# Patient Record
Sex: MALE | Race: WHITE | Employment: OTHER | URBAN - NONMETROPOLITAN AREA
[De-identification: names, ages, dates, MRNs, and addresses within clinical notes are randomized per-mention and may not be internally consistent; named-entity substitution may affect disease eponyms.]

---

## 2017-01-05 ENCOUNTER — OFFICE VISIT (OUTPATIENT)
Dept: PHYSICAL MEDICINE AND REHAB | Age: 52
End: 2017-01-05

## 2017-01-05 VITALS
HEART RATE: 75 BPM | HEIGHT: 66 IN | WEIGHT: 156.4 LBS | SYSTOLIC BLOOD PRESSURE: 106 MMHG | DIASTOLIC BLOOD PRESSURE: 64 MMHG | BODY MASS INDEX: 25.13 KG/M2

## 2017-01-05 DIAGNOSIS — G89.4 CHRONIC PAIN SYNDROME: ICD-10-CM

## 2017-01-05 DIAGNOSIS — M47.816 SPONDYLOSIS OF LUMBAR REGION WITHOUT MYELOPATHY OR RADICULOPATHY: Primary | ICD-10-CM

## 2017-01-05 PROCEDURE — G8427 DOCREV CUR MEDS BY ELIG CLIN: HCPCS | Performed by: PAIN MEDICINE

## 2017-01-05 PROCEDURE — G8484 FLU IMMUNIZE NO ADMIN: HCPCS | Performed by: PAIN MEDICINE

## 2017-01-05 PROCEDURE — G8599 NO ASA/ANTIPLAT THER USE RNG: HCPCS | Performed by: PAIN MEDICINE

## 2017-01-05 PROCEDURE — 3017F COLORECTAL CA SCREEN DOC REV: CPT | Performed by: PAIN MEDICINE

## 2017-01-05 PROCEDURE — G8419 CALC BMI OUT NRM PARAM NOF/U: HCPCS | Performed by: PAIN MEDICINE

## 2017-01-05 PROCEDURE — 99205 OFFICE O/P NEW HI 60 MIN: CPT | Performed by: PAIN MEDICINE

## 2017-01-05 PROCEDURE — 4004F PT TOBACCO SCREEN RCVD TLK: CPT | Performed by: PAIN MEDICINE

## 2017-01-05 RX ORDER — TIZANIDINE 4 MG/1
4 TABLET ORAL EVERY 8 HOURS PRN
Qty: 90 TABLET | Refills: 0 | Status: SHIPPED | OUTPATIENT
Start: 2017-01-05 | End: 2017-02-04

## 2017-01-05 RX ORDER — TRAMADOL HYDROCHLORIDE 50 MG/1
50 TABLET ORAL EVERY 8 HOURS PRN
Qty: 90 TABLET | Refills: 0 | Status: SHIPPED | OUTPATIENT
Start: 2017-01-05 | End: 2017-02-09 | Stop reason: ALTCHOICE

## 2017-01-05 ASSESSMENT — ENCOUNTER SYMPTOMS
COUGH: 0
RHINORRHEA: 0
DIARRHEA: 0
SINUS PRESSURE: 0
CHEST TIGHTNESS: 0
SORE THROAT: 0
ABDOMINAL PAIN: 0
PHOTOPHOBIA: 0
COLOR CHANGE: 0
WHEEZING: 0
SHORTNESS OF BREATH: 0
BOWEL INCONTINENCE: 0
BACK PAIN: 1
CONSTIPATION: 0
NAUSEA: 0
EYE PAIN: 0
VOMITING: 0

## 2017-01-06 DIAGNOSIS — M47.816 SPONDYLOSIS OF LUMBAR REGION WITHOUT MYELOPATHY OR RADICULOPATHY: Primary | ICD-10-CM

## 2017-01-26 ENCOUNTER — TELEPHONE (OUTPATIENT)
Dept: PHYSICAL MEDICINE AND REHAB | Age: 52
End: 2017-01-26

## 2017-02-09 ENCOUNTER — OFFICE VISIT (OUTPATIENT)
Dept: PHYSICAL MEDICINE AND REHAB | Age: 52
End: 2017-02-09

## 2017-02-09 VITALS
BODY MASS INDEX: 26.36 KG/M2 | DIASTOLIC BLOOD PRESSURE: 88 MMHG | HEART RATE: 104 BPM | HEIGHT: 66 IN | WEIGHT: 164 LBS | SYSTOLIC BLOOD PRESSURE: 127 MMHG

## 2017-02-09 DIAGNOSIS — G89.4 CHRONIC PAIN SYNDROME: ICD-10-CM

## 2017-02-09 DIAGNOSIS — M47.816 SPONDYLOSIS OF LUMBAR REGION WITHOUT MYELOPATHY OR RADICULOPATHY: Primary | ICD-10-CM

## 2017-02-09 PROCEDURE — 4004F PT TOBACCO SCREEN RCVD TLK: CPT | Performed by: NURSE PRACTITIONER

## 2017-02-09 PROCEDURE — G8419 CALC BMI OUT NRM PARAM NOF/U: HCPCS | Performed by: NURSE PRACTITIONER

## 2017-02-09 PROCEDURE — G8427 DOCREV CUR MEDS BY ELIG CLIN: HCPCS | Performed by: NURSE PRACTITIONER

## 2017-02-09 PROCEDURE — G8484 FLU IMMUNIZE NO ADMIN: HCPCS | Performed by: NURSE PRACTITIONER

## 2017-02-09 PROCEDURE — G8599 NO ASA/ANTIPLAT THER USE RNG: HCPCS | Performed by: NURSE PRACTITIONER

## 2017-02-09 PROCEDURE — 99213 OFFICE O/P EST LOW 20 MIN: CPT | Performed by: NURSE PRACTITIONER

## 2017-02-09 PROCEDURE — 3017F COLORECTAL CA SCREEN DOC REV: CPT | Performed by: NURSE PRACTITIONER

## 2017-02-09 RX ORDER — ACETAMINOPHEN AND CODEINE PHOSPHATE 300; 30 MG/1; MG/1
1 TABLET ORAL EVERY 8 HOURS PRN
Qty: 42 TABLET | Refills: 0 | Status: SHIPPED | OUTPATIENT
Start: 2017-02-09 | End: 2017-02-20

## 2017-02-09 ASSESSMENT — ENCOUNTER SYMPTOMS
BACK PAIN: 1
NAUSEA: 0
RHINORRHEA: 0
EYE PAIN: 0
CHEST TIGHTNESS: 0
SORE THROAT: 0
CONSTIPATION: 0
SHORTNESS OF BREATH: 0
SINUS PRESSURE: 0
PHOTOPHOBIA: 0
ABDOMINAL PAIN: 0
COLOR CHANGE: 0
COUGH: 0
DIARRHEA: 0
WHEEZING: 0
VOMITING: 0

## 2017-03-13 ENCOUNTER — OFFICE VISIT (OUTPATIENT)
Dept: PHYSICAL MEDICINE AND REHAB | Age: 52
End: 2017-03-13

## 2017-03-13 VITALS
WEIGHT: 156 LBS | BODY MASS INDEX: 25.07 KG/M2 | DIASTOLIC BLOOD PRESSURE: 82 MMHG | SYSTOLIC BLOOD PRESSURE: 129 MMHG | HEIGHT: 66 IN | HEART RATE: 81 BPM

## 2017-03-13 DIAGNOSIS — G89.29 CHRONIC BILATERAL LOW BACK PAIN WITHOUT SCIATICA: ICD-10-CM

## 2017-03-13 DIAGNOSIS — M47.816 SPONDYLOSIS OF LUMBAR REGION WITHOUT MYELOPATHY OR RADICULOPATHY: Primary | ICD-10-CM

## 2017-03-13 DIAGNOSIS — M54.50 CHRONIC BILATERAL LOW BACK PAIN WITHOUT SCIATICA: ICD-10-CM

## 2017-03-13 PROCEDURE — 4004F PT TOBACCO SCREEN RCVD TLK: CPT | Performed by: NURSE PRACTITIONER

## 2017-03-13 PROCEDURE — G8427 DOCREV CUR MEDS BY ELIG CLIN: HCPCS | Performed by: NURSE PRACTITIONER

## 2017-03-13 PROCEDURE — G8484 FLU IMMUNIZE NO ADMIN: HCPCS | Performed by: NURSE PRACTITIONER

## 2017-03-13 PROCEDURE — 3017F COLORECTAL CA SCREEN DOC REV: CPT | Performed by: NURSE PRACTITIONER

## 2017-03-13 PROCEDURE — 99213 OFFICE O/P EST LOW 20 MIN: CPT | Performed by: NURSE PRACTITIONER

## 2017-03-13 PROCEDURE — G8599 NO ASA/ANTIPLAT THER USE RNG: HCPCS | Performed by: NURSE PRACTITIONER

## 2017-03-13 PROCEDURE — G8419 CALC BMI OUT NRM PARAM NOF/U: HCPCS | Performed by: NURSE PRACTITIONER

## 2017-03-13 RX ORDER — TIZANIDINE 4 MG/1
4 TABLET ORAL 2 TIMES DAILY PRN
Qty: 30 TABLET | Refills: 0 | Status: SHIPPED | OUTPATIENT
Start: 2017-03-13 | End: 2017-03-28

## 2017-03-13 RX ORDER — TIZANIDINE 4 MG/1
4 TABLET ORAL EVERY 8 HOURS PRN
COMMUNITY
End: 2017-03-13 | Stop reason: SDUPTHER

## 2017-03-13 RX ORDER — ACETAMINOPHEN AND CODEINE PHOSPHATE 300; 30 MG/1; MG/1
1 TABLET ORAL EVERY 6 HOURS PRN
Qty: 120 TABLET | Refills: 0 | Status: SHIPPED | OUTPATIENT
Start: 2017-03-13 | End: 2017-04-12

## 2017-03-13 RX ORDER — PANTOPRAZOLE SODIUM 40 MG/1
40 TABLET, DELAYED RELEASE ORAL DAILY
COMMUNITY
End: 2017-09-19 | Stop reason: SDUPTHER

## 2017-03-13 ASSESSMENT — ENCOUNTER SYMPTOMS
ABDOMINAL DISTENTION: 0
CHOKING: 0
ANAL BLEEDING: 0
VOICE CHANGE: 0
STRIDOR: 0
VOMITING: 0
EYE ITCHING: 0
PHOTOPHOBIA: 0
CHEST TIGHTNESS: 0
COLOR CHANGE: 0
EYE PAIN: 0
FACIAL SWELLING: 0
EYE DISCHARGE: 0
SORE THROAT: 0
CONSTIPATION: 0
NAUSEA: 0
COUGH: 0
DIARRHEA: 0
ABDOMINAL PAIN: 0
EYE REDNESS: 0
SINUS PRESSURE: 0
RECTAL PAIN: 0
SHORTNESS OF BREATH: 0
RHINORRHEA: 0
WHEEZING: 0
APNEA: 0
TROUBLE SWALLOWING: 0
BLOOD IN STOOL: 0
BACK PAIN: 1

## 2017-03-30 ENCOUNTER — TELEPHONE (OUTPATIENT)
Dept: PHYSICAL MEDICINE AND REHAB | Age: 52
End: 2017-03-30

## 2017-04-13 ENCOUNTER — OFFICE VISIT (OUTPATIENT)
Dept: PHYSICAL MEDICINE AND REHAB | Age: 52
End: 2017-04-13

## 2017-04-13 VITALS
WEIGHT: 144.18 LBS | SYSTOLIC BLOOD PRESSURE: 104 MMHG | HEART RATE: 77 BPM | DIASTOLIC BLOOD PRESSURE: 63 MMHG | BODY MASS INDEX: 23.17 KG/M2 | HEIGHT: 66 IN

## 2017-04-13 DIAGNOSIS — G89.29 CHRONIC BILATERAL LOW BACK PAIN WITHOUT SCIATICA: ICD-10-CM

## 2017-04-13 DIAGNOSIS — G89.4 CHRONIC PAIN SYNDROME: ICD-10-CM

## 2017-04-13 DIAGNOSIS — M54.50 CHRONIC BILATERAL LOW BACK PAIN WITHOUT SCIATICA: ICD-10-CM

## 2017-04-13 DIAGNOSIS — M47.816 SPONDYLOSIS OF LUMBAR REGION WITHOUT MYELOPATHY OR RADICULOPATHY: Primary | ICD-10-CM

## 2017-04-13 PROCEDURE — G8420 CALC BMI NORM PARAMETERS: HCPCS | Performed by: NURSE PRACTITIONER

## 2017-04-13 PROCEDURE — 4004F PT TOBACCO SCREEN RCVD TLK: CPT | Performed by: NURSE PRACTITIONER

## 2017-04-13 PROCEDURE — G8427 DOCREV CUR MEDS BY ELIG CLIN: HCPCS | Performed by: NURSE PRACTITIONER

## 2017-04-13 PROCEDURE — 99213 OFFICE O/P EST LOW 20 MIN: CPT | Performed by: NURSE PRACTITIONER

## 2017-04-13 PROCEDURE — G8599 NO ASA/ANTIPLAT THER USE RNG: HCPCS | Performed by: NURSE PRACTITIONER

## 2017-04-13 PROCEDURE — 3017F COLORECTAL CA SCREEN DOC REV: CPT | Performed by: NURSE PRACTITIONER

## 2017-04-13 ASSESSMENT — ENCOUNTER SYMPTOMS
CONSTIPATION: 0
CHEST TIGHTNESS: 0
COUGH: 0
BACK PAIN: 1
VOMITING: 0
SINUS PRESSURE: 0
SORE THROAT: 0
DIARRHEA: 0
RHINORRHEA: 0
ABDOMINAL PAIN: 0
COLOR CHANGE: 0
EYE PAIN: 0
SHORTNESS OF BREATH: 0
PHOTOPHOBIA: 0
WHEEZING: 0
NAUSEA: 0

## 2017-06-12 ENCOUNTER — OFFICE VISIT (OUTPATIENT)
Dept: PHYSICAL MEDICINE AND REHAB | Age: 52
End: 2017-06-12

## 2017-06-12 VITALS
BODY MASS INDEX: 24.91 KG/M2 | HEIGHT: 66 IN | SYSTOLIC BLOOD PRESSURE: 124 MMHG | DIASTOLIC BLOOD PRESSURE: 77 MMHG | HEART RATE: 93 BPM | WEIGHT: 155 LBS

## 2017-06-12 DIAGNOSIS — M46.1 SI (SACROILIAC) JOINT INFLAMMATION (HCC): Primary | ICD-10-CM

## 2017-06-12 DIAGNOSIS — G89.4 CHRONIC PAIN SYNDROME: ICD-10-CM

## 2017-06-12 DIAGNOSIS — G89.29 CHRONIC BILATERAL LOW BACK PAIN WITHOUT SCIATICA: ICD-10-CM

## 2017-06-12 DIAGNOSIS — M47.816 SPONDYLOSIS OF LUMBAR REGION WITHOUT MYELOPATHY OR RADICULOPATHY: ICD-10-CM

## 2017-06-12 DIAGNOSIS — M54.50 CHRONIC BILATERAL LOW BACK PAIN WITHOUT SCIATICA: ICD-10-CM

## 2017-06-12 PROCEDURE — 99213 OFFICE O/P EST LOW 20 MIN: CPT | Performed by: NURSE PRACTITIONER

## 2017-06-12 PROCEDURE — 4004F PT TOBACCO SCREEN RCVD TLK: CPT | Performed by: NURSE PRACTITIONER

## 2017-06-12 PROCEDURE — G8599 NO ASA/ANTIPLAT THER USE RNG: HCPCS | Performed by: NURSE PRACTITIONER

## 2017-06-12 PROCEDURE — G8419 CALC BMI OUT NRM PARAM NOF/U: HCPCS | Performed by: NURSE PRACTITIONER

## 2017-06-12 PROCEDURE — 3017F COLORECTAL CA SCREEN DOC REV: CPT | Performed by: NURSE PRACTITIONER

## 2017-06-12 PROCEDURE — G8427 DOCREV CUR MEDS BY ELIG CLIN: HCPCS | Performed by: NURSE PRACTITIONER

## 2017-06-12 RX ORDER — ACETAMINOPHEN AND CODEINE PHOSPHATE 300; 30 MG/1; MG/1
1 TABLET ORAL EVERY 4 HOURS PRN
COMMUNITY
End: 2017-09-19

## 2017-06-12 ASSESSMENT — ENCOUNTER SYMPTOMS
RHINORRHEA: 0
ABDOMINAL PAIN: 0
APNEA: 0
ABDOMINAL DISTENTION: 0
STRIDOR: 0
WHEEZING: 0
SHORTNESS OF BREATH: 0
COLOR CHANGE: 0
VOICE CHANGE: 0
VOMITING: 0
BACK PAIN: 1
TROUBLE SWALLOWING: 0
EYE ITCHING: 0
DIARRHEA: 0
EYE REDNESS: 0
CONSTIPATION: 0
EYE DISCHARGE: 0
NAUSEA: 0
SORE THROAT: 0
PHOTOPHOBIA: 0
RECTAL PAIN: 0
COUGH: 0
FACIAL SWELLING: 0
CHEST TIGHTNESS: 0
EYE PAIN: 0
ANAL BLEEDING: 0
CHOKING: 0
SINUS PRESSURE: 0
BLOOD IN STOOL: 0

## 2017-06-29 ENCOUNTER — OFFICE VISIT (OUTPATIENT)
Dept: CARDIOLOGY | Age: 52
End: 2017-06-29

## 2017-06-29 VITALS
HEART RATE: 68 BPM | DIASTOLIC BLOOD PRESSURE: 60 MMHG | WEIGHT: 163 LBS | HEIGHT: 66 IN | SYSTOLIC BLOOD PRESSURE: 112 MMHG | BODY MASS INDEX: 26.2 KG/M2

## 2017-06-29 DIAGNOSIS — I20.8 CARDIAC SYNDROME X (HCC): ICD-10-CM

## 2017-06-29 DIAGNOSIS — B20 HIV (HUMAN IMMUNODEFICIENCY VIRUS INFECTION) (HCC): ICD-10-CM

## 2017-06-29 DIAGNOSIS — E78.00 PURE HYPERCHOLESTEROLEMIA: Primary | ICD-10-CM

## 2017-06-29 PROCEDURE — 4004F PT TOBACCO SCREEN RCVD TLK: CPT | Performed by: INTERNAL MEDICINE

## 2017-06-29 PROCEDURE — G8427 DOCREV CUR MEDS BY ELIG CLIN: HCPCS | Performed by: INTERNAL MEDICINE

## 2017-06-29 PROCEDURE — 99213 OFFICE O/P EST LOW 20 MIN: CPT | Performed by: INTERNAL MEDICINE

## 2017-06-29 PROCEDURE — G8419 CALC BMI OUT NRM PARAM NOF/U: HCPCS | Performed by: INTERNAL MEDICINE

## 2017-06-29 PROCEDURE — 3017F COLORECTAL CA SCREEN DOC REV: CPT | Performed by: INTERNAL MEDICINE

## 2017-06-29 PROCEDURE — G8599 NO ASA/ANTIPLAT THER USE RNG: HCPCS | Performed by: INTERNAL MEDICINE

## 2017-06-29 RX ORDER — ISOSORBIDE DINITRATE 20 MG/1
TABLET ORAL
Qty: 180 TABLET | Refills: 0 | Status: SHIPPED | OUTPATIENT
Start: 2017-06-29 | End: 2018-02-12 | Stop reason: SDUPTHER

## 2017-07-18 ENCOUNTER — ANESTHESIA (OUTPATIENT)
Dept: OPERATING ROOM | Age: 52
End: 2017-07-18
Payer: MEDICARE

## 2017-07-18 ENCOUNTER — HOSPITAL ENCOUNTER (OUTPATIENT)
Age: 52
Setting detail: OUTPATIENT SURGERY
Discharge: HOME | End: 2017-07-18
Attending: PAIN MEDICINE | Admitting: PAIN MEDICINE
Payer: MEDICARE

## 2017-07-18 ENCOUNTER — HOSPITAL ENCOUNTER (OUTPATIENT)
Dept: GENERAL RADIOLOGY | Age: 52
Discharge: HOME | End: 2017-07-18

## 2017-07-18 ENCOUNTER — ANESTHESIA EVENT (OUTPATIENT)
Dept: OPERATING ROOM | Age: 52
End: 2017-07-18
Payer: MEDICARE

## 2017-07-18 VITALS
DIASTOLIC BLOOD PRESSURE: 74 MMHG | TEMPERATURE: 98.1 F | HEART RATE: 69 BPM | OXYGEN SATURATION: 97 % | BODY MASS INDEX: 26.33 KG/M2 | HEIGHT: 66 IN | RESPIRATION RATE: 16 BRPM | SYSTOLIC BLOOD PRESSURE: 115 MMHG | WEIGHT: 163.8 LBS

## 2017-07-18 VITALS — DIASTOLIC BLOOD PRESSURE: 67 MMHG | SYSTOLIC BLOOD PRESSURE: 115 MMHG | OXYGEN SATURATION: 100 %

## 2017-07-18 PROCEDURE — 2500000003 HC RX 250 WO HCPCS: Performed by: PAIN MEDICINE

## 2017-07-18 PROCEDURE — 27096 INJECT SACROILIAC JOINT: CPT | Performed by: PAIN MEDICINE

## 2017-07-18 PROCEDURE — 3600000054 HC PAIN LEVEL 3 BASE: Performed by: PAIN MEDICINE

## 2017-07-18 PROCEDURE — 2580000003 HC RX 258: Performed by: NURSE ANESTHETIST, CERTIFIED REGISTERED

## 2017-07-18 PROCEDURE — 6360000002 HC RX W HCPCS: Performed by: NURSE ANESTHETIST, CERTIFIED REGISTERED

## 2017-07-18 PROCEDURE — 3700000000 HC ANESTHESIA ATTENDED CARE: Performed by: PAIN MEDICINE

## 2017-07-18 PROCEDURE — 7100000011 HC PHASE II RECOVERY - ADDTL 15 MIN: Performed by: PAIN MEDICINE

## 2017-07-18 PROCEDURE — 7100000010 HC PHASE II RECOVERY - FIRST 15 MIN: Performed by: PAIN MEDICINE

## 2017-07-18 PROCEDURE — 2500000003 HC RX 250 WO HCPCS: Performed by: NURSE ANESTHETIST, CERTIFIED REGISTERED

## 2017-07-18 PROCEDURE — 3209999900 FL SURGICAL TECHNOLOGIST

## 2017-07-18 PROCEDURE — 6360000002 HC RX W HCPCS: Performed by: PAIN MEDICINE

## 2017-07-18 RX ORDER — SODIUM CHLORIDE 9 MG/ML
INJECTION, SOLUTION INTRAVENOUS CONTINUOUS PRN
Status: DISCONTINUED | OUTPATIENT
Start: 2017-07-18 | End: 2017-07-18 | Stop reason: SDUPTHER

## 2017-07-18 RX ORDER — LIDOCAINE HYDROCHLORIDE 10 MG/ML
INJECTION, SOLUTION INFILTRATION; PERINEURAL PRN
Status: DISCONTINUED | OUTPATIENT
Start: 2017-07-18 | End: 2017-07-18 | Stop reason: SDUPTHER

## 2017-07-18 RX ORDER — METHYLPREDNISOLONE ACETATE 80 MG/ML
INJECTION, SUSPENSION INTRA-ARTICULAR; INTRALESIONAL; INTRAMUSCULAR; SOFT TISSUE PRN
Status: DISCONTINUED | OUTPATIENT
Start: 2017-07-18 | End: 2017-07-18 | Stop reason: HOSPADM

## 2017-07-18 RX ORDER — LIDOCAINE HYDROCHLORIDE 10 MG/ML
INJECTION, SOLUTION INFILTRATION; PERINEURAL PRN
Status: DISCONTINUED | OUTPATIENT
Start: 2017-07-18 | End: 2017-07-18 | Stop reason: HOSPADM

## 2017-07-18 RX ORDER — BUPIVACAINE HYDROCHLORIDE 2.5 MG/ML
INJECTION, SOLUTION EPIDURAL; INFILTRATION; INTRACAUDAL PRN
Status: DISCONTINUED | OUTPATIENT
Start: 2017-07-18 | End: 2017-07-18 | Stop reason: HOSPADM

## 2017-07-18 RX ORDER — PROPOFOL 10 MG/ML
INJECTION, EMULSION INTRAVENOUS PRN
Status: DISCONTINUED | OUTPATIENT
Start: 2017-07-18 | End: 2017-07-18 | Stop reason: SDUPTHER

## 2017-07-18 RX ADMIN — SODIUM CHLORIDE: 9 INJECTION, SOLUTION INTRAVENOUS at 14:23

## 2017-07-18 RX ADMIN — LIDOCAINE HYDROCHLORIDE 40 MG: 10 INJECTION, SOLUTION INFILTRATION; PERINEURAL at 14:26

## 2017-07-18 RX ADMIN — PROPOFOL 50 MG: 10 INJECTION, EMULSION INTRAVENOUS at 14:26

## 2017-07-18 ASSESSMENT — PULMONARY FUNCTION TESTS
PIF_VALUE: 0

## 2017-07-18 ASSESSMENT — PAIN - FUNCTIONAL ASSESSMENT: PAIN_FUNCTIONAL_ASSESSMENT: 0-10

## 2017-07-18 ASSESSMENT — PAIN SCALES - GENERAL: PAINLEVEL_OUTOF10: 0

## 2017-07-20 ENCOUNTER — HOSPITAL ENCOUNTER (OUTPATIENT)
Age: 52
Discharge: HOME | End: 2017-07-20
Payer: MEDICARE

## 2017-07-20 PROCEDURE — 86361 T CELL ABSOLUTE COUNT: CPT

## 2017-07-20 PROCEDURE — 87536 HIV-1 QUANT&REVRSE TRNSCRPJ: CPT

## 2017-07-20 PROCEDURE — 36415 COLL VENOUS BLD VENIPUNCTURE: CPT

## 2017-07-22 LAB — CD4 T-HELPER CELLS: NORMAL

## 2017-07-23 LAB
HIV RNA PCR INTERPRETATION: NOT DETECTED
HIV-1 RNA BY PCR, QN: < 1.3 LOG
HIV-1 RNA BY PCR, QN: < 20 CPY/ML

## 2017-08-07 ENCOUNTER — OFFICE VISIT (OUTPATIENT)
Dept: PHYSICAL MEDICINE AND REHAB | Age: 52
End: 2017-08-07
Payer: MEDICARE

## 2017-08-07 VITALS
SYSTOLIC BLOOD PRESSURE: 126 MMHG | WEIGHT: 168.6 LBS | BODY MASS INDEX: 27.1 KG/M2 | HEART RATE: 63 BPM | DIASTOLIC BLOOD PRESSURE: 72 MMHG | HEIGHT: 66 IN

## 2017-08-07 DIAGNOSIS — G89.4 CHRONIC PAIN SYNDROME: ICD-10-CM

## 2017-08-07 DIAGNOSIS — M54.50 CHRONIC BILATERAL LOW BACK PAIN WITHOUT SCIATICA: ICD-10-CM

## 2017-08-07 DIAGNOSIS — M46.1 SI (SACROILIAC) JOINT INFLAMMATION (HCC): Primary | ICD-10-CM

## 2017-08-07 DIAGNOSIS — G89.29 CHRONIC BILATERAL LOW BACK PAIN WITHOUT SCIATICA: ICD-10-CM

## 2017-08-07 DIAGNOSIS — M47.816 SPONDYLOSIS OF LUMBAR REGION WITHOUT MYELOPATHY OR RADICULOPATHY: ICD-10-CM

## 2017-08-07 PROCEDURE — G8599 NO ASA/ANTIPLAT THER USE RNG: HCPCS | Performed by: NURSE PRACTITIONER

## 2017-08-07 PROCEDURE — G8419 CALC BMI OUT NRM PARAM NOF/U: HCPCS | Performed by: NURSE PRACTITIONER

## 2017-08-07 PROCEDURE — 4004F PT TOBACCO SCREEN RCVD TLK: CPT | Performed by: NURSE PRACTITIONER

## 2017-08-07 PROCEDURE — 99213 OFFICE O/P EST LOW 20 MIN: CPT | Performed by: NURSE PRACTITIONER

## 2017-08-07 PROCEDURE — G8427 DOCREV CUR MEDS BY ELIG CLIN: HCPCS | Performed by: NURSE PRACTITIONER

## 2017-08-07 PROCEDURE — 3017F COLORECTAL CA SCREEN DOC REV: CPT | Performed by: NURSE PRACTITIONER

## 2017-08-07 RX ORDER — CYCLOBENZAPRINE HCL 10 MG
10 TABLET ORAL 3 TIMES DAILY PRN
COMMUNITY
End: 2017-08-07 | Stop reason: SDUPTHER

## 2017-08-07 RX ORDER — TRAMADOL HYDROCHLORIDE 50 MG/1
50 TABLET ORAL EVERY 8 HOURS PRN
COMMUNITY
End: 2019-03-07 | Stop reason: SDUPTHER

## 2017-08-07 RX ORDER — CYCLOBENZAPRINE HCL 10 MG
10 TABLET ORAL DAILY PRN
Qty: 30 TABLET | Refills: 0 | Status: SHIPPED | OUTPATIENT
Start: 2017-08-07 | End: 2018-04-04 | Stop reason: SDUPTHER

## 2017-08-07 ASSESSMENT — ENCOUNTER SYMPTOMS
BACK PAIN: 0
RHINORRHEA: 0
SINUS PRESSURE: 0
PHOTOPHOBIA: 0
CHEST TIGHTNESS: 0
COUGH: 0
NAUSEA: 0
WHEEZING: 0
SHORTNESS OF BREATH: 0
CONSTIPATION: 0
SORE THROAT: 0
ABDOMINAL PAIN: 0
COLOR CHANGE: 0
VOMITING: 0
EYE PAIN: 0
DIARRHEA: 0

## 2017-08-15 ENCOUNTER — HOSPITAL ENCOUNTER (OUTPATIENT)
Dept: ULTRASOUND IMAGING | Age: 52
Discharge: HOME | End: 2017-08-15
Payer: MEDICARE

## 2017-08-15 DIAGNOSIS — E78.00 HYPERCHOLESTEROLEMIA: ICD-10-CM

## 2017-08-15 PROCEDURE — 76705 ECHO EXAM OF ABDOMEN: CPT

## 2017-08-16 ENCOUNTER — HOSPITAL ENCOUNTER (OUTPATIENT)
Dept: AUDIOLOGY | Age: 52
Discharge: HOME | End: 2017-08-16
Payer: MEDICARE

## 2017-08-16 PROCEDURE — 92557 COMPREHENSIVE HEARING TEST: CPT | Performed by: AUDIOLOGIST

## 2017-08-16 PROCEDURE — 92567 TYMPANOMETRY: CPT | Performed by: AUDIOLOGIST

## 2017-09-18 ENCOUNTER — OFFICE VISIT (OUTPATIENT)
Dept: PHYSICAL MEDICINE AND REHAB | Age: 52
End: 2017-09-18
Payer: MEDICARE

## 2017-09-18 VITALS
BODY MASS INDEX: 27.1 KG/M2 | HEART RATE: 84 BPM | WEIGHT: 168.65 LBS | DIASTOLIC BLOOD PRESSURE: 79 MMHG | HEIGHT: 66 IN | SYSTOLIC BLOOD PRESSURE: 115 MMHG

## 2017-09-18 DIAGNOSIS — M46.1 SI (SACROILIAC) JOINT INFLAMMATION (HCC): Primary | ICD-10-CM

## 2017-09-18 DIAGNOSIS — G89.29 CHRONIC BILATERAL LOW BACK PAIN WITHOUT SCIATICA: ICD-10-CM

## 2017-09-18 DIAGNOSIS — M47.816 SPONDYLOSIS OF LUMBAR REGION WITHOUT MYELOPATHY OR RADICULOPATHY: ICD-10-CM

## 2017-09-18 DIAGNOSIS — G89.4 CHRONIC PAIN SYNDROME: ICD-10-CM

## 2017-09-18 DIAGNOSIS — M54.50 CHRONIC BILATERAL LOW BACK PAIN WITHOUT SCIATICA: ICD-10-CM

## 2017-09-18 PROCEDURE — 99213 OFFICE O/P EST LOW 20 MIN: CPT | Performed by: NURSE PRACTITIONER

## 2017-09-18 PROCEDURE — 3017F COLORECTAL CA SCREEN DOC REV: CPT | Performed by: NURSE PRACTITIONER

## 2017-09-18 PROCEDURE — G8427 DOCREV CUR MEDS BY ELIG CLIN: HCPCS | Performed by: NURSE PRACTITIONER

## 2017-09-18 PROCEDURE — G8599 NO ASA/ANTIPLAT THER USE RNG: HCPCS | Performed by: NURSE PRACTITIONER

## 2017-09-18 PROCEDURE — G8417 CALC BMI ABV UP PARAM F/U: HCPCS | Performed by: NURSE PRACTITIONER

## 2017-09-18 PROCEDURE — 4004F PT TOBACCO SCREEN RCVD TLK: CPT | Performed by: NURSE PRACTITIONER

## 2017-09-18 ASSESSMENT — ENCOUNTER SYMPTOMS
PHOTOPHOBIA: 0
RESPIRATORY NEGATIVE: 1
ALLERGIC/IMMUNOLOGIC NEGATIVE: 1
CHEST TIGHTNESS: 0
ABDOMINAL PAIN: 0
SHORTNESS OF BREATH: 0
COLOR CHANGE: 0
DIARRHEA: 0
RHINORRHEA: 0
NAUSEA: 0
SINUS PRESSURE: 0
WHEEZING: 0
SORE THROAT: 0
BACK PAIN: 1
COUGH: 0
VOMITING: 0
EYES NEGATIVE: 1
CONSTIPATION: 0
EYE PAIN: 0

## 2017-10-26 ENCOUNTER — HOSPITAL ENCOUNTER (OUTPATIENT)
Age: 52
Discharge: HOME | End: 2017-10-26
Payer: MEDICARE

## 2017-10-26 PROCEDURE — 83704 LIPOPROTEIN BLD QUAN PART: CPT

## 2017-10-26 PROCEDURE — 87536 HIV-1 QUANT&REVRSE TRNSCRPJ: CPT

## 2017-10-26 PROCEDURE — 86361 T CELL ABSOLUTE COUNT: CPT

## 2017-10-26 PROCEDURE — 80061 LIPID PANEL: CPT

## 2017-10-26 PROCEDURE — 36415 COLL VENOUS BLD VENIPUNCTURE: CPT

## 2017-10-27 LAB — CD4 T-HELPER CELLS: NORMAL

## 2017-10-29 LAB — MISC. #1 REFERENCE GROUP TEST: ABNORMAL

## 2017-11-01 RX ORDER — NITROGLYCERIN 0.4 MG/1
TABLET SUBLINGUAL
Qty: 25 TABLET | Refills: 3 | Status: SHIPPED | OUTPATIENT
Start: 2017-11-01

## 2017-11-13 ENCOUNTER — HOSPITAL ENCOUNTER (OUTPATIENT)
Age: 52
Discharge: HOME | End: 2017-11-13
Payer: MEDICARE

## 2017-11-13 VITALS
TEMPERATURE: 97.9 F | OXYGEN SATURATION: 99 % | DIASTOLIC BLOOD PRESSURE: 67 MMHG | RESPIRATION RATE: 16 BRPM | SYSTOLIC BLOOD PRESSURE: 123 MMHG | HEART RATE: 71 BPM

## 2017-11-13 PROCEDURE — 86580 TB INTRADERMAL TEST: CPT

## 2017-11-20 ENCOUNTER — OFFICE VISIT (OUTPATIENT)
Dept: PHYSICAL MEDICINE AND REHAB | Age: 52
End: 2017-11-20
Payer: MEDICARE

## 2017-11-20 VITALS
BODY MASS INDEX: 26.2 KG/M2 | DIASTOLIC BLOOD PRESSURE: 82 MMHG | HEART RATE: 73 BPM | WEIGHT: 163 LBS | SYSTOLIC BLOOD PRESSURE: 120 MMHG | HEIGHT: 66 IN

## 2017-11-20 DIAGNOSIS — M47.816 SPONDYLOSIS OF LUMBAR REGION WITHOUT MYELOPATHY OR RADICULOPATHY: ICD-10-CM

## 2017-11-20 DIAGNOSIS — M54.50 CHRONIC BILATERAL LOW BACK PAIN WITHOUT SCIATICA: ICD-10-CM

## 2017-11-20 DIAGNOSIS — G89.4 CHRONIC PAIN SYNDROME: ICD-10-CM

## 2017-11-20 DIAGNOSIS — M46.1 SI (SACROILIAC) JOINT INFLAMMATION (HCC): Primary | ICD-10-CM

## 2017-11-20 DIAGNOSIS — G89.29 CHRONIC BILATERAL LOW BACK PAIN WITHOUT SCIATICA: ICD-10-CM

## 2017-11-20 PROCEDURE — G8427 DOCREV CUR MEDS BY ELIG CLIN: HCPCS | Performed by: NURSE PRACTITIONER

## 2017-11-20 PROCEDURE — 3017F COLORECTAL CA SCREEN DOC REV: CPT | Performed by: NURSE PRACTITIONER

## 2017-11-20 PROCEDURE — G8484 FLU IMMUNIZE NO ADMIN: HCPCS | Performed by: NURSE PRACTITIONER

## 2017-11-20 PROCEDURE — 4004F PT TOBACCO SCREEN RCVD TLK: CPT | Performed by: NURSE PRACTITIONER

## 2017-11-20 PROCEDURE — G8599 NO ASA/ANTIPLAT THER USE RNG: HCPCS | Performed by: NURSE PRACTITIONER

## 2017-11-20 PROCEDURE — G8417 CALC BMI ABV UP PARAM F/U: HCPCS | Performed by: NURSE PRACTITIONER

## 2017-11-20 PROCEDURE — 99213 OFFICE O/P EST LOW 20 MIN: CPT | Performed by: NURSE PRACTITIONER

## 2017-11-20 ASSESSMENT — ENCOUNTER SYMPTOMS
COLOR CHANGE: 0
EYES NEGATIVE: 1
ALLERGIC/IMMUNOLOGIC NEGATIVE: 1
DIARRHEA: 0
WHEEZING: 0
PHOTOPHOBIA: 0
COUGH: 0
BACK PAIN: 1
CONSTIPATION: 0
CHEST TIGHTNESS: 0
SORE THROAT: 0
VOMITING: 0
RHINORRHEA: 0
EYE PAIN: 0
SHORTNESS OF BREATH: 0
SINUS PRESSURE: 0
NAUSEA: 0
RESPIRATORY NEGATIVE: 1
ABDOMINAL PAIN: 0

## 2017-11-20 NOTE — PROGRESS NOTES
Arthritis; Arthritis; Chest pain; Depression; Dysphagia; GERD (gastroesophageal reflux disease); History of blood transfusion; History of nephrolithiasis; HIV (human immunodeficiency virus infection) ([facility] [address] Hyperlipidemia; Hypertension; and Pancreatitis. Past Surgical History  The patient  has a past surgical history that includes Foot surgery (1970's); Cardiac catheterization (2015???); Colonoscopy (2016); Endoscopy, colon, diagnostic; other surgical history (Bilateral, 01/17/2017); other surgical history (Bilateral, 02/20/2017); other surgical history (Right, 03/28/2017); Nerve Surgery (Bilateral, 07/18/2017); Nerve Block Lumb Facet Level 1 Bilateral (Bilateral, 7/18/2017); and Upper gastrointestinal endoscopy (2985,4110U8, 6388,6385). Family History  This patient's family history includes Diabetes in his mother; High Blood Pressure in his mother. Social History  Abeba Gauthier  reports that he has been smoking Cigars. He has a 20.00 pack-year smoking history. He has never used smokeless tobacco. He reports that he does not drink alcohol or use drugs. Medications    Current Outpatient Prescriptions:     Acetaminophen-Codeine (TYLENOL WITH CODEINE #3 PO), Take by mouth 3 times daily as needed, Disp: , Rfl:     NITROSTAT 0.4 MG SL tablet, place 1 tablet under the tongue if needed every 5 minutes for chest pain for 3 doses IF NO RELIEF AFTER 3RD DOSE CALL PRESCRIBER ., Disp: 25 tablet, Rfl: 3    cyclobenzaprine (FLEXERIL) 10 MG tablet, Take 10 mg by mouth 3 times daily as needed for Muscle spasms, Disp: , Rfl:     traMADol (ULTRAM) 50 MG tablet, Take 50 mg by mouth every 8 hours as needed for Pain, Disp: , Rfl:     metoprolol tartrate (LOPRESSOR) 25 MG tablet, take 1 tablet by mouth twice a day, Disp: 180 tablet, Rfl: 0    isosorbide dinitrate (ISORDIL) 20 MG tablet, take 1 tablet by mouth twice a day, Disp: 180 tablet, Rfl: 0    simvastatin (ZOCOR) 10 MG tablet, Take 10 mg by mouth nightly.   , hallucinations, self-injury, sleep disturbance and suicidal ideas. The patient is not nervous/anxious and is not hyperactive. Objective:     Vitals:    11/20/17 1422   BP: 120/82   Site: Left Arm   Position: Sitting   Pulse: 73   Weight: 163 lb (73.9 kg)   Height: 5' 5.98\" (1.676 m)       Physical Exam   Constitutional: He is oriented to person, place, and time. He appears well-developed and well-nourished. No distress. HENT:   Head: Normocephalic and atraumatic. Right Ear: External ear normal.   Left Ear: External ear normal.   Nose: Nose normal.   Mouth/Throat: Oropharynx is clear and moist. No oropharyngeal exudate. Eyes: Conjunctivae and EOM are normal. Pupils are equal, round, and reactive to light. Right eye exhibits no discharge. Left eye exhibits no discharge. No scleral icterus. Neck: Normal range of motion and full passive range of motion without pain. Neck supple. No muscular tenderness present. No neck rigidity. No edema, no erythema and normal range of motion present. No thyromegaly present. Cardiovascular: Normal rate, regular rhythm, normal heart sounds and intact distal pulses. Exam reveals no gallop and no friction rub. No murmur heard. Pulmonary/Chest: Effort normal and breath sounds normal. No respiratory distress. He has no wheezes. He has no rales. He exhibits no tenderness. Abdominal: Soft. Bowel sounds are normal. He exhibits no distension. There is no tenderness. There is no rebound and no guarding. Musculoskeletal: He exhibits tenderness. Right shoulder: Normal.        Left shoulder: Normal.        Right hip: He exhibits tenderness. Left hip: He exhibits tenderness. Right knee: Normal.        Left knee: Normal.        Right ankle: Normal.        Left ankle: Normal.        Cervical back: Normal.        Thoracic back: Normal.        Lumbar back: He exhibits tenderness and spasm. He exhibits no pain.    Neurological: He is alert and oriented to person, place, and time. He has normal reflexes. He is not disoriented. He displays no atrophy. No cranial nerve deficit or sensory deficit. He exhibits normal muscle tone. He displays a negative Romberg sign. Coordination and gait normal.   Reflex Scores:       Tricep reflexes are 2+ on the right side. SLR-  Motor 4/5 generalized weakness  Feet bilat dusky when dependent   Skin: Skin is warm. No rash noted. He is not diaphoretic. No erythema. No pallor. Psychiatric: He has a normal mood and affect. His speech is normal and behavior is normal. Judgment and thought content normal. His mood appears not anxious. His affect is not angry, not blunt, not labile and not inappropriate. He is not agitated, not aggressive, not hyperactive, not slowed, not withdrawn, not actively hallucinating and not combative. Thought content is not paranoid and not delusional. Cognition and memory are normal. Cognition and memory are not impaired. He does not express impulsivity or inappropriate judgment. He does not exhibit a depressed mood. He expresses no homicidal and no suicidal ideation. He expresses no suicidal plans and no homicidal plans. He exhibits normal recent memory and normal remote memory. He is attentive. Nursing note and vitals reviewed. Assessment:     1. SI (sacroiliac) joint inflammation (HCC)    2. Spondylosis of lumbar region without myelopathy or radiculopathy    3. Chronic pain syndrome    4. Chronic bilateral low back pain without sciatica            Plan:      · OARRS reviewed. · Discussed long term side effects of medications. · Discussed tolerance, dependency and addiction. · Previous UDS reviewed. · UDS preformed today for compliance. · Patient told can not receive any pain medications from any other source. · Discussed with pt.may not be pain free. · No evidence of abuse, diversion or aberrant behavior. · Medications and/or procedures improve function and quality of life. .  · Continue tylenol #3 prn pain  · Plan to repeat Bilateral SI MBB in future if need,continues to receive 80% relief from #1    Meds. Prescribed:   No orders of the defined types were placed in this encounter. Return in about 2 months (around 1/20/2018) for Follow up pain medications, Follow up after procedure.          Electronically signed by Jesus Gee CNP on 11/20/2017 at 2:34 PM

## 2018-02-06 ENCOUNTER — HOSPITAL ENCOUNTER (OUTPATIENT)
Age: 53
Discharge: HOME | End: 2018-02-06
Payer: MEDICARE

## 2018-02-06 PROCEDURE — 36415 COLL VENOUS BLD VENIPUNCTURE: CPT

## 2018-02-06 PROCEDURE — 86361 T CELL ABSOLUTE COUNT: CPT

## 2018-02-06 PROCEDURE — 87536 HIV-1 QUANT&REVRSE TRNSCRPJ: CPT

## 2018-02-08 LAB
CD4 T-HELPER CELLS: NORMAL
HIV RNA PCR INTERPRETATION: NOT DETECTED
HIV-1 RNA BY PCR, QN: < 1.3 LOG
HIV-1 RNA BY PCR, QN: < 20 CPY/ML

## 2018-02-12 ENCOUNTER — HOSPITAL ENCOUNTER (OUTPATIENT)
Age: 53
Discharge: HOME | End: 2018-02-12
Payer: MEDICARE

## 2018-02-12 LAB
ALBUMIN SERPL-MCNC: 4.5 G/DL (ref 3.5–5.1)
ALP BLD-CCNC: 78 U/L (ref 38–126)
ALT SERPL-CCNC: 25 U/L (ref 11–66)
ANION GAP SERPL CALCULATED.3IONS-SCNC: 16 MEQ/L (ref 8–16)
AST SERPL-CCNC: 23 U/L (ref 5–40)
BASOPHILIA: SLIGHT
BASOPHILS # BLD: 0.6 %
BASOPHILS ABSOLUTE: 0 THOU/MM3 (ref 0–0.1)
BILIRUB SERPL-MCNC: 0.5 MG/DL (ref 0.3–1.2)
BUN BLDV-MCNC: 9 MG/DL (ref 7–22)
CALCIUM SERPL-MCNC: 9.7 MG/DL (ref 8.5–10.5)
CHLORIDE BLD-SCNC: 98 MEQ/L (ref 98–111)
CHOLESTEROL, TOTAL: 171 MG/DL (ref 100–199)
CO2: 27 MEQ/L (ref 23–33)
CREAT SERPL-MCNC: 1 MG/DL (ref 0.4–1.2)
DIFFERENTIAL TYPE: ABNORMAL
EOSINOPHIL # BLD: 3.2 %
EOSINOPHILS ABSOLUTE: 0.2 THOU/MM3 (ref 0–0.4)
GFR SERPL CREATININE-BSD FRML MDRD: 78 ML/MIN/1.73M2
GLUCOSE BLD-MCNC: 123 MG/DL (ref 70–108)
HCT VFR BLD CALC: 38.3 % (ref 42–52)
HDLC SERPL-MCNC: 32 MG/DL
HEMOGLOBIN: 13.3 GM/DL (ref 14–18)
LDL CHOLESTEROL CALCULATED: 82 MG/DL
LYMPHOCYTES # BLD: 52.8 %
LYMPHOCYTES ABSOLUTE: 4 THOU/MM3 (ref 1–4.8)
MACROCYTES: ABNORMAL
MCH RBC QN AUTO: 40.9 PG (ref 27–31)
MCHC RBC AUTO-ENTMCNC: 34.7 GM/DL (ref 33–37)
MCV RBC AUTO: 117.9 FL (ref 80–94)
MONOCYTES # BLD: 7 %
MONOCYTES ABSOLUTE: 0.5 THOU/MM3 (ref 0.4–1.3)
NUCLEATED RED BLOOD CELLS: 0 /100 WBC
PATHOLOGIST REVIEW: ABNORMAL
PDW BLD-RTO: 13.1 % (ref 11.5–14.5)
PLATELET # BLD: 269 THOU/MM3 (ref 130–400)
PLATELET ESTIMATE: ADEQUATE
PMV BLD AUTO: 7.2 FL (ref 7.4–10.4)
POIKILOCYTES: SLIGHT
POTASSIUM SERPL-SCNC: 4.5 MEQ/L (ref 3.5–5.2)
RBC # BLD: 3.25 MILL/MM3 (ref 4.7–6.1)
SCAN OF BLOOD SMEAR: NORMAL
SEG NEUTROPHILS: 36.4 %
SEGMENTED NEUTROPHILS ABSOLUTE COUNT: 2.7 THOU/MM3 (ref 1.8–7.7)
SODIUM BLD-SCNC: 141 MEQ/L (ref 135–145)
TOTAL PROTEIN: 7.5 G/DL (ref 6.1–8)
TRIGL SERPL-MCNC: 285 MG/DL (ref 0–199)
WBC # BLD: 7.5 THOU/MM3 (ref 4.8–10.8)

## 2018-02-12 PROCEDURE — 85025 COMPLETE CBC W/AUTO DIFF WBC: CPT

## 2018-02-12 PROCEDURE — 80053 COMPREHEN METABOLIC PANEL: CPT

## 2018-02-12 PROCEDURE — 36415 COLL VENOUS BLD VENIPUNCTURE: CPT

## 2018-02-12 PROCEDURE — 80061 LIPID PANEL: CPT

## 2018-02-12 RX ORDER — ISOSORBIDE DINITRATE 20 MG/1
TABLET ORAL
Qty: 180 TABLET | Refills: 3 | Status: SHIPPED | OUTPATIENT
Start: 2018-02-12 | End: 2019-01-25 | Stop reason: SDUPTHER

## 2018-02-19 ENCOUNTER — OFFICE VISIT (OUTPATIENT)
Dept: PHYSICAL MEDICINE AND REHAB | Age: 53
End: 2018-02-19
Payer: MEDICARE

## 2018-02-19 VITALS
HEIGHT: 66 IN | SYSTOLIC BLOOD PRESSURE: 129 MMHG | HEART RATE: 81 BPM | DIASTOLIC BLOOD PRESSURE: 86 MMHG | BODY MASS INDEX: 26.2 KG/M2 | WEIGHT: 163 LBS

## 2018-02-19 DIAGNOSIS — M47.816 SPONDYLOSIS OF LUMBAR REGION WITHOUT MYELOPATHY OR RADICULOPATHY: ICD-10-CM

## 2018-02-19 DIAGNOSIS — M46.1 SI (SACROILIAC) JOINT INFLAMMATION (HCC): Primary | ICD-10-CM

## 2018-02-19 DIAGNOSIS — G89.4 CHRONIC PAIN SYNDROME: ICD-10-CM

## 2018-02-19 PROCEDURE — G8427 DOCREV CUR MEDS BY ELIG CLIN: HCPCS | Performed by: NURSE PRACTITIONER

## 2018-02-19 PROCEDURE — G8599 NO ASA/ANTIPLAT THER USE RNG: HCPCS | Performed by: NURSE PRACTITIONER

## 2018-02-19 PROCEDURE — 99213 OFFICE O/P EST LOW 20 MIN: CPT | Performed by: NURSE PRACTITIONER

## 2018-02-19 PROCEDURE — 4004F PT TOBACCO SCREEN RCVD TLK: CPT | Performed by: NURSE PRACTITIONER

## 2018-02-19 PROCEDURE — G8484 FLU IMMUNIZE NO ADMIN: HCPCS | Performed by: NURSE PRACTITIONER

## 2018-02-19 PROCEDURE — G8417 CALC BMI ABV UP PARAM F/U: HCPCS | Performed by: NURSE PRACTITIONER

## 2018-02-19 PROCEDURE — 3017F COLORECTAL CA SCREEN DOC REV: CPT | Performed by: NURSE PRACTITIONER

## 2018-02-19 RX ORDER — PANTOPRAZOLE SODIUM 40 MG/1
40 TABLET, DELAYED RELEASE ORAL DAILY
COMMUNITY
End: 2018-10-09 | Stop reason: SDUPTHER

## 2018-02-19 ASSESSMENT — ENCOUNTER SYMPTOMS
NAUSEA: 0
EYE PAIN: 0
ALLERGIC/IMMUNOLOGIC NEGATIVE: 1
BACK PAIN: 1
SORE THROAT: 0
VOMITING: 0
RHINORRHEA: 0
SHORTNESS OF BREATH: 0
SINUS PRESSURE: 0
PHOTOPHOBIA: 0
COLOR CHANGE: 0
CONSTIPATION: 0
ABDOMINAL PAIN: 0
CHEST TIGHTNESS: 0
EYES NEGATIVE: 1
COUGH: 0
RESPIRATORY NEGATIVE: 1
DIARRHEA: 0
WHEEZING: 0

## 2018-02-19 NOTE — PROGRESS NOTES
[facility]  [address]  Dept: [phone]  Dept Fax: [phone]  Loc: [phone]    Visit Date: 2/19/2018    Functionality Assessment/Goals Worksheet     On a scale of 0 (Does not Interfere) to 10 (Completely Interferes)     1. Which number describes how during the past week pain has interfered with       the following:  A. General Activity:  9  B. Mood: 8  C. Walking Ability:  9  D. Normal Work (Includes both work outside the home and housework):  9  E. Relations with Other People:   2  F. Sleep:   7  G. Enjoyment of Life:   2    2. Patient Prefers to Take their Pain Medications:     []  On a regular basis   [x]  Only when necessary    []  Does not take pain medications    3. What are the Patient's Goals/Expectations for Visiting Pain Management? [x]  Learn about my pain    [x]  Receive Medication   []  Physical Therapy     []  Treat Depression   []  Receive Injections    []  Treat Sleep   []  Deal with Anxiety and Stress   []  Treat Opoid Dependence/Addiction   []  Other:    HPI:   Tanya Tinsley is a 47 y.o. male is here today for    Chief Complaint:  Bilateral SI and low back pain    HPI   F/U Bilateral SI MBB #1 7/18/2017 received about 70-80% pain relief or benefit for 4-6 months. He continues to take Ibuprofen for pain. The pain has returned to baseline elizabeth to the procedure. The  Low back and SI pain is bilateral and occasional mostly and radiates into his right groin thigh area. He denies receiving pain medications from other sources. He denies any ER visits since last visit. Pain scale with out pain medications is 8/10. Pain scale with pain medications is  0/10. Drug screen reviewed from 11/20/2017 OK    The patient has No Known Allergies. Past Medical History  Navneet Smith  has a past medical history of Arthritis; Arthritis; Chest pain; Depression; Dysphagia; GERD (gastroesophageal reflux disease);  History of Vitals:    02/19/18 1227   BP: 129/86   Pulse: 81   Weight: 163 lb (73.9 kg)   Height: 5' 6\" (1.676 m)       Physical Exam   Constitutional: He is oriented to person, place, and time. He appears well-developed and well-nourished. No distress. HENT:   Head: Normocephalic and atraumatic. Right Ear: External ear normal.   Left Ear: External ear normal.   Nose: Nose normal.   Mouth/Throat: Oropharynx is clear and moist. No oropharyngeal exudate. Eyes: Conjunctivae and EOM are normal. Pupils are equal, round, and reactive to light. Right eye exhibits no discharge. Left eye exhibits no discharge. No scleral icterus. Neck: Normal range of motion and full passive range of motion without pain. Neck supple. No muscular tenderness present. No neck rigidity. No edema, no erythema and normal range of motion present. No thyromegaly present. Cardiovascular: Normal rate, regular rhythm, normal heart sounds and intact distal pulses. Exam reveals no gallop and no friction rub. No murmur heard. Pulmonary/Chest: Effort normal and breath sounds normal. No respiratory distress. He has no wheezes. He has no rales. He exhibits no tenderness. Abdominal: Soft. Bowel sounds are normal. He exhibits no distension. There is no tenderness. There is no rebound and no guarding. Musculoskeletal: He exhibits tenderness. Right shoulder: Normal.        Left shoulder: Normal.        Right hip: He exhibits tenderness. Left hip: He exhibits tenderness. Right knee: Normal.        Left knee: Normal.        Right ankle: Normal.        Left ankle: Normal.        Cervical back: Normal.        Thoracic back: Normal.        Lumbar back: He exhibits tenderness, pain and spasm. Back:    Neurological: He is alert and oriented to person, place, and time. He has normal reflexes. He is not disoriented. He displays no atrophy. No cranial nerve deficit or sensory deficit. He exhibits normal muscle tone.  He displays a

## 2018-02-27 NOTE — PROGRESS NOTES
NPO after midnight  [employer] and drivers license  Wear comfortable clean clothing  Do not bring jewelry or valuables  Shower night before and morning of surgery with a liquid antibacterial soap  Bring list of medications with dosage and how often taken  Follow all instructions given by your physician   needed at discharge  Patient states he asked for a local so he wouldn't need a .  Patient told that he needs to have someone here with him during the procedure

## 2018-03-06 ENCOUNTER — APPOINTMENT (OUTPATIENT)
Dept: GENERAL RADIOLOGY | Age: 53
End: 2018-03-06
Attending: PAIN MEDICINE
Payer: MEDICARE

## 2018-03-06 ENCOUNTER — HOSPITAL ENCOUNTER (OUTPATIENT)
Age: 53
Setting detail: OUTPATIENT SURGERY
Discharge: HOME | End: 2018-03-06
Attending: PAIN MEDICINE | Admitting: PAIN MEDICINE
Payer: MEDICARE

## 2018-03-06 VITALS
HEART RATE: 80 BPM | HEIGHT: 66 IN | BODY MASS INDEX: 26.39 KG/M2 | SYSTOLIC BLOOD PRESSURE: 109 MMHG | WEIGHT: 164.2 LBS | OXYGEN SATURATION: 94 % | TEMPERATURE: 98.6 F | DIASTOLIC BLOOD PRESSURE: 73 MMHG | RESPIRATION RATE: 16 BRPM

## 2018-03-06 PROCEDURE — 3600000056 HC PAIN LEVEL 4 BASE: Performed by: PAIN MEDICINE

## 2018-03-06 PROCEDURE — 2500000003 HC RX 250 WO HCPCS: Performed by: PAIN MEDICINE

## 2018-03-06 PROCEDURE — 7100000011 HC PHASE II RECOVERY - ADDTL 15 MIN: Performed by: PAIN MEDICINE

## 2018-03-06 PROCEDURE — 7100000010 HC PHASE II RECOVERY - FIRST 15 MIN: Performed by: PAIN MEDICINE

## 2018-03-06 PROCEDURE — 3209999900 FLUORO FOR SURGICAL PROCEDURES

## 2018-03-06 PROCEDURE — 6360000002 HC RX W HCPCS: Performed by: PAIN MEDICINE

## 2018-03-06 PROCEDURE — 27096 INJECT SACROILIAC JOINT: CPT | Performed by: PAIN MEDICINE

## 2018-03-06 RX ORDER — BETAMETHASONE SODIUM PHOSPHATE AND BETAMETHASONE ACETATE 3; 3 MG/ML; MG/ML
INJECTION, SUSPENSION INTRA-ARTICULAR; INTRALESIONAL; INTRAMUSCULAR; SOFT TISSUE PRN
Status: DISCONTINUED | OUTPATIENT
Start: 2018-03-06 | End: 2018-03-06 | Stop reason: HOSPADM

## 2018-03-06 RX ORDER — BUPIVACAINE HYDROCHLORIDE 2.5 MG/ML
INJECTION, SOLUTION EPIDURAL; INFILTRATION; INTRACAUDAL PRN
Status: DISCONTINUED | OUTPATIENT
Start: 2018-03-06 | End: 2018-03-06 | Stop reason: HOSPADM

## 2018-03-06 RX ORDER — LIDOCAINE HYDROCHLORIDE 10 MG/ML
INJECTION, SOLUTION INFILTRATION; PERINEURAL PRN
Status: DISCONTINUED | OUTPATIENT
Start: 2018-03-06 | End: 2018-03-06 | Stop reason: HOSPADM

## 2018-03-06 ASSESSMENT — PAIN - FUNCTIONAL ASSESSMENT: PAIN_FUNCTIONAL_ASSESSMENT: 0-10

## 2018-03-06 NOTE — OP NOTE
Pre-Procedure Note    Patient Name: Roxana Madlonado   YOB: 1965  Medical Record Number: [id number]  Date: 2/19/2018     Indication: si PAIN  Consent: On file. Vital Signs:   Vitals:    03/06/18 1345   BP: 109/73   Pulse: 80   Resp: 16   Temp: 98.6 °F (37 °C)   SpO2: 94%       Past Medical History:   has a past medical history of Arthritis; Arthritis; Chest pain; Depression; Diabetes mellitus ([facility] [address] Dysphagia; GERD (gastroesophageal reflux disease); History of blood transfusion; History of nephrolithiasis; HIV (human immunodeficiency virus infection) ([facility] [address] Hyperlipidemia; and Pancreatitis. Past Surgical History:   has a past surgical history that includes Foot surgery (1970's); Cardiac catheterization (2015???); Colonoscopy (2016); Endoscopy, colon, diagnostic; other surgical history (Bilateral, 01/17/2017); other surgical history (Bilateral, 02/20/2017); other surgical history (Right, 03/28/2017); Nerve Surgery (Bilateral, 07/18/2017); Nerve Block Lumb Facet Level 1 Bilateral (Bilateral, 7/18/2017); and Upper gastrointestinal endoscopy ([id number], 5593,3710). Pre-Sedation Documentation and Exam:   Vital signs have been reviewed (see flow sheet for vitals). Sedation/ Anesthesia Plan: LOCAL    Patient is an appropriate candidate for plan of sedation: yes    Preoperative Diagnosis:  Bilateral sacroiliitis. Postoperative Diagnosis: Bilateral  Sacroiliitis. Procedure Performed:  Bilateral sacroiliac joint injection under fluoroscopy guidance # 2. Indication for the Procedure: The patient failed conservative management  for pain in low back. The patient is tender over the Bilateral SI joint. Kiko's test is positive on the Bilateral side. As patient is not responding to conservative management and pain is interfering with activities of daily living we decided to proceed with SI joint injection.  The procedure and risks were discussed with the patient and an informed consent was obtained. Procedure: BILATERAL    A meaningful communication was kept up with the patient throughout the procedure. The patient is placed in prone position. Skin over the back was prepped and draped in sterile manner. Then using fluoroscopy the Bilateral sacroiliac joint was identified. Then the angle of the fluoroscopy was adjusted such that the view of the caudal aspect of the joint space was optimized. Then skin and deep tissues over the caudal aspect of the joint were infiltrated with 4 ml of 1% lidocaine. The #22-gauge, 3-1/2 inch spinal needle was introduced through the skin wheal and directed such that the tip of the needle lies in the joint space. This was confirmed by injecting 1 ml of Omnipaque-180 through the needle in divided dose and observing the spread of the contrast along the joint space. Then after negative aspiration a total of 80 mg of depomedrol with 2 ml of  0.25% Marcaine was injected through the needle in divided dose. . The needle is removed and a Band-Aid was placed over the needle insertion site. The patient tolerated the procedure well and vital signs remained stable. The patient was discharged home in stable condition and will be followed in the pain clinic in the next few weeks or further planning.     Electronically signed by Barak Garibay MD on 3/6/2018 at 2:47 PM

## 2018-03-27 ENCOUNTER — OFFICE VISIT (OUTPATIENT)
Dept: PHYSICAL MEDICINE AND REHAB | Age: 53
End: 2018-03-27
Payer: MEDICARE

## 2018-03-27 VITALS
SYSTOLIC BLOOD PRESSURE: 118 MMHG | BODY MASS INDEX: 26.4 KG/M2 | DIASTOLIC BLOOD PRESSURE: 80 MMHG | HEART RATE: 88 BPM | WEIGHT: 164.24 LBS | HEIGHT: 66 IN

## 2018-03-27 DIAGNOSIS — M47.816 SPONDYLOSIS OF LUMBAR REGION WITHOUT MYELOPATHY OR RADICULOPATHY: Primary | ICD-10-CM

## 2018-03-27 DIAGNOSIS — G89.4 CHRONIC PAIN SYNDROME: ICD-10-CM

## 2018-03-27 DIAGNOSIS — M46.1 SI (SACROILIAC) JOINT INFLAMMATION (HCC): ICD-10-CM

## 2018-03-27 PROCEDURE — G8598 ASA/ANTIPLAT THER USED: HCPCS | Performed by: NURSE PRACTITIONER

## 2018-03-27 PROCEDURE — 99213 OFFICE O/P EST LOW 20 MIN: CPT | Performed by: NURSE PRACTITIONER

## 2018-03-27 PROCEDURE — 4004F PT TOBACCO SCREEN RCVD TLK: CPT | Performed by: NURSE PRACTITIONER

## 2018-03-27 PROCEDURE — G8417 CALC BMI ABV UP PARAM F/U: HCPCS | Performed by: NURSE PRACTITIONER

## 2018-03-27 PROCEDURE — G8427 DOCREV CUR MEDS BY ELIG CLIN: HCPCS | Performed by: NURSE PRACTITIONER

## 2018-03-27 PROCEDURE — G8484 FLU IMMUNIZE NO ADMIN: HCPCS | Performed by: NURSE PRACTITIONER

## 2018-03-27 PROCEDURE — 3017F COLORECTAL CA SCREEN DOC REV: CPT | Performed by: NURSE PRACTITIONER

## 2018-03-27 RX ORDER — LISINOPRIL 2.5 MG/1
2.5 TABLET ORAL DAILY
COMMUNITY
End: 2020-08-31

## 2018-03-27 RX ORDER — TIZANIDINE 4 MG/1
4 TABLET ORAL 2 TIMES DAILY
COMMUNITY
End: 2019-05-15 | Stop reason: SDUPTHER

## 2018-03-27 ASSESSMENT — ENCOUNTER SYMPTOMS
DIARRHEA: 0
SHORTNESS OF BREATH: 0
VOMITING: 0
SINUS PRESSURE: 0
ABDOMINAL PAIN: 0
PHOTOPHOBIA: 0
ALLERGIC/IMMUNOLOGIC NEGATIVE: 1
NAUSEA: 0
RESPIRATORY NEGATIVE: 1
BACK PAIN: 1
COUGH: 0
RHINORRHEA: 0
SORE THROAT: 0
CONSTIPATION: 0
EYES NEGATIVE: 1
CHEST TIGHTNESS: 0
COLOR CHANGE: 0
WHEEZING: 0
EYE PAIN: 0

## 2018-03-27 NOTE — PROGRESS NOTES
normal. No respiratory distress. He has no wheezes. He has no rales. He exhibits no tenderness. Abdominal: Soft. Bowel sounds are normal. He exhibits no distension. There is no tenderness. There is no rebound and no guarding. Musculoskeletal: He exhibits tenderness. Right shoulder: Normal.        Left shoulder: Normal.        Right hip: He exhibits tenderness. Left hip: He exhibits tenderness. Right knee: Normal.        Left knee: Normal.        Right ankle: Normal.        Left ankle: Normal.        Cervical back: Normal.        Thoracic back: Normal.        Lumbar back: He exhibits tenderness, pain and spasm. Back:    Neurological: He is alert and oriented to person, place, and time. He has normal reflexes. He is not disoriented. He displays no atrophy. No cranial nerve deficit or sensory deficit. He exhibits normal muscle tone. He displays a negative Romberg sign. Coordination and gait normal.   Reflex Scores:       Tricep reflexes are 2+ on the right side. SLR-  Motor 4/5 generalized weakness  Feet bilat dusky when dependent   Skin: Skin is warm. No rash noted. He is not diaphoretic. No erythema. No pallor. Psychiatric: He has a normal mood and affect. His speech is normal and behavior is normal. Judgment and thought content normal. His mood appears not anxious. His affect is not angry, not blunt, not labile and not inappropriate. He is not agitated, not aggressive, not hyperactive, not slowed, not withdrawn, not actively hallucinating and not combative. Thought content is not paranoid and not delusional. Cognition and memory are normal. Cognition and memory are not impaired. He does not express impulsivity or inappropriate judgment. He does not exhibit a depressed mood. He expresses no homicidal and no suicidal ideation. He expresses no suicidal plans and no homicidal plans. He exhibits normal recent memory and normal remote memory. He is attentive.    Nursing note and

## 2018-04-04 DIAGNOSIS — M54.50 CHRONIC BILATERAL LOW BACK PAIN WITHOUT SCIATICA: ICD-10-CM

## 2018-04-04 DIAGNOSIS — M47.816 SPONDYLOSIS OF LUMBAR REGION WITHOUT MYELOPATHY OR RADICULOPATHY: ICD-10-CM

## 2018-04-04 DIAGNOSIS — M46.1 SI (SACROILIAC) JOINT INFLAMMATION (HCC): ICD-10-CM

## 2018-04-04 DIAGNOSIS — G89.4 CHRONIC PAIN SYNDROME: ICD-10-CM

## 2018-04-04 DIAGNOSIS — G89.29 CHRONIC BILATERAL LOW BACK PAIN WITHOUT SCIATICA: ICD-10-CM

## 2018-04-04 RX ORDER — CYCLOBENZAPRINE HCL 10 MG
TABLET ORAL
Qty: 30 TABLET | Refills: 0 | Status: SHIPPED | OUTPATIENT
Start: 2018-04-04 | End: 2018-05-07 | Stop reason: SDUPTHER

## 2018-04-23 ENCOUNTER — HOSPITAL ENCOUNTER (OUTPATIENT)
Age: 53
Discharge: HOME | End: 2018-04-23
Payer: MEDICARE

## 2018-04-23 DIAGNOSIS — E78.00 PURE HYPERCHOLESTEROLEMIA: ICD-10-CM

## 2018-04-23 LAB
ALBUMIN SERPL-MCNC: 4.1 G/DL (ref 3.5–5.1)
ALP BLD-CCNC: 65 U/L (ref 38–126)
ALT SERPL-CCNC: 18 U/L (ref 11–66)
AST SERPL-CCNC: 34 U/L (ref 5–40)
BILIRUB SERPL-MCNC: 0.4 MG/DL (ref 0.3–1.2)
BILIRUBIN DIRECT: < 0.2 MG/DL (ref 0–0.3)
CHOLESTEROL, TOTAL: 165 MG/DL (ref 100–199)
HDLC SERPL-MCNC: 26 MG/DL
LDL CHOLESTEROL CALCULATED: 94 MG/DL
TOTAL PROTEIN: 6.9 G/DL (ref 6.1–8)
TRIGL SERPL-MCNC: 225 MG/DL (ref 0–199)

## 2018-04-23 PROCEDURE — 80076 HEPATIC FUNCTION PANEL: CPT

## 2018-04-23 PROCEDURE — 80061 LIPID PANEL: CPT

## 2018-04-23 PROCEDURE — 36415 COLL VENOUS BLD VENIPUNCTURE: CPT

## 2018-04-30 ENCOUNTER — TELEPHONE (OUTPATIENT)
Dept: CARDIOLOGY CLINIC | Age: 53
End: 2018-04-30

## 2018-05-07 ENCOUNTER — OFFICE VISIT (OUTPATIENT)
Dept: CARDIOLOGY CLINIC | Age: 53
End: 2018-05-07
Payer: MEDICARE

## 2018-05-07 VITALS
DIASTOLIC BLOOD PRESSURE: 50 MMHG | SYSTOLIC BLOOD PRESSURE: 100 MMHG | HEART RATE: 83 BPM | BODY MASS INDEX: 24.96 KG/M2 | WEIGHT: 155.3 LBS | HEIGHT: 66 IN

## 2018-05-07 DIAGNOSIS — R06.02 SOB (SHORTNESS OF BREATH): Primary | ICD-10-CM

## 2018-05-07 PROCEDURE — 93000 ELECTROCARDIOGRAM COMPLETE: CPT | Performed by: INTERNAL MEDICINE

## 2018-05-07 PROCEDURE — 99213 OFFICE O/P EST LOW 20 MIN: CPT | Performed by: INTERNAL MEDICINE

## 2018-05-07 ASSESSMENT — ENCOUNTER SYMPTOMS
BOWEL INCONTINENCE: 0
HOARSE VOICE: 0
BLOATING: 0
EYE PAIN: 0
BACK PAIN: 0
HEMOPTYSIS: 0
ORTHOPNEA: 0
DOUBLE VISION: 0
COUGH: 0
DIARRHEA: 0
CONSTIPATION: 0
SHORTNESS OF BREATH: 0
CHANGE IN BOWEL HABIT: 0
BLURRED VISION: 0
EYE DISCHARGE: 0
ABDOMINAL PAIN: 0
SPUTUM PRODUCTION: 0

## 2018-05-29 ENCOUNTER — OFFICE VISIT (OUTPATIENT)
Dept: PHYSICAL MEDICINE AND REHAB | Age: 53
End: 2018-05-29
Payer: MEDICARE

## 2018-05-29 VITALS
DIASTOLIC BLOOD PRESSURE: 70 MMHG | HEIGHT: 66 IN | WEIGHT: 152 LBS | HEART RATE: 72 BPM | BODY MASS INDEX: 24.43 KG/M2 | SYSTOLIC BLOOD PRESSURE: 110 MMHG

## 2018-05-29 DIAGNOSIS — M47.816 SPONDYLOSIS OF LUMBAR REGION WITHOUT MYELOPATHY OR RADICULOPATHY: Primary | ICD-10-CM

## 2018-05-29 DIAGNOSIS — G89.4 CHRONIC PAIN SYNDROME: ICD-10-CM

## 2018-05-29 DIAGNOSIS — M46.1 SI (SACROILIAC) JOINT INFLAMMATION (HCC): ICD-10-CM

## 2018-05-29 PROCEDURE — 99213 OFFICE O/P EST LOW 20 MIN: CPT | Performed by: NURSE PRACTITIONER

## 2018-05-29 ASSESSMENT — ENCOUNTER SYMPTOMS
SORE THROAT: 0
RESPIRATORY NEGATIVE: 1
ABDOMINAL PAIN: 0
PHOTOPHOBIA: 0
VOMITING: 0
COUGH: 0
CHEST TIGHTNESS: 0
BACK PAIN: 1
COLOR CHANGE: 0
DIARRHEA: 0
CONSTIPATION: 0
SINUS PRESSURE: 0
EYES NEGATIVE: 1
ALLERGIC/IMMUNOLOGIC NEGATIVE: 1
SHORTNESS OF BREATH: 0
NAUSEA: 0
EYE PAIN: 0
WHEEZING: 0
RHINORRHEA: 0

## 2018-05-31 ENCOUNTER — HOSPITAL ENCOUNTER (OUTPATIENT)
Age: 53
Discharge: HOME | End: 2018-05-31
Payer: MEDICARE

## 2018-05-31 PROCEDURE — 86361 T CELL ABSOLUTE COUNT: CPT

## 2018-05-31 PROCEDURE — 36415 COLL VENOUS BLD VENIPUNCTURE: CPT

## 2018-05-31 PROCEDURE — 87536 HIV-1 QUANT&REVRSE TRNSCRPJ: CPT

## 2018-06-03 LAB — CD4 T-HELPER CELLS: NORMAL

## 2018-06-26 DIAGNOSIS — G89.29 CHRONIC BILATERAL LOW BACK PAIN WITHOUT SCIATICA: ICD-10-CM

## 2018-06-26 DIAGNOSIS — M46.1 SI (SACROILIAC) JOINT INFLAMMATION (HCC): ICD-10-CM

## 2018-06-26 DIAGNOSIS — G89.4 CHRONIC PAIN SYNDROME: ICD-10-CM

## 2018-06-26 DIAGNOSIS — M47.816 SPONDYLOSIS OF LUMBAR REGION WITHOUT MYELOPATHY OR RADICULOPATHY: ICD-10-CM

## 2018-06-26 DIAGNOSIS — M54.50 CHRONIC BILATERAL LOW BACK PAIN WITHOUT SCIATICA: ICD-10-CM

## 2018-06-26 RX ORDER — CYCLOBENZAPRINE HCL 10 MG
TABLET ORAL
Qty: 30 TABLET | Refills: 0 | Status: ON HOLD | OUTPATIENT
Start: 2018-06-26 | End: 2019-05-21

## 2018-08-28 ENCOUNTER — OFFICE VISIT (OUTPATIENT)
Dept: PHYSICAL MEDICINE AND REHAB | Age: 53
End: 2018-08-28
Payer: MEDICARE

## 2018-08-28 VITALS
SYSTOLIC BLOOD PRESSURE: 105 MMHG | HEART RATE: 73 BPM | HEIGHT: 66 IN | DIASTOLIC BLOOD PRESSURE: 73 MMHG | WEIGHT: 152 LBS | BODY MASS INDEX: 24.43 KG/M2

## 2018-08-28 DIAGNOSIS — M46.1 SI (SACROILIAC) JOINT INFLAMMATION (HCC): ICD-10-CM

## 2018-08-28 DIAGNOSIS — M47.816 SPONDYLOSIS OF LUMBAR REGION WITHOUT MYELOPATHY OR RADICULOPATHY: Primary | ICD-10-CM

## 2018-08-28 DIAGNOSIS — G89.4 CHRONIC PAIN SYNDROME: ICD-10-CM

## 2018-08-28 PROCEDURE — 99213 OFFICE O/P EST LOW 20 MIN: CPT | Performed by: NURSE PRACTITIONER

## 2018-08-28 ASSESSMENT — ENCOUNTER SYMPTOMS
ABDOMINAL PAIN: 0
SHORTNESS OF BREATH: 0
CHEST TIGHTNESS: 0
CONSTIPATION: 0
WHEEZING: 0
EYES NEGATIVE: 1
DIARRHEA: 0
ALLERGIC/IMMUNOLOGIC NEGATIVE: 1
EYE PAIN: 0
RESPIRATORY NEGATIVE: 1
SORE THROAT: 0
NAUSEA: 0
COUGH: 0
PHOTOPHOBIA: 0
VOMITING: 0
RHINORRHEA: 0
COLOR CHANGE: 0
BACK PAIN: 1
SINUS PRESSURE: 0

## 2018-08-28 NOTE — PROGRESS NOTES
"Chief Complaint   Patient presents with     Ear Problem     Derm Problem       Initial /86  Pulse 92  Temp 97.1  F (36.2  C) (Tympanic)  Resp 14  Wt 155 lb (70.3 kg)  LMP  (LMP Unknown)  SpO2 99%  BMI 26.61 kg/m2 Estimated body mass index is 26.61 kg/(m^2) as calculated from the following:    Height as of 11/16/16: 5' 4\" (1.626 m).    Weight as of this encounter: 155 lb (70.3 kg).  Medication Reconciliation: complete    "   tiZANidine (ZANAFLEX) 4 MG tablet, Take 4 mg by mouth 2 times daily, Disp: , Rfl:     pantoprazole (PROTONIX) 40 MG tablet, Take 40 mg by mouth daily, Disp: , Rfl:     isosorbide dinitrate (ISORDIL) 20 MG tablet, take 1 tablet by mouth twice a day, Disp: 180 tablet, Rfl: 3    Acetaminophen-Codeine (TYLENOL WITH CODEINE #3 PO), Take by mouth 3 times daily as needed, Disp: , Rfl:     NITROSTAT 0.4 MG SL tablet, place 1 tablet under the tongue if needed every 5 minutes for chest pain for 3 doses IF NO RELIEF AFTER 3RD DOSE CALL PRESCRIBER ., Disp: 25 tablet, Rfl: 3    cyclobenzaprine (FLEXERIL) 10 MG tablet, Take 10 mg by mouth 3 times daily as needed for Muscle spasms, Disp: , Rfl:     traMADol (ULTRAM) 50 MG tablet, Take 50 mg by mouth every 8 hours as needed for Pain, Disp: , Rfl:     simvastatin (ZOCOR) 10 MG tablet, Take 10 mg by mouth nightly.  , Disp: , Rfl:     lamivudine-zidovudine (COMBIVIR) 150-300 MG per tablet, Take 1 tablet by mouth 2 times daily. , Disp: , Rfl:     fosamprenavir (LEXIVA) 700 MG tablet, Take by mouth 2 times daily 2 tab, Disp: , Rfl:     olanzapine (ZYPREXA) 5 MG tablet, Take 5 mg by mouth nightly.  , Disp: , Rfl:     Subjective:      Review of Systems   Constitutional: Positive for activity change. Negative for appetite change, chills, diaphoresis, fatigue, fever and unexpected weight change. HENT: Negative. Negative for congestion, ear pain, hearing loss, mouth sores, nosebleeds, rhinorrhea, sinus pressure and sore throat. Eyes: Negative. Negative for photophobia, pain and visual disturbance. Respiratory: Negative. Negative for cough, chest tightness, shortness of breath and wheezing. Cardiovascular: Negative for chest pain and palpitations. Gastrointestinal: Negative for abdominal pain, constipation, diarrhea, nausea and vomiting. Endocrine: Negative. Negative for cold intolerance, heat intolerance, polydipsia, polyphagia and polyuria. heard.  Pulmonary/Chest: Effort normal and breath sounds normal. No respiratory distress. He has no wheezes. He has no rales. He exhibits no tenderness. Abdominal: Soft. Bowel sounds are normal. He exhibits no distension. There is no tenderness. There is no rebound and no guarding. Musculoskeletal: He exhibits tenderness. Right shoulder: Normal.        Left shoulder: Normal.        Right hip: He exhibits tenderness. Left hip: He exhibits tenderness. Right knee: Normal.        Left knee: Normal.        Right ankle: Normal.        Left ankle: Normal.        Cervical back: Normal.        Thoracic back: Normal.        Lumbar back: He exhibits tenderness, pain and spasm. Back:    Neurological: He is alert and oriented to person, place, and time. He has normal reflexes. He is not disoriented. He displays no atrophy. No cranial nerve deficit or sensory deficit. He exhibits normal muscle tone. He displays a negative Romberg sign. Coordination and gait normal.   Reflex Scores:       Tricep reflexes are 2+ on the right side. SLR-  Motor 4/5 generalized weakness  Feet bilat dusky when dependent   Skin: Skin is warm. No rash noted. He is not diaphoretic. No erythema. No pallor. Psychiatric: He has a normal mood and affect. His speech is normal and behavior is normal. Judgment and thought content normal. His mood appears not anxious. His affect is not angry, not blunt, not labile and not inappropriate. He is not agitated, not aggressive, not hyperactive, not slowed, not withdrawn, not actively hallucinating and not combative. Thought content is not paranoid and not delusional. Cognition and memory are normal. Cognition and memory are not impaired. He does not express impulsivity or inappropriate judgment. He does not exhibit a depressed mood. He expresses no homicidal and no suicidal ideation. He expresses no suicidal plans and no homicidal plans.  He exhibits normal recent memory and

## 2018-10-05 ENCOUNTER — HOSPITAL ENCOUNTER (OUTPATIENT)
Age: 53
Discharge: HOME | End: 2018-10-05
Payer: MEDICARE

## 2018-10-05 PROCEDURE — 87491 CHLMYD TRACH DNA AMP PROBE: CPT

## 2018-10-05 PROCEDURE — 87591 N.GONORRHOEAE DNA AMP PROB: CPT

## 2018-10-05 PROCEDURE — 36415 COLL VENOUS BLD VENIPUNCTURE: CPT

## 2018-10-05 PROCEDURE — 86592 SYPHILIS TEST NON-TREP QUAL: CPT

## 2018-10-05 PROCEDURE — 83036 HEMOGLOBIN GLYCOSYLATED A1C: CPT

## 2018-10-05 PROCEDURE — 87536 HIV-1 QUANT&REVRSE TRNSCRPJ: CPT

## 2018-10-05 PROCEDURE — 86361 T CELL ABSOLUTE COUNT: CPT

## 2018-10-06 LAB
AVERAGE GLUCOSE: 114 MG/DL (ref 70–126)
CHLAMYDIA TRACHOMATIS BY RT-PCR: NOT DETECTED
CT/NG SOURCE: NORMAL
HBA1C MFR BLD: 5.8 % (ref 4.4–6.4)
NEISSERIA GONORRHOEAE BY RT-PCR: NOT DETECTED
RPR: NONREACTIVE

## 2018-10-07 LAB — CD4 T-HELPER CELLS: NORMAL

## 2018-11-14 ENCOUNTER — HOSPITAL ENCOUNTER (OUTPATIENT)
Age: 53
Discharge: HOME | End: 2018-11-14
Payer: MEDICARE

## 2018-11-14 VITALS
OXYGEN SATURATION: 97 % | SYSTOLIC BLOOD PRESSURE: 124 MMHG | RESPIRATION RATE: 14 BRPM | HEART RATE: 76 BPM | WEIGHT: 155 LBS | DIASTOLIC BLOOD PRESSURE: 61 MMHG | TEMPERATURE: 98.1 F | BODY MASS INDEX: 25.02 KG/M2

## 2018-11-14 PROCEDURE — 86580 TB INTRADERMAL TEST: CPT

## 2018-11-14 PROCEDURE — 96372 THER/PROPH/DIAG INJ SC/IM: CPT

## 2018-11-14 NOTE — PROGRESS NOTES
Administered 0.1 ml PPD to right forearm. Patient tolerated well. No adverse reactions noted at this time. Patient to return in 48 hours for reading. Patient verbalized understanding.

## 2018-11-14 NOTE — PROGRESS NOTES
_Met__ Safety   GOAL: Patient will not experience fall during visit. (Environmental)   [place] to environment  [employer] ID band is correct and in place/ allergy band as needed   Assess for fall risk   Initiate fall precautions as applicable (fall band, side rails, etc.)   Call light within reach   Bed in low position/ wheels locked    __Met__ Pain   GOAL:   Patient will verbalize controlled pain.  Assess pain level and characteristics   Administer analgesics as ordered   Assess effectiveness of pain management and report to MD as needed    __Met__ Knowledge Deficit:   GOAL:  Patient will verbalize understanding of therapy plan.  Assess baseline knowledge   Provide teaching at level of understanding   Provide teaching via preferred learning method   Evaluate teaching effectiveness    __Met__ Hemodynamic/Respiratory Status:   GOAL:  Vital signs remain within normal limits prior and post treatment.        (Pre and Post Procedure Monitoring)   Assess/Monitor vital signs and LOC   Assess Baseline SpO2 prior to any sedation   Obtain weight/height   Assess vital signs/ LOC until patient meets discharge criteria   Monitor procedure site and notify MD of any issues      CARE PLAN END DATE:     11/16/18

## 2018-11-27 ENCOUNTER — OFFICE VISIT (OUTPATIENT)
Dept: PHYSICAL MEDICINE AND REHAB | Age: 53
End: 2018-11-27
Payer: MEDICARE

## 2018-11-27 VITALS
SYSTOLIC BLOOD PRESSURE: 129 MMHG | HEART RATE: 73 BPM | WEIGHT: 154.98 LBS | BODY MASS INDEX: 24.91 KG/M2 | DIASTOLIC BLOOD PRESSURE: 80 MMHG | HEIGHT: 66 IN

## 2018-11-27 DIAGNOSIS — M54.50 CHRONIC BILATERAL LOW BACK PAIN WITHOUT SCIATICA: ICD-10-CM

## 2018-11-27 DIAGNOSIS — G89.29 CHRONIC BILATERAL LOW BACK PAIN WITHOUT SCIATICA: ICD-10-CM

## 2018-11-27 DIAGNOSIS — M47.816 SPONDYLOSIS OF LUMBAR REGION WITHOUT MYELOPATHY OR RADICULOPATHY: Primary | ICD-10-CM

## 2018-11-27 DIAGNOSIS — G89.4 CHRONIC PAIN SYNDROME: ICD-10-CM

## 2018-11-27 DIAGNOSIS — M46.1 SI (SACROILIAC) JOINT INFLAMMATION (HCC): ICD-10-CM

## 2018-11-27 PROCEDURE — 99213 OFFICE O/P EST LOW 20 MIN: CPT | Performed by: NURSE PRACTITIONER

## 2018-11-27 ASSESSMENT — ENCOUNTER SYMPTOMS
BACK PAIN: 1
EYES NEGATIVE: 1
ABDOMINAL PAIN: 0
COUGH: 0
CHEST TIGHTNESS: 0
COLOR CHANGE: 0
VOMITING: 0
SORE THROAT: 0
RESPIRATORY NEGATIVE: 1
WHEEZING: 0
SINUS PRESSURE: 0
ALLERGIC/IMMUNOLOGIC NEGATIVE: 1
SHORTNESS OF BREATH: 0
RHINORRHEA: 0
PHOTOPHOBIA: 0
CONSTIPATION: 0
NAUSEA: 0
EYE PAIN: 0
DIARRHEA: 0

## 2019-01-28 RX ORDER — ISOSORBIDE DINITRATE 20 MG/1
TABLET ORAL
Qty: 60 TABLET | Refills: 11 | Status: SHIPPED | OUTPATIENT
Start: 2019-01-28 | End: 2019-11-29 | Stop reason: SDUPTHER

## 2019-02-27 ENCOUNTER — OFFICE VISIT (OUTPATIENT)
Dept: PHYSICAL MEDICINE AND REHAB | Age: 54
End: 2019-02-27
Payer: MEDICARE

## 2019-02-27 VITALS
BODY MASS INDEX: 25.3 KG/M2 | HEART RATE: 69 BPM | DIASTOLIC BLOOD PRESSURE: 69 MMHG | SYSTOLIC BLOOD PRESSURE: 123 MMHG | HEIGHT: 66 IN | WEIGHT: 157.4 LBS

## 2019-02-27 DIAGNOSIS — G89.4 CHRONIC PAIN SYNDROME: ICD-10-CM

## 2019-02-27 DIAGNOSIS — M47.816 SPONDYLOSIS OF LUMBAR REGION WITHOUT MYELOPATHY OR RADICULOPATHY: Primary | ICD-10-CM

## 2019-02-27 DIAGNOSIS — M46.1 SI (SACROILIAC) JOINT INFLAMMATION (HCC): ICD-10-CM

## 2019-02-27 PROCEDURE — 99213 OFFICE O/P EST LOW 20 MIN: CPT | Performed by: NURSE PRACTITIONER

## 2019-02-27 RX ORDER — TRAMADOL HYDROCHLORIDE 50 MG/1
50 TABLET ORAL EVERY 8 HOURS PRN
Qty: 90 TABLET | Refills: 0 | Status: SHIPPED | OUTPATIENT
Start: 2019-02-27 | End: 2019-03-29

## 2019-02-27 ASSESSMENT — ENCOUNTER SYMPTOMS
ABDOMINAL PAIN: 0
RESPIRATORY NEGATIVE: 1
SINUS PRESSURE: 0
SHORTNESS OF BREATH: 0
EYES NEGATIVE: 1
CHEST TIGHTNESS: 0
RHINORRHEA: 0
NAUSEA: 0
ALLERGIC/IMMUNOLOGIC NEGATIVE: 1
DIARRHEA: 0
WHEEZING: 0
COUGH: 0
CONSTIPATION: 0
EYE PAIN: 0
COLOR CHANGE: 0
VOMITING: 0
SORE THROAT: 0
BACK PAIN: 1
PHOTOPHOBIA: 0

## 2019-03-07 ENCOUNTER — OFFICE VISIT (OUTPATIENT)
Dept: CARDIOLOGY CLINIC | Age: 54
End: 2019-03-07
Payer: MEDICARE

## 2019-03-07 VITALS — BODY MASS INDEX: 25.57 KG/M2 | WEIGHT: 158.4 LBS

## 2019-03-07 DIAGNOSIS — I25.10 ASCVD (ARTERIOSCLEROTIC CARDIOVASCULAR DISEASE): Primary | ICD-10-CM

## 2019-03-07 PROCEDURE — 99213 OFFICE O/P EST LOW 20 MIN: CPT | Performed by: INTERNAL MEDICINE

## 2019-03-22 ENCOUNTER — PREP FOR PROCEDURE (OUTPATIENT)
Dept: PHYSICAL MEDICINE AND REHAB | Age: 54
End: 2019-03-22

## 2019-03-25 ENCOUNTER — TELEPHONE (OUTPATIENT)
Dept: PHYSICAL MEDICINE AND REHAB | Age: 54
End: 2019-03-25

## 2019-03-25 NOTE — TELEPHONE ENCOUNTER
Called to reschedule patient due to insurance still pending. Pt does not have voicemail set up. Called mother (emergency contact) and left message asking her to inform patient. Spoke with surgery center and informed them; also moved pt to Thursday 3-28-19.

## 2019-04-11 ENCOUNTER — PREP FOR PROCEDURE (OUTPATIENT)
Dept: PHYSICAL MEDICINE AND REHAB | Age: 54
End: 2019-04-11

## 2019-04-11 NOTE — H&P (VIEW-ONLY)
Richard Mattson is an 46 y.o.  male. Chief Complaint: Low back pain            The patienthas No Known Allergies.     Past Medical History  Mare Garcia  has a past medical history of Arthritis; Arthritis; Chest pain; Depression; Diabetes mellitus ([facility] Dysphagia; GERD (gastroesophageal reflux disease); History of blood transfusion; History of nephrolithiasis; [facility]) [facility] Hyperlipidemia; Pancreatitis; and Type 2 diabetes mellitus without complication ([facility]    Past Surgical History  The patient  has a past surgical history that includes Foot surgery (1970's); Cardiac catheterization (2015???); Colonoscopy (2016); Endoscopy, colon, diagnostic; other surgical history (Bilateral, 01/17/2017); other surgical history (Bilateral, 02/20/2017); other surgical history (Right, 03/28/2017); Nerve Surgery (Bilateral, 07/18/2017); Nerve Block Lumb Facet Level 1 Bilateral (Bilateral, 7/18/2017); Upper gastrointestinal endoscopy (5932,0597Z1, [id number]); other surgical history (Bilateral, 03/06/2018); and pr inj dx/ther agnt paravert facet joint, lumbar/sac, 2nd level (Bilateral, 3/6/2018).    Family History  This patient's family history includes Diabetes in his mother; High Blood Pressure in his mother.     Social History  Mare Garcia  reports that he has been smoking Cigars. He has a 10.00 pack-year smoking history. He has never used smokeless tobacco. He reports that he does not drink alcohol or use drugs.     Medications    Current Medication      Current Outpatient Prescriptions:     traMADol (ULTRAM) 50 MG tablet, Take 1 tablet by mouth every 8 hours as needed for Pain for up to 30 days. ., Disp: 90 tablet, Rfl: 0    isosorbide dinitrate (ISORDIL) 20 MG tablet, Take 1 tablet by mouth twice daily, Disp: 60 tablet, Rfl: 11    pantoprazole (PROTONIX) 40 MG tablet, Take 1 tablet by mouth every morning (before breakfast) 30 min.  before breakfast., Disp: 90 tablet, Rfl: 2    cyclobenzaprine sounds and intact distal pulses. Exam reveals no gallop and no friction rub. No murmur heard. Pulmonary/Chest: Effort normal and breath sounds normal. No respiratory distress. He has no wheezes. He has no rales. He exhibits no tenderness. Abdominal: Soft. Bowel sounds are normal. He exhibits no distension. There is no tenderness. There is no rebound and no guarding. Musculoskeletal: He exhibits tenderness. Right shoulder: Normal.        Left shoulder: Normal.        Right hip: He exhibits tenderness. Left hip: He exhibits tenderness. Right knee: Normal.        Left knee: Normal.        Right ankle: Normal.        Left ankle: Normal.        Cervical back: Normal.        Thoracic back: Normal.        Lumbar back: He exhibits tenderness, pain and spasm. Back:    Neurological: He is alert and oriented to person, place, and time. He has normal reflexes. He is not disoriented. He displays no atrophy. No cranial nerve deficit or sensory deficit. He exhibits normal muscle tone. He displays a negative Romberg sign. Coordination and gait normal.   Reflex Scores:       Tricep reflexes are 2+ on the right side. SLR-  Motor 4/5 generalized weakness  Feet bilat dusky when dependent   Skin: Skin is warm. No rash noted. He is not diaphoretic. No erythema. No pallor. Psychiatric: He has a normal mood and affect. His speech is normal and behavior is normal. Judgment and thought content normal. His mood appears not anxious. His affect is not angry, not blunt, not labile and not inappropriate. He is not agitated, not aggressive, not hyperactive, not slowed, not withdrawn, not actively hallucinating and not combative. Thought content is not paranoid and not delusional. Cognition and memory are normal. Cognition and memory are not impaired. He does not express impulsivity or inappropriate judgment. He does not exhibit a depressed mood. He expresses no homicidal and no suicidal ideation.  He

## 2019-04-17 NOTE — PROGRESS NOTES
PAT call attempted patient unavailable left message with instructions    NPO after midnight  Bring insurance info and drivers license  Wear comfortable clean clothing  Do not bring jewelry   Shower night before and morning of surgery with a liquid antibacterial soap  Bring list of medications with dosage and how often taken  Follow all instructions given by your physician   needed at discharge  Call [phone] for any questions

## 2019-04-18 ENCOUNTER — TELEPHONE (OUTPATIENT)
Dept: OPERATING ROOM | Age: 54
End: 2019-04-18

## 2019-04-18 NOTE — TELEPHONE ENCOUNTER
----- Message from Yogesh Panda sent at 4/18/2019 12:43 PM EDT -----  Contact: Auth in physician review- pending denial  As per call with [facility] Rn : Danyelle Hernández is in Physician review since the % of relief doesnot meet guidelines. 2 diagnostic Lumbar MBB with 80% relief needed before an RFA. Physician can choose to do a peer to peer ph no : [phone], Informed MDO via [employer] message.

## 2019-04-22 ENCOUNTER — TELEPHONE (OUTPATIENT)
Dept: PHYSICAL MEDICINE AND REHAB | Age: 54
End: 2019-04-22

## 2019-04-23 ENCOUNTER — HOSPITAL ENCOUNTER (OUTPATIENT)
Age: 54
Setting detail: OUTPATIENT SURGERY
Discharge: HOME | End: 2019-04-23
Attending: PAIN MEDICINE | Admitting: PAIN MEDICINE
Payer: MEDICARE

## 2019-04-23 ENCOUNTER — APPOINTMENT (OUTPATIENT)
Dept: GENERAL RADIOLOGY | Age: 54
End: 2019-04-23
Attending: PAIN MEDICINE
Payer: MEDICARE

## 2019-04-23 VITALS
RESPIRATION RATE: 18 BRPM | HEART RATE: 78 BPM | OXYGEN SATURATION: 97 % | DIASTOLIC BLOOD PRESSURE: 64 MMHG | BODY MASS INDEX: 24.68 KG/M2 | HEIGHT: 66 IN | WEIGHT: 153.6 LBS | TEMPERATURE: 97.9 F | SYSTOLIC BLOOD PRESSURE: 125 MMHG

## 2019-04-23 PROCEDURE — 64635 DESTROY LUMB/SAC FACET JNT: CPT | Performed by: PAIN MEDICINE

## 2019-04-23 PROCEDURE — 2709999900 HC NON-CHARGEABLE SUPPLY: Performed by: PAIN MEDICINE

## 2019-04-23 PROCEDURE — 3600000056 HC PAIN LEVEL 4 BASE: Performed by: PAIN MEDICINE

## 2019-04-23 PROCEDURE — 3209999900 FLUORO FOR SURGICAL PROCEDURES

## 2019-04-23 PROCEDURE — 7100000011 HC PHASE II RECOVERY - ADDTL 15 MIN: Performed by: PAIN MEDICINE

## 2019-04-23 PROCEDURE — 7100000010 HC PHASE II RECOVERY - FIRST 15 MIN: Performed by: PAIN MEDICINE

## 2019-04-23 PROCEDURE — 3600000057 HC PAIN LEVEL 4 ADDL 15 MIN: Performed by: PAIN MEDICINE

## 2019-04-23 PROCEDURE — 6360000002 HC RX W HCPCS: Performed by: PAIN MEDICINE

## 2019-04-23 PROCEDURE — 64636 DESTROY L/S FACET JNT ADDL: CPT | Performed by: PAIN MEDICINE

## 2019-04-23 PROCEDURE — 2500000003 HC RX 250 WO HCPCS: Performed by: PAIN MEDICINE

## 2019-04-23 RX ORDER — ROPIVACAINE HYDROCHLORIDE 2 MG/ML
INJECTION, SOLUTION EPIDURAL; INFILTRATION; PERINEURAL PRN
Status: DISCONTINUED | OUTPATIENT
Start: 2019-04-23 | End: 2019-04-23 | Stop reason: ALTCHOICE

## 2019-04-23 RX ORDER — LIDOCAINE HYDROCHLORIDE 10 MG/ML
INJECTION, SOLUTION EPIDURAL; INFILTRATION; INTRACAUDAL; PERINEURAL PRN
Status: DISCONTINUED | OUTPATIENT
Start: 2019-04-23 | End: 2019-04-23 | Stop reason: ALTCHOICE

## 2019-04-23 RX ORDER — TRAMADOL HYDROCHLORIDE 50 MG/1
50 TABLET ORAL ONCE
COMMUNITY
End: 2019-05-15 | Stop reason: SDUPTHER

## 2019-04-23 RX ORDER — METHYLPREDNISOLONE ACETATE 80 MG/ML
INJECTION, SUSPENSION INTRA-ARTICULAR; INTRALESIONAL; INTRAMUSCULAR; SOFT TISSUE PRN
Status: DISCONTINUED | OUTPATIENT
Start: 2019-04-23 | End: 2019-04-23 | Stop reason: ALTCHOICE

## 2019-04-23 ASSESSMENT — PAIN - FUNCTIONAL ASSESSMENT: PAIN_FUNCTIONAL_ASSESSMENT: 0-10

## 2019-04-23 ASSESSMENT — PAIN DESCRIPTION - DESCRIPTORS: DESCRIPTORS: ACHING

## 2019-04-23 ASSESSMENT — PAIN SCALES - GENERAL: PAINLEVEL_OUTOF10: 5

## 2019-04-23 NOTE — OP NOTE
Pre-Procedure Note    Patient Name: Ann Rivera   YOB: 1965  Medical Record Number: [id number]  Date: 2/27/2019    Indication:  Lower back pain  Consent: On file. Vital Signs:   Vitals:    04/23/19 1305   BP: 136/61   Pulse: 84   Resp: 25   Temp:    SpO2: 96%       Past Medical History:   has a past medical history of Arthritis, Arthritis, Chest pain, Depression, Diabetes mellitus ([facility] Dysphagia, GERD (gastroesophageal reflux disease), History of blood transfusion, History of nephrolithiasis, HIV (human immunodeficiency virus infection) ([facility] Hyperlipidemia, Pancreatitis, and Type [facility] Past Surgical History:   has a past surgical history that includes Foot surgery (1970's); Cardiac catheterization (2015???); Colonoscopy (2016); Endoscopy, colon, diagnostic; other surgical history (Bilateral, 01/17/2017); other surgical history (Bilateral, 02/20/2017); other surgical history (Right, 03/28/2017); Nerve Surgery (Bilateral, 07/18/2017); Nerve Block Lumb Facet Level 1 Bilateral (Bilateral, 7/18/2017); Upper gastrointestinal endoscopy (4338,6359O9, [id number]); other surgical history (Bilateral, 03/06/2018); and pr inj dx/ther agnt paravert facet joint, lumbar/sac, 2nd level (Bilateral, 3/6/2018). Pre-Sedation Documentation and Exam:   Vital signs have been reviewed (see flow sheet for vitals). Sedation/ Anesthesia Plan:   LOCAL    Patient is an appropriate candidate for plan of sedation: yes    Preoperative Diagnosis:  L-spondylosis    Post-Op Dx: as above    Procedure Performed:  :Radiofrequency ablation of median branches at the levels of L3-4,L4-5 and L5-S1 right under fluoroscopic guidance     Indication for the Procedure: The patient has ahistory of chronic low back pain that is not responding well to the conservative treatment. Patient's pain is mostly axial in nature. Pain is interfering with the activities of daily living.   Physical examination revealed facet tenderness and facet loading is positive. Patient had undergone lumbar facet joint injections with pain relief that lasted for only a short period of time and had greater than 70% pain relief with confirmatory median branch blocks. Hence we decided to do radiofrequency abalation of median branches for long term pain releif. The procedure and risks  were discussed with the patient and an informed consent was obtained. Procedure:  Right side   A meaningful communication was kept up with the patient throughout the procedure. The patient is placed in prone position and skin over the back was prepped and draped in sterile manner. Then using fluoroscopy the junction of the transverse process of the vertebra with the superior process of the facet joint was observed and the view was optimized. The skin and deep tissues posterior were infiltrated with 9 ml of  1% xylocaine. The RF canula with the 10 mm active tip was introduced through the skin wheal under fluoroscopy guidance such that the tip of the needle lies in the groove of the transverse process with the superior processes of the facet joint. Then a lateral view of the lumbar spine was obtained to make sure the tip of needle is not in the neural foramen. Then electric impedence was checked to make sure it is acceptable. Then a sensory stimulus was applied at 50 Hz up to 1 volt and concordant pain symptoms were reproduced. Then a motor stimulus was applied at 2 Hz up to 2 volts and no motor stimulation was seen in lower extremities. Some multifidus stimulus was seen. Then after negative aspiration a mixture of depomedrol 80 mg  and 0.2%  Ropivacaine 1.5 cc was injected through the needle. Then a initial lesion was done at 80 degrees centigrade for 90 seconds. For L5 median branch block the junction of the ala of  the sacrum with the superior articular process of the facet joint was taken as a reference point.   For the L4

## 2019-04-23 NOTE — PROGRESS NOTES
1309: Patient to phase 2 recovery room via cart. Patient is awake and alert. Report received from surgical RN, Anne Zamudio. Patient's vitals obtained, see charting. 1311: Patient is denying nausea and stating he is having pain a \"5\"/10.   1313: Patient is denying wanting anything to eat or drink and is wanting to go home. Patient is getting dressed at this time. 1324: Discharge instructions given and explained to the patient, he verbalized understanding. 1325: Patient discharged home in stable condition.

## 2019-04-23 NOTE — INTERVAL H&P NOTE
H&P Update    Patient's History and Physical from April 11, 2019 was reviewed. Patient examined. There has been no change.     Ephriam Spindle

## 2019-05-14 ENCOUNTER — PREP FOR PROCEDURE (OUTPATIENT)
Dept: PHYSICAL MEDICINE AND REHAB | Age: 54
End: 2019-05-14

## 2019-05-14 NOTE — H&P (VIEW-ONLY)
Jaimee Richter is an 46 y.o.  male. Chief Complaint: Low back pain   The patienthas No Known Allergies.     Past Medical History  [facility]  has a past medical history of Arthritis; Arthritis; Chest pain; Depression; Diabetes mellitus ([facility] Dysphagia; GERD (gastroesophageal reflux disease); History of blood transfusion; History of nephrolithiasis; HIV (human immunodeficiency virus infection) ([facility] Hyperlipidemia; Pancreatitis; and Type 2 diabetes mellitus without complication ([facility]    Past Surgical History  The patient  has a past surgical history that includes Foot surgery (1970's); Cardiac catheterization (2015???); Colonoscopy (2016); Endoscopy, colon, diagnostic; other surgical history (Bilateral, 01/17/2017); other surgical history (Bilateral, 02/20/2017); other surgical history (Right, 03/28/2017); Nerve Surgery (Bilateral, 07/18/2017); Nerve Block Lumb Facet Level 1 Bilateral (Bilateral, 7/18/2017); Upper gastrointestinal endoscopy (4075,9925Q3, [id number]); other surgical history (Bilateral, 03/06/2018); and pr inj dx/ther agnt paravert facet joint, lumbar/sac, 2nd level (Bilateral, 3/6/2018).    Family History  This patient's family history includes Diabetes in his mother; High Blood Pressure in his mother.     Social History  [facility]  reports that he has been smoking Cigars. He has a 10.00 pack-year smoking history. He has never used smokeless tobacco. He reports that he does not drink alcohol or use drugs.     Medications    Current Medication      Current Outpatient Prescriptions:     traMADol (ULTRAM) 50 MG tablet, Take 1 tablet by mouth every 8 hours as needed for Pain for up to 30 days. ., Disp: 90 tablet, Rfl: 0    isosorbide dinitrate (ISORDIL) 20 MG tablet, Take 1 tablet by mouth twice daily, Disp: 60 tablet, Rfl: 11    pantoprazole (PROTONIX) 40 MG tablet, Take 1 tablet by mouth every morning (before breakfast) 30 min.  before breakfast., Disp: 90 tablet, Rfl: 2    cyclobenzaprine (FLEXERIL) pain, constipation, diarrhea, nausea and vomiting. Endocrine: Negative. Negative for cold intolerance, heat intolerance, polydipsia, polyphagia and polyuria. Genitourinary: Negative. Negative for decreased urine volume, difficulty urinating, frequency and hematuria. Musculoskeletal: Positive for arthralgias, back pain, gait problem and myalgias. Negative for joint swelling, neck pain and neck stiffness. Skin: Negative. Negative for color change and rash. Allergic/Immunologic: Negative. Negative for food allergies and immunocompromised state. Neurological: Negative for dizziness, tremors, seizures, syncope, facial asymmetry, speech difficulty, weakness, light-headedness, numbness and headaches. Hematological: Negative. Does not bruise/bleed easily. Psychiatric/Behavioral: Negative. Negative for agitation, behavioral problems, confusion, decreased concentration, dysphoric mood, hallucinations, self-injury, sleep disturbance and suicidal ideas. The patient is not nervous/anxious and is not hyperactive.          Objective:      Vitals   Weight: 157 lb 6.4 oz (71.4 kg)   Height: 5' 6\" (1.676 m)            Physical Exam   Constitutional: He is oriented to person, place, and time. He appears well-developed and well-nourished. No distress. HENT:   Head: Normocephalic and atraumatic. Right Ear: External ear normal.   Left Ear: External ear normal.   Nose: Nose normal.   Mouth/Throat: Oropharynx is clear and moist. No oropharyngeal exudate. Eyes: Pupils are equal, round, and reactive to light. Conjunctivae and EOM are normal. Right eye exhibits no discharge. Left eye exhibits no discharge. No scleral icterus. Neck: Normal range of motion and full passive range of motion without pain. Neck supple. No muscular tenderness present. No neck rigidity. No edema, no erythema and normal range of motion present. No thyromegaly present.    Cardiovascular: Normal rate, regular rhythm, normal heart sounds and intact distal pulses. Exam reveals no gallop and no friction rub. No murmur heard. Pulmonary/Chest: Effort normal and breath sounds normal. No respiratory distress. He has no wheezes. He has no rales. He exhibits no tenderness. Abdominal: Soft. Bowel sounds are normal. He exhibits no distension. There is no tenderness. There is no rebound and no guarding. Musculoskeletal: He exhibits tenderness. Right shoulder: Normal.        Left shoulder: Normal.        Right hip: He exhibits tenderness. Left hip: He exhibits tenderness. Right knee: Normal.        Left knee: Normal.        Right ankle: Normal.        Left ankle: Normal.        Cervical back: Normal.        Thoracic back: Normal.        Lumbar back: He exhibits tenderness, pain and spasm. Back:    Neurological: He is alert and oriented to person, place, and time. He has normal reflexes. He is not disoriented. He displays no atrophy. No cranial nerve deficit or sensory deficit. He exhibits normal muscle tone. He displays a negative Romberg sign. Coordination and gait normal.   Reflex Scores:       Tricep reflexes are 2+ on the right side. SLR-  Motor 4/5 generalized weakness  Feet bilat dusky when dependent   Skin: Skin is warm. No rash noted. He is not diaphoretic. No erythema. No pallor. Psychiatric: He has a normal mood and affect. His speech is normal and behavior is normal. Judgment and thought content normal. His mood appears not anxious. His affect is not angry, not blunt, not labile and not inappropriate. He is not agitated, not aggressive, not hyperactive, not slowed, not withdrawn, not actively hallucinating and not combative. Thought content is not paranoid and not delusional. Cognition and memory are normal. Cognition and memory are not impaired. He does not express impulsivity or inappropriate judgment. He does not exhibit a depressed mood. He expresses no homicidal and no suicidal ideation.  He expresses no suicidal plans and no homicidal plans. He exhibits normal recent memory and normal remote memory. He is attentive. Nursing note and vitals reviewed.     HARRISON test:positive bilateral  Yeomans test: positive bilateral  Gaenslen test:positive bilateral  Assessment:      1. Spondylosis of lumbar region without myelopathy or radiculopathy    2. SI (sacroiliac) joint inflammation (HCC)    3.  Chronic pain syndrome           Review of Systems    Physical Exam      Plan:  Lumbar RFA LEFT SIDE L3-4,4-5,5-S1,         Elena Leblanc, ALIZE - CNP  5/14/2019

## 2019-05-15 ENCOUNTER — OFFICE VISIT (OUTPATIENT)
Dept: PHYSICAL MEDICINE AND REHAB | Age: 54
End: 2019-05-15
Payer: MEDICARE

## 2019-05-15 VITALS
HEART RATE: 71 BPM | BODY MASS INDEX: 24.73 KG/M2 | HEIGHT: 66 IN | SYSTOLIC BLOOD PRESSURE: 118 MMHG | WEIGHT: 153.9 LBS | DIASTOLIC BLOOD PRESSURE: 74 MMHG

## 2019-05-15 DIAGNOSIS — M46.1 SACROILIAC INFLAMMATION (HCC): ICD-10-CM

## 2019-05-15 DIAGNOSIS — M47.816 LUMBAR SPONDYLOSIS: Primary | ICD-10-CM

## 2019-05-15 DIAGNOSIS — G89.4 CHRONIC PAIN SYNDROME: ICD-10-CM

## 2019-05-15 PROCEDURE — 99213 OFFICE O/P EST LOW 20 MIN: CPT | Performed by: NURSE PRACTITIONER

## 2019-05-15 RX ORDER — TRAMADOL HYDROCHLORIDE 50 MG/1
50 TABLET ORAL 2 TIMES DAILY PRN
Qty: 60 TABLET | Refills: 0 | Status: SHIPPED | OUTPATIENT
Start: 2019-05-15 | End: 2019-06-11 | Stop reason: SDUPTHER

## 2019-05-15 RX ORDER — TIZANIDINE 4 MG/1
4 TABLET ORAL 2 TIMES DAILY PRN
Qty: 60 TABLET | Refills: 1 | Status: SHIPPED | OUTPATIENT
Start: 2019-05-15 | End: 2019-06-14

## 2019-05-15 ASSESSMENT — ENCOUNTER SYMPTOMS
SHORTNESS OF BREATH: 0
NAUSEA: 0
SINUS PRESSURE: 0
EYE PAIN: 0
ABDOMINAL PAIN: 0
EYES NEGATIVE: 1
COLOR CHANGE: 0
COUGH: 0
CHEST TIGHTNESS: 0
CONSTIPATION: 0
RESPIRATORY NEGATIVE: 1
WHEEZING: 0
ALLERGIC/IMMUNOLOGIC NEGATIVE: 1
VOMITING: 0
PHOTOPHOBIA: 0
RHINORRHEA: 0
SORE THROAT: 0
DIARRHEA: 0
BACK PAIN: 1

## 2019-05-15 NOTE — PROGRESS NOTES
[address] [facility]  [address] [facility] [address]  Dept: [phone]  Dept Fax: [phone]  Loc: [phone]    Visit Date: 5/15/2019    Functionality Assessment/Goals Worksheet     On a scale of 0 (Does not Interfere) to 10 (Completely Interferes)     1. Which number describes how during the past week pain has interfered with       the following:  A. General Activity:  8  B. Mood: 7  C. Walking Ability:  8  D. Normal Work (Includes both work outside the home and housework):  8  E. Relations with Other People:   3  F. Sleep:   1  G. Enjoyment of Life:   1    2. Patient Prefers to Take their Pain Medications:     []  On a regular basis   [x]  Only when necessary    []  Does not take pain medications    3. What are the Patient's Goals/Expectations for Visiting Pain Management? []  Learn about my pain    [x]  Receive Medication   []  Physical Therapy     [x]  Treat Depression   [x]  Receive Injections    []  Treat Sleep   []  Deal with Anxiety and Stress   []  Treat Opoid Dependence/Addiction   []  Other:      HPI:   Vince Dixon is a 46 y.o. male is here today for    Chief Complaint: Low back pain Si pain    HPI     F/U Lumbar RFA Right side L3-4,4-5,5-S1 4/23/2019 states he is receiving about 50% pain relief or benefit. He continues to have low back pain on the left side mostly. Lumbar RFA left side is scheduled next week. He continues to take Tramadol Zanaflex rarely. No relief from Bilateral SI MBB in past.  Medications reviewed. Patient denies side effects with medications. Patient states he is taking medications as prescribed. Hedenies receiving pain medications from other sources. He denies any ER visits since last visit. Pain scale with out pain medications or at its worst is 8/10. Pain scale with pain medications or at its best is 0/10.   Last dose of Tramadol was 5 days ago  Drug screen reviewed from 2/27/2019  and was appropriate was on Tramadol and Tylenol #3 at that time  Pill count was not completed today and patient was educated on bringing in medications  Patient does not have naloxone available at home. Patient has not required use of naloxone at home since last office visit. The patienthas No Known Allergies. Past Medical History  Raymond Gregg  has a past medical history of Arthritis, Arthritis, Chest pain, Depression, Diabetes mellitus ([facility] [address] Dysphagia, GERD (gastroesophageal reflux disease), History of blood transfusion, History of nephrolithiasis, HIV (human immunodeficiency virus infection) ([facility] [address] Hyperlipidemia, Pancreatitis, and Type 2 diabetes mellitus without complication ([facility] Past Surgical History  The patient  has a past surgical history that includes Foot surgery (1970's); Cardiac catheterization (2015???); Colonoscopy (2016); Endoscopy, colon, diagnostic; other surgical history (Bilateral, 01/17/2017); other surgical history (Bilateral, 02/20/2017); other surgical history (Right, 03/28/2017); Nerve Surgery (Bilateral, 07/18/2017); Nerve Block Lumb Facet Level 1 Bilateral (Bilateral, 7/18/2017); Upper gastrointestinal endoscopy (4257,4514X1, [id number]); other surgical history (Bilateral, 03/06/2018); pr inj dx/ther agnt paravert facet joint, lumbar/sac, 2nd level (Bilateral, 3/6/2018); and Lumbar spine surgery (Right, 4/23/2019). Family History  This patient's family history includes Diabetes in his mother; High Blood Pressure in his mother. Social History  Raymond Gregg  reports that he has been smoking cigars. He has a 10.00 pack-year smoking history. He has never used smokeless tobacco. He reports that he does not drink alcohol or use drugs.     Medications    Current Outpatient Medications:     tiZANidine (ZANAFLEX) 4 MG tablet, Take 1 tablet by mouth 2 times daily as needed (spasms), Disp: 60 tablet, Rfl: 1    traMADol (ULTRAM) 50 MG tablet, Take 1 tablet by mouth 2 times daily as needed for Pain for up to 30 days. , Disp: 60 tablet, Rfl: 0    metoprolol tartrate (LOPRESSOR) 25 MG tablet, Take 1 tablet by mouth twice daily, Disp: 60 tablet, Rfl: 11    isosorbide dinitrate (ISORDIL) 20 MG tablet, Take 1 tablet by mouth twice daily, Disp: 60 tablet, Rfl: 11    pantoprazole (PROTONIX) 40 MG tablet, Take 1 tablet by mouth every morning (before breakfast) 30 min. before breakfast., Disp: 90 tablet, Rfl: 2    cyclobenzaprine (FLEXERIL) 10 MG tablet, take 1 tablet by mouth if needed for muscle spasm, Disp: 30 tablet, Rfl: 0    aspirin 81 MG tablet, Take 81 mg by mouth daily, Disp: , Rfl:     lisinopril (PRINIVIL;ZESTRIL) 2.5 MG tablet, Take 2.5 mg by mouth daily, Disp: , Rfl:     NITROSTAT 0.4 MG SL tablet, place 1 tablet under the tongue if needed every 5 minutes for chest pain for 3 doses IF NO RELIEF AFTER 3RD DOSE CALL PRESCRIBER ., Disp: 25 tablet, Rfl: 3    simvastatin (ZOCOR) 10 MG tablet, Take 10 mg by mouth nightly.  , Disp: , Rfl:     lamivudine-zidovudine (COMBIVIR) 150-300 MG per tablet, Take 1 tablet by mouth 2 times daily. , Disp: , Rfl:     fosamprenavir (LEXIVA) 700 MG tablet, Take by mouth 2 times daily 2 tab, Disp: , Rfl:     olanzapine (ZYPREXA) 5 MG tablet, Take 5 mg by mouth nightly.  , Disp: , Rfl:     Subjective:      Review of Systems   Constitutional: Positive for activity change. Negative for appetite change, chills, diaphoresis, fatigue, fever and unexpected weight change. HENT: Negative. Negative for congestion, ear pain, hearing loss, mouth sores, nosebleeds, rhinorrhea, sinus pressure and sore throat. Eyes: Negative. Negative for photophobia, pain and visual disturbance. Respiratory: Negative. Negative for cough, chest tightness, shortness of breath and wheezing. Cardiovascular: Negative for chest pain and palpitations. Gastrointestinal: Negative for abdominal pain, constipation, diarrhea, nausea and vomiting.    Endocrine: Negative. Negative for cold intolerance, heat intolerance, polydipsia, polyphagia and polyuria. Genitourinary: Negative. Negative for decreased urine volume, difficulty urinating, frequency and hematuria. Musculoskeletal: Positive for arthralgias, back pain, gait problem and myalgias. Negative for joint swelling, neck pain and neck stiffness. Skin: Negative. Negative for color change and rash. Allergic/Immunologic: Negative. Negative for food allergies and immunocompromised state. Neurological: Negative for dizziness, tremors, seizures, syncope, facial asymmetry, speech difficulty, weakness, light-headedness, numbness and headaches. Hematological: Negative. Does not bruise/bleed easily. Psychiatric/Behavioral: Negative. Negative for agitation, behavioral problems, confusion, decreased concentration, dysphoric mood, hallucinations, self-injury, sleep disturbance and suicidal ideas. The patient is not nervous/anxious and is not hyperactive. Objective:     Vitals:    05/15/19 1333   BP: 118/74   Site: Left Upper Arm   Position: Sitting   Cuff Size: Medium Adult   Pulse: 71   Weight: 153 lb 14.4 oz (69.8 kg)   Height: 5' 6\" (1.676 m)       Physical Exam   Constitutional: He is oriented to person, place, and time. He appears well-developed and well-nourished. No distress. HENT:   Head: Normocephalic and atraumatic. Right Ear: External ear normal.   Left Ear: External ear normal.   Nose: Nose normal.   Mouth/Throat: Oropharynx is clear and moist. No oropharyngeal exudate. Eyes: Pupils are equal, round, and reactive to light. Conjunctivae and EOM are normal. Right eye exhibits no discharge. Left eye exhibits no discharge. No scleral icterus. Neck: Normal range of motion and full passive range of motion without pain. Neck supple. No muscular tenderness present. No neck rigidity. No edema, no erythema and normal range of motion present. No thyromegaly present.    Cardiovascular: Normal rate, regular rhythm, normal heart sounds and intact distal pulses. Exam reveals no gallop and no friction rub. No murmur heard. Pulmonary/Chest: Effort normal and breath sounds normal. No respiratory distress. He has no wheezes. He has no rales. He exhibits no tenderness. Abdominal: Soft. Bowel sounds are normal. He exhibits no distension. There is no tenderness. There is no rebound and no guarding. Musculoskeletal: He exhibits tenderness. Right shoulder: Normal.        Left shoulder: Normal.        Right hip: He exhibits tenderness. Left hip: He exhibits tenderness. Right knee: Normal.        Left knee: Normal.        Right ankle: Normal.        Left ankle: Normal.        Cervical back: Normal.        Thoracic back: Normal.        Lumbar back: He exhibits decreased range of motion, tenderness, bony tenderness, pain and spasm. Back:    Neurological: He is alert and oriented to person, place, and time. He has normal reflexes. He is not disoriented. He displays no atrophy. No cranial nerve deficit or sensory deficit. He exhibits normal muscle tone. He displays a negative Romberg sign. Coordination and gait normal.   Reflex Scores:       Tricep reflexes are 2+ on the right side. SLR-  Motor 4/5 generalized weakness  Feet bilat dusky when dependent   Skin: Skin is warm. No rash noted. He is not diaphoretic. No erythema. No pallor. Psychiatric: He has a normal mood and affect. His speech is normal and behavior is normal. Judgment and thought content normal. His mood appears not anxious. His affect is not angry, not blunt, not labile and not inappropriate. He is not agitated, not aggressive, not hyperactive, not slowed, not withdrawn, not actively hallucinating and not combative. Thought content is not paranoid and not delusional. Cognition and memory are normal. Cognition and memory are not impaired. He does not express impulsivity or inappropriate judgment.  He does not exhibit a depressed mood. He expresses no homicidal and no suicidal ideation. He expresses no suicidal plans and no homicidal plans. He exhibits normal recent memory and normal remote memory. He is attentive. Nursing note and vitals reviewed. HARRISON test:positive bilateral  Yeomans test:positive bilateral  Gaenslen test: positive bilateral     Assessment:     1. Lumbar spondylosis    2. Sacroiliac inflammation ([address]    3. Chronic pain syndrome            Plan:      · OARRS reviewed. Current MED:15  · Patient was offered naloxone for home. · Discussed long term side effects of medications, tolerance, dependency and addiction. · Previous UDS reviewed  · UDS preformed today for compliance. · Patient told can not receive any pain medications from any other source. · No evidence of abuse, diversion or aberrant behavior.  Medications and/or procedures to improve function and quality of life- patient understanding with this and that may not be pain free   Discussed with patient about safe storage of medications at home   Discussed possible weaning of medication dosing dependent on treatment/procedure results.  Testing: none   Procedures: Lumbar RFA Left side L3-4,4-5,5-S1 already scheduled next week   Discussed with patient about risks with procedure including infection, reaction to medication, increased pain, or bleeding.  Medications:Continue Tramadol Zanaflex. Meds. Prescribed:   Orders Placed This Encounter   Medications    tiZANidine (ZANAFLEX) 4 MG tablet     Sig: Take 1 tablet by mouth 2 times daily as needed (spasms)     Dispense:  60 tablet     Refill:  1    traMADol (ULTRAM) 50 MG tablet     Sig: Take 1 tablet by mouth 2 times daily as needed for Pain for up to 30 days. Dispense:  60 tablet     Refill:  0     Reduce doses taken as pain becomes manageable       Return for Lumbar RFA Left side  L3-4,4-5,5-S1 already scheduled. .         Electronically signed by ALIZE Carver - MONTSE on5/15/2019 at 1:59 PM

## 2019-05-21 ENCOUNTER — ANESTHESIA (OUTPATIENT)
Dept: OPERATING ROOM | Age: 54
End: 2019-05-21
Payer: MEDICARE

## 2019-05-21 ENCOUNTER — ANESTHESIA EVENT (OUTPATIENT)
Dept: OPERATING ROOM | Age: 54
End: 2019-05-21
Payer: MEDICARE

## 2019-05-21 ENCOUNTER — APPOINTMENT (OUTPATIENT)
Dept: GENERAL RADIOLOGY | Age: 54
End: 2019-05-21
Attending: PAIN MEDICINE
Payer: MEDICARE

## 2019-05-21 ENCOUNTER — HOSPITAL ENCOUNTER (OUTPATIENT)
Age: 54
Setting detail: OUTPATIENT SURGERY
Discharge: HOME | End: 2019-05-21
Attending: PAIN MEDICINE | Admitting: PAIN MEDICINE
Payer: MEDICARE

## 2019-05-21 VITALS
SYSTOLIC BLOOD PRESSURE: 141 MMHG | HEART RATE: 69 BPM | TEMPERATURE: 97.8 F | DIASTOLIC BLOOD PRESSURE: 72 MMHG | OXYGEN SATURATION: 96 % | RESPIRATION RATE: 18 BRPM

## 2019-05-21 PROCEDURE — 6360000002 HC RX W HCPCS: Performed by: PAIN MEDICINE

## 2019-05-21 PROCEDURE — 64636 DESTROY L/S FACET JNT ADDL: CPT | Performed by: PAIN MEDICINE

## 2019-05-21 PROCEDURE — 64635 DESTROY LUMB/SAC FACET JNT: CPT | Performed by: PAIN MEDICINE

## 2019-05-21 PROCEDURE — 2500000003 HC RX 250 WO HCPCS: Performed by: PAIN MEDICINE

## 2019-05-21 PROCEDURE — 3600000056 HC PAIN LEVEL 4 BASE: Performed by: PAIN MEDICINE

## 2019-05-21 PROCEDURE — 2709999900 HC NON-CHARGEABLE SUPPLY: Performed by: PAIN MEDICINE

## 2019-05-21 PROCEDURE — 3209999900 FLUORO FOR SURGICAL PROCEDURES

## 2019-05-21 PROCEDURE — 7100000010 HC PHASE II RECOVERY - FIRST 15 MIN: Performed by: PAIN MEDICINE

## 2019-05-21 PROCEDURE — 7100000011 HC PHASE II RECOVERY - ADDTL 15 MIN: Performed by: PAIN MEDICINE

## 2019-05-21 PROCEDURE — 3600000057 HC PAIN LEVEL 4 ADDL 15 MIN: Performed by: PAIN MEDICINE

## 2019-05-21 RX ORDER — METHYLPREDNISOLONE ACETATE 80 MG/ML
INJECTION, SUSPENSION INTRA-ARTICULAR; INTRALESIONAL; INTRAMUSCULAR; SOFT TISSUE PRN
Status: DISCONTINUED | OUTPATIENT
Start: 2019-05-21 | End: 2019-05-21 | Stop reason: ALTCHOICE

## 2019-05-21 RX ORDER — LIDOCAINE HYDROCHLORIDE 10 MG/ML
INJECTION, SOLUTION EPIDURAL; INFILTRATION; INTRACAUDAL; PERINEURAL PRN
Status: DISCONTINUED | OUTPATIENT
Start: 2019-05-21 | End: 2019-05-21 | Stop reason: ALTCHOICE

## 2019-05-21 RX ORDER — ROPIVACAINE HYDROCHLORIDE 2 MG/ML
INJECTION, SOLUTION EPIDURAL; INFILTRATION; PERINEURAL PRN
Status: DISCONTINUED | OUTPATIENT
Start: 2019-05-21 | End: 2019-05-21 | Stop reason: ALTCHOICE

## 2019-05-21 NOTE — OP NOTE
living. Physical examination revealed facet tenderness and facet loading is positive. Patient had undergone lumbar facet joint injections with pain relief that lasted for only a short period of time and had greater than 70% pain relief with confirmatory median branch blocks. Hence we decided to do radiofrequency abalation of median branches for long term pain releif. The procedure and risks  were discussed with the patient and an informed consent was obtained. Procedure:  Left side   A meaningful communication was kept up with the patient throughout the procedure. The patient is placed in prone position and skin over the back was prepped and draped in sterile manner. Then using fluoroscopy the junction of the transverse process of the vertebra with the superior process of the facet joint was observed and the view was optimized. The skin and deep tissues posterior were infiltrated with 9 ml of  1% xylocaine The RF canula with the 10 mm active tip was introduced through the skin wheal under fluoroscopy guidance such that the tip of the needle lies in the groove of the transverse process with the superior processes of the facet joint. Then a lateral view of the lumbar spine was obtained to make sure the tip of needle is not in the neural foramen. Then electric impedence was checked to make sure it is acceptable. Then a sensory stimulus was applied at 50 Hz up to 1 volt and concordant pain symptoms were reproduced. Then a motor stimulus was applied at 2 Hz up to 2 volts and no motor stimulation was seen in lower extremities. Some multifidus stimulus was seen. Then after negative aspiration a mixture of depomedrol 80 mg  and 0.2%  Ropivacaine 1.5 cc  was injected through the needle. Then a initial lesion was done at 80 degrees centigrade for 90 seconds. For L5 median branch block the junction of the ala of  the sacrum with the superior articular process of the facet joint was taken as a reference point. For the L4 median branch the junction of the transverse process of L5 with the superolateral possible facet joint was taken as a reference point and for S1 median branch the most lateral and superior aspect of S1 foramina was taken as a reference point,. For L3 median branch the junction of L4 transverse process and superior articular process of facet joint was taken as reference point and so on. Patient's vital signs and neurological status remained stable throughout the procedure and post procedural period. The patient tolerated the procedure well and was discharged home in stable condition.     Electronically signed by Lilli New MD

## 2019-05-21 NOTE — PROGRESS NOTES
1257- Pt arrived to PACU phase 2 local, VSS, pt breathing deeply on room air, denies needs, sat up snack given, no family here  1313- Pt doing well up to get dressed  36- Pt given discharge instructions, verbalized understanding discharged home by himself

## 2019-05-21 NOTE — INTERVAL H&P NOTE
H&P Update    Patient's History and Physical from May 14, 2019 was reviewed. Patient examined. There has been no change.     Naomia Sarai

## 2019-06-11 ENCOUNTER — OFFICE VISIT (OUTPATIENT)
Dept: PHYSICAL MEDICINE AND REHAB | Age: 54
End: 2019-06-11
Payer: MEDICARE

## 2019-06-11 VITALS
HEART RATE: 79 BPM | DIASTOLIC BLOOD PRESSURE: 66 MMHG | WEIGHT: 153 LBS | SYSTOLIC BLOOD PRESSURE: 116 MMHG | HEIGHT: 66 IN | BODY MASS INDEX: 24.59 KG/M2

## 2019-06-11 DIAGNOSIS — M47.816 LUMBAR SPONDYLOSIS: Primary | ICD-10-CM

## 2019-06-11 DIAGNOSIS — G89.4 CHRONIC PAIN SYNDROME: ICD-10-CM

## 2019-06-11 DIAGNOSIS — M46.1 SACROILIAC INFLAMMATION (HCC): ICD-10-CM

## 2019-06-11 PROCEDURE — 99213 OFFICE O/P EST LOW 20 MIN: CPT | Performed by: NURSE PRACTITIONER

## 2019-06-11 RX ORDER — TRAMADOL HYDROCHLORIDE 50 MG/1
50 TABLET ORAL 2 TIMES DAILY PRN
Qty: 60 TABLET | Refills: 0 | Status: SHIPPED | OUTPATIENT
Start: 2019-06-14 | End: 2019-07-14

## 2019-06-11 ASSESSMENT — ENCOUNTER SYMPTOMS
EYE PAIN: 0
SORE THROAT: 0
CONSTIPATION: 0
ALLERGIC/IMMUNOLOGIC NEGATIVE: 1
SHORTNESS OF BREATH: 0
BACK PAIN: 1
WHEEZING: 0
NAUSEA: 0
COLOR CHANGE: 0
ABDOMINAL PAIN: 0
VOMITING: 0
CHEST TIGHTNESS: 0
RHINORRHEA: 0
RESPIRATORY NEGATIVE: 1
DIARRHEA: 0
COUGH: 0
SINUS PRESSURE: 0
PHOTOPHOBIA: 0
EYES NEGATIVE: 1

## 2019-06-11 NOTE — PROGRESS NOTES
ago  Drug screen reviewed from 5/15/2019  and was appropriate  Pill count was completed today and was appropriate  Patient does not have naloxone available at home. Patient has not required use of naloxone at home since last office visit. The patienthas No Known Allergies. Past Medical History  Lisha Dena  has a past medical history of Arthritis, Arthritis, Chest pain, Depression, Diabetes mellitus ([facility] [address] Dysphagia, GERD (gastroesophageal reflux disease), History of blood transfusion, History of nephrolithiasis, HIV (human immunodeficiency virus infection) ([facility] [address] Hyperlipidemia, Pancreatitis, and Type 2 diabetes mellitus without complication ([facility] [address] Past Surgical History  The patient  has a past surgical history that includes Foot surgery (1970's); Cardiac catheterization (2015???); Colonoscopy (2016); Endoscopy, colon, diagnostic; other surgical history (Bilateral, 01/17/2017); other surgical history (Bilateral, 02/20/2017); other surgical history (Right, 03/28/2017); Nerve Surgery (Bilateral, 07/18/2017); Nerve Block Lumb Facet Level 1 Bilateral (Bilateral, 7/18/2017); Upper gastrointestinal endoscopy (3689,6254L6, [id number]); other surgical history (Bilateral, 03/06/2018); pr inj dx/ther agnt paravert facet joint, lumbar/sac, 2nd level (Bilateral, 3/6/2018); Lumbar spine surgery (Right, 4/23/2019); and Lumbar spine surgery (Left, 5/21/2019). Family History  This patient's family history includes Diabetes in his mother; High Blood Pressure in his mother. Social History  Lisha Dena  reports that he has been smoking cigars. He has a 10.00 pack-year smoking history. He has never used smokeless tobacco. He reports that he does not drink alcohol or use drugs. Medications    Current Outpatient Medications:     [START ON 6/14/2019] traMADol (ULTRAM) 50 MG tablet, Take 1 tablet by mouth 2 times daily as needed for Pain for up to 30 days. , Disp: 60 tablet, Rfl: 0    tiZANidine (ZANAFLEX) 4 MG tablet, Take 1 Genitourinary: Negative. Negative for decreased urine volume, difficulty urinating, frequency and hematuria. Musculoskeletal: Positive for arthralgias, back pain, gait problem and myalgias. Negative for joint swelling, neck pain and neck stiffness. Skin: Negative. Negative for color change and rash. Allergic/Immunologic: Negative. Negative for food allergies and immunocompromised state. Neurological: Negative for dizziness, tremors, seizures, syncope, facial asymmetry, speech difficulty, weakness, light-headedness, numbness and headaches. Hematological: Negative. Does not bruise/bleed easily. Psychiatric/Behavioral: Negative. Negative for agitation, behavioral problems, confusion, decreased concentration, dysphoric mood, hallucinations, self-injury, sleep disturbance and suicidal ideas. The patient is not nervous/anxious and is not hyperactive. Objective:     Vitals:    06/11/19 1248   BP: 116/66   Site: Left Upper Arm   Position: Sitting   Cuff Size: Medium Adult   Pulse: 79   Weight: 153 lb (69.4 kg)   Height: 5' 6\" (1.676 m)       Physical Exam   Constitutional: He is oriented to person, place, and time. He appears well-developed and well-nourished. No distress. HENT:   Head: Normocephalic and atraumatic. Right Ear: External ear normal.   Left Ear: External ear normal.   Nose: Nose normal.   Mouth/Throat: Oropharynx is clear and moist. No oropharyngeal exudate. Eyes: Pupils are equal, round, and reactive to light. Conjunctivae and EOM are normal. Right eye exhibits no discharge. Left eye exhibits no discharge. No scleral icterus. Neck: Normal range of motion and full passive range of motion without pain. Neck supple. No muscular tenderness present. No neck rigidity. No edema, no erythema and normal range of motion present. No thyromegaly present. Cardiovascular: Normal rate, regular rhythm, normal heart sounds and intact distal pulses.  Exam reveals no gallop and no friction rub.   No murmur heard. Pulmonary/Chest: Effort normal and breath sounds normal. No respiratory distress. He has no wheezes. He has no rales. He exhibits no tenderness. Abdominal: Soft. Bowel sounds are normal. He exhibits no distension. There is no tenderness. There is no rebound and no guarding. Musculoskeletal: He exhibits tenderness. Right shoulder: Normal.        Left shoulder: Normal.        Right hip: He exhibits tenderness. Left hip: He exhibits tenderness. Right knee: Normal.        Left knee: Normal.        Right ankle: Normal.        Left ankle: Normal.        Cervical back: Normal.        Thoracic back: Normal.        Lumbar back: He exhibits decreased range of motion, tenderness, bony tenderness, pain and spasm. Back:    Neurological: He is alert and oriented to person, place, and time. He has normal reflexes. He is not disoriented. He displays no atrophy. No cranial nerve deficit or sensory deficit. He exhibits normal muscle tone. He displays a negative Romberg sign. Coordination and gait normal.   Reflex Scores:       Tricep reflexes are 2+ on the right side. SLR-  Motor 4/5 generalized weakness  Feet bilat dusky when dependent   Skin: Skin is warm. No rash noted. He is not diaphoretic. No erythema. No pallor. Psychiatric: He has a normal mood and affect. His speech is normal and behavior is normal. Judgment and thought content normal. His mood appears not anxious. His affect is not angry, not blunt, not labile and not inappropriate. He is not agitated, not aggressive, not hyperactive, not slowed, not withdrawn, not actively hallucinating and not combative. Thought content is not paranoid and not delusional. Cognition and memory are normal. Cognition and memory are not impaired. He does not express impulsivity or inappropriate judgment. He does not exhibit a depressed mood. He expresses no homicidal and no suicidal ideation.  He expresses no suicidal plans and no homicidal plans. He exhibits normal recent memory and normal remote memory. Flat affect, h/o depression He is attentive. Nursing note and vitals reviewed. HARRISON test: positive bilateral  Yeomans test:positive bilateral  Gaenslen test: positive bilateral     Assessment:     1. Lumbar spondylosis    2. Sacroiliac inflammation ([address]    3. Chronic pain syndrome            Plan:      · OARRS reviewed. Current MED 10  · Patient was offered naloxone for home. refsued  · Discussed long term side effects of medications, tolerance, dependency and addiction. · Previous UDS reviewed  · UDS preformed today for compliance. · Patient told can not receive any pain medications from any other source. · No evidence of abuse, diversion or aberrant behavior.  Medications and/or procedures to improve function and quality of life- patient understanding with this and that may not be pain free   Discussed with patient about safe storage of medications at home   Discussed possible weaning of medication dosing dependent on treatment/procedure results.  Testing: none   Procedures: Continued 75% pain relief or benefit from Bilateral Lumbar RFA L3-S1   Discussed with patient about risks with procedure including infection, reaction to medication, increased pain, or bleeding.  Medications:Continue Tramadol, no relief from Bilateral Si MBB in past      Meds. Prescribed:   Orders Placed This Encounter   Medications    traMADol (ULTRAM) 50 MG tablet     Sig: Take 1 tablet by mouth 2 times daily as needed for Pain for up to 30 days. Dispense:  60 tablet     Refill:  0     Reduce doses taken as pain becomes manageable       Return in about 3 months (around 9/11/2019) for Follow up after procedure.          Electronically signed by ALIZE Ward CNP on6/11/2019 at 2:59 PM

## 2019-08-07 ENCOUNTER — HOSPITAL ENCOUNTER (OUTPATIENT)
Age: 54
Setting detail: SPECIMEN
Discharge: HOME | End: 2019-08-07
Payer: MEDICARE

## 2019-08-07 LAB
ABSOLUTE EOS #: 0.23 K/UL (ref 0–0.44)
ABSOLUTE IMMATURE GRANULOCYTE: 0 K/UL (ref 0–0.3)
ABSOLUTE LYMPH #: 3.3 K/UL (ref 1.1–3.7)
ABSOLUTE MONO #: 0.45 K/UL (ref 0.1–1.2)
ALBUMIN SERPL-MCNC: 4.2 G/DL (ref 3.5–5.2)
ALBUMIN/GLOBULIN RATIO: 1.4 (ref 1–2.5)
ALP BLD-CCNC: 71 U/L (ref 40–129)
ALT SERPL-CCNC: 16 U/L (ref 5–41)
ANION GAP SERPL CALCULATED.3IONS-SCNC: 14 MMOL/L (ref 9–17)
AST SERPL-CCNC: 16 U/L
BASOPHILS # BLD: 1 % (ref 0–2)
BASOPHILS ABSOLUTE: 0.08 K/UL (ref 0–0.2)
BILIRUB SERPL-MCNC: 0.21 MG/DL (ref 0.3–1.2)
BUN BLDV-MCNC: 9 MG/DL (ref 6–20)
BUN/CREAT BLD: ABNORMAL (ref 9–20)
CALCIUM SERPL-MCNC: 8.9 MG/DL (ref 8.6–10.4)
CHLORIDE BLD-SCNC: 96 MMOL/L (ref 98–107)
CHOLESTEROL/HDL RATIO: 3.6
CHOLESTEROL: 134 MG/DL
CO2: 21 MMOL/L (ref 20–31)
CREAT SERPL-MCNC: 0.87 MG/DL (ref 0.7–1.2)
DIFFERENTIAL TYPE: ABNORMAL
EOSINOPHILS RELATIVE PERCENT: 3 % (ref 1–4)
GFR AFRICAN AMERICAN: >60 ML/MIN
GFR NON-AFRICAN AMERICAN: >60 ML/MIN
GFR SERPL CREATININE-BSD FRML MDRD: ABNORMAL ML/MIN/{1.73_M2}
GFR SERPL CREATININE-BSD FRML MDRD: ABNORMAL ML/MIN/{1.73_M2}
GLUCOSE BLD-MCNC: 166 MG/DL (ref 70–99)
HCT VFR BLD CALC: 39.4 % (ref 40.7–50.3)
HDLC SERPL-MCNC: 37 MG/DL
HEMOGLOBIN: 14.1 G/DL (ref 13–17)
IMMATURE GRANULOCYTES: 0 %
LDL CHOLESTEROL: 70 MG/DL (ref 0–130)
LYMPHOCYTES # BLD: 44 % (ref 24–43)
MCH RBC QN AUTO: 41.5 PG (ref 25.2–33.5)
MCHC RBC AUTO-ENTMCNC: 35.8 G/DL (ref 28.4–34.8)
MCV RBC AUTO: 115.9 FL (ref 82.6–102.9)
MONOCYTES # BLD: 6 % (ref 3–12)
MORPHOLOGY: ABNORMAL
NRBC AUTOMATED: 0 PER 100 WBC
PDW BLD-RTO: 11.9 % (ref 11.8–14.4)
PLATELET # BLD: 331 K/UL (ref 138–453)
PLATELET ESTIMATE: ABNORMAL
PMV BLD AUTO: 9.1 FL (ref 8.1–13.5)
POTASSIUM SERPL-SCNC: 4.8 MMOL/L (ref 3.7–5.3)
RBC # BLD: 3.4 M/UL (ref 4.21–5.77)
RBC # BLD: ABNORMAL 10*6/UL
SEG NEUTROPHILS: 46 % (ref 36–65)
SEGMENTED NEUTROPHILS ABSOLUTE COUNT: 3.44 K/UL (ref 1.5–8.1)
SODIUM BLD-SCNC: 131 MMOL/L (ref 135–144)
T. PALLIDUM, IGG: NONREACTIVE
TOTAL PROTEIN: 7.1 G/DL (ref 6.4–8.3)
TRIGL SERPL-MCNC: 136 MG/DL
VLDLC SERPL CALC-MCNC: ABNORMAL MG/DL (ref 1–30)
WBC # BLD: 7.5 K/UL (ref 3.5–11.3)
WBC # BLD: ABNORMAL 10*3/UL

## 2019-08-08 LAB
ABSOLUTE CD 4 HELPER: 891 /UL (ref 309–1571)
CD4 % HELPER T CELL: 27 % (ref 27–64)
CRYPTOCOCCAL ANTIGEN: NEGATIVE
DIRECT EXAM: NORMAL
DIRECT EXAM: NORMAL
LYMPHOCYTES # BLD: 44 % (ref 24–44)
Lab: NORMAL
SPECIMEN DESCRIPTION: NORMAL
WBC # BLD: 7.5 K/UL (ref 3.5–11)

## 2019-08-11 LAB
HBV QNT LOG, IU/ML: NOT DETECTED LOG IU/ML
HBV QNT, IU/ML: NOT DETECTED IU/ML
INTERPRETATION: NOT DETECTED
QUANTI TB GOLD PLUS: NEGATIVE
QUANTI TB1 MINUS NIL: 0 IU/ML (ref 0–0.34)
QUANTI TB2 MINUS NIL: 0 IU/ML (ref 0–0.34)
QUANTIFERON MITOGEN: 8.66 IU/ML
QUANTIFERON NIL: 0.04 IU/ML

## 2019-09-10 ENCOUNTER — OFFICE VISIT (OUTPATIENT)
Dept: PHYSICAL MEDICINE AND REHAB | Age: 54
End: 2019-09-10
Payer: MEDICARE

## 2019-09-10 VITALS
HEIGHT: 66 IN | HEART RATE: 64 BPM | DIASTOLIC BLOOD PRESSURE: 66 MMHG | BODY MASS INDEX: 24.43 KG/M2 | SYSTOLIC BLOOD PRESSURE: 110 MMHG | WEIGHT: 152 LBS

## 2019-09-10 DIAGNOSIS — M47.816 LUMBAR SPONDYLOSIS: Primary | ICD-10-CM

## 2019-09-10 DIAGNOSIS — M46.1 SI (SACROILIAC) JOINT INFLAMMATION (HCC): ICD-10-CM

## 2019-09-10 DIAGNOSIS — M47.816 SPONDYLOSIS OF LUMBAR REGION WITHOUT MYELOPATHY OR RADICULOPATHY: ICD-10-CM

## 2019-09-10 DIAGNOSIS — G89.4 CHRONIC PAIN SYNDROME: ICD-10-CM

## 2019-09-10 DIAGNOSIS — M46.1 SACROILIAC INFLAMMATION (HCC): ICD-10-CM

## 2019-09-10 PROCEDURE — 99213 OFFICE O/P EST LOW 20 MIN: CPT | Performed by: NURSE PRACTITIONER

## 2019-09-10 ASSESSMENT — ENCOUNTER SYMPTOMS
ABDOMINAL PAIN: 0
RESPIRATORY NEGATIVE: 1
PHOTOPHOBIA: 0
EYES NEGATIVE: 1
EYE PAIN: 0
VOMITING: 0
ALLERGIC/IMMUNOLOGIC NEGATIVE: 1
COLOR CHANGE: 0
SHORTNESS OF BREATH: 0
SORE THROAT: 0
COUGH: 0
CHEST TIGHTNESS: 0
SINUS PRESSURE: 0
BACK PAIN: 1
NAUSEA: 0
CONSTIPATION: 0
WHEEZING: 0
RHINORRHEA: 0
DIARRHEA: 0

## 2019-09-10 NOTE — PROGRESS NOTES
[facility]  [address]  Dept: [phone]  Dept Fax: [phone]: [phone]    Visit Date: 9/10/2019    Functionality Assessment/Goals Worksheet     On a scale of 0 (Does not Interfere) to 10 (Completely Interferes)     1. Which number describes how during the past week pain has interfered with       the following:  A. General Activity:  0  B. Mood: 2  C. Walking Ability:  0  D. Normal Work (Includes both work outside the home and housework):  2  E. Relations with Other People:   0  F. Sleep:   0  G. Enjoyment of Life:   0    2. Patient Prefers to Take their Pain Medications:     []  On a regular basis   [x]  Only when necessary    []  Does not take pain medications    3. What are the Patient's Goals/Expectations for Visiting Pain Management? []  Learn about my pain    [x]  Receive Medication   []  Physical Therapy     []  Treat Depression   [x]  Receive Injections    []  Treat Sleep   []  Deal with Anxiety and Stress   []  Treat Opoid Dependence/Addiction   []  Other:    HPI:   Teena Robbins is a 46 y.o. male is here today for    Chief Complaint: Low back pain  SI pain  HPI   F/U Continued 75% pain relief or benefit from Bilateral Lumbar RFA L3-4,4-5,5-S1  Left May 2019 Right April 2019. He had a few flare ups of back pain and had to take his Tramadol but rarely takes it. He states the low back pan is better now. Medications reviewed. Patient denies side effects with medications. Patient states he is taking medications as prescribed. Hedenies receiving pain medications from other sources. He denies any ER visits since last visit. Pain scale with out pain medications or at its worst is 4/10. Pain scale with pain medications or at its best is 0/10.   Last dose of Tramadol  Was 1 week ago  Drug screen reviewed from 6/11/2019 and was appropriate  Pill count was mouth every morning (before breakfast) 30 min. before breakfast., Disp: 90 tablet, Rfl: 2    aspirin 81 MG tablet, Take 81 mg by mouth daily, Disp: , Rfl:     lisinopril (PRINIVIL;ZESTRIL) 2.5 MG tablet, Take 2.5 mg by mouth daily, Disp: , Rfl:     NITROSTAT 0.4 MG SL tablet, place 1 tablet under the tongue if needed every 5 minutes for chest pain for 3 doses IF NO RELIEF AFTER 3RD DOSE CALL PRESCRIBER ., Disp: 25 tablet, Rfl: 3    simvastatin (ZOCOR) 10 MG tablet, Take 10 mg by mouth nightly.  , Disp: , Rfl:     lamivudine-zidovudine (COMBIVIR) 150-300 MG per tablet, Take 1 tablet by mouth 2 times daily. , Disp: , Rfl:     fosamprenavir (LEXIVA) 700 MG tablet, Take by mouth 2 times daily 2 tab, Disp: , Rfl:     olanzapine (ZYPREXA) 5 MG tablet, Take 5 mg by mouth nightly.  , Disp: , Rfl:     Subjective:      Review of Systems   Constitutional: Positive for activity change. Negative for appetite change, chills, diaphoresis, fatigue, fever and unexpected weight change. HENT: Negative. Negative for congestion, ear pain, hearing loss, mouth sores, nosebleeds, rhinorrhea, sinus pressure and sore throat. Eyes: Negative. Negative for photophobia, pain and visual disturbance. Respiratory: Negative. Negative for cough, chest tightness, shortness of breath and wheezing. Cardiovascular: Negative for chest pain and palpitations. Gastrointestinal: Negative for abdominal pain, constipation, diarrhea, nausea and vomiting. GERD   Endocrine: Negative. Negative for cold intolerance, heat intolerance, polydipsia, polyphagia and polyuria. DM    Genitourinary: Negative. Negative for decreased urine volume, difficulty urinating, frequency and hematuria. Musculoskeletal: Positive for arthralgias, back pain, gait problem and myalgias. Negative for joint swelling, neck pain and neck stiffness. Skin: Negative. Negative for color change and rash. Allergic/Immunologic: Negative.   Negative for food allergies and immunocompromised state. Neurological: Negative for dizziness, tremors, seizures, syncope, facial asymmetry, speech difficulty, weakness, light-headedness, numbness and headaches. Hematological: Negative. Does not bruise/bleed easily. Psychiatric/Behavioral: Negative. Negative for agitation, behavioral problems, confusion, decreased concentration, dysphoric mood, hallucinations, self-injury, sleep disturbance and suicidal ideas. The patient is not nervous/anxious and is not hyperactive. Objective:     Vitals:    09/10/19 1233   BP: 110/66   Site: Left Upper Arm   Position: Sitting   Cuff Size: Medium Adult   Pulse: 64   Weight: 152 lb (68.9 kg)   Height: 5' 6\" (1.676 m)       Physical Exam   Constitutional: He is oriented to person, place, and time. He appears well-developed and well-nourished. No distress. HENT:   Head: Normocephalic and atraumatic. Right Ear: External ear normal.   Left Ear: External ear normal.   Nose: Nose normal.   Mouth/Throat: Oropharynx is clear and moist. No oropharyngeal exudate. Eyes: Pupils are equal, round, and reactive to light. Conjunctivae and EOM are normal. Right eye exhibits no discharge. Left eye exhibits no discharge. No scleral icterus. Neck: Normal range of motion and full passive range of motion without pain. Neck supple. No muscular tenderness present. No neck rigidity. No edema, no erythema and normal range of motion present. No thyromegaly present. Cardiovascular: Normal rate, regular rhythm, normal heart sounds and intact distal pulses. Exam reveals no gallop and no friction rub. No murmur heard. Pulmonary/Chest: Effort normal and breath sounds normal. No respiratory distress. He has no wheezes. He has no rales. He exhibits no tenderness. Abdominal: Soft. Bowel sounds are normal. He exhibits no distension. There is no tenderness. There is no rebound and no guarding. Musculoskeletal: He exhibits tenderness.

## 2019-11-29 RX ORDER — ISOSORBIDE DINITRATE 20 MG/1
TABLET ORAL
Qty: 60 TABLET | Refills: 3 | Status: SHIPPED | OUTPATIENT
Start: 2019-11-29 | End: 2020-04-08

## 2019-12-10 ENCOUNTER — OFFICE VISIT (OUTPATIENT)
Dept: PHYSICAL MEDICINE AND REHAB | Age: 54
End: 2019-12-10
Payer: MEDICARE

## 2019-12-10 ENCOUNTER — TELEPHONE (OUTPATIENT)
Dept: PHYSICAL MEDICINE AND REHAB | Age: 54
End: 2019-12-10

## 2019-12-10 VITALS
HEIGHT: 66 IN | BODY MASS INDEX: 24.09 KG/M2 | DIASTOLIC BLOOD PRESSURE: 68 MMHG | HEART RATE: 74 BPM | SYSTOLIC BLOOD PRESSURE: 120 MMHG | WEIGHT: 149.91 LBS

## 2019-12-10 DIAGNOSIS — G89.4 CHRONIC PAIN SYNDROME: ICD-10-CM

## 2019-12-10 DIAGNOSIS — M46.1 SI (SACROILIAC) JOINT INFLAMMATION (HCC): ICD-10-CM

## 2019-12-10 DIAGNOSIS — M47.816 LUMBAR SPONDYLOSIS: Primary | ICD-10-CM

## 2019-12-10 DIAGNOSIS — M46.1 SACROILIAC INFLAMMATION (HCC): ICD-10-CM

## 2019-12-10 DIAGNOSIS — M47.816 SPONDYLOSIS OF LUMBAR REGION WITHOUT MYELOPATHY OR RADICULOPATHY: ICD-10-CM

## 2019-12-10 PROCEDURE — 99213 OFFICE O/P EST LOW 20 MIN: CPT | Performed by: NURSE PRACTITIONER

## 2019-12-10 ASSESSMENT — ENCOUNTER SYMPTOMS
SORE THROAT: 0
SHORTNESS OF BREATH: 0
RHINORRHEA: 0
CONSTIPATION: 0
NAUSEA: 0
RESPIRATORY NEGATIVE: 1
VOMITING: 0
EYE PAIN: 0
PHOTOPHOBIA: 0
ALLERGIC/IMMUNOLOGIC NEGATIVE: 1
COUGH: 0
WHEEZING: 0
CHEST TIGHTNESS: 0
DIARRHEA: 0
EYES NEGATIVE: 1
COLOR CHANGE: 0
ABDOMINAL PAIN: 0
SINUS PRESSURE: 0
BACK PAIN: 1

## 2020-01-06 ENCOUNTER — PREP FOR PROCEDURE (OUTPATIENT)
Dept: PHYSICAL MEDICINE AND REHAB | Age: 55
End: 2020-01-06

## 2020-01-13 RX ORDER — CALCIUM POLYCARBOPHIL 625 MG 625 MG/1
625 TABLET ORAL DAILY
COMMUNITY
End: 2021-09-10

## 2020-01-13 RX ORDER — M-VIT,TX,IRON,MINS/CALC/FOLIC 27MG-0.4MG
1 TABLET ORAL DAILY
COMMUNITY
End: 2021-09-10

## 2020-01-14 ENCOUNTER — HOSPITAL ENCOUNTER (OUTPATIENT)
Age: 55
Setting detail: OUTPATIENT SURGERY
Discharge: HOME | End: 2020-01-14
Attending: PAIN MEDICINE | Admitting: PAIN MEDICINE
Payer: MEDICARE

## 2020-01-14 ENCOUNTER — APPOINTMENT (OUTPATIENT)
Dept: GENERAL RADIOLOGY | Age: 55
End: 2020-01-14
Attending: PAIN MEDICINE
Payer: MEDICARE

## 2020-01-14 VITALS
DIASTOLIC BLOOD PRESSURE: 69 MMHG | HEIGHT: 66 IN | HEART RATE: 81 BPM | WEIGHT: 151.6 LBS | OXYGEN SATURATION: 96 % | BODY MASS INDEX: 24.36 KG/M2 | SYSTOLIC BLOOD PRESSURE: 112 MMHG | RESPIRATION RATE: 14 BRPM | TEMPERATURE: 97.8 F

## 2020-01-14 PROCEDURE — 2500000003 HC RX 250 WO HCPCS: Performed by: PAIN MEDICINE

## 2020-01-14 PROCEDURE — 3600000056 HC PAIN LEVEL 4 BASE: Performed by: PAIN MEDICINE

## 2020-01-14 PROCEDURE — 3209999900 FLUORO FOR SURGICAL PROCEDURES

## 2020-01-14 PROCEDURE — 64635 DESTROY LUMB/SAC FACET JNT: CPT | Performed by: PAIN MEDICINE

## 2020-01-14 PROCEDURE — 64636 DESTROY L/S FACET JNT ADDL: CPT | Performed by: PAIN MEDICINE

## 2020-01-14 PROCEDURE — 2709999900 HC NON-CHARGEABLE SUPPLY: Performed by: PAIN MEDICINE

## 2020-01-14 PROCEDURE — 6360000002 HC RX W HCPCS: Performed by: PAIN MEDICINE

## 2020-01-14 PROCEDURE — 7100000010 HC PHASE II RECOVERY - FIRST 15 MIN: Performed by: PAIN MEDICINE

## 2020-01-14 RX ORDER — LIDOCAINE HYDROCHLORIDE 10 MG/ML
INJECTION, SOLUTION EPIDURAL; INFILTRATION; INTRACAUDAL; PERINEURAL PRN
Status: DISCONTINUED | OUTPATIENT
Start: 2020-01-14 | End: 2020-01-14 | Stop reason: ALTCHOICE

## 2020-01-14 RX ORDER — ROPIVACAINE HYDROCHLORIDE 2 MG/ML
INJECTION, SOLUTION EPIDURAL; INFILTRATION; PERINEURAL PRN
Status: DISCONTINUED | OUTPATIENT
Start: 2020-01-14 | End: 2020-01-14 | Stop reason: ALTCHOICE

## 2020-01-14 RX ORDER — METHYLPREDNISOLONE ACETATE 80 MG/ML
INJECTION, SUSPENSION INTRA-ARTICULAR; INTRALESIONAL; INTRAMUSCULAR; SOFT TISSUE PRN
Status: DISCONTINUED | OUTPATIENT
Start: 2020-01-14 | End: 2020-01-14 | Stop reason: ALTCHOICE

## 2020-01-14 ASSESSMENT — PAIN - FUNCTIONAL ASSESSMENT: PAIN_FUNCTIONAL_ASSESSMENT: 0-10

## 2020-01-14 ASSESSMENT — PAIN SCALES - GENERAL: PAINLEVEL_OUTOF10: 0

## 2020-01-14 NOTE — INTERVAL H&P NOTE
H&P Update    Patient's History and Physical from January 6, 2020 was reviewed. Patient examined. There has been no change.     Electronically signed by Krish Frederick MD on 1/14/20 at 2:14 PM

## 2020-01-14 NOTE — OP NOTE
the activities of daily living. Physical examination revealed facet tenderness and facet loading is positive. Patient had undergone lumbar facet joint injections with pain relief that lasted for only a short period of time and had greater than 70% pain relief with confirmatory median branch blocks. Hence we decided to do radiofrequency abalation of median branches for long term pain releif. The procedure and risks  were discussed with the patient and an informed consent was obtained.     Procedure:  Right side   A meaningful communication was kept up with the patient throughout the procedure. The patient is placed in prone position and skin over the back was prepped and draped in sterile manner. Then using fluoroscopy the junction of the transverse process of the vertebra with the superior process of the facet joint was observed and the view was optimized. The skin and deep tissues posterior were infiltrated with 9 ml of  1% xylocaine. The RF canula with the 10 mm active tip was introduced through the skin wheal under fluoroscopy guidance such that the tip of the needle lies in the groove of the transverse process with the superior processes of the facet joint. Then a lateral view of the lumbar spine was obtained to make sure the tip of needle is not in the neural foramen. Then electric impedence was checked to make sure it is acceptable. Then a sensory stimulus was applied at 50 Hz up to 1 volt and concordant pain symptoms were reproduced. Then a motor stimulus was applied at 2 Hz up to 2 volts and no motor stimulation was seen in lower extremities. Some multifidus stimulus was seen. Then after negative aspiration a mixture of depomedrol 80 mg  and 0.2%  Ropivacaine 1.5 cc was injected through the needle.  Then a initial lesion was done at 80 degrees centigrade for 90 seconds.       For L5 median branch block the junction of the ala of  the sacrum with the superior articular process of the facet joint non-verbal indicators of pain/discomfort absent

## 2020-02-05 ENCOUNTER — OFFICE VISIT (OUTPATIENT)
Dept: PHYSICAL MEDICINE AND REHAB | Age: 55
End: 2020-02-05
Payer: MEDICARE

## 2020-02-05 ENCOUNTER — TELEPHONE (OUTPATIENT)
Dept: PHYSICAL MEDICINE AND REHAB | Age: 55
End: 2020-02-05

## 2020-02-05 VITALS
HEIGHT: 66 IN | BODY MASS INDEX: 24.09 KG/M2 | WEIGHT: 149.91 LBS | SYSTOLIC BLOOD PRESSURE: 128 MMHG | DIASTOLIC BLOOD PRESSURE: 70 MMHG | HEART RATE: 68 BPM

## 2020-02-05 PROCEDURE — 99213 OFFICE O/P EST LOW 20 MIN: CPT | Performed by: NURSE PRACTITIONER

## 2020-02-05 ASSESSMENT — ENCOUNTER SYMPTOMS
CONSTIPATION: 0
RHINORRHEA: 0
COUGH: 0
BACK PAIN: 1
PHOTOPHOBIA: 0
DIARRHEA: 0
COLOR CHANGE: 0
SHORTNESS OF BREATH: 0
RESPIRATORY NEGATIVE: 1
VOMITING: 0
ABDOMINAL PAIN: 0
WHEEZING: 0
ALLERGIC/IMMUNOLOGIC NEGATIVE: 1
EYES NEGATIVE: 1
CHEST TIGHTNESS: 0
SINUS PRESSURE: 0
EYE PAIN: 0
NAUSEA: 0
SORE THROAT: 0

## 2020-02-05 NOTE — PROGRESS NOTES
[facility]  [address]  Dept: [phone]  Dept Fax: [phone]: [phone]    Visit Date: 2/5/2020    Functionality Assessment/Goals Worksheet     On a scale of 0 (Does not Interfere) to 10 (Completely Interferes)     1. Which number describes how during the past week pain has interfered with       the following:  A. General Activity:  2  B. Mood: 1  C. Walking Ability:  3  D. Normal Work (Includes both work outside the home and housework):  3  E. Relations with Other People:   0  F. Sleep:   0  G. Enjoyment of Life:   2    2. Patient Prefers to Take their Pain Medications:     []  On a regular basis   [x]  Only when necessary    []  Does not take pain medications    3. What are the Patient's Goals/Expectations for Visiting Pain Management? []  Learn about my pain    [x]  Receive Medication   []  Physical Therapy     []  Treat Depression   [x]  Receive Injections    []  Treat Sleep   []  Deal with Anxiety and Stress   []  Treat Opoid Dependence/Addiction   []  Other:      HPI:   Karri Sterling is a 53 y.o. male is here today for    Chief Complaint: Low back pain and Hip pain  Si pain   HPI   F/U Lumbar RFA Right side L3-4,4-5,5-S1 1/14/20202 states he still has procedure site pain but overall receiving about 50% pan relief or benefit. He continues to have low back bilateral Si pain. He has had Lumbar RFA Bilateral L3-S1 Spring 2019 with 40-75% pain relief for 8 months. He continues to take Tramadol Motrin prn . His low back SI pain increases with long term walking. Medications reviewed. Patient denies side effects with medications. Patient states he is taking medications as prescribed. Hedenies receiving pain medications from other sources. He denies any ER visits since last visit. Pain scale with out pain medications or at its worst is 6/10.   Pain scale with sounds. No murmur. No friction rub. No gallop. Pulmonary:      Effort: Pulmonary effort is normal. No respiratory distress. Breath sounds: Normal breath sounds. No wheezing or rales. Chest:      Chest wall: No tenderness. Abdominal:      General: Bowel sounds are normal. There is no distension. Palpations: Abdomen is soft. Tenderness: There is no abdominal tenderness. There is no guarding or rebound. Musculoskeletal:         General: Tenderness present. Right shoulder: Normal.      Left shoulder: Normal.      Right hip: He exhibits tenderness. Left hip: He exhibits tenderness. Right knee: Normal.      Left knee: Normal.      Right ankle: Normal.      Left ankle: Normal.      Cervical back: Normal.      Thoracic back: Normal.      Lumbar back: He exhibits decreased range of motion, tenderness, bony tenderness, pain and spasm. Back:    Skin:     General: Skin is warm. Coloration: Skin is not pale. Findings: No erythema or rash. Neurological:      Mental Status: He is alert and oriented to person, place, and time. He is not disoriented. Cranial Nerves: No cranial nerve deficit. Sensory: No sensory deficit. Motor: No atrophy or abnormal muscle tone. Coordination: Coordination normal.      Gait: Gait normal.      Deep Tendon Reflexes: Reflexes are normal and symmetric. Reflex Scores:       Tricep reflexes are 2+ on the right side and 2+ on the left side. Bicep reflexes are 2+ on the right side and 2+ on the left side. Brachioradialis reflexes are 2+ on the right side and 2+ on the left side. Patellar reflexes are 2+ on the right side and 2+ on the left side. Achilles reflexes are 2+ on the right side and 2+ on the left side. Comments: SLR-  Motor 4/5 generalized weakness  Feet bilat dusky when dependent   Psychiatric:         Attention and Perception: Attention normal. He is attentive.          Mood and

## 2020-02-07 ENCOUNTER — TELEPHONE (OUTPATIENT)
Dept: PHYSICAL MEDICINE AND REHAB | Age: 55
End: 2020-02-07

## 2020-02-24 ENCOUNTER — PREP FOR PROCEDURE (OUTPATIENT)
Dept: PHYSICAL MEDICINE AND REHAB | Age: 55
End: 2020-02-24

## 2020-02-24 NOTE — H&P (VIEW-ONLY)
tablet, Rfl: 11    isosorbide dinitrate (ISORDIL) 20 MG tablet, Take 1 tablet by mouth twice daily, Disp: 60 tablet, Rfl: 3    metoprolol tartrate (LOPRESSOR) 25 MG tablet, Take 1 tablet by mouth twice daily, Disp: 60 tablet, Rfl: 11    lisinopril (PRINIVIL;ZESTRIL) 2.5 MG tablet, Take 2.5 mg by mouth daily, Disp: , Rfl:     NITROSTAT 0.4 MG SL tablet, place 1 tablet under the tongue if needed every 5 minutes for chest pain for 3 doses IF NO RELIEF AFTER 3RD DOSE CALL PRESCRIBER ., Disp: 25 tablet, Rfl: 3    simvastatin (ZOCOR) 10 MG tablet, Take 10 mg by mouth nightly.  , Disp: , Rfl:     lamivudine-zidovudine (COMBIVIR) 150-300 MG per tablet, Take 1 tablet by mouth 2 times daily. , Disp: , Rfl:     fosamprenavir (LEXIVA) 700 MG tablet, Take by mouth 2 times daily 2 tab, Disp: , Rfl:     olanzapine (ZYPREXA) 5 MG tablet, Take 5 mg by mouth nightly.  , Disp: , Rfl:         Subjective:      Review of Systems   Constitutional: Positive for activity change. Negative for appetite change, chills, diaphoresis, fatigue, fever and unexpected weight change. HENT: Negative. Negative for congestion, ear pain, hearing loss, mouth sores, nosebleeds, rhinorrhea, sinus pressure and sore throat. Eyes: Negative. Negative for photophobia, pain and visual disturbance. Respiratory: Negative. Negative for cough, chest tightness, shortness of breath and wheezing. Cardiovascular: Negative for chest pain and palpitations. Gastrointestinal: Negative for abdominal pain, constipation, diarrhea, nausea and vomiting. GERD   Endocrine: Negative. Negative for cold intolerance, heat intolerance, polydipsia, polyphagia and polyuria. DM    Genitourinary: Negative. Negative for decreased urine volume, difficulty urinating, frequency and hematuria. Musculoskeletal: Positive for arthralgias, back pain, gait problem and myalgias. Negative for joint swelling, neck pain and neck stiffness. Skin: Negative. content is not paranoid or delusional. Thought content does not include homicidal or suicidal ideation. Thought content does not include homicidal or suicidal plan. Cognition and Memory: Cognition normal. Memory is not impaired. He does not exhibit impaired recent memory or impaired remote memory. Judgment: Judgment normal. Judgment is not impulsive or inappropriate. Comments: Flat affect, h/o depression         HARRISON test: +  Yeomans test: +  Gaenslen test: +  Assessment:      1. Lumbar spondylosis    2. SI (sacroiliac) joint inflammation (HCC)    3.  Chronic pain syndrome          Review of Systems    Physical Exam        Plan:  Lumbar RFA Left side L3-4,4-5,5-S1     Wilma Jones, APRN - CNP  2/24/2020

## 2020-03-02 ENCOUNTER — HOSPITAL ENCOUNTER (OUTPATIENT)
Age: 55
Setting detail: OUTPATIENT SURGERY
Discharge: HOME | End: 2020-03-02
Attending: PAIN MEDICINE | Admitting: PAIN MEDICINE
Payer: MEDICARE

## 2020-03-02 ENCOUNTER — APPOINTMENT (OUTPATIENT)
Dept: GENERAL RADIOLOGY | Age: 55
End: 2020-03-02
Attending: PAIN MEDICINE
Payer: MEDICARE

## 2020-03-02 VITALS
HEIGHT: 66 IN | WEIGHT: 154.8 LBS | DIASTOLIC BLOOD PRESSURE: 71 MMHG | OXYGEN SATURATION: 95 % | RESPIRATION RATE: 16 BRPM | SYSTOLIC BLOOD PRESSURE: 123 MMHG | BODY MASS INDEX: 24.88 KG/M2 | HEART RATE: 76 BPM | TEMPERATURE: 98 F

## 2020-03-02 PROCEDURE — 64636 DESTROY L/S FACET JNT ADDL: CPT | Performed by: PAIN MEDICINE

## 2020-03-02 PROCEDURE — 7100000011 HC PHASE II RECOVERY - ADDTL 15 MIN: Performed by: PAIN MEDICINE

## 2020-03-02 PROCEDURE — 2709999900 HC NON-CHARGEABLE SUPPLY: Performed by: PAIN MEDICINE

## 2020-03-02 PROCEDURE — 7100000010 HC PHASE II RECOVERY - FIRST 15 MIN: Performed by: PAIN MEDICINE

## 2020-03-02 PROCEDURE — 2500000003 HC RX 250 WO HCPCS: Performed by: PAIN MEDICINE

## 2020-03-02 PROCEDURE — 3209999900 FLUORO FOR SURGICAL PROCEDURES

## 2020-03-02 PROCEDURE — 64635 DESTROY LUMB/SAC FACET JNT: CPT | Performed by: PAIN MEDICINE

## 2020-03-02 PROCEDURE — 6360000002 HC RX W HCPCS: Performed by: PAIN MEDICINE

## 2020-03-02 PROCEDURE — 3600000057 HC PAIN LEVEL 4 ADDL 15 MIN: Performed by: PAIN MEDICINE

## 2020-03-02 PROCEDURE — 3600000056 HC PAIN LEVEL 4 BASE: Performed by: PAIN MEDICINE

## 2020-03-02 RX ORDER — LIDOCAINE HYDROCHLORIDE 10 MG/ML
INJECTION, SOLUTION EPIDURAL; INFILTRATION; INTRACAUDAL; PERINEURAL PRN
Status: DISCONTINUED | OUTPATIENT
Start: 2020-03-02 | End: 2020-03-02 | Stop reason: ALTCHOICE

## 2020-03-02 RX ORDER — BUPIVACAINE HYDROCHLORIDE 2.5 MG/ML
INJECTION, SOLUTION EPIDURAL; INFILTRATION; INTRACAUDAL PRN
Status: DISCONTINUED | OUTPATIENT
Start: 2020-03-02 | End: 2020-03-02 | Stop reason: ALTCHOICE

## 2020-03-02 RX ORDER — METHYLPREDNISOLONE ACETATE 80 MG/ML
INJECTION, SUSPENSION INTRA-ARTICULAR; INTRALESIONAL; INTRAMUSCULAR; SOFT TISSUE PRN
Status: DISCONTINUED | OUTPATIENT
Start: 2020-03-02 | End: 2020-03-02 | Stop reason: ALTCHOICE

## 2020-03-02 ASSESSMENT — PAIN - FUNCTIONAL ASSESSMENT: PAIN_FUNCTIONAL_ASSESSMENT: 0-10

## 2020-03-02 NOTE — OP NOTE
Pre-Procedure Note    Patient Name: Melany Thomas   YOB: 1965  Room/Bed: [address] [place] [address] Pool/[facility]  Medical Record Number: [id number]  Date: 3/2/20      Indication:  Lower back pain  Consent: On file. Vital Signs:   Vitals:    03/02/20 1359   BP: 121/69   Pulse: 84   Resp: 16   Temp: 97.9 °F (36.6 °C)   SpO2: 94%       Pre-Sedation Documentation and Exam:   Vital signs have been reviewed (see flow sheet for vitals). Sedation/ Anesthesia Plan:   LOCAL    Patient is an appropriate candidate for plan of sedation: yes       Preoperative Diagnosis:  L-spondylosis     Post-Op Dx: as above     Procedure Performed:  :Radiofrequency ablation of median branches at the levels of L3-4,L4-5 and L5-S1 left under fluoroscopic guidance      Indication for the Procedure:  The patient has ahistory of chronic low back pain that is not responding well to the conservative treatment.  Patient's pain is mostly axial in nature.  Pain is interfering with the activities of daily living.  Physical examination revealed facet tenderness and facet loading is positive.  Patient had undergone lumbar facet joint injections with pain relief that lasted for only a short period of time and had greater than 70% pain relief with confirmatory median branch blocks.  Hence we decided to do radiofrequency abalation of median branches for long term pain releif.   The procedure and risks  were discussed with the patient and an informed consent was obtained.     Procedure:  left side   A meaningful communication was kept up with the patient throughout the procedure. The patient is placed in prone position and skin over the back was prepped and draped in sterile manner.  Then using fluoroscopy the junction of the transverse process of the vertebra with the superior process of the facet joint was observed and the view was optimized.  The skin and deep tissues posterior were infiltrated with 9 ml of  1% xylocaine.  The RF canula with the 10 mm active tip was introduced through the skin wheal under fluoroscopy guidance such that the tip of the needle lies in the groove of the transverse process with the superior processes of the facet joint.  Then a lateral view of the lumbar spine was obtained to make sure the tip of needle is not in the neural foramen.  Then electric impedence was checked to make sure it is acceptable. Then a sensory stimulus was applied at 50 Hz up to 1 volt and concordant pain symptoms were reproduced. Then a motor stimulus was applied at 2 Hz up to 2 volts and no motor stimulation was seen in lower extremities.  Some multifidus stimulus was seen. Patti Exon after negative aspiration a mixture of depomedrol 80 mg  and 0.2%  Ropivacaine 1.5 cc was injected through the needle. Then a initial lesion was done at 80 degrees centigrade for 90 seconds.       For L5 median branch block the junction of the ala of  the sacrum with the superior articular process of the facet joint was taken as a reference point.  For the L4 median branch the junction of the transverse process of L5 with the superolateral possible facet joint was taken as a reference point and for S1 median branch the most lateral and superior aspect of S1 foramina was taken as a reference point,.  For L3 median branch the junction of L4 transverse process and superior articular process of facet joint was taken as reference point and so on.     [id number]     Patient's vital signs and neurological status remained stable throughout the procedure and post procedural period.  The patient tolerated the procedure well and was discharged home in stable condition.     Electronically signed by Víctor Raphael MD on 3/2/20 at 3:17 PM

## 2020-03-02 NOTE — PROGRESS NOTES
1533: Patient arrived to Phase II via cart. VSS. [id number]: Patient tolerating PO fluid and peanut butter crackers. 1555: Reviewed discharge instructions with patient, no concerns voiced. 1559: Patient escorted to discharge door in stable condition.

## 2020-03-25 ENCOUNTER — OFFICE VISIT (OUTPATIENT)
Dept: PHYSICAL MEDICINE AND REHAB | Age: 55
End: 2020-03-25
Payer: MEDICARE

## 2020-03-25 VITALS
HEIGHT: 66 IN | DIASTOLIC BLOOD PRESSURE: 68 MMHG | HEART RATE: 70 BPM | BODY MASS INDEX: 24.8 KG/M2 | WEIGHT: 154.32 LBS | SYSTOLIC BLOOD PRESSURE: 112 MMHG

## 2020-03-25 PROCEDURE — 99213 OFFICE O/P EST LOW 20 MIN: CPT | Performed by: NURSE PRACTITIONER

## 2020-03-25 ASSESSMENT — ENCOUNTER SYMPTOMS
DIARRHEA: 0
CONSTIPATION: 0
EYES NEGATIVE: 1
WHEEZING: 0
SORE THROAT: 0
COLOR CHANGE: 0
SHORTNESS OF BREATH: 0
SINUS PRESSURE: 0
NAUSEA: 0
RHINORRHEA: 0
RESPIRATORY NEGATIVE: 1
EYE PAIN: 0
PHOTOPHOBIA: 0
COUGH: 0
ALLERGIC/IMMUNOLOGIC NEGATIVE: 1
BACK PAIN: 1
ABDOMINAL PAIN: 0
CHEST TIGHTNESS: 0
VOMITING: 0

## 2020-03-25 NOTE — PROGRESS NOTES
[facility]  [address]  Dept: [phone]  Dept Fax: [phone]: [phone]    Visit Date: 3/25/2020    Functionality Assessment/Goals Worksheet     On a scale of 0 (Does not Interfere) to 10 (Completely Interferes)     1. Which number describes how during the past week pain has interfered with       the following:  A. General Activity:  0  B. Mood: 1  C. Walking Ability:  2  D. Normal Work (Includes both work outside the home and housework):  2  E. Relations with Other People:   1  F. Sleep:   0  G. Enjoyment of Life:   1    2. Patient Prefers to Take their Pain Medications:     []  On a regular basis   []  Only when necessary    []  Does not take pain medications    3. What are the Patient's Goals/Expectations for Visiting Pain Management? []  Learn about my pain    [x]  Receive Medication   []  Physical Therapy     []  Treat Depression   [x]  Receive Injections    []  Treat Sleep   []  Deal with Anxiety and Stress   []  Treat Opoid Dependence/Addiction   []  Other:    HPI:   Radha  is a 53 y.o. male is here today for    Chief Complaint: Low back pain, Right leg pain, Left leg pain and Hip pain  SI pain   HPI   F/U Left Lumbar RFA L3-S1  3/2/2020 state she is receiving about 80-90% pain relief or benefit. He had Right Lumbar RFA  1/4/2020 continued 80% pain relief. He continues to take Tramadol very rarely has had same script for 9 months. Continues to take  Motrin prn also. .      Medications reviewed. Patient denies side effects with medications. Patient states he is taking medications as prescribed. Hedenies receiving pain medications from other sources. He denies any ER visits since last visit. Pain scale with out pain medications or at its worst is 3/10. Pain scale with pain medications or at its best is 0/10.   Last dose of  Tramadol was  2 weeks 40 MG tablet, Take 1 tablet by mouth every morning, Disp: 30 tablet, Rfl: 11    isosorbide dinitrate (ISORDIL) 20 MG tablet, Take 1 tablet by mouth twice daily, Disp: 60 tablet, Rfl: 3    lisinopril (PRINIVIL;ZESTRIL) 2.5 MG tablet, Take 2.5 mg by mouth daily, Disp: , Rfl:     NITROSTAT 0.4 MG SL tablet, place 1 tablet under the tongue if needed every 5 minutes for chest pain for 3 doses IF NO RELIEF AFTER 3RD DOSE CALL PRESCRIBER ., Disp: 25 tablet, Rfl: 3    simvastatin (ZOCOR) 10 MG tablet, Take 10 mg by mouth nightly.  , Disp: , Rfl:     lamivudine-zidovudine (COMBIVIR) 150-300 MG per tablet, Take 1 tablet by mouth 2 times daily. , Disp: , Rfl:     fosamprenavir (LEXIVA) 700 MG tablet, Take by mouth 2 times daily 2 tab, Disp: , Rfl:     olanzapine (ZYPREXA) 5 MG tablet, Take 5 mg by mouth nightly.  , Disp: , Rfl:     Subjective:      Review of Systems   Constitutional: Positive for activity change. Negative for appetite change, chills, diaphoresis, fatigue, fever and unexpected weight change. HENT: Negative. Negative for congestion, ear pain, hearing loss, mouth sores, nosebleeds, rhinorrhea, sinus pressure and sore throat. Eyes: Negative. Negative for photophobia, pain and visual disturbance. Respiratory: Negative. Negative for cough, chest tightness, shortness of breath and wheezing. Cardiovascular: Negative for chest pain and palpitations. Gastrointestinal: Negative for abdominal pain, constipation, diarrhea, nausea and vomiting. GERD   Endocrine: Negative. Negative for cold intolerance, heat intolerance, polydipsia, polyphagia and polyuria. DM    Genitourinary: Negative. Negative for decreased urine volume, difficulty urinating, frequency and hematuria. Musculoskeletal: Positive for arthralgias, back pain, gait problem and myalgias. Negative for joint swelling, neck pain and neck stiffness. Skin: Negative. Negative for color change and rash. Allergic/Immunologic: Negative. Negative for food allergies and immunocompromised state. Neurological: Negative for dizziness, tremors, seizures, syncope, facial asymmetry, speech difficulty, weakness, light-headedness, numbness and headaches. Hematological: Negative. Does not bruise/bleed easily. Psychiatric/Behavioral: Negative. Negative for agitation, behavioral problems, confusion, decreased concentration, dysphoric mood, hallucinations, self-injury, sleep disturbance and suicidal ideas. The patient is not nervous/anxious and is not hyperactive. Objective:     Vitals:    03/25/20 1227   BP: 112/68   Site: Left Upper Arm   Position: Sitting   Cuff Size: Medium Adult   Pulse: 70   Weight: 154 lb 5.2 oz (70 kg)   Height: 5' 6\" (1.676 m)       Physical Exam  Vitals signs and nursing note reviewed. Constitutional:       General: He is not in acute distress. Appearance: He is well-developed. He is not diaphoretic. HENT:      Head: Normocephalic and atraumatic. Right Ear: External ear normal.      Left Ear: External ear normal.      Nose: Nose normal.      Mouth/Throat:      Pharynx: No oropharyngeal exudate. Eyes:      General: No scleral icterus. Right eye: No discharge. Left eye: No discharge. Conjunctiva/sclera: Conjunctivae normal.      Pupils: Pupils are equal, round, and reactive to light. Neck:      Musculoskeletal: Full passive range of motion without pain, normal range of motion and neck supple. Normal range of motion. No edema, erythema, neck rigidity or muscular tenderness. Thyroid: No thyromegaly. Cardiovascular:      Rate and Rhythm: Normal rate and regular rhythm. Heart sounds: Normal heart sounds. No murmur. No friction rub. No gallop. Pulmonary:      Effort: Pulmonary effort is normal. No respiratory distress. Breath sounds: Normal breath sounds. No wheezing or rales. Chest:      Chest wall: No tenderness.    Abdominal: General: Bowel sounds are normal. There is no distension. Palpations: Abdomen is soft. Tenderness: There is no abdominal tenderness. There is no guarding or rebound. Musculoskeletal:         General: Tenderness present. Right shoulder: Normal.      Left shoulder: Normal.      Right hip: He exhibits tenderness. Left hip: He exhibits tenderness. Right knee: Normal.      Left knee: Normal.      Right ankle: Normal.      Left ankle: Normal.      Cervical back: Normal.      Thoracic back: Normal.      Lumbar back: He exhibits decreased range of motion, tenderness, bony tenderness, pain and spasm. Back:    Skin:     General: Skin is warm. Coloration: Skin is not pale. Findings: No erythema or rash. Neurological:      Mental Status: He is alert and oriented to person, place, and time. He is not disoriented. Cranial Nerves: Cranial nerves are intact. No cranial nerve deficit. Sensory: Sensation is intact. No sensory deficit. Motor: Weakness present. No atrophy or abnormal muscle tone. Coordination: Coordination is intact. Coordination normal.      Gait: Gait abnormal.      Deep Tendon Reflexes: Reflexes are normal and symmetric. Reflex Scores:       Tricep reflexes are 2+ on the right side and 2+ on the left side. Bicep reflexes are 2+ on the right side and 2+ on the left side. Brachioradialis reflexes are 2+ on the right side and 2+ on the left side. Patellar reflexes are 2+ on the right side and 2+ on the left side. Achilles reflexes are 2+ on the right side and 2+ on the left side. Comments: SLR-  Motor 4/5 generalized weakness  Feet bilat dusky when dependent   Psychiatric:         Attention and Perception: Attention and perception normal. He is attentive. Mood and Affect: Mood and affect normal. Mood is not anxious or depressed. Affect is not labile, blunt, angry or inappropriate.          Speech: Speech normal.         Behavior: Behavior normal. Behavior is not agitated, slowed, aggressive, withdrawn, hyperactive or combative. Behavior is cooperative. Thought Content: Thought content normal. Thought content is not paranoid or delusional. Thought content does not include homicidal or suicidal ideation. Thought content does not include homicidal or suicidal plan. Cognition and Memory: Cognition and memory normal. Memory is not impaired. He does not exhibit impaired recent memory or impaired remote memory. Judgment: Judgment normal. Judgment is not impulsive or inappropriate. Comments: Flat affect, h/o depression       HARRISON test:+  Yeomans test: +  Gaenslen test: +     Assessment:     1. Lumbar spondylosis    2. SI (sacroiliac) joint inflammation (HCC)    3. Sacroiliac inflammation (HCC)    4. Spondylosis of lumbar region without myelopathy or radiculopathy    5. Chronic pain syndrome            Plan:      · OARRS reviewed. Current MED:10  · Patient was not offered naloxone for home. · Discussed long term side effects of medications, tolerance, dependency and addiction. · Previous UDS reviewed  · UDS preformed today for compliance. · Patient told can not receive any pain medications from any other source. · No evidence of abuse, diversion or aberrant behavior.  Medications and/or procedures to improve function and quality of life- patient understanding with this and that may not be pain free   Discussed with patient about safe storage of medications at home   Discussed possible weaning of medication dosing dependent on treatment/procedure results.  Testing:none    Procedures: Continued 80% pain relief form Lumbar RFA Bilateral    Discussed with patient about risks with procedure including infection, reaction to medication, increased pain, or bleeding.  Medications:Tramadol very rarely prn Motrin prn       Meds.  Prescribed:   No orders of the defined types were placed in

## 2020-04-09 RX ORDER — ISOSORBIDE DINITRATE 20 MG/1
TABLET ORAL
Qty: 60 TABLET | Refills: 3 | Status: SHIPPED | OUTPATIENT
Start: 2020-04-09 | End: 2020-08-07

## 2020-06-24 ENCOUNTER — OFFICE VISIT (OUTPATIENT)
Dept: PHYSICAL MEDICINE AND REHAB | Age: 55
End: 2020-06-24
Payer: MEDICARE

## 2020-06-24 VITALS
WEIGHT: 155 LBS | SYSTOLIC BLOOD PRESSURE: 112 MMHG | HEART RATE: 78 BPM | TEMPERATURE: 98 F | DIASTOLIC BLOOD PRESSURE: 66 MMHG | HEIGHT: 66 IN | BODY MASS INDEX: 24.91 KG/M2

## 2020-06-24 PROCEDURE — 99213 OFFICE O/P EST LOW 20 MIN: CPT | Performed by: NURSE PRACTITIONER

## 2020-06-24 ASSESSMENT — ENCOUNTER SYMPTOMS
VOMITING: 0
EYES NEGATIVE: 1
SORE THROAT: 0
EYE PAIN: 0
ABDOMINAL PAIN: 0
RHINORRHEA: 0
ALLERGIC/IMMUNOLOGIC NEGATIVE: 1
SHORTNESS OF BREATH: 0
SINUS PRESSURE: 0
CONSTIPATION: 0
NAUSEA: 0
PHOTOPHOBIA: 0
CHEST TIGHTNESS: 0
DIARRHEA: 0
BACK PAIN: 1
COLOR CHANGE: 0

## 2020-06-24 NOTE — PROGRESS NOTES
daily, Disp: 60 tablet, Rfl: 3    Multiple Vitamins-Minerals (THERAPEUTIC MULTIVITAMIN-MINERALS) tablet, Take 1 tablet by mouth daily, Disp: , Rfl:     polycarbophil (FIBERCON) 625 MG tablet, Take 625 mg by mouth daily, Disp: , Rfl:     pantoprazole (PROTONIX) 40 MG tablet, Take 1 tablet by mouth every morning, Disp: 30 tablet, Rfl: 11    lisinopril (PRINIVIL;ZESTRIL) 2.5 MG tablet, Take 2.5 mg by mouth daily, Disp: , Rfl:     NITROSTAT 0.4 MG SL tablet, place 1 tablet under the tongue if needed every 5 minutes for chest pain for 3 doses IF NO RELIEF AFTER 3RD DOSE CALL PRESCRIBER ., Disp: 25 tablet, Rfl: 3    simvastatin (ZOCOR) 10 MG tablet, Take 10 mg by mouth nightly.  , Disp: , Rfl:     lamivudine-zidovudine (COMBIVIR) 150-300 MG per tablet, Take 1 tablet by mouth 2 times daily. , Disp: , Rfl:     fosamprenavir (LEXIVA) 700 MG tablet, Take by mouth 2 times daily 2 tab, Disp: , Rfl:     olanzapine (ZYPREXA) 5 MG tablet, Take 5 mg by mouth nightly.  , Disp: , Rfl:     Subjective:      Review of Systems   Constitutional: Positive for activity change. Negative for appetite change, chills, diaphoresis, fatigue, fever and unexpected weight change. HENT: Negative. Negative for congestion, ear pain, hearing loss, mouth sores, nosebleeds, rhinorrhea, sinus pressure and sore throat. Eyes: Negative. Negative for photophobia, pain and visual disturbance. Respiratory: Negative for chest tightness and shortness of breath. DAY COPD smokes 1 pack a day pt has noticed increased SOB last 3 months   Gastrointestinal: Negative for abdominal pain, constipation, diarrhea, nausea and vomiting. GERD   Endocrine: Negative. Negative for cold intolerance, heat intolerance, polydipsia, polyphagia and polyuria. DM    Genitourinary: Negative. Negative for decreased urine volume, difficulty urinating, frequency and hematuria.    Musculoskeletal: Positive for arthralgias, back pain, gait problem and myalgias. Negative for joint swelling, neck pain and neck stiffness. Skin: Negative. Negative for color change and rash. Allergic/Immunologic: Negative. Negative for food allergies and immunocompromised state. Neurological: Negative for dizziness, tremors, seizures, syncope, facial asymmetry, speech difficulty, weakness, light-headedness, numbness and headaches. Hematological: Negative. Does not bruise/bleed easily. Psychiatric/Behavioral: Negative. Negative for agitation, behavioral problems, confusion, decreased concentration, dysphoric mood, hallucinations, self-injury, sleep disturbance and suicidal ideas. The patient is not nervous/anxious and is not hyperactive. Objective:     Vitals:    06/24/20 1115   BP: 112/66   Site: Left Upper Arm   Position: Sitting   Cuff Size: Medium Adult   Pulse: 78   Temp: 98 °F (36.7 °C)   TempSrc: Temporal   Weight: 155 lb (70.3 kg)   Height: 5' 6\" (1.676 m)       Physical Exam  Vitals signs and nursing note reviewed. Constitutional:       General: He is not in acute distress. Appearance: He is well-developed. He is not diaphoretic. HENT:      Head: Normocephalic and atraumatic. Right Ear: External ear normal.      Left Ear: External ear normal.      Nose: Nose normal.      Mouth/Throat:      Pharynx: No oropharyngeal exudate. Eyes:      General: No scleral icterus. Right eye: No discharge. Left eye: No discharge. Conjunctiva/sclera: Conjunctivae normal.      Pupils: Pupils are equal, round, and reactive to light. Neck:      Musculoskeletal: Full passive range of motion without pain, normal range of motion and neck supple. Normal range of motion. No edema, erythema, neck rigidity or muscular tenderness. Thyroid: No thyromegaly. Cardiovascular:      Rate and Rhythm: Normal rate and regular rhythm. Heart sounds: Normal heart sounds. No murmur. No friction rub. No gallop.     Pulmonary:      Effort: Pulmonary effort is normal. No respiratory distress. Breath sounds: Examination of the right-upper field reveals wheezing. Examination of the left-upper field reveals wheezing. Decreased breath sounds and wheezing present. No rales. Comments: Anterior expiratory wheezes  DBS   Chest:      Chest wall: No tenderness. Abdominal:      General: Bowel sounds are normal. There is no distension. Palpations: Abdomen is soft. Tenderness: There is no abdominal tenderness. There is no guarding or rebound. Musculoskeletal:         General: Tenderness present. Right shoulder: Normal.      Left shoulder: Normal.      Right hip: He exhibits tenderness. Left hip: He exhibits tenderness. Right knee: Normal.      Left knee: Normal.      Right ankle: Normal.      Left ankle: Normal.      Cervical back: Normal.      Thoracic back: Normal.      Lumbar back: He exhibits decreased range of motion, tenderness, bony tenderness, pain and spasm. Back:    Skin:     General: Skin is warm. Coloration: Skin is not pale. Findings: No erythema or rash. Neurological:      Mental Status: He is alert and oriented to person, place, and time. He is not disoriented. Cranial Nerves: Cranial nerves are intact. No cranial nerve deficit. Sensory: Sensation is intact. No sensory deficit. Motor: Weakness present. No atrophy or abnormal muscle tone. Coordination: Coordination is intact. Coordination normal.      Gait: Gait abnormal.      Deep Tendon Reflexes: Reflexes are normal and symmetric. Reflex Scores:       Tricep reflexes are 2+ on the right side and 2+ on the left side. Bicep reflexes are 2+ on the right side and 2+ on the left side. Brachioradialis reflexes are 2+ on the right side and 2+ on the left side. Patellar reflexes are 2+ on the right side and 2+ on the left side. Achilles reflexes are 2+ on the right side and 2+ on the left side.

## 2020-08-07 RX ORDER — ISOSORBIDE DINITRATE 20 MG/1
TABLET ORAL
Qty: 60 TABLET | Refills: 0 | Status: SHIPPED | OUTPATIENT
Start: 2020-08-07 | End: 2020-12-08 | Stop reason: SDUPTHER

## 2020-08-31 ENCOUNTER — OFFICE VISIT (OUTPATIENT)
Dept: CARDIOLOGY CLINIC | Age: 55
End: 2020-08-31
Payer: MEDICARE

## 2020-08-31 VITALS
DIASTOLIC BLOOD PRESSURE: 60 MMHG | SYSTOLIC BLOOD PRESSURE: 132 MMHG | BODY MASS INDEX: 25.1 KG/M2 | WEIGHT: 156.2 LBS | HEART RATE: 75 BPM | HEIGHT: 66 IN

## 2020-08-31 PROCEDURE — 99213 OFFICE O/P EST LOW 20 MIN: CPT | Performed by: INTERNAL MEDICINE

## 2020-08-31 PROCEDURE — 93000 ELECTROCARDIOGRAM COMPLETE: CPT | Performed by: INTERNAL MEDICINE

## 2020-08-31 RX ORDER — TIZANIDINE 4 MG/1
4 TABLET ORAL EVERY 6 HOURS PRN
COMMUNITY
End: 2021-03-08 | Stop reason: SDUPTHER

## 2020-08-31 RX ORDER — TRAMADOL HYDROCHLORIDE 50 MG/1
50 TABLET ORAL EVERY 6 HOURS PRN
COMMUNITY
End: 2022-08-01

## 2020-08-31 NOTE — PROGRESS NOTES
3    simvastatin (ZOCOR) 10 MG tablet, Take 10 mg by mouth nightly.  , Disp: , Rfl:     lamivudine-zidovudine (COMBIVIR) 150-300 MG per tablet, Take 1 tablet by mouth 2 times daily. , Disp: , Rfl:     fosamprenavir (LEXIVA) 700 MG tablet, Take by mouth 2 times daily 2 tab, Disp: , Rfl:     olanzapine (ZYPREXA) 5 MG tablet, Take 5 mg by mouth nightly.  , Disp: , Rfl:     Past Medical History  Vinod Zimmer  has a past medical history of Arthritis, Arthritis, Chest pain, Depression, Diabetes mellitus ([facility] [address] Dysphagia, GERD (gastroesophageal reflux disease), History of blood transfusion, History of nephrolithiasis, HIV (human immunodeficiency virus infection) ([facility] [address] Hyperlipidemia, Pancreatitis, and Type 2 diabetes mellitus without complication ([facility] [address] Social History  Vinod Zimmer  reports that he has been smoking cigarettes. He has a 20.00 pack-year smoking history. He has never used smokeless tobacco. He reports that he does not drink alcohol or use drugs. Family History  Vinod Zimmer family history includes Diabetes in his mother; High Blood Pressure in his mother.     Past Surgical History   Past Surgical History:   Procedure Laterality Date    CARDIAC CATHETERIZATION  2015???    OK    COLONOSCOPY  2016    ENDOSCOPY, COLON, DIAGNOSTIC      egd with dilatation    FOOT SURGERY  1970's    left    LUMBAR SPINE SURGERY Right 4/23/2019    Lumbar RFA  Right side first L3-4,4-5,5-S1 performed by Yamila Rome MD at [address] [facility] Left 5/21/2019    Lumbar RFA  Left side L3-4,4-5,5-S1 performed by Yamila Rome MD at [place] [address] 1 BILATERAL Bilateral 7/18/2017    SI MBB BILATERAL performed by Yamila Rome MD at [facility] 98 Bilateral 07/18/2017    SI BLOCK    OTHER SURGICAL HISTORY Bilateral 01/17/2017    Lumbar facet     OTHER SURGICAL HISTORY Bilateral 02/20/2017    Lumbar Facet L3-S1    OTHER SURGICAL HISTORY Right 03/28/2017    Lumbar RFA L3-S1    OTHER SURGICAL HISTORY Bilateral 03/06/2018    Bilat SI joint injection    PAIN MANAGEMENT PROCEDURE Right 1/14/2020    Lumbar RFA  Right side first L3-4,4-5,5-S1 No sedation per patient request [facility] Left 3/2/2020    Lumbar RFA Left side L3-4,4-5,5-S1- local per pt request performed by Vanita Frank MD at [address] [place] [address] DX/THER AGNT PARAVERT FACET JOINT, LUMBAR/SAC, 2ND LEVEL Bilateral 3/6/2018    BILATERAL SI MBB #2 NO SEDATION performed by Vanita Frank MD at [address] [place] [address] ENDOSCOPY  [id number], [id number]       Subjective:     REVIEW OF SYSTEMS  Constitutional: denies sweats, chills and fever  HENT: denies  congestion, sinus pressure, sneezing and sore throat. Eyes: denies  pain, discharge, redness and itching. Respiratory: denies apnea, cough  Gastrointestinal: denies blood in stool, constipation, diarrhea   Endocrine: denies cold intolerance, heat intolerance, polydipsia. Genitourinary: denies dysuria, enuresis, flank pain and hematuria. Musculoskeletal: denies arthralgias, joint swelling and neck pain. Neurological: denies numbness and headaches. Psychiatric/Behavioral: denies agitation, confusion, decreased concentration and dysphoric mood    All others reviewed and are negative. Objective:     /60   Pulse 75   Ht 5' 6\" (1.676 m)   Wt 156 lb 3.2 oz (70.9 kg)   BMI 25.21 kg/m²     Wt Readings from Last 3 Encounters:   08/31/20 156 lb 3.2 oz (70.9 kg)   06/24/20 155 lb (70.3 kg)   03/25/20 154 lb 5.2 oz (70 kg)     BP Readings from Last 3 Encounters:   08/31/20 132/60   06/24/20 112/66   03/25/20 112/68       PHYSICAL EXAM  Constitutional: Oriented to person, place, and time. Appears well-developed and well-nourished. HENT:   Head: Normocephalic and atraumatic.    Eyes: 04/18/2013           Results for orders placed during the hospital encounter of 11/05/15   ECHO Complete 2D W Doppler W Color       STRESS:    CATH:6/2013:  CORONARY ANGIOGRAPHY     RIGHT CORONARY ARTERY:  A large caliber vessel bifurcating to the PDA and PLV  which are also large caliber vessels.  In the proximal portion, the lesion is  about 20% lesion noted.     LEFT MAIN:  Has just mild tapering in the ostium, however no critical lesion  is noted. The vessel is long and bifurcates to the circumflex and LAD.     CIRCUMFLEX:  Moderate to large caliber vessel which gives rise to a small to  medium sized OM-1 and OM-2 without any critical lesion.     LEFT ANTERIOR DESCENDING:  The LAD is a large caliber vessel that gives rise  to a large diagonal.  It does not demonstrate any critical lesion.  It just  has some mild luminal irregularity, but no critical lesion noted.     CONCLUSION:       1.   Left ventriculography demonstrated normal systolic function,             ejection fraction 55% with no mitral regurgitation on pullback.       2.   Hemodynamics, refer to the patient's chart.       3.   Right coronary artery is a dominant, large caliber vessel.  It has             a 20% lesion in the proximal portion, but no critical lesion.       4.   The left main is a long caliber vessel with just mild tapering in             the ostium, however no critical lesion is noted.       5.   The circumflex is a moderate to large caliber vessel, widely             patent.      6.    The left anterior descending is a large caliber vessel, widely       patent.       7.   The diagonal is also a large caliber vessel, widely patent.       8.   No complication occurred.  Adequate hemostasis was obtained.     RECOMMENDATION:  Aggressive risk factor modification, exercise thirty minutes  a day and no driving or heavy lifting for the next two days.     ECHO:   11/05/2015    SUMMARY:     Left ventricle:  Size was normal.  Systolic function

## 2020-09-23 ENCOUNTER — OFFICE VISIT (OUTPATIENT)
Dept: PHYSICAL MEDICINE AND REHAB | Age: 55
End: 2020-09-23
Payer: MEDICARE

## 2020-09-23 VITALS
HEIGHT: 66 IN | DIASTOLIC BLOOD PRESSURE: 62 MMHG | WEIGHT: 156 LBS | SYSTOLIC BLOOD PRESSURE: 106 MMHG | TEMPERATURE: 96.7 F | BODY MASS INDEX: 25.07 KG/M2

## 2020-09-23 PROCEDURE — 99213 OFFICE O/P EST LOW 20 MIN: CPT | Performed by: NURSE PRACTITIONER

## 2020-09-23 ASSESSMENT — ENCOUNTER SYMPTOMS
CONSTIPATION: 0
SORE THROAT: 0
RHINORRHEA: 0
ABDOMINAL PAIN: 0
COLOR CHANGE: 0
ALLERGIC/IMMUNOLOGIC NEGATIVE: 1
NAUSEA: 0
DIARRHEA: 0
EYES NEGATIVE: 1
BACK PAIN: 1
SHORTNESS OF BREATH: 1
PHOTOPHOBIA: 0
CHEST TIGHTNESS: 0
EYE PAIN: 0
VOMITING: 0
SINUS PRESSURE: 0

## 2020-09-23 NOTE — PROGRESS NOTES
[facility]  [address]  Dept: [phone]  Dept Fax: [phone]: [phone]    Visit Date: 9/23/2020    Functionality Assessment/Goals Worksheet     On a scale of 0 (Does not Interfere) to 10 (Completely Interferes)     1. Which number describes how during the past week pain has interfered with       the following:  A. General Activity:  2  B. Mood: 1  C. Walking Ability:  6  D. Normal Work (Includes both work outside the home and housework):  2  E. Relations with Other People:   1  F. Sleep:   0  G. Enjoyment of Life:   0    2. Patient Prefers to Take their Pain Medications:     []  On a regular basis   [x]  Only when necessary    []  Does not take pain medications    3. What are the Patient's Goals/Expectations for Visiting Pain Management? []  Learn about my pain    [x]  Receive Medication   []  Physical Therapy     []  Treat Depression   [x]  Receive Injections    []  Treat Sleep   []  Deal with Anxiety and Stress   []  Treat Opoid Dependence/Addiction   []  Other:      HPI:   Magui Cowart is a 53 y.o. male is here today for    Chief Complaint: Low back pain, Right leg pain, Left leg pain and Hip pain  Si pain   HPI   F/U  contiunes to have low back  Bilateral  SI hip pain. He continues to take Tramadol Zanaflex. He has had Lumbar RFA Bilateral L3-4,4-5,5-S1  Left 3/2020 Right 1/2020 with 50% pain releif for 6-8 months. He states the low back pain recently returned with the cold weather change and increased  walking. His low back pain increases with walking mostly. He still has some DAY with activity. He had f/u cardiology recently and follows PCP soon. He smokes 1 pack a day. Medications reviewed. Patient denies side effects with medications. Patient states he is taking medications as prescribed. Hedenies receiving pain medications from other sources.  He does not drink alcohol or use drugs. Medications    Current Outpatient Medications:     traMADol (ULTRAM) 50 MG tablet, Take 50 mg by mouth every 6 hours as needed for Pain., Disp: , Rfl:     tiZANidine (ZANAFLEX) 4 MG tablet, Take 4 mg by mouth every 6 hours as needed, Disp: , Rfl:     isosorbide dinitrate (ISORDIL) 20 MG tablet, Take 1 tablet by mouth twice daily, Disp: 60 tablet, Rfl: 0    metoprolol tartrate (LOPRESSOR) 25 MG tablet, Take 1 tablet by mouth twice daily, Disp: 60 tablet, Rfl: 4    Multiple Vitamins-Minerals (THERAPEUTIC MULTIVITAMIN-MINERALS) tablet, Take 1 tablet by mouth daily, Disp: , Rfl:     polycarbophil (FIBERCON) 625 MG tablet, Take 625 mg by mouth daily, Disp: , Rfl:     pantoprazole (PROTONIX) 40 MG tablet, Take 1 tablet by mouth every morning, Disp: 30 tablet, Rfl: 11    NITROSTAT 0.4 MG SL tablet, place 1 tablet under the tongue if needed every 5 minutes for chest pain for 3 doses IF NO RELIEF AFTER 3RD DOSE CALL PRESCRIBER ., Disp: 25 tablet, Rfl: 3    simvastatin (ZOCOR) 10 MG tablet, Take 10 mg by mouth nightly.  , Disp: , Rfl:     lamivudine-zidovudine (COMBIVIR) 150-300 MG per tablet, Take 1 tablet by mouth 2 times daily. , Disp: , Rfl:     fosamprenavir (LEXIVA) 700 MG tablet, Take by mouth 2 times daily 2 tab, Disp: , Rfl:     olanzapine (ZYPREXA) 5 MG tablet, Take 5 mg by mouth nightly.  , Disp: , Rfl:     Subjective:      Review of Systems   Constitutional: Positive for activity change. Negative for appetite change, chills, diaphoresis, fatigue, fever and unexpected weight change. HENT: Negative. Negative for congestion, ear pain, hearing loss, mouth sores, nosebleeds, rhinorrhea, sinus pressure and sore throat. Eyes: Negative. Negative for photophobia, pain and visual disturbance. Respiratory: Positive for shortness of breath. Negative for chest tightness.          DAY COPD smokes 1 pack a day pt has noticed increased SOB lately Cardiovascular:        CAD HTN  HLD   Gastrointestinal: Negative for abdominal pain, constipation, diarrhea, nausea and vomiting. GERD   Endocrine: Negative. Negative for cold intolerance, heat intolerance, polydipsia, polyphagia and polyuria. DM    Genitourinary: Negative. Negative for decreased urine volume, difficulty urinating, frequency and hematuria. Musculoskeletal: Positive for arthralgias, back pain, gait problem and myalgias. Negative for joint swelling, neck pain and neck stiffness. Skin: Negative. Negative for color change and rash. Allergic/Immunologic: Negative. Negative for food allergies and immunocompromised state. Neurological: Negative for dizziness, tremors, seizures, syncope, facial asymmetry, speech difficulty, weakness, light-headedness, numbness and headaches. Hematological: Negative. Does not bruise/bleed easily. HIV   Psychiatric/Behavioral: Negative. Negative for agitation, behavioral problems, confusion, decreased concentration, dysphoric mood, hallucinations, self-injury, sleep disturbance and suicidal ideas. The patient is not nervous/anxious and is not hyperactive. Objective:     Vitals:    09/23/20 1052   BP: 106/62   Temp: 96.7 °F (35.9 °C)   Weight: 156 lb (70.8 kg)   Height: 5' 6\" (1.676 m)       Physical Exam  Vitals signs and nursing note reviewed. Constitutional:       General: He is not in acute distress. Appearance: He is well-developed. He is not diaphoretic. HENT:      Head: Normocephalic and atraumatic. Right Ear: External ear normal.      Left Ear: External ear normal.      Nose: Nose normal.      Mouth/Throat:      Pharynx: No oropharyngeal exudate. Eyes:      General: No scleral icterus. Right eye: No discharge. Left eye: No discharge. Conjunctiva/sclera: Conjunctivae normal.      Pupils: Pupils are equal, round, and reactive to light.    Neck:      Musculoskeletal: Full passive range of motion without pain, normal range of motion and neck supple. Normal range of motion. No edema, erythema, neck rigidity or muscular tenderness. Thyroid: No thyromegaly. Cardiovascular:      Rate and Rhythm: Normal rate and regular rhythm. Heart sounds: Normal heart sounds. No murmur. No friction rub. No gallop. Pulmonary:      Effort: Pulmonary effort is normal. No respiratory distress. Breath sounds: Examination of the right-upper field reveals wheezing. Examination of the left-upper field reveals wheezing. Decreased breath sounds and wheezing present. No rales. Comments: Anterior expiratory wheezes  DBS   DAY  Chest:      Chest wall: No tenderness. Abdominal:      General: Bowel sounds are normal. There is no distension. Palpations: Abdomen is soft. Tenderness: There is no abdominal tenderness. There is no guarding or rebound. Musculoskeletal:         General: Tenderness present. Right shoulder: Normal.      Left shoulder: Normal.      Right hip: He exhibits tenderness. Left hip: He exhibits tenderness. Right knee: Normal.      Left knee: Normal.      Right ankle: Normal.      Left ankle: Normal.      Cervical back: Normal.      Thoracic back: Normal.      Lumbar back: He exhibits decreased range of motion, tenderness, bony tenderness, pain and spasm. Back:    Skin:     General: Skin is warm. Coloration: Skin is not pale. Findings: No erythema or rash. Neurological:      Mental Status: He is alert and oriented to person, place, and time. He is not disoriented. Cranial Nerves: Cranial nerves are intact. No cranial nerve deficit. Sensory: Sensation is intact. No sensory deficit. Motor: Weakness present. No atrophy or abnormal muscle tone. Coordination: Coordination is intact. Coordination normal.      Gait: Gait abnormal.      Deep Tendon Reflexes: Reflexes are normal and symmetric.       Reflex Scores:       Tricep reflexes are 2+ on the right side and 2+ on the left side. Bicep reflexes are 2+ on the right side and 2+ on the left side. Brachioradialis reflexes are 2+ on the right side and 2+ on the left side. Patellar reflexes are 2+ on the right side and 2+ on the left side. Achilles reflexes are 2+ on the right side and 2+ on the left side. Comments: SLR-  Motor 4/5 generalized weakness  Feet bilat dusky when dependent   Psychiatric:         Attention and Perception: Attention and perception normal. He is attentive. Mood and Affect: Mood and affect normal. Mood is not anxious or depressed. Affect is not labile, blunt, angry or inappropriate. Speech: Speech normal.         Behavior: Behavior normal. Behavior is not agitated, slowed, aggressive, withdrawn, hyperactive or combative. Behavior is cooperative. Thought Content: Thought content normal. Thought content is not paranoid or delusional. Thought content does not include homicidal or suicidal ideation. Thought content does not include homicidal or suicidal plan. Cognition and Memory: Cognition and memory normal. Memory is not impaired. He does not exhibit impaired recent memory or impaired remote memory. Judgment: Judgment normal. Judgment is not impulsive or inappropriate. Comments: Flat affect, h/o depression       HARRISON test: +  Yeomans test: +  Gaenslen test: +     Assessment:     1. Lumbar spondylosis    2. SI (sacroiliac) joint inflammation (HCC)    3. Sacroiliac inflammation (HCC)    4. Spondylosis of lumbar region without myelopathy or radiculopathy    5. Chronic pain syndrome            Plan:      · OARRS reviewed. Current MED: 10  · Patient was offered naloxone for home. refused  · Discussed long term side effects of medications, tolerance, dependency and addiction. · Previous UDS reviewed  · UDS preformed today for compliance.   · Patient told can not receive any pain medications from any other source. · No evidence of abuse, diversion or aberrant behavior.  Medications and/or procedures to improve function and quality of life- patient understanding with this and that may not be pain free   Discussed with patient about safe storage of medications at home   Discussed possible weaning of medication dosing dependent on treatment/procedure results.  Testing: none   Procedures: Repeat Lumbar RF A Bilateral L3-4,4-5,5-S1 right side first    Discussed with patient about risks with procedure including infection, reaction to medication, increased pain, or bleeding.  Medications:Tramadol Zanaflex       Meds. Prescribed:   No orders of the defined types were placed in this encounter. Return for repeat Lumbar RFA bilateral L3-4,4-5,5-S1  right side first .         Time spent with patient was 15 minutes, more than 50% was spent Counseling/coordinated the patient'scare.       Electronically signed by ALIZE Kunz CNP on9/23/2020 at 11:21 AM

## 2020-10-06 ENCOUNTER — TELEPHONE (OUTPATIENT)
Dept: PHYSICAL MEDICINE AND REHAB | Age: 55
End: 2020-10-06

## 2020-10-08 ENCOUNTER — APPOINTMENT (OUTPATIENT)
Dept: GENERAL RADIOLOGY | Age: 55
End: 2020-10-08
Attending: PAIN MEDICINE
Payer: MEDICARE

## 2020-10-08 ENCOUNTER — HOSPITAL ENCOUNTER (OUTPATIENT)
Age: 55
Setting detail: OUTPATIENT SURGERY
Discharge: HOME | End: 2020-10-08
Attending: PAIN MEDICINE | Admitting: PAIN MEDICINE
Payer: MEDICARE

## 2020-10-08 VITALS
RESPIRATION RATE: 16 BRPM | TEMPERATURE: 98.2 F | OXYGEN SATURATION: 97 % | DIASTOLIC BLOOD PRESSURE: 83 MMHG | HEART RATE: 74 BPM | HEIGHT: 66 IN | BODY MASS INDEX: 25.39 KG/M2 | WEIGHT: 158 LBS | SYSTOLIC BLOOD PRESSURE: 138 MMHG

## 2020-10-08 PROCEDURE — 2500000003 HC RX 250 WO HCPCS: Performed by: PAIN MEDICINE

## 2020-10-08 PROCEDURE — 2709999900 HC NON-CHARGEABLE SUPPLY: Performed by: PAIN MEDICINE

## 2020-10-08 PROCEDURE — 64635 DESTROY LUMB/SAC FACET JNT: CPT | Performed by: PAIN MEDICINE

## 2020-10-08 PROCEDURE — 7100000010 HC PHASE II RECOVERY - FIRST 15 MIN: Performed by: PAIN MEDICINE

## 2020-10-08 PROCEDURE — 3209999900 FLUORO FOR SURGICAL PROCEDURES

## 2020-10-08 PROCEDURE — 3600000056 HC PAIN LEVEL 4 BASE: Performed by: PAIN MEDICINE

## 2020-10-08 PROCEDURE — 64636 DESTROY L/S FACET JNT ADDL: CPT | Performed by: PAIN MEDICINE

## 2020-10-08 PROCEDURE — 3600000057 HC PAIN LEVEL 4 ADDL 15 MIN: Performed by: PAIN MEDICINE

## 2020-10-08 PROCEDURE — 6360000002 HC RX W HCPCS: Performed by: PAIN MEDICINE

## 2020-10-08 RX ORDER — LIDOCAINE HYDROCHLORIDE 10 MG/ML
INJECTION, SOLUTION EPIDURAL; INFILTRATION; INTRACAUDAL; PERINEURAL PRN
Status: DISCONTINUED | OUTPATIENT
Start: 2020-10-08 | End: 2020-10-08 | Stop reason: ALTCHOICE

## 2020-10-08 RX ORDER — METHYLPREDNISOLONE ACETATE 80 MG/ML
INJECTION, SUSPENSION INTRA-ARTICULAR; INTRALESIONAL; INTRAMUSCULAR; SOFT TISSUE PRN
Status: DISCONTINUED | OUTPATIENT
Start: 2020-10-08 | End: 2020-10-08 | Stop reason: ALTCHOICE

## 2020-10-08 RX ORDER — BUPIVACAINE HYDROCHLORIDE 2.5 MG/ML
INJECTION, SOLUTION EPIDURAL; INFILTRATION; INTRACAUDAL PRN
Status: DISCONTINUED | OUTPATIENT
Start: 2020-10-08 | End: 2020-10-08 | Stop reason: ALTCHOICE

## 2020-10-08 ASSESSMENT — PAIN DESCRIPTION - PAIN TYPE: TYPE: CHRONIC PAIN

## 2020-10-08 ASSESSMENT — PAIN DESCRIPTION - DESCRIPTORS
DESCRIPTORS: ACHING
DESCRIPTORS: ACHING

## 2020-10-08 ASSESSMENT — PAIN SCALES - GENERAL: PAINLEVEL_OUTOF10: 3

## 2020-10-08 ASSESSMENT — PAIN DESCRIPTION - ORIENTATION: ORIENTATION: LOWER

## 2020-10-08 ASSESSMENT — PAIN DESCRIPTION - LOCATION: LOCATION: BACK

## 2020-10-08 ASSESSMENT — PAIN - FUNCTIONAL ASSESSMENT: PAIN_FUNCTIONAL_ASSESSMENT: 0-10

## 2020-10-08 NOTE — H&P
H&P    Contiunes to have low back  Bilateral  SI hip pain. He continues to take Tramadol Zanaflex. He has had Lumbar RFA Bilateral L3-4,4-5,5-S1  Left 3/2020 Right 1/2020 with 50% pain releif for 6-8 months. He states the low back pain recently returned with the cold weather change and increased  walking. His low back pain increases with walking mostly. He still has some DAY with activity. He had f/u cardiology recently and follows PCP soon. He smokes 1 pack a day.         Medications reviewed. Patient denies side effects with medications. Patient states he is taking medications as prescribed. Hedenies receiving pain medications from other sources. He denies any ER visits since last visit.     Pain scale with out pain medications or at its worst is 6/10. Pain scale with pain medications or at its best is 0/10. Last dose of  Tramadol was  Month ago   Drug screen reviewed from  6/2020 and was appropriate  Pill count was completed today and was appropriate 53  Patient does not have naloxone available at home. Patient has not required use of naloxone at home since last office visit.         The patienthas No Known Allergies.     Past Medical History  Jimmie House  has a past medical history of Arthritis, Arthritis, Chest pain, Depression, Diabetes mellitus ([address] Dysphagia, GERD (gastroesophageal reflux disease), History of blood transfusion, History of nephrolithiasis, HIV (human immunodeficiency virus infection) ([address] Hyperlipidemia, Pancreatitis, and Type 2 diabetes mellitus without complication ([address]    Past Surgical History  The patient  has a past surgical history that includes Foot surgery (1970's); Cardiac catheterization (2015???); Colonoscopy (2016); Endoscopy, colon, diagnostic; other surgical history (Bilateral, 01/17/2017); other surgical history (Bilateral, 02/20/2017); other surgical history (Right, 03/28/2017); Nerve Surgery (Bilateral, 07/18/2017);  Nerve Block Lumb Facet Level 1 Bilateral (Bilateral, Take by mouth 2 times daily 2 tab, Disp: , Rfl:     olanzapine (ZYPREXA) 5 MG tablet, Take 5 mg by mouth nightly.  , Disp: , Rfl:         Subjective:      Review of Systems   Constitutional: Positive for activity change. Negative for appetite change, chills, diaphoresis, fatigue, fever and unexpected weight change. HENT: Negative. Negative for congestion, ear pain, hearing loss, mouth sores, nosebleeds, rhinorrhea, sinus pressure and sore throat. Eyes: Negative. Negative for photophobia, pain and visual disturbance. Respiratory: Positive for shortness of breath. Negative for chest tightness. DAY COPD smokes 1 pack a day pt has noticed increased SOB lately    Cardiovascular:        CAD HTN  HLD   Gastrointestinal: Negative for abdominal pain, constipation, diarrhea, nausea and vomiting. GERD   Endocrine: Negative. Negative for cold intolerance, heat intolerance, polydipsia, polyphagia and polyuria. DM    Genitourinary: Negative. Negative for decreased urine volume, difficulty urinating, frequency and hematuria. Musculoskeletal: Positive for arthralgias, back pain, gait problem and myalgias. Negative for joint swelling, neck pain and neck stiffness. Skin: Negative. Negative for color change and rash. Allergic/Immunologic: Negative. Negative for food allergies and immunocompromised state. Neurological: Negative for dizziness, tremors, seizures, syncope, facial asymmetry, speech difficulty, weakness, light-headedness, numbness and headaches. Hematological: Negative. Does not bruise/bleed easily. HIV   Psychiatric/Behavioral: Negative. Negative for agitation, behavioral problems, confusion, decreased concentration, dysphoric mood, hallucinations, self-injury, sleep disturbance and suicidal ideas.  The patient is not nervous/anxious and is not hyperactive.          Objective:      Vitals       Vitals:     09/23/20 1052   BP: 106/62   Temp: 96.7 °F (35.9 °C)   Weight: 156 lb (70.8 kg)   Height: 5' 6\" (1.676 m)           Physical Exam  Vitals signs and nursing note reviewed. Constitutional:       General: He is not in acute distress. Appearance: He is well-developed. He is not diaphoretic. HENT:      Head: Normocephalic and atraumatic. Right Ear: External ear normal.      Left Ear: External ear normal.      Nose: Nose normal.      Mouth/Throat:      Pharynx: No oropharyngeal exudate. Eyes:      General: No scleral icterus. Right eye: No discharge. Left eye: No discharge. Conjunctiva/sclera: Conjunctivae normal.      Pupils: Pupils are equal, round, and reactive to light. Neck:      Musculoskeletal: Full passive range of motion without pain, normal range of motion and neck supple. Normal range of motion. No edema, erythema, neck rigidity or muscular tenderness. Thyroid: No thyromegaly. Cardiovascular:      Rate and Rhythm: Normal rate and regular rhythm. Heart sounds: Normal heart sounds. No murmur. No friction rub. No gallop. Pulmonary:      Effort: Pulmonary effort is normal. No respiratory distress. Breath sounds: Examination of the right-upper field reveals wheezing. Examination of the left-upper field reveals wheezing. Decreased breath sounds and wheezing present. No rales. Comments: Anterior expiratory wheezes  DBS   DAY  Chest:      Chest wall: No tenderness. Abdominal:      General: Bowel sounds are normal. There is no distension. Palpations: Abdomen is soft. Tenderness: There is no abdominal tenderness. There is no guarding or rebound. Musculoskeletal:         General: Tenderness present. Right shoulder: Normal.      Left shoulder: Normal.      Right hip: He exhibits tenderness. Left hip: He exhibits tenderness.       Right knee: Normal.      Left knee: Normal.      Right ankle: Normal.      Left ankle: Normal.      Cervical back: Normal.      Thoracic back: Normal.      Lumbar back: He exhibits decreased range of motion, tenderness, bony tenderness, pain and spasm. Back:    Skin:     General: Skin is warm. Coloration: Skin is not pale. Findings: No erythema or rash. Neurological:      Mental Status: He is alert and oriented to person, place, and time. He is not disoriented. Cranial Nerves: Cranial nerves are intact. No cranial nerve deficit. Sensory: Sensation is intact. No sensory deficit. Motor: Weakness present. No atrophy or abnormal muscle tone. Coordination: Coordination is intact. Coordination normal.      Gait: Gait abnormal.      Deep Tendon Reflexes: Reflexes are normal and symmetric. Reflex Scores:       Tricep reflexes are 2+ on the right side and 2+ on the left side. Bicep reflexes are 2+ on the right side and 2+ on the left side. Brachioradialis reflexes are 2+ on the right side and 2+ on the left side. Patellar reflexes are 2+ on the right side and 2+ on the left side. Achilles reflexes are 2+ on the right side and 2+ on the left side. Comments: SLR-  Motor 4/5 generalized weakness  Feet bilat dusky when dependent   Psychiatric:         Attention and Perception: Attention and perception normal. He is attentive. Mood and Affect: Mood and affect normal. Mood is not anxious or depressed. Affect is not labile, blunt, angry or inappropriate. Speech: Speech normal.         Behavior: Behavior normal. Behavior is not agitated, slowed, aggressive, withdrawn, hyperactive or combative. Behavior is cooperative. Thought Content: Thought content normal. Thought content is not paranoid or delusional. Thought content does not include homicidal or suicidal ideation. Thought content does not include homicidal or suicidal plan. Cognition and Memory: Cognition and memory normal. Memory is not impaired. He does not exhibit impaired recent memory or impaired remote memory.          Judgment: Judgment normal. Judgment is not impulsive or inappropriate. Comments: Flat affect, h/o depression         HARRISON test: +  Yeomans test: +  Gaenslen test: +  Assessment:      1. Lumbar spondylosis    2. SI (sacroiliac) joint inflammation (HCC)    3. Sacroiliac inflammation (HCC)    4. Spondylosis of lumbar region without myelopathy or radiculopathy    5. Chronic pain syndrome       Plan:      · OARRS reviewed. Current MED: 10  · Patient was offered naloxone for home. refused  · Discussed long term side effects of medications, tolerance, dependency and addiction. · Previous UDS reviewed  · UDS preformed today for compliance. · Patient told can not receive any pain medications from any other source. · No evidence of abuse, diversion or aberrant behavior. · Medications and/or procedures to improve function and quality of life- patient understanding with this and that may not be pain free  · Discussed with patient about safe storage of medications at home  · Discussed possible weaning of medication dosing dependent on treatment/procedure results. · Testing: none  · Procedures: Repeat Lumbar RF A Bilateral L3-4,4-5,5-S1 right side first   · Discussed with patient about risks with procedure including infection, reaction to medication, increased pain, or bleeding. · Medications:Tramadol Zanaflex         Meds.  Prescribed:     Encounter Medications    No orders of the defined types were placed in this encounter.        Return for repeat Lumbar RFA bilateral L3-4,4-5,5-S1  right side first .

## 2020-10-08 NOTE — PROGRESS NOTES
1154-  Patient arrived to phase II via cart. Spontaneous respirations even and unlabored. Placed on monitor--VSS. Report received from Surgical RN.   0297-  Assessment completed. Patient is alert and oriented x4. Patient states pain 3/10. Denies ice pack. Injection sites clean and dry. 1157-  Snack and drink given to patient. No family present. 1200-  Patient dressed and states he is ready to go. Discharge instructions given in pre-op. 1205-  Patient discharged in stable condition with all belongings.

## 2020-10-08 NOTE — H&P
[address] [address]  History and Physical Update    Pt Name: Cristina Weiss  MRN: [id number]  YOB: 1965  Date of evaluation: 10/8/2020      I have examined the patient and reviewed the H&P/Consult and there are no changes to the patient or plans.         Electronically signed by Michelle Simons MD on 10/8/2020 at 10:43 AM

## 2020-10-08 NOTE — OP NOTE
chronic low back pain that is not responding well to the conservative treatment.  Patient's pain is mostly axial in nature.  Pain is interfering with the activities of daily living.  Physical examination revealed facet tenderness and facet loading is positive.  Patient had undergone lumbar facet joint injections with pain relief that lasted for only a short period of time and had greater than 70% pain relief with confirmatory median branch blocks.  Hence we decided to do radiofrequency abalation of median branches for long term pain releif.   The procedure and risks  were discussed with the patient and an informed consent was obtained.     Procedure:  right side   A meaningful communication was kept up with the patient throughout the procedure. The patient is placed in prone position and skin over the back was prepped and draped in sterile manner.  Then using fluoroscopy the junction of the transverse process of the vertebra with the superior process of the facet joint was observed and the view was optimized.  The skin and deep tissues posterior were infiltrated with 9 ml of  1% xylocaine. The RF canula with the 10 mm active tip was introduced through the skin wheal under fluoroscopy guidance such that the tip of the needle lies in the groove of the transverse process with the superior processes of the facet joint.  Then a lateral view of the lumbar spine was obtained to make sure the tip of needle is not in the neural foramen.  Then electric impedence was checked to make sure it is acceptable. Then a sensory stimulus was applied at 50 Hz up to 1 volt and concordant pain symptoms were reproduced. Then a motor stimulus was applied at 2 Hz up to 2 volts and no motor stimulation was seen in lower extremities.  Some multifidus stimulus was seen. Victoria Saucedo after negative aspiration a mixture of depomedrol 80 mg  and 0.25%  marcaine 1.5 cc was injected through the needle.  Then a  lesion was done at 80 degrees centigrade for 90 seconds.     EBL-0  For L5 median branch block the junction of the ala of  the sacrum with the superior articular process of the facet joint was taken as a reference point.  For the L4 median branch the junction of the transverse process of L5 with the superolateral possible facet joint was taken as a reference point and for S1 median branch the most lateral and superior aspect of S1 foramina was taken as a reference point,.  For L3 median branch the junction of L4 transverse process and superior articular process of facet joint was taken as reference point and so on.     ebl-0     Patient's vital signs and neurological status remained stable throughout the procedure and post procedural period.  The patient tolerated the procedure well and was discharged home in stable condition.       Electronically signed by Dalila López MD on 10/8/20 at 11:42 AM EDT

## 2020-10-19 NOTE — TELEPHONE ENCOUNTER
Jayant العراقي called requesting a refill on the following medications:  Requested Prescriptions     Pending Prescriptions Disp Refills    isosorbide dinitrate (ISORDIL) 20 MG tablet 60 tablet 0    metoprolol tartrate (LOPRESSOR) 25 MG tablet 60 tablet 4     Pharmacy verified:[facility]  . [facility]      Date of last visit: 09/23/20  Date of next visit (if applicable): Visit date not found

## 2020-10-20 RX ORDER — ISOSORBIDE DINITRATE 20 MG/1
TABLET ORAL
Qty: 60 TABLET | Refills: 4 | OUTPATIENT
Start: 2020-10-20

## 2020-10-27 ENCOUNTER — OFFICE VISIT (OUTPATIENT)
Dept: PHYSICAL MEDICINE AND REHAB | Age: 55
End: 2020-10-27
Payer: MEDICARE

## 2020-10-27 VITALS
DIASTOLIC BLOOD PRESSURE: 60 MMHG | WEIGHT: 158 LBS | SYSTOLIC BLOOD PRESSURE: 114 MMHG | HEIGHT: 66 IN | BODY MASS INDEX: 25.39 KG/M2 | TEMPERATURE: 96.6 F

## 2020-10-27 PROCEDURE — 99213 OFFICE O/P EST LOW 20 MIN: CPT | Performed by: NURSE PRACTITIONER

## 2020-10-27 ASSESSMENT — ENCOUNTER SYMPTOMS
EYE PAIN: 0
SINUS PRESSURE: 0
DIARRHEA: 0
ALLERGIC/IMMUNOLOGIC NEGATIVE: 1
PHOTOPHOBIA: 0
SORE THROAT: 0
VOMITING: 0
NAUSEA: 0
CHEST TIGHTNESS: 0
EYES NEGATIVE: 1
SHORTNESS OF BREATH: 1
ABDOMINAL PAIN: 0
RHINORRHEA: 0
BACK PAIN: 1
CONSTIPATION: 0
COLOR CHANGE: 0

## 2020-10-27 NOTE — PROGRESS NOTES
[facility]  [address]  Dept: [phone]  Dept Fax: [phone]: [phone]    Visit Date: 10/27/2020    Functionality Assessment/Goals Worksheet     On a scale of 0 (Does not Interfere) to 10 (Completely Interferes)     1. Which number describes how during the past week pain has interfered with       the following:  A. General Activity:  1  B. Mood: 1  C. Walking Ability:  1  D. Normal Work (Includes both work outside the home and housework):  1  E. Relations with Other People:   0  F. Sleep:   0  G. Enjoyment of Life:   0    2. Patient Prefers to Take their Pain Medications:     []  On a regular basis   [x]  Only when necessary    []  Does not take pain medications    3. What are the Patient's Goals/Expectations for Visiting Pain Management? []  Learn about my pain    [x]  Receive Medication   []  Physical Therapy     []  Treat Depression   [x]  Receive Injections    []  Treat Sleep   []  Deal with Anxiety and Stress   []  Treat Opoid Dependence/Addiction   []  Other:    HPI:   Pastora Carcamo is a 53 y.o. male is here today for    Chief Complaint: Low back pain, Right leg pain, Left leg pain and Hip pain    HPI   F/U  Lumbar RFA right side L3-4,4-5,5-S1 10/8/2020 states he is receiving about 80% pain relief. He continues to have  low back SI hip BLE pain. He continues to take Tramadol Zanaflex. He had bilateral Lumbar RFA L3-S1  Left 3/2020 Right  1/2020 with 50% pain relief for 6-8 months. Medications reviewed. Patient denies side effects with medications. Patient states he is taking medications as prescribed. Hedenies receiving pain medications from other sources. He denies any ER visits since last visit. Pain scale with out pain medications or at its worst is 3/10. Pain scale with pain medications or at its best is 0/10.   Last dose of Tramadol  was 6 weeks ago  Drug screen reviewed from 9/2020 and was appropriate  Pill count was completed today and was appropriate  Patient does not have naloxone available at home. Patient has not required use of naloxone at home since last office visit. The patienthas No Known Allergies. Past Medical History  Mian Gooden  has a past medical history of Arthritis, Arthritis, Chest pain, Depression, Diabetes mellitus ([facility] [address] Dysphagia, GERD (gastroesophageal reflux disease), History of blood transfusion, History of nephrolithiasis, HIV (human immunodeficiency virus infection) ([facility] [address] Hyperlipidemia, Pancreatitis, and Type 2 diabetes mellitus without complication ([facility] [address] Past Surgical History  The patient  has a past surgical history that includes Foot surgery (1970's); Cardiac catheterization (2015???); Colonoscopy (2016); Endoscopy, colon, diagnostic; other surgical history (Bilateral, 01/17/2017); other surgical history (Bilateral, 02/20/2017); other surgical history (Right, 03/28/2017); Nerve Surgery (Bilateral, 07/18/2017); Nerve Block Lumb Facet Level 1 Bilateral (Bilateral, 7/18/2017); Upper gastrointestinal endoscopy ([id number], [id number]); other surgical history (Bilateral, 03/06/2018); pr inj dx/ther agnt paravert facet joint, lumbar/sac, 2nd level (Bilateral, 3/6/2018); Lumbar spine surgery (Right, 4/23/2019); Lumbar spine surgery (Left, 5/21/2019); Pain management procedure (Right, 1/14/2020); Pain management procedure (Left, 3/2/2020); and Pain management procedure (Right, 10/8/2020). Family History  This patient's family history includes Diabetes in his mother; High Blood Pressure in his mother. Social History  Mian Gooden  reports that he has been smoking cigarettes. He has a 20.00 pack-year smoking history. He has never used smokeless tobacco. He reports that he does not drink alcohol or use drugs.     Medications    Current Outpatient Medications:     traMADol (ULTRAM) 50 MG tablet, Take 50 mg by mouth every 6 GERD   Endocrine: Negative. Negative for cold intolerance, heat intolerance, polydipsia, polyphagia and polyuria. DM    Genitourinary: Negative. Negative for decreased urine volume, difficulty urinating, frequency and hematuria. Musculoskeletal: Positive for arthralgias, back pain, gait problem and myalgias. Negative for joint swelling, neck pain and neck stiffness. Skin: Negative. Negative for color change and rash. Allergic/Immunologic: Negative. Negative for food allergies and immunocompromised state. Neurological: Negative for dizziness, tremors, seizures, syncope, facial asymmetry, speech difficulty, weakness, light-headedness, numbness and headaches. Hematological: Negative. Does not bruise/bleed easily. HIV   Psychiatric/Behavioral: Negative. Negative for agitation, behavioral problems, confusion, decreased concentration, dysphoric mood, hallucinations, self-injury, sleep disturbance and suicidal ideas. The patient is not nervous/anxious and is not hyperactive. Objective:     Vitals:    10/27/20 0926   BP: 114/60   Temp: 96.6 °F (35.9 °C)   Weight: 158 lb (71.7 kg)   Height: 5' 6\" (1.676 m)       Physical Exam  Vitals signs and nursing note reviewed. Constitutional:       General: He is not in acute distress. Appearance: He is well-developed. He is not diaphoretic. HENT:      Head: Normocephalic and atraumatic. Right Ear: External ear normal.      Left Ear: External ear normal.      Nose: Nose normal.      Mouth/Throat:      Pharynx: No oropharyngeal exudate. Eyes:      General: No scleral icterus. Right eye: No discharge. Left eye: No discharge. Conjunctiva/sclera: Conjunctivae normal.      Pupils: Pupils are equal, round, and reactive to light. Neck:      Musculoskeletal: Full passive range of motion without pain, normal range of motion and neck supple. Normal range of motion. No edema, erythema, neck rigidity or muscular tenderness. Thyroid: No thyromegaly. Cardiovascular:      Rate and Rhythm: Normal rate and regular rhythm. Heart sounds: Normal heart sounds. No murmur. No friction rub. No gallop. Pulmonary:      Effort: Pulmonary effort is normal. No respiratory distress. Breath sounds: Examination of the right-upper field reveals wheezing. Examination of the left-upper field reveals wheezing. Decreased breath sounds and wheezing present. No rales. Comments: Anterior expiratory wheezes  DBS   DAY  Chest:      Chest wall: No tenderness. Abdominal:      General: Bowel sounds are normal. There is no distension. Palpations: Abdomen is soft. Tenderness: There is no abdominal tenderness. There is no guarding or rebound. Musculoskeletal:         General: Tenderness present. Right shoulder: Normal.      Left shoulder: Normal.      Right hip: He exhibits tenderness. Left hip: He exhibits tenderness. Right knee: Normal.      Left knee: Normal.      Right ankle: Normal.      Left ankle: Normal.      Cervical back: Normal.      Thoracic back: Normal.      Lumbar back: He exhibits decreased range of motion, tenderness, bony tenderness, pain and spasm. Back:    Skin:     General: Skin is warm. Coloration: Skin is not pale. Findings: No erythema or rash. Neurological:      Mental Status: He is alert and oriented to person, place, and time. He is not disoriented. Cranial Nerves: Cranial nerves are intact. No cranial nerve deficit. Sensory: Sensation is intact. No sensory deficit. Motor: Weakness present. No atrophy or abnormal muscle tone. Coordination: Coordination is intact. Coordination normal.      Gait: Gait abnormal.      Deep Tendon Reflexes: Reflexes are normal and symmetric. Reflex Scores:       Tricep reflexes are 2+ on the right side and 2+ on the left side. Bicep reflexes are 2+ on the right side and 2+ on the left side. Brachioradialis reflexes are 2+ on the right side and 2+ on the left side. Patellar reflexes are 2+ on the right side and 2+ on the left side. Achilles reflexes are 2+ on the right side and 2+ on the left side. Comments: SLR-  Motor 4/5 generalized weakness  Feet bilat dusky when dependent   Psychiatric:         Attention and Perception: Attention and perception normal. He is attentive. Mood and Affect: Mood and affect normal. Mood is not anxious or depressed. Affect is not labile, blunt, angry or inappropriate. Speech: Speech normal.         Behavior: Behavior normal. Behavior is not agitated, slowed, aggressive, withdrawn, hyperactive or combative. Behavior is cooperative. Thought Content: Thought content normal. Thought content is not paranoid or delusional. Thought content does not include homicidal or suicidal ideation. Thought content does not include homicidal or suicidal plan. Cognition and Memory: Cognition and memory normal. Memory is not impaired. He does not exhibit impaired recent memory or impaired remote memory. Judgment: Judgment normal. Judgment is not impulsive or inappropriate. Comments: Flat affect, h/o depression       HARRISON test:+  Yeomans test:+  Gaenslen test:+     Assessment:     1. Lumbar spondylosis    2. SI (sacroiliac) joint inflammation (HCC)    3. Sacroiliac inflammation (HCC)    4. Spondylosis of lumbar region without myelopathy or radiculopathy    5. Chronic pain syndrome            Plan:      · OARRS reviewed. Current MED:10  · Patient was not offered naloxone for home. · Discussed long term side effects of medications, tolerance, dependency and addiction. · Previous UDS reviewed  · UDS preformed today for compliance. · Patient told can not receive any pain medications from any other source. · No evidence of abuse, diversion or aberrant behavior.    Medications and/or procedures to improve function and quality of life- patient understanding with this and that may not be pain free   Discussed with patient about safe storage of medications at home   Discussed possible weaning of medication dosing dependent on treatment/procedure results.  Testing:none    Procedures: Repeat Lumbar RFA Left Side L3-4,4-5,5-S1   Discussed with patient about risks with procedure including infection, reaction to medication, increased pain, or bleeding.  Medications:Tramadol Zanaflex       Meds. Prescribed:   No orders of the defined types were placed in this encounter. Return for Lumbar RFA Left side L3-4, 4-5, 5-S1, Follow up after procedure. Time spent with patient was 15 minutes, more than 50% was spent Counseling/coordinated the patient'scare.       Electronically signed by ALIZE Paiz CNP on10/27/2020 at 9:49 AM

## 2020-11-03 PROBLEM — M47.816 LUMBAR SPONDYLOSIS: Status: RESOLVED | Noted: 2020-11-03 | Resolved: 2020-11-03

## 2020-11-04 NOTE — TELEPHONE ENCOUNTER
Patient calling in about this, he did schedule an appt for 9/10/2021. Please verify refills or call him is needs anything else done.

## 2020-11-12 ENCOUNTER — ANESTHESIA EVENT (OUTPATIENT)
Dept: OPERATING ROOM | Age: 55
End: 2020-11-12
Payer: MEDICARE

## 2020-11-12 ENCOUNTER — HOSPITAL ENCOUNTER (OUTPATIENT)
Age: 55
Setting detail: OUTPATIENT SURGERY
Discharge: HOME | End: 2020-11-12
Attending: PAIN MEDICINE | Admitting: PAIN MEDICINE
Payer: MEDICARE

## 2020-11-12 ENCOUNTER — ANESTHESIA (OUTPATIENT)
Dept: OPERATING ROOM | Age: 55
End: 2020-11-12
Payer: MEDICARE

## 2020-11-12 ENCOUNTER — APPOINTMENT (OUTPATIENT)
Dept: GENERAL RADIOLOGY | Age: 55
End: 2020-11-12
Attending: PAIN MEDICINE
Payer: MEDICARE

## 2020-11-12 VITALS
WEIGHT: 152 LBS | TEMPERATURE: 97.7 F | HEART RATE: 112 BPM | RESPIRATION RATE: 16 BRPM | BODY MASS INDEX: 24.43 KG/M2 | HEIGHT: 66 IN | DIASTOLIC BLOOD PRESSURE: 75 MMHG | SYSTOLIC BLOOD PRESSURE: 130 MMHG | OXYGEN SATURATION: 95 %

## 2020-11-12 PROBLEM — M47.816 LUMBAR SPONDYLOSIS: Status: ACTIVE | Noted: 2020-11-03

## 2020-11-12 PROCEDURE — 2709999900 HC NON-CHARGEABLE SUPPLY: Performed by: PAIN MEDICINE

## 2020-11-12 PROCEDURE — 6360000002 HC RX W HCPCS: Performed by: PAIN MEDICINE

## 2020-11-12 PROCEDURE — 3600000056 HC PAIN LEVEL 4 BASE: Performed by: PAIN MEDICINE

## 2020-11-12 PROCEDURE — 64635 DESTROY LUMB/SAC FACET JNT: CPT | Performed by: PAIN MEDICINE

## 2020-11-12 PROCEDURE — 7100000010 HC PHASE II RECOVERY - FIRST 15 MIN: Performed by: PAIN MEDICINE

## 2020-11-12 PROCEDURE — 2720000010 HC SURG SUPPLY STERILE: Performed by: PAIN MEDICINE

## 2020-11-12 PROCEDURE — 7100000011 HC PHASE II RECOVERY - ADDTL 15 MIN: Performed by: PAIN MEDICINE

## 2020-11-12 PROCEDURE — 3209999900 FLUORO FOR SURGICAL PROCEDURES

## 2020-11-12 PROCEDURE — 2500000003 HC RX 250 WO HCPCS: Performed by: PAIN MEDICINE

## 2020-11-12 PROCEDURE — 64636 DESTROY L/S FACET JNT ADDL: CPT | Performed by: PAIN MEDICINE

## 2020-11-12 RX ORDER — BUPIVACAINE HYDROCHLORIDE 2.5 MG/ML
INJECTION, SOLUTION EPIDURAL; INFILTRATION; INTRACAUDAL PRN
Status: DISCONTINUED | OUTPATIENT
Start: 2020-11-12 | End: 2020-11-12 | Stop reason: ALTCHOICE

## 2020-11-12 RX ORDER — METHYLPREDNISOLONE ACETATE 80 MG/ML
INJECTION, SUSPENSION INTRA-ARTICULAR; INTRALESIONAL; INTRAMUSCULAR; SOFT TISSUE PRN
Status: DISCONTINUED | OUTPATIENT
Start: 2020-11-12 | End: 2020-11-12 | Stop reason: ALTCHOICE

## 2020-11-12 RX ORDER — LIDOCAINE HYDROCHLORIDE 10 MG/ML
INJECTION, SOLUTION EPIDURAL; INFILTRATION; INTRACAUDAL; PERINEURAL PRN
Status: DISCONTINUED | OUTPATIENT
Start: 2020-11-12 | End: 2020-11-12 | Stop reason: ALTCHOICE

## 2020-11-12 ASSESSMENT — PAIN SCALES - GENERAL: PAINLEVEL_OUTOF10: 0

## 2020-11-12 ASSESSMENT — PAIN - FUNCTIONAL ASSESSMENT: PAIN_FUNCTIONAL_ASSESSMENT: 0-10

## 2020-11-12 ASSESSMENT — PAIN DESCRIPTION - DESCRIPTORS: DESCRIPTORS: ACHING

## 2020-11-12 NOTE — OP NOTE
Indication for the Procedure:  The patient has ahistory of chronic low back pain that is not responding well to the conservative treatment.  Patient's pain is mostly axial in nature.  Pain is interfering with the activities of daily living.  Physical examination revealed facet tenderness and facet loading is positive.  Patient had undergone lumbar facet joint injections with pain relief that lasted for only a short period of time and had greater than 70% pain relief with confirmatory median branch blocks.  Hence we decided to do radiofrequency abalation of median branches for long term pain releif.   The procedure and risks  were discussed with the patient and an informed consent was obtained.     Procedure:  left side   A meaningful communication was kept up with the patient throughout the procedure. The patient is placed in prone position and skin over the back was prepped and draped in sterile manner.  Then using fluoroscopy the junction of the transverse process of the vertebra with the superior process of the facet joint was observed and the view was optimized.  The skin and deep tissues posterior were infiltrated with 9 ml of  1% xylocaine. The RF canula with the 10 mm active tip was introduced through the skin wheal under fluoroscopy guidance such that the tip of the needle lies in the groove of the transverse process with the superior processes of the facet joint.  Then a lateral view of the lumbar spine was obtained to make sure the tip of needle is not in the neural foramen.  Then electric impedence was checked to make sure it is acceptable. Then a sensory stimulus was applied at 50 Hz up to 1 volt and concordant pain symptoms were reproduced.  Then a motor stimulus was applied at 2 Hz up to 2 volts and no motor stimulation was seen in lower extremities.  Some multifidus stimulus was seen. Sharrell Burkitt after negative aspiration a mixture of depomedrol 80 mg  and 0.25%  marcaine 1.5 cc was injected through the needle.  Then a  lesion was done at 80 degrees centigrade for 90 seconds.     EBL-0  For L5 median branch block the junction of the ala of  the sacrum with the superior articular process of the facet joint was taken as a reference point.  For the L4 median branch the junction of the transverse process of L5 with the superolateral possible facet joint was taken as a reference point and for S1 median branch the most lateral and superior aspect of S1 foramina was taken as a reference point,.  For L3 median branch the junction of L4 transverse process and superior articular process of facet joint was taken as reference point and so on.     ebl-0     Patient's vital signs and neurological status remained stable throughout the procedure and post procedural period.  The patient tolerated the procedure well and was discharged home in stable condition.       Electronically signed by Lemuel Andino MD on 11/12/20 at 12:19 PM EST

## 2020-11-12 NOTE — H&P
H&P    Lumbar RFA right side L3-4,4-5,5-S1 10/8/2020 states he is receiving about 80% pain relief. He continues to have  low back SI hip BLE pain. He continues to take Tramadol Zanaflex. He had bilateral Lumbar RFA L3-S1  Left 3/2020 Right  1/2020 with 50% pain relief for 6-8 months.      Medications reviewed. Patient denies side effects with medications. Patient states he is taking medications as prescribed. Hedenies receiving pain medications from other sources. He denies any ER visits since last visit.     Pain scale with out pain medications or at its worst is 3/10. Pain scale with pain medications or at its best is 0/10. Last dose of Tramadol  was 6 weeks ago  Drug screen reviewed from 9/2020 and was appropriate  Pill count was completed today and was appropriate  Patient does not have naloxone available at home. Patient has not required use of naloxone at home since last office visit.         The patienthas No Known Allergies.     Past Medical History  Chato Benavidez  has a past medical history of Arthritis, Arthritis, Chest pain, Depression, Diabetes mellitus ([address] Dysphagia, GERD (gastroesophageal reflux disease), History of blood transfusion, History of nephrolithiasis, HIV (human immunodeficiency virus infection) ([address] Hyperlipidemia, Pancreatitis, and Type 2 diabetes mellitus without complication ([address]    Past Surgical History  The patient  has a past surgical history that includes Foot surgery (1970's); Cardiac catheterization (2015???); Colonoscopy (2016); Endoscopy, colon, diagnostic; other surgical history (Bilateral, 01/17/2017); other surgical history (Bilateral, 02/20/2017); other surgical history (Right, 03/28/2017); Nerve Surgery (Bilateral, 07/18/2017); Nerve Block Lumb Facet Level 1 Bilateral (Bilateral, 7/18/2017);  Upper gastrointestinal endoscopy ([id number], [id number]); other surgical history (Bilateral, 03/06/2018); pr inj dx/ther agnt paravert facet joint, lumbar/sac, 2nd level (Bilateral, 3/6/2018); Lumbar spine surgery (Right, 4/23/2019); Lumbar spine surgery (Left, 5/21/2019); Pain management procedure (Right, 1/14/2020); Pain management procedure (Left, 3/2/2020); and Pain management procedure (Right, 10/8/2020).    Family History  This patient's family history includes Diabetes in his mother; High Blood Pressure in his mother.     Social History  Kalpana Scott  reports that he has been smoking cigarettes. He has a 20.00 pack-year smoking history. He has never used smokeless tobacco. He reports that he does not drink alcohol or use drugs.     Medications  Current Medication     Current Outpatient Medications:     traMADol (ULTRAM) 50 MG tablet, Take 50 mg by mouth every 6 hours as needed for Pain., Disp: , Rfl:     tiZANidine (ZANAFLEX) 4 MG tablet, Take 4 mg by mouth every 6 hours as needed, Disp: , Rfl:     isosorbide dinitrate (ISORDIL) 20 MG tablet, Take 1 tablet by mouth twice daily, Disp: 60 tablet, Rfl: 0    metoprolol tartrate (LOPRESSOR) 25 MG tablet, Take 1 tablet by mouth twice daily, Disp: 60 tablet, Rfl: 4    Multiple Vitamins-Minerals (THERAPEUTIC MULTIVITAMIN-MINERALS) tablet, Take 1 tablet by mouth daily, Disp: , Rfl:     polycarbophil (FIBERCON) 625 MG tablet, Take 625 mg by mouth daily, Disp: , Rfl:     pantoprazole (PROTONIX) 40 MG tablet, Take 1 tablet by mouth every morning, Disp: 30 tablet, Rfl: 11    NITROSTAT 0.4 MG SL tablet, place 1 tablet under the tongue if needed every 5 minutes for chest pain for 3 doses IF NO RELIEF AFTER 3RD DOSE CALL PRESCRIBER ., Disp: 25 tablet, Rfl: 3    simvastatin (ZOCOR) 10 MG tablet, Take 10 mg by mouth nightly.  , Disp: , Rfl:     lamivudine-zidovudine (COMBIVIR) 150-300 MG per tablet, Take 1 tablet by mouth 2 times daily.   , Disp: , Rfl:     fosamprenavir (LEXIVA) 700 MG tablet, Take by mouth 2 times daily 2 tab, Disp: , Rfl:     olanzapine (ZYPREXA) 5 MG tablet, Take 5 mg by mouth nightly.  , Disp: , Rfl:    Subjective:      Review of Systems   Constitutional: Positive for activity change. Negative for appetite change, chills, diaphoresis, fatigue, fever and unexpected weight change. HENT: Negative. Negative for congestion, ear pain, hearing loss, mouth sores, nosebleeds, rhinorrhea, sinus pressure and sore throat. Eyes: Negative. Negative for photophobia, pain and visual disturbance. Respiratory: Positive for shortness of breath. Negative for chest tightness. DAY COPD smokes 1 pack a day pt has noticed increased SOB lately now on  Inhaler per pulmonary    Cardiovascular:        CAD HTN  HLD   Gastrointestinal: Negative for abdominal pain, constipation, diarrhea, nausea and vomiting. GERD   Endocrine: Negative. Negative for cold intolerance, heat intolerance, polydipsia, polyphagia and polyuria. DM    Genitourinary: Negative. Negative for decreased urine volume, difficulty urinating, frequency and hematuria. Musculoskeletal: Positive for arthralgias, back pain, gait problem and myalgias. Negative for joint swelling, neck pain and neck stiffness. Skin: Negative. Negative for color change and rash. Allergic/Immunologic: Negative. Negative for food allergies and immunocompromised state. Neurological: Negative for dizziness, tremors, seizures, syncope, facial asymmetry, speech difficulty, weakness, light-headedness, numbness and headaches. Hematological: Negative. Does not bruise/bleed easily. HIV   Psychiatric/Behavioral: Negative. Negative for agitation, behavioral problems, confusion, decreased concentration, dysphoric mood, hallucinations, self-injury, sleep disturbance and suicidal ideas.  The patient is not nervous/anxious and is not hyperactive.          Objective:      Vitals       Vitals:     10/27/20 0926   BP: 114/60   Temp: 96.6 °F (35.9 °C)   Weight: 158 lb (71.7 kg)   Height: 5' 6\" (1.676 m)           Physical Exam  Vitals signs and nursing note reviewed. Constitutional:       General: He is not in acute distress. Appearance: He is well-developed. He is not diaphoretic. HENT:      Head: Normocephalic and atraumatic. Right Ear: External ear normal.      Left Ear: External ear normal.      Nose: Nose normal.      Mouth/Throat:      Pharynx: No oropharyngeal exudate. Eyes:      General: No scleral icterus. Right eye: No discharge. Left eye: No discharge. Conjunctiva/sclera: Conjunctivae normal.      Pupils: Pupils are equal, round, and reactive to light. Neck:      Musculoskeletal: Full passive range of motion without pain, normal range of motion and neck supple. Normal range of motion. No edema, erythema, neck rigidity or muscular tenderness. Thyroid: No thyromegaly. Cardiovascular:      Rate and Rhythm: Normal rate and regular rhythm. Heart sounds: Normal heart sounds. No murmur. No friction rub. No gallop. Pulmonary:      Effort: Pulmonary effort is normal. No respiratory distress. Breath sounds: Examination of the right-upper field reveals wheezing. Examination of the left-upper field reveals wheezing. Decreased breath sounds and wheezing present. No rales. Comments: Anterior expiratory wheezes  DBS   DAY  Chest:      Chest wall: No tenderness. Abdominal:      General: Bowel sounds are normal. There is no distension. Palpations: Abdomen is soft. Tenderness: There is no abdominal tenderness. There is no guarding or rebound. Musculoskeletal:         General: Tenderness present. Right shoulder: Normal.      Left shoulder: Normal.      Right hip: He exhibits tenderness. Left hip: He exhibits tenderness. Right knee: Normal.      Left knee: Normal.      Right ankle: Normal.      Left ankle: Normal.      Cervical back: Normal.      Thoracic back: Normal.      Lumbar back: He exhibits decreased range of motion, tenderness, bony tenderness, pain and spasm. Back:    Skin:     General: Skin is warm. Coloration: Skin is not pale. Findings: No erythema or rash. Neurological:      Mental Status: He is alert and oriented to person, place, and time. He is not disoriented. Cranial Nerves: Cranial nerves are intact. No cranial nerve deficit. Sensory: Sensation is intact. No sensory deficit. Motor: Weakness present. No atrophy or abnormal muscle tone. Coordination: Coordination is intact. Coordination normal.      Gait: Gait abnormal.      Deep Tendon Reflexes: Reflexes are normal and symmetric. Reflex Scores:       Tricep reflexes are 2+ on the right side and 2+ on the left side. Bicep reflexes are 2+ on the right side and 2+ on the left side. Brachioradialis reflexes are 2+ on the right side and 2+ on the left side. Patellar reflexes are 2+ on the right side and 2+ on the left side. Achilles reflexes are 2+ on the right side and 2+ on the left side. Comments: SLR-  Motor 4/5 generalized weakness  Feet bilat dusky when dependent   Psychiatric:         Attention and Perception: Attention and perception normal. He is attentive. Mood and Affect: Mood and affect normal. Mood is not anxious or depressed. Affect is not labile, blunt, angry or inappropriate. Speech: Speech normal.         Behavior: Behavior normal. Behavior is not agitated, slowed, aggressive, withdrawn, hyperactive or combative. Behavior is cooperative. Thought Content: Thought content normal. Thought content is not paranoid or delusional. Thought content does not include homicidal or suicidal ideation. Thought content does not include homicidal or suicidal plan. Cognition and Memory: Cognition and memory normal. Memory is not impaired. He does not exhibit impaired recent memory or impaired remote memory. Judgment: Judgment normal. Judgment is not impulsive or inappropriate.       Comments: Flat affect, h/o depression         HARRISON test:+  Yeomans test:+  Gaenslen test:+  Assessment:      1. Lumbar spondylosis    2. SI (sacroiliac) joint inflammation (HCC)    3. Sacroiliac inflammation (HCC)    4. Spondylosis of lumbar region without myelopathy or radiculopathy    5. Chronic pain syndrome       Plan:      · OARRS reviewed. Current MED:10  · Patient was not offered naloxone for home. · Discussed long term side effects of medications, tolerance, dependency and addiction. · Previous UDS reviewed  · UDS preformed today for compliance. · Patient told can not receive any pain medications from any other source. · No evidence of abuse, diversion or aberrant behavior. · Medications and/or procedures to improve function and quality of life- patient understanding with this and that may not be pain free  · Discussed with patient about safe storage of medications at home  · Discussed possible weaning of medication dosing dependent on treatment/procedure results. · Testing:none   · Procedures: Repeat Lumbar RFA Left Side L3-4,4-5,5-S1  · Discussed with patient about risks with procedure including infection, reaction to medication, increased pain, or bleeding. · Medications:Tramadol Zanaflex         Meds. Prescribed:     Encounter Medications    No orders of the defined types were placed in this encounter.        Return for Lumbar RFA Left side L3-4, 4-5, 5-S1, Follow up after procedure.

## 2020-11-12 NOTE — ANESTHESIA PRE PROCEDURE
Department of Anesthesiology  Preprocedure Note       Name:  Disha Benitez   Age:  63 y.o.  :  1965                                          MRN:  [id number]         Date:  2020      Surgeon: Erick Hurd):  Kelly Hawthorne MD    Procedure: Procedure(s):  Lumbar RFA Left side L3-4, 4-5, 5-S1,    Medications prior to admission:   Prior to Admission medications    Medication Sig Start Date End Date Taking? Authorizing Provider   traMADol (ULTRAM) 50 MG tablet Take 50 mg by mouth every 6 hours as needed for Pain. Yes Historical Provider, MD   tiZANidine (ZANAFLEX) 4 MG tablet Take 4 mg by mouth every 6 hours as needed   Yes Historical Provider, MD   isosorbide dinitrate (ISORDIL) 20 MG tablet Take 1 tablet by mouth twice daily 20  Yes ALIZE Pate CNP   metoprolol tartrate (LOPRESSOR) 25 MG tablet Take 1 tablet by mouth twice daily 20  Yes Maikel Carroll MD   polycarbophil (FIBERCON) 625 MG tablet Take 625 mg by mouth daily   Yes Historical Provider, MD   pantoprazole (PROTONIX) 40 MG tablet Take 1 tablet by mouth every morning 20  Yes ALIZE King CNP   simvastatin (ZOCOR) 10 MG tablet Take 10 mg by mouth nightly. Yes Historical Provider, MD   lamivudine-zidovudine (COMBIVIR) 150-300 MG per tablet Take 1 tablet by mouth 2 times daily. Yes Historical Provider, MD   fosamprenavir (LEXIVA) 700 MG tablet Take by mouth 2 times daily 2 tab   Yes Historical Provider, MD   olanzapine (ZYPREXA) 5 MG tablet Take 5 mg by mouth nightly.      Yes Historical Provider, MD   Multiple Vitamins-Minerals (THERAPEUTIC MULTIVITAMIN-MINERALS) tablet Take 1 tablet by mouth daily    Historical Provider, MD   NITROSTAT 0.4 MG SL tablet place 1 tablet under the tongue if needed every 5 minutes for chest pain for 3 doses IF NO RELIEF AFTER 3RD DOSE CALL PRESCRIBER . 17   ALIZE Pate CNP       Current medications:    No current facility-administered medications for this encounter. Allergies:  No Known Allergies    Problem List:    Patient Active Problem List   Diagnosis Code    HIV (human immunodeficiency virus infection) ([facility] B20    Hyperlipidemia E78.5    Depression F32.9    Cardiac syndrome X ([address] I20.8    Spondylosis of lumbar region without myelopathy or radiculopathy M47.816    Sacroiliac inflammation ([address] M46.1       Past Medical History:        Diagnosis Date    Arthritis     back    Arthritis 06/01/2016    LUMBAR SPINE     Chest pain     Depression     Diabetes mellitus ([address]     Dysphagia     GERD (gastroesophageal reflux disease)     History of blood transfusion last one 2000? ??    for HIV    History of nephrolithiasis     HIV (human immunodeficiency virus infection) ([facility]     Hyperlipidemia     Pancreatitis     Type 2 diabetes mellitus without complication ([address]     PT DENIES BEING DB  AT THIS TIME        Past Surgical History:        Procedure Laterality Date    CARDIAC CATHETERIZATION  2015???    OK    COLONOSCOPY  2016    ENDOSCOPY, COLON, DIAGNOSTIC      egd with dilatation    FOOT SURGERY  1970's    left    LUMBAR SPINE SURGERY Right 4/23/2019    Lumbar RFA  Right side first L3-4,4-5,5-S1 performed by Dimas Garcia MD at [facility] Left 5/21/2019    Lumbar RFA  Left side L3-4,4-5,5-S1 performed by Dimas Garcia MD at [place] [address] 1 BILATERAL Bilateral 7/18/2017    SI MBB BILATERAL performed by Dimas Garcia MD at [place] [address] Bilateral 07/18/2017    SI BLOCK    OTHER SURGICAL HISTORY Bilateral 01/17/2017    Lumbar facet     OTHER SURGICAL HISTORY Bilateral 02/20/2017    Lumbar Facet L3-S1    OTHER SURGICAL HISTORY Right 03/28/2017    Lumbar RFA L3-S1    OTHER SURGICAL HISTORY Bilateral 03/06/2018    Bilat SI joint injection    PAIN MANAGEMENT PROCEDURE Right 1/14/2020 Lumbar RFA  Right side first L3-4,4-5,5-S1 No sedation per patient request performed by Michelle Simons MD at [place] [address] Left 3/2/2020    Lumbar RFA Left side L3-4,4-5,5-S1- local per pt request performed by Michelle Simons MD at [facility] Right 10/8/2020    Lumbar RFA bilateral L3-4,4-5,5-S1  right side first -Local per patient request- performed by Michelle Simons MD at [facility] DX/THER AGNT PARAVERT FACET JOINT, LUMBAR/SAC, 2ND LEVEL Bilateral 3/6/2018    BILATERAL SI MBB #2 NO SEDATION performed by Michelle Simons MD at [address] [place] [address]  [id number], [id number]       Social History:    Social History     Tobacco Use    Smoking status: Current Every Day Smoker     Packs/day: 1.00     Years: 20.00     Pack years: 20.00     Types: Cigarettes    Smokeless tobacco: Never Used   Substance Use Topics    Alcohol use: No                                Ready to quit: Not Answered  Counseling given: Not Answered      Vital Signs (Current):   Vitals:    11/12/20 1120   BP: (!) 128/90   Pulse: 119   Resp: 16   Temp: 96.4 °F (35.8 °C)   TempSrc: Temporal   SpO2: 95%   Weight: 152 lb (68.9 kg)   Height: 5' 6\" (1.676 m)                                              BP Readings from Last 3 Encounters:   11/12/20 (!) 128/90   10/27/20 114/60   10/08/20 138/83       NPO Status: Time of last liquid consumption: 2000                        Time of last solid consumption: 2000                        Date of last liquid consumption: 11/11/20                        Date of last solid food consumption: 11/11/20    BMI:   Wt Readings from Last 3 Encounters:   11/12/20 152 lb (68.9 kg)   10/27/20 158 lb (71.7 kg)   10/08/20 158 lb (71.7 kg)     Body mass index is 24.53 kg/m².     CBC:   Lab Results   Component Value Date    WBC 7.5 08/07/2019    WBC 7.5 08/07/2019 Plan      MAC     ASA 3       Induction: intravenous. Anesthetic plan and risks discussed with patient. Plan discussed with CRNA.                   [address], DO   11/12/2020

## 2020-11-12 NOTE — H&P
[address] [place] [address]  History and Physical Update    Pt Name: Lesa Velazquez  MRN: [id number]  YOB: 1965  Date of evaluation: 11/12/2020      I have examined the patient and reviewed the H&P/Consult and there are no changes to the patient or plans.         Electronically signed by Lalito Klein MD on 11/12/2020 at 11:03 AM

## 2020-12-08 ENCOUNTER — OFFICE VISIT (OUTPATIENT)
Dept: PHYSICAL MEDICINE AND REHAB | Age: 55
End: 2020-12-08
Payer: MEDICARE

## 2020-12-08 VITALS
SYSTOLIC BLOOD PRESSURE: 128 MMHG | DIASTOLIC BLOOD PRESSURE: 72 MMHG | WEIGHT: 152 LBS | BODY MASS INDEX: 24.43 KG/M2 | HEIGHT: 66 IN | TEMPERATURE: 97.6 F

## 2020-12-08 PROCEDURE — 99213 OFFICE O/P EST LOW 20 MIN: CPT | Performed by: NURSE PRACTITIONER

## 2020-12-08 RX ORDER — ISOSORBIDE DINITRATE 20 MG/1
TABLET ORAL
Qty: 60 TABLET | Refills: 0 | OUTPATIENT
Start: 2020-12-08

## 2020-12-08 ASSESSMENT — ENCOUNTER SYMPTOMS
VOMITING: 0
SORE THROAT: 0
BACK PAIN: 1
RHINORRHEA: 0
ABDOMINAL PAIN: 0
EYES NEGATIVE: 1
SHORTNESS OF BREATH: 1
EYE PAIN: 0
NAUSEA: 0
DIARRHEA: 0
PHOTOPHOBIA: 0
CONSTIPATION: 0
SINUS PRESSURE: 0
CHEST TIGHTNESS: 0
COLOR CHANGE: 0
ALLERGIC/IMMUNOLOGIC NEGATIVE: 1

## 2020-12-08 NOTE — PROGRESS NOTES
and was appropriate  Pill count was not completed today and patient was educated on bringing in medications  Patient does not have naloxone available at home. Patient has not required use of naloxone at home since last office visit. The patienthas No Known Allergies. Past Medical History  Anai Quintana  has a past medical history of Arthritis, Arthritis, Chest pain, Depression, Diabetes mellitus ([facility] [address] Dysphagia, GERD (gastroesophageal reflux disease), History of blood transfusion, History of nephrolithiasis, HIV (human immunodeficiency virus infection) ([facility] [address] Hyperlipidemia, Pancreatitis, and Type 2 diabetes mellitus without complication ([facility] [address] Past Surgical History  The patient  has a past surgical history that includes Foot surgery (1970's); Cardiac catheterization (2015???); Colonoscopy (2016); Endoscopy, colon, diagnostic; other surgical history (Bilateral, 01/17/2017); other surgical history (Bilateral, 02/20/2017); other surgical history (Right, 03/28/2017); Nerve Surgery (Bilateral, 07/18/2017); Nerve Block Lumb Facet Level 1 Bilateral (Bilateral, 7/18/2017); Upper gastrointestinal endoscopy ([id number], [id number]); other surgical history (Bilateral, 03/06/2018); pr inj dx/ther agnt paravert facet joint, lumbar/sac, 2nd level (Bilateral, 3/6/2018); Lumbar spine surgery (Right, 4/23/2019); Lumbar spine surgery (Left, 5/21/2019); Pain management procedure (Right, 1/14/2020); Pain management procedure (Left, 3/2/2020); Pain management procedure (Right, 10/8/2020); and Pain management procedure (Left, 11/12/2020). Family History  This patient's family history includes Diabetes in his mother; High Blood Pressure in his mother. Social History  Anai Quintana  reports that he has been smoking cigarettes. He has a 20.00 pack-year smoking history. He has never used smokeless tobacco. He reports that he does not drink alcohol or use drugs.     Medications    Current Outpatient Medications:     traMADol (ULTRAM) 50 MG tablet, Take 50 mg by mouth every 6 hours as needed for Pain., Disp: , Rfl:     tiZANidine (ZANAFLEX) 4 MG tablet, Take 4 mg by mouth every 6 hours as needed, Disp: , Rfl:     isosorbide dinitrate (ISORDIL) 20 MG tablet, Take 1 tablet by mouth twice daily, Disp: 60 tablet, Rfl: 0    metoprolol tartrate (LOPRESSOR) 25 MG tablet, Take 1 tablet by mouth twice daily, Disp: 60 tablet, Rfl: 4    Multiple Vitamins-Minerals (THERAPEUTIC MULTIVITAMIN-MINERALS) tablet, Take 1 tablet by mouth daily, Disp: , Rfl:     polycarbophil (FIBERCON) 625 MG tablet, Take 625 mg by mouth daily, Disp: , Rfl:     pantoprazole (PROTONIX) 40 MG tablet, Take 1 tablet by mouth every morning, Disp: 30 tablet, Rfl: 11    NITROSTAT 0.4 MG SL tablet, place 1 tablet under the tongue if needed every 5 minutes for chest pain for 3 doses IF NO RELIEF AFTER 3RD DOSE CALL PRESCRIBER ., Disp: 25 tablet, Rfl: 3    simvastatin (ZOCOR) 10 MG tablet, Take 10 mg by mouth nightly.  , Disp: , Rfl:     lamivudine-zidovudine (COMBIVIR) 150-300 MG per tablet, Take 1 tablet by mouth 2 times daily. , Disp: , Rfl:     fosamprenavir (LEXIVA) 700 MG tablet, Take by mouth 2 times daily 2 tab, Disp: , Rfl:     olanzapine (ZYPREXA) 5 MG tablet, Take 5 mg by mouth nightly.  , Disp: , Rfl:     Subjective:      Review of Systems   Constitutional: Positive for activity change. Negative for appetite change, chills, diaphoresis, fatigue, fever and unexpected weight change. HENT: Positive for hearing loss. Negative for congestion, ear pain, mouth sores, nosebleeds, rhinorrhea, sinus pressure and sore throat. Eyes: Negative. Negative for photophobia, pain and visual disturbance. Respiratory: Positive for shortness of breath. Negative for chest tightness.          DAY COPD smokes 1 pack a day pt has noticed increased SOB lately now on  Inhaler per pulmonary    Cardiovascular:        CAD HTN  HLD   Gastrointestinal: Negative for abdominal pain, constipation, diarrhea, nausea and vomiting. GERD   Endocrine: Negative. Negative for cold intolerance, heat intolerance, polydipsia, polyphagia and polyuria. DM    Genitourinary: Negative. Negative for decreased urine volume, difficulty urinating, frequency and hematuria. Musculoskeletal: Positive for arthralgias, back pain, gait problem and myalgias. Negative for joint swelling, neck pain and neck stiffness. Skin: Negative. Negative for color change and rash. Allergic/Immunologic: Negative. Negative for food allergies and immunocompromised state. Neurological: Negative for dizziness, tremors, seizures, syncope, facial asymmetry, speech difficulty, weakness, light-headedness, numbness and headaches. Hematological: Negative. Does not bruise/bleed easily. HIV   Psychiatric/Behavioral: Negative. Negative for agitation, behavioral problems, confusion, decreased concentration, dysphoric mood, hallucinations, self-injury, sleep disturbance and suicidal ideas. The patient is not nervous/anxious and is not hyperactive. Objective:     Vitals:    12/08/20 1138   BP: 128/72   Site: Left Upper Arm   Position: Sitting   Cuff Size: Small Adult   Temp: 97.6 °F (36.4 °C)   Weight: 152 lb (68.9 kg)   Height: 5' 6\" (1.676 m)       Physical Exam  Vitals signs and nursing note reviewed. Constitutional:       General: He is not in acute distress. Appearance: He is well-developed. He is not diaphoretic. HENT:      Head: Normocephalic and atraumatic. Right Ear: External ear normal.      Left Ear: External ear normal.      Nose: Nose normal.      Mouth/Throat:      Pharynx: No oropharyngeal exudate. Eyes:      General: No scleral icterus. Right eye: No discharge. Left eye: No discharge. Conjunctiva/sclera: Conjunctivae normal.      Pupils: Pupils are equal, round, and reactive to light.    Neck:      Musculoskeletal: Full passive range of motion without pain, normal range of motion and neck supple. Normal range of motion. No edema, erythema, neck rigidity or muscular tenderness. Thyroid: No thyromegaly. Cardiovascular:      Rate and Rhythm: Normal rate and regular rhythm. Heart sounds: Normal heart sounds. No murmur. No friction rub. No gallop. Pulmonary:      Effort: Pulmonary effort is normal. No respiratory distress. Breath sounds: Examination of the right-upper field reveals wheezing. Examination of the left-upper field reveals wheezing. Decreased breath sounds and wheezing present. No rales. Comments: Anterior expiratory wheezes  DBS   DAY  Chest:      Chest wall: No tenderness. Abdominal:      General: Bowel sounds are normal. There is no distension. Palpations: Abdomen is soft. Tenderness: There is no abdominal tenderness. There is no guarding or rebound. Musculoskeletal:         General: Tenderness present. Right shoulder: Normal.      Left shoulder: Normal.      Right hip: He exhibits tenderness. Left hip: He exhibits tenderness. Right knee: Normal.      Left knee: Normal.      Right ankle: Normal.      Left ankle: Normal.      Cervical back: Normal.      Thoracic back: Normal.      Lumbar back: He exhibits decreased range of motion, tenderness, bony tenderness, pain and spasm. Back:    Skin:     General: Skin is warm. Coloration: Skin is not pale. Findings: No erythema or rash. Neurological:      Mental Status: He is alert and oriented to person, place, and time. He is not disoriented. Cranial Nerves: Cranial nerves are intact. No cranial nerve deficit. Sensory: Sensation is intact. No sensory deficit. Motor: Weakness present. No atrophy or abnormal muscle tone. Coordination: Coordination is intact. Coordination normal.      Gait: Gait abnormal.      Deep Tendon Reflexes: Reflexes are normal and symmetric.       Reflex Scores:       Tricep reflexes are 2+ on the right side and 2+ on the left side. Bicep reflexes are 2+ on the right side and 2+ on the left side. Brachioradialis reflexes are 2+ on the right side and 2+ on the left side. Patellar reflexes are 2+ on the right side and 2+ on the left side. Achilles reflexes are 2+ on the right side and 2+ on the left side. Comments: SLR-  Motor 4/5 generalized weakness  Feet bilat dusky when dependent   Psychiatric:         Attention and Perception: Attention and perception normal. He is attentive. Mood and Affect: Mood and affect normal. Mood is not anxious or depressed. Affect is not labile, blunt, angry or inappropriate. Speech: Speech normal.         Behavior: Behavior normal. Behavior is not agitated, slowed, aggressive, withdrawn, hyperactive or combative. Behavior is cooperative. Thought Content: Thought content normal. Thought content is not paranoid or delusional. Thought content does not include homicidal or suicidal ideation. Thought content does not include homicidal or suicidal plan. Cognition and Memory: Cognition and memory normal. Memory is not impaired. He does not exhibit impaired recent memory or impaired remote memory. Judgment: Judgment normal. Judgment is not impulsive or inappropriate. Comments: Flat affect, h/o depression       HARRISON test: +  Yeomans test: +  Gaenslen test: +     Assessment:     1. Lumbar spondylosis    2. SI (sacroiliac) joint inflammation (HCC)    3. Chronic pain syndrome            Plan:      · OARRS reviewed. Current MED:10  · Patient was not offered naloxone for home. · Discussed long term side effects of medications, tolerance, dependency and addiction. · Previous UDS reviewed  · UDS preformed today for compliance. · Patient told can not receive any pain medications from any other source. · No evidence of abuse, diversion or aberrant behavior.    Medications and/or procedures to improve function and quality of life- patient understanding with this and that may not be pain free   Discussed with patient about safe storage of medications at home   Discussed possible weaning of medication dosing dependent on treatment/procedure results.  Testing:none    Procedures: none   Discussed with patient about risks with procedure including infection, reaction to medication, increased pain, or bleeding.  Medications:Tramadol Zanaflex       Meds. Prescribed:   No orders of the defined types were placed in this encounter. Return in about 3 months (around 3/8/2021) for Follow up pain medications. Time spent with patient was  15 minutes, more than 50% was spent Counseling/coordinated the patient'scare.       Electronically signed by ALIZE Santana CNP on12/8/2020 at 12:11 PM

## 2020-12-08 NOTE — TELEPHONE ENCOUNTER
Akanksha Hunter called requesting a refill on the following medications:  Requested Prescriptions     Pending Prescriptions Disp Refills    isosorbide dinitrate (ISORDIL) 20 MG tablet 60 tablet 0    metoprolol tartrate (LOPRESSOR) 25 MG tablet 60 tablet 4     Pharmacy verified:  [facility] [place]      Date of last visit: 08-31-20  Date of next visit (if applicable): 11-18-43

## 2020-12-08 NOTE — TELEPHONE ENCOUNTER
Columba Sat called requesting a refill on the following medications:  Requested Prescriptions     Pending Prescriptions Disp Refills    metoprolol tartrate (LOPRESSOR) 25 MG tablet 60 tablet 4    isosorbide dinitrate (ISORDIL) 20 MG tablet 60 tablet 0   COMPLETELY OUT OF MEDS, NEEDS SHORT SUPPLY UNTIL MAIL ORDER ARRIVES    Pharmacy verified:  [facility] [place]      Date of last visit: 08-31-20  Date of next visit (if applicable): 68-02-02

## 2020-12-09 RX ORDER — ISOSORBIDE DINITRATE 20 MG/1
TABLET ORAL
Qty: 180 TABLET | Refills: 3 | Status: SHIPPED | OUTPATIENT
Start: 2020-12-09 | End: 2021-08-26

## 2021-03-08 ENCOUNTER — OFFICE VISIT (OUTPATIENT)
Dept: PHYSICAL MEDICINE AND REHAB | Age: 56
End: 2021-03-08
Payer: MEDICARE

## 2021-03-08 VITALS
SYSTOLIC BLOOD PRESSURE: 118 MMHG | DIASTOLIC BLOOD PRESSURE: 60 MMHG | HEIGHT: 66 IN | BODY MASS INDEX: 24.91 KG/M2 | HEART RATE: 97 BPM | WEIGHT: 155 LBS

## 2021-03-08 DIAGNOSIS — F11.90 CHRONIC, CONTINUOUS USE OF OPIOIDS: ICD-10-CM

## 2021-03-08 DIAGNOSIS — M46.1 SACROILIAC INFLAMMATION (HCC): ICD-10-CM

## 2021-03-08 DIAGNOSIS — M47.816 SPONDYLOSIS OF LUMBAR REGION WITHOUT MYELOPATHY OR RADICULOPATHY: ICD-10-CM

## 2021-03-08 DIAGNOSIS — M46.1 SI (SACROILIAC) JOINT INFLAMMATION (HCC): ICD-10-CM

## 2021-03-08 DIAGNOSIS — G89.4 CHRONIC PAIN SYNDROME: ICD-10-CM

## 2021-03-08 DIAGNOSIS — M47.816 LUMBAR SPONDYLOSIS: Primary | ICD-10-CM

## 2021-03-08 PROCEDURE — 99214 OFFICE O/P EST MOD 30 MIN: CPT | Performed by: NURSE PRACTITIONER

## 2021-03-08 RX ORDER — TIZANIDINE 4 MG/1
4 TABLET ORAL EVERY 8 HOURS PRN
Qty: 90 TABLET | Refills: 2 | Status: SHIPPED | OUTPATIENT
Start: 2021-03-08 | End: 2021-04-07

## 2021-03-08 ASSESSMENT — ENCOUNTER SYMPTOMS
PHOTOPHOBIA: 0
RHINORRHEA: 0
EYE PAIN: 0
NAUSEA: 0
VOMITING: 0
SORE THROAT: 0
COLOR CHANGE: 0
SHORTNESS OF BREATH: 1
ABDOMINAL PAIN: 0
ALLERGIC/IMMUNOLOGIC NEGATIVE: 1
SINUS PRESSURE: 0
EYES NEGATIVE: 1
BACK PAIN: 1
CHEST TIGHTNESS: 0
CONSTIPATION: 0
DIARRHEA: 0

## 2021-03-08 NOTE — PROGRESS NOTES
[facility] [address]  Dept: [phone]  Dept Fax: [phone]: [phone]    Visit Date: 3/8/2021    Functionality Assessment/Goals Worksheet     On a scale of 0 (Does not Interfere) to 10 (Completely Interferes)     1. Which number describes how during the past week pain has interfered with       the following:  A. General Activity:  1  B. Mood: 2  C. Walking Ability:  2  D. Normal Work (Includes both work outside the home and housework):  2  E. Relations with Other People:   1  F. Sleep:   1  G. Enjoyment of Life:   0    2. Patient Prefers to Take their Pain Medications:     []  On a regular basis   [x]  Only when necessary    []  Does not take pain medications    3. What are the Patient's Goals/Expectations for Visiting Pain Management? []  Learn about my pain    [x]  Receive Medication   [x]  Physical Therapy     [x]  Treat Depression   [x]  Receive Injections    []  Treat Sleep   []  Deal with Anxiety and Stress   []  Treat Opoid Dependence/Addiction   []  Other:      HPI:   Ramesh Rodriguez is a 63 y.o. male is here today for    Chief Complaint: Low back pain and Hip pain  SI pain   HPI   F/U continues to have mild low back Si pain. He has had Lumbar RFA L3-S1 bilateral right 10/2020 Left 11/2020 with continued 50% pain relief. He continues to take Zanaflex Tramadol very rarely. He has increased pain with walking and  lifting . Medications reviewed. Patient denies side effects with medications. Patient states he is taking medications as prescribed. Hedenies receiving pain medications from other sources. He denies any ER visits since last visit. Pain scale with out pain medications or at its worst is 5/10. Pain scale with pain medications or at its best is 0/10.   Last dose of Tramadol was yesterday  Drug screen reviewed from 12/2020 and was appropriate  Pill count was completed today and was appropriate 48  Patient does not have naloxone available at home. Patient has not required use of naloxone at home since last office visit. The patienthas No Known Allergies. Subjective:      Review of Systems   Constitutional: Positive for activity change. Negative for appetite change, chills, diaphoresis, fatigue, fever and unexpected weight change. HENT: Positive for hearing loss. Negative for congestion, ear pain, mouth sores, nosebleeds, rhinorrhea, sinus pressure and sore throat. Eyes: Negative. Negative for photophobia, pain and visual disturbance. Respiratory: Positive for shortness of breath. Negative for chest tightness. DAY COPD smokes 1 pack a day pt has noticed increased SOB lately now on  Inhaler per pulmonary    Cardiovascular:        CAD HTN  HLD   Gastrointestinal: Negative for abdominal pain, constipation, diarrhea, nausea and vomiting. GERD   Endocrine: Negative. Negative for cold intolerance, heat intolerance, polydipsia, polyphagia and polyuria. DM    Genitourinary: Negative. Negative for decreased urine volume, difficulty urinating, frequency and hematuria. Musculoskeletal: Positive for arthralgias, back pain, gait problem and myalgias. Negative for joint swelling, neck pain and neck stiffness. Skin: Negative. Negative for color change and rash. Allergic/Immunologic: Negative. Negative for food allergies and immunocompromised state. Neurological: Negative for dizziness, tremors, seizures, syncope, facial asymmetry, speech difficulty, weakness, light-headedness, numbness and headaches. Hematological: Negative. Does not bruise/bleed easily. HIV   Psychiatric/Behavioral: Negative. Negative for agitation, behavioral problems, confusion, decreased concentration, dysphoric mood, hallucinations, self-injury, sleep disturbance and suicidal ideas.  The patient is not nervous/anxious and is not hyperactive. Objective:     Vitals:    03/08/21 1115   BP: 118/60   Site: Left Upper Arm   Position: Sitting   Cuff Size: Medium Adult   Pulse: 97   Weight: 155 lb (70.3 kg)   Height: 5' 6\" (1.676 m)       Physical Exam  Vitals signs and nursing note reviewed. Constitutional:       General: He is not in acute distress. Appearance: He is well-developed. He is not diaphoretic. HENT:      Head: Normocephalic and atraumatic. Right Ear: External ear normal.      Left Ear: External ear normal.      Nose: Nose normal.      Mouth/Throat:      Pharynx: No oropharyngeal exudate. Eyes:      General: No scleral icterus. Right eye: No discharge. Left eye: No discharge. Conjunctiva/sclera: Conjunctivae normal.      Pupils: Pupils are equal, round, and reactive to light. Neck:      Musculoskeletal: Full passive range of motion without pain, normal range of motion and neck supple. Normal range of motion. No edema, erythema, neck rigidity or muscular tenderness. Thyroid: No thyromegaly. Cardiovascular:      Rate and Rhythm: Normal rate and regular rhythm. Heart sounds: Normal heart sounds. No murmur. No friction rub. No gallop. Pulmonary:      Effort: Pulmonary effort is normal. No respiratory distress. Breath sounds: Examination of the right-upper field reveals wheezing. Examination of the left-upper field reveals wheezing. Decreased breath sounds and wheezing present. No rales. Comments: Anterior expiratory wheezes  DBS   DAY  Chest:      Chest wall: No tenderness. Abdominal:      General: Bowel sounds are normal. There is no distension. Palpations: Abdomen is soft. Tenderness: There is no abdominal tenderness. There is no guarding or rebound. Musculoskeletal:         General: Tenderness present. Right shoulder: Normal.      Left shoulder: Normal.      Right hip: He exhibits tenderness. Left hip: He exhibits tenderness.       Right knee: Normal.      Left knee: Normal.      Right ankle: Normal.      Left ankle: Normal.      Cervical back: Normal.      Thoracic back: Normal.      Lumbar back: He exhibits decreased range of motion, tenderness, bony tenderness, pain and spasm. Back:    Skin:     General: Skin is warm. Coloration: Skin is not pale. Findings: No erythema or rash. Neurological:      Mental Status: He is alert and oriented to person, place, and time. He is not disoriented. Cranial Nerves: Cranial nerves are intact. No cranial nerve deficit. Sensory: Sensation is intact. No sensory deficit. Motor: Weakness present. No atrophy or abnormal muscle tone. Coordination: Coordination is intact. Coordination normal.      Gait: Gait abnormal.      Deep Tendon Reflexes: Reflexes are normal and symmetric. Reflex Scores:       Tricep reflexes are 2+ on the right side and 2+ on the left side. Bicep reflexes are 2+ on the right side and 2+ on the left side. Brachioradialis reflexes are 2+ on the right side and 2+ on the left side. Patellar reflexes are 2+ on the right side and 2+ on the left side. Achilles reflexes are 2+ on the right side and 2+ on the left side. Comments: SLR-  Motor 4/5 generalized weakness  Feet bilat dusky when dependent   Psychiatric:         Attention and Perception: Attention and perception normal. He is attentive. Mood and Affect: Mood and affect normal. Mood is not anxious or depressed. Affect is not labile, blunt, angry or inappropriate. Speech: Speech normal.         Behavior: Behavior normal. Behavior is not agitated, slowed, aggressive, withdrawn, hyperactive or combative. Behavior is cooperative. Thought Content: Thought content normal. Thought content is not paranoid or delusional. Thought content does not include homicidal or suicidal ideation. Thought content does not include homicidal or suicidal plan. Cognition and Memory: Cognition and memory normal. Memory is not impaired. He does not exhibit impaired recent memory or impaired remote memory. Judgment: Judgment normal. Judgment is not impulsive or inappropriate. Comments: Flat affect, h/o depression       HARRISON test: +  Yeomans test:+  Gaenslen test:+     Assessment:     1. Lumbar spondylosis    2. SI (sacroiliac) joint inflammation (HCC)    3. Sacroiliac inflammation (HCC)    4. Spondylosis of lumbar region without myelopathy or radiculopathy    5. Chronic pain syndrome    6. Chronic, continuous use of opioids            Plan:      OARRS reviewed. Current MED:10  Patient was not offered naloxone for home. Discussed long term side effects of medications, tolerance, dependency and addiction. Previous UDS reviewed  UDS preformed today for compliance. Patient told can not receive any pain medications from any other source. No evidence of abuse, diversion or aberrant behavior. Medications and/or procedures to improve function and quality of life- patient understanding with this and that may not be pain free  Discussed with patient about safe storage of medications at home  Discussed possible weaning of medication dosing dependent on treatment/procedure results. Testing: none  Procedures: may repeat Lumbar RFA right side  L3-S1  if need at next apt   Discussed with patient about risks with procedure including infection, reaction to medication, increased pain, or bleeding. Medications:Tramadol Zanaflex   If patient is on blood thinners will need approval to holdn/a      Meds. Prescribed:   Orders Placed This Encounter   Medications    tiZANidine (ZANAFLEX) 4 MG tablet     Sig: Take 1 tablet by mouth every 8 hours as needed (spasms)     Dispense:  90 tablet     Refill:  2       Return in about 3 months (around 6/8/2021) for Follow up pain medications.                Electronically signed by ALIZE Benjamin CNP on3/8/2021 at 11:44 AM

## 2021-05-24 ENCOUNTER — OFFICE VISIT (OUTPATIENT)
Dept: PHYSICAL MEDICINE AND REHAB | Age: 56
End: 2021-05-24
Payer: MEDICARE

## 2021-05-24 VITALS
BODY MASS INDEX: 25.07 KG/M2 | HEIGHT: 66 IN | HEART RATE: 62 BPM | WEIGHT: 156 LBS | DIASTOLIC BLOOD PRESSURE: 62 MMHG | SYSTOLIC BLOOD PRESSURE: 120 MMHG

## 2021-05-24 DIAGNOSIS — F11.90 CHRONIC, CONTINUOUS USE OF OPIOIDS: ICD-10-CM

## 2021-05-24 DIAGNOSIS — M47.816 SPONDYLOSIS OF LUMBAR REGION WITHOUT MYELOPATHY OR RADICULOPATHY: ICD-10-CM

## 2021-05-24 DIAGNOSIS — M46.1 SACROILIAC INFLAMMATION (HCC): ICD-10-CM

## 2021-05-24 DIAGNOSIS — M47.816 LUMBAR SPONDYLOSIS: Primary | ICD-10-CM

## 2021-05-24 DIAGNOSIS — G89.4 CHRONIC PAIN SYNDROME: ICD-10-CM

## 2021-05-24 DIAGNOSIS — M46.1 SI (SACROILIAC) JOINT INFLAMMATION (HCC): ICD-10-CM

## 2021-05-24 PROCEDURE — 99214 OFFICE O/P EST MOD 30 MIN: CPT | Performed by: NURSE PRACTITIONER

## 2021-05-24 ASSESSMENT — ENCOUNTER SYMPTOMS
VOMITING: 0
RHINORRHEA: 0
EYE PAIN: 0
CHEST TIGHTNESS: 0
DIARRHEA: 0
EYES NEGATIVE: 1
SINUS PRESSURE: 0
BACK PAIN: 1
SHORTNESS OF BREATH: 1
ALLERGIC/IMMUNOLOGIC NEGATIVE: 1
SORE THROAT: 0
ABDOMINAL PAIN: 0
NAUSEA: 0
CONSTIPATION: 0
PHOTOPHOBIA: 0
COLOR CHANGE: 0

## 2021-05-24 NOTE — PROGRESS NOTES
[address] [facility] [address]  Dept: [phone]  Dept Fax: [phone]: [phone]    Visit Date: 5/24/2021    Functionality Assessment/Goals Worksheet     On a scale of 0 (Does not Interfere) to 10 (Completely Interferes)     1. Which number describes how during the past week pain has interfered with       the following:  A. General Activity:  1  B. Mood: 1  C. Walking Ability:  1  D. Normal Work (Includes both work outside the home and housework):  1  E. Relations with Other People:   1  F. Sleep:   1  G. Enjoyment of Life:   1    2. Patient Prefers to Take their Pain Medications:     []  On a regular basis   [x]  Only when necessary    []  Does not take pain medications    3. What are the Patient's Goals/Expectations for Visiting Pain Management? []  Learn about my pain    [x]  Receive Medication   []  Physical Therapy     []  Treat Depression   [x]  Receive Injections    []  Treat Sleep   []  Deal with Anxiety and Stress   []  Treat Opoid Dependence/Addiction   []  Other:    HPI:   Ann Ross is a 63 y.o. male is here today for    Chief Complaint: Low back pain, Mid back pain, Right leg pain, Left leg pain and Hip pain    HPI   F/U states he is doing well. Has been walking and increased activity. He has had Lumbar RFA L3-S1 bilateral right 10/2020 Left 11/2020 with continued 50% pain relief. He continues to take Zanaflex Tramadol very rarely. Pain increases with bending, lifting, twisting , turning torso, walking and standing. ice, heat, narcotics, muscle relaxer, steroid burst and injections  stabbing, burning and aching    Medications reviewed. Patient denies side effects with medications. Patient states he is taking medications as prescribed. Hedenies receiving pain medications from other sources. He denies any ER visits since last visit.     Pain scale with out pain medications or at its worst is 3/10. Pain scale with pain medications or at its best is 0/10. Last dose o Tramadol  was  5 days ago   Drug screen reviewed from 3/2021and was appropriate  Pill count was not completed today and patient was educated on bringing in medications  Patient does not have naloxone available at home. Patient has not required use of naloxone at home since last office visit. The patienthas No Known Allergies. Subjective:      Review of Systems   Constitutional: Positive for activity change. Negative for appetite change, chills, diaphoresis, fatigue, fever and unexpected weight change. HENT: Positive for hearing loss. Negative for congestion, ear pain, mouth sores, nosebleeds, rhinorrhea, sinus pressure and sore throat. Eyes: Negative. Negative for photophobia, pain and visual disturbance. Respiratory: Positive for shortness of breath. Negative for chest tightness. DAY COPD smokes 1 pack a day pt has noticed increased SOB lately now on  Inhaler per pulmonary    Cardiovascular:        CAD HTN  HLD   Gastrointestinal: Negative for abdominal pain, constipation, diarrhea, nausea and vomiting. GERD   Endocrine: Negative. Negative for cold intolerance, heat intolerance, polydipsia, polyphagia and polyuria. DM    Genitourinary: Negative. Negative for decreased urine volume, difficulty urinating, frequency and hematuria. Musculoskeletal: Positive for arthralgias, back pain, gait problem and myalgias. Negative for joint swelling, neck pain and neck stiffness. Skin: Negative. Negative for color change and rash. Allergic/Immunologic: Negative. Negative for food allergies and immunocompromised state. Neurological: Negative for dizziness, tremors, seizures, syncope, facial asymmetry, speech difficulty, weakness, light-headedness, numbness and headaches. Hematological: Negative. Does not bruise/bleed easily.         HIV   Psychiatric/Behavioral: Negative. Negative for agitation, behavioral problems, confusion, decreased concentration, dysphoric mood, hallucinations, self-injury, sleep disturbance and suicidal ideas. The patient is not nervous/anxious and is not hyperactive. Objective:     Vitals:    05/24/21 1119   BP: 120/62   Site: Left Upper Arm   Position: Sitting   Cuff Size: Medium Adult   Pulse: 62   Weight: 156 lb (70.8 kg)   Height: 5' 6\" (1.676 m)       Physical Exam  Vitals and nursing note reviewed. Constitutional:       General: He is not in acute distress. Appearance: He is well-developed. He is not diaphoretic. HENT:      Head: Normocephalic and atraumatic. Right Ear: External ear normal.      Left Ear: External ear normal.      Nose: Nose normal.      Mouth/Throat:      Pharynx: No oropharyngeal exudate. Eyes:      General: No scleral icterus. Right eye: No discharge. Left eye: No discharge. Conjunctiva/sclera: Conjunctivae normal.      Pupils: Pupils are equal, round, and reactive to light. Neck:      Thyroid: No thyromegaly. Cardiovascular:      Rate and Rhythm: Normal rate and regular rhythm. Heart sounds: Normal heart sounds. No murmur heard. No friction rub. No gallop. Pulmonary:      Effort: Pulmonary effort is normal. No respiratory distress. Breath sounds: Examination of the right-upper field reveals wheezing. Examination of the left-upper field reveals wheezing. Decreased breath sounds and wheezing present. No rales. Comments:   Left sided I and E A and P   wheezes  DBS   DAY  Chest:      Chest wall: No tenderness. Abdominal:      General: Bowel sounds are normal. There is no distension. Palpations: Abdomen is soft. Tenderness: There is no abdominal tenderness. There is no guarding or rebound. Musculoskeletal:         General: Tenderness present.       Right shoulder: Normal.      Left shoulder: Normal.      Cervical back: Full passive range of motion without pain, normal range of motion and neck supple. No edema, erythema or rigidity. No muscular tenderness. Normal range of motion. Thoracic back: Tenderness and bony tenderness present. Decreased range of motion. Lumbar back: Spasms, tenderness and bony tenderness present. Decreased range of motion. Back:       Right hip: Tenderness present. Left hip: Tenderness present. Right knee: Normal.      Left knee: Normal.      Right ankle: Normal.      Left ankle: Normal.   Skin:     General: Skin is warm. Coloration: Skin is not pale. Findings: No erythema or rash. Neurological:      Mental Status: He is alert and oriented to person, place, and time. He is not disoriented. Cranial Nerves: Cranial nerves are intact. No cranial nerve deficit. Sensory: Sensation is intact. No sensory deficit. Motor: Weakness present. No atrophy or abnormal muscle tone. Coordination: Coordination is intact. Coordination normal.      Gait: Gait abnormal.      Deep Tendon Reflexes: Reflexes are normal and symmetric. Reflex Scores:       Tricep reflexes are 2+ on the right side and 2+ on the left side. Bicep reflexes are 2+ on the right side and 2+ on the left side. Brachioradialis reflexes are 2+ on the right side and 2+ on the left side. Patellar reflexes are 2+ on the right side and 2+ on the left side. Achilles reflexes are 2+ on the right side and 2+ on the left side. Comments: SLR-  Motor 4/5 generalized weakness  Feet bilat dusky when dependent   Psychiatric:         Attention and Perception: Attention and perception normal. He is attentive. Mood and Affect: Mood and affect normal. Mood is not anxious or depressed. Affect is not labile, blunt, angry or inappropriate. Speech: Speech normal.         Behavior: Behavior normal. Behavior is not agitated, slowed, aggressive, withdrawn, hyperactive or combative.  Behavior is Electronically signed by ALIZE Arellano CNP on5/24/2021 at 11:35 AM

## 2021-08-26 RX ORDER — ISOSORBIDE DINITRATE 20 MG/1
TABLET ORAL
Qty: 60 TABLET | Refills: 11 | Status: SHIPPED | OUTPATIENT
Start: 2021-08-26 | End: 2022-07-12 | Stop reason: SDUPTHER

## 2021-08-30 ENCOUNTER — OFFICE VISIT (OUTPATIENT)
Dept: PHYSICAL MEDICINE AND REHAB | Age: 56
End: 2021-08-30
Payer: MEDICARE

## 2021-08-30 VITALS
HEIGHT: 66 IN | BODY MASS INDEX: 24.43 KG/M2 | WEIGHT: 152 LBS | SYSTOLIC BLOOD PRESSURE: 102 MMHG | DIASTOLIC BLOOD PRESSURE: 68 MMHG

## 2021-08-30 DIAGNOSIS — M46.1 SI (SACROILIAC) JOINT INFLAMMATION (HCC): ICD-10-CM

## 2021-08-30 DIAGNOSIS — M47.816 LUMBAR SPONDYLOSIS: Primary | ICD-10-CM

## 2021-08-30 DIAGNOSIS — M47.816 SPONDYLOSIS OF LUMBAR REGION WITHOUT MYELOPATHY OR RADICULOPATHY: ICD-10-CM

## 2021-08-30 DIAGNOSIS — M46.1 SACROILIAC INFLAMMATION (HCC): ICD-10-CM

## 2021-08-30 DIAGNOSIS — G89.4 CHRONIC PAIN SYNDROME: ICD-10-CM

## 2021-08-30 DIAGNOSIS — B20 HIV INFECTION, UNSPECIFIED SYMPTOM STATUS (HCC): ICD-10-CM

## 2021-08-30 DIAGNOSIS — F11.90 CHRONIC, CONTINUOUS USE OF OPIOIDS: ICD-10-CM

## 2021-08-30 PROCEDURE — 99214 OFFICE O/P EST MOD 30 MIN: CPT | Performed by: NURSE PRACTITIONER

## 2021-08-30 ASSESSMENT — ENCOUNTER SYMPTOMS
RHINORRHEA: 0
DIARRHEA: 0
ALLERGIC/IMMUNOLOGIC NEGATIVE: 1
SHORTNESS OF BREATH: 1
SINUS PRESSURE: 0
BACK PAIN: 1
CONSTIPATION: 0
COLOR CHANGE: 0
NAUSEA: 0
ABDOMINAL PAIN: 0
PHOTOPHOBIA: 0
SORE THROAT: 0
EYE PAIN: 0
EYES NEGATIVE: 1
VOMITING: 0
CHEST TIGHTNESS: 0

## 2021-08-30 NOTE — PROGRESS NOTES
[address] [facility] [address]  Dept: [phone]  Dept Fax: [phone]: [phone]    Visit Date: 8/30/2021    Functionality Assessment/Goals Worksheet     On a scale of 0 (Does not Interfere) to 10 (Completely Interferes)     1. Which number describes how during the past week pain has interfered with       the following:  A. General Activity:  1  B. Mood: 0  C. Walking Ability:  1  D. Normal Work (Includes both work outside the home and housework):  1  E. Relations with Other People:   0  F. Sleep:   0  G. Enjoyment of Life:   0    2. Patient Prefers to Take their Pain Medications:     []  On a regular basis   [x]  Only when necessary    []  Does not take pain medications    3. What are the Patient's Goals/Expectations for Visiting Pain Management? []  Learn about my pain    [x]  Receive Medication   []  Physical Therapy     [x]  Treat Depression   [x]  Receive Injections    []  Treat Sleep   []  Deal with Anxiety and Stress   []  Treat Opoid Dependence/Addiction   []  Other:    HPI:   Karlos Paris is a 63 y.o. male is here today for    Chief Complaint: Low back pain, Mid back pain, Neck pain and Hip pain    HPI   F/U states he is doing well. Has been walking and increased activity. He has had Lumbar RFA L3-S1 bilateral right 10/2020 Left 11/2020 with continued 50% pain relief. He continues to take Zanaflex Tramadol very rarely. Pain increases with bending, lifting, twisting , reaching, pushing, pulling, walking, standing, stairs and getting up and down. Treatments Tried ice, heat, NSAIDS, narcotics, muscle relaxer, OTC rubs creams patches, steroid burst and injections  Pain Description sharp, burning, aching, numbness and tingling    Medications reviewed. Patient denies side effects with medications. Patient states he is taking medications as prescribed. Cashes receiving pain medications from other sources. He denies any ER visits since last visit. Pain scale with out pain medications or at its worst is 3/10. Pain scale with pain medications or at its best is 0/10. Last dose of Tramadol  was  greater than 1 week ago  Drug screen reviewed from 5/2021and was appropriate  Pill count was completed today and was appropriate Pt is out of meds:   Patient  Does not have naloxone available at home. Patient has not required use of naloxone at home since last office visit. The patienthas No Known Allergies. Subjective:      Review of Systems   Constitutional: Positive for activity change. Negative for appetite change, chills, diaphoresis, fatigue, fever and unexpected weight change. HENT: Positive for hearing loss. Negative for congestion, ear pain, mouth sores, nosebleeds, rhinorrhea, sinus pressure and sore throat. Eyes: Negative. Negative for photophobia, pain and visual disturbance. Respiratory: Positive for shortness of breath. Negative for chest tightness. DAY COPD smokes 1 pack a day pt has noticed increased SOB lately now on  Inhaler per pulmonary    Cardiovascular:        CAD HTN  HLD   Gastrointestinal: Negative for abdominal pain, constipation, diarrhea, nausea and vomiting. GERD   Endocrine: Negative. Negative for cold intolerance, heat intolerance, polydipsia, polyphagia and polyuria. DM    Genitourinary: Negative. Negative for decreased urine volume, difficulty urinating, frequency and hematuria. Musculoskeletal: Positive for arthralgias, back pain, gait problem and myalgias. Negative for joint swelling, neck pain and neck stiffness. Skin: Negative. Negative for color change and rash. Allergic/Immunologic: Negative. Negative for food allergies and immunocompromised state.    Neurological: Negative for dizziness, tremors, seizures, syncope, facial asymmetry, speech difficulty, weakness, light-headedness, numbness and headaches. Hematological: Negative. Does not bruise/bleed easily. HIV   Psychiatric/Behavioral: Negative. Negative for agitation, behavioral problems, confusion, decreased concentration, dysphoric mood, hallucinations, self-injury, sleep disturbance and suicidal ideas. The patient is not nervous/anxious and is not hyperactive. Objective:     Vitals:    08/30/21 1144   BP: 102/68   Site: Left Upper Arm   Position: Sitting   Weight: 152 lb (68.9 kg)   Height: 5' 6\" (1.676 m)       Physical Exam  Vitals and nursing note reviewed. Constitutional:       General: He is not in acute distress. Appearance: He is well-developed. He is not diaphoretic. HENT:      Head: Normocephalic and atraumatic. Right Ear: External ear normal.      Left Ear: External ear normal.      Nose: Nose normal.      Mouth/Throat:      Pharynx: No oropharyngeal exudate. Eyes:      General: No scleral icterus. Right eye: No discharge. Left eye: No discharge. Conjunctiva/sclera: Conjunctivae normal.      Pupils: Pupils are equal, round, and reactive to light. Neck:      Thyroid: No thyromegaly. Cardiovascular:      Rate and Rhythm: Normal rate and regular rhythm. Heart sounds: Normal heart sounds. No murmur heard. No friction rub. No gallop. Pulmonary:      Effort: Pulmonary effort is normal. No respiratory distress. Breath sounds: Examination of the right-upper field reveals wheezing. Examination of the left-upper field reveals wheezing. Decreased breath sounds and wheezing present. No rales. Comments:   Left sided I and E A and P   wheezes  DBS   DAY  Chest:      Chest wall: No tenderness. Abdominal:      General: Bowel sounds are normal. There is no distension. Palpations: Abdomen is soft. Tenderness: There is no abdominal tenderness. There is no guarding or rebound. Musculoskeletal:         General: Tenderness present.       Right shoulder: Normal.      Left shoulder: Normal.      Cervical back: Full passive range of motion without pain, normal range of motion and neck supple. No edema, erythema or rigidity. No muscular tenderness. Normal range of motion. Thoracic back: Tenderness and bony tenderness present. Decreased range of motion. Lumbar back: Spasms, tenderness and bony tenderness present. Decreased range of motion. Back:       Right hip: Tenderness present. Left hip: Tenderness present. Right knee: Normal.      Left knee: Normal.      Right ankle: Normal.      Left ankle: Normal.   Skin:     General: Skin is warm. Coloration: Skin is not pale. Findings: No erythema or rash. Neurological:      Mental Status: He is alert and oriented to person, place, and time. He is not disoriented. Cranial Nerves: Cranial nerves are intact. No cranial nerve deficit. Sensory: Sensation is intact. No sensory deficit. Motor: Weakness present. No atrophy or abnormal muscle tone. Coordination: Coordination is intact. Coordination normal.      Gait: Gait abnormal.      Deep Tendon Reflexes: Reflexes are normal and symmetric. Reflex Scores:       Tricep reflexes are 2+ on the right side and 2+ on the left side. Bicep reflexes are 2+ on the right side and 2+ on the left side. Brachioradialis reflexes are 2+ on the right side and 2+ on the left side. Patellar reflexes are 2+ on the right side and 2+ on the left side. Achilles reflexes are 2+ on the right side and 2+ on the left side. Comments: SLR-  Motor 4/5 generalized weakness  Feet bilat dusky when dependent   Psychiatric:         Attention and Perception: Attention and perception normal. He is attentive. Mood and Affect: Mood and affect normal. Mood is not anxious or depressed. Affect is not labile, blunt, angry or inappropriate.          Speech: Speech normal.         Behavior: Behavior normal. Behavior is not agitated, slowed, aggressive, withdrawn, hyperactive or combative. Behavior is cooperative. Thought Content: Thought content normal. Thought content is not paranoid or delusional. Thought content does not include homicidal or suicidal ideation. Thought content does not include homicidal or suicidal plan. Cognition and Memory: Cognition and memory normal. Memory is not impaired. He does not exhibit impaired recent memory or impaired remote memory. Judgment: Judgment normal. Judgment is not impulsive or inappropriate. Comments: Flat affect, h/o depression       HARRISON test: +  Yeomans test:+  Gaenslen test: +     Assessment:     1. Lumbar spondylosis    2. SI (sacroiliac) joint inflammation (HCC)    3. Sacroiliac inflammation (HCC)    4. Spondylosis of lumbar region without myelopathy or radiculopathy    5. Chronic pain syndrome    6. Chronic, continuous use of opioids    7. HIV infection, unspecified symptom status ([facility] [address]            Plan:      OARRS reviewed. Current MED: 15  Patient was not offered naloxone for home. Discussed long term side effects of medications, tolerance, dependency and addiction. Previous UDS reviewed  UDS preformed today for compliance. Patient told can not receive any pain medications from any other source. No evidence of abuse, diversion or aberrant behavior. Medications and/or procedures to improve function and quality of life- patient understanding with this and that may not be pain free  Discussed with patient about safe storage of medications at home  Discussed possible weaning of medication dosing dependent on treatment/procedure results. Testing: none  Procedures: none   Discussed with patient about risks with procedure including infection, reaction to medication, increased pain, or bleeding. Medications:Tramadol Zanaflex   If patient is on blood thinners will need approval to hold:N/A      Meds.  Prescribed:   No orders of the defined types were placed in this encounter. Return in about 3 months (around 11/30/2021) for Follow up pain medications.                Electronically signed by ALIZE Babb CNP on8/30/2021 at 11:51 AM

## 2021-09-10 ENCOUNTER — OFFICE VISIT (OUTPATIENT)
Dept: CARDIOLOGY CLINIC | Age: 56
End: 2021-09-10
Payer: MEDICARE

## 2021-09-10 VITALS
DIASTOLIC BLOOD PRESSURE: 60 MMHG | SYSTOLIC BLOOD PRESSURE: 118 MMHG | HEIGHT: 66 IN | BODY MASS INDEX: 23.3 KG/M2 | WEIGHT: 145 LBS | HEART RATE: 88 BPM

## 2021-09-10 DIAGNOSIS — R06.02 SOB (SHORTNESS OF BREATH): Primary | ICD-10-CM

## 2021-09-10 PROCEDURE — 99213 OFFICE O/P EST LOW 20 MIN: CPT | Performed by: INTERNAL MEDICINE

## 2021-09-10 PROCEDURE — 93000 ELECTROCARDIOGRAM COMPLETE: CPT | Performed by: INTERNAL MEDICINE

## 2021-09-10 RX ORDER — ASPIRIN 81 MG/1
81 TABLET ORAL DAILY
Qty: 90 TABLET | Refills: 1 | Status: SHIPPED | OUTPATIENT
Start: 2021-09-10 | End: 2022-07-12

## 2021-09-10 NOTE — PROGRESS NOTES
Ul[facility] CARDIOLOGY  [address] [facility]  Dept: [phone]  Dept Fax: [phone]  Loc: [phone]    Visit Date: 9/10/2021    Mr. Jaimee Jimenez is a 63 y.o. male  who presented for:  No chief complaint on file. HPI:   48 yo M c hx of HIV, DM, HLD , Depression is here for a follow up. Cath in 6/2013 showed non-obstructive CAD. Denies any chest pain, palpitations, lightheadedness, dizziness, orthopnea, PND or pedal edema. Patient complains of Shortness of Breath which he states is chronic and due to his smoking. Patient experiences this with walking somewhere between six blocks and 1 mile. Patient denies symptoms with rest.  Patient continues to smoke one pack a day and no oxygen use at home. Patient sleeps flat during the night. Patient has not had to use the nitro in years. Patient denies side effects with current medications    EKG 9/10/21: Sinus rhythm with incomplete RBBB     Current Outpatient Medications:     aspirin EC 81 MG EC tablet, Take 1 tablet by mouth daily, Disp: 90 tablet, Rfl: 1    metoprolol tartrate (LOPRESSOR) 25 MG tablet, Take 1 tablet by mouth twice daily, Disp: 60 tablet, Rfl: 11    isosorbide dinitrate (ISORDIL) 20 MG tablet, Take 1 tablet by mouth twice daily, Disp: 60 tablet, Rfl: 11    tiZANidine (ZANAFLEX) 4 MG tablet, Take 4 mg by mouth every 6 hours as needed, Disp: , Rfl:     pantoprazole (PROTONIX) 40 MG tablet, Take 1 tablet by mouth every morning (before breakfast), Disp: 90 tablet, Rfl: 1    traMADol (ULTRAM) 50 MG tablet, Take 50 mg by mouth every 6 hours as needed for Pain., Disp: , Rfl:     NITROSTAT 0.4 MG SL tablet, place 1 tablet under the tongue if needed every 5 minutes for chest pain for 3 doses IF NO RELIEF AFTER 3RD DOSE CALL PRESCRIBER ., Disp: 25 tablet, Rfl: 3    lamivudine-zidovudine (COMBIVIR) 150-300 MG per tablet, Take 1 tablet by mouth 2 times daily.   , Disp: , Rfl:     fosamprenavir (LEXIVA) 700 MG tablet, Take by mouth 2 times daily 2 tab, Disp: , Rfl:     olanzapine (ZYPREXA) 5 MG tablet, Take 5 mg by mouth nightly.  , Disp: , Rfl:     Past Medical History  Anai Quintana  has a past medical history of Arthritis, Arthritis, Chest pain, Depression, Diabetes mellitus ([facility] [address] Dysphagia, GERD (gastroesophageal reflux disease), History of blood transfusion, History of nephrolithiasis, HIV (human immunodeficiency virus infection) ([facility] [address] Hyperlipidemia, Pancreatitis, and Type 2 diabetes mellitus without complication ([facility] [address] Social History  Anai Quintana  reports that he has been smoking cigarettes. He has a 20.00 pack-year smoking history. He has never used smokeless tobacco. He reports that he does not drink alcohol and does not use drugs. Family History  Anai Quintana family history includes Diabetes in his mother; High Blood Pressure in his mother.     Past Surgical History   Past Surgical History:   Procedure Laterality Date    CARDIAC CATHETERIZATION  2015???    OK    COLONOSCOPY  2016    ENDOSCOPY, COLON, DIAGNOSTIC      egd with dilatation    FOOT SURGERY  1970's    left    LUMBAR SPINE SURGERY Right 4/23/2019    Lumbar RFA  Right side first L3-4,4-5,5-S1 performed by Jeffrey Myers MD at [facility] Left 5/21/2019    Lumbar RFA  Left side L3-4,4-5,5-S1 performed by Jeffrey Myers MD at [place] [address] 1 BILATERAL Bilateral 7/18/2017    SI MBB BILATERAL performed by Jeffrey Myers MD at [facility] 98 Bilateral 07/18/2017    SI BLOCK    OTHER SURGICAL HISTORY Bilateral 01/17/2017    Lumbar facet     OTHER SURGICAL HISTORY Bilateral 02/20/2017    Lumbar Facet L3-S1    OTHER SURGICAL HISTORY Right 03/28/2017    Lumbar RFA L3-S1    OTHER SURGICAL HISTORY Bilateral 03/06/2018    Bilat SI joint injection    PAIN MANAGEMENT PROCEDURE Right 1/14/2020 Lumbar RFA  Right side first L3-4,4-5,5-S1 No sedation per patient request performed by Juju Kwon MD at [place] [address] Left 3/2/2020    Lumbar RFA Left side L3-4,4-5,5-S1- local per pt request performed by Juju Kwon MD at [place] [address] Right 10/8/2020    Lumbar RFA bilateral L3-4,4-5,5-S1  right side first -Local per patient request- performed by Juju Kwon MD at [place] [address] Left 11/12/2020    Lumbar RFA Left side L3-4, 4-5, 5-S1, performed by Juju Kwon MD at [facility] DX/THER AGNT PARAVERT FACET JOINT, LUMBAR/SAC, 2ND LEVEL Bilateral 3/6/2018    BILATERAL SI MBB #2 NO SEDATION performed by Juju Kwon MD at [address] [facility]  [id number], [id number]       Subjective:     REVIEW OF SYSTEMS  Constitutional: denies sweats, chills and fever  HENT: denies  congestion, sinus pressure, sneezing and sore throat. Eyes: denies  pain, discharge, redness and itching. Respiratory: Short of breath, denies apnea, cough  Gastrointestinal: denies blood in stool, constipation, diarrhea   Endocrine: denies cold intolerance, heat intolerance, polydipsia. Genitourinary: denies dysuria, enuresis, flank pain and hematuria. Musculoskeletal: denies arthralgias, joint swelling and neck pain. Neurological: denies numbness and headaches. Psychiatric/Behavioral: denies agitation, confusion, decreased concentration and dysphoric mood    All others reviewed and are negative.    Objective:     /60   Pulse 88   Ht 5' 6\" (1.676 m)   Wt 145 lb (65.8 kg)   BMI 23.40 kg/m²     Wt Readings from Last 3 Encounters:   09/10/21 145 lb (65.8 kg)   08/30/21 152 lb (68.9 kg)   07/29/21 152 lb (68.9 kg)     BP Readings from Last 3 Encounters:   09/10/21 118/60   08/30/21 102/68   07/29/21 100/65 PHYSICAL EXAM  Constitutional: Oriented to person, place, and time. Appears well-developed and well-nourished. Short of breath during exam.  HENT:   Head: Normocephalic and atraumatic. Eyes: EOM are normal. Pupils are equal, round, and reactive to light. Neck: Normal range of motion. Neck supple. No JVD present. Cardiovascular: Normal rate , normal heart sounds and intact distal pulses. Pulmonary/Chest: Short of breath during exam with increased effort of breathing, decreased breath sounds bilaterally with mild wheezing on auscultation. No respiratory distress. Abdominal: Soft. Bowel sounds are normal. No distension. There is no tenderness. Musculoskeletal: Normal range of motion. No edema. Neurological: Alert and oriented to person, place, and time. No cranial nerve deficit. Coordination normal.   Skin: Skin is warm and dry. Psychiatric: Normal mood and affect.        Lab Results   Component Value Date    CKTOTAL 117 11/04/2011    CKMB 1.5 11/04/2011       Lab Results   Component Value Date    WBC 7.5 08/07/2019    WBC 7.5 08/07/2019    RBC 3.40 08/07/2019    RBC 3.37 11/04/2011    HGB 14.1 08/07/2019    HCT 39.4 08/07/2019    .9 08/07/2019    MCH 41.5 08/07/2019    MCHC 35.8 08/07/2019    RDW 11.9 08/07/2019     08/07/2019    MPV 9.1 08/07/2019       Lab Results   Component Value Date     08/07/2019    K 4.8 08/07/2019    CL 96 08/07/2019    CO2 21 08/07/2019    BUN 9 08/07/2019    LABALBU 4.2 08/07/2019    LABALBU 4.4 11/04/2011    CREATININE 0.87 08/07/2019    CALCIUM 8.9 08/07/2019    GFRAA >60 08/07/2019    LABGLOM >60 08/07/2019    LABGLOM 78 02/12/2018    GLUCOSE 166 08/07/2019       Lab Results   Component Value Date    ALKPHOS 71 08/07/2019    ALT 16 08/07/2019    AST 16 08/07/2019    PROT 7.1 08/07/2019    BILITOT 0.21 08/07/2019    BILIDIR <0.2 04/23/2018    LABALBU 4.2 08/07/2019    LABALBU 4.4 11/04/2011       No results found for: MG    Lab Results   Component Value Date    INR 0.85 09/09/2016         Lab Results   Component Value Date    LABA1C 5.8 10/05/2018       Lab Results   Component Value Date    TRIG 136 08/07/2019    HDL 37 08/07/2019    LDLCALC 94 04/23/2018       Lab Results   Component Value Date    TSH 1.975 04/18/2013           Results for orders placed during the hospital encounter of 11/05/15   ECHO Complete 2D W Doppler W Color       STRESS:    CATH:6/2013:  CORONARY ANGIOGRAPHY     RIGHT CORONARY ARTERY:  A large caliber vessel bifurcating to the PDA and PLV  which are also large caliber vessels.  In the proximal portion, the lesion is  about 20% lesion noted.     LEFT MAIN:  Has just mild tapering in the ostium, however no critical lesion  is noted. The vessel is long and bifurcates to the circumflex and LAD.     CIRCUMFLEX:  Moderate to large caliber vessel which gives rise to a small to  medium sized OM-1 and OM-2 without any critical lesion.     LEFT ANTERIOR DESCENDING:  The LAD is a large caliber vessel that gives rise  to a large diagonal.  It does not demonstrate any critical lesion.  It just  has some mild luminal irregularity, but no critical lesion noted.     CONCLUSION:       1.   Left ventriculography demonstrated normal systolic function,             ejection fraction 55% with no mitral regurgitation on pullback.       2.   Hemodynamics, refer to the patient's chart.       3.   Right coronary artery is a dominant, large caliber vessel.  It has             a 20% lesion in the proximal portion, but no critical lesion.       4.   The left main is a long caliber vessel with just mild tapering in             the ostium, however no critical lesion is noted.       5.   The circumflex is a moderate to large caliber vessel, widely             patent.      6.    The left anterior descending is a large caliber vessel, widely       patent.       7.   The diagonal is also a large caliber vessel, widely patent.       8.   No complication occurred. Adequate hemostasis was obtained.     RECOMMENDATION:  Aggressive risk factor modification, exercise thirty minutes  a day and no driving or heavy lifting for the next two days.     ECHO:   11/05/2015    SUMMARY:     Left ventricle:  Size was normal.  Systolic function was normal. Ejection fraction was estimated in the range of 55 % to 65 %. There were no regional wall motion abnormalities. Doppler parameters were consistent with abnormal left ventricular relaxation (grade 1 diastolic dysfunction).    Aortic valve: There was mild regurgitation.     Tricuspid valve: There was mild regurgitation.     Summary: compared to prior study no true change is noted      Assessment/Plan       Diagnosis Orders   1. SOB (shortness of breath)  EKG 12 Lead       ASCVD  Current smoker  HLD  HIV     Patient reports chronic shortness of breath    Reviewed Today EKG/cath/echo from before  EKG 8/31/20: Sinus rhythm with incomplete RBBB compared with EKG 05/2018 which showed abnormal RSR(V1)    Continue Lisinopril/Statin/metoprolol  Patient no longer taking aspirin. Patient states was discontinued by PCP. Pt denies side effects with aspirin. Would suggest to restart aspirin  Advised to stop smoking because smoking increases risk of heart disease, morbidity, mortality and end organ damage. Patient expressed no interest in quitting. The patient is asked to make an attempt to improve diet and exercise patterns to aid in medical management of this problem. Advised more plant based nutrition/meditarrean diet   Advised patient to call office or seek immediate medical attention if there is any new onset of  any chest pain, sob, palpitations, lightheadedness, dizziness, orthopnea, PND or pedal edema. All medication side effects were discussed in details. Thank you for allowing me to participate in the care of this patient. Please do not hesitate to contact me for any further questions.      Return if symptoms worsen or fail to improve, for Regular follow up, Review testing.        Electronically signed by Bob Ernst MD [facility]  9/10/2021 at 12:58 PM

## 2021-11-15 ENCOUNTER — OFFICE VISIT (OUTPATIENT)
Dept: PHYSICAL MEDICINE AND REHAB | Age: 56
End: 2021-11-15
Payer: MEDICARE

## 2021-11-15 VITALS
HEIGHT: 66 IN | WEIGHT: 146 LBS | HEART RATE: 72 BPM | BODY MASS INDEX: 23.46 KG/M2 | DIASTOLIC BLOOD PRESSURE: 76 MMHG | SYSTOLIC BLOOD PRESSURE: 118 MMHG

## 2021-11-15 DIAGNOSIS — M47.816 LUMBAR SPONDYLOSIS: Primary | ICD-10-CM

## 2021-11-15 DIAGNOSIS — M46.1 SACROILIAC INFLAMMATION (HCC): ICD-10-CM

## 2021-11-15 DIAGNOSIS — M46.1 SI (SACROILIAC) JOINT INFLAMMATION (HCC): ICD-10-CM

## 2021-11-15 DIAGNOSIS — F11.90 CHRONIC, CONTINUOUS USE OF OPIOIDS: ICD-10-CM

## 2021-11-15 DIAGNOSIS — G89.4 CHRONIC PAIN SYNDROME: ICD-10-CM

## 2021-11-15 DIAGNOSIS — M47.816 SPONDYLOSIS OF LUMBAR REGION WITHOUT MYELOPATHY OR RADICULOPATHY: ICD-10-CM

## 2021-11-15 PROCEDURE — 99214 OFFICE O/P EST MOD 30 MIN: CPT | Performed by: NURSE PRACTITIONER

## 2021-11-15 RX ORDER — TRAMADOL HYDROCHLORIDE 50 MG/1
50 TABLET ORAL 2 TIMES DAILY PRN
Qty: 60 TABLET | Refills: 0 | Status: SHIPPED | OUTPATIENT
Start: 2021-11-15 | End: 2021-12-15

## 2021-11-15 ASSESSMENT — ENCOUNTER SYMPTOMS
DIARRHEA: 0
NAUSEA: 0
CONSTIPATION: 0
VOMITING: 0
COLOR CHANGE: 0
SHORTNESS OF BREATH: 1
EYES NEGATIVE: 1
RHINORRHEA: 0
ABDOMINAL PAIN: 0
CHEST TIGHTNESS: 0
SORE THROAT: 0
BACK PAIN: 1
PHOTOPHOBIA: 0
EYE PAIN: 0
ALLERGIC/IMMUNOLOGIC NEGATIVE: 1
SINUS PRESSURE: 0

## 2021-11-15 NOTE — PROGRESS NOTES
[address] [facility] [address] [facility]  Dept: [phone]  Dept Fax: [phone]: [phone]    Visit Date: 11/15/2021    Functionality Assessment/Goals Worksheet     On a scale of 0 (Does not Interfere) to 10 (Completely Interferes)     1. Which number describes how during the past week pain has interfered with       the following:  A. General Activity:  0  B. Mood: 0  C. Walking Ability:  3  D. Normal Work (Includes both work outside the home and housework):  3  E. Relations with Other People:   0  F. Sleep:   0  G. Enjoyment of Life:   0    2. Patient Prefers to Take their Pain Medications:     []  On a regular basis   [x]  Only when necessary    []  Does not take pain medications    3. What are the Patient's Goals/Expectations for Visiting Pain Management? []  Learn about my pain    [x]  Receive Medication   []  Physical Therapy     [x]  Treat Depression   [x]  Receive Injections    []  Treat Sleep   []  Deal with Anxiety and Stress   []  Treat Opoid Dependence/Addiction   []  Other:    HPI:   Kellie Carbajal is a 61 y.o. male is here today for    Chief Complaint: Low back pain, Right leg pain and Left leg pain    HPI   F/U  continues to have low back pain. He had  EGD Colonoscopy. He has continued relief from Lumbar RFA in 2020. He has had Lumbar RFA L3-S1 bilateral right 10/2020 Left 11/2020 with continued 50% pain relief. He continues to take Zanaflex Tramadol very rarely. Pain increases with bending, lifting, twisting , reaching, pushing, pulling, walking, standing, stairs and getting up and down. Treatments Tried ice, heat, NSAIDS, narcotics, muscle relaxer, OTC rubs creams patches, steroid burst and injections  Pain Description sharp, burning, aching, numbness and tingling      Medications reviewed. Patient denies side effects with medications.  Patient states he is taking medications as prescribed. Hedenies receiving pain medications from other sources. He denies any ER visits since last visit. Pain scale with out pain medications or at its worst is 4/10. Pain scale with pain medications or at its best is 0/10. Last dose of Tramadol months ago Drug screen reviewed from 8/2021and was appropriate  Pill count was completed today and was appropriate Pt is out of meds:   Patient does not have naloxone available at home. Patient has not required use of naloxone at home since last office visit. The patienthas No Known Allergies. Subjective:      Review of Systems   Constitutional: Positive for activity change. Negative for appetite change, chills, diaphoresis, fatigue, fever and unexpected weight change. HENT: Positive for hearing loss. Negative for congestion, ear pain, mouth sores, nosebleeds, rhinorrhea, sinus pressure and sore throat. Eyes: Negative. Negative for photophobia, pain and visual disturbance. Respiratory: Positive for shortness of breath. Negative for chest tightness. DAY COPD smokes 1 pack a day pt has noticed increased SOB lately now on  Inhaler per pulmonary    Cardiovascular:        CAD HTN  HLD   Gastrointestinal: Negative for abdominal pain, constipation, diarrhea, nausea and vomiting. GERD  Recent EGD Colonoscopy    Endocrine: Negative. Negative for cold intolerance, heat intolerance, polydipsia, polyphagia and polyuria. DM    Genitourinary: Negative. Negative for decreased urine volume, difficulty urinating, frequency and hematuria. Musculoskeletal: Positive for arthralgias, back pain, gait problem and myalgias. Negative for joint swelling, neck pain and neck stiffness. Skin: Negative. Negative for color change and rash. Allergic/Immunologic: Negative. Negative for food allergies and immunocompromised state.    Neurological: Negative for dizziness, tremors, seizures, syncope, facial asymmetry, speech difficulty, weakness, light-headedness, numbness and headaches. Hematological: Negative. Does not bruise/bleed easily. HIV   Psychiatric/Behavioral: Negative. Negative for agitation, behavioral problems, confusion, decreased concentration, dysphoric mood, hallucinations, self-injury, sleep disturbance and suicidal ideas. The patient is not nervous/anxious and is not hyperactive. Objective:     Vitals:    11/15/21 1150   BP: 118/76   Site: Left Upper Arm   Position: Sitting   Cuff Size: Medium Adult   Pulse: 72   Weight: 146 lb (66.2 kg)   Height: 5' 6\" (1.676 m)       Physical Exam  Vitals and nursing note reviewed. Constitutional:       General: He is not in acute distress. Appearance: He is well-developed. He is not diaphoretic. HENT:      Head: Normocephalic and atraumatic. Right Ear: External ear normal.      Left Ear: External ear normal.      Nose: Nose normal.      Mouth/Throat:      Pharynx: No oropharyngeal exudate. Eyes:      General: No scleral icterus. Right eye: No discharge. Left eye: No discharge. Conjunctiva/sclera: Conjunctivae normal.      Pupils: Pupils are equal, round, and reactive to light. Neck:      Thyroid: No thyromegaly. Cardiovascular:      Rate and Rhythm: Normal rate and regular rhythm. Heart sounds: Normal heart sounds. No murmur heard. No friction rub. No gallop. Pulmonary:      Effort: Pulmonary effort is normal. No respiratory distress. Breath sounds: Examination of the right-upper field reveals wheezing. Examination of the left-upper field reveals wheezing. Decreased breath sounds and wheezing present. No rales. Comments:   Left sided I and E A and P   wheezes  DBS   DAY  Chest:      Chest wall: No tenderness. Abdominal:      General: Bowel sounds are normal. There is no distension. Palpations: Abdomen is soft. Tenderness: There is no abdominal tenderness. There is no guarding or rebound. Musculoskeletal:         General: Tenderness present. Right shoulder: Normal.      Left shoulder: Normal.      Cervical back: Full passive range of motion without pain, normal range of motion and neck supple. No edema, erythema or rigidity. No muscular tenderness. Normal range of motion. Thoracic back: Tenderness and bony tenderness present. Decreased range of motion. Lumbar back: Spasms, tenderness and bony tenderness present. Decreased range of motion. Back:       Right hip: Tenderness present. Left hip: Tenderness present. Right knee: Normal.      Left knee: Normal.      Right ankle: Normal.      Left ankle: Normal.   Skin:     General: Skin is warm. Coloration: Skin is not pale. Findings: No erythema or rash. Neurological:      Mental Status: He is alert and oriented to person, place, and time. He is not disoriented. Cranial Nerves: Cranial nerves are intact. No cranial nerve deficit. Sensory: Sensation is intact. No sensory deficit. Motor: Weakness present. No atrophy or abnormal muscle tone. Coordination: Coordination is intact. Coordination normal.      Gait: Gait abnormal.      Deep Tendon Reflexes: Reflexes are normal and symmetric. Reflex Scores:       Tricep reflexes are 2+ on the right side and 2+ on the left side. Bicep reflexes are 2+ on the right side and 2+ on the left side. Brachioradialis reflexes are 2+ on the right side and 2+ on the left side. Patellar reflexes are 2+ on the right side and 2+ on the left side. Achilles reflexes are 2+ on the right side and 2+ on the left side. Comments: SLR-  Motor 4/5 generalized weakness  Feet bilat dusky when dependent   Psychiatric:         Attention and Perception: Attention and perception normal. He is attentive. Mood and Affect: Mood and affect normal. Mood is not anxious or depressed. Affect is not labile, blunt, angry or inappropriate. Speech: Speech normal.         Behavior: Behavior normal. Behavior is not agitated, slowed, aggressive, withdrawn, hyperactive or combative. Behavior is cooperative. Thought Content: Thought content normal. Thought content is not paranoid or delusional. Thought content does not include homicidal or suicidal ideation. Thought content does not include homicidal or suicidal plan. Cognition and Memory: Cognition and memory normal. Memory is not impaired. He does not exhibit impaired recent memory or impaired remote memory. Judgment: Judgment normal. Judgment is not impulsive or inappropriate. Comments: Flat affect, h/o depression       HARRISON  Patricks test:+        Assessment:     1. Lumbar spondylosis    2. SI (sacroiliac) joint inflammation (HCC)    3. Sacroiliac inflammation (HCC)    4. Spondylosis of lumbar region without myelopathy or radiculopathy    5. Chronic pain syndrome    6. Chronic, continuous use of opioids            Plan:      OARRS reviewed. Current MED: 10  Patient was not offered naloxone for home. Discussed long term side effects of medications, tolerance, dependency and addiction. Previous UDS reviewed  UDS preformed today for compliance. Patient told can not receive any pain medications from any other source. No evidence of abuse, diversion or aberrant behavior. Medications and/or procedures to improve function and quality of life- patient understanding with this and that may not be pain free  Discussed with patient about safe storage of medications at home  Discussed possible weaning of medication dosing dependent on treatment/procedure results. Testing: none  Procedures: none  Discussed with patient about risks with procedure including infection, reaction to medication, increased pain, or bleeding. Medications:Tramadol zanaflex  If patient is on blood thinners will need approval to hold:Asp      Meds.  Prescribed:   Orders Placed This Encounter   Medications  traMADol (ULTRAM) 50 MG tablet     Sig: Take 1 tablet by mouth 2 times daily as needed for Pain for up to 30 days. Dispense:  60 tablet     Refill:  0     Reduce doses taken as pain becomes manageable       Return in about 3 months (around 2/15/2022) for Follow up pain medications.                Electronically signed by ALIZE Michael CNP on11/15/2021 at 12:11 PM

## 2022-02-14 ENCOUNTER — OFFICE VISIT (OUTPATIENT)
Dept: PHYSICAL MEDICINE AND REHAB | Age: 57
End: 2022-02-14
Payer: MEDICARE

## 2022-02-14 VITALS
HEART RATE: 60 BPM | WEIGHT: 156 LBS | DIASTOLIC BLOOD PRESSURE: 78 MMHG | HEIGHT: 66 IN | BODY MASS INDEX: 25.07 KG/M2 | SYSTOLIC BLOOD PRESSURE: 126 MMHG

## 2022-02-14 DIAGNOSIS — G89.4 CHRONIC PAIN SYNDROME: ICD-10-CM

## 2022-02-14 DIAGNOSIS — M46.1 SI (SACROILIAC) JOINT INFLAMMATION (HCC): ICD-10-CM

## 2022-02-14 DIAGNOSIS — B20 HIV INFECTION, UNSPECIFIED SYMPTOM STATUS (HCC): ICD-10-CM

## 2022-02-14 DIAGNOSIS — M47.816 LUMBAR SPONDYLOSIS: Primary | ICD-10-CM

## 2022-02-14 PROCEDURE — 99213 OFFICE O/P EST LOW 20 MIN: CPT | Performed by: NURSE PRACTITIONER

## 2022-02-14 ASSESSMENT — ENCOUNTER SYMPTOMS
SORE THROAT: 0
DIARRHEA: 0
RHINORRHEA: 0
CHEST TIGHTNESS: 0
ABDOMINAL PAIN: 0
BACK PAIN: 1
NAUSEA: 0
COLOR CHANGE: 0
SINUS PRESSURE: 0
PHOTOPHOBIA: 0
CONSTIPATION: 0
EYE PAIN: 0
EYES NEGATIVE: 1
ALLERGIC/IMMUNOLOGIC NEGATIVE: 1
SHORTNESS OF BREATH: 1
VOMITING: 0

## 2022-02-14 NOTE — PROGRESS NOTES
[address] [facility] [address]  Dept: [phone]  Dept Fax: [phone]: [phone]    Visit Date: 2/14/2022    Functionality Assessment/Goals Worksheet     On a scale of 0 (Does not Interfere) to 10 (Completely Interferes)     1. Which number describes how during the past week pain has interfered with       the following:  A. General Activity:  0  B. Mood: 0  C. Walking Ability:  6  D. Normal Work (Includes both work outside the home and housework):  0  E. Relations with Other People:   0  F. Sleep:   0  G. Enjoyment of Life:   0    2. Patient Prefers to Take their Pain Medications:     []  On a regular basis   [x]  Only when necessary    []  Does not take pain medications    3. What are the Patient's Goals/Expectations for Visiting Pain Management? []  Learn about my pain    [x]  Receive Medication   [x]  Physical Therapy     [x]  Treat Depression   [x]  Receive Injections    [x]  Treat Sleep   []  Deal with Anxiety and Stress   []  Treat Opoid Dependence/Addiction   []  Other:      HPI:   Riana Farr is a 61 y.o. male is here today for    Chief Complaint: Low back pain and SI pain    HPI   F/U continues to have relief from procedures. Pain is tolerable. He has continued relief from Lumbar RFA in 2020. He has had Lumbar RFA L3-S1 bilateral right 10/2020 Left 11/2020 with continued 50% pain relief. He continues to take Zanaflex Tramadol very rarely. Pain increases with bending, lifting, twisting , reaching, pushing, pulling, walking, standing, stairs and getting up and down. Treatments Tried ice, heat, NSAIDS, narcotics, muscle relaxer, OTC rubs creams patches, steroid burst and injections  Pain Description sharp, burning, aching, numbness and tingling      Medications reviewed. Patient denies side effects with medications.  Patient states he is taking medications as prescribed. Hedenies receiving pain medications from other sources. He denies any ER visits since last visit. Pain scale with out pain medications or at its worst is 6/10. Pain scale with pain medications or at its best is 0/10. has not took Tramadol in 3 months       The patienthas No Known Allergies. Subjective:      Review of Systems   Constitutional: Positive for activity change. Negative for appetite change, chills, diaphoresis, fatigue, fever and unexpected weight change. HENT: Positive for hearing loss. Negative for congestion, ear pain, mouth sores, nosebleeds, rhinorrhea, sinus pressure and sore throat. Eyes: Negative. Negative for photophobia, pain and visual disturbance. Respiratory: Positive for shortness of breath. Negative for chest tightness. DAY COPD smokes 1 pack a day pt has noticed increased SOB lately now on  Inhaler per pulmonary    Cardiovascular:        CAD HTN  HLD   Gastrointestinal: Negative for abdominal pain, constipation, diarrhea, nausea and vomiting. GERD  Recent EGD Colonoscopy    Endocrine: Negative. Negative for cold intolerance, heat intolerance, polydipsia, polyphagia and polyuria. DM    Genitourinary: Negative. Negative for decreased urine volume, difficulty urinating, frequency and hematuria. Musculoskeletal: Positive for arthralgias, back pain, gait problem and myalgias. Negative for joint swelling, neck pain and neck stiffness. Skin: Negative. Negative for color change and rash. Allergic/Immunologic: Negative. Negative for food allergies and immunocompromised state. Neurological: Negative for dizziness, tremors, seizures, syncope, facial asymmetry, speech difficulty, weakness, light-headedness, numbness and headaches. Hematological: Negative. Does not bruise/bleed easily. HIV   Psychiatric/Behavioral: Negative.   Negative for agitation, behavioral problems, confusion, decreased concentration, dysphoric mood, hallucinations, self-injury, sleep disturbance and suicidal ideas. The patient is not nervous/anxious and is not hyperactive. Objective:     Vitals:    02/14/22 1128   BP: 126/78   Site: Left Upper Arm   Position: Sitting   Cuff Size: Large Adult   Pulse: 60   Weight: 156 lb (70.8 kg)   Height: 5' 6\" (1.676 m)       Physical Exam  Vitals and nursing note reviewed. Constitutional:       General: He is not in acute distress. Appearance: He is well-developed. He is not diaphoretic. HENT:      Head: Normocephalic and atraumatic. Right Ear: External ear normal.      Left Ear: External ear normal.      Nose: Nose normal.      Mouth/Throat:      Pharynx: No oropharyngeal exudate. Eyes:      General: No scleral icterus. Right eye: No discharge. Left eye: No discharge. Conjunctiva/sclera: Conjunctivae normal.      Pupils: Pupils are equal, round, and reactive to light. Neck:      Thyroid: No thyromegaly. Cardiovascular:      Rate and Rhythm: Normal rate and regular rhythm. Heart sounds: Normal heart sounds. No murmur heard. No friction rub. No gallop. Pulmonary:      Effort: Pulmonary effort is normal. No respiratory distress. Breath sounds: Examination of the right-upper field reveals wheezing. Examination of the left-upper field reveals wheezing. Decreased breath sounds and wheezing present. No rales. Comments:   Left sided I and E A and P   wheezes  DBS   DAY  Chest:      Chest wall: No tenderness. Abdominal:      General: Bowel sounds are normal. There is no distension. Palpations: Abdomen is soft. Tenderness: There is no abdominal tenderness. There is no guarding or rebound. Musculoskeletal:         General: Tenderness present. Right shoulder: Normal.      Left shoulder: Normal.      Cervical back: Full passive range of motion without pain, normal range of motion and neck supple. No edema, erythema or rigidity. No muscular tenderness. Normal range of motion. Thoracic back: Tenderness and bony tenderness present. Decreased range of motion. Lumbar back: Spasms, tenderness and bony tenderness present. Decreased range of motion. Back:       Right hip: Tenderness present. Left hip: Tenderness present. Right knee: Normal.      Left knee: Normal.      Right ankle: Normal.      Left ankle: Normal.   Skin:     General: Skin is warm. Coloration: Skin is not pale. Findings: No erythema or rash. Neurological:      Mental Status: He is alert and oriented to person, place, and time. He is not disoriented. Cranial Nerves: Cranial nerves are intact. No cranial nerve deficit. Sensory: Sensation is intact. No sensory deficit. Motor: Weakness present. No atrophy or abnormal muscle tone. Coordination: Coordination is intact. Coordination normal.      Gait: Gait abnormal.      Deep Tendon Reflexes: Reflexes are normal and symmetric. Reflex Scores:       Tricep reflexes are 2+ on the right side and 2+ on the left side. Bicep reflexes are 2+ on the right side and 2+ on the left side. Brachioradialis reflexes are 2+ on the right side and 2+ on the left side. Patellar reflexes are 2+ on the right side and 2+ on the left side. Achilles reflexes are 2+ on the right side and 2+ on the left side. Comments: SLR-  Motor 4/5 generalized weakness  Feet bilat dusky when dependent   Psychiatric:         Attention and Perception: Attention and perception normal. He is attentive. Mood and Affect: Mood and affect normal. Mood is not anxious or depressed. Affect is not labile, blunt, angry or inappropriate. Speech: Speech normal.         Behavior: Behavior normal. Behavior is not agitated, slowed, aggressive, withdrawn, hyperactive or combative. Behavior is cooperative. Thought Content:  Thought content normal. Thought content is not paranoid or delusional. Thought content does not include homicidal or suicidal ideation. Thought content does not include homicidal or suicidal plan. Cognition and Memory: Cognition and memory normal. Memory is not impaired. He does not exhibit impaired recent memory or impaired remote memory. Judgment: Judgment normal. Judgment is not impulsive or inappropriate. Comments: Flat affect, h/o depression              Assessment:     1. Lumbar spondylosis    2. SI (sacroiliac) joint inflammation (HCC)    3. Chronic pain syndrome    4. HIV infection, unspecified symptom status ([facility] [address]            Plan:      · OARRS reviewed. Current MED: 20 same script since 2021  · Patient was not offered naloxone for home. · Discussed long term side effects of medications, tolerance, dependency and addiction. · Previous UDS reviewed  · UDS preformed today for compliance. · Patient told can not receive any pain medications from any other source. · No evidence of abuse, diversion or aberrant behavior.  Medications and/or procedures to improve function and quality of life- patient understanding with this and that may not be pain free   Discussed with patient about safe storage of medications at home   Discussed possible weaning of medication dosing dependent on treatment/procedure results.  Testing: none   Procedures: none   Discussed with patient about risks with procedure including infection, reaction to medication, increased pain, or bleeding.  Medications: Tramadol rarely Zanaflex      Meds. Prescribed:   No orders of the defined types were placed in this encounter. Return in about 3 months (around 5/14/2022) for Follow up pain medications.                Electronically signed by ALIZE Randolph CNP on2/14/2022 at 11:44 AM

## 2022-05-09 ENCOUNTER — OFFICE VISIT (OUTPATIENT)
Dept: PHYSICAL MEDICINE AND REHAB | Age: 57
End: 2022-05-09
Payer: MEDICARE

## 2022-05-09 VITALS
DIASTOLIC BLOOD PRESSURE: 64 MMHG | HEART RATE: 62 BPM | HEIGHT: 66 IN | SYSTOLIC BLOOD PRESSURE: 114 MMHG | BODY MASS INDEX: 25.39 KG/M2 | WEIGHT: 158 LBS

## 2022-05-09 DIAGNOSIS — M47.816 LUMBAR SPONDYLOSIS: Primary | ICD-10-CM

## 2022-05-09 DIAGNOSIS — M47.816 SPONDYLOSIS OF LUMBAR REGION WITHOUT MYELOPATHY OR RADICULOPATHY: ICD-10-CM

## 2022-05-09 DIAGNOSIS — G89.4 CHRONIC PAIN SYNDROME: ICD-10-CM

## 2022-05-09 DIAGNOSIS — M46.1 SI (SACROILIAC) JOINT INFLAMMATION (HCC): ICD-10-CM

## 2022-05-09 DIAGNOSIS — M46.1 SACROILIAC INFLAMMATION (HCC): ICD-10-CM

## 2022-05-09 DIAGNOSIS — B20 HIV INFECTION, UNSPECIFIED SYMPTOM STATUS (HCC): ICD-10-CM

## 2022-05-09 PROCEDURE — 99214 OFFICE O/P EST MOD 30 MIN: CPT | Performed by: NURSE PRACTITIONER

## 2022-05-09 ASSESSMENT — ENCOUNTER SYMPTOMS
NAUSEA: 0
COLOR CHANGE: 0
BACK PAIN: 1
DIARRHEA: 0
PHOTOPHOBIA: 0
VOMITING: 0
ALLERGIC/IMMUNOLOGIC NEGATIVE: 1
SHORTNESS OF BREATH: 1
EYE PAIN: 0
SINUS PRESSURE: 0
CONSTIPATION: 0
RHINORRHEA: 0
SORE THROAT: 0
EYES NEGATIVE: 1
CHEST TIGHTNESS: 0
ABDOMINAL PAIN: 0

## 2022-05-09 NOTE — PROGRESS NOTES
[facility] [address]  Dept: [phone]  Dept Fax: [phone]: [phone]    Visit Date: 5/9/2022    Functionality Assessment/Goals Worksheet     On a scale of 0 (Does not Interfere) to 10 (Completely Interferes)     1. Which number describes how during the past week pain has interfered with       the following:  A. General Activity:  0  B. Mood: 0  C. Walking Ability:  7  D. Normal Work (Includes both work outside the home and housework):  2  E. Relations with Other People:   0  F. Sleep:   0  G. Enjoyment of Life:   0    2. Patient Prefers to Take their Pain Medications:     []  On a regular basis   [x]  Only when necessary    []  Does not take pain medications    3. What are the Patient's Goals/Expectations for Visiting Pain Management? []  Learn about my pain    [x]  Receive Medication   []  Physical Therapy     []  Treat Depression   [x]  Receive Injections    []  Treat Sleep   []  Deal with Anxiety and Stress   []  Treat Opoid Dependence/Addiction   []  Other:    HPI:   Genesis He is a 61 y.o. male is here today for    Chief Complaint: Low back pain and SI pain    HPI   F/U     He continues to have relief from procedures. Pain is tolerable. He has continued relief from Lumbar RFA in 2020. He has had Lumbar RFA L3-S1 bilateral right 10/2020 Left 11/2020 with continued 50% pain relief. He continues to take Zanaflex Tramadol very rarely.     Pain increases with bending, lifting, twisting , reaching, pushing, pulling, walking, standing, stairs and getting up and down. Treatments Tried ice, heat, NSAIDS, narcotics, muscle relaxer, OTC rubs creams patches, steroid burst and injections  Pain Description sharp, burning, aching, numbness and tingling         He denies any ER visits or new health issues since last visit.     Pain scale with out pain medications or at its worst is 3/10. Pain scale with pain medications or at its best is 0/10. Last dose of Tramadol was greater than 1 week ago  Drug screen reviewed from 2/2022and was appropriate  Pill count completed  today and WNL: No     FINDINGS: The osseous framework of the lumbar spine demonstrates satisfactory alignment. Marrow signals show no evidence of aggressive lytic or blastic process. No evidence of fracture. No evidence of spondylolysis.       There are mildly desiccated intervertebral discs throughout the lumbar spine. Minimal endplate osteophytes are also present, predominantly in the proximal lumbar spine.       The conus medullaris is located at the superior L1 level. It and the spinal cord parenchyma imaged have normal signal characteristics. Components of the cauda equina as well show no finding to indicate intrinsic abnormality.       Very mild degenerative disc changes are present in the T11-12 and T12-L1 disc space levels, producing very minimal left lateral canal stenosis. No significant foraminal stenosis.       There is bulging disc material, mild, at L1-2. Very minimal to mild facet degenerative changes are present. There is resultant minimal central canal stenosis. There is no significant foraminal stenosis on either side.       The L2-3 canal is grossly patent. There are mild facet degenerative changes bilaterally. There is no focal disc abnormality. The foramina are uncompromised on both sides.       The L3-4 canal is also widely patent. Mild facet degeneration evident at this level as well. No focal disc abnormality demonstrated. No compromise of the foramen demonstrated.       The L4-5 canal is widely patent, as are the foramina. Mild facet degenerative changes are present bilaterally. This has only minimal desiccation but otherwise is within normal limits.       Widely patent canal and foramina also demonstrated at L5-S1. There are mild facet degeneration evident bilaterally.  The disc is essentially within normal limits.       Surrounding tissues reveal no concerning characteristics of acute or aggressive process though slightly prominent atherosclerotic changes are implied in the abdominal aorta, poorly defined, including irregular asymmetric plaque at the level of the    proximal infrarenal abdominal aortic.           Impression   NO EVIDENCE OF ACUTE ABNORMALITY OR CHARACTERISTICS OF SIGNIFICANT CANAL OR FORAMINAL STENOSIS. FACET DEGENERATIVE CHANGES ARE PRESENT DIFFUSELY, MILD, WITH LESS IMPRESSIVE DISC DEGENERATION, PREDOMINANTLY DEMONSTRATED AT THE L1-2 LEVEL,    WHERE THERE IS ONLY MILD BULGING DISC MATERIAL DEMONSTRATED.       POORLY CHARACTERIZED PROMINENT ATHEROSCLEROTIC DISEASE OF THE ABDOMINAL AORTA IMPLIED. CONSIDER FURTHER EVALUATION OF THE ABDOMINAL AORTA SUCH AS WITH ULTRASOUND OR CTA, TO FURTHER EVALUATE.                 The patienthas No Known Allergies. Subjective:      Review of Systems   Constitutional: Positive for activity change. Negative for appetite change, chills, diaphoresis, fatigue, fever and unexpected weight change. HENT: Positive for hearing loss. Negative for congestion, ear pain, mouth sores, nosebleeds, rhinorrhea, sinus pressure and sore throat. Eyes: Negative. Negative for photophobia, pain and visual disturbance. Respiratory: Positive for shortness of breath. Negative for chest tightness. DAY COPD smokes 1 pack a day pt has noticed increased SOB lately now on  Inhaler per pulmonary    Cardiovascular:        CAD HTN  HLD   Gastrointestinal: Negative for abdominal pain, constipation, diarrhea, nausea and vomiting. GERD  Recent EGD Colonoscopy    Endocrine: Negative. Negative for cold intolerance, heat intolerance, polydipsia, polyphagia and polyuria. DM    Genitourinary: Negative. Negative for decreased urine volume, difficulty urinating, frequency and hematuria.    Musculoskeletal: Positive for arthralgias, back pain, gait problem and myalgias. Negative for joint swelling, neck pain and neck stiffness. Skin: Negative. Negative for color change and rash. Allergic/Immunologic: Negative. Negative for food allergies and immunocompromised state. Neurological: Negative for dizziness, tremors, seizures, syncope, facial asymmetry, speech difficulty, weakness, light-headedness, numbness and headaches. Hematological: Negative. Does not bruise/bleed easily. HIV   Psychiatric/Behavioral: Negative. Negative for agitation, behavioral problems, confusion, decreased concentration, dysphoric mood, hallucinations, self-injury, sleep disturbance and suicidal ideas. The patient is not nervous/anxious and is not hyperactive. Objective:     Vitals:    05/09/22 1126   BP: 114/64   Site: Left Upper Arm   Position: Sitting   Cuff Size: Medium Adult   Pulse: 62   Weight: 158 lb (71.7 kg)   Height: 5' 6\" (1.676 m)       Physical Exam  Vitals and nursing note reviewed. Constitutional:       General: He is not in acute distress. Appearance: He is well-developed. He is not diaphoretic. HENT:      Head: Normocephalic and atraumatic. Right Ear: External ear normal.      Left Ear: External ear normal.      Nose: Nose normal.      Mouth/Throat:      Pharynx: No oropharyngeal exudate. Eyes:      General: No scleral icterus. Right eye: No discharge. Left eye: No discharge. Conjunctiva/sclera: Conjunctivae normal.      Pupils: Pupils are equal, round, and reactive to light. Neck:      Thyroid: No thyromegaly. Cardiovascular:      Rate and Rhythm: Normal rate and regular rhythm. Heart sounds: Normal heart sounds. No murmur heard. No friction rub. No gallop. Pulmonary:      Effort: Pulmonary effort is normal. No respiratory distress. Breath sounds: Examination of the right-upper field reveals wheezing. Examination of the left-upper field reveals wheezing.  Decreased breath sounds and wheezing present. No rales. Comments:   Left sided I and E A and P   wheezes  DBS   DAY  Chest:      Chest wall: No tenderness. Abdominal:      General: Bowel sounds are normal. There is no distension. Palpations: Abdomen is soft. Tenderness: There is no abdominal tenderness. There is no guarding or rebound. Musculoskeletal:         General: Tenderness present. Right shoulder: Normal.      Left shoulder: Normal.      Cervical back: Full passive range of motion without pain, normal range of motion and neck supple. No edema, erythema or rigidity. No muscular tenderness. Normal range of motion. Thoracic back: Tenderness and bony tenderness present. Decreased range of motion. Lumbar back: Spasms, tenderness and bony tenderness present. Decreased range of motion. Back:       Right hip: Tenderness present. Left hip: Tenderness present. Right knee: Normal.      Left knee: Normal.      Right ankle: Normal.      Left ankle: Normal.   Skin:     General: Skin is warm. Coloration: Skin is not pale. Findings: No erythema or rash. Neurological:      Mental Status: He is alert and oriented to person, place, and time. He is not disoriented. Cranial Nerves: Cranial nerves are intact. No cranial nerve deficit. Sensory: Sensation is intact. No sensory deficit. Motor: Weakness present. No atrophy or abnormal muscle tone. Coordination: Coordination is intact. Coordination normal.      Gait: Gait abnormal.      Deep Tendon Reflexes: Reflexes are normal and symmetric. Reflex Scores:       Tricep reflexes are 2+ on the right side and 2+ on the left side. Bicep reflexes are 2+ on the right side and 2+ on the left side. Brachioradialis reflexes are 2+ on the right side and 2+ on the left side. Patellar reflexes are 2+ on the right side and 2+ on the left side. Achilles reflexes are 2+ on the right side and 2+ on the left side. Comments: SLR-  Motor 4/5 generalized weakness  Feet bilat dusky when dependent   Psychiatric:         Attention and Perception: Attention and perception normal. He is attentive. Mood and Affect: Mood and affect normal. Mood is not anxious or depressed. Affect is not labile, blunt, angry or inappropriate. Speech: Speech normal.         Behavior: Behavior normal. Behavior is not agitated, slowed, aggressive, withdrawn, hyperactive or combative. Behavior is cooperative. Thought Content: Thought content normal. Thought content is not paranoid or delusional. Thought content does not include homicidal or suicidal ideation. Thought content does not include homicidal or suicidal plan. Cognition and Memory: Cognition and memory normal. Memory is not impaired. He does not exhibit impaired recent memory or impaired remote memory. Judgment: Judgment normal. Judgment is not impulsive or inappropriate. Comments: Flat affect, h/o depression       HARRISON  Patricks test  positive  Yeoman's  or Gaenslen's positive  Kemps  positive  Spurlings  na  Montgomery's na         Assessment:     1. Lumbar spondylosis    2. SI (sacroiliac) joint inflammation (HCC)    3. Chronic pain syndrome    4. HIV infection, unspecified symptom status ([facility] [address]    5. Sacroiliac inflammation (HCC)    6. Spondylosis of lumbar region without myelopathy or radiculopathy            Plan:      · OARRS reviewed. Current MED10  · Patient was not offered naloxone for home.  Procedures:may repeat if need   Lumbar MRI reviewed   Discussed with patient about risks with procedure including infection, reaction to medication, increased pain, or bleeding.  Medications:Tramadol     Meds. Prescribed:   No orders of the defined types were placed in this encounter. Return in about 3 months (around 8/9/2022) for Follow up pain medications.                Electronically signed by ALIZE Hastings CNP on5/9/2022 at 11:41 AM

## 2022-06-27 RX ORDER — ISOSORBIDE DINITRATE 20 MG/1
TABLET ORAL
Qty: 60 TABLET | Refills: 11 | OUTPATIENT
Start: 2022-06-27

## 2022-06-29 RX ORDER — ISOSORBIDE DINITRATE 20 MG/1
TABLET ORAL
Qty: 60 TABLET | Refills: 11 | OUTPATIENT
Start: 2022-06-29

## 2022-06-29 NOTE — TELEPHONE ENCOUNTER
[facility] [place] called requesting a refill on the following medications:  Requested Prescriptions     Pending Prescriptions Disp Refills    isosorbide dinitrate (ISORDIL) 20 MG tablet 60 tablet 11    metoprolol tartrate (LOPRESSOR) 25 MG tablet 60 tablet 11     Pharmacy verified:[facility]  . pv      Date of last visit: 9-  Date of next visit (if applicable): 7/66/1559

## 2022-07-12 ENCOUNTER — OFFICE VISIT (OUTPATIENT)
Dept: CARDIOLOGY CLINIC | Age: 57
End: 2022-07-12
Payer: MEDICARE

## 2022-07-12 VITALS
HEART RATE: 121 BPM | BODY MASS INDEX: 25.36 KG/M2 | HEIGHT: 66 IN | DIASTOLIC BLOOD PRESSURE: 80 MMHG | SYSTOLIC BLOOD PRESSURE: 139 MMHG | WEIGHT: 157.8 LBS

## 2022-07-12 DIAGNOSIS — I25.10 CORONARY ARTERY DISEASE INVOLVING NATIVE CORONARY ARTERY OF NATIVE HEART WITHOUT ANGINA PECTORIS: Primary | ICD-10-CM

## 2022-07-12 DIAGNOSIS — R06.02 SHORTNESS OF BREATH: ICD-10-CM

## 2022-07-12 PROCEDURE — 99214 OFFICE O/P EST MOD 30 MIN: CPT | Performed by: STUDENT IN AN ORGANIZED HEALTH CARE EDUCATION/TRAINING PROGRAM

## 2022-07-12 RX ORDER — ISOSORBIDE DINITRATE 20 MG/1
TABLET ORAL
Qty: 60 TABLET | Refills: 11 | Status: SHIPPED | OUTPATIENT
Start: 2022-07-12

## 2022-07-12 RX ORDER — FOSAMPRENAVIR CALCIUM 700 MG/1
1400 TABLET, COATED ORAL 2 TIMES DAILY
COMMUNITY

## 2022-07-12 NOTE — PROGRESS NOTES
[facility] PROFESSIONAL SERVICES  [facility] [address]   [address] [address]   Dept: [phone]   Dept Fax: [phone]   Loc: [phone]      Chief Complaint   Patient presents with    Follow-up     Cardiologist:  Dr. Abimael Chowdhury  62 yo male presents for 10 month f/u. Hx of HIV, DM, HLD, depression, smoker. Cath 2013 non obs CAD. Breathing has not been good. Still smoking. Is interested in quitting eventually, not right now. Has never had a lung workup that he knows of. States his breathing is never very good but worse after chain smoking. Can usually walk some distance without noticeably worse symptoms but it does get worse at times. Fairly vague about symptoms. No chest pain, no dizziness, no swelling. Weight is stable. General:   No fever, no chills, no weight loss, + fatigue  Pulmonary:    + dyspnea, no wheezing  Cardiac:    Denies recent chest pain   GI:     No nausea or vomiting, no abdominal pain  Neuro:      No dizziness or light headedness  Musculoskeletal:  No recent active issues  Extremities:   No edema      Past Medical History:   Diagnosis Date    Arthritis     back    Arthritis 06/01/2016    LUMBAR SPINE     Chest pain     Depression     Diabetes mellitus ([facility] [address]     Dysphagia     GERD (gastroesophageal reflux disease)     History of blood transfusion last one 2000? ??    for HIV    History of nephrolithiasis     HIV (human immunodeficiency virus infection) ([facility] [address]     Hyperlipidemia     Pancreatitis     Type 2 diabetes mellitus without complication (HCC)     PT DENIES BEING DB  AT THIS TIME        No Known Allergies    Current Outpatient Medications   Medication Sig Dispense Refill    fosamprenavir (LEXIVA) 700 MG tablet Take 1,400 mg by mouth 2 times daily      isosorbide dinitrate (ISORDIL) 20 MG tablet 1 tablet twice daily 60 tablet 11    metoprolol tartrate (LOPRESSOR) 25 MG tablet Take 1 tablet by mouth twice daily 60 tablet 11    obvious chest pain. Has not been evaluted with a stress test since [facility] that I see. Should have an ischemic eval. Will schedule exercise ST to r/o cardiac concerns for SOB. Consider referral to pulm. He is not interested in quitting smoking right now. Stress test.     Disposition:   F/u with Dr Yoan Matta in 1 year.   Continue Dr Victor Finger current treatment plan  Follow up with Dr Yoan Matta as scheduled or sooner if needed

## 2022-07-27 ENCOUNTER — TELEPHONE (OUTPATIENT)
Dept: CARDIOLOGY CLINIC | Age: 57
End: 2022-07-27

## 2022-08-01 ENCOUNTER — OFFICE VISIT (OUTPATIENT)
Dept: PHYSICAL MEDICINE AND REHAB | Age: 57
End: 2022-08-01
Payer: MEDICARE

## 2022-08-01 VITALS
SYSTOLIC BLOOD PRESSURE: 112 MMHG | HEART RATE: 64 BPM | WEIGHT: 157 LBS | HEIGHT: 66 IN | BODY MASS INDEX: 25.23 KG/M2 | DIASTOLIC BLOOD PRESSURE: 64 MMHG

## 2022-08-01 DIAGNOSIS — M46.1 SI (SACROILIAC) JOINT INFLAMMATION (HCC): ICD-10-CM

## 2022-08-01 DIAGNOSIS — M47.816 LUMBAR SPONDYLOSIS: Primary | ICD-10-CM

## 2022-08-01 DIAGNOSIS — G89.4 CHRONIC PAIN SYNDROME: ICD-10-CM

## 2022-08-01 DIAGNOSIS — M46.1 SACROILIAC INFLAMMATION (HCC): ICD-10-CM

## 2022-08-01 DIAGNOSIS — B20 HIV INFECTION, UNSPECIFIED SYMPTOM STATUS (HCC): ICD-10-CM

## 2022-08-01 DIAGNOSIS — M47.816 SPONDYLOSIS OF LUMBAR REGION WITHOUT MYELOPATHY OR RADICULOPATHY: ICD-10-CM

## 2022-08-01 PROCEDURE — 99214 OFFICE O/P EST MOD 30 MIN: CPT | Performed by: NURSE PRACTITIONER

## 2022-08-01 RX ORDER — TRAMADOL HYDROCHLORIDE 50 MG/1
50 TABLET ORAL 2 TIMES DAILY PRN
Qty: 60 TABLET | Refills: 0 | Status: SHIPPED | OUTPATIENT
Start: 2022-08-01 | End: 2022-08-31

## 2022-08-01 ASSESSMENT — ENCOUNTER SYMPTOMS
PHOTOPHOBIA: 0
NAUSEA: 0
CONSTIPATION: 0
ALLERGIC/IMMUNOLOGIC NEGATIVE: 1
COLOR CHANGE: 0
EYES NEGATIVE: 1
DIARRHEA: 0
SHORTNESS OF BREATH: 1
ABDOMINAL PAIN: 0
SORE THROAT: 0
CHEST TIGHTNESS: 0
BACK PAIN: 1
SINUS PRESSURE: 0
RHINORRHEA: 0
EYE PAIN: 0
VOMITING: 0

## 2022-08-01 NOTE — PROGRESS NOTES
or at its best is denies/10. Last dose of Tramadol  was greater than 1 week ago  Drug screen reviewed from 5/2022 and was appropriate  Pill count completed  today and WNL: Yes         Prior Injections:  Lumbar RFA L3-S1 bilateral right 10/2020 Left 11/2020 with continued 50% pain relief        Radiology:  FINDINGS: The osseous framework of the lumbar spine demonstrates satisfactory alignment. Marrow signals show no evidence of aggressive lytic or blastic process. No evidence of fracture. No evidence of spondylolysis. There are mildly desiccated intervertebral discs throughout the lumbar spine. Minimal endplate osteophytes are also present, predominantly in the proximal lumbar spine. The conus medullaris is located at the superior L1 level. It and the spinal cord parenchyma imaged have normal signal characteristics. Components of the cauda equina as well show no finding to indicate intrinsic abnormality. Very mild degenerative disc changes are present in the T11-12 and T12-L1 disc space levels, producing very minimal left lateral canal stenosis. No significant foraminal stenosis. There is bulging disc material, mild, at L1-2. Very minimal to mild facet degenerative changes are present. There is resultant minimal central canal stenosis. There is no significant foraminal stenosis on either side. The L2-3 canal is grossly patent. There are mild facet degenerative changes bilaterally. There is no focal disc abnormality. The foramina are uncompromised on both sides. The L3-4 canal is also widely patent. Mild facet degeneration evident at this level as well. No focal disc abnormality demonstrated. No compromise of the foramen demonstrated. The L4-5 canal is widely patent, as are the foramina. Mild facet degenerative changes are present bilaterally. This has only minimal desiccation but otherwise is within normal limits.        Widely patent canal and foramina also demonstrated at L5-S1. There are mild facet degeneration evident bilaterally. The disc is essentially within normal limits. Surrounding tissues reveal no concerning characteristics of acute or aggressive process though slightly prominent atherosclerotic changes are implied in the abdominal aorta, poorly defined, including irregular asymmetric plaque at the level of the   proximal infrarenal abdominal aortic. Impression   NO EVIDENCE OF ACUTE ABNORMALITY OR CHARACTERISTICS OF SIGNIFICANT CANAL OR FORAMINAL STENOSIS. FACET DEGENERATIVE CHANGES ARE PRESENT DIFFUSELY, MILD, WITH LESS IMPRESSIVE DISC DEGENERATION, PREDOMINANTLY DEMONSTRATED AT THE L1-2 LEVEL,   WHERE THERE IS ONLY MILD BULGING DISC MATERIAL DEMONSTRATED. POORLY CHARACTERIZED PROMINENT ATHEROSCLEROTIC DISEASE OF THE ABDOMINAL AORTA IMPLIED. CONSIDER FURTHER EVALUATION OF THE ABDOMINAL AORTA SUCH AS WITH ULTRASOUND OR CTA, TO FURTHER EVALUATE. The patienthas No Known Allergies. Subjective:      Review of Systems   Constitutional:  Positive for activity change. Negative for appetite change, chills, diaphoresis, fatigue, fever and unexpected weight change. HENT:  Positive for hearing loss. Negative for congestion, ear pain, mouth sores, nosebleeds, rhinorrhea, sinus pressure and sore throat. Eyes: Negative. Negative for photophobia, pain and visual disturbance. Respiratory:  Positive for shortness of breath. Negative for chest tightness. DAY COPD smokes 1 pack a day pt has noticed increased SOB lately now on  Inhaler per pulmonary    Cardiovascular:         CAD HTN  HLD   Gastrointestinal:  Negative for abdominal pain, constipation, diarrhea, nausea and vomiting. GERD  Recent EGD Colonoscopy    Endocrine: Negative. Negative for cold intolerance, heat intolerance, polydipsia, polyphagia and polyuria. DM    Genitourinary: Negative.   Negative for decreased urine volume, difficulty urinating, frequency and hematuria. Musculoskeletal:  Positive for arthralgias, back pain, gait problem and myalgias. Negative for joint swelling, neck pain and neck stiffness. Skin: Negative. Negative for color change and rash. Allergic/Immunologic: Negative. Negative for food allergies and immunocompromised state. Neurological:  Negative for dizziness, tremors, seizures, syncope, facial asymmetry, speech difficulty, weakness, light-headedness, numbness and headaches. Hematological: Negative. Does not bruise/bleed easily. HIV   Psychiatric/Behavioral: Negative. Negative for agitation, behavioral problems, confusion, decreased concentration, dysphoric mood, hallucinations, self-injury, sleep disturbance and suicidal ideas. The patient is not nervous/anxious and is not hyperactive. Objective:     Vitals:    08/01/22 1104   BP: 112/64   Site: Left Upper Arm   Position: Sitting   Cuff Size: Medium Adult   Pulse: 64   Weight: 157 lb (71.2 kg)   Height: 5' 6\" (1.676 m)       Physical Exam  Vitals and nursing note reviewed. Constitutional:       General: He is not in acute distress. Appearance: He is well-developed. He is not diaphoretic. HENT:      Head: Normocephalic and atraumatic. Right Ear: External ear normal.      Left Ear: External ear normal.      Nose: Nose normal.      Mouth/Throat:      Pharynx: No oropharyngeal exudate. Eyes:      General: No scleral icterus. Right eye: No discharge. Left eye: No discharge. Conjunctiva/sclera: Conjunctivae normal.      Pupils: Pupils are equal, round, and reactive to light. Neck:      Thyroid: No thyromegaly. Cardiovascular:      Rate and Rhythm: Normal rate and regular rhythm. Heart sounds: Normal heart sounds. No murmur heard. No friction rub. No gallop. Pulmonary:      Effort: Pulmonary effort is normal. No respiratory distress. Breath sounds: Examination of the right-upper field reveals wheezing.  Examination of the Achilles reflexes are 2+ on the right side and 2+ on the left side. Comments: SLR-  Motor 4/5 generalized weakness  Feet bilat dusky when dependent   Psychiatric:         Attention and Perception: Attention and perception normal. He is attentive. Mood and Affect: Mood and affect normal. Mood is not anxious or depressed. Affect is not labile, blunt, angry or inappropriate. Speech: Speech normal.         Behavior: Behavior normal. Behavior is not agitated, slowed, aggressive, withdrawn, hyperactive or combative. Behavior is cooperative. Thought Content: Thought content normal. Thought content is not paranoid or delusional. Thought content does not include homicidal or suicidal ideation. Thought content does not include homicidal or suicidal plan. Cognition and Memory: Cognition and memory normal. Memory is not impaired. He does not exhibit impaired recent memory or impaired remote memory. Judgment: Judgment normal. Judgment is not impulsive or inappropriate. Comments: Flat affect, h/o depression     HARRISON  Patricks test  positive  Yeoman's  or Gaenslen's positive  Kemps  positive           Assessment:     1. Lumbar spondylosis    2. Chronic pain syndrome    3. HIV infection, unspecified symptom status ([address]    4. SI (sacroiliac) joint inflammation ([address]    5. Sacroiliac inflammation (HCC)    6. Spondylosis of lumbar region without myelopathy or radiculopathy            Plan:      OARRS reviewed. Current MED: 10  Patient was not offered naloxone for home. Testing Labs or Radiology reviewed: Lumbar  Procedures:may repeat Lumbar RFA if need  Medications:Tramadol Zanaflex      Meds. Prescribed:   Orders Placed This Encounter   Medications    traMADol (ULTRAM) 50 MG tablet     Sig: Take 1 tablet by mouth 2 times daily as needed for Pain for up to 30 days.      Dispense:  60 tablet     Refill:  0     Reduce doses taken as pain becomes manageable       Return in about 3 months (around 11/1/2022).          Electronically signed by ALIZE Leavitt CNP on8/1/2022 at 11:18 AM

## 2022-10-31 ENCOUNTER — OFFICE VISIT (OUTPATIENT)
Dept: PHYSICAL MEDICINE AND REHAB | Age: 57
End: 2022-10-31
Payer: MEDICARE

## 2022-10-31 VITALS
SYSTOLIC BLOOD PRESSURE: 120 MMHG | HEIGHT: 66 IN | HEART RATE: 80 BPM | BODY MASS INDEX: 24.91 KG/M2 | WEIGHT: 155 LBS | DIASTOLIC BLOOD PRESSURE: 72 MMHG

## 2022-10-31 DIAGNOSIS — M47.816 LUMBAR SPONDYLOSIS: Primary | ICD-10-CM

## 2022-10-31 DIAGNOSIS — M47.816 SPONDYLOSIS OF LUMBAR REGION WITHOUT MYELOPATHY OR RADICULOPATHY: ICD-10-CM

## 2022-10-31 DIAGNOSIS — B20 HIV INFECTION, UNSPECIFIED SYMPTOM STATUS (HCC): ICD-10-CM

## 2022-10-31 DIAGNOSIS — M46.1 SI (SACROILIAC) JOINT INFLAMMATION (HCC): ICD-10-CM

## 2022-10-31 DIAGNOSIS — M46.1 SACROILIAC INFLAMMATION (HCC): ICD-10-CM

## 2022-10-31 DIAGNOSIS — G89.4 CHRONIC PAIN SYNDROME: ICD-10-CM

## 2022-10-31 PROCEDURE — 99214 OFFICE O/P EST MOD 30 MIN: CPT | Performed by: NURSE PRACTITIONER

## 2022-10-31 ASSESSMENT — ENCOUNTER SYMPTOMS
CONSTIPATION: 0
ALLERGIC/IMMUNOLOGIC NEGATIVE: 1
ABDOMINAL PAIN: 0
COLOR CHANGE: 0
NAUSEA: 0
EYE PAIN: 0
SORE THROAT: 0
VOMITING: 0
SHORTNESS OF BREATH: 1
RHINORRHEA: 0
BACK PAIN: 1
CHEST TIGHTNESS: 0
SINUS PRESSURE: 0
PHOTOPHOBIA: 0
EYES NEGATIVE: 1
DIARRHEA: 0

## 2022-10-31 NOTE — PROGRESS NOTES
[facility] [address]  Dept: [phone]  Dept Fax: [phone]: [phone]    Visit Date: 10/31/2022    Functionality Assessment/Goals Worksheet     On a scale of 0 (Does not Interfere) to 10 (Completely Interferes)     1. Which number describes how during the past week pain has interfered with       the following:  A. General Activity:  0  B. Mood: 0  C. Walking Ability:  7  D. Normal Work (Includes both work outside the home and housework):  3  E. Relations with Other People:   0  F. Sleep:   0  G. Enjoyment of Life:   0    2. Patient Prefers to Take their Pain Medications:     []  On a regular basis   [x]  Only when necessary    []  Does not take pain medications    3. What are the Patient's Goals/Expectations for Visiting Pain Management? []  Learn about my pain    [x]  Receive Medication   []  Physical Therapy     []  Treat Depression   [x]  Receive Injections    []  Treat Sleep   []  Deal with Anxiety and Stress   []  Treat Opoid Dependence/Addiction   []  Other:    HPI:   Dayna Gu is a 61 y.o. male is here today for    Chief Complaint: Lumbar back pain and SI pain      F/U has continued relief from Lumbar RFA in 2020    No new health issues. States increased activity will flare  the lumbar SI pain but overall  manageable tolerable. He continues to take Zanaflex Tramadol very rarely. Pain increases with bending, lifting, twisting , reaching, pushing, pulling, walking, standing, stairs and getting up and down. Treatments Tried ice, heat, NSAIDS, narcotics, muscle relaxer, OTC rubs creams patches, steroid burst and injections  Pain Description sharp, burning, aching, numbness and tingling      He denies any ER visits or new health issues since last visit. Pain scale with out pain medications or at its worst is 4/10.   Pain scale with pain medications or at its best is 0/10. Last dose of Tramadol  was greater than 1 week ago  Drug screen reviewed from 8/2022 and was appropriate  Pill count completed  today and WNL: No         Prior Injections:    Lumbar RFA L3-S1 bilateral right 10/2020 Left 11/2020 with continued 50% pain relief         Radiology:  FINDINGS: The osseous framework of the lumbar spine demonstrates satisfactory alignment. Marrow signals show no evidence of aggressive lytic or blastic process. No evidence of fracture. No evidence of spondylolysis. There are mildly desiccated intervertebral discs throughout the lumbar spine. Minimal endplate osteophytes are also present, predominantly in the proximal lumbar spine. The conus medullaris is located at the superior L1 level. It and the spinal cord parenchyma imaged have normal signal characteristics. Components of the cauda equina as well show no finding to indicate intrinsic abnormality. Very mild degenerative disc changes are present in the T11-12 and T12-L1 disc space levels, producing very minimal left lateral canal stenosis. No significant foraminal stenosis. There is bulging disc material, mild, at L1-2. Very minimal to mild facet degenerative changes are present. There is resultant minimal central canal stenosis. There is no significant foraminal stenosis on either side. The L2-3 canal is grossly patent. There are mild facet degenerative changes bilaterally. There is no focal disc abnormality. The foramina are uncompromised on both sides. The L3-4 canal is also widely patent. Mild facet degeneration evident at this level as well. No focal disc abnormality demonstrated. No compromise of the foramen demonstrated. The L4-5 canal is widely patent, as are the foramina. Mild facet degenerative changes are present bilaterally. This has only minimal desiccation but otherwise is within normal limits.        Widely patent canal and foramina also demonstrated at L5-S1. There are mild facet degeneration evident bilaterally. The disc is essentially within normal limits. Surrounding tissues reveal no concerning characteristics of acute or aggressive process though slightly prominent atherosclerotic changes are implied in the abdominal aorta, poorly defined, including irregular asymmetric plaque at the level of the   proximal infrarenal abdominal aortic. Impression   NO EVIDENCE OF ACUTE ABNORMALITY OR CHARACTERISTICS OF SIGNIFICANT CANAL OR FORAMINAL STENOSIS. FACET DEGENERATIVE CHANGES ARE PRESENT DIFFUSELY, MILD, WITH LESS IMPRESSIVE DISC DEGENERATION, PREDOMINANTLY DEMONSTRATED AT THE L1-2 LEVEL,   WHERE THERE IS ONLY MILD BULGING DISC MATERIAL DEMONSTRATED. POORLY CHARACTERIZED PROMINENT ATHEROSCLEROTIC DISEASE OF THE ABDOMINAL AORTA IMPLIED. CONSIDER FURTHER EVALUATION OF THE ABDOMINAL AORTA SUCH AS WITH ULTRASOUND OR CTA, TO FURTHER EVALUATE. The patienthas No Known Allergies. Subjective:      Review of Systems   Constitutional:  Positive for activity change. Negative for appetite change, chills, diaphoresis, fatigue, fever and unexpected weight change. HENT:  Positive for hearing loss. Negative for congestion, ear pain, mouth sores, nosebleeds, rhinorrhea, sinus pressure and sore throat. Eyes: Negative. Negative for photophobia, pain and visual disturbance. Respiratory:  Positive for shortness of breath. Negative for chest tightness. DAY COPD smokes 1 pack a day pt has noticed increased SOB lately now on  Inhaler per pulmonary    Cardiovascular:         CAD HTN  HLD   Gastrointestinal:  Negative for abdominal pain, constipation, diarrhea, nausea and vomiting. GERD  Recent EGD Colonoscopy    Endocrine: Negative. Negative for cold intolerance, heat intolerance, polydipsia, polyphagia and polyuria. DM    Genitourinary: Negative.   Negative for decreased urine volume, difficulty urinating, frequency and hematuria. Musculoskeletal:  Positive for arthralgias, back pain, gait problem and myalgias. Negative for joint swelling, neck pain and neck stiffness. Skin: Negative. Negative for color change and rash. Allergic/Immunologic: Negative. Negative for food allergies and immunocompromised state. Neurological:  Negative for dizziness, tremors, seizures, syncope, facial asymmetry, speech difficulty, weakness, light-headedness, numbness and headaches. Hematological: Negative. Does not bruise/bleed easily. HIV   Psychiatric/Behavioral: Negative. Negative for agitation, behavioral problems, confusion, decreased concentration, dysphoric mood, hallucinations, self-injury, sleep disturbance and suicidal ideas. The patient is not nervous/anxious and is not hyperactive. Objective:     Vitals:    10/31/22 0842   BP: 120/72   Site: Left Upper Arm   Position: Sitting   Cuff Size: Medium Adult   Pulse: 80   Weight: 155 lb (70.3 kg)   Height: 5' 6\" (1.676 m)       Physical Exam  Vitals and nursing note reviewed. Constitutional:       General: He is not in acute distress. Appearance: He is well-developed. He is not diaphoretic. HENT:      Head: Normocephalic and atraumatic. Right Ear: External ear normal.      Left Ear: External ear normal.      Nose: Nose normal.      Mouth/Throat:      Pharynx: No oropharyngeal exudate. Eyes:      General: No scleral icterus. Right eye: No discharge. Left eye: No discharge. Conjunctiva/sclera: Conjunctivae normal.      Pupils: Pupils are equal, round, and reactive to light. Neck:      Thyroid: No thyromegaly. Cardiovascular:      Rate and Rhythm: Normal rate and regular rhythm. Heart sounds: Normal heart sounds. No murmur heard. No friction rub. No gallop. Pulmonary:      Effort: Pulmonary effort is normal. No respiratory distress. Breath sounds: Examination of the right-upper field reveals wheezing. Examination of the left-upper field reveals wheezing. Decreased breath sounds and wheezing present. No rales. Comments:   Left sided I and E A and P   wheezes  DBS   DAY  Chest:      Chest wall: No tenderness. Abdominal:      General: Bowel sounds are normal. There is no distension. Palpations: Abdomen is soft. Tenderness: There is no abdominal tenderness. There is no guarding or rebound. Musculoskeletal:         General: Tenderness present. Right shoulder: Normal.      Left shoulder: Normal.      Cervical back: Full passive range of motion without pain, normal range of motion and neck supple. No edema, erythema or rigidity. No muscular tenderness. Normal range of motion. Thoracic back: Tenderness and bony tenderness present. Decreased range of motion. Lumbar back: Spasms, tenderness and bony tenderness present. Decreased range of motion. Back:       Right hip: Tenderness present. Left hip: Tenderness present. Right knee: Normal.      Left knee: Normal.      Right ankle: Normal.      Left ankle: Normal.   Skin:     General: Skin is warm. Coloration: Skin is not pale. Findings: No erythema or rash. Neurological:      Mental Status: He is alert and oriented to person, place, and time. He is not disoriented. Cranial Nerves: No cranial nerve deficit. Sensory: Sensation is intact. No sensory deficit. Motor: Weakness present. No atrophy or abnormal muscle tone. Coordination: Coordination is intact. Coordination normal.      Gait: Gait abnormal.      Deep Tendon Reflexes: Reflexes are normal and symmetric. Reflex Scores:       Tricep reflexes are 2+ on the right side and 2+ on the left side. Bicep reflexes are 2+ on the right side and 2+ on the left side. Brachioradialis reflexes are 2+ on the right side and 2+ on the left side. Patellar reflexes are 2+ on the right side and 2+ on the left side.        Achilles reflexes are 2+ on the right side and 2+ on the left side. Comments: SLR-  Motor 4/5 generalized weakness  Feet bilat dusky when dependent   Psychiatric:         Attention and Perception: Attention and perception normal. He is attentive. Mood and Affect: Mood and affect normal. Mood is not anxious or depressed. Affect is not labile, blunt, angry or inappropriate. Speech: Speech normal.         Behavior: Behavior normal. Behavior is not agitated, slowed, aggressive, withdrawn, hyperactive or combative. Behavior is cooperative. Thought Content: Thought content normal. Thought content is not paranoid or delusional. Thought content does not include homicidal or suicidal ideation. Thought content does not include homicidal or suicidal plan. Cognition and Memory: Cognition and memory normal. Memory is not impaired. He does not exhibit impaired recent memory or impaired remote memory. Judgment: Judgment normal. Judgment is not impulsive or inappropriate. Comments: Flat affect, h/o depression     HARRISON  Patricks test  positive  Yeoman's  or Gaenslen's positive  Kemps  positive         Assessment:     1. Lumbar spondylosis    2. SI (sacroiliac) joint inflammation (HCC)    3. Chronic pain syndrome    4. HIV infection, unspecified symptom status ([facility] [address]    5. Sacroiliac inflammation (HCC)    6. Spondylosis of lumbar region without myelopathy or radiculopathy            Plan:      OARRS reviewed. Current MED: 10  Patient was not offered naloxone for home. Testing Labs or Radiology reviewed: Lumbar  Medications: Tramadol very rarely Zanaflex rarely     Meds. Prescribed:   No orders of the defined types were placed in this encounter. Return in about 3 months (around 1/31/2023).                Electronically signed by ALIZE Long CNP on10/31/2022 at 8:55 AM

## 2022-11-08 ENCOUNTER — HOSPITAL ENCOUNTER (OUTPATIENT)
Dept: NON INVASIVE DIAGNOSTICS | Age: 57
Discharge: HOME | End: 2022-11-08
Payer: MEDICARE

## 2022-11-08 DIAGNOSIS — I25.10 CORONARY ARTERY DISEASE INVOLVING NATIVE CORONARY ARTERY OF NATIVE HEART WITHOUT ANGINA PECTORIS: ICD-10-CM

## 2022-11-08 DIAGNOSIS — R06.02 SHORTNESS OF BREATH: ICD-10-CM

## 2022-11-08 PROCEDURE — 6360000002 HC RX W HCPCS

## 2022-11-08 PROCEDURE — A9500 TC99M SESTAMIBI: HCPCS | Performed by: INTERNAL MEDICINE

## 2022-11-08 PROCEDURE — 93017 CV STRESS TEST TRACING ONLY: CPT | Performed by: INTERNAL MEDICINE

## 2022-11-08 PROCEDURE — 78452 HT MUSCLE IMAGE SPECT MULT: CPT

## 2022-11-08 PROCEDURE — 3430000000 HC RX DIAGNOSTIC RADIOPHARMACEUTICAL: Performed by: INTERNAL MEDICINE

## 2022-11-08 RX ADMIN — Medication 28.4 MILLICURIE: at 10:45

## 2022-11-08 RX ADMIN — Medication 8.8 MILLICURIE: at 09:40

## 2022-11-09 ENCOUNTER — TELEPHONE (OUTPATIENT)
Dept: CARDIOLOGY CLINIC | Age: 57
End: 2022-11-09

## 2022-11-09 NOTE — TELEPHONE ENCOUNTER
Anything needed? Summary   Lexiscan EKG stress test is not suggestive for ischemia. Calculated gated LVEF 57 %. The T.I.D. ratio was 1.09 . There was a small sized, moderate in intensity, partially reversible   myocardial perfusion defect of the apical wall. Recommendation   Clinical correlation is recommended. Medical management.       Signatures      ----------------------------------------------------------------   Electronically signed by Angeline Mabry MD (Interpreting   Cardiologist) on 11/08/2022 at 19:04

## 2022-11-10 ENCOUNTER — OFFICE VISIT (OUTPATIENT)
Dept: CARDIOLOGY CLINIC | Age: 57
End: 2022-11-10
Payer: MEDICARE

## 2022-11-10 VITALS
SYSTOLIC BLOOD PRESSURE: 94 MMHG | DIASTOLIC BLOOD PRESSURE: 62 MMHG | HEART RATE: 78 BPM | BODY MASS INDEX: 23.82 KG/M2 | WEIGHT: 148.2 LBS | HEIGHT: 66 IN

## 2022-11-10 DIAGNOSIS — I25.10 ASCVD (ARTERIOSCLEROTIC CARDIOVASCULAR DISEASE): Primary | ICD-10-CM

## 2022-11-10 PROCEDURE — 99213 OFFICE O/P EST LOW 20 MIN: CPT | Performed by: INTERNAL MEDICINE

## 2022-11-10 RX ORDER — TRAMADOL HYDROCHLORIDE 50 MG/1
50 TABLET ORAL EVERY 6 HOURS PRN
COMMUNITY

## 2022-11-10 NOTE — PROGRESS NOTES
Pt C/O pt has no cardiac symptoms at this time.        Pt denies CP, SOB, Headache, dizziness, heart palpitations, swelling, fatigue

## 2022-11-10 NOTE — PROGRESS NOTES
Ginger Russell 89 CARDIOLOGY  [facility] [address]  [facility] [address]  Dept: [phone]  Dept Fax: [phone]  Loc: [phone]    Visit Date: 11/10/2022    Mr. Bevin Ahumada is a 61 y.o. male  who presented for:  Chief Complaint   Patient presents with    Follow-up     Stress        HPI:   60 yo M c hx of HIV, DM, HLD , Depression is here for a follow up. Cath in 6/2013 showed non-obstructive CAD. Denies any chest pain, palpitations, lightheadedness, dizziness, orthopnea, PND or pedal edema. EKG 11/10/22: Sinus rhythm with incomplete RBBB     Current Outpatient Medications:     traMADol (ULTRAM) 50 MG tablet, Take 50 mg by mouth every 6 hours as needed for Pain., Disp: , Rfl:     tiotropium-olodaterol (STIOLTO RESPIMAT) 2.5-2.5 MCG/ACT AERS, Inhale 2 puffs into the lungs daily, Disp: , Rfl:     fosamprenavir (LEXIVA) 700 MG tablet, Take 1,400 mg by mouth 2 times daily, Disp: , Rfl:     isosorbide dinitrate (ISORDIL) 20 MG tablet, 1 tablet twice daily, Disp: 60 tablet, Rfl: 11    metoprolol tartrate (LOPRESSOR) 25 MG tablet, Take 1 tablet by mouth twice daily, Disp: 60 tablet, Rfl: 11    tiZANidine (ZANAFLEX) 4 MG tablet, Take 4 mg by mouth every 8 hours as needed , Disp: , Rfl:     pantoprazole (PROTONIX) 40 MG tablet, Take 1 tablet by mouth every morning (before breakfast), Disp: 90 tablet, Rfl: 1    lamivudine-zidovudine (COMBIVIR) 150-300 MG per tablet, Take 1 tablet by mouth 2 times daily. , Disp: , Rfl:     olanzapine (ZYPREXA) 5 MG tablet, Take 5 mg by mouth nightly.  , Disp: , Rfl:     NITROSTAT 0.4 MG SL tablet, place 1 tablet under the tongue if needed every 5 minutes for chest pain for 3 doses IF NO RELIEF AFTER 3RD DOSE CALL PRESCRIBER .  (Patient not taking: Reported on 11/10/2022), Disp: 25 tablet, Rfl: 3    Past Medical History  Irish Starch  has a past medical history of Arthritis, Arthritis, Chest pain, Depression, Diabetes mellitus ([facility] [address] Dysphagia, GERD (gastroesophageal reflux disease), History of blood transfusion, History of nephrolithiasis, HIV (human immunodeficiency virus infection) ([facility] [address] Hyperlipidemia, Pancreatitis, and Type 2 diabetes mellitus without complication ([facility] [address] Social History  Tarsha Dueñas  reports that he has been smoking cigarettes. He has a 27.00 pack-year smoking history. He has never used smokeless tobacco. He reports that he does not drink alcohol and does not use drugs. Family History  Tarsha Dueñas family history includes Diabetes in his mother; High Blood Pressure in his mother.     Past Surgical History   Past Surgical History:   Procedure Laterality Date    CARDIAC CATHETERIZATION  2015???    OK    COLONOSCOPY  2016    COLONOSCOPY  2021    Dr Hattie Luna     EGD  2021    ENDOSCOPY, COLON, DIAGNOSTIC      egd with dilatation    FOOT SURGERY  1970's    left    LUMBAR SPINE SURGERY Right 4/23/2019    Lumbar RFA  Right side first L3-4,4-5,5-S1 performed by Disha Babin MD at [facility] Left 5/21/2019    Lumbar RFA  Left side L3-4,4-5,5-S1 performed by Disha Babin MD at [address] [address] BILATERAL Bilateral 7/18/2017    SI MBB BILATERAL performed by Disha Babin MD at [address] [facility] Bilateral 07/18/2017    SI BLOCK    OTHER SURGICAL HISTORY Bilateral 01/17/2017    Lumbar facet     OTHER SURGICAL HISTORY Bilateral 02/20/2017    Lumbar Facet L3-S1    OTHER SURGICAL HISTORY Right 03/28/2017    Lumbar RFA L3-S1    OTHER SURGICAL HISTORY Bilateral 03/06/2018    Bilat SI joint injection    PAIN MANAGEMENT PROCEDURE Right 1/14/2020    Lumbar RFA  Right side first L3-4,4-5,5-S1 No sedation per patient request performed by Disha Babin MD at [facility] Left 3/2/2020    Lumbar RFA Left side L3-4,4-5,5-S1- local per pt request performed by Disha Babin MD at [facility] OR    PAIN MANAGEMENT PROCEDURE Right 10/8/2020    Lumbar RFA bilateral L3-4,4-5,5-S1  right side first -Local per patient request- performed by Antionette Schultz MD at [facility] Left 11/12/2020    Lumbar RFA Left side L3-4, 4-5, 5-S1, performed by Antionette Schultz MD at [address] [facility] DX/THER AGNT PARAVERT FACET JOINT, LUMBAR/SAC, 2ND LEVEL Bilateral 3/6/2018    BILATERAL SI MBB #2 NO SEDATION performed by Antionette Schultz MD at [address] [place] [id number], [phone]       Subjective:     REVIEW OF SYSTEMS  Constitutional: denies sweats, chills and fever  HENT: denies  congestion, sinus pressure, sneezing and sore throat. Eyes: denies  pain, discharge, redness and itching. Respiratory: Short of breath, denies apnea, cough  Gastrointestinal: denies blood in stool, constipation, diarrhea   Endocrine: denies cold intolerance, heat intolerance, polydipsia. Genitourinary: denies dysuria, enuresis, flank pain and hematuria. Musculoskeletal: denies arthralgias, joint swelling and neck pain. Neurological: denies numbness and headaches. Psychiatric/Behavioral: denies agitation, confusion, decreased concentration and dysphoric mood    All others reviewed and are negative. Objective:     BP 94/62   Pulse 78   Ht 5' 6\" (1.676 m)   Wt 148 lb 3.2 oz (67.2 kg)   BMI 23.92 kg/m²     Wt Readings from Last 3 Encounters:   11/10/22 148 lb 3.2 oz (67.2 kg)   10/31/22 155 lb (70.3 kg)   08/01/22 157 lb (71.2 kg)     BP Readings from Last 3 Encounters:   11/10/22 94/62   10/31/22 120/72   08/01/22 112/64       PHYSICAL EXAM  Constitutional: Oriented to person, place, and time. Appears well-developed and well-nourished. Short of breath during exam.  HENT:   Head: Normocephalic and atraumatic. Eyes: EOM are normal. Pupils are equal, round, and reactive to light.    Neck: Normal range of motion. Neck supple. No JVD present. Cardiovascular: Normal rate , normal heart sounds and intact distal pulses. Pulmonary/Chest: Short of breath during exam with increased effort of breathing, decreased breath sounds bilaterally with mild wheezing on auscultation. No respiratory distress. Abdominal: Soft. Bowel sounds are normal. No distension. There is no tenderness. Musculoskeletal: Normal range of motion. No edema. Neurological: Alert and oriented to person, place, and time. No cranial nerve deficit. Coordination normal.   Skin: Skin is warm and dry. Psychiatric: Normal mood and affect.        Lab Results   Component Value Date/Time    CKTOTAL 117 11/04/2011 10:45 AM    CKMB 1.5 11/04/2011 10:45 AM       Lab Results   Component Value Date/Time    WBC 7.5 08/07/2019 01:45 PM    WBC 7.5 08/07/2019 01:45 PM    RBC 3.40 08/07/2019 01:45 PM    RBC 3.37 11/04/2011 10:45 AM    HGB 14.1 08/07/2019 01:45 PM    HCT 39.4 08/07/2019 01:45 PM    .9 08/07/2019 01:45 PM    MCH 41.5 08/07/2019 01:45 PM    MCHC 35.8 08/07/2019 01:45 PM    RDW 11.9 08/07/2019 01:45 PM     08/07/2019 01:45 PM    MPV 9.1 08/07/2019 01:45 PM       Lab Results   Component Value Date/Time     08/07/2019 01:45 PM    K 4.8 08/07/2019 01:45 PM    CL 96 08/07/2019 01:45 PM    CO2 21 08/07/2019 01:45 PM    BUN 9 08/07/2019 01:45 PM    LABALBU 4.2 08/07/2019 01:45 PM    LABALBU 4.4 11/04/2011 10:45 AM    CREATININE 0.87 08/07/2019 01:45 PM    CALCIUM 8.9 08/07/2019 01:45 PM    GFRAA >60 08/07/2019 01:45 PM    LABGLOM >60 08/07/2019 01:45 PM    LABGLOM 78 02/12/2018 08:06 AM    GLUCOSE 166 08/07/2019 01:45 PM       Lab Results   Component Value Date/Time    ALKPHOS 71 08/07/2019 01:45 PM    ALT 16 08/07/2019 01:45 PM    AST 16 08/07/2019 01:45 PM    PROT 7.1 08/07/2019 01:45 PM    BILITOT 0.21 08/07/2019 01:45 PM    BILIDIR <0.2 04/23/2018 04:19 PM    LABALBU 4.2 08/07/2019 01:45 PM    LABALBU 4.4 11/04/2011 10:45 AM       No results found for: MG    Lab Results   Component Value Date    INR 0.85 09/09/2016         Lab Results   Component Value Date/Time    LABA1C 5.8 10/05/2018 02:44 PM       Lab Results   Component Value Date/Time    TRIG 136 08/07/2019 01:45 PM    HDL 37 08/07/2019 01:45 PM    LDLCALC 94 04/23/2018 04:19 PM       Lab Results   Component Value Date/Time    TSH 1.975 04/18/2013 12:08 PM           Results for orders placed during the hospital encounter of 11/05/15   ECHO Complete 2D W Doppler W Color       STRESS:    CATH:6/2013:  CORONARY ANGIOGRAPHY     RIGHT CORONARY ARTERY:  A large caliber vessel bifurcating to the PDA and PLV  which are also large caliber vessels. In the proximal portion, the lesion is  about 20% lesion noted. LEFT MAIN:  Has just mild tapering in the ostium, however no critical lesion  is noted. The vessel is long and bifurcates to the circumflex and LAD. CIRCUMFLEX:  Moderate to large caliber vessel which gives rise to a small to  medium sized OM-1 and OM-2 without any critical lesion. LEFT ANTERIOR DESCENDING:  The LAD is a large caliber vessel that gives rise  to a large diagonal.  It does not demonstrate any critical lesion. It just  has some mild luminal irregularity, but no critical lesion noted. CONCLUSION:       1. Left ventriculography demonstrated normal systolic function,             ejection fraction 55% with no mitral regurgitation on pullback. 2.   Hemodynamics, refer to the patient's chart. 3.   Right coronary artery is a dominant, large caliber vessel. It has             a 20% lesion in the proximal portion, but no critical lesion. 4.   The left main is a long caliber vessel with just mild tapering in             the ostium, however no critical lesion is noted. 5.   The circumflex is a moderate to large caliber vessel, widely             patent. 6.    The left anterior descending is a large caliber vessel, widely       patent. 7.   The diagonal is also a large caliber vessel, widely patent. 8.   No complication occurred. Adequate hemostasis was obtained. RECOMMENDATION:  Aggressive risk factor modification, exercise thirty minutes  a day and no driving or heavy lifting for the next two days. ECHO:   11/05/2015    SUMMARY:     Left ventricle:  Size was normal.  Systolic function was normal. Ejection fraction was estimated in the range of 55 % to 65 %. There were no regional wall motion abnormalities. Doppler parameters were consistent with abnormal left ventricular relaxation (grade 1 diastolic dysfunction). Aortic valve: There was mild regurgitation. Tricuspid valve: There was mild regurgitation. Summary: compared to prior study no true change is noted      Assessment/Plan       Diagnosis Orders   1. ASCVD (arteriosclerotic cardiovascular disease)              ASCVD  Current smoker  HLD  HIV     Patient reports chronic shortness of breath    Reviewed Today EKG/cath/echo from before  EKG today Sinus rhythm with incomplete RBBB   Continue Lisinopril/Statin/metoprolol  Patient no longer taking aspirin. Patient states was discontinued by PCP. Advised to stop smoking because smoking increases risk of heart disease, morbidity, mortality and end organ damage. Patient expressed no interest in quitting. The patient is asked to make an attempt to improve diet and exercise patterns to aid in medical management of this problem. Advised more plant based nutrition/meditarrean diet   Advised patient to call office or seek immediate medical attention if there is any new onset of  any chest pain, sob, palpitations, lightheadedness, dizziness, orthopnea, PND or pedal edema. All medication side effects were discussed in details. Thank you for allowing me to participate in the care of this patient. Please do not hesitate to contact me for any further questions.      Return if symptoms worsen or fail to improve, for Review testing, Regular follow up.        Electronically signed by Teodoro García MD [facility]  11/10/2022 at 12:58 PM

## 2023-01-30 ENCOUNTER — OFFICE VISIT (OUTPATIENT)
Dept: PHYSICAL MEDICINE AND REHAB | Age: 58
End: 2023-01-30
Payer: MEDICARE

## 2023-01-30 ENCOUNTER — TELEPHONE (OUTPATIENT)
Dept: PHYSICAL MEDICINE AND REHAB | Age: 58
End: 2023-01-30

## 2023-01-30 VITALS
HEIGHT: 66 IN | SYSTOLIC BLOOD PRESSURE: 130 MMHG | HEART RATE: 80 BPM | WEIGHT: 150 LBS | DIASTOLIC BLOOD PRESSURE: 82 MMHG | BODY MASS INDEX: 24.11 KG/M2

## 2023-01-30 DIAGNOSIS — M46.1 SACROILIAC INFLAMMATION (HCC): ICD-10-CM

## 2023-01-30 DIAGNOSIS — M47.816 SPONDYLOSIS OF LUMBAR REGION WITHOUT MYELOPATHY OR RADICULOPATHY: ICD-10-CM

## 2023-01-30 DIAGNOSIS — G89.4 CHRONIC PAIN SYNDROME: ICD-10-CM

## 2023-01-30 DIAGNOSIS — M47.816 LUMBAR SPONDYLOSIS: Primary | ICD-10-CM

## 2023-01-30 DIAGNOSIS — M46.1 SI (SACROILIAC) JOINT INFLAMMATION (HCC): ICD-10-CM

## 2023-01-30 PROCEDURE — 99214 OFFICE O/P EST MOD 30 MIN: CPT | Performed by: NURSE PRACTITIONER

## 2023-01-30 ASSESSMENT — ENCOUNTER SYMPTOMS
EYES NEGATIVE: 1
CHEST TIGHTNESS: 0
EYE PAIN: 0
BACK PAIN: 1
PHOTOPHOBIA: 0
COLOR CHANGE: 0
RHINORRHEA: 0
NAUSEA: 0
SHORTNESS OF BREATH: 1
CONSTIPATION: 0
SINUS PRESSURE: 0
DIARRHEA: 0
ALLERGIC/IMMUNOLOGIC NEGATIVE: 1
SORE THROAT: 0
VOMITING: 0
ABDOMINAL PAIN: 0

## 2023-01-30 NOTE — PROGRESS NOTES
[address] [facility] [address]  Dept: [phone]  Dept Fax: [phone]: [phone]    Visit Date: 1/30/2023    Functionality Assessment/Goals Worksheet     On a scale of 0 (Does not Interfere) to 10 (Completely Interferes)     1. Which number describes how during the past week pain has interfered with       the following:  A. General Activity:  0  B. Mood: 0  C. Walking Ability:  7  D. Normal Work (Includes both work outside the home and housework):  0  E. Relations with Other People:   0  F. Sleep:   0  G. Enjoyment of Life:   0    2. Patient Prefers to Take their Pain Medications:     []  On a regular basis   [x]  Only when necessary    []  Does not take pain medications    3. What are the Patient's Goals/Expectations for Visiting Pain Management?      []  Learn about my pain    [x]  Receive Medication   [x]  Physical Therapy     []  Treat Depression   [x]  Receive Injections    []  Treat Sleep   []  Deal with Anxiety and Stress   []  Treat Opoid Dependence/Addiction   []  Other:

## 2023-01-30 NOTE — PROGRESS NOTES
HPI:   Rigo Antonio is a 61 y.o. male is here today for    Chief Complaint: Lumbar back pain and SI pain      No new health issues. States increased activity will flare  the lumbar SI pain  would like to repeat Lumbar RFA. He continues to take Zanaflex Tramadol very rarely. Pain increases with bending, lifting, twisting , reaching, pushing, pulling, walking, standing, stairs and getting up and down. Treatments Tried ice, heat, NSAIDS, narcotics, muscle relaxer, OTC rubs creams patches, steroid burst and injections  Pain Description sharp, burning, aching, numbness and tingling     He denies any ER visits or new health issues since last visit. Pain scale with out pain medications or at its worst is 9/10. Pain scale with pain medications or at its best is 3/10. Last dose of Tramadol  was  4 days ago  takes very rarely   Drug screen reviewed from 10/2022 and was appropriate  Pill count completed  today and WNL: Yes         Prior Injections:    Lumbar RFA L3-S1 bilateral right 10/2020 Left 11/2020 with continued 50% pain relief      Radiology:    FINDINGS: The osseous framework of the lumbar spine demonstrates satisfactory alignment. Marrow signals show no evidence of aggressive lytic or blastic process. No evidence of fracture. No evidence of spondylolysis. There are mildly desiccated intervertebral discs throughout the lumbar spine. Minimal endplate osteophytes are also present, predominantly in the proximal lumbar spine. The conus medullaris is located at the superior L1 level. It and the spinal cord parenchyma imaged have normal signal characteristics. Components of the cauda equina as well show no finding to indicate intrinsic abnormality. Very mild degenerative disc changes are present in the T11-12 and T12-L1 disc space levels, producing very minimal left lateral canal stenosis. No significant foraminal stenosis. There is bulging disc material, mild, at L1-2.  Very minimal to mild facet degenerative changes are present. There is resultant minimal central canal stenosis. There is no significant foraminal stenosis on either side. The L2-3 canal is grossly patent. There are mild facet degenerative changes bilaterally. There is no focal disc abnormality. The foramina are uncompromised on both sides. The L3-4 canal is also widely patent. Mild facet degeneration evident at this level as well. No focal disc abnormality demonstrated. No compromise of the foramen demonstrated. The L4-5 canal is widely patent, as are the foramina. Mild facet degenerative changes are present bilaterally. This has only minimal desiccation but otherwise is within normal limits. Widely patent canal and foramina also demonstrated at L5-S1. There are mild facet degeneration evident bilaterally. The disc is essentially within normal limits. Surrounding tissues reveal no concerning characteristics of acute or aggressive process though slightly prominent atherosclerotic changes are implied in the abdominal aorta, poorly defined, including irregular asymmetric plaque at the level of the   proximal infrarenal abdominal aortic. Impression   NO EVIDENCE OF ACUTE ABNORMALITY OR CHARACTERISTICS OF SIGNIFICANT CANAL OR FORAMINAL STENOSIS. FACET DEGENERATIVE CHANGES ARE PRESENT DIFFUSELY, MILD, WITH LESS IMPRESSIVE DISC DEGENERATION, PREDOMINANTLY DEMONSTRATED AT THE L1-2 LEVEL,   WHERE THERE IS ONLY MILD BULGING DISC MATERIAL DEMONSTRATED. POORLY CHARACTERIZED PROMINENT ATHEROSCLEROTIC DISEASE OF THE ABDOMINAL AORTA IMPLIED. CONSIDER FURTHER EVALUATION OF THE ABDOMINAL AORTA SUCH AS WITH ULTRASOUND OR CTA, TO FURTHER EVALUATE. The patienthas No Known Allergies. Subjective:      Review of Systems   Constitutional:  Positive for activity change. Negative for appetite change, chills, diaphoresis, fatigue, fever and unexpected weight change.    HENT:  Positive for hearing loss. Negative for congestion, ear pain, mouth sores, nosebleeds, rhinorrhea, sinus pressure and sore throat.    Eyes: Negative.  Negative for photophobia, pain and visual disturbance.   Respiratory:  Positive for shortness of breath. Negative for chest tightness.         DAY COPD smokes 1 pack a day pt has noticed increased SOB lately now on  Inhaler per pulmonary    Cardiovascular:         CAD HTN  HLD   Gastrointestinal:  Negative for abdominal pain, constipation, diarrhea, nausea and vomiting.        GERD  Recent EGD Colonoscopy    Endocrine: Negative.  Negative for cold intolerance, heat intolerance, polydipsia, polyphagia and polyuria.        DM    Genitourinary: Negative.  Negative for decreased urine volume, difficulty urinating, frequency and hematuria.   Musculoskeletal:  Positive for arthralgias, back pain, gait problem and myalgias. Negative for joint swelling, neck pain and neck stiffness.   Skin: Negative.  Negative for color change and rash.   Allergic/Immunologic: Negative.  Negative for food allergies and immunocompromised state.   Neurological:  Negative for dizziness, tremors, seizures, syncope, facial asymmetry, speech difficulty, weakness, light-headedness, numbness and headaches.   Hematological: Negative.  Does not bruise/bleed easily.        HIV   Psychiatric/Behavioral: Negative.  Negative for agitation, behavioral problems, confusion, decreased concentration, dysphoric mood, hallucinations, self-injury, sleep disturbance and suicidal ideas. The patient is not nervous/anxious and is not hyperactive.      Objective:     Vitals:    01/30/23 0950   BP: 130/82   Site: Left Upper Arm   Position: Sitting   Cuff Size: Medium Adult   Pulse: 80   Weight: 150 lb (68 kg)   Height: 5' 6\" (1.676 m)       Physical Exam  Vitals and nursing note reviewed.   Constitutional:       General: He is not in acute distress.     Appearance: He is well-developed. He is not diaphoretic.   HENT:      Head:  Normocephalic and atraumatic. Right Ear: External ear normal.      Left Ear: External ear normal.      Nose: Nose normal.      Mouth/Throat:      Pharynx: No oropharyngeal exudate. Eyes:      General: No scleral icterus. Right eye: No discharge. Left eye: No discharge. Conjunctiva/sclera: Conjunctivae normal.      Pupils: Pupils are equal, round, and reactive to light. Neck:      Thyroid: No thyromegaly. Cardiovascular:      Rate and Rhythm: Normal rate and regular rhythm. Heart sounds: Normal heart sounds. No murmur heard. No friction rub. No gallop. Pulmonary:      Effort: Pulmonary effort is normal. No respiratory distress. Breath sounds: Examination of the right-upper field reveals wheezing. Examination of the left-upper field reveals wheezing. Decreased breath sounds and wheezing present. No rales. Comments:   Left sided I and E A and P   wheezes  DBS   DAY  Chest:      Chest wall: No tenderness. Abdominal:      General: Bowel sounds are normal. There is no distension. Palpations: Abdomen is soft. Tenderness: There is no abdominal tenderness. There is no guarding or rebound. Musculoskeletal:         General: Tenderness present. Right shoulder: Normal.      Left shoulder: Normal.      Cervical back: Full passive range of motion without pain, normal range of motion and neck supple. No edema, erythema or rigidity. No muscular tenderness. Normal range of motion. Thoracic back: Tenderness and bony tenderness present. Decreased range of motion. Lumbar back: Spasms, tenderness and bony tenderness present. Decreased range of motion. Back:       Right hip: Tenderness present. Left hip: Tenderness present. Right knee: Normal.      Left knee: Normal.      Right ankle: Normal.      Left ankle: Normal.   Skin:     General: Skin is warm. Coloration: Skin is not pale. Findings: No erythema or rash.           Neurological: Mental Status: He is alert and oriented to person, place, and time. He is not disoriented. Cranial Nerves: No cranial nerve deficit. Sensory: Sensation is intact. No sensory deficit. Motor: Weakness present. No atrophy or abnormal muscle tone. Coordination: Coordination is intact. Coordination normal.      Gait: Gait abnormal.      Deep Tendon Reflexes: Reflexes are normal and symmetric. Reflex Scores:       Tricep reflexes are 2+ on the right side and 2+ on the left side. Bicep reflexes are 2+ on the right side and 2+ on the left side. Brachioradialis reflexes are 2+ on the right side and 2+ on the left side. Patellar reflexes are 2+ on the right side and 2+ on the left side. Achilles reflexes are 2+ on the right side and 2+ on the left side. Comments: SLR-  Motor 4/5 generalized weakness  Feet bilat dusky when dependent   Psychiatric:         Attention and Perception: Attention and perception normal. He is attentive. Mood and Affect: Mood and affect normal. Mood is not anxious or depressed. Affect is not labile, blunt, angry or inappropriate. Speech: Speech normal.         Behavior: Behavior normal. Behavior is not agitated, slowed, aggressive, withdrawn, hyperactive or combative. Behavior is cooperative. Thought Content: Thought content normal. Thought content is not paranoid or delusional. Thought content does not include homicidal or suicidal ideation. Thought content does not include homicidal or suicidal plan. Cognition and Memory: Cognition and memory normal. Memory is not impaired. He does not exhibit impaired recent memory or impaired remote memory. Judgment: Judgment normal. Judgment is not impulsive or inappropriate. Comments: Flat affect, h/o depression     HARRISON  Patricks test  positive  Yeoman's  or Gaenslen's positive  Kemps  positive  Spurlings  positive  Montgomery's na         Assessment:     1. Lumbar spondylosis    2. SI (sacroiliac) joint inflammation (HCC)    3. Chronic pain syndrome    4. Sacroiliac inflammation (HCC)    5. Spondylosis of lumbar region without myelopathy or radiculopathy            Plan:      OARRS reviewed. Current MED: 20  Patient was not offered naloxone for home. Testing Labs or Radiology reviewed: Lumbar   Procedures:repeat Lumbar RFA Bilateral L4-5,5-S1   Discussed with patient about risks with procedure including infection, reaction to medication, increased pain, or bleeding. Medications:Tramadol very rarely Zanaflex rarely   If patient is on blood thinners will need approval to hold yes or no:N/A  Does patient have implanted devices? Stimulators, AICD or Pacemaker:N/A      Meds. Prescribed:   No orders of the defined types were placed in this encounter. Return for repeat Lumbar RFA L4-5, 5-S1 , Follow up after procedure.                Electronically signed by ALIZE Salmeron CNP on1/30/2023 at 9:59 AM

## 2023-02-21 NOTE — DISCHARGE INSTRUCTIONS
ANESTHESIA INSTRUCTIONS FOLLOWING SURGERY        Since you may experience some intermittent light-headedness for the next several hours, we suggest you plan on bed rest or quiet relaxation this evening. You must have a friend or relative stay with you tonight. Because of the sedation you have received, it is recommended that you do not drive a motor vehicle, operate any kind of machinery, or sign any contractual agreement for 24 hours following the procedure. You should not take alcoholic beverages tonight and only take sleeping medication that has been specifically prescribed for you by your physician. Call office [phone] if you have:  Temperature greater than 100.4  Persistent nausea and vomiting  Severe uncontrolled pain  Redness, tenderness, or signs of infection (pain, swelling, redness, odor or green/yellow discharge around the site)  Difficulty breathing, headache or visual disturbances  Hives  Persistent dizziness or light-headedness  Extreme fatigue  Any other questions or concerns you may have after discharge    In an emergency, call 911 or go to an Emergency Department at a nearby hospital    It is important to bring a complete, current list of your medications to any medical appointments or hospitalizations. REMINDER:   Carry a list of your medications and allergies with you at all times  Call your pharmacy at least 1 week in advance to refill prescriptions    Diet: Resume your usual diet. Good nutrition promotes healing. Increase fluid intake. Activity: Rest for 24 hrs then resume normal activity. HOME MEDICATIONS:      If on blood thinning products such as; Aspirin, NSAIDS, Plavix, Coumadin, Xarelto, Fish Oil, Multi-Vitamins or Herbal Supplements restart in 24 hours      Restart Metformin in 48 hours if you had procedure with dye. Restart Metformin in 24 hours if no dye used during procedure.         Education Materials Received: {yes/no:376416}  Belongings Returned: {yes/no:[id number]}          I understand and acknowledge receipt of the above instructions. Patient or Guardian Signature                                                         Date/Time                                                                                                                                            Physician's or R.N.'s Signature                                                                  Date/Time      The discharge instructions have been reviewed with the patient and/or Guardian. Patient and/or Guardian signed and retained a printed copy. Surgical Site Infections        How can we work together to prevent Surgical Site Infections? We would like to thank you for choosing [facility] for your Surgical Care. Below you will find helpful information on how we can work together to prevent Surgical Site Infections. What is a Surgical Site Infection (SSI)? A surgical site infection is an infection that occurs after surgery in the part of the body where the surgery took place. Most patients who have surgery do not develop an infection. However, infections develop in about 1 to 3 out of every 100 patients who have surgery. Some of the common symptoms of a surgical site infection are:  Redness and pain around the area where you had surgery  Drainage of cloudy fluid from your surgical wound  Fever    Can SSIs be treated? Yes. Most surgical site infections can be treated with antibiotics. The antibiotic given to you depends on the bacteria (germs) causing the infection. Sometimes patients with SSIs also need another surgery to treat the infection. What are some of the things that hospitals are doing to prevent SSIs? To prevent SSIs, doctors, nurses, and other healthcare providers:   May remove some of your hair immediately before your surgery using electric clippers if the hair is in the same area where the procedure will occur. They should not shave you with a razor. Give you antibiotics before your surgery starts. In most cases, you should get antibiotics within 60 minutes before the surgery starts and the antibiotics should be stopped within 24 hours after surgery. Clean the skin at the site of your surgery with a special soap that kills germs. Clean their hands and arms up to their elbows with an antiseptic agent just before the surgery. Wear special hair covers, masks, gowns, and gloves during surgery to  keep the surgery area clean. Clean their hands with soap and water or an alcohol-based hand rub before and after caring for each patient. If you do not see your providers clean their hands, please     ask  them to do so. What can I do to help prevent SSIs? Before your surgery:  Tell your doctor about other medical problems you may have. Health problems such as allergies, diabetes, and obesity could affect your surgery and your treatment. Quit smoking. Patients who smoke get more infections. Talk to your doctor about how you can quit before your surgery. Do not shave near where you will have surgery. Shaving with a razor can irritate your skin and make it easier to develop an infection. At the time of your surgery:  Speak up if someone tries to shave you with a razor before surgery. Ask why you need to be shaved and talk with your surgeon if you have any concerns. Ask if you will get antibiotics before surgery. After your surgery:  Make sure that your healthcare providers clean their hands before examining you, either with soap and water or an alcohol-based hand rub. Family and friends who visit you should not touch the surgical wound or dressings. Family and friends should clean their hands with soap and water or an alcohol-based hand rub before and after visiting you.  If you do not see them clean their hands, ask them to clean their hands. What do I need to do when I go home from the hospital?  Before you go home, your doctor or nurse should explain everything you need to know about taking care of your wound. Make sure you understand how to care for your wound before you leave the hospital.  Always clean your hands before and after caring for your wound. Before you go home, make sure you know who to contact if you have questions or problems after you get home. If you have any symptoms of an infection, such as redness and pain at the surgery site, drainage, or fever, call your doctor immediately. If you have additional questions, please ask your doctor or nurse. Call office [phone] if you have:  Temperature greater than 100.4  Persistent nausea and vomiting  Severe uncontrolled pain  Redness, tenderness, or signs of infection (pain, swelling, redness, odor or green/yellow discharge around the site)  Difficulty breathing, headache or visual disturbances  Hives  Persistent dizziness or light-headedness  Extreme fatigue  Any other questions or concerns you may have after discharge    In an emergency, call 911 or go to an Emergency Department at a nearby hospital    It is important to bring a complete, current list of your medications to any medical appointments or hospitalizations. REMINDER:   Carry a list of your medications and allergies with you at all times  Call your pharmacy at least 1 week in advance to refill prescriptions    Diet: Resume your usual diet. Good nutrition promotes healing. Increase fluid intake. Activity: Rest for 24 hrs then resume normal activity. Education Materials Received: {yes/no:197617}  Belongings Returned: {yes/no:[id number]}          I understand and acknowledge receipt of the above instructions.                                                                                                                                           Patient or Guardian Signature                                                         Date/Time                                                                                                                                            Physician's or R.N.'s Signature                                                                  Date/Time      The discharge instructions have been reviewed with the patient and/or Guardian. Patient and/or Guardian signed and retained a printed copy.

## 2023-02-23 ENCOUNTER — PREP FOR PROCEDURE (OUTPATIENT)
Dept: PHYSICAL MEDICINE AND REHAB | Age: 58
End: 2023-02-23

## 2023-02-27 ENCOUNTER — HOSPITAL ENCOUNTER (OUTPATIENT)
Age: 58
Setting detail: OUTPATIENT SURGERY
Discharge: HOME | End: 2023-02-27
Attending: PAIN MEDICINE | Admitting: PAIN MEDICINE
Payer: MEDICARE

## 2023-02-27 ENCOUNTER — APPOINTMENT (OUTPATIENT)
Dept: GENERAL RADIOLOGY | Age: 58
End: 2023-02-27
Attending: PAIN MEDICINE
Payer: MEDICARE

## 2023-02-27 VITALS
RESPIRATION RATE: 14 BRPM | WEIGHT: 150 LBS | HEART RATE: 81 BPM | BODY MASS INDEX: 24.11 KG/M2 | DIASTOLIC BLOOD PRESSURE: 67 MMHG | TEMPERATURE: 97.7 F | SYSTOLIC BLOOD PRESSURE: 140 MMHG | OXYGEN SATURATION: 95 % | HEIGHT: 66 IN

## 2023-02-27 PROCEDURE — 64635 DESTROY LUMB/SAC FACET JNT: CPT | Performed by: PAIN MEDICINE

## 2023-02-27 PROCEDURE — 3600000057 HC PAIN LEVEL 4 ADDL 15 MIN: Performed by: PAIN MEDICINE

## 2023-02-27 PROCEDURE — 64636 DESTROY L/S FACET JNT ADDL: CPT | Performed by: PAIN MEDICINE

## 2023-02-27 PROCEDURE — 7100000011 HC PHASE II RECOVERY - ADDTL 15 MIN: Performed by: PAIN MEDICINE

## 2023-02-27 PROCEDURE — 3600000056 HC PAIN LEVEL 4 BASE: Performed by: PAIN MEDICINE

## 2023-02-27 PROCEDURE — 2500000003 HC RX 250 WO HCPCS: Performed by: PAIN MEDICINE

## 2023-02-27 PROCEDURE — 7100000010 HC PHASE II RECOVERY - FIRST 15 MIN: Performed by: PAIN MEDICINE

## 2023-02-27 PROCEDURE — 3209999900 FLUORO FOR SURGICAL PROCEDURES

## 2023-02-27 PROCEDURE — 2709999900 HC NON-CHARGEABLE SUPPLY: Performed by: PAIN MEDICINE

## 2023-02-27 RX ORDER — BUPIVACAINE HYDROCHLORIDE 2.5 MG/ML
INJECTION, SOLUTION EPIDURAL; INFILTRATION; INTRACAUDAL PRN
Status: DISCONTINUED | OUTPATIENT
Start: 2023-02-27 | End: 2023-02-27 | Stop reason: ALTCHOICE

## 2023-02-27 RX ORDER — LIDOCAINE HYDROCHLORIDE 10 MG/ML
INJECTION, SOLUTION EPIDURAL; INFILTRATION; INTRACAUDAL; PERINEURAL PRN
Status: DISCONTINUED | OUTPATIENT
Start: 2023-02-27 | End: 2023-02-27 | Stop reason: ALTCHOICE

## 2023-02-27 ASSESSMENT — PAIN SCALES - GENERAL: PAINLEVEL_OUTOF10: 0

## 2023-02-27 ASSESSMENT — PAIN - FUNCTIONAL ASSESSMENT: PAIN_FUNCTIONAL_ASSESSMENT: 0-10

## 2023-02-27 NOTE — PROGRESS NOTES
1423: Patient to Phase II Recovery via bed in stable condition on room air. 1426: Patient requesting snack at this time. 1430: IV removed. No complications. 1433: Patient getting dressed. 1445: Patient discharged.

## 2023-02-27 NOTE — OP NOTE
Pre-Procedure Note    Patient Name: Cristy Botello   YOB: 1965  Medical Record Number: [id number]  Date: 2/27/23    Indication:   Lower back pain    Consent: On file. Vital Signs:   Vitals:    02/27/23 1234   BP: 124/70   Pulse: 81   Resp: 16   Temp: 97.7 °F (36.5 °C)   SpO2: 94%       Past Medical History:   has a past medical history of Arthritis, Arthritis, Chest pain, Depression, Dysphagia, GERD (gastroesophageal reflux disease), History of blood transfusion, History of nephrolithiasis, HIV (human immunodeficiency virus infection) ([facility] [address] Hyperlipidemia, and Pancreatitis. Past Surgical History:   has a past surgical history that includes Foot surgery (1970's); Cardiac catheterization (2015???); Colonoscopy (2016); Endoscopy, colon, diagnostic; other surgical history (Bilateral, 01/17/2017); other surgical history (Bilateral, 02/20/2017); other surgical history (Right, 03/28/2017); Nerve Surgery (Bilateral, 07/18/2017); Nerve Block Lumb Facet Level 1 Bilateral (Bilateral, 7/18/2017); Upper gastrointestinal endoscopy ([id number],[id number], [id number]); other surgical history (Bilateral, 03/06/2018); pr njx dx/ther agt pvrt facet jt lmbr/sac 2nd level (Bilateral, 3/6/2018); Lumbar spine surgery (Right, 4/23/2019); Lumbar spine surgery (Left, 5/21/2019); Pain management procedure (Right, 1/14/2020); Pain management procedure (Left, 3/2/2020); Pain management procedure (Right, 10/8/2020); Pain management procedure (Left, 11/12/2020); EGD (2021); and Colonoscopy (2021). Pre-Sedation Documentation and Exam:   Vital signs have been reviewed (see flow sheet for vitals).      Sedation/ Anesthesia Plan:   LOCAL    Patient is an appropriate candidate for plan of sedation: yes         Preoperative Diagnosis:  L-spondylosis     Post-Op Dx: as above     Procedure Performed:  :Radiofrequency ablation of median branches at the levels of 4-5 and L5-S1 bilateral under fluoroscopic guidance      Indication for the Procedure: The patient has ahistory of chronic low back pain that is not responding well to the conservative treatment. Patient's pain is mostly axial in nature. Pain is interfering with the activities of daily living. Physical examination revealed facet tenderness and facet loading is positive. Patient had undergone lumbar facet joint injections with pain relief that lasted for only a short period of time and had greater than 70% pain relief with confirmatory median branch blocks. Hence we decided to do radiofrequency abalation of median branches for long term pain releif. The procedure and risks  were discussed with the patient and an informed consent was obtained. Procedure:     A meaningful communication was kept up with the patient throughout the procedure. The patient is placed in prone position and skin over the back was prepped and draped in sterile manner. Then using fluoroscopy the junction of the transverse process of the vertebra with the superior process of the facet joint was observed and the view was optimized. The skin and deep tissues posterior were infiltrated with 9 ml of  1% xylocaine. The RF canula with the 15 mm active tip was introduced through the skin wheal under fluoroscopy guidance such that the tip of the needle lies in the groove of the transverse process with the superior processes of the facet joint. Then a lateral view of the lumbar spine was obtained to make sure the tip of needle is not in the neural foramen. Then electric impedence was checked to make sure it is acceptable. Then a sensory stimulus was applied at 50 Hz up to 1 volt and concordant pain symptoms were reproduced. Then a motor stimulus was applied at 2 Hz up to 2 volts and no motor stimulation was seen in lower extremities. Some multifidus stimulus was seen. Then after negative aspiration 0.25%  marcaine  4 cc was injected through the needle in divided doses.  Then a  lesion was done at 80 degrees centigrade for 90 seconds. EBL-0  For L5 median branch block the junction of the ala of  the sacrum with the superior articular process of the facet joint was taken as a reference point. For the L4 median branch the junction of the transverse process of L5 with the superolateral possible facet joint was taken as a reference point and for S1 median branch the most lateral and superior aspect of S1 foramina was taken as a reference point,. EBL-0     Patient's vital signs and neurological status remained stable throughout the procedure and post procedural period. The patient tolerated the procedure well and was discharged home in stable condition.      Electronically signed by Adela Irby MD on 2/27/23 at 2:06 PM EST

## 2023-02-27 NOTE — H&P
H&P    States increased activity will flare  the lumbar SI pain  would like to repeat Lumbar RFA. He continues to take Zanaflex Tramadol very rarely. Pain increases with bending, lifting, twisting , reaching, pushing, pulling, walking, standing, stairs and getting up and down. Treatments Tried ice, heat, NSAIDS, narcotics, muscle relaxer, OTC rubs creams patches, steroid burst and injections  Pain Description sharp, burning, aching, numbness and tingling     He denies any ER visits or new health issues since last visit. Pain scale with out pain medications or at its worst is 9/10. Pain scale with pain medications or at its best is 3/10. Last dose of Tramadol  was  4 days ago  takes very rarely   Drug screen reviewed from 10/2022 and was appropriate  Pill count completed  today and WNL: Yes            Prior Injections:     Lumbar RFA L3-S1 bilateral right 10/2020 Left 11/2020 with continued 50% pain relief        Radiology:     FINDINGS: The osseous framework of the lumbar spine demonstrates satisfactory alignment. Marrow signals show no evidence of aggressive lytic or blastic process. No evidence of fracture. No evidence of spondylolysis. There are mildly desiccated intervertebral discs throughout the lumbar spine. Minimal endplate osteophytes are also present, predominantly in the proximal lumbar spine. The conus medullaris is located at the superior L1 level. It and the spinal cord parenchyma imaged have normal signal characteristics. Components of the cauda equina as well show no finding to indicate intrinsic abnormality. Very mild degenerative disc changes are present in the T11-12 and T12-L1 disc space levels, producing very minimal left lateral canal stenosis. No significant foraminal stenosis. There is bulging disc material, mild, at L1-2. Very minimal to mild facet degenerative changes are present. There is resultant minimal central canal stenosis.  There is no significant foraminal stenosis on either side. The L2-3 canal is grossly patent. There are mild facet degenerative changes bilaterally. There is no focal disc abnormality. The foramina are uncompromised on both sides. The L3-4 canal is also widely patent. Mild facet degeneration evident at this level as well. No focal disc abnormality demonstrated. No compromise of the foramen demonstrated. The L4-5 canal is widely patent, as are the foramina. Mild facet degenerative changes are present bilaterally. This has only minimal desiccation but otherwise is within normal limits. Widely patent canal and foramina also demonstrated at L5-S1. There are mild facet degeneration evident bilaterally. The disc is essentially within normal limits. Surrounding tissues reveal no concerning characteristics of acute or aggressive process though slightly prominent atherosclerotic changes are implied in the abdominal aorta, poorly defined, including irregular asymmetric plaque at the level of the   proximal infrarenal abdominal aortic. Impression   NO EVIDENCE OF ACUTE ABNORMALITY OR CHARACTERISTICS OF SIGNIFICANT CANAL OR FORAMINAL STENOSIS. FACET DEGENERATIVE CHANGES ARE PRESENT DIFFUSELY, MILD, WITH LESS IMPRESSIVE DISC DEGENERATION, PREDOMINANTLY DEMONSTRATED AT THE L1-2 LEVEL,   WHERE THERE IS ONLY MILD BULGING DISC MATERIAL DEMONSTRATED. POORLY CHARACTERIZED PROMINENT ATHEROSCLEROTIC DISEASE OF THE ABDOMINAL AORTA IMPLIED. CONSIDER FURTHER EVALUATION OF THE ABDOMINAL AORTA SUCH AS WITH ULTRASOUND OR CTA, TO FURTHER EVALUATE. The patienthas No Known Allergies. Subjective:      Review of Systems   Constitutional:  Positive for activity change. Negative for appetite change, chills, diaphoresis, fatigue, fever and unexpected weight change. HENT:  Positive for hearing loss.  Negative for congestion, ear pain, mouth sores, nosebleeds, rhinorrhea, sinus pressure and sore throat. Eyes: Negative. Negative for photophobia, pain and visual disturbance. Respiratory:  Positive for shortness of breath. Negative for chest tightness. DAY COPD smokes 1 pack a day pt has noticed increased SOB lately now on  Inhaler per pulmonary    Cardiovascular:         CAD HTN  HLD   Gastrointestinal:  Negative for abdominal pain, constipation, diarrhea, nausea and vomiting. GERD  Recent EGD Colonoscopy    Endocrine: Negative. Negative for cold intolerance, heat intolerance, polydipsia, polyphagia and polyuria. DM    Genitourinary: Negative. Negative for decreased urine volume, difficulty urinating, frequency and hematuria. Musculoskeletal:  Positive for arthralgias, back pain, gait problem and myalgias. Negative for joint swelling, neck pain and neck stiffness. Skin: Negative. Negative for color change and rash. Allergic/Immunologic: Negative. Negative for food allergies and immunocompromised state. Neurological:  Negative for dizziness, tremors, seizures, syncope, facial asymmetry, speech difficulty, weakness, light-headedness, numbness and headaches. Hematological: Negative. Does not bruise/bleed easily. HIV   Psychiatric/Behavioral: Negative. Negative for agitation, behavioral problems, confusion, decreased concentration, dysphoric mood, hallucinations, self-injury, sleep disturbance and suicidal ideas. The patient is not nervous/anxious and is not hyperactive. Objective:      Vitals       Vitals:     01/30/23 0950   BP: 130/82   Site: Left Upper Arm   Position: Sitting   Cuff Size: Medium Adult   Pulse: 80   Weight: 150 lb (68 kg)   Height: 5' 6\" (1.676 m)            Physical Exam  Vitals and nursing note reviewed. Constitutional:       General: He is not in acute distress. Appearance: He is well-developed. He is not diaphoretic. HENT:      Head: Normocephalic and atraumatic.       Right Ear: External ear normal.      Left Ear: External ear normal.      Nose: Nose normal.      Mouth/Throat:      Pharynx: No oropharyngeal exudate. Eyes:      General: No scleral icterus. Right eye: No discharge. Left eye: No discharge. Conjunctiva/sclera: Conjunctivae normal.      Pupils: Pupils are equal, round, and reactive to light. Neck:      Thyroid: No thyromegaly. Cardiovascular:      Rate and Rhythm: Normal rate and regular rhythm. Heart sounds: Normal heart sounds. No murmur heard. No friction rub. No gallop. Pulmonary:      Effort: Pulmonary effort is normal. No respiratory distress. Breath sounds: Examination of the right-upper field reveals wheezing. Examination of the left-upper field reveals wheezing. Decreased breath sounds and wheezing present. No rales. Comments:   Left sided I and E A and P   wheezes  DBS   DAY  Chest:      Chest wall: No tenderness. Abdominal:      General: Bowel sounds are normal. There is no distension. Palpations: Abdomen is soft. Tenderness: There is no abdominal tenderness. There is no guarding or rebound. Musculoskeletal:         General: Tenderness present. Right shoulder: Normal.      Left shoulder: Normal.      Cervical back: Full passive range of motion without pain, normal range of motion and neck supple. No edema, erythema or rigidity. No muscular tenderness. Normal range of motion. Thoracic back: Tenderness and bony tenderness present. Decreased range of motion. Lumbar back: Spasms, tenderness and bony tenderness present. Decreased range of motion. Back:     Right hip: Tenderness present. Left hip: Tenderness present. Right knee: Normal.      Left knee: Normal.      Right ankle: Normal.      Left ankle: Normal.   Skin:     General: Skin is warm. Coloration: Skin is not pale. Findings: No erythema or rash. Neurological:      Mental Status: He is alert and oriented to person, place, and time.  He is not disoriented. Cranial Nerves: No cranial nerve deficit. Sensory: Sensation is intact. No sensory deficit. Motor: Weakness present. No atrophy or abnormal muscle tone. Coordination: Coordination is intact. Coordination normal.      Gait: Gait abnormal.      Deep Tendon Reflexes: Reflexes are normal and symmetric. Reflex Scores:       Tricep reflexes are 2+ on the right side and 2+ on the left side. Bicep reflexes are 2+ on the right side and 2+ on the left side. Brachioradialis reflexes are 2+ on the right side and 2+ on the left side. Patellar reflexes are 2+ on the right side and 2+ on the left side. Achilles reflexes are 2+ on the right side and 2+ on the left side. Comments: SLR-  Motor 4/5 generalized weakness  Feet bilat dusky when dependent   Psychiatric:         Attention and Perception: Attention and perception normal. He is attentive. Mood and Affect: Mood and affect normal. Mood is not anxious or depressed. Affect is not labile, blunt, angry or inappropriate. Speech: Speech normal.         Behavior: Behavior normal. Behavior is not agitated, slowed, aggressive, withdrawn, hyperactive or combative. Behavior is cooperative. Thought Content: Thought content normal. Thought content is not paranoid or delusional. Thought content does not include homicidal or suicidal ideation. Thought content does not include homicidal or suicidal plan. Cognition and Memory: Cognition and memory normal. Memory is not impaired. He does not exhibit impaired recent memory or impaired remote memory. Judgment: Judgment normal. Judgment is not impulsive or inappropriate. Comments: Flat affect, h/o depression      HARRISON  Patricks test  positive  Yeoman's  or Gaenslen's positive  Kemps  positive  Spurlings  positive  Montgomery's na        Assessment:      1. Lumbar spondylosis    2. SI (sacroiliac) joint inflammation (HCC)    3.  Chronic pain syndrome    4. Sacroiliac inflammation (HCC)    5. Spondylosis of lumbar region without myelopathy or radiculopathy       Plan:      OARRS reviewed. Current MED: 20  Patient was not offered naloxone for home. Testing Labs or Radiology reviewed: Lumbar   Procedures:repeat Lumbar RFA Bilateral L4-5,5-S1   Discussed with patient about risks with procedure including infection, reaction to medication, increased pain, or bleeding. Medications:Tramadol very rarely Zanaflex rarely   If patient is on blood thinners will need approval to hold yes or no:N/A  Does patient have implanted devices? Stimulators, AICD or Pacemaker:N/A           Return for repeat Lumbar RFA L4-5, 5-S1 , Follow up after procedure. show

## 2023-02-27 NOTE — H&P
[facility]  History and Physical Update    Pt Name: Deirdre Valenzuela  MRN: [id number]  YOB: 1965  Date of evaluation: 2/27/2023      I have examined the patient and reviewed the H&P/Consult and there are no changes to the patient or plans.         Electronically signed by Jessica Roger MD on 2/27/2023 at 12:39 PM

## 2023-03-27 ENCOUNTER — OFFICE VISIT (OUTPATIENT)
Dept: PHYSICAL MEDICINE AND REHAB | Age: 58
End: 2023-03-27
Payer: MEDICARE

## 2023-03-27 VITALS
WEIGHT: 150 LBS | HEIGHT: 66 IN | BODY MASS INDEX: 24.11 KG/M2 | SYSTOLIC BLOOD PRESSURE: 118 MMHG | HEART RATE: 78 BPM | DIASTOLIC BLOOD PRESSURE: 64 MMHG

## 2023-03-27 DIAGNOSIS — M46.1 SI (SACROILIAC) JOINT INFLAMMATION (HCC): ICD-10-CM

## 2023-03-27 DIAGNOSIS — G89.4 CHRONIC PAIN SYNDROME: ICD-10-CM

## 2023-03-27 DIAGNOSIS — M47.816 SPONDYLOSIS OF LUMBAR REGION WITHOUT MYELOPATHY OR RADICULOPATHY: ICD-10-CM

## 2023-03-27 DIAGNOSIS — M46.1 SACROILIAC INFLAMMATION (HCC): ICD-10-CM

## 2023-03-27 DIAGNOSIS — B20 HIV INFECTION, UNSPECIFIED SYMPTOM STATUS (HCC): ICD-10-CM

## 2023-03-27 DIAGNOSIS — M47.816 LUMBAR SPONDYLOSIS: Primary | ICD-10-CM

## 2023-03-27 PROCEDURE — 99214 OFFICE O/P EST MOD 30 MIN: CPT | Performed by: NURSE PRACTITIONER

## 2023-03-27 ASSESSMENT — ENCOUNTER SYMPTOMS
VOMITING: 0
SINUS PRESSURE: 0
EYES NEGATIVE: 1
EYE PAIN: 0
PHOTOPHOBIA: 0
SORE THROAT: 0
CHEST TIGHTNESS: 0
BACK PAIN: 1
DIARRHEA: 0
SHORTNESS OF BREATH: 1
NAUSEA: 0
COLOR CHANGE: 0
CONSTIPATION: 0
ABDOMINAL PAIN: 0
RHINORRHEA: 0
ALLERGIC/IMMUNOLOGIC NEGATIVE: 1

## 2023-05-15 RX ORDER — ISOSORBIDE DINITRATE 20 MG/1
TABLET ORAL
Qty: 60 TABLET | Refills: 11 | OUTPATIENT
Start: 2023-05-15

## 2023-05-24 ENCOUNTER — OFFICE VISIT (OUTPATIENT)
Dept: CARDIOLOGY CLINIC | Age: 58
End: 2023-05-24
Payer: MEDICARE

## 2023-05-24 VITALS
HEIGHT: 66 IN | HEART RATE: 95 BPM | BODY MASS INDEX: 23.33 KG/M2 | SYSTOLIC BLOOD PRESSURE: 116 MMHG | WEIGHT: 145.2 LBS | DIASTOLIC BLOOD PRESSURE: 78 MMHG

## 2023-05-24 DIAGNOSIS — I10 ESSENTIAL HYPERTENSION: ICD-10-CM

## 2023-05-24 DIAGNOSIS — K22.70 BARRETT'S ESOPHAGUS WITHOUT DYSPLASIA: ICD-10-CM

## 2023-05-24 DIAGNOSIS — E78.2 MIXED HYPERLIPIDEMIA: ICD-10-CM

## 2023-05-24 DIAGNOSIS — I25.10 CORONARY ARTERY DISEASE INVOLVING NATIVE CORONARY ARTERY OF NATIVE HEART WITHOUT ANGINA PECTORIS: Primary | ICD-10-CM

## 2023-05-24 DIAGNOSIS — K21.9 GASTROESOPHAGEAL REFLUX DISEASE WITHOUT ESOPHAGITIS: ICD-10-CM

## 2023-05-24 DIAGNOSIS — Z76.0 ENCOUNTER FOR MEDICATION REFILL: ICD-10-CM

## 2023-05-24 PROCEDURE — 3078F DIAST BP <80 MM HG: CPT | Performed by: STUDENT IN AN ORGANIZED HEALTH CARE EDUCATION/TRAINING PROGRAM

## 2023-05-24 PROCEDURE — 99213 OFFICE O/P EST LOW 20 MIN: CPT | Performed by: STUDENT IN AN ORGANIZED HEALTH CARE EDUCATION/TRAINING PROGRAM

## 2023-05-24 PROCEDURE — 3074F SYST BP LT 130 MM HG: CPT | Performed by: STUDENT IN AN ORGANIZED HEALTH CARE EDUCATION/TRAINING PROGRAM

## 2023-05-24 PROCEDURE — 93000 ELECTROCARDIOGRAM COMPLETE: CPT | Performed by: STUDENT IN AN ORGANIZED HEALTH CARE EDUCATION/TRAINING PROGRAM

## 2023-05-24 RX ORDER — ATORVASTATIN CALCIUM 40 MG/1
40 TABLET, FILM COATED ORAL DAILY
COMMUNITY
End: 2023-05-24 | Stop reason: SDUPTHER

## 2023-05-24 RX ORDER — FENOFIBRATE 145 MG/1
145 TABLET, COATED ORAL DAILY
COMMUNITY
End: 2023-05-24 | Stop reason: SDUPTHER

## 2023-05-24 RX ORDER — ATORVASTATIN CALCIUM 40 MG/1
40 TABLET, FILM COATED ORAL DAILY
Qty: 30 TABLET | Refills: 11 | Status: SHIPPED | OUTPATIENT
Start: 2023-05-24

## 2023-05-24 RX ORDER — FENOFIBRATE 145 MG/1
145 TABLET, COATED ORAL DAILY
Qty: 30 TABLET | Refills: 11 | Status: SHIPPED | OUTPATIENT
Start: 2023-05-24

## 2023-05-24 RX ORDER — PANTOPRAZOLE SODIUM 40 MG/1
40 TABLET, DELAYED RELEASE ORAL
Qty: 90 TABLET | Refills: 11 | Status: SHIPPED | OUTPATIENT
Start: 2023-05-24

## 2023-05-24 RX ORDER — ISOSORBIDE DINITRATE 20 MG/1
TABLET ORAL
Qty: 60 TABLET | Refills: 11 | Status: SHIPPED | OUTPATIENT
Start: 2023-05-24

## 2023-05-24 NOTE — PROGRESS NOTES
Pt here for a check up    EKG done today    Pt denies cp, sob, dizziness, LASHA and palpitations
Placed This Encounter   Procedures    EKG 12 Lead     Order Specific Question:   Reason for Exam?     Answer: Other       Assessment/Plan:   SOB/Hx of mild CAD--breathing is about the same. No obvious chest pain. Continue GDMT as before with statin, tricor, isordil, bb, protonix. Disposition:   1 year.    Follow up with Dr Myles Livingston as scheduled or sooner if needed

## 2023-07-18 ENCOUNTER — OFFICE VISIT (OUTPATIENT)
Dept: PHYSICAL MEDICINE AND REHAB | Age: 58
End: 2023-07-18
Payer: MEDICARE

## 2023-07-18 VITALS
HEART RATE: 64 BPM | WEIGHT: 144 LBS | SYSTOLIC BLOOD PRESSURE: 120 MMHG | HEIGHT: 66 IN | BODY MASS INDEX: 23.14 KG/M2 | DIASTOLIC BLOOD PRESSURE: 70 MMHG

## 2023-07-18 DIAGNOSIS — M46.1 SACROILIAC INFLAMMATION (HCC): ICD-10-CM

## 2023-07-18 DIAGNOSIS — M47.816 LUMBAR SPONDYLOSIS: Primary | ICD-10-CM

## 2023-07-18 DIAGNOSIS — G89.4 CHRONIC PAIN SYNDROME: ICD-10-CM

## 2023-07-18 DIAGNOSIS — M46.1 SI (SACROILIAC) JOINT INFLAMMATION (HCC): ICD-10-CM

## 2023-07-18 DIAGNOSIS — B20 HIV INFECTION, UNSPECIFIED SYMPTOM STATUS (HCC): ICD-10-CM

## 2023-07-18 DIAGNOSIS — M47.816 SPONDYLOSIS OF LUMBAR REGION WITHOUT MYELOPATHY OR RADICULOPATHY: ICD-10-CM

## 2023-07-18 PROCEDURE — 99214 OFFICE O/P EST MOD 30 MIN: CPT | Performed by: NURSE PRACTITIONER

## 2023-07-18 RX ORDER — TRAMADOL HYDROCHLORIDE 50 MG/1
50 TABLET ORAL 2 TIMES DAILY PRN
Qty: 60 TABLET | Refills: 0 | Status: SHIPPED | OUTPATIENT
Start: 2023-07-18 | End: 2023-08-17

## 2023-07-18 RX ORDER — TIZANIDINE 4 MG/1
4 TABLET ORAL 2 TIMES DAILY PRN
Qty: 180 TABLET | Refills: 0 | Status: SHIPPED | OUTPATIENT
Start: 2023-07-18 | End: 2023-10-16

## 2023-07-18 ASSESSMENT — ENCOUNTER SYMPTOMS
SINUS PRESSURE: 0
SORE THROAT: 0
EYE PAIN: 0
SHORTNESS OF BREATH: 1
NAUSEA: 0
PHOTOPHOBIA: 0
BACK PAIN: 1
CONSTIPATION: 0
COLOR CHANGE: 0
EYES NEGATIVE: 1
ABDOMINAL PAIN: 0
ALLERGIC/IMMUNOLOGIC NEGATIVE: 1
CHEST TIGHTNESS: 0
VOMITING: 0
RHINORRHEA: 0
DIARRHEA: 0

## 2023-07-18 NOTE — PROGRESS NOTES
Functionality Assessment/Goals Worksheet     On a scale of 0 (Does not Interfere) to 10 (Completely Interferes)     1. Which number describes how during the past week pain has interfered with           the following:  A. General Activity:  0  B. Mood: 0  C. Walking Ability:  6  D. Normal Work (Includes both work outside the home and housework):  6  E. Relations with Other People:   0  F. Sleep:   0  G. Enjoyment of Life:   0    2. Patient Prefers to Take their Pain Medications:     []  On a regular basis   [x]  Only when necessary    []  Does not take pain medications    3. What are the Patient's Goals/Expectations for Visiting Pain Management?      []  Learn about my pain    [x]  Receive Medication   []  Physical Therapy     []  Treat Depression   [x]  Receive Injections    []  Treat Sleep   []  Deal with Anxiety and Stress   []  Treat Opoid Dependence/Addiction   []  Other:
Prescribed:   Orders Placed This Encounter   Medications    traMADol (ULTRAM) 50 MG tablet     Sig: Take 1 tablet by mouth 2 times daily as needed for Pain for up to 30 days. Max Daily Amount: 100 mg     Dispense:  60 tablet     Refill:  0     Reduce doses taken as pain becomes manageable    tiZANidine (ZANAFLEX) 4 MG tablet     Sig: Take 1 tablet by mouth 2 times daily as needed (spasms)     Dispense:  180 tablet     Refill:  0       Return in about 2 months (around 9/18/2023).                Electronically signed by ALIZE Gale CNP on7/18/2023 at 1:18 PM

## 2023-09-07 ENCOUNTER — HOSPITAL ENCOUNTER (INPATIENT)
Age: 58
LOS: 3 days | Discharge: HOME | DRG: 641 | End: 2023-09-11
Attending: EMERGENCY MEDICINE | Admitting: INTERNAL MEDICINE
Payer: MEDICARE

## 2023-09-07 ENCOUNTER — APPOINTMENT (OUTPATIENT)
Dept: CT IMAGING | Age: 58
DRG: 641 | End: 2023-09-07
Payer: MEDICARE

## 2023-09-07 ENCOUNTER — APPOINTMENT (OUTPATIENT)
Dept: GENERAL RADIOLOGY | Age: 58
DRG: 641 | End: 2023-09-07
Payer: MEDICARE

## 2023-09-07 DIAGNOSIS — R07.9 CHEST PAIN, UNSPECIFIED TYPE: ICD-10-CM

## 2023-09-07 DIAGNOSIS — R10.11 ABDOMINAL PAIN, RIGHT UPPER QUADRANT: Primary | ICD-10-CM

## 2023-09-07 DIAGNOSIS — E87.1 HYPONATREMIA: ICD-10-CM

## 2023-09-07 LAB
ALBUMIN SERPL BCG-MCNC: 4.3 G/DL (ref 3.5–5.1)
ALP SERPL-CCNC: 43 U/L (ref 38–126)
ALT SERPL W/O P-5'-P-CCNC: 19 U/L (ref 11–66)
ANION GAP SERPL CALC-SCNC: 14 MEQ/L (ref 8–16)
ANION GAP SERPL CALC-SCNC: 15 MEQ/L (ref 8–16)
AST SERPL-CCNC: 35 U/L (ref 5–40)
BACTERIA URNS QL MICRO: ABNORMAL /HPF
BILIRUB CONJ SERPL-MCNC: < 0.2 MG/DL (ref 0–0.3)
BILIRUB SERPL-MCNC: 0.7 MG/DL (ref 0.3–1.2)
BILIRUB UR QL STRIP.AUTO: NEGATIVE
BUN SERPL-MCNC: 15 MG/DL (ref 7–22)
BUN SERPL-MCNC: 16 MG/DL (ref 7–22)
CALCIUM SERPL-MCNC: 9.6 MG/DL (ref 8.5–10.5)
CALCIUM SERPL-MCNC: 9.9 MG/DL (ref 8.5–10.5)
CASTS #/AREA URNS LPF: ABNORMAL /LPF
CASTS 2: ABNORMAL /LPF
CHARACTER UR: CLEAR
CHLORIDE SERPL-SCNC: 80 MEQ/L (ref 98–111)
CHLORIDE SERPL-SCNC: 81 MEQ/L (ref 98–111)
CO2 SERPL-SCNC: 23 MEQ/L (ref 23–33)
CO2 SERPL-SCNC: 23 MEQ/L (ref 23–33)
COLOR: YELLOW
CREAT SERPL-MCNC: 1 MG/DL (ref 0.4–1.2)
CREAT SERPL-MCNC: 1.1 MG/DL (ref 0.4–1.2)
CRYSTALS URNS MICRO: ABNORMAL
EPITHELIAL CELLS, UA: ABNORMAL /HPF
GFR SERPL CREATININE-BSD FRML MDRD: > 60 ML/MIN/1.73M2
GFR SERPL CREATININE-BSD FRML MDRD: > 60 ML/MIN/1.73M2
GLUCOSE SERPL-MCNC: 117 MG/DL (ref 70–108)
GLUCOSE SERPL-MCNC: 94 MG/DL (ref 70–108)
GLUCOSE UR QL STRIP.AUTO: NEGATIVE MG/DL
HCT VFR BLD AUTO: 34.5 % (ref 42–52)
HGB BLD-MCNC: 12.8 GM/DL (ref 14–18)
HGB UR QL STRIP.AUTO: ABNORMAL
KETONES UR QL STRIP.AUTO: NEGATIVE
LIPASE SERPL-CCNC: 38.1 U/L (ref 5.6–51.3)
MISCELLANEOUS 2: ABNORMAL
NITRITE UR QL STRIP: NEGATIVE
OSMOLALITY SERPL CALC.SUM OF ELEC: 237.2 MOSMOL/KG (ref 275–300)
OSMOLALITY SERPL CALC.SUM OF ELEC: 242.6 MOSMOL/KG (ref 275–300)
OSMOLALITY SERPL: 249 MOSMOL/KG (ref 275–295)
OSMOLALITY UR: 270 MOSMOL/KG (ref 250–750)
PH UR STRIP.AUTO: 6.5 [PH] (ref 5–9)
POTASSIUM SERPL-SCNC: 3.8 MEQ/L (ref 3.5–5.2)
POTASSIUM SERPL-SCNC: 4.3 MEQ/L (ref 3.5–5.2)
PROT SERPL-MCNC: 7.4 G/DL (ref 6.1–8)
PROT UR STRIP.AUTO-MCNC: 300 MG/DL
RBC URINE: ABNORMAL /HPF
RENAL EPI CELLS #/AREA URNS HPF: ABNORMAL /[HPF]
SCAN OF BLOOD SMEAR: NORMAL
SODIUM SERPL-SCNC: 117 MEQ/L (ref 135–145)
SODIUM SERPL-SCNC: 119 MEQ/L (ref 135–145)
SODIUM UR-SCNC: < 20 MEQ/L
SP GR UR REFRACT.AUTO: 1.02 (ref 1–1.03)
TROPONIN, HIGH SENSITIVITY: 23 NG/L (ref 0–12)
UROBILINOGEN, URINE: 0.2 EU/DL (ref 0–1)
WBC #/AREA URNS HPF: ABNORMAL /HPF
WBC #/AREA URNS HPF: ABNORMAL /[HPF]
YEAST LIKE FUNGI URNS QL MICRO: ABNORMAL

## 2023-09-07 PROCEDURE — 71046 X-RAY EXAM CHEST 2 VIEWS: CPT

## 2023-09-07 PROCEDURE — 84484 ASSAY OF TROPONIN QUANT: CPT

## 2023-09-07 PROCEDURE — 81001 URINALYSIS AUTO W/SCOPE: CPT

## 2023-09-07 PROCEDURE — 74177 CT ABD & PELVIS W/CONTRAST: CPT

## 2023-09-07 PROCEDURE — 84300 ASSAY OF URINE SODIUM: CPT

## 2023-09-07 PROCEDURE — 82607 VITAMIN B-12: CPT

## 2023-09-07 PROCEDURE — 87086 URINE CULTURE/COLONY COUNT: CPT

## 2023-09-07 PROCEDURE — 71260 CT THORAX DX C+: CPT

## 2023-09-07 PROCEDURE — 83690 ASSAY OF LIPASE: CPT

## 2023-09-07 PROCEDURE — 85018 HEMOGLOBIN: CPT

## 2023-09-07 PROCEDURE — 80053 COMPREHEN METABOLIC PANEL: CPT

## 2023-09-07 PROCEDURE — 93005 ELECTROCARDIOGRAM TRACING: CPT | Performed by: NURSE PRACTITIONER

## 2023-09-07 PROCEDURE — 85014 HEMATOCRIT: CPT

## 2023-09-07 PROCEDURE — 82746 ASSAY OF FOLIC ACID SERUM: CPT

## 2023-09-07 PROCEDURE — 82248 BILIRUBIN DIRECT: CPT

## 2023-09-07 PROCEDURE — 99215 OFFICE O/P EST HI 40 MIN: CPT

## 2023-09-07 PROCEDURE — 83935 ASSAY OF URINE OSMOLALITY: CPT

## 2023-09-07 PROCEDURE — 99204 OFFICE O/P NEW MOD 45 MIN: CPT | Performed by: NURSE PRACTITIONER

## 2023-09-07 PROCEDURE — 85025 COMPLETE CBC W/AUTO DIFF WBC: CPT

## 2023-09-07 PROCEDURE — 83930 ASSAY OF BLOOD OSMOLALITY: CPT

## 2023-09-07 PROCEDURE — 6360000004 HC RX CONTRAST MEDICATION: Performed by: EMERGENCY MEDICINE

## 2023-09-07 PROCEDURE — 36415 COLL VENOUS BLD VENIPUNCTURE: CPT

## 2023-09-07 PROCEDURE — 6370000000 HC RX 637 (ALT 250 FOR IP): Performed by: EMERGENCY MEDICINE

## 2023-09-07 PROCEDURE — 99285 EMERGENCY DEPT VISIT HI MDM: CPT

## 2023-09-07 RX ORDER — LIDOCAINE 4 G/G
1 PATCH TOPICAL ONCE
Status: COMPLETED | OUTPATIENT
Start: 2023-09-07 | End: 2023-09-08

## 2023-09-07 RX ADMIN — IOPAMIDOL 80 ML: 755 INJECTION, SOLUTION INTRAVENOUS at 20:34

## 2023-09-07 ASSESSMENT — ENCOUNTER SYMPTOMS
ABDOMINAL PAIN: 1
SORE THROAT: 0
DIARRHEA: 0
COUGH: 0
RHINORRHEA: 0
CHEST TIGHTNESS: 0
NAUSEA: 0
SHORTNESS OF BREATH: 0
BACK PAIN: 1
VOMITING: 0

## 2023-09-07 ASSESSMENT — PAIN SCALES - GENERAL
PAINLEVEL_OUTOF10: 6
PAINLEVEL_OUTOF10: 8
PAINLEVEL_OUTOF10: 10
PAINLEVEL_OUTOF10: 7
PAINLEVEL_OUTOF10: 8

## 2023-09-07 ASSESSMENT — PAIN DESCRIPTION - PAIN TYPE: TYPE: ACUTE PAIN

## 2023-09-07 ASSESSMENT — PAIN DESCRIPTION - LOCATION
LOCATION: BACK

## 2023-09-07 ASSESSMENT — PAIN DESCRIPTION - DESCRIPTORS: DESCRIPTORS: ACHING

## 2023-09-07 ASSESSMENT — PAIN - FUNCTIONAL ASSESSMENT: PAIN_FUNCTIONAL_ASSESSMENT: 0-10

## 2023-09-07 ASSESSMENT — PAIN DESCRIPTION - ORIENTATION: ORIENTATION: RIGHT;LEFT

## 2023-09-07 NOTE — ED NOTES
Pt report right rib pain and left out back pain under his scapula.       Allie Rainey, RN  09/07/23 1924

## 2023-09-07 NOTE — ED TRIAGE NOTES
Pt c/o upper and lower back pain x 2 days. Denies known injury. Denies nausea, vomiting or diarrhea. Pt is very hard of hearing and did not wear hearing aids today. Denies taking any medication OTC for pain relief.

## 2023-09-07 NOTE — ED NOTES
Pt denies pain at present. Instructed to go directly to [facility] ER and remain NPO. Pt voiced understanding. To lobby via ambulation. Gait steady.       Violeta Rosenberg RN  09/07/23 0357

## 2023-09-08 PROBLEM — R10.9 ABDOMINAL PAIN: Status: ACTIVE | Noted: 2023-09-08

## 2023-09-08 LAB
AMPHETAMINES UR QL SCN: NEGATIVE
ANION GAP SERPL CALC-SCNC: 19 MEQ/L (ref 8–16)
AUTO DIFF PNL BLD: ABNORMAL
B-OH-BUTYR SERPL-MSCNC: 3.2 MG/DL (ref 0.2–2.81)
BARBITURATES UR QL SCN: NEGATIVE
BASOPHILS ABSOLUTE: 0 THOU/MM3 (ref 0–0.1)
BASOPHILS NFR BLD AUTO: 0.3 %
BENZODIAZ UR QL SCN: NEGATIVE
BUN SERPL-MCNC: 15 MG/DL (ref 7–22)
BZE UR QL SCN: NEGATIVE
CALCIUM SERPL-MCNC: 10.2 MG/DL (ref 8.5–10.5)
CANNABINOIDS UR QL SCN: NEGATIVE
CHLORIDE SERPL-SCNC: 84 MEQ/L (ref 98–111)
CHOLEST SERPL-MCNC: 173 MG/DL (ref 100–199)
CK SERPL-CCNC: 575 U/L (ref 55–170)
CO2 SERPL-SCNC: 21 MEQ/L (ref 23–33)
CREAT SERPL-MCNC: 1.1 MG/DL (ref 0.4–1.2)
CRP SERPL-MCNC: 6.25 MG/DL (ref 0–1)
D DIMER PPP IA.FEU-MCNC: 488 NG/ML FEU (ref 0–500)
DEPRECATED RDW RBC AUTO: 45.5 FL (ref 35–45)
DEPRECATED RDW RBC AUTO: 46.9 FL (ref 35–45)
EKG ATRIAL RATE: 79 BPM
EKG P AXIS: 32 DEGREES
EKG P-R INTERVAL: 166 MS
EKG Q-T INTERVAL: 406 MS
EKG QRS DURATION: 116 MS
EKG QTC CALCULATION (BAZETT): 465 MS
EKG R AXIS: -7 DEGREES
EKG T AXIS: 45 DEGREES
EKG VENTRICULAR RATE: 79 BPM
EOSINOPHIL NFR BLD AUTO: 1.2 %
EOSINOPHILS ABSOLUTE: 0.1 THOU/MM3 (ref 0–0.4)
ERYTHROCYTE [DISTWIDTH] IN BLOOD BY AUTOMATED COUNT: 11.4 % (ref 11.5–14.5)
ERYTHROCYTE [DISTWIDTH] IN BLOOD BY AUTOMATED COUNT: 11.5 % (ref 11.5–14.5)
ERYTHROCYTE [SEDIMENTATION RATE] IN BLOOD BY WESTERGREN METHOD: 46 MM/HR (ref 0–10)
ETHANOL SERPL-MCNC: < 0.01 %
FENTANYL: NEGATIVE
FLUAV RNA RESP QL NAA+PROBE: NOT DETECTED
FLUBV RNA RESP QL NAA+PROBE: NOT DETECTED
FOLATE SERPL-MCNC: 6 NG/ML (ref 4.8–24.2)
GFR SERPL CREATININE-BSD FRML MDRD: > 60 ML/MIN/1.73M2
GLUCOSE BLD STRIP.AUTO-MCNC: 116 MG/DL (ref 70–108)
GLUCOSE BLD STRIP.AUTO-MCNC: 119 MG/DL (ref 70–108)
GLUCOSE BLD STRIP.AUTO-MCNC: 141 MG/DL (ref 70–108)
GLUCOSE BLD STRIP.AUTO-MCNC: 208 MG/DL (ref 70–108)
GLUCOSE SERPL-MCNC: 120 MG/DL (ref 70–108)
HCT VFR BLD AUTO: 23 % (ref 42–52)
HCT VFR BLD AUTO: 37.7 % (ref 42–52)
HDLC SERPL-MCNC: 44 MG/DL
HGB BLD-MCNC: 13.9 GM/DL (ref 14–18)
HGB BLD-MCNC: 8.3 GM/DL (ref 14–18)
IMM GRANULOCYTES # BLD AUTO: 0.03 THOU/MM3 (ref 0–0.07)
IMM GRANULOCYTES NFR BLD AUTO: 0.5 %
LACTATE SERPL-SCNC: 0.9 MMOL/L (ref 0.5–2)
LACTATE SERPL-SCNC: 1.5 MMOL/L (ref 0.5–2)
LDLC SERPL CALC-MCNC: 96 MG/DL
LIPASE SERPL-CCNC: 42.7 U/L (ref 5.6–51.3)
LYMPHOCYTES ABSOLUTE: 2.4 THOU/MM3 (ref 1–4.8)
LYMPHOCYTES NFR BLD AUTO: 36.5 %
MACROCYTES BLD QL SMEAR: PRESENT
MCH RBC QN AUTO: 39.8 PG (ref 26–33)
MCH RBC QN AUTO: 40.3 PG (ref 26–33)
MCHC RBC AUTO-ENTMCNC: 36.1 GM/DL (ref 32.2–35.5)
MCHC RBC AUTO-ENTMCNC: 36.9 GM/DL (ref 32.2–35.5)
MCV RBC AUTO: 108 FL (ref 80–94)
MCV RBC AUTO: 111.7 FL (ref 80–94)
MONOCYTES ABSOLUTE: 0.7 THOU/MM3 (ref 0.4–1.3)
MONOCYTES NFR BLD AUTO: 10.9 %
MRSA DNA SPEC QL NAA+PROBE: NEGATIVE
NEUTROPHILS NFR BLD AUTO: 50.6 %
NRBC BLD AUTO-RTO: 0 /100 WBC
OPIATES UR QL SCN: NEGATIVE
OSMOLALITY SERPL CALC.SUM OF ELEC: 251.7 MOSMOL/KG (ref 275–300)
OXYCODONE: NEGATIVE
PATHOLOGIST REVIEW: ABNORMAL
PCP UR QL SCN: NEGATIVE
PLATELET # BLD AUTO: 209 THOU/MM3 (ref 130–400)
PLATELET # BLD AUTO: 411 THOU/MM3 (ref 130–400)
PLATELET BLD QL SMEAR: ADEQUATE
PMV BLD AUTO: 8.4 FL (ref 9.4–12.4)
PMV BLD AUTO: 8.5 FL (ref 9.4–12.4)
POTASSIUM SERPL-SCNC: 3.6 MEQ/L (ref 3.5–5.2)
PROCALCITONIN SERPL IA-MCNC: 0.14 NG/ML (ref 0.01–0.09)
RBC # BLD AUTO: 2.06 MILL/MM3 (ref 4.7–6.1)
RBC # BLD AUTO: 3.49 MILL/MM3 (ref 4.7–6.1)
SARS-COV-2 RNA RESP QL NAA+PROBE: NOT DETECTED
SEGMENTED NEUTROPHILS ABSOLUTE COUNT: 3.3 THOU/MM3 (ref 1.8–7.7)
SODIUM SERPL-SCNC: 119 MEQ/L (ref 135–145)
SODIUM SERPL-SCNC: 122 MEQ/L (ref 135–145)
SODIUM SERPL-SCNC: 122 MEQ/L (ref 135–145)
SODIUM SERPL-SCNC: 124 MEQ/L (ref 135–145)
SODIUM SERPL-SCNC: 124 MEQ/L (ref 135–145)
SODIUM SERPL-SCNC: 125 MEQ/L (ref 135–145)
SODIUM SERPL-SCNC: 126 MEQ/L (ref 135–145)
T4 FREE SERPL-MCNC: 1.56 NG/DL (ref 0.93–1.76)
TRIGL SERPL-MCNC: 163 MG/DL (ref 0–199)
TROPONIN, HIGH SENSITIVITY: 30 NG/L (ref 0–12)
TROPONIN, HIGH SENSITIVITY: 30 NG/L (ref 0–12)
TROPONIN, HIGH SENSITIVITY: 35 NG/L (ref 0–12)
TSH SERPL DL<=0.005 MIU/L-ACNC: 0.89 UIU/ML (ref 0.4–4.2)
VIT B12 SERPL-MCNC: 416 PG/ML (ref 211–911)
WBC # BLD AUTO: 10.4 THOU/MM3 (ref 4.8–10.8)
WBC # BLD AUTO: 6.6 THOU/MM3 (ref 4.8–10.8)

## 2023-09-08 PROCEDURE — 82550 ASSAY OF CK (CPK): CPT

## 2023-09-08 PROCEDURE — 84439 ASSAY OF FREE THYROXINE: CPT

## 2023-09-08 PROCEDURE — 93306 TTE W/DOPPLER COMPLETE: CPT

## 2023-09-08 PROCEDURE — 84295 ASSAY OF SERUM SODIUM: CPT

## 2023-09-08 PROCEDURE — 2580000003 HC RX 258: Performed by: STUDENT IN AN ORGANIZED HEALTH CARE EDUCATION/TRAINING PROGRAM

## 2023-09-08 PROCEDURE — 84443 ASSAY THYROID STIM HORMONE: CPT

## 2023-09-08 PROCEDURE — 87070 CULTURE OTHR SPECIMN AEROBIC: CPT

## 2023-09-08 PROCEDURE — 83690 ASSAY OF LIPASE: CPT

## 2023-09-08 PROCEDURE — 6360000002 HC RX W HCPCS: Performed by: STUDENT IN AN ORGANIZED HEALTH CARE EDUCATION/TRAINING PROGRAM

## 2023-09-08 PROCEDURE — 84484 ASSAY OF TROPONIN QUANT: CPT

## 2023-09-08 PROCEDURE — 86140 C-REACTIVE PROTEIN: CPT

## 2023-09-08 PROCEDURE — 80048 BASIC METABOLIC PNL TOTAL CA: CPT

## 2023-09-08 PROCEDURE — 6370000000 HC RX 637 (ALT 250 FOR IP): Performed by: STUDENT IN AN ORGANIZED HEALTH CARE EDUCATION/TRAINING PROGRAM

## 2023-09-08 PROCEDURE — 87086 URINE CULTURE/COLONY COUNT: CPT

## 2023-09-08 PROCEDURE — 85379 FIBRIN DEGRADATION QUANT: CPT

## 2023-09-08 PROCEDURE — 85027 COMPLETE CBC AUTOMATED: CPT

## 2023-09-08 PROCEDURE — 87040 BLOOD CULTURE FOR BACTERIA: CPT

## 2023-09-08 PROCEDURE — 80307 DRUG TEST PRSMV CHEM ANLYZR: CPT

## 2023-09-08 PROCEDURE — 82010 KETONE BODYS QUAN: CPT

## 2023-09-08 PROCEDURE — 87641 MR-STAPH DNA AMP PROBE: CPT

## 2023-09-08 PROCEDURE — 36415 COLL VENOUS BLD VENIPUNCTURE: CPT

## 2023-09-08 PROCEDURE — 87536 HIV-1 QUANT&REVRSE TRNSCRPJ: CPT

## 2023-09-08 PROCEDURE — 83605 ASSAY OF LACTIC ACID: CPT

## 2023-09-08 PROCEDURE — 82077 ASSAY SPEC XCP UR&BREATH IA: CPT

## 2023-09-08 PROCEDURE — 2060000000 HC ICU INTERMEDIATE R&B

## 2023-09-08 PROCEDURE — 87636 SARSCOV2 & INF A&B AMP PRB: CPT

## 2023-09-08 PROCEDURE — 80061 LIPID PANEL: CPT

## 2023-09-08 PROCEDURE — 84145 PROCALCITONIN (PCT): CPT

## 2023-09-08 PROCEDURE — 82948 REAGENT STRIP/BLOOD GLUCOSE: CPT

## 2023-09-08 PROCEDURE — 85651 RBC SED RATE NONAUTOMATED: CPT

## 2023-09-08 PROCEDURE — 93010 ELECTROCARDIOGRAM REPORT: CPT | Performed by: INTERNAL MEDICINE

## 2023-09-08 RX ORDER — PANTOPRAZOLE SODIUM 40 MG/1
40 TABLET, DELAYED RELEASE ORAL
Status: DISCONTINUED | OUTPATIENT
Start: 2023-09-08 | End: 2023-09-11 | Stop reason: HOSPADM

## 2023-09-08 RX ORDER — ISOSORBIDE DINITRATE 20 MG/1
20 TABLET ORAL 2 TIMES DAILY
Status: DISCONTINUED | OUTPATIENT
Start: 2023-09-08 | End: 2023-09-11 | Stop reason: HOSPADM

## 2023-09-08 RX ORDER — ENOXAPARIN SODIUM 100 MG/ML
40 INJECTION SUBCUTANEOUS DAILY
Status: DISCONTINUED | OUTPATIENT
Start: 2023-09-08 | End: 2023-09-11 | Stop reason: HOSPADM

## 2023-09-08 RX ORDER — SODIUM CHLORIDE 9 MG/ML
INJECTION, SOLUTION INTRAVENOUS CONTINUOUS
Status: DISCONTINUED | OUTPATIENT
Start: 2023-09-08 | End: 2023-09-09

## 2023-09-08 RX ORDER — SODIUM CHLORIDE 0.9 % (FLUSH) 0.9 %
5-40 SYRINGE (ML) INJECTION PRN
Status: DISCONTINUED | OUTPATIENT
Start: 2023-09-08 | End: 2023-09-11 | Stop reason: HOSPADM

## 2023-09-08 RX ORDER — DEXTROSE MONOHYDRATE 100 MG/ML
INJECTION, SOLUTION INTRAVENOUS CONTINUOUS PRN
Status: DISCONTINUED | OUTPATIENT
Start: 2023-09-08 | End: 2023-09-11 | Stop reason: HOSPADM

## 2023-09-08 RX ORDER — REGADENOSON 0.08 MG/ML
0.4 INJECTION, SOLUTION INTRAVENOUS
Status: DISCONTINUED | OUTPATIENT
Start: 2023-09-08 | End: 2023-09-11 | Stop reason: HOSPADM

## 2023-09-08 RX ORDER — ACETAMINOPHEN 650 MG/1
650 SUPPOSITORY RECTAL EVERY 6 HOURS PRN
Status: DISCONTINUED | OUTPATIENT
Start: 2023-09-08 | End: 2023-09-11 | Stop reason: HOSPADM

## 2023-09-08 RX ORDER — ACETAMINOPHEN 325 MG/1
650 TABLET ORAL EVERY 6 HOURS PRN
Status: DISCONTINUED | OUTPATIENT
Start: 2023-09-08 | End: 2023-09-11 | Stop reason: HOSPADM

## 2023-09-08 RX ORDER — TIZANIDINE 4 MG/1
4 TABLET ORAL 2 TIMES DAILY PRN
Status: DISCONTINUED | OUTPATIENT
Start: 2023-09-08 | End: 2023-09-11 | Stop reason: HOSPADM

## 2023-09-08 RX ORDER — ONDANSETRON 2 MG/ML
4 INJECTION INTRAMUSCULAR; INTRAVENOUS EVERY 6 HOURS PRN
Status: DISCONTINUED | OUTPATIENT
Start: 2023-09-08 | End: 2023-09-11 | Stop reason: HOSPADM

## 2023-09-08 RX ORDER — FOSAMPRENAVIR CALCIUM 700 MG/1
1400 TABLET, COATED ORAL 2 TIMES DAILY
Status: DISCONTINUED | OUTPATIENT
Start: 2023-09-11 | End: 2023-09-11 | Stop reason: HOSPADM

## 2023-09-08 RX ORDER — SODIUM CHLORIDE 9 MG/ML
INJECTION, SOLUTION INTRAVENOUS CONTINUOUS
Status: ACTIVE | OUTPATIENT
Start: 2023-09-08 | End: 2023-09-09

## 2023-09-08 RX ORDER — SODIUM CHLORIDE 0.9 % (FLUSH) 0.9 %
5-40 SYRINGE (ML) INJECTION EVERY 12 HOURS SCHEDULED
Status: DISCONTINUED | OUTPATIENT
Start: 2023-09-08 | End: 2023-09-11 | Stop reason: HOSPADM

## 2023-09-08 RX ORDER — IBUPROFEN 600 MG/1
1 TABLET ORAL PRN
Status: DISCONTINUED | OUTPATIENT
Start: 2023-09-08 | End: 2023-09-11 | Stop reason: HOSPADM

## 2023-09-08 RX ORDER — ONDANSETRON 4 MG/1
4 TABLET, ORALLY DISINTEGRATING ORAL EVERY 8 HOURS PRN
Status: DISCONTINUED | OUTPATIENT
Start: 2023-09-08 | End: 2023-09-11 | Stop reason: HOSPADM

## 2023-09-08 RX ORDER — POLYETHYLENE GLYCOL 3350 17 G/17G
17 POWDER, FOR SOLUTION ORAL DAILY PRN
Status: DISCONTINUED | OUTPATIENT
Start: 2023-09-08 | End: 2023-09-11 | Stop reason: HOSPADM

## 2023-09-08 RX ORDER — SODIUM CHLORIDE 450 MG/100ML
INJECTION, SOLUTION INTRAVENOUS CONTINUOUS
Status: DISCONTINUED | OUTPATIENT
Start: 2023-09-08 | End: 2023-09-09

## 2023-09-08 RX ORDER — LAMIVUDINE AND ZIDOVUDINE 150; 300 MG/1; MG/1
1 TABLET, FILM COATED ORAL 2 TIMES DAILY
Status: DISCONTINUED | OUTPATIENT
Start: 2023-09-11 | End: 2023-09-11 | Stop reason: HOSPADM

## 2023-09-08 RX ORDER — LIDOCAINE 4 G/G
1 PATCH TOPICAL DAILY PRN
Status: DISCONTINUED | OUTPATIENT
Start: 2023-09-08 | End: 2023-09-11 | Stop reason: HOSPADM

## 2023-09-08 RX ORDER — ATORVASTATIN CALCIUM 40 MG/1
40 TABLET, FILM COATED ORAL DAILY
Status: DISCONTINUED | OUTPATIENT
Start: 2023-09-08 | End: 2023-09-08 | Stop reason: ALTCHOICE

## 2023-09-08 RX ORDER — FENOFIBRATE 54 MG/1
54 TABLET ORAL DAILY
Status: DISCONTINUED | OUTPATIENT
Start: 2023-09-08 | End: 2023-09-11 | Stop reason: HOSPADM

## 2023-09-08 RX ORDER — OLANZAPINE 10 MG/1
10 TABLET ORAL NIGHTLY
COMMUNITY

## 2023-09-08 RX ORDER — OLANZAPINE 10 MG/1
10 TABLET ORAL NIGHTLY
Status: DISCONTINUED | OUTPATIENT
Start: 2023-09-08 | End: 2023-09-11 | Stop reason: HOSPADM

## 2023-09-08 RX ORDER — SODIUM CHLORIDE 9 MG/ML
INJECTION, SOLUTION INTRAVENOUS PRN
Status: DISCONTINUED | OUTPATIENT
Start: 2023-09-08 | End: 2023-09-11 | Stop reason: HOSPADM

## 2023-09-08 RX ADMIN — CEFTRIAXONE SODIUM 1000 MG: 1 INJECTION, POWDER, FOR SOLUTION INTRAMUSCULAR; INTRAVENOUS at 15:00

## 2023-09-08 RX ADMIN — ATORVASTATIN CALCIUM 40 MG: 40 TABLET, FILM COATED ORAL at 10:26

## 2023-09-08 RX ADMIN — METOPROLOL TARTRATE 25 MG: 50 TABLET, FILM COATED ORAL at 10:26

## 2023-09-08 RX ADMIN — ENOXAPARIN SODIUM 40 MG: 100 INJECTION SUBCUTANEOUS at 10:26

## 2023-09-08 RX ADMIN — OLANZAPINE 10 MG: 5 TABLET, FILM COATED ORAL at 20:14

## 2023-09-08 RX ADMIN — SODIUM CHLORIDE: 9 INJECTION, SOLUTION INTRAVENOUS at 05:59

## 2023-09-08 RX ADMIN — PANTOPRAZOLE SODIUM 40 MG: 40 TABLET, DELAYED RELEASE ORAL at 06:53

## 2023-09-08 RX ADMIN — SODIUM CHLORIDE, PRESERVATIVE FREE 10 ML: 5 INJECTION INTRAVENOUS at 20:14

## 2023-09-08 RX ADMIN — SODIUM CHLORIDE: 9 INJECTION, SOLUTION INTRAVENOUS at 20:18

## 2023-09-08 RX ADMIN — SODIUM CHLORIDE: 4.5 INJECTION, SOLUTION INTRAVENOUS at 12:16

## 2023-09-08 RX ADMIN — FENOFIBRATE 54 MG: 54 TABLET ORAL at 12:19

## 2023-09-08 ASSESSMENT — PAIN SCALES - GENERAL
PAINLEVEL_OUTOF10: 6
PAINLEVEL_OUTOF10: 0
PAINLEVEL_OUTOF10: 6
PAINLEVEL_OUTOF10: 0

## 2023-09-08 ASSESSMENT — PAIN DESCRIPTION - LOCATION: LOCATION: BACK

## 2023-09-08 NOTE — CARE COORDINATION
Case Management Assessment  Initial Evaluation    Date/Time of Evaluation: 9/8/2023 11:54 AM  Assessment Completed by: Amira Velázquez RN    If patient is discharged prior to next notation, then this note serves as note for discharge by case management. Patient Name: Radha Orozco                   YOB: 1965  Diagnosis: Abdominal pain, right upper quadrant [R10.11]  Hyponatremia [E87.1]  Abdominal pain [R10.9]  Chest pain, unspecified type [R07.9]                   Date / Time: 9/7/2023  5:20 PM  Location: [address] [place] [address]     Patient Admission Status: Inpatient   Readmission Risk Low 0-14, Mod 15-19), High > 20: Readmission Risk Score: 11.3    Current PCP: ALIZE Lloyd CNP  PCP verified by CM? Yes    Chart Reviewed: Yes      History Provided by: Patient  Patient Orientation: Alert and Oriented    Patient Cognition: Alert    Hospitalization in the last 30 days (Readmission):  No    If yes, Readmission Assessment in  Navigator will be completed. Advance Directives:      Code Status: Full Code   Patient's Primary Decision Maker is: Legal Next of Kin      Discharge Planning:    Patient lives with: Alone Type of Home: House  Primary Care Giver: Self  Patient Support Systems include: Children   Current Financial resources: Medicaid, Medicare  Current community resources: None  Current services prior to admission: None            Current DME:              Type of Home Care services:  None    ADLS  Prior functional level: Independent in ADLs/IADLs  Current functional level: Independent in ADLs/IADLs    Family can provide assistance at DC: Yes  Would you like Case Management to discuss the discharge plan with any other family members/significant others, and if so, who?  No  Plans to Return to Present Housing: Yes  Other Identified Issues/Barriers to RETURNING to current housing: medical complications  Potential Assistance needed at discharge: N/A            Potential DME:    Patient

## 2023-09-08 NOTE — ED NOTES
Pt ambulatory to restroom. Medicated per MAR. Call light within reach.      Ken Zuñiga RN  09/07/23 9623

## 2023-09-08 NOTE — ED NOTES
ED to inpatient nurses report    Chief Complaint   Patient presents with    Back Pain     C/O back pain upper and lower-denies known injury onset symptoms x 2 days      Present to ED from home  LOC: alert and orientated to name, place, date  Vital signs   Vitals:    09/07/23 2115 09/07/23 2215 09/07/23 2300 09/08/23 0000   BP: (!) 144/87 (!) 149/94 (!) 140/90 (!) 156/93   Pulse: 83 88 83 86   Resp: 16 21 22 27   Temp:       TempSrc:       SpO2: 98% 98% 97% 98%   Weight:       Height:          Oxygen Baseline 0    Current needs required 0   LDAs:   Peripheral IV 09/07/23 Left Antecubital (Active)   Site Assessment Clean, dry & intact 09/07/23 2020   Line Status Brisk blood return;Flushed 09/07/23 2020   Phlebitis Assessment No symptoms 09/07/23 2020   Infiltration Assessment 0 09/07/23 2020   Dressing Status New dressing applied 09/07/23 2020   Dressing Type Transparent 09/07/23 2020   Dressing Intervention New 09/07/23 2020     Mobility: Independent  Pending ED orders: None  Present condition: Stable      C-SSRS Risk of Suicide: No Risk  Swallow Screening    Preferred Language: Dominik     Electronically signed by Дмитрий Stanton RN on 9/8/2023 at 12:29 AM       Дмитрий Stanton RN  09/08/23 8463

## 2023-09-08 NOTE — ED NOTES
Patient resting in bed watching TV. Respirations easy and unlabored. No distress noted. Call light within reach.        Reva Terry RN  09/08/23 5273

## 2023-09-08 NOTE — FLOWSHEET NOTE
09/08/23 1057   Safe Environment   Safety Measures Bed in low position;Call light within reach; Side rails X 2;Standard Safety Measures  (Virtual nurse safety round completed.)     Patient resting quietly. No distress noted. Unknown if patient's side camera is on.

## 2023-09-08 NOTE — FLOWSHEET NOTE
09/08/23 4711   Safe Environment   Safety Measures Bed in low position;Call light within reach;Standard Safety Measures  (virtual safety round)     Pt did not respond to voice, camera turned on for safety. Pt resting in bed with eyes closed. No apparent signs of distress.

## 2023-09-08 NOTE — ED NOTES
Pt moved to room 30. This RN assuming care. EKG completed. IV established. Call light within reach.      Tomas Bhatt RN  09/07/23 2053

## 2023-09-08 NOTE — H&P
MD Joe at [facility]. Bilateral 07/18/2017    SI BLOCK    OTHER SURGICAL HISTORY Bilateral 01/17/2017    Lumbar facet     OTHER SURGICAL HISTORY Bilateral 02/20/2017    Lumbar Facet L3-S1    OTHER SURGICAL HISTORY Right 03/28/2017    Lumbar RFA L3-S1    OTHER SURGICAL HISTORY Bilateral 03/06/2018    Bilat SI joint injection    PAIN MANAGEMENT PROCEDURE Right 1/14/2020    Lumbar RFA  Right side first L3-4,4-5,5-S1 No sedation per patient request performed by Campos Cortez MD at [address] Left 3/2/2020    Lumbar RFA Left side L3-4,4-5,5-S1- local per pt request performed by Campos Cortez MD at [address] Right 10/8/2020    Lumbar RFA bilateral L3-4,4-5,5-S1  right side first -Local per patient request- performed by Campos Cortez MD at [address] Left 11/12/2020    Lumbar RFA Left side L3-4, 4-5, 5-S1, performed by Campos Cortez MD at [address] Bilateral 2/27/2023    Bilateral Lumbar 4-5, 5-sacral 1 radiofrequency ablation performed by Campos Cortez MD at [address] DX/THER AGT PVRT FACET JT LMBR/SAC 2ND LEVEL Bilateral 3/6/2018    BILATERAL SI MBB #2 NO SEDATION performed by Campos Cortez MD at [address] [facility].  [id number], [id number]     Home Medications:   No current facility-administered medications on file prior to encounter.      Current Outpatient Medications on File Prior to Encounter   Medication Sig Dispense Refill    tiZANidine (ZANAFLEX) 4 MG tablet Take 1 tablet by mouth 2 times daily as needed (spasms) 180 tablet 0    isosorbide dinitrate (ISORDIL) 20 MG tablet 1 tablet twice daily 60 tablet 11    metoprolol tartrate (LOPRESSOR) 25 MG tablet Take 1 tablet by mouth twice daily 60 tablet 11

## 2023-09-08 NOTE — ED NOTES
Patient resting in bed. Respirations easy and unlabored. No distress noted. Call light within reach.          Kurt Nguyễn RN  09/08/23 5226

## 2023-09-09 PROBLEM — R10.11 ABDOMINAL PAIN, RIGHT UPPER QUADRANT: Status: ACTIVE | Noted: 2023-09-09

## 2023-09-09 LAB
ANION GAP SERPL CALC-SCNC: 13 MEQ/L (ref 8–16)
BACTERIA UR CULT: ABNORMAL
BACTERIA UR CULT: ABNORMAL
BUN SERPL-MCNC: 21 MG/DL (ref 7–22)
CALCIUM SERPL-MCNC: 9.5 MG/DL (ref 8.5–10.5)
CHLORIDE SERPL-SCNC: 92 MEQ/L (ref 98–111)
CO2 SERPL-SCNC: 23 MEQ/L (ref 23–33)
CREAT SERPL-MCNC: 1.1 MG/DL (ref 0.4–1.2)
DEPRECATED RDW RBC AUTO: 46.4 FL (ref 35–45)
ERYTHROCYTE [DISTWIDTH] IN BLOOD BY AUTOMATED COUNT: 11.4 % (ref 11.5–14.5)
GFR SERPL CREATININE-BSD FRML MDRD: > 60 ML/MIN/1.73M2
GLUCOSE BLD STRIP.AUTO-MCNC: 119 MG/DL (ref 70–108)
GLUCOSE BLD STRIP.AUTO-MCNC: 121 MG/DL (ref 70–108)
GLUCOSE BLD STRIP.AUTO-MCNC: 150 MG/DL (ref 70–108)
GLUCOSE BLD STRIP.AUTO-MCNC: 150 MG/DL (ref 70–108)
GLUCOSE SERPL-MCNC: 99 MG/DL (ref 70–108)
HCT VFR BLD AUTO: 32.3 % (ref 42–52)
HGB BLD-MCNC: 11.5 GM/DL (ref 14–18)
MAGNESIUM SERPL-MCNC: 1.7 MG/DL (ref 1.6–2.4)
MCH RBC QN AUTO: 39.5 PG (ref 26–33)
MCHC RBC AUTO-ENTMCNC: 35.6 GM/DL (ref 32.2–35.5)
MCV RBC AUTO: 111 FL (ref 80–94)
ORGANISM: ABNORMAL
ORGANISM: ABNORMAL
PLATELET # BLD AUTO: 283 THOU/MM3 (ref 130–400)
PMV BLD AUTO: 8.6 FL (ref 9.4–12.4)
POTASSIUM SERPL-SCNC: 3.6 MEQ/L (ref 3.5–5.2)
RBC # BLD AUTO: 2.91 MILL/MM3 (ref 4.7–6.1)
SODIUM SERPL-SCNC: 120 MEQ/L (ref 135–145)
SODIUM SERPL-SCNC: 128 MEQ/L (ref 135–145)
SODIUM SERPL-SCNC: 129 MEQ/L (ref 135–145)
SODIUM SERPL-SCNC: 129 MEQ/L (ref 135–145)
WBC # BLD AUTO: 8.1 THOU/MM3 (ref 4.8–10.8)

## 2023-09-09 PROCEDURE — 82948 REAGENT STRIP/BLOOD GLUCOSE: CPT

## 2023-09-09 PROCEDURE — 84295 ASSAY OF SERUM SODIUM: CPT

## 2023-09-09 PROCEDURE — 80048 BASIC METABOLIC PNL TOTAL CA: CPT

## 2023-09-09 PROCEDURE — 85027 COMPLETE CBC AUTOMATED: CPT

## 2023-09-09 PROCEDURE — 6370000000 HC RX 637 (ALT 250 FOR IP): Performed by: STUDENT IN AN ORGANIZED HEALTH CARE EDUCATION/TRAINING PROGRAM

## 2023-09-09 PROCEDURE — 83735 ASSAY OF MAGNESIUM: CPT

## 2023-09-09 PROCEDURE — 6360000002 HC RX W HCPCS: Performed by: STUDENT IN AN ORGANIZED HEALTH CARE EDUCATION/TRAINING PROGRAM

## 2023-09-09 PROCEDURE — 94760 N-INVAS EAR/PLS OXIMETRY 1: CPT

## 2023-09-09 PROCEDURE — 94640 AIRWAY INHALATION TREATMENT: CPT

## 2023-09-09 PROCEDURE — 1200000003 HC TELEMETRY R&B

## 2023-09-09 PROCEDURE — 99222 1ST HOSP IP/OBS MODERATE 55: CPT | Performed by: INTERNAL MEDICINE

## 2023-09-09 PROCEDURE — 36415 COLL VENOUS BLD VENIPUNCTURE: CPT

## 2023-09-09 PROCEDURE — 2580000003 HC RX 258: Performed by: STUDENT IN AN ORGANIZED HEALTH CARE EDUCATION/TRAINING PROGRAM

## 2023-09-09 PROCEDURE — 99232 SBSQ HOSP IP/OBS MODERATE 35: CPT | Performed by: INTERNAL MEDICINE

## 2023-09-09 RX ORDER — SODIUM CHLORIDE 9 MG/ML
INJECTION, SOLUTION INTRAVENOUS CONTINUOUS
Status: ACTIVE | OUTPATIENT
Start: 2023-09-09 | End: 2023-09-09

## 2023-09-09 RX ADMIN — ISOSORBIDE DINITRATE 20 MG: 20 TABLET ORAL at 08:19

## 2023-09-09 RX ADMIN — SODIUM CHLORIDE, PRESERVATIVE FREE 10 ML: 5 INJECTION INTRAVENOUS at 20:53

## 2023-09-09 RX ADMIN — ENOXAPARIN SODIUM 40 MG: 100 INJECTION SUBCUTANEOUS at 08:20

## 2023-09-09 RX ADMIN — OLANZAPINE 10 MG: 5 TABLET, FILM COATED ORAL at 20:54

## 2023-09-09 RX ADMIN — METOPROLOL TARTRATE 25 MG: 50 TABLET, FILM COATED ORAL at 08:19

## 2023-09-09 RX ADMIN — FENOFIBRATE 54 MG: 54 TABLET ORAL at 08:19

## 2023-09-09 RX ADMIN — TIOTROPIUM BROMIDE AND OLODATEROL 2 PUFF: 3.124; 2.736 SPRAY, METERED RESPIRATORY (INHALATION) at 09:43

## 2023-09-09 RX ADMIN — CEFTRIAXONE SODIUM 1000 MG: 1 INJECTION, POWDER, FOR SOLUTION INTRAMUSCULAR; INTRAVENOUS at 15:51

## 2023-09-09 ASSESSMENT — PAIN SCALES - GENERAL
PAINLEVEL_OUTOF10: 0
PAINLEVEL_OUTOF10: 0

## 2023-09-10 LAB
ANION GAP SERPL CALC-SCNC: 12 MEQ/L (ref 8–16)
BACTERIA SPEC AEROBE CULT: NORMAL
BUN SERPL-MCNC: 21 MG/DL (ref 7–22)
CALCIUM SERPL-MCNC: 9.6 MG/DL (ref 8.5–10.5)
CHLORIDE SERPL-SCNC: 99 MEQ/L (ref 98–111)
CO2 SERPL-SCNC: 24 MEQ/L (ref 23–33)
CREAT SERPL-MCNC: 1.1 MG/DL (ref 0.4–1.2)
DEPRECATED RDW RBC AUTO: 47.3 FL (ref 35–45)
ERYTHROCYTE [DISTWIDTH] IN BLOOD BY AUTOMATED COUNT: 11.5 % (ref 11.5–14.5)
GFR SERPL CREATININE-BSD FRML MDRD: > 60 ML/MIN/1.73M2
GLUCOSE BLD STRIP.AUTO-MCNC: 106 MG/DL (ref 70–108)
GLUCOSE BLD STRIP.AUTO-MCNC: 147 MG/DL (ref 70–108)
GLUCOSE BLD STRIP.AUTO-MCNC: 171 MG/DL (ref 70–108)
GLUCOSE SERPL-MCNC: 108 MG/DL (ref 70–108)
HCT VFR BLD AUTO: 31.7 % (ref 42–52)
HGB BLD-MCNC: 11.2 GM/DL (ref 14–18)
MAGNESIUM SERPL-MCNC: 1.5 MG/DL (ref 1.6–2.4)
MCH RBC QN AUTO: 39.6 PG (ref 26–33)
MCHC RBC AUTO-ENTMCNC: 35.3 GM/DL (ref 32.2–35.5)
MCV RBC AUTO: 112 FL (ref 80–94)
PLATELET # BLD AUTO: 298 THOU/MM3 (ref 130–400)
PMV BLD AUTO: 8.5 FL (ref 9.4–12.4)
POTASSIUM SERPL-SCNC: 4.1 MEQ/L (ref 3.5–5.2)
RBC # BLD AUTO: 2.83 MILL/MM3 (ref 4.7–6.1)
SODIUM SERPL-SCNC: 132 MEQ/L (ref 135–145)
SODIUM SERPL-SCNC: 132 MEQ/L (ref 135–145)
SODIUM SERPL-SCNC: 133 MEQ/L (ref 135–145)
SODIUM SERPL-SCNC: 135 MEQ/L (ref 135–145)
WBC # BLD AUTO: 8 THOU/MM3 (ref 4.8–10.8)

## 2023-09-10 PROCEDURE — 80048 BASIC METABOLIC PNL TOTAL CA: CPT

## 2023-09-10 PROCEDURE — 6370000000 HC RX 637 (ALT 250 FOR IP)

## 2023-09-10 PROCEDURE — 6370000000 HC RX 637 (ALT 250 FOR IP): Performed by: STUDENT IN AN ORGANIZED HEALTH CARE EDUCATION/TRAINING PROGRAM

## 2023-09-10 PROCEDURE — 6360000002 HC RX W HCPCS

## 2023-09-10 PROCEDURE — 85027 COMPLETE CBC AUTOMATED: CPT

## 2023-09-10 PROCEDURE — 83735 ASSAY OF MAGNESIUM: CPT

## 2023-09-10 PROCEDURE — 36415 COLL VENOUS BLD VENIPUNCTURE: CPT

## 2023-09-10 PROCEDURE — 84295 ASSAY OF SERUM SODIUM: CPT

## 2023-09-10 PROCEDURE — 82948 REAGENT STRIP/BLOOD GLUCOSE: CPT

## 2023-09-10 PROCEDURE — 2580000003 HC RX 258: Performed by: STUDENT IN AN ORGANIZED HEALTH CARE EDUCATION/TRAINING PROGRAM

## 2023-09-10 PROCEDURE — 6360000002 HC RX W HCPCS: Performed by: STUDENT IN AN ORGANIZED HEALTH CARE EDUCATION/TRAINING PROGRAM

## 2023-09-10 PROCEDURE — 99232 SBSQ HOSP IP/OBS MODERATE 35: CPT | Performed by: INTERNAL MEDICINE

## 2023-09-10 PROCEDURE — 1200000003 HC TELEMETRY R&B

## 2023-09-10 RX ORDER — REGADENOSON 0.08 MG/ML
0.4 INJECTION, SOLUTION INTRAVENOUS
Status: DISCONTINUED | OUTPATIENT
Start: 2023-09-10 | End: 2023-09-11 | Stop reason: HOSPADM

## 2023-09-10 RX ORDER — MAGNESIUM SULFATE IN WATER 40 MG/ML
2000 INJECTION, SOLUTION INTRAVENOUS ONCE
Status: COMPLETED | OUTPATIENT
Start: 2023-09-10 | End: 2023-09-10

## 2023-09-10 RX ADMIN — TIZANIDINE 4 MG: 4 TABLET ORAL at 02:44

## 2023-09-10 RX ADMIN — FENOFIBRATE 54 MG: 54 TABLET ORAL at 09:04

## 2023-09-10 RX ADMIN — SODIUM CHLORIDE, PRESERVATIVE FREE 10 ML: 5 INJECTION INTRAVENOUS at 09:05

## 2023-09-10 RX ADMIN — MAGNESIUM SULFATE HEPTAHYDRATE 2000 MG: 40 INJECTION, SOLUTION INTRAVENOUS at 09:11

## 2023-09-10 RX ADMIN — METOPROLOL TARTRATE 25 MG: 50 TABLET, FILM COATED ORAL at 21:44

## 2023-09-10 RX ADMIN — SODIUM CHLORIDE, PRESERVATIVE FREE 10 ML: 5 INJECTION INTRAVENOUS at 21:46

## 2023-09-10 RX ADMIN — ENOXAPARIN SODIUM 40 MG: 100 INJECTION SUBCUTANEOUS at 09:04

## 2023-09-10 RX ADMIN — OLANZAPINE 10 MG: 5 TABLET, FILM COATED ORAL at 21:44

## 2023-09-10 RX ADMIN — PANTOPRAZOLE SODIUM 40 MG: 40 TABLET, DELAYED RELEASE ORAL at 05:11

## 2023-09-10 RX ADMIN — ISOSORBIDE DINITRATE 20 MG: 20 TABLET ORAL at 21:44

## 2023-09-10 RX ADMIN — METOPROLOL TARTRATE 25 MG: 50 TABLET, FILM COATED ORAL at 09:04

## 2023-09-10 RX ADMIN — ACETAMINOPHEN 650 MG: 325 TABLET ORAL at 21:44

## 2023-09-10 RX ADMIN — ISOSORBIDE DINITRATE 20 MG: 20 TABLET ORAL at 09:04

## 2023-09-10 ASSESSMENT — PAIN DESCRIPTION - PAIN TYPE: TYPE: ACUTE PAIN;CHRONIC PAIN

## 2023-09-10 ASSESSMENT — PAIN DESCRIPTION - FREQUENCY: FREQUENCY: INTERMITTENT

## 2023-09-10 ASSESSMENT — PAIN DESCRIPTION - DESCRIPTORS
DESCRIPTORS: ACHING
DESCRIPTORS: ACHING

## 2023-09-10 ASSESSMENT — PAIN DESCRIPTION - LOCATION
LOCATION: BACK
LOCATION: BACK

## 2023-09-10 ASSESSMENT — PAIN SCALES - GENERAL
PAINLEVEL_OUTOF10: 0
PAINLEVEL_OUTOF10: 2
PAINLEVEL_OUTOF10: 10
PAINLEVEL_OUTOF10: 3

## 2023-09-10 ASSESSMENT — PAIN - FUNCTIONAL ASSESSMENT: PAIN_FUNCTIONAL_ASSESSMENT: ACTIVITIES ARE NOT PREVENTED

## 2023-09-10 ASSESSMENT — PAIN DESCRIPTION - ONSET: ONSET: AWAKENED FROM SLEEP

## 2023-09-10 ASSESSMENT — PAIN DESCRIPTION - ORIENTATION: ORIENTATION: RIGHT;LEFT

## 2023-09-11 ENCOUNTER — APPOINTMENT (OUTPATIENT)
Dept: NON INVASIVE DIAGNOSTICS | Age: 58
DRG: 641 | End: 2023-09-11
Payer: MEDICARE

## 2023-09-11 VITALS
SYSTOLIC BLOOD PRESSURE: 121 MMHG | HEIGHT: 66 IN | RESPIRATION RATE: 17 BRPM | HEART RATE: 78 BPM | BODY MASS INDEX: 23.43 KG/M2 | WEIGHT: 145.8 LBS | DIASTOLIC BLOOD PRESSURE: 70 MMHG | TEMPERATURE: 98.4 F | OXYGEN SATURATION: 97 %

## 2023-09-11 PROBLEM — E87.1 HYPONATREMIA: Status: ACTIVE | Noted: 2023-09-11

## 2023-09-11 LAB
ANION GAP SERPL CALC-SCNC: 11 MEQ/L (ref 8–16)
BUN SERPL-MCNC: 19 MG/DL (ref 7–22)
CALCIUM SERPL-MCNC: 9.8 MG/DL (ref 8.5–10.5)
CHLORIDE SERPL-SCNC: 101 MEQ/L (ref 98–111)
CO2 SERPL-SCNC: 24 MEQ/L (ref 23–33)
CREAT SERPL-MCNC: 1 MG/DL (ref 0.4–1.2)
DEPRECATED RDW RBC AUTO: 51.9 FL (ref 35–45)
ERYTHROCYTE [DISTWIDTH] IN BLOOD BY AUTOMATED COUNT: 11.7 % (ref 11.5–14.5)
GFR SERPL CREATININE-BSD FRML MDRD: > 60 ML/MIN/1.73M2
GLUCOSE SERPL-MCNC: 105 MG/DL (ref 70–108)
HCT VFR BLD AUTO: 35.8 % (ref 42–52)
HGB BLD-MCNC: 12 GM/DL (ref 14–18)
HIV QUANT LOG: 1.93 LOG CPY/ML
HIV-1 RNA BY PCR, QN: 84.7 CPY/ML
HIV-1 RNA BY PCR, QN: DETECTED
MAGNESIUM SERPL-MCNC: 2.1 MG/DL (ref 1.6–2.4)
MCH RBC QN AUTO: 40.1 PG (ref 26–33)
MCHC RBC AUTO-ENTMCNC: 33.5 GM/DL (ref 32.2–35.5)
MCV RBC AUTO: 119.7 FL (ref 80–94)
OSMOLALITY UR: 378 MOSMOL/KG (ref 250–750)
PLATELET # BLD AUTO: 282 THOU/MM3 (ref 130–400)
PMV BLD AUTO: 8.3 FL (ref 9.4–12.4)
POTASSIUM SERPL-SCNC: 3.7 MEQ/L (ref 3.5–5.2)
RBC # BLD AUTO: 2.99 MILL/MM3 (ref 4.7–6.1)
SODIUM SERPL-SCNC: 129 MEQ/L (ref 135–145)
SODIUM SERPL-SCNC: 136 MEQ/L (ref 135–145)
SODIUM SERPL-SCNC: 136 MEQ/L (ref 135–145)
SODIUM UR-SCNC: 46 MEQ/L
SOURCE: ABNORMAL
WBC # BLD AUTO: 6.1 THOU/MM3 (ref 4.8–10.8)

## 2023-09-11 PROCEDURE — 36415 COLL VENOUS BLD VENIPUNCTURE: CPT

## 2023-09-11 PROCEDURE — 6370000000 HC RX 637 (ALT 250 FOR IP): Performed by: STUDENT IN AN ORGANIZED HEALTH CARE EDUCATION/TRAINING PROGRAM

## 2023-09-11 PROCEDURE — 6370000000 HC RX 637 (ALT 250 FOR IP)

## 2023-09-11 PROCEDURE — 3430000000 HC RX DIAGNOSTIC RADIOPHARMACEUTICAL: Performed by: STUDENT IN AN ORGANIZED HEALTH CARE EDUCATION/TRAINING PROGRAM

## 2023-09-11 PROCEDURE — 6360000002 HC RX W HCPCS: Performed by: STUDENT IN AN ORGANIZED HEALTH CARE EDUCATION/TRAINING PROGRAM

## 2023-09-11 PROCEDURE — 93017 CV STRESS TEST TRACING ONLY: CPT | Performed by: INTERNAL MEDICINE

## 2023-09-11 PROCEDURE — 83935 ASSAY OF URINE OSMOLALITY: CPT

## 2023-09-11 PROCEDURE — 78452 HT MUSCLE IMAGE SPECT MULT: CPT | Performed by: INTERNAL MEDICINE

## 2023-09-11 PROCEDURE — 80048 BASIC METABOLIC PNL TOTAL CA: CPT

## 2023-09-11 PROCEDURE — 83735 ASSAY OF MAGNESIUM: CPT

## 2023-09-11 PROCEDURE — 84295 ASSAY OF SERUM SODIUM: CPT

## 2023-09-11 PROCEDURE — 2580000003 HC RX 258: Performed by: INTERNAL MEDICINE

## 2023-09-11 PROCEDURE — 99239 HOSP IP/OBS DSCHRG MGMT >30: CPT | Performed by: INTERNAL MEDICINE

## 2023-09-11 PROCEDURE — 6360000002 HC RX W HCPCS

## 2023-09-11 PROCEDURE — A9500 TC99M SESTAMIBI: HCPCS | Performed by: STUDENT IN AN ORGANIZED HEALTH CARE EDUCATION/TRAINING PROGRAM

## 2023-09-11 PROCEDURE — 85027 COMPLETE CBC AUTOMATED: CPT

## 2023-09-11 PROCEDURE — 84300 ASSAY OF URINE SODIUM: CPT

## 2023-09-11 RX ORDER — SODIUM CHLORIDE 9 MG/ML
500 INJECTION, SOLUTION INTRAVENOUS CONTINUOUS PRN
Status: ACTIVE | OUTPATIENT
Start: 2023-09-11 | End: 2023-09-11

## 2023-09-11 RX ORDER — TETRAKIS(2-METHOXYISOBUTYLISOCYANIDE)COPPER(I) TETRAFLUOROBORATE 1 MG/ML
9.35 INJECTION, POWDER, LYOPHILIZED, FOR SOLUTION INTRAVENOUS
Status: COMPLETED | OUTPATIENT
Start: 2023-09-11 | End: 2023-09-11

## 2023-09-11 RX ORDER — SODIUM CHLORIDE 1 G/1
1 TABLET ORAL 2 TIMES DAILY WITH MEALS
Qty: 90 TABLET | Refills: 3 | Status: SHIPPED | OUTPATIENT
Start: 2023-09-11

## 2023-09-11 RX ORDER — AMINOPHYLLINE DIHYDRATE 25 MG/ML
50 INJECTION, SOLUTION INTRAVENOUS PRN
Status: ACTIVE | OUTPATIENT
Start: 2023-09-11 | End: 2023-09-11

## 2023-09-11 RX ORDER — ATROPINE SULFATE 0.1 MG/ML
0.5 INJECTION INTRAVENOUS EVERY 5 MIN PRN
Status: ACTIVE | OUTPATIENT
Start: 2023-09-11 | End: 2023-09-11

## 2023-09-11 RX ORDER — TETRAKIS(2-METHOXYISOBUTYLISOCYANIDE)COPPER(I) TETRAFLUOROBORATE 1 MG/ML
30.4 INJECTION, POWDER, LYOPHILIZED, FOR SOLUTION INTRAVENOUS
Status: COMPLETED | OUTPATIENT
Start: 2023-09-11 | End: 2023-09-11

## 2023-09-11 RX ORDER — SODIUM CHLORIDE 0.9 % (FLUSH) 0.9 %
5-40 SYRINGE (ML) INJECTION PRN
Status: ACTIVE | OUTPATIENT
Start: 2023-09-11 | End: 2023-09-11

## 2023-09-11 RX ORDER — NITROGLYCERIN 0.4 MG/1
0.4 TABLET SUBLINGUAL EVERY 5 MIN PRN
Status: DISCONTINUED | OUTPATIENT
Start: 2023-09-11 | End: 2023-09-11 | Stop reason: SDUPTHER

## 2023-09-11 RX ORDER — SODIUM CHLORIDE 1 G/1
1 TABLET ORAL 2 TIMES DAILY WITH MEALS
Status: DISCONTINUED | OUTPATIENT
Start: 2023-09-11 | End: 2023-09-11 | Stop reason: HOSPADM

## 2023-09-11 RX ORDER — SODIUM CHLORIDE 9 MG/ML
INJECTION, SOLUTION INTRAVENOUS CONTINUOUS
Status: DISCONTINUED | OUTPATIENT
Start: 2023-09-11 | End: 2023-09-11

## 2023-09-11 RX ORDER — ALBUTEROL SULFATE 90 UG/1
2 AEROSOL, METERED RESPIRATORY (INHALATION) PRN
Status: ACTIVE | OUTPATIENT
Start: 2023-09-11 | End: 2023-09-11

## 2023-09-11 RX ORDER — METOPROLOL TARTRATE 5 MG/5ML
5 INJECTION INTRAVENOUS EVERY 5 MIN PRN
Status: DISCONTINUED | OUTPATIENT
Start: 2023-09-11 | End: 2023-09-11 | Stop reason: SDUPTHER

## 2023-09-11 RX ORDER — METOPROLOL TARTRATE 5 MG/5ML
5 INJECTION INTRAVENOUS EVERY 5 MIN PRN
Status: ACTIVE | OUTPATIENT
Start: 2023-09-11 | End: 2023-09-11

## 2023-09-11 RX ORDER — AMINOPHYLLINE DIHYDRATE 25 MG/ML
50 INJECTION, SOLUTION INTRAVENOUS PRN
Status: DISCONTINUED | OUTPATIENT
Start: 2023-09-11 | End: 2023-09-11 | Stop reason: SDUPTHER

## 2023-09-11 RX ORDER — NITROGLYCERIN 0.4 MG/1
0.4 TABLET SUBLINGUAL EVERY 5 MIN PRN
Status: ACTIVE | OUTPATIENT
Start: 2023-09-11 | End: 2023-09-11

## 2023-09-11 RX ORDER — ATROPINE SULFATE 0.1 MG/ML
0.5 INJECTION INTRAVENOUS EVERY 5 MIN PRN
Status: DISCONTINUED | OUTPATIENT
Start: 2023-09-11 | End: 2023-09-11 | Stop reason: SDUPTHER

## 2023-09-11 RX ADMIN — Medication 30.4 MILLICURIE: at 08:25

## 2023-09-11 RX ADMIN — SODIUM CHLORIDE: 9 INJECTION, SOLUTION INTRAVENOUS at 10:11

## 2023-09-11 RX ADMIN — ISOSORBIDE DINITRATE 20 MG: 20 TABLET ORAL at 10:12

## 2023-09-11 RX ADMIN — Medication 9.35 MILLICURIE: at 07:27

## 2023-09-11 RX ADMIN — FENOFIBRATE 54 MG: 54 TABLET ORAL at 10:12

## 2023-09-11 RX ADMIN — ENOXAPARIN SODIUM 40 MG: 100 INJECTION SUBCUTANEOUS at 10:19

## 2023-09-11 RX ADMIN — METOPROLOL TARTRATE 25 MG: 50 TABLET, FILM COATED ORAL at 10:12

## 2023-09-11 ASSESSMENT — PAIN SCALES - GENERAL
PAINLEVEL_OUTOF10: 0
PAINLEVEL_OUTOF10: 0

## 2023-09-11 NOTE — PROGRESS NOTES
Echo completed at bedside.
Internal Medicine Resident Progress Note    Name: Toshia Barlow, male, : 1965, MRN: [id number]    PCP: Michelle Wallace, ALIZE - CNP    Date of Admission: 2023  Date of Service: Pt seen/examined on 23      Assessment/Plan:  Hypovolemic hypotonic hyponatremia - resolved: Na 117 on arrival. Aox4, asymptomatic. Serum Osm low, urine osmolality normal, Urine Na <20. Clinically very dry but not sure why, denies any renal/GI losses out of proportion. Likely contributing to his diffuse muscle spasms. 2023: Start gentle IV fluids, monitor Na q3h, avoid overcorrection. 2023: Sodium corrected to 124, followed by a drop to 120.  2023: Sodium 128 this morning, 129 at 1310. EtOH negative. continue NS at 50 mL/h. Chloride has improved to 92. Sodium checks every 6 hours. 9/10/2023: Patient sodium today 132, continue with NS 50 cc/h for 14 hours with sodium checks. 2023: Sodium down to 129 at night. NS increased from 50 cc/h to 75 cc/h. Repeat sodium 136. Fluids discontinued - sodium 136, and salt tablet started. No need to repeat sodium levels     Nonspecific abdominal and atypical, non-anginal chest pain:  Anterior rib cage diffusely tender to palpation. Unlikely ACS. Low suspicion for pancreatitis, CT abdomen unremarkable. Lipas, inflammatory markers pending. Continue supportive care with multimodal pain control. EKG shows normal sinus rhythm with RBBB. Cardiology following  2023: Due to risk factors and nonspecific abdominal pain cardiology consult was placed. D-dimer negative. Troponins elevated but stable: 20, 35, 30.  Echo shows EF 55%. No wall motion abnormalities. 2023: Pain resolved. No tenderness to palpation. Chemical stress test scheduled for Monday. 9/10/2023: Patient is scheduled for stress test tomorrow a.m. if stress test is negative patient can be discharged. 2023: Stress test performed, awaiting results.      Right
Pharmacy Medication History Note      List of current medications patient is taking is complete. Source of information: patient interview, pill packages, [facility], dispense report    Changes made to medication list:  Medications added/doses adjusted:  Olanzapine 5 mg increased to 10 mg nightly    Other notes (ex. Recent course of antibiotics, Coumadin dosing):  Confirmed HIV medication regimen filled with [facility] by speaking with local [facility] in [place] on [address]. Lexiva and Combivir are both nonformulary and not stocked in pharmacy. Order was placed to acquire these medications, although will not arrive until Monday 9/11. Informed Dr. Elena Latif and we will plan to resume HIV medications on Monday. Patient recevied Bactrim x 7 days on 8/23/23; however he reports still having some remaining. Patient takes fenofibrate 145 mg at home, but fenofibrate 54 mg ordered inpatient (appropriate based on CrCl 30-80). Consider continuing with reduced dose at discharge. Denies use of other OTC or herbal medications.     Thank you,    Electronically signed by Demetrius Ernandez, [address] [place] [address] on 9/8/2023 at 10:06 AM
Pt admitted to  [facility] in a wheelchair. Complaints: Abdominal Pain. IV site free of s/s of infection or infiltration. Vital signs obtained. Assessment and data collection initiated. Two nurse skin assessment performed by Samantha Willingham RN and Fabrizio Oleary. Oriented to room. Swallow Screen completed and documented for all patients ages 39 and older. Patient passed swallow screen    Policies and procedures for [facility] explained. All questions answered with no further questions at this time. Fall prevention and safety brochure discussed with patient. Bed alarm on. Call light in reach.
Pt discharge home with family transporting; discharge packet reviewed regarding: medications, follow ups, discharge instructions, and pt education with no further questions or concerns voiced at this time.
Sodium up from 117 to 124. Will hold the fluids now to avoid overcorrection. Day shift to resume as indicated.
N/A),   IBW. Current BMI (kg/m2): 23.5  Usual Body Weight:  (per pt : 145#, Per EMR: (5/24/23) 145# 3oz, (11/10/22) 148# 3oz, (7/12/22) 157# 13oz)                       BMI Categories: Normal Weight (BMI 18.5-24. 9)    Estimated Daily Nutrient Needs:  Energy Requirements Based On: Kcal/kg  Weight Used for Energy Requirements:  (66.1kgm (9/11))  Energy (kcal/day): 1653-1983kcals (25-30kcals/kgm)  Weight Used for Protein Requirements:  (66.1kgm)  Protein (g/day): 66-79 grams  (1-1.2 grams protein/kgm)       Nutrition Diagnosis:   Inadequate oral intake related to inadequate protein-energy intake as evidenced by NPO or clear liquid status due to medical condition    Nutrition Interventions:   Food and/or Nutrient Delivery: Continue NPO  Nutrition Education/Counseling: Education not appropriate  Coordination of Nutrition Care: Continue to monitor while inpatient       Goals:     Goals: Initiate PO diet (within 2 days)       Nutrition Monitoring and Evaluation:      Food/Nutrient Intake Outcomes: Diet Advancement/Tolerance  Physical Signs/Symptoms Outcomes: Biochemical Data, GI Status, Fluid Status or Edema, Hemodynamic Status, Nutrition Focused Physical Findings, Skin, Weight    Discharge Planning:     Too soon to determine (9/15-9/18)     Cassy Cee RD, LD  Contact: [phone]
4.1  --    CL 84*  --  92*  --   --  99  --    CO2 21*  --  23  --   --  24  --    BUN 15  --  21  --   --  21  --    CREATININE 1.1  --  1.1  --   --  1.1  --    CALCIUM 10.2  --  9.5  --   --  9.6  --     < > = values in this interval not displayed. Recent Labs     09/07/23 1955   AST 35   ALT 19   BILIDIR <0.2   BILITOT 0.7   ALKPHOS 43     No results for input(s): \"INR\" in the last 72 hours. No results for input(s): \"TROPONINT\" in the last 72 hours. Recent Labs     09/08/23  0028   PROCAL 0.14*      Lab Results   Component Value Date/Time    NITRU NEGATIVE 09/07/2023 09:50 PM    WBCUA 10-15 09/07/2023 09:50 PM    WBCUA 50-75 11/04/2011 10:30 AM    BACTERIA NONE SEEN 09/07/2023 09:50 PM    RBCUA 3-5 09/07/2023 09:50 PM    BLOODU MODERATE 09/07/2023 09:50 PM    GLUCOSEU NEGATIVE 09/07/2023 09:50 PM           DVT prophylaxis:    [x] Lovenox  [] SCDs  [] SQ Heparin  [] Encourage ambulation   [] Already on Anticoagulation  Diet: ADULT DIET;  Regular  ADULT ORAL NUTRITION SUPPLEMENT; Breakfast, Lunch, Dinner; Standard High Calorie/High Protein Oral Supplement  Diet NPO  Code Status: Full Code  PT/OT: No  Tele: Yes  IVF: NS 50 cc/h for 14 hours    Electronically signed by Lulu Echols DO PGY 2 on 9/10/2023 at 2:20 PM    Case was discussed with Attending, Dr. Dary Salas
1. No acute infiltrate. This document has been electronically signed by: Avinash Moreno MD on 09/07/2023 08:04 PM      DVT prophylaxis:    [x] Lovenox  [] SCDs  [] SQ Heparin  [] Encourage ambulation   [] Already on Anticoagulation  Diet: ADULT DIET; Regular  ADULT ORAL NUTRITION SUPPLEMENT; Breakfast, Lunch, Dinner; Standard High Calorie/High Protein Oral Supplement  Code Status: Full Code  PT/OT: No  Tele: yes  IVF: NS 50ml/hr    Electronically signed by Dale العراقي DO on 9/9/2023 at 3:34 PM    Case was discussed with Attending, Dr. Rudy Morelos.

## 2023-09-11 NOTE — DISCHARGE SUMMARY
H&P    Patient: Melany Crowder               Sex: male          DOA: 2/4/2020       YOB: 1957      Age:  60 y.o.                  ASSESSMENT:   1. Large left renal pelvic stone with intermittent flank pain  2. BPH/LUTS 100gm prostate on Flomax/finasteride     No interval change  On Macrobid   To OR for L PCNL         Edward Glover MD  [phone] Office  [phone]  Pager    Left renal stone     HISTORY OF PRESENT ILLNESS:  Melany Crowder is a 60 y.o. male with large left renal pelvic stone. Intermittent left flank pain. No hematuria, infections. Does not pain getting worse over several weeks. No fevers. Proceed to PCNL . No flowsheet data found.     Past Medical History:   Diagnosis Date    Asthma     Hernia     High cholesterol     nephrolithiasis     kidney stone       Past Surgical History:   Procedure Laterality Date    HX APPENDECTOMY      HX HEENT      nasal surgery    HX HERNIA REPAIR         Social History     Tobacco Use    Smoking status: Never Smoker    Smokeless tobacco: Never Used   Substance Use Topics    Alcohol use: Yes     Comment: occasional    Drug use: Never       No Known Allergies    Family History   Problem Relation Age of Onset    Cancer Mother         breast    Heart Disease Other     Diabetes Other     Heart Disease Father        Current Facility-Administered Medications   Medication Dose Route Frequency Provider Last Rate Last Dose    lidocaine (PF) (XYLOCAINE) 10 mg/mL (1 %) injection 0.1 mL  0.1 mL SubCUTAneous PRN Ceci Vazquez CRNA        lactated Ringers infusion  50 mL/hr IntraVENous CONTINUOUS Ceci Vazquez CRNA 50 mL/hr at 02/04/20 0922 50 mL/hr at 02/04/20 0922    gentamicin (GARAMYCIN) 400 mg in 0.9% sodium chloride 100 mL IVPB  400 mg IntraVENous ONCE Alma Herbert MD        ampicillin (OMNIPEN) 2 g in 0.9% sodium chloride (MBP/ADV) 100 mL MBP  2 g IntraVENous Q4H Laine Larios MD           Review of Resident Discharge Summary (Hospitalist)      Patient Identification:   Yanni Espinoza   : 1965  MRN: [id number]   Account: [de-identified]   Patient's PCP: ALIZE Shoemaker CNP    Admit Date: 2023   Discharge Date: 2023      Admitting Physician: Derrell Howe MD  Discharge Physician: Henok Harper DO       Discharge Diagnoses:  Hypovolemic Hypotonic Hyponatremia, resolved: Na 117 on arrival.   2023: Sodium corrected to 124, followed by a drop to 120.  2023: Sodium 128 this morning, 129 at 1310. EtOH negative. continue NS at 50 mL/h. Chloride has improved to 92. Sodium checks every 6 hours. 9/10/2023: Patient sodium today 132, continue with NS 50 cc/h for 14 hours with sodium checks. 2023: Overnight sodium 129 thought to be inaccurate. Repeat sodium 136. Follow-up sodium 136 as well. Fluids were discontinued. salt tabs initiated. Continue salt tabs 1 mg twice daily on DC. Nonspecific abdominal and atypical, non-anginal chest pain: resolved Anterior rib cage diffusely tender to palpation. Unlikely ACS. EKG shows normal sinus rhythm with RBBB.  2023: Due to risk factors and nonspecific abdominal pain cardiology consult was placed. D-dimer negative. Troponins elevated but stable: 20, 35, 30.  Echo shows EF 55%. No wall motion abnormalities. 2023: Pain resolved. No tenderness to palpation. Chemical stress test scheduled for Monday. 9/10/2023: Patient is scheduled for stress test tomorrow a.m. if stress test is negative patient can be discharged. 2023: Lexiscan EKG stress and nuclear images not suggestive of myocardial ischemia. 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 Lactic acid WNL. Follow up as outpatient. HIV:Resume home antiretrovirals     COPD: In no acute exacerbation.  Resume home Stiolto, prn albuterol     Tobacco Smoker: Smokes 1 ppd, total 30 pack years [employer]  Constitutional: No fever, chills, or weight loss  Respiratory: No dyspnea  Cardiovascular: No chest pain  Gastrointestinal: No vomiting or abdominal pain. Genitourinary: Denies frequency, urgency, dysuria, hematuria. Neurological: No focal motor changes. PHYSICAL EXAMINATION:   Visit Vitals  BP (!) 159/92 (BP 1 Location: Right arm, BP Patient Position: At rest)   Pulse 74   Temp 97.8 °F (36.6 °C)   Resp 20   Ht 6' 2\" (1.88 m)   Wt 197 lb 4 oz (89.5 kg)   SpO2 95%   BMI 25.33 kg/m²     Constitutional: Well developed, well nourished male. No acute distress. HEENT: Normocephalic, Atraumatic, EOM's intact   CV:  Normal radial pulse. Respiratory: No respiratory distress or difficulties breathing   Abdomen:  Nontender, nondistended.  Male: + L CVA tenderness  SCROTUM:  No scrotal rash or lesions noticed. Normal bilateral testes without masses or tenderness. Skin: No evidence of jaundice. Normal color  Neuro/Psych:  Alert and oriented. Affect appropriate. Lymphatic:   No enlarged inguinal lymph nodes. REVIEW OF LABS AND IMAGING:      Labs: Results:   Chemistry  No results for input(s): GLU, NA, K, CL, CO2, BUN, CREA, CA, AGAP, BUCR, TBIL, GPT, AP, TP, ALB, GLOB, AGRAT in the last 72 hours. CBC w/Diff No results for input(s): WBC, RBC, HGB, HCT, PLT, GRANS, LYMPH, EOS, HGBEXT, HCTEXT, PLTEXT in the last 72 hours. Cultures No results for input(s): CULT in the last 72 hours.   All Micro Results     None            Urinalysis Color   Date Value Ref Range Status   12/30/2019 YELLOW   Final     Appearance   Date Value Ref Range Status   12/30/2019 CLOUDY   Final     Specific gravity   Date Value Ref Range Status   12/30/2019 1.015 1.005 - 1.030   Final     pH (UA)   Date Value Ref Range Status   12/30/2019 7.5 5.0 - 8.0   Final     Protein   Date Value Ref Range Status   12/30/2019 NEGATIVE  NEG mg/dL Final     Ketone   Date Value Ref Range Status   12/30/2019 NEGATIVE  NEG mg/dL Final Bilirubin   Date Value Ref Range Status   2019 NEGATIVE  NEG   Final     Blood   Date Value Ref Range Status   2019 MODERATE (A) NEG   Final     Urobilinogen   Date Value Ref Range Status   2019 1.0 0.2 - 1.0 EU/dL Final     Nitrites   Date Value Ref Range Status   2019 NEGATIVE  NEG   Final     Leukocyte Esterase   Date Value Ref Range Status   2019 SMALL (A) NEG   Final     Potassium   Date Value Ref Range Status   2019 3.9 3.5 - 5.5 mmol/L Final     Creatinine   Date Value Ref Range Status   2019 0.97 0.6 - 1.3 MG/DL Final     BUN   Date Value Ref Range Status   2019 27 (H) 7.0 - 18 MG/DL Final      PSA No results for input(s): PSA in the last 72 hours. Coagulation Lab Results   Component Value Date/Time    Prothrombin time 12.9 2019 12:14 PM    INR 1.0 2019 12:14 PM    aPTT 32.0 2019 12:14 PM           US Results (most recent):  No results found for this or any previous visit. chest    CT Results (most recent):   Results from Hospital Encounter encounter on 11   [address] [place] [address]                 NAME: Renae Andino                                                          PT : 1957               SEX: M         MR#: [id number]     LOCATION/NS: ER-                 AGE: 54Y      ACCT: [id number]     ORDERING: Mary Whalen                      PT TYPE: E                         RADIOLOGIST: Shelley Quinteros MD ([id number])  **Final Report**       ICD Codes / Adm. Diagnosis:    /     Examination:  CT ABD PELVIS W CONTRAST  - [id number] - 2011 12:29PM    Reason:  abd pain, lori rlq    REPORT:  HISTORY: Right lower quadrant abdominal pain.     Exam: CT abdomen and pelvis with oral and IV contrast.    Technique: Thin section axial CT images are obtained from the lung bases   through the pubic symphysis. 125 mL Optiray 350 was administered. Oral   contrast was also given. No comparison. FINDINGS: The lung bases demonstrate mild atelectasis bilaterally. CT abdomen: There is a right posterior lobe hepatic lesion measuring 12 mm   (image 18). Smaller hypodense lesions seen within the right hepatic lobe,   measuring less than 5 mm. The spleen enhances homogeneously. The   gallbladder, pancreas, and adrenal glands are normal. There is a tiny   hypodense lesion within the upper pole left kidney. Nonobstructing calculi   seen within the left kidney. No evidence of obstructive uropathy. Bowel   loops in the upper abdomen are normal. No definite upper abdominal   lymphadenopathy seen. CT pelvis: There are marked periappendiceal inflammatory changes present. Appendix is distended, measuring 9 mm. There is a small periappendiceal   fluid collection present, which could represent a small abscess. However,   this is at the level of the appendiceal origin from the cecum, rather than   at the tip of the appendix. This collection measures 16 x 20 mm (image 52). Seminal vesicles, prostate, bladder, and rectum are normal. There is no   pelvic adenopathy present. Bone window evaluation demonstrates no aggressive osseous lesions. IMPRESSIONS:  1. Findings compatible with appendicitis. There is a small periappendiceal   fluid collection, at the level of the origin of the appendix. This measures   16 x 20 mm, and may represent a small abscess. No evidence of free air. 2. Hypodense lesion within the right posterior lobe of liver is of uncertain   etiology or clinical significance. 3. Nonobstructing calculi seen within the collecting system on the left.     IMPRESSION:              Signing/Reading Doctor: Hector Wagner MD ([id number])    Approved: Hector Wagner MD ([id number])  11/27/2011 ABD      MRI Results (most recent): No procedure found. No diagnosis found.

## 2023-09-11 NOTE — CARE COORDINATION
9/11/23, 1:58 PM EDT    DISCHARGE ON GOING EVALUATION    Daniela Ferrer day: 3  Location: 8A-10/010-A Reason for admit: Abdominal pain, right upper quadrant [R10.11]  Hyponatremia [E87.1]  Abdominal pain [R10.9]  Chest pain, unspecified type [R07.9]   Procedure:   9/7 CXR  No acute infiltrate  9/7 CT Chest W Contrast 1. No solid or cystic lesion seen in the chest wall. 2. Moderate emphysema. 3. Prominent interstitial markings seen in the periphery of the lungs bilaterally may represent chronic interstitial lung disease. No focal   areas of consolidation. 9/7 CT A/P 1. No gallstones or evidence for acute cholecystitis. 2. Colonic diverticulosis without diverticulitis. Normal appendix. 3. Atherosclerotic plaquing and calcification seen in the infrarenal abdominal aorta. No aneurysm. 4. 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 5. Left common and external iliac artery are patent. Left internal iliac   artery is occluded  9/8 ECHO: EF 55%. 9/11 Stress Test: ordered  Barriers to Discharge: Hospitalist. Sodium q6h. Daily BMP. Na 136. IVF stopped. Lopressor   PCP: ALIZE Chadwick CNP  Readmission Risk Score: 10.8%  Patient Goals/Plan/Treatment Preferences: Plans to return home alone. Has a walker. Denies DME needs or [facility] services.

## 2023-09-12 ENCOUNTER — HOSPITAL ENCOUNTER (OUTPATIENT)
Age: 58
Discharge: HOME | End: 2023-09-12
Payer: MEDICARE

## 2023-09-12 DIAGNOSIS — E87.1 HYPONATREMIA: ICD-10-CM

## 2023-09-12 DIAGNOSIS — R10.11 ABDOMINAL PAIN, RIGHT UPPER QUADRANT: ICD-10-CM

## 2023-09-12 LAB
ANION GAP SERPL CALC-SCNC: 12 MEQ/L (ref 8–16)
BUN SERPL-MCNC: 18 MG/DL (ref 7–22)
CALCIUM SERPL-MCNC: 9.2 MG/DL (ref 8.5–10.5)
CHLORIDE SERPL-SCNC: 98 MEQ/L (ref 98–111)
CO2 SERPL-SCNC: 24 MEQ/L (ref 23–33)
CREAT SERPL-MCNC: 1.1 MG/DL (ref 0.4–1.2)
DEPRECATED RDW RBC AUTO: 49.8 FL (ref 35–45)
ERYTHROCYTE [DISTWIDTH] IN BLOOD BY AUTOMATED COUNT: 11.6 % (ref 11.5–14.5)
GFR SERPL CREATININE-BSD FRML MDRD: > 60 ML/MIN/1.73M2
GLUCOSE SERPL-MCNC: 101 MG/DL (ref 70–108)
HCT VFR BLD AUTO: 31.9 % (ref 42–52)
HGB BLD-MCNC: 11 GM/DL (ref 14–18)
MCH RBC QN AUTO: 40.6 PG (ref 26–33)
MCHC RBC AUTO-ENTMCNC: 34.5 GM/DL (ref 32.2–35.5)
MCV RBC AUTO: 117.7 FL (ref 80–94)
PLATELET # BLD AUTO: 376 THOU/MM3 (ref 130–400)
PMV BLD AUTO: 8.5 FL (ref 9.4–12.4)
POTASSIUM SERPL-SCNC: 5.1 MEQ/L (ref 3.5–5.2)
RBC # BLD AUTO: 2.71 MILL/MM3 (ref 4.7–6.1)
SODIUM SERPL-SCNC: 134 MEQ/L (ref 135–145)
WBC # BLD AUTO: 6.7 THOU/MM3 (ref 4.8–10.8)

## 2023-09-12 PROCEDURE — 36415 COLL VENOUS BLD VENIPUNCTURE: CPT

## 2023-09-12 PROCEDURE — 80048 BASIC METABOLIC PNL TOTAL CA: CPT

## 2023-09-12 PROCEDURE — 85027 COMPLETE CBC AUTOMATED: CPT

## 2023-09-15 LAB
BACTERIA BLD AEROBE CULT: NORMAL
BACTERIA BLD AEROBE CULT: NORMAL

## 2023-09-19 ENCOUNTER — OFFICE VISIT (OUTPATIENT)
Dept: PHYSICAL MEDICINE AND REHAB | Age: 58
End: 2023-09-19
Payer: MEDICARE

## 2023-09-19 ENCOUNTER — TELEPHONE (OUTPATIENT)
Dept: PHYSICAL MEDICINE AND REHAB | Age: 58
End: 2023-09-19

## 2023-09-19 VITALS
WEIGHT: 145 LBS | HEIGHT: 66 IN | HEART RATE: 76 BPM | BODY MASS INDEX: 23.3 KG/M2 | SYSTOLIC BLOOD PRESSURE: 118 MMHG | DIASTOLIC BLOOD PRESSURE: 66 MMHG

## 2023-09-19 DIAGNOSIS — M53.3 SACROILIAC JOINT DYSFUNCTION: ICD-10-CM

## 2023-09-19 DIAGNOSIS — M46.1 SI (SACROILIAC) JOINT INFLAMMATION (HCC): ICD-10-CM

## 2023-09-19 DIAGNOSIS — G62.9 NEUROPATHY: ICD-10-CM

## 2023-09-19 DIAGNOSIS — M47.812 CERVICAL SPONDYLOSIS: ICD-10-CM

## 2023-09-19 DIAGNOSIS — G89.4 CHRONIC PAIN SYNDROME: ICD-10-CM

## 2023-09-19 DIAGNOSIS — M47.816 LUMBAR SPONDYLOSIS: Primary | ICD-10-CM

## 2023-09-19 PROCEDURE — 99214 OFFICE O/P EST MOD 30 MIN: CPT | Performed by: NURSE PRACTITIONER

## 2023-09-19 RX ORDER — TRAMADOL HYDROCHLORIDE 50 MG/1
50 TABLET ORAL EVERY 6 HOURS PRN
Qty: 120 TABLET | Refills: 0 | Status: SHIPPED | OUTPATIENT
Start: 2023-09-19 | End: 2023-10-19

## 2023-09-19 RX ORDER — TIZANIDINE 4 MG/1
4 TABLET ORAL 2 TIMES DAILY PRN
Qty: 180 TABLET | Refills: 2 | Status: SHIPPED | OUTPATIENT
Start: 2023-09-19 | End: 2024-12-19

## 2023-09-19 ASSESSMENT — ENCOUNTER SYMPTOMS
DIARRHEA: 0
PHOTOPHOBIA: 0
NAUSEA: 0
RHINORRHEA: 0
EYES NEGATIVE: 1
SORE THROAT: 0
CONSTIPATION: 0
CHEST TIGHTNESS: 0
ABDOMINAL PAIN: 0
EYE PAIN: 0
ALLERGIC/IMMUNOLOGIC NEGATIVE: 1
VOMITING: 0
BACK PAIN: 1
SHORTNESS OF BREATH: 1
COLOR CHANGE: 0
SINUS PRESSURE: 0

## 2023-09-19 NOTE — PROGRESS NOTES
[address] [place] [address]  Dept: [phone]  Dept Fax: [phone]: [phone]    Visit Date: 9/19/2023    Functionality Assessment/Goals Worksheet     On a scale of 0 (Does not Interfere) to 10 (Completely Interferes)     1. Which number describes how during the past week pain has interfered with       the following:  A. General Activity:  1  B. Mood: 0  C. Walking Ability:  6  D. Normal Work (Includes both work outside the home and housework):  6  E. Relations with Other People:   0  F. Sleep:   0  G. Enjoyment of Life:   0    2. Patient Prefers to Take their Pain Medications:     []  On a regular basis   [x]  Only when necessary    []  Does not take pain medications    3. What are the Patient's Goals/Expectations for Visiting Pain Management? []  Learn about my pain    [x]  Receive Medication   []  Physical Therapy     []  Treat Depression   [x]  Receive Injections    []  Treat Sleep   []  Deal with Anxiety and Stress   []  Treat Opoid Dependence/Addiction   []  Other:    HPI:   Ryan Plasencia is a 61 y.o. male is here today for    Chief Complaint: Lumbar back pain and SI pain      F/U  was in hospital for  low sodium since last visit. He continues to take Zanaflex Tramadol very rarely. Lumbar RFA has worn off now (completed 2/2023) lumbar pain is back to baseline maybe higher. He c/o low lumbar Si  pain that Increases with walking  standing bending and mowing. Pain increases with bending, lifting, twisting , reaching, pushing, pulling, walking, standing, stairs and getting up and down. Treatments Tried ice, heat, NSAIDS, narcotics, muscle relaxer, OTC rubs creams patches, steroid burst and injections  Pain Description sharp, burning, aching, numbness and tingling   . Pain scale with out pain medications or at its worst is 8/10.   Pain scale with pain

## 2023-09-19 NOTE — PROGRESS NOTES
Functionality Assessment/Goals Worksheet     On a scale of 0 (Does not Interfere) to 10 (Completely Interferes)     1. Which number describes how during the past week pain has interfered with           the following:  A. General Activity:  4  B. Mood: 0  C. Walking Ability:  5  D. Normal Work (Includes both work outside the home and housework):  2  E. Relations with Other People:   0  F. Sleep:   0  G. Enjoyment of Life:   0    2. Patient Prefers to Take their Pain Medications:     []  On a regular basis   [x]  Only when necessary    []  Does not take pain medications    3. What are the Patient's Goals/Expectations for Visiting Pain Management?      []  Learn about my pain    [x]  Receive Medication   []  Physical Therapy     []  Treat Depression   [x]  Receive Injections    []  Treat Sleep   []  Deal with Anxiety and Stress   []  Treat Opoid Dependence/Addiction   []  Other:

## 2023-10-01 NOTE — H&P
Esther Roman is a 53 y.o. male is here today for     Chief Complaint: Low back pain        The patienthas No Known Allergies.     Past Medical History  Yesenia Jaimes  has a past medical history of Arthritis, Arthritis, Chest pain, Depression, Diabetes mellitus ([facility] [address] Dysphagia, GERD (gastroesophageal reflux disease), History of blood transfusion, History of nephrolithiasis, HIV (human immunodeficiency virus infection) ([facility] [address] Hyperlipidemia, Pancreatitis, and Type 2 diabetes mellitus without complication ([facility] [address]    Past Surgical History  The patient  has a past surgical history that includes Foot surgery (1970's); Cardiac catheterization (2015???); Colonoscopy (2016); Endoscopy, colon, diagnostic; other surgical history (Bilateral, 01/17/2017); other surgical history (Bilateral, 02/20/2017); other surgical history (Right, 03/28/2017); Nerve Surgery (Bilateral, 07/18/2017); Nerve Block Lumb Facet Level 1 Bilateral (Bilateral, 7/18/2017); Upper gastrointestinal endoscopy ([id number], [id number]); other surgical history (Bilateral, 03/06/2018); pr inj dx/ther agnt paravert facet joint, lumbar/sac, 2nd level (Bilateral, 3/6/2018); Lumbar spine surgery (Right, 4/23/2019); and Lumbar spine surgery (Left, 5/21/2019).    Family History  This patient's family history includes Diabetes in his mother; High Blood Pressure in his mother.     Social History  Yesenia Jaimes  reports that he has been smoking cigars. He has a 10.00 pack-year smoking history. He has never used smokeless tobacco. He reports that he does not drink alcohol or use drugs.     Medications    Current Medication      Current Outpatient Medications:     isosorbide dinitrate (ISORDIL) 20 MG tablet, Take 1 tablet by mouth twice daily, Disp: 60 tablet, Rfl: 3    metoprolol tartrate (LOPRESSOR) 25 MG tablet, Take 1 tablet by mouth twice daily, Disp: 60 tablet, Rfl: 11    pantoprazole (PROTONIX) 40 MG tablet, Take 1 tablet by mouth every morning (before breakfast) 30 min. before breakfast., Disp: 90 tablet, Rfl: 2    aspirin 81 MG tablet, Take 81 mg by mouth daily, Disp: , Rfl:     lisinopril (PRINIVIL;ZESTRIL) 2.5 MG tablet, Take 2.5 mg by mouth daily, Disp: , Rfl:     NITROSTAT 0.4 MG SL tablet, place 1 tablet under the tongue if needed every 5 minutes for chest pain for 3 doses IF NO RELIEF AFTER 3RD DOSE CALL PRESCRIBER ., Disp: 25 tablet, Rfl: 3    simvastatin (ZOCOR) 10 MG tablet, Take 10 mg by mouth nightly.  , Disp: , Rfl:     lamivudine-zidovudine (COMBIVIR) 150-300 MG per tablet, Take 1 tablet by mouth 2 times daily. , Disp: , Rfl:     fosamprenavir (LEXIVA) 700 MG tablet, Take by mouth 2 times daily 2 tab, Disp: , Rfl:     olanzapine (ZYPREXA) 5 MG tablet, Take 5 mg by mouth nightly.  , Disp: , Rfl:         Subjective:      Review of Systems   Constitutional: Positive for activity change. Negative for appetite change, chills, diaphoresis, fatigue, fever and unexpected weight change. HENT: Negative. Negative for congestion, ear pain, hearing loss, mouth sores, nosebleeds, rhinorrhea, sinus pressure and sore throat. Eyes: Negative. Negative for photophobia, pain and visual disturbance. Respiratory: Negative. Negative for cough, chest tightness, shortness of breath and wheezing. Cardiovascular: Negative for chest pain and palpitations. Gastrointestinal: Negative for abdominal pain, constipation, diarrhea, nausea and vomiting. GERD   Endocrine: Negative. Negative for cold intolerance, heat intolerance, polydipsia, polyphagia and polyuria. DM    Genitourinary: Negative. Negative for decreased urine volume, difficulty urinating, frequency and hematuria. Musculoskeletal: Positive for arthralgias, back pain, gait problem and myalgias. Negative for joint swelling, neck pain and neck stiffness. Skin: Negative. Negative for color change and rash. Allergic/Immunologic: Negative.   Negative for food allergies and immunocompromised rebound. Musculoskeletal:         General: Tenderness present. Right shoulder: Normal.      Left shoulder: Normal.      Right hip: He exhibits tenderness. Left hip: He exhibits tenderness. Right knee: Normal.      Left knee: Normal.      Right ankle: Normal.      Left ankle: Normal.      Cervical back: Normal.      Thoracic back: Normal.      Lumbar back: He exhibits decreased range of motion, tenderness, bony tenderness, pain and spasm. Back:    Skin:     General: Skin is warm. Coloration: Skin is not pale. Findings: No erythema or rash. Neurological:      Mental Status: He is alert and oriented to person, place, and time. He is not disoriented. Cranial Nerves: No cranial nerve deficit. Sensory: No sensory deficit. Motor: No atrophy or abnormal muscle tone. Coordination: Coordination normal.      Gait: Gait normal.      Deep Tendon Reflexes: Reflexes are normal and symmetric. Reflex Scores:       Tricep reflexes are 2+ on the right side. Comments: SLR-  Motor 4/5 generalized weakness  Feet bilat dusky when dependent   Psychiatric:         Attention and Perception: He is attentive. Mood and Affect: Mood is not anxious or depressed. Affect is not labile, blunt, angry or inappropriate. Speech: Speech normal.         Behavior: Behavior normal. Behavior is not agitated, slowed, aggressive, withdrawn, hyperactive or combative. Thought Content: Thought content normal. Thought content is not paranoid or delusional. Thought content does not include homicidal or suicidal ideation. Thought content does not include homicidal or suicidal plan. Cognition and Memory: Memory is not impaired. He does not exhibit impaired recent memory or impaired remote memory. Judgment: Judgment normal. Judgment is not impulsive or inappropriate.       Comments: Flat affect, h/o depression         HARRISON test:+   Yeomans test: +  Gasukhdev test: +  Assessment:      1. Lumbar spondylosis    2. Sacroiliac inflammation (HCC)    3. Spondylosis of lumbar region without myelopathy or radiculopathy    4. SI (sacroiliac) joint inflammation (HCC)    5.  Chronic pain syndrome          Plan:  Lumbar RFA Bilateral L3-4,4-5,5-S1 right side first .       Fausto Hernandez, ALIZE - CNP  1/6/2020 Parent MEDICATIONS  (STANDING):  acetaminophen   Tablet .. 650 milliGRAM(s) Oral once  atorvastatin 40 milliGRAM(s) Oral at bedtime  cloZAPine Disintegrating Tablet 100 milliGRAM(s) Oral two times a day  dextrose 40% Gel 15 Gram(s) Oral once  glucagon  Injectable 1 milliGRAM(s) IntraMuscular once  insulin lispro (ADMELOG) corrective regimen sliding scale   SubCutaneous three times a day before meals  insulin lispro (ADMELOG) corrective regimen sliding scale   SubCutaneous at bedtime  metFORMIN 1000 milliGRAM(s) Oral at bedtime  risperiDONE  Disintegrating Tablet 4 milliGRAM(s) Oral at bedtime  risperiDONE  Disintegrating Tablet 2 milliGRAM(s) Oral daily  sitaGLIPtin 100 milliGRAM(s) Oral daily    MEDICATIONS  (PRN):  acetaminophen   Tablet .. 650 milliGRAM(s) Oral every 6 hours PRN Mild Pain (1 - 3), Moderate Pain (4 - 6)  chlorproMAZINE    Injectable 100 milliGRAM(s) IntraMuscular once PRN agitation or aggression  chlorproMAZINE    Tablet 100 milliGRAM(s) Oral every 4 hours PRN agitation or aggression  diphenhydrAMINE   Injectable 25 milliGRAM(s) IntraMuscular every 12 hours PRN EPS ppx  diphenhydrAMINE   Injectable 25 milliGRAM(s) IntraMuscular every 12 hours PRN EPS ppx  haloperidol    Injectable 5 milliGRAM(s) IntraMuscular every 12 hours PRN psychosis  haloperidol    Injectable 5 milliGRAM(s) IntraMuscular every 12 hours PRN psychosis  LORazepam     Tablet 2 milliGRAM(s) Oral every 6 hours PRN agitation  melatonin. 5 milliGRAM(s) Oral at bedtime PRN Insomnia  polyethylene glycol 3350 17 Gram(s) Oral daily PRN constipation  senna 2 Tablet(s) Oral at bedtime PRN constipation

## 2024-03-15 ENCOUNTER — HOSPITAL ENCOUNTER (OUTPATIENT)
Dept: GENERAL RADIOLOGY | Age: 59
Discharge: HOME | End: 2024-03-15
Payer: MEDICARE

## 2024-03-15 ENCOUNTER — HOSPITAL ENCOUNTER (OUTPATIENT)
Age: 59
Discharge: HOME | End: 2024-03-15
Payer: MEDICARE

## 2024-03-15 DIAGNOSIS — J44.9 CHRONIC OBSTRUCTIVE PULMONARY DISEASE, UNSPECIFIED COPD TYPE (HCC): ICD-10-CM

## 2024-03-15 PROCEDURE — 71046 X-RAY EXAM CHEST 2 VIEWS: CPT

## 2024-03-25 ENCOUNTER — HOSPITAL ENCOUNTER (OUTPATIENT)
Dept: ULTRASOUND IMAGING | Age: 59
Discharge: HOME | End: 2024-03-25
Payer: MEDICARE

## 2024-03-25 DIAGNOSIS — N50.811 TESTICULAR PAIN, RIGHT: ICD-10-CM

## 2024-03-25 PROCEDURE — 76870 US EXAM SCROTUM: CPT

## 2024-03-25 RX ORDER — ISOSORBIDE DINITRATE 20 MG/1
TABLET ORAL
Qty: 180 TABLET | Refills: 0 | Status: SHIPPED | OUTPATIENT
Start: 2024-03-25

## 2024-04-02 ENCOUNTER — TELEPHONE (OUTPATIENT)
Dept: SURGERY | Age: 59
End: 2024-04-02

## 2024-04-02 ENCOUNTER — HOSPITAL ENCOUNTER (OUTPATIENT)
Age: 59
Discharge: HOME | End: 2024-04-02
Payer: MEDICARE

## 2024-04-02 ENCOUNTER — OFFICE VISIT (OUTPATIENT)
Dept: SURGERY | Age: 59
End: 2024-04-02
Payer: MEDICARE

## 2024-04-02 VITALS
RESPIRATION RATE: 18 BRPM | HEART RATE: 94 BPM | SYSTOLIC BLOOD PRESSURE: 100 MMHG | HEIGHT: 66 IN | TEMPERATURE: 98 F | DIASTOLIC BLOOD PRESSURE: 72 MMHG | OXYGEN SATURATION: 97 % | WEIGHT: 151.9 LBS | BODY MASS INDEX: 24.41 KG/M2

## 2024-04-02 DIAGNOSIS — K40.90 NON-RECURRENT UNILATERAL INGUINAL HERNIA WITHOUT OBSTRUCTION OR GANGRENE: Primary | ICD-10-CM

## 2024-04-02 DIAGNOSIS — Z01.818 PRE-OP TESTING: ICD-10-CM

## 2024-04-02 DIAGNOSIS — K40.90 LEFT INGUINAL HERNIA: ICD-10-CM

## 2024-04-02 LAB
ANION GAP SERPL CALC-SCNC: 13 MEQ/L (ref 8–16)
BUN SERPL-MCNC: 20 MG/DL (ref 7–22)
CALCIUM SERPL-MCNC: 9.2 MG/DL (ref 8.5–10.5)
CHLORIDE SERPL-SCNC: 98 MEQ/L (ref 98–111)
CO2 SERPL-SCNC: 21 MEQ/L (ref 23–33)
CREAT SERPL-MCNC: 1.2 MG/DL (ref 0.4–1.2)
GFR SERPL CREATININE-BSD FRML MDRD: 69 ML/MIN/1.73M2
GLUCOSE SERPL-MCNC: 98 MG/DL (ref 70–108)
HCT VFR BLD AUTO: 37 % (ref 42–52)
HGB BLD-MCNC: 13.2 GM/DL (ref 14–18)
POTASSIUM SERPL-SCNC: 4.4 MEQ/L (ref 3.5–5.2)
SODIUM SERPL-SCNC: 132 MEQ/L (ref 135–145)

## 2024-04-02 PROCEDURE — 85018 HEMOGLOBIN: CPT

## 2024-04-02 PROCEDURE — 80048 BASIC METABOLIC PNL TOTAL CA: CPT

## 2024-04-02 PROCEDURE — 85014 HEMATOCRIT: CPT

## 2024-04-02 PROCEDURE — 99204 OFFICE O/P NEW MOD 45 MIN: CPT | Performed by: SURGERY

## 2024-04-02 PROCEDURE — 36415 COLL VENOUS BLD VENIPUNCTURE: CPT

## 2024-04-02 RX ORDER — FLUTICASONE PROPIONATE 50 MCG
SPRAY, SUSPENSION (ML) NASAL
COMMUNITY
Start: 2024-03-15

## 2024-04-02 RX ORDER — LORATADINE 10 MG/1
TABLET ORAL
COMMUNITY
Start: 2024-03-15

## 2024-04-02 NOTE — PROGRESS NOTES
file   Occupational History    Not on file   Tobacco Use    Smoking status: Every Day     Current packs/day: 1.00     Average packs/day: 1 pack/day for 27.0 years (27.0 ttl pk-yrs)     Types: Cigarettes    Smokeless tobacco: Never   Vaping Use    Vaping Use: Never used   Substance and Sexual Activity    Alcohol use: No    Drug use: No    Sexual activity: Not on file   Other Topics Concern    Not on file   Social History Narrative    Not on file     Social Determinants of Health     Financial Resource Strain: Not on file   Food Insecurity: Not on file   Transportation Needs: Not on file   Physical Activity: Not on file   Stress: Not on file   Social Connections: Not on file   Intimate Partner Violence: Not on file   Housing Stability: Not on file        Review of Systems:  Review of Systems   Constitutional:  Negative for appetite change, fatigue and fever.   HENT:  Negative for ear pain, sore throat and trouble swallowing.    Respiratory:  Negative for cough, chest tightness and shortness of breath.    Cardiovascular:  Negative for chest pain, palpitations and leg swelling.   Gastrointestinal:  Negative for abdominal pain, blood in stool, constipation, diarrhea, nausea and vomiting.        Groin pain   Endocrine: Negative for cold intolerance.   Genitourinary:  Negative for difficulty urinating and urgency.   Musculoskeletal:  Negative for back pain and joint swelling.   Skin:  Negative for rash and wound.   Allergic/Immunologic: Negative for immunocompromised state.   Neurological:  Negative for seizures and headaches.   Psychiatric/Behavioral:  Negative for hallucinations and suicidal ideas. The patient is not nervous/anxious.        OBJECTIVE:   CURRENT VITALS:  height is 1.676 m (5' 6\") and weight is 68.9 kg (151 lb 14.4 oz). His temporal temperature is 98 °F (36.7 °C). His blood pressure is 100/72 and his pulse is 94. His respiration is 18 and oxygen saturation is 97%.  Body mass index is 24.52 kg/m².  Physical

## 2024-04-02 NOTE — TELEPHONE ENCOUNTER
Patient scheduled for surgery with Dr. Trent on 04- at 12:00pm with an arrival of 10:00am.  Preop order (H/H, BMP) and surgery instructions given to patient.  Surgery consent signed. Antibacterial soap given.  Cardiac clearance per Dr. Carroll.

## 2024-04-02 NOTE — TELEPHONE ENCOUNTER
Robotic Surgery Scheduling Form   [facility] [address]  [place], [place] [address]    Phone * [phone]   Surgical Scheduling Direct line Phone * [phone]  Fax * [phone]      David CORREIA Lio      1965    male    [address]  [place] [address]  Marital Status:    Single     Home Phone: [phone]   Cell Phone:   Telephone Information:   Mobile [phone]              Surgeon: Dr. Trent  Surgery Date:04-   Time: 12:00pm     Procedure: Robotic assisted left inguinal hernia repair with mesh possible open, possible right inguinal hernia repair   Outpatient     Diagnosis: Left inguinal hernia    Important Medical History: In Epic    Special Inst/Equip:     CPT Codes: 59429    Latex Allergy:   no Cardiac Device:  no    Anesthesia Type: General    Case Location:  Main OR     Preadmission Testing: Phone Call      PAT Date and Time: ________________________________    PAT Confirmation #: _________________________________    Post Op Visit:  ______________________________________    Need Preop Cardiac Clearance:   yes,     Does patient have Cardiologist/physician?   Dr. Carroll    Surgery Conformation #:  ______________________________________________    : __________________________________ Date:____________________      RNFA (colon resection only):      Insurance Company Name:  [employer]

## 2024-04-03 ENCOUNTER — TELEPHONE (OUTPATIENT)
Dept: SURGERY | Age: 59
End: 2024-04-03

## 2024-04-03 NOTE — TELEPHONE ENCOUNTER
Per [employer]    No Authorization Required  Service Type  2 - Surgical  Place of Service  22 - [facility]  Service From - To Date  2024-04-26 - 2024-04-26    Quantity  1 Units    Level of Service  Elective    Diagnosis Code 1   - Unil inguinal hernia w/o obst or gangr not spcf as recur    Procedure Code 1  22608 - LAP ING HERNIA REPAIR INIT  Quantity  1 Units    Status  NO AUTH REQUIRED

## 2024-04-04 ASSESSMENT — ENCOUNTER SYMPTOMS
ABDOMINAL PAIN: 0
TROUBLE SWALLOWING: 0
SORE THROAT: 0
VOMITING: 0
NAUSEA: 0
CHEST TIGHTNESS: 0
DIARRHEA: 0
COUGH: 0
BLOOD IN STOOL: 0
SHORTNESS OF BREATH: 0

## 2024-04-09 RX ORDER — SODIUM CHLORIDE 1 G/1
1 TABLET ORAL 2 TIMES DAILY WITH MEALS
Qty: 240 TABLET | OUTPATIENT
Start: 2024-04-09

## 2024-04-22 ENCOUNTER — PREP FOR PROCEDURE (OUTPATIENT)
Dept: SURGERY | Age: 59
End: 2024-04-22

## 2024-04-22 RX ORDER — SODIUM CHLORIDE 9 MG/ML
INJECTION, SOLUTION INTRAVENOUS CONTINUOUS
Status: CANCELLED | OUTPATIENT
Start: 2024-04-22

## 2024-04-26 ENCOUNTER — ANESTHESIA EVENT (OUTPATIENT)
Dept: OPERATING ROOM | Age: 59
End: 2024-04-26
Payer: MEDICARE

## 2024-04-26 ENCOUNTER — HOSPITAL ENCOUNTER (OUTPATIENT)
Age: 59
Setting detail: OUTPATIENT SURGERY
Discharge: HOME | End: 2024-04-26
Attending: SURGERY | Admitting: SURGERY
Payer: MEDICARE

## 2024-04-26 ENCOUNTER — ANESTHESIA (OUTPATIENT)
Dept: OPERATING ROOM | Age: 59
End: 2024-04-26
Payer: MEDICARE

## 2024-04-26 VITALS
RESPIRATION RATE: 14 BRPM | DIASTOLIC BLOOD PRESSURE: 87 MMHG | HEIGHT: 66 IN | TEMPERATURE: 96.8 F | HEART RATE: 93 BPM | WEIGHT: 148.5 LBS | SYSTOLIC BLOOD PRESSURE: 153 MMHG | OXYGEN SATURATION: 96 % | BODY MASS INDEX: 23.87 KG/M2

## 2024-04-26 DIAGNOSIS — K40.20 BILATERAL INGUINAL HERNIA WITHOUT OBSTRUCTION OR GANGRENE, RECURRENCE NOT SPECIFIED: Primary | ICD-10-CM

## 2024-04-26 PROCEDURE — 6360000002 HC RX W HCPCS: Performed by: SURGERY

## 2024-04-26 PROCEDURE — 2580000003 HC RX 258: Performed by: NURSE PRACTITIONER

## 2024-04-26 PROCEDURE — C1781 MESH (IMPLANTABLE): HCPCS | Performed by: SURGERY

## 2024-04-26 PROCEDURE — 2709999900 HC NON-CHARGEABLE SUPPLY: Performed by: SURGERY

## 2024-04-26 PROCEDURE — 3600000019 HC SURGERY ROBOT ADDTL 15MIN: Performed by: SURGERY

## 2024-04-26 PROCEDURE — 3600000009 HC SURGERY ROBOT BASE: Performed by: SURGERY

## 2024-04-26 PROCEDURE — 7100000010 HC PHASE II RECOVERY - FIRST 15 MIN: Performed by: SURGERY

## 2024-04-26 PROCEDURE — 7100000011 HC PHASE II RECOVERY - ADDTL 15 MIN: Performed by: SURGERY

## 2024-04-26 PROCEDURE — 6360000002 HC RX W HCPCS: Performed by: NURSE ANESTHETIST, CERTIFIED REGISTERED

## 2024-04-26 PROCEDURE — 2500000003 HC RX 250 WO HCPCS: Performed by: NURSE ANESTHETIST, CERTIFIED REGISTERED

## 2024-04-26 PROCEDURE — 94640 AIRWAY INHALATION TREATMENT: CPT

## 2024-04-26 PROCEDURE — 7100000001 HC PACU RECOVERY - ADDTL 15 MIN: Performed by: SURGERY

## 2024-04-26 PROCEDURE — 7100000000 HC PACU RECOVERY - FIRST 15 MIN: Performed by: SURGERY

## 2024-04-26 PROCEDURE — 6360000002 HC RX W HCPCS: Performed by: NURSE PRACTITIONER

## 2024-04-26 PROCEDURE — 3700000000 HC ANESTHESIA ATTENDED CARE: Performed by: SURGERY

## 2024-04-26 PROCEDURE — S2900 ROBOTIC SURGICAL SYSTEM: HCPCS | Performed by: SURGERY

## 2024-04-26 PROCEDURE — 49650 LAP ING HERNIA REPAIR INIT: CPT | Performed by: SURGERY

## 2024-04-26 PROCEDURE — 3700000001 HC ADD 15 MINUTES (ANESTHESIA): Performed by: SURGERY

## 2024-04-26 PROCEDURE — 6370000000 HC RX 637 (ALT 250 FOR IP): Performed by: SURGERY

## 2024-04-26 DEVICE — MESH HERN W10XL15CM POLY POLYLACTIC ACID 70% CLLGN 30% GLYC: Type: IMPLANTABLE DEVICE | Site: GROIN | Status: FUNCTIONAL

## 2024-04-26 RX ORDER — PHENYLEPHRINE HCL IN 0.9% NACL 1 MG/10 ML
SYRINGE (ML) INTRAVENOUS PRN
Status: DISCONTINUED | OUTPATIENT
Start: 2024-04-26 | End: 2024-04-26 | Stop reason: SDUPTHER

## 2024-04-26 RX ORDER — ONDANSETRON 2 MG/ML
INJECTION INTRAMUSCULAR; INTRAVENOUS PRN
Status: DISCONTINUED | OUTPATIENT
Start: 2024-04-26 | End: 2024-04-26 | Stop reason: SDUPTHER

## 2024-04-26 RX ORDER — HYDROMORPHONE HYDROCHLORIDE 2 MG/ML
INJECTION, SOLUTION INTRAMUSCULAR; INTRAVENOUS; SUBCUTANEOUS PRN
Status: DISCONTINUED | OUTPATIENT
Start: 2024-04-26 | End: 2024-04-26 | Stop reason: SDUPTHER

## 2024-04-26 RX ORDER — IPRATROPIUM BROMIDE AND ALBUTEROL SULFATE 2.5; .5 MG/3ML; MG/3ML
1 SOLUTION RESPIRATORY (INHALATION) ONCE
Status: COMPLETED | OUTPATIENT
Start: 2024-04-26 | End: 2024-04-26

## 2024-04-26 RX ORDER — IPRATROPIUM BROMIDE AND ALBUTEROL SULFATE 2.5; .5 MG/3ML; MG/3ML
SOLUTION RESPIRATORY (INHALATION)
Status: DISCONTINUED
Start: 2024-04-26 | End: 2024-04-26 | Stop reason: HOSPADM

## 2024-04-26 RX ORDER — ESMOLOL HYDROCHLORIDE 10 MG/ML
INJECTION INTRAVENOUS PRN
Status: DISCONTINUED | OUTPATIENT
Start: 2024-04-26 | End: 2024-04-26 | Stop reason: SDUPTHER

## 2024-04-26 RX ORDER — KETAMINE HCL IN NACL, ISO-OSM 100MG/10ML
SYRINGE (ML) INJECTION PRN
Status: DISCONTINUED | OUTPATIENT
Start: 2024-04-26 | End: 2024-04-26 | Stop reason: SDUPTHER

## 2024-04-26 RX ORDER — SODIUM CHLORIDE 9 MG/ML
INJECTION, SOLUTION INTRAVENOUS CONTINUOUS
Status: DISCONTINUED | OUTPATIENT
Start: 2024-04-26 | End: 2024-04-26 | Stop reason: HOSPADM

## 2024-04-26 RX ORDER — KETOROLAC TROMETHAMINE 30 MG/ML
INJECTION, SOLUTION INTRAMUSCULAR; INTRAVENOUS PRN
Status: DISCONTINUED | OUTPATIENT
Start: 2024-04-26 | End: 2024-04-26 | Stop reason: SDUPTHER

## 2024-04-26 RX ORDER — DEXAMETHASONE SODIUM PHOSPHATE 10 MG/ML
INJECTION, EMULSION INTRAMUSCULAR; INTRAVENOUS PRN
Status: DISCONTINUED | OUTPATIENT
Start: 2024-04-26 | End: 2024-04-26 | Stop reason: SDUPTHER

## 2024-04-26 RX ORDER — PROPOFOL 10 MG/ML
INJECTION, EMULSION INTRAVENOUS PRN
Status: DISCONTINUED | OUTPATIENT
Start: 2024-04-26 | End: 2024-04-26 | Stop reason: SDUPTHER

## 2024-04-26 RX ORDER — OXYCODONE HYDROCHLORIDE AND ACETAMINOPHEN 5; 325 MG/1; MG/1
1 TABLET ORAL EVERY 6 HOURS PRN
Qty: 20 TABLET | Refills: 0 | Status: SHIPPED | OUTPATIENT
Start: 2024-04-26 | End: 2024-05-01

## 2024-04-26 RX ORDER — DOCUSATE SODIUM 100 MG/1
100 CAPSULE, LIQUID FILLED ORAL 2 TIMES DAILY
Qty: 60 CAPSULE | Refills: 0 | Status: SHIPPED | OUTPATIENT
Start: 2024-04-26 | End: 2024-05-26

## 2024-04-26 RX ORDER — BUPIVACAINE HYDROCHLORIDE 5 MG/ML
INJECTION, SOLUTION PERINEURAL PRN
Status: DISCONTINUED | OUTPATIENT
Start: 2024-04-26 | End: 2024-04-26 | Stop reason: ALTCHOICE

## 2024-04-26 RX ORDER — ROCURONIUM BROMIDE 10 MG/ML
INJECTION, SOLUTION INTRAVENOUS PRN
Status: DISCONTINUED | OUTPATIENT
Start: 2024-04-26 | End: 2024-04-26 | Stop reason: SDUPTHER

## 2024-04-26 RX ADMIN — SUGAMMADEX 150 MG: 100 INJECTION, SOLUTION INTRAVENOUS at 12:40

## 2024-04-26 RX ADMIN — Medication 200 MCG: at 12:07

## 2024-04-26 RX ADMIN — HYDROMORPHONE HYDROCHLORIDE 1 MG: 2 INJECTION INTRAMUSCULAR; INTRAVENOUS; SUBCUTANEOUS at 11:50

## 2024-04-26 RX ADMIN — ESMOLOL HYDROCHLORIDE 50 MG: 10 INJECTION, SOLUTION INTRAVENOUS at 12:20

## 2024-04-26 RX ADMIN — SUGAMMADEX 50 MG: 100 INJECTION, SOLUTION INTRAVENOUS at 12:39

## 2024-04-26 RX ADMIN — WATER 2000 MG: 1 INJECTION INTRAMUSCULAR; INTRAVENOUS; SUBCUTANEOUS at 11:50

## 2024-04-26 RX ADMIN — ESMOLOL HYDROCHLORIDE 30 MG: 10 INJECTION, SOLUTION INTRAVENOUS at 12:15

## 2024-04-26 RX ADMIN — ROCURONIUM BROMIDE 50 MG: 10 INJECTION INTRAVENOUS at 11:50

## 2024-04-26 RX ADMIN — KETOROLAC TROMETHAMINE 15 MG: 30 INJECTION, SOLUTION INTRAMUSCULAR at 12:30

## 2024-04-26 RX ADMIN — HYDROMORPHONE HYDROCHLORIDE 1 MG: 2 INJECTION INTRAMUSCULAR; INTRAVENOUS; SUBCUTANEOUS at 12:22

## 2024-04-26 RX ADMIN — SODIUM CHLORIDE: 9 INJECTION, SOLUTION INTRAVENOUS at 11:05

## 2024-04-26 RX ADMIN — Medication 30 MG: at 12:10

## 2024-04-26 RX ADMIN — ESMOLOL HYDROCHLORIDE 20 MG: 10 INJECTION, SOLUTION INTRAVENOUS at 12:10

## 2024-04-26 RX ADMIN — ONDANSETRON 4 MG: 2 INJECTION INTRAMUSCULAR; INTRAVENOUS at 11:50

## 2024-04-26 RX ADMIN — DEXAMETHASONE SODIUM PHOSPHATE 10 MG: 10 INJECTION, EMULSION INTRAMUSCULAR; INTRAVENOUS at 11:50

## 2024-04-26 RX ADMIN — PROPOFOL 150 MG: 10 INJECTION, EMULSION INTRAVENOUS at 11:50

## 2024-04-26 RX ADMIN — PROPOFOL 50 MG: 10 INJECTION, EMULSION INTRAVENOUS at 12:13

## 2024-04-26 RX ADMIN — IPRATROPIUM BROMIDE AND ALBUTEROL SULFATE 1 DOSE: .5; 3 SOLUTION RESPIRATORY (INHALATION) at 11:36

## 2024-04-26 ASSESSMENT — ENCOUNTER SYMPTOMS: SHORTNESS OF BREATH: 1

## 2024-04-26 ASSESSMENT — PAIN - FUNCTIONAL ASSESSMENT
PAIN_FUNCTIONAL_ASSESSMENT: ACTIVITIES ARE NOT PREVENTED
PAIN_FUNCTIONAL_ASSESSMENT: NONE - DENIES PAIN
PAIN_FUNCTIONAL_ASSESSMENT: 0-10

## 2024-04-26 ASSESSMENT — PAIN SCALES - GENERAL: PAINLEVEL_OUTOF10: 0

## 2024-04-26 ASSESSMENT — LIFESTYLE VARIABLES: SMOKING_STATUS: 1

## 2024-04-26 ASSESSMENT — PAIN DESCRIPTION - DESCRIPTORS: DESCRIPTORS: ACHING;TENDER

## 2024-04-26 NOTE — PROGRESS NOTES
Patient oriented to Same Day department and admitted to Same Day Surgery room 1.   Patient verbalized approval for first name, last initial with physician name on unit whiteboard.     Plan of care reviewed with patient.   Patient room whiteboard filled out and discussed with patient and responsible adult.     Call light in reach.   Bed in lowest position, locked, with one bed rail up.   SCDs and warming blanket in place.  Appropriate arm bands on patient.   Bathroom offered.   All questions and concerns of patient addressed.        Meds to Beds:   Patient informed of [facility] Meds to Beds program during admission. Patient has declined use of program.

## 2024-04-26 NOTE — ANESTHESIA POSTPROCEDURE EVALUATION
Department of Anesthesiology  Postprocedure Note    Patient: David Vaca  MRN: [id number]  YOB: 1965  Date of evaluation: 4/26/2024    Procedure Summary       Date: 04/26/24 Room / Location: [facility] OR [address] [place] [address] OR    Anesthesia Start: 1147 Anesthesia Stop:     Procedure: Robotic Left Inguinal Hernia Repair and Right Inguinal Hernia Repair both with mesh (Left) Diagnosis:       Left inguinal hernia      (Left inguinal hernia [K40.90])    Surgeons: Jana Trent MD Responsible Provider: Kiko Hutton DO    Anesthesia Type: general ASA Status: 4            Anesthesia Type: No value filed.    Jaskaran Phase I: Jaskaran Score: 9    Jaskaran Phase II:      Anesthesia Post Evaluation    Patient location during evaluation: PACU  Patient participation: complete - patient participated  Level of consciousness: awake and alert  Airway patency: patent  Nausea & Vomiting: no nausea  Cardiovascular status: blood pressure returned to baseline and hemodynamically stable  Respiratory status: acceptable and spontaneous ventilation  Hydration status: euvolemic  Pain management: adequate    No notable events documented.

## 2024-04-26 NOTE — DISCHARGE INSTRUCTIONS
DR. HDZ'S DISCHARGE INSTRUCTIONS    Pt Name: David Vaca  Medical Record Number: [id number]  Today's Date: 4/26/2024  GENERAL ANESTHESIA OR SEDATION   1. Do not drive or operate hazardous machinery for 24 hours.  2. Do not make important business or personal decisions for 24 hours.  3. Do not drink alcoholic beverages or use tobacco for 24 hours.  ACTIVITY INSTRUCTIONS   Rest today. Resume light to normal activity tomorrow.  You may resume normal activity tomorrow. Do not engage in strenuous activity that may place stress on your incision.  Do not drive for 3-5 days and avoid heavy lifting, tugging, pullings greater than 10-20 lbs until seen in the office.    DIET INSTRUCTIONS   Begin with clear liquids. If not nauseated, may increase to a low-fat diet when you desire. Greasy and spicy foods are not advised.  Regular diet as tolerated.  MEDICATIONS   Prescription written for ***. Take as directed.  Do not drink alcohol or drive while taking pain medications. You may experience dizziness or drowsiness with these medications. You may also experience constipation which can be relieved with stool softners or laxatives.  You may resume your daily prescription medication schedule unless otherwise specified.  Do not take 325mg Aspirin or other blood thinners such as Coumadin or Plavix for 5 days.  WOUND & DRESSING INSTRUCTIONS   Always ensure you and your care giver clean hands before and after caring for the wound.  Keep dressing clean and dry for 48 hours. Change when soiled or wet.      Allow steri-strips to fall off on their own if present, allow surgical glue to peel off on its own  Ice operative site for 20 minutes 4 times a day.     May wash over incision in shower in 48 hours, but do not soak in a bath.     ABDOMINAL & LAPAROSCOPIC PROCEDURES   1. You are encouraged to get up and move around as this helps with the circulation and speeds up the healing process.  2. Breath deeply and cough from time to

## 2024-04-26 NOTE — PROGRESS NOTES
Back to [facility] from PACU Alert. No Family at bedside. crackers and joe mist given. Side rails up bed in low position. Call light in reach

## 2024-04-26 NOTE — ANESTHESIA PRE PROCEDURE
Department of Anesthesiology  Preprocedure Note       Name:  David Vaca   Age:  58 y.o.  :  1965                                          MRN:  [id number]         Date:  2024      Surgeon: Surgeon(s):  Jana Trent MD    Procedure: Procedure(s):  Robotic Left Inguinal Hernia Repair with Mesh Possible Open Possible Right Inguinal Hernia Repair    Medications prior to admission:   Prior to Admission medications    Medication Sig Start Date End Date Taking? Authorizing Provider   fluticasone (FLONASE) 50 MCG/ACT nasal spray Flonase Allergy Relief 50 MCG/ACT Nasal Suspension 03/15/2024 Provider: Sandi Choudhary CNP 3/15/24   Abelino Bond MD   loratadine (CLARITIN) 10 MG tablet Take by mouth 3/15/24   Abelino Bond MD   metoprolol tartrate (LOPRESSOR) 25 MG tablet Take 1 tablet by mouth twice daily 3/25/24   Maikel Carroll MD   isosorbide dinitrate (ISORDIL) 20 MG tablet Take 1 tablet by mouth twice daily 3/25/24   Maikel Carroll MD   tiZANidine (ZANAFLEX) 4 MG tablet Take 1 tablet by mouth 2 times daily as needed (spasms) 23  Renetta Longoria APRN - CNP   sodium chloride 1 g tablet Take 1 tablet by mouth 2 times daily (with meals) 23   Chad Blackburn DO   OLANZapine (ZYPREXA) 10 MG tablet Take 1 tablet by mouth nightly    Abelino Bond MD   atorvastatin (LIPITOR) 40 MG tablet Take 1 tablet by mouth daily 23   Mortimer, Connor, PA-C   fenofibrate (TRICOR) 145 MG tablet Take 1 tablet by mouth daily 23   Mortimer, Connor, PA-C   pantoprazole (PROTONIX) 40 MG tablet Take 1 tablet by mouth every morning (before breakfast) 23   Mortimer, Connor, PA-C   tiotropium-olodaterol (STIOLTO) 2.5-2.5 MCG/ACT AERS Inhale 2 puffs into the lungs daily    Abelino Bond MD   fosamprenavir (LEXIVA) 700 MG tablet Take 2 tablets by mouth 2 times daily    Abelino Bond MD   lamivudine-zidovudine (COMBIVIR) 150-300 MG per tablet Take 1 tablet by    Component Value Date/Time    COVID19 NOT DETECTED 09/08/2023 06:05 AM           Anesthesia Evaluation  Patient summary reviewed and Nursing notes reviewed   no history of anesthetic complications:   Airway: Mallampati: II          Dental:          Pulmonary: breath sounds clear to auscultation  (+)   shortness of breath: chronic,         current smoker          Patient smoked on day of surgery.                 Cardiovascular:  Exercise tolerance: no interval change  (+) angina: no interval change, CAD:      ECG reviewed  Rhythm: regular  Rate: normal  Echocardiogram reviewed  Stress test reviewed                Neuro/Psych:   (+) psychiatric history:            GI/Hepatic/Renal:   (+) GERD:          Endo/Other:    (+) : arthritis:..                  ROS comment: HiV +  Stable counts Abdominal:             Vascular:          Other Findings:       Anesthesia Plan      general     ASA 4       Induction: intravenous.    MIPS: Postoperative opioids intended and Prophylactic antiemetics administered.  Anesthetic plan and risks discussed with patient and spouse.      Plan discussed with CRNA.                ESTRELLITA GRAFF DO   4/26/2024

## 2024-04-26 NOTE — OP NOTE
inspection demonstrated very small inguinal hernias.  Started on the patient's left side preperitoneal flap was created, this dissected down the avascular plane to the pubic tubercle, and then dissected laterally.  Cord structures were identified skeletonized the cord structures and vasa were identified and protected.  There were no femoral or direct hernias appreciated the preperitoneal flap was dissected down larger to accommodate mesh.  Attention was then turned to the right side where dissection was done in identical fashion again there was a very very small indirect hernia.  After all of the direct indirect femoral spaces were freed there were no other hernias appreciated and the flaps were large enough mesh was rolled and placed into the abdomen.  The mesh was unrolled and placed to cover both the direct indirect and femoral spaces and mesh crossing the midline behind the bladder.  The mesh laid nicely there is no kinking or pulling.  The peritoneal flaps were closed with running  V-Loc sutures.  Pneumoperitoneum was evacuated after the robotic system had been undocked.  The trocars were removed and the incisions were closed with subdermal intra Vicryl.  Surgical glue was applied patient was woken anesthesia and transported PACU in stable condition all sponge and needle counts were correct    Electronically signed by Jana Trent MD on 4/26/2024 at 12:56 PM

## 2024-04-26 NOTE — PROGRESS NOTES
1248 Arouses to name on arrival to PACU with NC 2L NC , HOB elevated , pt has Insp and Exp wheeze   1255 Duo neb aerosol Tx started   1300 Tx complete I&E wheeze remains but not as tight   Denies any pain or nausea and drifts back to sleep easily   1315 eyes closed resp easy   1325 resting , awakens to name , states pain tolerable and drifts back to sleep easily   1330 meets criteria for discharge , transported to [facility]

## 2024-04-29 ENCOUNTER — TELEPHONE (OUTPATIENT)
Dept: SURGERY | Age: 59
End: 2024-04-29

## 2024-05-02 ASSESSMENT — ENCOUNTER SYMPTOMS
SHORTNESS OF BREATH: 0
CONSTIPATION: 0
NAUSEA: 0
VOMITING: 0
BACK PAIN: 0
SORE THROAT: 0
ABDOMINAL PAIN: 0
CHEST TIGHTNESS: 0
BLOOD IN STOOL: 0
DIARRHEA: 0
COUGH: 0
TROUBLE SWALLOWING: 0

## 2024-05-09 ENCOUNTER — OFFICE VISIT (OUTPATIENT)
Dept: SURGERY | Age: 59
End: 2024-05-09

## 2024-05-09 VITALS
OXYGEN SATURATION: 97 % | DIASTOLIC BLOOD PRESSURE: 68 MMHG | HEIGHT: 66 IN | WEIGHT: 146.6 LBS | BODY MASS INDEX: 23.56 KG/M2 | TEMPERATURE: 97.7 F | SYSTOLIC BLOOD PRESSURE: 102 MMHG | HEART RATE: 97 BPM

## 2024-05-09 DIAGNOSIS — K40.20 BILATERAL INGUINAL HERNIA WITHOUT OBSTRUCTION OR GANGRENE, RECURRENCE NOT SPECIFIED: ICD-10-CM

## 2024-05-09 DIAGNOSIS — Z98.890 S/P BILATERAL INGUINAL HERNIA REPAIR: Primary | ICD-10-CM

## 2024-05-09 DIAGNOSIS — Z87.19 S/P BILATERAL INGUINAL HERNIA REPAIR: Primary | ICD-10-CM

## 2024-05-09 PROCEDURE — 99024 POSTOP FOLLOW-UP VISIT: CPT | Performed by: NURSE PRACTITIONER

## 2024-05-09 ASSESSMENT — ENCOUNTER SYMPTOMS
ABDOMINAL PAIN: 0
SHORTNESS OF BREATH: 0
NAUSEA: 0
VOMITING: 0

## 2024-05-09 NOTE — PROGRESS NOTES
[facility] [place] [facility]  [facility] GENERAL SURGERY  [address]  [facility] [address]  Dept: [phone]  Dept Fax: [phone]  Loc: [phone]    Visit Date: 5/9/2024       David Vaca is a 58 y.o. male who presents today for:  Chief Complaint   Patient presents with    Post-Op Check     S/p-Robotic Left Inguinal Hernia Repair and Right Inguinal Hernia Repair both with mesh--4/26/24       HPI:     David presents today for a 2-week post op check.  He has no complaints at this time.  He denies any scrotal swelling or tenderness, no penile bruising.  He still takes pain medication as needed.  Denies the need for refill.  He is tolerating meals denies nausea vomiting, he is having bowel function no issues urinating.  Incisions look good no concerns for infection.  Discussed lifting restrictions for the next 4 weeks.  May resume all normal activity on June 7 as discussed.  Verbalized understanding no questions or concerns at this time.  No follow-up necessary.       Past Medical History:   Diagnosis Date    Arthritis 06/01/2016    LUMBAR SPINE     Depression     Dysphagia     GERD (gastroesophageal reflux disease)     History of nephrolithiasis     HIV (human immunodeficiency virus infection) (HCC)     Hyperlipidemia       Past Surgical History:   Procedure Laterality Date    CARDIAC CATHETERIZATION  2015???    OK    COLONOSCOPY  2016    COLONOSCOPY  2021    Dr Rutledge     EGD  2021    ENDOSCOPY, COLON, DIAGNOSTIC      egd with dilatation    FOOT SURGERY  1970's    left    HERNIA REPAIR Left 4/26/2024    Robotic Left Inguinal Hernia Repair and Right Inguinal Hernia Repair both with mesh performed by Jana Trent MD at [facility] OR    LUMBAR SPINE SURGERY Right 4/23/2019    Lumbar RFA  Right side first L3-4,4-5,5-S1 performed by Robert Diaz MD at [facility] OR    LUMBAR SPINE SURGERY Left 5/21/2019    Lumbar RFA  Left side L3-4,4-5,5-S1 performed by

## 2024-05-10 DIAGNOSIS — Z87.19 S/P INGUINAL HERNIA REPAIR: Primary | ICD-10-CM

## 2024-05-10 DIAGNOSIS — Z98.890 S/P INGUINAL HERNIA REPAIR: Primary | ICD-10-CM

## 2024-05-10 RX ORDER — OXYCODONE HYDROCHLORIDE AND ACETAMINOPHEN 5; 325 MG/1; MG/1
1 TABLET ORAL EVERY 6 HOURS PRN
Qty: 12 TABLET | Refills: 0 | Status: SHIPPED | OUTPATIENT
Start: 2024-05-10 | End: 2024-05-15

## 2024-05-10 RX ORDER — OXYCODONE HYDROCHLORIDE AND ACETAMINOPHEN 5; 325 MG/1; MG/1
TABLET ORAL
Qty: 20 TABLET | OUTPATIENT
Start: 2024-05-10

## 2024-05-23 ENCOUNTER — OFFICE VISIT (OUTPATIENT)
Dept: CARDIOLOGY CLINIC | Age: 59
End: 2024-05-23
Payer: MEDICARE

## 2024-05-23 ENCOUNTER — OFFICE VISIT (OUTPATIENT)
Dept: SURGERY | Age: 59
End: 2024-05-23

## 2024-05-23 VITALS
SYSTOLIC BLOOD PRESSURE: 134 MMHG | RESPIRATION RATE: 18 BRPM | OXYGEN SATURATION: 98 % | HEART RATE: 72 BPM | BODY MASS INDEX: 24.49 KG/M2 | DIASTOLIC BLOOD PRESSURE: 70 MMHG | TEMPERATURE: 97.2 F | WEIGHT: 147 LBS | HEIGHT: 65 IN

## 2024-05-23 VITALS
HEIGHT: 66 IN | BODY MASS INDEX: 22.98 KG/M2 | WEIGHT: 143 LBS | DIASTOLIC BLOOD PRESSURE: 70 MMHG | HEART RATE: 76 BPM | SYSTOLIC BLOOD PRESSURE: 114 MMHG

## 2024-05-23 DIAGNOSIS — I25.10 ASCVD (ARTERIOSCLEROTIC CARDIOVASCULAR DISEASE): Primary | ICD-10-CM

## 2024-05-23 DIAGNOSIS — G89.18 POST-OP PAIN: Primary | ICD-10-CM

## 2024-05-23 DIAGNOSIS — Z98.890 S/P BILATERAL INGUINAL HERNIA REPAIR: ICD-10-CM

## 2024-05-23 DIAGNOSIS — Z87.19 S/P BILATERAL INGUINAL HERNIA REPAIR: ICD-10-CM

## 2024-05-23 PROCEDURE — 99214 OFFICE O/P EST MOD 30 MIN: CPT | Performed by: INTERNAL MEDICINE

## 2024-05-23 PROCEDURE — 99024 POSTOP FOLLOW-UP VISIT: CPT | Performed by: NURSE PRACTITIONER

## 2024-05-23 RX ORDER — FLUOXETINE HYDROCHLORIDE 20 MG/1
20 CAPSULE ORAL DAILY
COMMUNITY
Start: 2024-05-15

## 2024-05-23 RX ORDER — CYCLOBENZAPRINE HCL 10 MG
TABLET ORAL
COMMUNITY
Start: 2024-05-02

## 2024-05-23 RX ORDER — OXYCODONE HYDROCHLORIDE AND ACETAMINOPHEN 5; 325 MG/1; MG/1
1 TABLET ORAL EVERY 8 HOURS PRN
Qty: 10 TABLET | Refills: 0 | Status: SHIPPED | OUTPATIENT
Start: 2024-05-23 | End: 2024-05-28

## 2024-05-23 RX ORDER — TRAMADOL HYDROCHLORIDE 50 MG/1
50 TABLET ORAL EVERY 6 HOURS PRN
COMMUNITY

## 2024-05-23 NOTE — PATIENT INSTRUCTIONS
Your  Nurses  Today:  Erika     Your Provider for Today: Dr. Carroll    You may receive a survey regarding the care you received during your visit.  Your input is valuable to us.  We encourage you to complete and return your survey.  We hope you will choose us in the future for your healthcare needs.

## 2024-05-23 NOTE — PATIENT INSTRUCTIONS
Do not take pain management medications if taking percocet   Okay to resume normal activity on June 7

## 2024-05-23 NOTE — PROGRESS NOTES
Pt C/O sob, dizziness in past,       Pt denies CP, SOB, Headache, dizziness, heart palpitations, swelling, fatigue  
tablet, Take 1 tablet by mouth daily, Disp: 30 tablet, Rfl: 11    pantoprazole (PROTONIX) 40 MG tablet, Take 1 tablet by mouth every morning (before breakfast), Disp: 90 tablet, Rfl: 11    tiotropium-olodaterol (STIOLTO) 2.5-2.5 MCG/ACT AERS, Inhale 2 puffs into the lungs daily, Disp: , Rfl:     fosamprenavir (LEXIVA) 700 MG tablet, Take 2 tablets by mouth 2 times daily, Disp: , Rfl:     lamivudine-zidovudine (COMBIVIR) 150-300 MG per tablet, Take 1 tablet by mouth 2 times daily, Disp: , Rfl:     Past Medical History  David  has a past medical history of Arthritis, Depression, Dysphagia, GERD (gastroesophageal reflux disease), History of nephrolithiasis, HIV (human immunodeficiency virus infection) (HCC), and Hyperlipidemia.    Social History  David  reports that he has been smoking cigarettes. He has a 27.0 pack-year smoking history. He has never used smokeless tobacco. He reports that he does not drink alcohol and does not use drugs.    Family History  David family history includes Diabetes in his mother; High Blood Pressure in his mother.    Past Surgical History   Past Surgical History:   Procedure Laterality Date    CARDIAC CATHETERIZATION  2015???    OK    COLONOSCOPY  2016    COLONOSCOPY  2021    Dr Rutledge     EGD  2021    ENDOSCOPY, COLON, DIAGNOSTIC      egd with dilatation    FOOT SURGERY  1970's    left    HERNIA REPAIR Left 4/26/2024    Robotic Left Inguinal Hernia Repair and Right Inguinal Hernia Repair both with mesh performed by Jana Trent MD at [facility] OR    LUMBAR SPINE SURGERY Right 4/23/2019    Lumbar RFA  Right side first L3-4,4-5,5-S1 performed by Robert Diaz MD at [facility] [place] OR    LUMBAR SPINE SURGERY Left 5/21/2019    Lumbar RFA  Left side L3-4,4-5,5-S1 performed by Robert Diaz MD at [facility] [place] OR    NERVE BLOCK LUMB FACET LEVEL 1 BILATERAL Bilateral 7/18/2017    SI MBB BILATERAL performed by Robert Diaz MD at [facility] [place] OR    NERVE

## 2024-05-24 ASSESSMENT — ENCOUNTER SYMPTOMS
SHORTNESS OF BREATH: 0
NAUSEA: 0
ABDOMINAL PAIN: 0
VOMITING: 0

## 2024-05-24 NOTE — PROGRESS NOTES
[facility] [place] [facility]  [facility] GENERAL SURGERY  [address]  [facility] [address]  Dept: [phone]  Dept Fax: [phone]  Loc: [phone]    Visit Date: 5/23/2024       David Vaca is a 58 y.o. male who presents today for:  Chief Complaint   Patient presents with    Post-Op Check     S/p Robotic Left Inguinal Hernia Repair and Right Inguinal Hernia Repair both with mesh 4/26/24 --LOV-5/9/24       HPI:     David presents today for a post op check.  Today He complains of a persistent postoperative pain.  Overall the patient is doing a little bit better, incisions look good.  He is still taking pain medication and requests a refill.  He does see pain management takes a muscle relaxer and tramadol.  As he has chronic back pain.  He states he alternates between the Percocet and the tramadol.  I advised patient he cannot be taking both medications at the same time.  He is tolerating meals he denies nausea vomiting, he is having bowel function, no problems with urination.  Do not need to follow-up with patient at this time advised the patient that this will be his last prescription for pain control.  He will need to seek up pain management if he is having any more issues or concerns.  He can call our office if there is any questions or concerns with his hernia repair      Past Medical History:   Diagnosis Date    Arthritis 06/01/2016    LUMBAR SPINE     Depression     Dysphagia     GERD (gastroesophageal reflux disease)     History of nephrolithiasis     HIV (human immunodeficiency virus infection) (HCC)     Hyperlipidemia       Past Surgical History:   Procedure Laterality Date    CARDIAC CATHETERIZATION  2015???    OK    COLONOSCOPY  2016    COLONOSCOPY  2021    Dr Rutledge     EGD  2021    ENDOSCOPY, COLON, DIAGNOSTIC      egd with dilatation    FOOT SURGERY  1970's    left    HERNIA REPAIR Left 4/26/2024    Robotic Left Inguinal Hernia Repair and Right Inguinal Hernia

## 2024-05-31 ENCOUNTER — ANESTHESIA EVENT (OUTPATIENT)
Dept: OPERATING ROOM | Age: 59
End: 2024-05-31
Payer: MEDICARE

## 2024-05-31 ENCOUNTER — HOSPITAL ENCOUNTER (INPATIENT)
Age: 59
LOS: 9 days | Discharge: NURSING, REHAB OR LONG-TERM CARE | DRG: 270 | End: 2024-06-09
Attending: EMERGENCY MEDICINE | Admitting: SURGERY
Payer: MEDICARE

## 2024-05-31 ENCOUNTER — ANESTHESIA (OUTPATIENT)
Dept: OPERATING ROOM | Age: 59
End: 2024-05-31
Payer: MEDICARE

## 2024-05-31 ENCOUNTER — APPOINTMENT (OUTPATIENT)
Dept: CT IMAGING | Age: 59
End: 2024-05-31
Payer: MEDICARE

## 2024-05-31 ENCOUNTER — HOSPITAL ENCOUNTER (EMERGENCY)
Age: 59
Discharge: ANOTHER HOSPITAL | End: 2024-05-31
Attending: EMERGENCY MEDICINE
Payer: MEDICARE

## 2024-05-31 VITALS
BODY MASS INDEX: 23.08 KG/M2 | OXYGEN SATURATION: 95 % | TEMPERATURE: 98 F | HEART RATE: 91 BPM | DIASTOLIC BLOOD PRESSURE: 106 MMHG | RESPIRATION RATE: 32 BRPM | WEIGHT: 143 LBS | SYSTOLIC BLOOD PRESSURE: 169 MMHG

## 2024-05-31 DIAGNOSIS — I74.09: ICD-10-CM

## 2024-05-31 DIAGNOSIS — I99.8 ISCHEMIC LEG: Primary | ICD-10-CM

## 2024-05-31 DIAGNOSIS — I74.10 AORTIC THROMBUS (HCC): Primary | ICD-10-CM

## 2024-05-31 PROBLEM — I70.0 AORTIC OCCLUSION (HCC): Status: ACTIVE | Noted: 2024-05-31

## 2024-05-31 LAB
ANION GAP SERPL CALC-SCNC: 19 MEQ/L (ref 8–16)
ANTI-XA UNFRAC HEPARIN: >2 IU/L
APTT PPP: 25.9 SECONDS (ref 22–38)
ARTERIAL PATENCY WRIST A: ABNORMAL
BASOPHILS ABSOLUTE: 0 THOU/MM3 (ref 0–0.1)
BASOPHILS NFR BLD AUTO: 0.1 %
BODY TEMPERATURE: 35.7
BUN BLD-MCNC: 15 MG/DL (ref 8–26)
BUN SERPL-MCNC: 20 MG/DL (ref 7–22)
CA-I BLD-SCNC: 1.17 MMOL/L (ref 1.13–1.33)
CA-I BLD-SCNC: 1.21 MMOL/L (ref 1.15–1.33)
CALCIUM SERPL-MCNC: 9.8 MG/DL (ref 8.5–10.5)
CHLORIDE BLD-SCNC: 101 MMOL/L (ref 98–107)
CHLORIDE SERPL-SCNC: 97 MEQ/L (ref 98–111)
CHLORIDE, WHOLE BLOOD: 106 MMOL/L (ref 98–110)
CO2 BLD CALC-SCNC: 22 MMOL/L (ref 22–30)
CO2 SERPL-SCNC: 16 MEQ/L (ref 23–33)
COHGB MFR BLD: 2.5 % (ref 0–5)
CREAT SERPL-MCNC: 1.2 MG/DL (ref 0.4–1.2)
DEPRECATED RDW RBC AUTO: 47.9 FL (ref 35–45)
EGFR, POC: 87 ML/MIN/1.73M2
EOSINOPHIL NFR BLD AUTO: 0 %
EOSINOPHILS ABSOLUTE: 0 THOU/MM3 (ref 0–0.4)
ERYTHROCYTE [DISTWIDTH] IN BLOOD BY AUTOMATED COUNT: 11.9 % (ref 11.5–14.5)
FIO2 ON VENT: ABNORMAL %
GFR SERPL CREATININE-BSD FRML MDRD: 69 ML/MIN/1.73M2
GLUCOSE BLD-MCNC: 108 MG/DL (ref 75–110)
GLUCOSE BLD-MCNC: 124 MG/DL (ref 74–100)
GLUCOSE SERPL-MCNC: 112 MG/DL (ref 70–108)
HCO3 ARTERIAL: 21.9 MMOL/L (ref 22–27)
HCO3 VENOUS: 21.9 MMOL/L (ref 22–29)
HCT VFR BLD AUTO: 33.7 % (ref 42–52)
HCT VFR BLD AUTO: 38 % (ref 41–53)
HCT VFR BLD CALC: 34.2 % (ref 40.7–50.3)
HEMOGLOBIN: 11.1 GM/DL (ref 13–17)
HEPARIN UNFRACTIONATED: 0.08 U/ML (ref 0.3–0.7)
HGB BLD-MCNC: 12.3 GM/DL (ref 14–18)
IMM GRANULOCYTES # BLD AUTO: 0.16 THOU/MM3 (ref 0–0.07)
IMM GRANULOCYTES NFR BLD AUTO: 1 %
INR PPP: 0.93 (ref 0.85–1.13)
INR PPP: 1.2
LACTIC ACID, WHOLE BLOOD: 0.7 MMOL/L (ref 0.7–2.1)
LYMPHOCYTES ABSOLUTE: 1 THOU/MM3 (ref 1–4.8)
LYMPHOCYTES NFR BLD AUTO: 5.9 %
MCH RBC QN AUTO: 40.1 PG (ref 26–33)
MCHC RBC AUTO-ENTMCNC: 36.5 GM/DL (ref 32.2–35.5)
MCV RBC AUTO: 109.8 FL (ref 80–94)
MONOCYTES ABSOLUTE: 1 THOU/MM3 (ref 0.4–1.3)
MONOCYTES NFR BLD AUTO: 6.4 %
NEGATIVE BASE EXCESS, ART: 3.9 MMOL/L (ref 0–2)
NEGATIVE BASE EXCESS, VEN: 4 MMOL/L (ref 0–2)
NEUTROPHILS ABSOLUTE: 14.1 THOU/MM3 (ref 1.8–7.7)
NEUTROPHILS NFR BLD AUTO: 86.6 %
NRBC BLD AUTO-RTO: 0 /100 WBC
O2 SAT, ARTERIAL: 99 % (ref 94–100)
O2 SAT, VEN: 79.9 % (ref 60–85)
OSMOLALITY SERPL CALC.SUM OF ELEC: 267.9 MOSMOL/KG (ref 275–300)
PARTIAL THROMBOPLASTIN TIME: >180 SEC (ref 23–36.5)
PCO2 ARTERIAL: 45.6 MMHG (ref 32–45)
PCO2, VEN: 41.8 MM HG (ref 41–51)
PH ARTERIAL: 7.3 (ref 7.35–7.45)
PH VENOUS: 7.33 (ref 7.32–7.43)
PLATELET # BLD AUTO: 300 THOU/MM3 (ref 130–400)
PMV BLD AUTO: 8.6 FL (ref 9.4–12.4)
PO2 ARTERIAL: 171 MMHG (ref 75–95)
PO2, VEN: 47.5 MM HG (ref 30–50)
POC ANION GAP: 14 MMOL/L (ref 7–16)
POC CREATININE: 1 MG/DL (ref 0.51–1.19)
POC HEMOGLOBIN (CALC): 12.8 G/DL (ref 13.5–17.5)
POC LACTIC ACID: 2.4 MMOL/L (ref 0.56–1.39)
POTASSIUM BLD-SCNC: 3.9 MMOL/L (ref 3.5–4.5)
POTASSIUM SERPL-SCNC: 3.8 MEQ/L (ref 3.5–5.2)
POTASSIUM, WHOLE BLOOD: 3.2 MMOL/L (ref 3.6–5)
PROTHROMBIN TIME: 14.8 SEC (ref 11.7–14.9)
RBC # BLD AUTO: 3.07 MILL/MM3 (ref 4.7–6.1)
SODIUM BLD-SCNC: 136 MMOL/L (ref 138–146)
SODIUM SERPL-SCNC: 132 MEQ/L (ref 135–145)
SODIUM, WHOLE BLOOD: 134 MMOL/L (ref 136–145)
WBC # BLD AUTO: 16.3 THOU/MM3 (ref 4.8–10.8)

## 2024-05-31 PROCEDURE — 84132 ASSAY OF SERUM POTASSIUM: CPT

## 2024-05-31 PROCEDURE — 75635 CT ANGIO ABDOMINAL ARTERIES: CPT

## 2024-05-31 PROCEDURE — C1757 CATH, THROMBECTOMY/EMBOLECT: HCPCS | Performed by: SURGERY

## 2024-05-31 PROCEDURE — 36415 COLL VENOUS BLD VENIPUNCTURE: CPT

## 2024-05-31 PROCEDURE — 5A1945Z RESPIRATORY VENTILATION, 24-96 CONSECUTIVE HOURS: ICD-10-PCS | Performed by: SURGERY

## 2024-05-31 PROCEDURE — 2500000003 HC RX 250 WO HCPCS

## 2024-05-31 PROCEDURE — 70450 CT HEAD/BRAIN W/O DYE: CPT

## 2024-05-31 PROCEDURE — 82805 BLOOD GASES W/O2 SATURATION: CPT

## 2024-05-31 PROCEDURE — 35646 BPG AORTOBIFEMORAL: CPT | Performed by: SURGERY

## 2024-05-31 PROCEDURE — 3700000000 HC ANESTHESIA ATTENDED CARE: Performed by: SURGERY

## 2024-05-31 PROCEDURE — 86901 BLOOD TYPING SEROLOGIC RH(D): CPT

## 2024-05-31 PROCEDURE — 85014 HEMATOCRIT: CPT

## 2024-05-31 PROCEDURE — 3600000015 HC SURGERY LEVEL 5 ADDTL 15MIN: Performed by: SURGERY

## 2024-05-31 PROCEDURE — 85610 PROTHROMBIN TIME: CPT

## 2024-05-31 PROCEDURE — 2720000010 HC SURG SUPPLY STERILE: Performed by: SURGERY

## 2024-05-31 PROCEDURE — 2500000003 HC RX 250 WO HCPCS: Performed by: SURGERY

## 2024-05-31 PROCEDURE — 85730 THROMBOPLASTIN TIME PARTIAL: CPT

## 2024-05-31 PROCEDURE — 2580000003 HC RX 258: Performed by: SURGERY

## 2024-05-31 PROCEDURE — 86900 BLOOD TYPING SEROLOGIC ABO: CPT

## 2024-05-31 PROCEDURE — 83605 ASSAY OF LACTIC ACID: CPT

## 2024-05-31 PROCEDURE — 2580000003 HC RX 258: Performed by: EMERGENCY MEDICINE

## 2024-05-31 PROCEDURE — 86850 RBC ANTIBODY SCREEN: CPT

## 2024-05-31 PROCEDURE — 6360000004 HC RX CONTRAST MEDICATION: Performed by: EMERGENCY MEDICINE

## 2024-05-31 PROCEDURE — C1894 INTRO/SHEATH, NON-LASER: HCPCS | Performed by: SURGERY

## 2024-05-31 PROCEDURE — 3600000005 HC SURGERY LEVEL 5 BASE: Performed by: SURGERY

## 2024-05-31 PROCEDURE — 82803 BLOOD GASES ANY COMBINATION: CPT

## 2024-05-31 PROCEDURE — 84520 ASSAY OF UREA NITROGEN: CPT

## 2024-05-31 PROCEDURE — 82330 ASSAY OF CALCIUM: CPT

## 2024-05-31 PROCEDURE — 80051 ELECTROLYTE PANEL: CPT

## 2024-05-31 PROCEDURE — 6370000000 HC RX 637 (ALT 250 FOR IP): Performed by: SURGERY

## 2024-05-31 PROCEDURE — 3700000001 HC ADD 15 MINUTES (ANESTHESIA): Performed by: SURGERY

## 2024-05-31 PROCEDURE — 0T7D8ZZ DILATION OF URETHRA, VIA NATURAL OR ARTIFICIAL OPENING ENDOSCOPIC: ICD-10-PCS | Performed by: UROLOGY

## 2024-05-31 PROCEDURE — A4217 STERILE WATER/SALINE, 500 ML: HCPCS | Performed by: SURGERY

## 2024-05-31 PROCEDURE — 96375 TX/PRO/DX INJ NEW DRUG ADDON: CPT

## 2024-05-31 PROCEDURE — 86920 COMPATIBILITY TEST SPIN: CPT

## 2024-05-31 PROCEDURE — C1751 CATH, INF, PER/CENT/MIDLINE: HCPCS | Performed by: SURGERY

## 2024-05-31 PROCEDURE — 85520 HEPARIN ASSAY: CPT

## 2024-05-31 PROCEDURE — 85018 HEMOGLOBIN: CPT

## 2024-05-31 PROCEDURE — 0KNT0ZZ RELEASE LEFT LOWER LEG MUSCLE, OPEN APPROACH: ICD-10-PCS | Performed by: SURGERY

## 2024-05-31 PROCEDURE — 80048 BASIC METABOLIC PNL TOTAL CA: CPT

## 2024-05-31 PROCEDURE — 0D9670Z DRAINAGE OF STOMACH WITH DRAINAGE DEVICE, VIA NATURAL OR ARTIFICIAL OPENING: ICD-10-PCS | Performed by: SURGERY

## 2024-05-31 PROCEDURE — 99285 EMERGENCY DEPT VISIT HI MDM: CPT

## 2024-05-31 PROCEDURE — 96374 THER/PROPH/DIAG INJ IV PUSH: CPT

## 2024-05-31 PROCEDURE — 6360000002 HC RX W HCPCS: Performed by: EMERGENCY MEDICINE

## 2024-05-31 PROCEDURE — C1758 CATHETER, URETERAL: HCPCS | Performed by: SURGERY

## 2024-05-31 PROCEDURE — 82947 ASSAY GLUCOSE BLOOD QUANT: CPT

## 2024-05-31 PROCEDURE — 82565 ASSAY OF CREATININE: CPT

## 2024-05-31 PROCEDURE — 85025 COMPLETE CBC W/AUTO DIFF WBC: CPT

## 2024-05-31 PROCEDURE — 6360000002 HC RX W HCPCS: Performed by: SURGERY

## 2024-05-31 PROCEDURE — 04100JK BYPASS ABDOMINAL AORTA TO BILATERAL FEMORAL ARTERIES WITH SYNTHETIC SUBSTITUTE, OPEN APPROACH: ICD-10-PCS | Performed by: SURGERY

## 2024-05-31 PROCEDURE — C1768 GRAFT, VASCULAR: HCPCS | Performed by: SURGERY

## 2024-05-31 PROCEDURE — 6360000002 HC RX W HCPCS

## 2024-05-31 PROCEDURE — 27602 DECOMPRESSION OF LOWER LEG: CPT | Performed by: SURGERY

## 2024-05-31 PROCEDURE — 0T9B80Z DRAINAGE OF BLADDER WITH DRAINAGE DEVICE, VIA NATURAL OR ARTIFICIAL OPENING ENDOSCOPIC: ICD-10-PCS | Performed by: UROLOGY

## 2024-05-31 PROCEDURE — 93005 ELECTROCARDIOGRAM TRACING: CPT | Performed by: EMERGENCY MEDICINE

## 2024-05-31 PROCEDURE — 2580000003 HC RX 258

## 2024-05-31 PROCEDURE — 6360000002 HC RX W HCPCS: Performed by: THORACIC SURGERY (CARDIOTHORACIC VASCULAR SURGERY)

## 2024-05-31 PROCEDURE — 99223 1ST HOSP IP/OBS HIGH 75: CPT | Performed by: SURGERY

## 2024-05-31 PROCEDURE — 6360000002 HC RX W HCPCS: Performed by: NURSE ANESTHETIST, CERTIFIED REGISTERED

## 2024-05-31 PROCEDURE — 04CE0ZZ EXTIRPATION OF MATTER FROM RIGHT INTERNAL ILIAC ARTERY, OPEN APPROACH: ICD-10-PCS | Performed by: SURGERY

## 2024-05-31 PROCEDURE — 0BH17EZ INSERTION OF ENDOTRACHEAL AIRWAY INTO TRACHEA, VIA NATURAL OR ARTIFICIAL OPENING: ICD-10-PCS | Performed by: SURGERY

## 2024-05-31 PROCEDURE — 2000000000 HC ICU R&B

## 2024-05-31 PROCEDURE — 2709999900 HC NON-CHARGEABLE SUPPLY: Performed by: SURGERY

## 2024-05-31 PROCEDURE — 04CF0ZZ EXTIRPATION OF MATTER FROM LEFT INTERNAL ILIAC ARTERY, OPEN APPROACH: ICD-10-PCS | Performed by: SURGERY

## 2024-05-31 DEVICE — CLIP INT L ORNG TI TRNSVRS GRV CHEVRON SHP W/ PRECIS TIP TO: Type: IMPLANTABLE DEVICE | Site: ABDOMEN | Status: FUNCTIONAL

## 2024-05-31 DEVICE — CLIP INT M L GRN TI TRNSVRS GRV CHEVRON SHP W/ PRECIS TIP: Type: IMPLANTABLE DEVICE | Site: ABDOMEN | Status: FUNCTIONAL

## 2024-05-31 RX ORDER — SODIUM CHLORIDE 9 MG/ML
INJECTION, SOLUTION INTRAVENOUS PRN
Status: DISCONTINUED | OUTPATIENT
Start: 2024-05-31 | End: 2024-06-09 | Stop reason: HOSPADM

## 2024-05-31 RX ORDER — SODIUM CHLORIDE, SODIUM LACTATE, POTASSIUM CHLORIDE, CALCIUM CHLORIDE 600; 310; 30; 20 MG/100ML; MG/100ML; MG/100ML; MG/100ML
INJECTION, SOLUTION INTRAVENOUS CONTINUOUS PRN
Status: DISCONTINUED | OUTPATIENT
Start: 2024-05-31 | End: 2024-06-01 | Stop reason: SDUPTHER

## 2024-05-31 RX ORDER — FENTANYL CITRATE 50 UG/ML
50 INJECTION, SOLUTION INTRAMUSCULAR; INTRAVENOUS
Status: DISCONTINUED | OUTPATIENT
Start: 2024-05-31 | End: 2024-06-01

## 2024-05-31 RX ORDER — HEPARIN SODIUM 1000 [USP'U]/ML
40 INJECTION, SOLUTION INTRAVENOUS; SUBCUTANEOUS PRN
Status: DISCONTINUED | OUTPATIENT
Start: 2024-05-31 | End: 2024-05-31 | Stop reason: HOSPADM

## 2024-05-31 RX ORDER — HEPARIN SODIUM 10000 [USP'U]/100ML
5-30 INJECTION, SOLUTION INTRAVENOUS CONTINUOUS
Status: DISCONTINUED | OUTPATIENT
Start: 2024-05-31 | End: 2024-05-31 | Stop reason: HOSPADM

## 2024-05-31 RX ORDER — 0.9 % SODIUM CHLORIDE 0.9 %
1000 INTRAVENOUS SOLUTION INTRAVENOUS ONCE
Status: COMPLETED | OUTPATIENT
Start: 2024-05-31 | End: 2024-05-31

## 2024-05-31 RX ORDER — SODIUM CHLORIDE 0.9 % (FLUSH) 0.9 %
5-40 SYRINGE (ML) INJECTION PRN
Status: DISCONTINUED | OUTPATIENT
Start: 2024-05-31 | End: 2024-06-09 | Stop reason: HOSPADM

## 2024-05-31 RX ORDER — WATER 10 ML/10ML
INJECTION INTRAMUSCULAR; INTRAVENOUS; SUBCUTANEOUS
Status: DISPENSED
Start: 2024-05-31 | End: 2024-06-01

## 2024-05-31 RX ORDER — HEPARIN SODIUM 1000 [USP'U]/ML
40 INJECTION, SOLUTION INTRAVENOUS; SUBCUTANEOUS PRN
Status: DISCONTINUED | OUTPATIENT
Start: 2024-05-31 | End: 2024-06-01

## 2024-05-31 RX ORDER — NITROGLYCERIN 20 MG/100ML
INJECTION INTRAVENOUS CONTINUOUS PRN
Status: DISCONTINUED | OUTPATIENT
Start: 2024-05-31 | End: 2024-06-01 | Stop reason: SDUPTHER

## 2024-05-31 RX ORDER — PROPOFOL 10 MG/ML
INJECTION, EMULSION INTRAVENOUS PRN
Status: DISCONTINUED | OUTPATIENT
Start: 2024-05-31 | End: 2024-06-01 | Stop reason: SDUPTHER

## 2024-05-31 RX ORDER — ESMOLOL HYDROCHLORIDE 10 MG/ML
INJECTION INTRAVENOUS PRN
Status: DISCONTINUED | OUTPATIENT
Start: 2024-05-31 | End: 2024-06-01 | Stop reason: SDUPTHER

## 2024-05-31 RX ORDER — MIDAZOLAM HYDROCHLORIDE 1 MG/ML
INJECTION INTRAMUSCULAR; INTRAVENOUS PRN
Status: DISCONTINUED | OUTPATIENT
Start: 2024-05-31 | End: 2024-06-01 | Stop reason: SDUPTHER

## 2024-05-31 RX ORDER — MORPHINE SULFATE 2 MG/ML
2 INJECTION, SOLUTION INTRAMUSCULAR; INTRAVENOUS ONCE
Status: COMPLETED | OUTPATIENT
Start: 2024-05-31 | End: 2024-05-31

## 2024-05-31 RX ORDER — HEPARIN SODIUM 1000 [USP'U]/ML
80 INJECTION, SOLUTION INTRAVENOUS; SUBCUTANEOUS ONCE
Status: COMPLETED | OUTPATIENT
Start: 2024-05-31 | End: 2024-05-31

## 2024-05-31 RX ORDER — HEPARIN SODIUM 1000 [USP'U]/ML
80 INJECTION, SOLUTION INTRAVENOUS; SUBCUTANEOUS PRN
Status: DISCONTINUED | OUTPATIENT
Start: 2024-05-31 | End: 2024-05-31 | Stop reason: HOSPADM

## 2024-05-31 RX ORDER — FENTANYL CITRATE 50 UG/ML
25 INJECTION, SOLUTION INTRAMUSCULAR; INTRAVENOUS
Status: DISCONTINUED | OUTPATIENT
Start: 2024-05-31 | End: 2024-06-01

## 2024-05-31 RX ORDER — NITROGLYCERIN 20 MG/100ML
INJECTION INTRAVENOUS PRN
Status: DISCONTINUED | OUTPATIENT
Start: 2024-05-31 | End: 2024-06-01 | Stop reason: SDUPTHER

## 2024-05-31 RX ORDER — HEPARIN SODIUM 1000 [USP'U]/ML
80 INJECTION, SOLUTION INTRAVENOUS; SUBCUTANEOUS PRN
Status: DISCONTINUED | OUTPATIENT
Start: 2024-05-31 | End: 2024-06-01

## 2024-05-31 RX ORDER — LIDOCAINE HYDROCHLORIDE 10 MG/ML
INJECTION, SOLUTION EPIDURAL; INFILTRATION; INTRACAUDAL; PERINEURAL PRN
Status: DISCONTINUED | OUTPATIENT
Start: 2024-05-31 | End: 2024-06-01 | Stop reason: SDUPTHER

## 2024-05-31 RX ORDER — LIDOCAINE HYDROCHLORIDE 20 MG/ML
JELLY TOPICAL
Status: DISPENSED
Start: 2024-05-31 | End: 2024-06-01

## 2024-05-31 RX ORDER — SODIUM CHLORIDE, SODIUM LACTATE, POTASSIUM CHLORIDE, CALCIUM CHLORIDE 600; 310; 30; 20 MG/100ML; MG/100ML; MG/100ML; MG/100ML
INJECTION, SOLUTION INTRAVENOUS CONTINUOUS
Status: DISCONTINUED | OUTPATIENT
Start: 2024-05-31 | End: 2024-06-02

## 2024-05-31 RX ORDER — ROCURONIUM BROMIDE 10 MG/ML
INJECTION, SOLUTION INTRAVENOUS PRN
Status: DISCONTINUED | OUTPATIENT
Start: 2024-05-31 | End: 2024-06-01 | Stop reason: SDUPTHER

## 2024-05-31 RX ORDER — RIFAMPIN 600 MG/10ML
INJECTION, POWDER, LYOPHILIZED, FOR SOLUTION INTRAVENOUS
Status: DISPENSED
Start: 2024-05-31 | End: 2024-06-01

## 2024-05-31 RX ORDER — ONDANSETRON 4 MG/1
4 TABLET, ORALLY DISINTEGRATING ORAL EVERY 8 HOURS PRN
Status: DISCONTINUED | OUTPATIENT
Start: 2024-05-31 | End: 2024-06-09 | Stop reason: HOSPADM

## 2024-05-31 RX ORDER — SODIUM CHLORIDE 9 MG/ML
INJECTION, SOLUTION INTRAVENOUS CONTINUOUS
Status: DISCONTINUED | OUTPATIENT
Start: 2024-05-31 | End: 2024-06-01

## 2024-05-31 RX ORDER — SODIUM CHLORIDE 0.9 % (FLUSH) 0.9 %
5-40 SYRINGE (ML) INJECTION EVERY 12 HOURS SCHEDULED
Status: DISCONTINUED | OUTPATIENT
Start: 2024-05-31 | End: 2024-06-09 | Stop reason: HOSPADM

## 2024-05-31 RX ORDER — MAGNESIUM HYDROXIDE 1200 MG/15ML
LIQUID ORAL CONTINUOUS PRN
Status: COMPLETED | OUTPATIENT
Start: 2024-05-31 | End: 2024-06-01

## 2024-05-31 RX ORDER — ONDANSETRON 2 MG/ML
4 INJECTION INTRAMUSCULAR; INTRAVENOUS EVERY 6 HOURS PRN
Status: DISCONTINUED | OUTPATIENT
Start: 2024-05-31 | End: 2024-06-09 | Stop reason: HOSPADM

## 2024-05-31 RX ORDER — DEXAMETHASONE SODIUM PHOSPHATE 10 MG/ML
INJECTION INTRAMUSCULAR; INTRAVENOUS PRN
Status: DISCONTINUED | OUTPATIENT
Start: 2024-05-31 | End: 2024-06-01 | Stop reason: SDUPTHER

## 2024-05-31 RX ORDER — LIDOCAINE HYDROCHLORIDE 20 MG/ML
JELLY TOPICAL
Status: COMPLETED
Start: 2024-05-31 | End: 2024-06-01

## 2024-05-31 RX ORDER — HEPARIN SODIUM 10000 [USP'U]/100ML
5-30 INJECTION, SOLUTION INTRAVENOUS CONTINUOUS
Status: DISCONTINUED | OUTPATIENT
Start: 2024-05-31 | End: 2024-06-01

## 2024-05-31 RX ORDER — FENTANYL CITRATE 50 UG/ML
INJECTION, SOLUTION INTRAMUSCULAR; INTRAVENOUS PRN
Status: DISCONTINUED | OUTPATIENT
Start: 2024-05-31 | End: 2024-06-01 | Stop reason: SDUPTHER

## 2024-05-31 RX ADMIN — FENTANYL CITRATE 100 MCG: 0.05 INJECTION, SOLUTION INTRAMUSCULAR; INTRAVENOUS at 22:29

## 2024-05-31 RX ADMIN — FENTANYL CITRATE 100 MCG: 0.05 INJECTION, SOLUTION INTRAMUSCULAR; INTRAVENOUS at 23:41

## 2024-05-31 RX ADMIN — FENTANYL CITRATE 50 MCG: 0.05 INJECTION, SOLUTION INTRAMUSCULAR; INTRAVENOUS at 22:09

## 2024-05-31 RX ADMIN — NITROGLYCERIN 10 MCG/MIN: 20 INJECTION INTRAVENOUS at 23:52

## 2024-05-31 RX ADMIN — HEPARIN SODIUM 18 UNITS/KG/HR: 10000 INJECTION, SOLUTION INTRAVENOUS at 17:07

## 2024-05-31 RX ADMIN — ROCURONIUM BROMIDE 30 MG: 10 INJECTION, SOLUTION INTRAVENOUS at 22:31

## 2024-05-31 RX ADMIN — NITROGLYCERIN 20 MCG: 20 INJECTION INTRAVENOUS at 23:43

## 2024-05-31 RX ADMIN — FENTANYL CITRATE 100 MCG: 0.05 INJECTION, SOLUTION INTRAMUSCULAR; INTRAVENOUS at 21:23

## 2024-05-31 RX ADMIN — SODIUM CHLORIDE: 9 INJECTION, SOLUTION INTRAVENOUS at 19:22

## 2024-05-31 RX ADMIN — FENTANYL CITRATE 25 MCG: 50 INJECTION, SOLUTION INTRAMUSCULAR; INTRAVENOUS at 19:37

## 2024-05-31 RX ADMIN — NITROGLYCERIN 40 MCG: 20 INJECTION INTRAVENOUS at 23:48

## 2024-05-31 RX ADMIN — SODIUM CHLORIDE 1000 ML: 9 INJECTION, SOLUTION INTRAVENOUS at 14:03

## 2024-05-31 RX ADMIN — FENTANYL CITRATE 150 MCG: 0.05 INJECTION, SOLUTION INTRAMUSCULAR; INTRAVENOUS at 20:58

## 2024-05-31 RX ADMIN — FENTANYL CITRATE 100 MCG: 0.05 INJECTION, SOLUTION INTRAMUSCULAR; INTRAVENOUS at 23:34

## 2024-05-31 RX ADMIN — NITROGLYCERIN 20 MCG: 20 INJECTION INTRAVENOUS at 23:52

## 2024-05-31 RX ADMIN — SODIUM CHLORIDE, POTASSIUM CHLORIDE, SODIUM LACTATE AND CALCIUM CHLORIDE: 600; 310; 30; 20 INJECTION, SOLUTION INTRAVENOUS at 21:09

## 2024-05-31 RX ADMIN — SODIUM CHLORIDE, POTASSIUM CHLORIDE, SODIUM LACTATE AND CALCIUM CHLORIDE: 600; 310; 30; 20 INJECTION, SOLUTION INTRAVENOUS at 23:27

## 2024-05-31 RX ADMIN — DEXAMETHASONE SODIUM PHOSPHATE 10 MG: 10 INJECTION INTRAMUSCULAR; INTRAVENOUS at 21:25

## 2024-05-31 RX ADMIN — ROCURONIUM BROMIDE 50 MG: 10 INJECTION, SOLUTION INTRAVENOUS at 20:58

## 2024-05-31 RX ADMIN — PROPOFOL 120 MG: 10 INJECTION, EMULSION INTRAVENOUS at 20:58

## 2024-05-31 RX ADMIN — SODIUM CHLORIDE, SODIUM LACTATE, POTASSIUM CHLORIDE, CALCIUM CHLORIDE: 600; 310; 30; 20 INJECTION, SOLUTION INTRAVENOUS at 21:55

## 2024-05-31 RX ADMIN — FENTANYL CITRATE 50 MCG: 0.05 INJECTION, SOLUTION INTRAMUSCULAR; INTRAVENOUS at 23:07

## 2024-05-31 RX ADMIN — LIDOCAINE HYDROCHLORIDE 50 MG: 10 INJECTION, SOLUTION EPIDURAL; INFILTRATION; INTRACAUDAL; PERINEURAL at 20:58

## 2024-05-31 RX ADMIN — IOPAMIDOL 80 ML: 755 INJECTION, SOLUTION INTRAVENOUS at 14:37

## 2024-05-31 RX ADMIN — FENTANYL CITRATE 100 MCG: 0.05 INJECTION, SOLUTION INTRAMUSCULAR; INTRAVENOUS at 23:16

## 2024-05-31 RX ADMIN — HEPARIN SODIUM 5200 UNITS: 1000 INJECTION INTRAVENOUS; SUBCUTANEOUS at 17:05

## 2024-05-31 RX ADMIN — MORPHINE SULFATE 2 MG: 2 INJECTION, SOLUTION INTRAMUSCULAR; INTRAVENOUS at 17:12

## 2024-05-31 RX ADMIN — ROCURONIUM BROMIDE 50 MG: 10 INJECTION, SOLUTION INTRAVENOUS at 23:15

## 2024-05-31 RX ADMIN — MIDAZOLAM 2 MG: 1 INJECTION INTRAMUSCULAR; INTRAVENOUS at 20:58

## 2024-05-31 RX ADMIN — ESMOLOL HYDROCHLORIDE 30 MG: 10 INJECTION, SOLUTION INTRAVENOUS at 23:25

## 2024-05-31 RX ADMIN — Medication 2 G: at 21:20

## 2024-05-31 RX ADMIN — NITROGLYCERIN 40 MCG: 20 INJECTION INTRAVENOUS at 23:46

## 2024-05-31 RX ADMIN — NITROGLYCERIN 20 MCG: 20 INJECTION INTRAVENOUS at 23:45

## 2024-05-31 RX ADMIN — PROPOFOL 100 MG: 10 INJECTION, EMULSION INTRAVENOUS at 23:13

## 2024-05-31 RX ADMIN — FENTANYL CITRATE 50 MCG: 0.05 INJECTION, SOLUTION INTRAMUSCULAR; INTRAVENOUS at 21:59

## 2024-05-31 RX ADMIN — ROCURONIUM BROMIDE 20 MG: 10 INJECTION, SOLUTION INTRAVENOUS at 21:58

## 2024-05-31 RX ADMIN — HEPARIN SODIUM 18 UNITS/KG/HR: 10000 INJECTION, SOLUTION INTRAVENOUS at 18:45

## 2024-05-31 RX ADMIN — PROPOFOL 30 MG: 10 INJECTION, EMULSION INTRAVENOUS at 23:36

## 2024-05-31 ASSESSMENT — PAIN DESCRIPTION - LOCATION
LOCATION: LEG

## 2024-05-31 ASSESSMENT — PAIN - FUNCTIONAL ASSESSMENT
PAIN_FUNCTIONAL_ASSESSMENT: 0-10
PAIN_FUNCTIONAL_ASSESSMENT: 0-10

## 2024-05-31 ASSESSMENT — PAIN DESCRIPTION - ORIENTATION
ORIENTATION: LEFT
ORIENTATION: LEFT;RIGHT
ORIENTATION: LEFT

## 2024-05-31 ASSESSMENT — PAIN SCALES - GENERAL
PAINLEVEL_OUTOF10: 10
PAINLEVEL_OUTOF10: 10
PAINLEVEL_OUTOF10: 9

## 2024-05-31 ASSESSMENT — PAIN DESCRIPTION - DESCRIPTORS
DESCRIPTORS: ACHING
DESCRIPTORS: ACHING

## 2024-05-31 NOTE — ED PROVIDER NOTES
[facility] ED  Emergency Department Encounter  Emergency Medicine Resident     Pt Name:David Vaca  MRN: [id number]  Birthdate 1965  Date of evaluation: 5/31/24  PCP:  Snadi Choudhary APRN - CNP  Note Started: 6:30 PM EDT      CHIEF COMPLAINT       Chief Complaint   Patient presents with    Cold Extremity     L leg        HISTORY OF PRESENT ILLNESS  (Location/Symptom, Timing/Onset, Context/Setting, Quality, Duration, Modifying Factors, Severity.)      David Vaca is a 58 y.o. male who presents as a transfer from [facility] with thrombus extending from the level of the renal arteries down into both common iliacs and down the left lower extremity.  Patient was started on heparin drip and transferred here for vascular surgery eval.  Patient is just complaining of pain in his left buttock and left calf.  He states that both of his legs were generally feeling weak and they gave out from underneath him and he fell.  He is not normally on blood thinners.  Per [employer], patient seemed a little bit more short of breath on their flight here.  Patient denies any chest pain or shortness of breath.  He is not normally on home oxygen.  He is currently saturating well on 2 L nasal cannula.    PAST MEDICAL / SURGICAL / SOCIAL / FAMILY HISTORY      has a past medical history of Arthritis, Depression, Dysphagia, GERD (gastroesophageal reflux disease), History of nephrolithiasis, HIV (human immunodeficiency virus infection) (HCC), and Hyperlipidemia.     has a past surgical history that includes Foot surgery (1970's); Cardiac catheterization (2015???); Colonoscopy (2016); Endoscopy, colon, diagnostic; other surgical history (Bilateral, 01/17/2017); other surgical history (Bilateral, 02/20/2017); other surgical history (Right, 03/28/2017); Nerve Surgery (Bilateral, 07/18/2017); Nerve Block Lumb Facet Level 1 Bilateral (Bilateral, 7/18/2017); Upper gastrointestinal endoscopy (2005,2007x2,

## 2024-05-31 NOTE — ED PROVIDER NOTES
I performed a history and physical examination of the patient and discussed management with the resident. I reviewed the resident’s note and agree with the documented findings and plan of care. Any areas of disagreement are noted on the chart. I was personally present for the key portions of any procedures. I have documented in the chart those procedures where I was not present during the key portions. Unless noted in my documentation, I agree with the chief complaint, past medical history, past surgical history, allergies, medications, social and family history as documented. Documentation of the HPI, Physical Exam and Medical Decision Making performed by medical students or scribes is based on my personal performance of the HPI, PE and MDM.   For Phys Assistant/ Nurse Practitioner cases/documentation I have personally evaluated this patient and have completed at least one if not all key elements of the E/M (history, physical exam, and MDM). I find the patient's history and physical exam are consistent with the NP/PA documentation. I agree with the care provided, treatment rendered, disposition and followup plan.   Additional findings are as noted.    Robert Ortiz MD  Attending Emergency  Physician        This patient was transferred from [facility] emergency department in [place] where he had presented with complaints of pain and numbness in the left foot and lower extremity.  Vascular studies there demonstrated arterial occlusion extending from the infrarenal aorta into the bilateral iliacs worse on the left.  Patient was transferred here for evaluation and treatment by the vascular surgeons to work currently planning to transfer the patient to the operating room for aortobifem bypass.  On examination patient is awake and alert.  He is cooperative and responsive.  Speech is fluent comprehension is normal.  GCS is 15.  Examination of lower extremities reveals the left lower extremity to be dusky,

## 2024-05-31 NOTE — H&P
CTA ABDOMINAL AORTA W BILAT RUNOFF W WO CONTRAST    Result Date: 5/31/2024  1. Extensive acute appearing thrombus extending from the level of the renal arteries in the aorta and continuing down through the abdominal aorta into both common iliac arteries. 2. Flow is seen in the left common femoral artery via a collateral connection to the left inferior epigastric artery. However, the majority of the left iliac arteries, SFA, popliteal arteries and trifurcation arteries are completely occluded with thrombus. 3. Thrombus from the aorta extends into the right common iliac artery also. However, flow from the right inferior epigastric artery supplies the common femoral artery which appears to be provide retrograde supply to the external and internal iliac arteries. The arteries throughout the remaining right lower extremity appear patent with three-vessel runoff to the foot. **This report has been created using voice recognition software. It may contain minor errors which are inherent in voice recognition technology.**    CT HEAD WO CONTRAST    Result Date: 5/31/2024   No evidence of an acute process. **This report has been created using voice recognition software. 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  -Planned procedure explained in detail including discussion of all associated risks/benefits.  All questions/concerns were addressed and informed consent was obtained, witnessed by nurse, and placed on the pt's chart.   -Patient identified his sister care as medical decision-maker and states he is unable to make decisions, Patient stated he would like to be full code  -This plan was discussed with

## 2024-05-31 NOTE — ED NOTES
Pt to ED via "[employer] c/o fall yesterday while on a walk. Pt states his legs gave out and he fell. Pt denies LOC or hitting his head. Pt is unsure if he is on a blood thinner. Pt states he woke up today and his legs are \"numb\" and painful. Pt states this has never happened before. Pt left leg cool to the touch. VSS. EKG complete. Pt Aox4.

## 2024-05-31 NOTE — ED PROVIDER NOTES
antivirals through Dr. Fay at [facility].  It is not clear whether patient is compliant with his medications, no fever noted at intake.    He has weak pulse involving the left lower extremity, especially I could not detect dorsalis pedis pulse, very weak posterior tibialis.  Patient states that he has history of PAD that he's never taken care of.    Decision to pursue a vascular study based on the weak pulse/paresthesia/foot drop; CTA abdomen shows extensive thrombus starting at the aorta all the way down to his left leg.  CT head was to rule out possible bleed from the fall with expectation that he might need heparin, compounding this patient's presentation was the fact that he is HIV positive, CT could also help see if he had any obvious infectious processes in his brain causing him to be as lethargic on presentation.     Vascular surgery contacted as soon as we receive the CTA abdomen results, came down and saw patient; CT head negative, heparin started; vascular wants patient transferred to [facility].    Of note, upon reexamination just prior to being transported, patient seem to have regained some plantarflexion of the left foot.         Case discussed with consulting clinician: Vascular surgery         Shared Decision-Making was performed and disposition discussed with the        Patient/Family and questions answered          Social determinants of health impacting treatment or disposition:           Code Status: Full      Summary of Patient Presentation:      MDM  /   Vitals Reviewed:    Vitals:    05/31/24 1548 05/31/24 1620 05/31/24 1634 05/31/24 1704   BP: (!) 153/137 (!) 182/95 (!) 176/98 (!) 169/106   Pulse: 97 94 89 91   Resp: 20 27 28 (!) 32   Temp:       TempSrc:       SpO2: 96% 94% 96% 95%   Weight:           The patient was seen and examined. Appropriate diagnostic testing was performed and results reviewed with the patient.      The results of pertinent diagnostic studies and exam findings  Unfractionated 0.08 (*)     All other components within normal limits   GLOMERULAR FILTRATION RATE, ESTIMATED   APTT   PROTIME-INR   ANTI-XA, UNFRACTIONATED HEPARIN   ANTI-XA, UNFRACTIONATED HEPARIN       EMERGENCY DEPARTMENT COURSE:   Vitals:    Vitals:    05/31/24 1548 05/31/24 1620 05/31/24 1634 05/31/24 1704   BP: (!) 153/137 (!) 182/95 (!) 176/98 (!) 169/106   Pulse: 97 94 89 91   Resp: 20 27 28 (!) 32   Temp:       TempSrc:       SpO2: 96% 94% 96% 95%   Weight:             CRITICAL CARE:       CONSULTS:  None    PROCEDURES:  none    FINAL IMPRESSION      1. Ischemic leg    2. Thrombosis of abdominal aorta (HCC)          DISPOSITION/PLAN   Decision To Transfer    PATIENT REFERRED TO:  No follow-up provider specified.    DISCHARGE MEDICATIONS:  Discharge Medication List as of 5/31/2024  5:43 PM          (Please note that portions of this note were completed with a voice recognition program.  Efforts were made to edit the dictations but occasionally words are mis-transcribed.)    Rajiv Dumont DO

## 2024-06-01 ENCOUNTER — APPOINTMENT (OUTPATIENT)
Dept: GENERAL RADIOLOGY | Age: 59
DRG: 270 | End: 2024-06-01
Payer: MEDICARE

## 2024-06-01 PROBLEM — I70.229 CRITICAL ISCHEMIA OF LOWER EXTREMITY (HCC): Status: ACTIVE | Noted: 2024-06-01

## 2024-06-01 PROBLEM — I74.10 AORTIC THROMBUS (HCC): Status: ACTIVE | Noted: 2024-06-01

## 2024-06-01 LAB
ABO/RH: NORMAL
ALLEN TEST: ABNORMAL
ANION GAP SERPL CALCULATED.3IONS-SCNC: 10 MMOL/L (ref 9–16)
ANION GAP SERPL CALCULATED.3IONS-SCNC: 11 MMOL/L (ref 9–16)
ANION GAP SERPL CALCULATED.3IONS-SCNC: 12 MMOL/L (ref 9–16)
ANION GAP SERPL CALCULATED.3IONS-SCNC: 8 MMOL/L (ref 9–16)
ANTIBODY SCREEN: NEGATIVE
ARM BAND NUMBER: NORMAL
ARTERIAL PATENCY WRIST A: ABNORMAL
ARTERIAL PATENCY WRIST A: ABNORMAL
BASOPHILS # BLD: <0.03 K/UL (ref 0–0.2)
BASOPHILS NFR BLD: 0 % (ref 0–2)
BLOOD BANK DISPENSE STATUS: NORMAL
BLOOD BANK DISPENSE STATUS: NORMAL
BLOOD BANK SAMPLE EXPIRATION: NORMAL
BODY TEMPERATURE: 37
BODY TEMPERATURE: 37
BPU ID: NORMAL
BPU ID: NORMAL
BUN BLD-MCNC: 12 MG/DL (ref 8–26)
BUN SERPL-MCNC: 13 MG/DL (ref 6–20)
BUN SERPL-MCNC: 13 MG/DL (ref 6–20)
BUN SERPL-MCNC: 15 MG/DL (ref 6–20)
BUN SERPL-MCNC: 16 MG/DL (ref 6–20)
CA-I BLD-SCNC: 1.12 MMOL/L (ref 1.13–1.33)
CA-I BLD-SCNC: 1.17 MMOL/L (ref 1.13–1.33)
CA-I BLD-SCNC: 1.21 MMOL/L (ref 1.13–1.33)
CA-I BLD-SCNC: 1.25 MMOL/L (ref 1.15–1.33)
CA-I BLD-SCNC: 1.45 MMOL/L (ref 1.13–1.33)
CALCIUM SERPL-MCNC: 7.3 MG/DL (ref 8.6–10.4)
CALCIUM SERPL-MCNC: 7.6 MG/DL (ref 8.6–10.4)
CALCIUM SERPL-MCNC: 8.1 MG/DL (ref 8.6–10.4)
CALCIUM SERPL-MCNC: 8.9 MG/DL (ref 8.6–10.4)
CHLORIDE BLD-SCNC: 105 MMOL/L (ref 98–107)
CHLORIDE SERPL-SCNC: 104 MMOL/L (ref 98–107)
CHLORIDE SERPL-SCNC: 104 MMOL/L (ref 98–107)
CHLORIDE SERPL-SCNC: 107 MMOL/L (ref 98–107)
CHLORIDE SERPL-SCNC: 108 MMOL/L (ref 98–107)
CHLORIDE, WHOLE BLOOD: 105 MMOL/L (ref 98–110)
CHLORIDE, WHOLE BLOOD: 108 MMOL/L (ref 98–110)
CK SERPL-CCNC: 9194 U/L (ref 39–308)
CK SERPL-CCNC: ABNORMAL U/L (ref 39–308)
CK SERPL-CCNC: ABNORMAL U/L (ref 39–308)
CO2 BLD CALC-SCNC: 24 MMOL/L (ref 22–30)
CO2 SERPL-SCNC: 20 MMOL/L (ref 20–31)
CO2 SERPL-SCNC: 22 MMOL/L (ref 20–31)
CO2 SERPL-SCNC: 23 MMOL/L (ref 20–31)
CO2 SERPL-SCNC: 23 MMOL/L (ref 20–31)
COHGB MFR BLD: 1.7 % (ref 0–5)
COHGB MFR BLD: 2.4 % (ref 0–5)
COMPONENT: NORMAL
COMPONENT: NORMAL
CREAT SERPL-MCNC: 0.9 MG/DL (ref 0.7–1.2)
CREAT SERPL-MCNC: 1 MG/DL (ref 0.7–1.2)
CREAT SERPL-MCNC: 1.3 MG/DL (ref 0.7–1.2)
CREAT SERPL-MCNC: 1.4 MG/DL (ref 0.7–1.2)
CROSSMATCH RESULT: NORMAL
CROSSMATCH RESULT: NORMAL
EGFR, POC: 70 ML/MIN/1.73M2
EGFR, POC: >90 ML/MIN/1.73M2
EKG ATRIAL RATE: 83 BPM
EKG P AXIS: 46 DEGREES
EKG P-R INTERVAL: 152 MS
EKG Q-T INTERVAL: 372 MS
EKG QRS DURATION: 100 MS
EKG QTC CALCULATION (BAZETT): 437 MS
EKG R AXIS: 34 DEGREES
EKG T AXIS: 49 DEGREES
EKG VENTRICULAR RATE: 83 BPM
EOSINOPHIL # BLD: <0.03 K/UL (ref 0–0.44)
EOSINOPHILS RELATIVE PERCENT: 0 % (ref 1–4)
ERYTHROCYTE [DISTWIDTH] IN BLOOD BY AUTOMATED COUNT: 12.9 % (ref 11.8–14.4)
FIBRINOGEN, FUNCTIONAL TEG: 19.7 MM (ref 15–32)
FIO2 ON VENT: 60 %
FIO2 ON VENT: ABNORMAL %
FIO2: 100
FIO2: 40
GFR, ESTIMATED: 57 ML/MIN/1.73M2
GFR, ESTIMATED: 65 ML/MIN/1.73M2
GFR, ESTIMATED: 87 ML/MIN/1.73M2
GFR, ESTIMATED: >90 ML/MIN/1.73M2
GLUCOSE BLD-MCNC: 129 MG/DL (ref 75–110)
GLUCOSE BLD-MCNC: 132 MG/DL (ref 74–100)
GLUCOSE BLD-MCNC: 149 MG/DL (ref 75–110)
GLUCOSE BLD-MCNC: 155 MG/DL (ref 75–110)
GLUCOSE BLD-MCNC: 156 MG/DL (ref 74–100)
GLUCOSE BLD-MCNC: 160 MG/DL (ref 75–110)
GLUCOSE BLD-MCNC: 179 MG/DL (ref 74–100)
GLUCOSE SERPL-MCNC: 124 MG/DL (ref 74–99)
GLUCOSE SERPL-MCNC: 132 MG/DL (ref 74–99)
GLUCOSE SERPL-MCNC: 162 MG/DL (ref 74–99)
GLUCOSE SERPL-MCNC: 201 MG/DL (ref 74–99)
HCO3 ARTERIAL: 20.8 MMOL/L (ref 22–27)
HCO3 ARTERIAL: 22.2 MMOL/L (ref 22–27)
HCT VFR BLD AUTO: 24 % (ref 40.7–50.3)
HCT VFR BLD AUTO: 27.8 % (ref 40.7–50.3)
HCT VFR BLD AUTO: 28 % (ref 41–53)
HCT VFR BLD AUTO: 29.8 % (ref 40.7–50.3)
HCT VFR BLD AUTO: 32 % (ref 41–53)
HCT VFR BLD CALC: 26.4 % (ref 40.7–50.3)
HCT VFR BLD CALC: 27.4 % (ref 40.7–50.3)
HEMOGLOBIN: 8.5 GM/DL (ref 13–17)
HEMOGLOBIN: 8.8 GM/DL (ref 13–17)
HGB BLD-MCNC: 10 G/DL (ref 13–17)
HGB BLD-MCNC: 10.9 G/DL (ref 13–17)
HGB BLD-MCNC: 8.4 G/DL (ref 13–17)
IMM GRANULOCYTES # BLD AUTO: 0.06 K/UL (ref 0–0.3)
IMM GRANULOCYTES NFR BLD: 1 %
INR PPP: 1.1
LACTIC ACID, WHOLE BLOOD: 1.1 MMOL/L (ref 0.7–2.1)
LACTIC ACID, WHOLE BLOOD: 2 MMOL/L (ref 0.7–2.1)
LACTIC ACID, WHOLE BLOOD: 3.6 MMOL/L (ref 0.7–2.1)
LY30 (LYSIS) TEG: 0 % (ref 0–2.6)
LYMPHOCYTES NFR BLD: 0.94 K/UL (ref 1.1–3.7)
LYMPHOCYTES RELATIVE PERCENT: 9 % (ref 24–43)
MA(MAX CLOT) RAPID TEG: 57.5 MM (ref 52–70)
MAGNESIUM SERPL-MCNC: 1.4 MG/DL (ref 1.6–2.6)
MAGNESIUM SERPL-MCNC: 2.3 MG/DL (ref 1.6–2.6)
MAGNESIUM SERPL-MCNC: 2.4 MG/DL (ref 1.6–2.6)
MAGNESIUM SERPL-MCNC: 2.4 MG/DL (ref 1.6–2.6)
MCH RBC QN AUTO: 40.2 PG (ref 25.2–33.5)
MCHC RBC AUTO-ENTMCNC: 36.6 G/DL (ref 28.4–34.8)
MCV RBC AUTO: 110 FL (ref 82.6–102.9)
MODE: ABNORMAL
MODE: ABNORMAL
MONOCYTES NFR BLD: 0.39 K/UL (ref 0.1–1.2)
MONOCYTES NFR BLD: 4 % (ref 3–12)
MRSA, DNA, NASAL: NEGATIVE
MYOGLOBIN SERPL-MCNC: ABNORMAL NG/ML (ref 28–72)
MYOGLOBIN SERPL-MCNC: ABNORMAL NG/ML (ref 28–72)
NEGATIVE BASE EXCESS, ART: 0.1 MMOL/L (ref 0–2)
NEGATIVE BASE EXCESS, ART: 0.6 MMOL/L (ref 0–2)
NEGATIVE BASE EXCESS, ART: 2.2 MMOL/L (ref 0–2)
NEGATIVE BASE EXCESS, ART: 3.2 MMOL/L (ref 0–2)
NEGATIVE BASE EXCESS, ART: 5.6 MMOL/L (ref 0–2)
NEUTROPHILS NFR BLD: 86 % (ref 36–65)
NEUTS SEG NFR BLD: 9.08 K/UL (ref 1.5–8.1)
NRBC BLD-RTO: 0 PER 100 WBC
O2 DELIVERY DEVICE: ABNORMAL
O2 DELIVERY DEVICE: ABNORMAL
O2 SAT, ARTERIAL: 98.3 % (ref 94–100)
O2 SAT, ARTERIAL: 99.1 % (ref 94–100)
PCO2 ARTERIAL: 44.6 MMHG (ref 32–45)
PCO2 ARTERIAL: 48.4 MMHG (ref 32–45)
PH ARTERIAL: 7.26 (ref 7.35–7.45)
PH ARTERIAL: 7.32 (ref 7.35–7.45)
PHOSPHATE SERPL-MCNC: 3.2 MG/DL (ref 2.5–4.5)
PHOSPHATE SERPL-MCNC: 3.4 MG/DL (ref 2.5–4.5)
PHOSPHATE SERPL-MCNC: 4.3 MG/DL (ref 2.5–4.5)
PHOSPHATE SERPL-MCNC: 4.7 MG/DL (ref 2.5–4.5)
PLATELET # BLD AUTO: 177 K/UL (ref 138–453)
PMV BLD AUTO: 8.4 FL (ref 8.1–13.5)
PO2 ARTERIAL: 165 MMHG (ref 75–95)
PO2 ARTERIAL: 232 MMHG (ref 75–95)
POC ANION GAP: 12 MMOL/L (ref 7–16)
POC CREATININE: 0.9 MG/DL (ref 0.51–1.19)
POC CREATININE: 1.2 MG/DL (ref 0.51–1.19)
POC HCO3: 23.9 MMOL/L (ref 21–28)
POC HCO3: 24.4 MMOL/L (ref 21–28)
POC HCO3: 24.7 MMOL/L (ref 21–28)
POC HEMOGLOBIN (CALC): 10.9 G/DL (ref 13.5–17.5)
POC HEMOGLOBIN (CALC): 9.5 G/DL (ref 13.5–17.5)
POC LACTIC ACID: 1.4 MMOL/L (ref 0.56–1.39)
POC LACTIC ACID: 1.8 MMOL/L (ref 0.56–1.39)
POC LACTIC ACID: <0.3 MMOL/L (ref 0.56–1.39)
POC O2 SATURATION: 100 % (ref 94–98)
POC O2 SATURATION: 98.9 % (ref 94–98)
POC O2 SATURATION: 99.1 % (ref 94–98)
POC PCO2: 39.8 MM HG (ref 35–48)
POC PCO2: 40.7 MM HG (ref 35–48)
POC PCO2: 45.7 MM HG (ref 35–48)
POC PH: 7.33 (ref 7.35–7.45)
POC PH: 7.39 (ref 7.35–7.45)
POC PH: 7.4 (ref 7.35–7.45)
POC PO2: 128.8 MM HG (ref 83–108)
POC PO2: 135.4 MM HG (ref 83–108)
POC PO2: 619.5 MM HG (ref 83–108)
POTASSIUM BLD-SCNC: 3.5 MMOL/L (ref 3.5–4.5)
POTASSIUM BLD-SCNC: 4.6 MMOL/L (ref 3.5–4.5)
POTASSIUM SERPL-SCNC: 3.7 MMOL/L (ref 3.7–5.3)
POTASSIUM SERPL-SCNC: 3.7 MMOL/L (ref 3.7–5.3)
POTASSIUM SERPL-SCNC: 4.4 MMOL/L (ref 3.7–5.3)
POTASSIUM SERPL-SCNC: 4.4 MMOL/L (ref 3.7–5.3)
POTASSIUM, WHOLE BLOOD: 3.4 MMOL/L (ref 3.6–5)
POTASSIUM, WHOLE BLOOD: 3.9 MMOL/L (ref 3.6–5)
PROTHROMBIN TIME: 14.4 SEC (ref 11.7–14.9)
RBC # BLD AUTO: 2.71 M/UL (ref 4.21–5.77)
RBC # BLD: ABNORMAL 10*6/UL
REACTION TIME TEG: 5 MIN (ref 4.6–9.1)
SAMPLE SITE: ABNORMAL
SAMPLE SITE: ABNORMAL
SODIUM SERPL-SCNC: 137 MMOL/L (ref 136–145)
SODIUM SERPL-SCNC: 138 MMOL/L (ref 136–145)
SODIUM SERPL-SCNC: 138 MMOL/L (ref 136–145)
SODIUM SERPL-SCNC: 139 MMOL/L (ref 136–145)
SODIUM, WHOLE BLOOD: 137 MMOL/L (ref 136–145)
SODIUM, WHOLE BLOOD: 139 MMOL/L (ref 136–145)
SPECIMEN DESCRIPTION: NORMAL
TRANSFUSION STATUS: NORMAL
TRANSFUSION STATUS: NORMAL
TROPONIN I SERPL HS-MCNC: 37 NG/L (ref 0–22)
UNIT DIVISION: 0
UNIT DIVISION: 0
WBC OTHER # BLD: 10.5 K/UL (ref 3.5–11.3)

## 2024-06-01 PROCEDURE — 37799 UNLISTED PX VASCULAR SURGERY: CPT

## 2024-06-01 PROCEDURE — 6360000002 HC RX W HCPCS

## 2024-06-01 PROCEDURE — 6360000002 HC RX W HCPCS: Performed by: STUDENT IN AN ORGANIZED HEALTH CARE EDUCATION/TRAINING PROGRAM

## 2024-06-01 PROCEDURE — 84132 ASSAY OF SERUM POTASSIUM: CPT

## 2024-06-01 PROCEDURE — 2580000003 HC RX 258

## 2024-06-01 PROCEDURE — 2500000003 HC RX 250 WO HCPCS

## 2024-06-01 PROCEDURE — 85025 COMPLETE CBC W/AUTO DIFF WBC: CPT

## 2024-06-01 PROCEDURE — 84295 ASSAY OF SERUM SODIUM: CPT

## 2024-06-01 PROCEDURE — 93010 ELECTROCARDIOGRAM REPORT: CPT | Performed by: INTERNAL MEDICINE

## 2024-06-01 PROCEDURE — 94002 VENT MGMT INPAT INIT DAY: CPT

## 2024-06-01 PROCEDURE — 6370000000 HC RX 637 (ALT 250 FOR IP)

## 2024-06-01 PROCEDURE — 82805 BLOOD GASES W/O2 SATURATION: CPT

## 2024-06-01 PROCEDURE — 83735 ASSAY OF MAGNESIUM: CPT

## 2024-06-01 PROCEDURE — 85610 PROTHROMBIN TIME: CPT

## 2024-06-01 PROCEDURE — 6370000000 HC RX 637 (ALT 250 FOR IP): Performed by: STUDENT IN AN ORGANIZED HEALTH CARE EDUCATION/TRAINING PROGRAM

## 2024-06-01 PROCEDURE — 6360000002 HC RX W HCPCS: Performed by: NURSE ANESTHETIST, CERTIFIED REGISTERED

## 2024-06-01 PROCEDURE — 82565 ASSAY OF CREATININE: CPT

## 2024-06-01 PROCEDURE — C9113 INJ PANTOPRAZOLE SODIUM, VIA: HCPCS

## 2024-06-01 PROCEDURE — 82803 BLOOD GASES ANY COMBINATION: CPT

## 2024-06-01 PROCEDURE — 84484 ASSAY OF TROPONIN QUANT: CPT

## 2024-06-01 PROCEDURE — A4216 STERILE WATER/SALINE, 10 ML: HCPCS

## 2024-06-01 PROCEDURE — 2500000003 HC RX 250 WO HCPCS: Performed by: STUDENT IN AN ORGANIZED HEALTH CARE EDUCATION/TRAINING PROGRAM

## 2024-06-01 PROCEDURE — 80051 ELECTROLYTE PANEL: CPT

## 2024-06-01 PROCEDURE — 82330 ASSAY OF CALCIUM: CPT

## 2024-06-01 PROCEDURE — 2580000003 HC RX 258: Performed by: NURSE ANESTHETIST, CERTIFIED REGISTERED

## 2024-06-01 PROCEDURE — 85384 FIBRINOGEN ACTIVITY: CPT

## 2024-06-01 PROCEDURE — 84520 ASSAY OF UREA NITROGEN: CPT

## 2024-06-01 PROCEDURE — 82947 ASSAY GLUCOSE BLOOD QUANT: CPT

## 2024-06-01 PROCEDURE — 85018 HEMOGLOBIN: CPT

## 2024-06-01 PROCEDURE — 82550 ASSAY OF CK (CPK): CPT

## 2024-06-01 PROCEDURE — 99291 CRITICAL CARE FIRST HOUR: CPT | Performed by: SURGERY

## 2024-06-01 PROCEDURE — 80048 BASIC METABOLIC PNL TOTAL CA: CPT

## 2024-06-01 PROCEDURE — 2000000000 HC ICU R&B

## 2024-06-01 PROCEDURE — 2580000003 HC RX 258: Performed by: STUDENT IN AN ORGANIZED HEALTH CARE EDUCATION/TRAINING PROGRAM

## 2024-06-01 PROCEDURE — 94761 N-INVAS EAR/PLS OXIMETRY MLT: CPT

## 2024-06-01 PROCEDURE — 2500000003 HC RX 250 WO HCPCS: Performed by: SURGERY

## 2024-06-01 PROCEDURE — 6360000002 HC RX W HCPCS: Performed by: SURGERY

## 2024-06-01 PROCEDURE — A4216 STERILE WATER/SALINE, 10 ML: HCPCS | Performed by: STUDENT IN AN ORGANIZED HEALTH CARE EDUCATION/TRAINING PROGRAM

## 2024-06-01 PROCEDURE — 2700000000 HC OXYGEN THERAPY PER DAY

## 2024-06-01 PROCEDURE — 2500000003 HC RX 250 WO HCPCS: Performed by: NURSE ANESTHETIST, CERTIFIED REGISTERED

## 2024-06-01 PROCEDURE — 85390 FIBRINOLYSINS SCREEN I&R: CPT

## 2024-06-01 PROCEDURE — 36415 COLL VENOUS BLD VENIPUNCTURE: CPT

## 2024-06-01 PROCEDURE — 87641 MR-STAPH DNA AMP PROBE: CPT

## 2024-06-01 PROCEDURE — 85576 BLOOD PLATELET AGGREGATION: CPT

## 2024-06-01 PROCEDURE — P9041 ALBUMIN (HUMAN),5%, 50ML: HCPCS | Performed by: NURSE ANESTHETIST, CERTIFIED REGISTERED

## 2024-06-01 PROCEDURE — 84100 ASSAY OF PHOSPHORUS: CPT

## 2024-06-01 PROCEDURE — 83605 ASSAY OF LACTIC ACID: CPT

## 2024-06-01 PROCEDURE — 71045 X-RAY EXAM CHEST 1 VIEW: CPT

## 2024-06-01 PROCEDURE — 82962 GLUCOSE BLOOD TEST: CPT

## 2024-06-01 PROCEDURE — 83874 ASSAY OF MYOGLOBIN: CPT

## 2024-06-01 PROCEDURE — 85347 COAGULATION TIME ACTIVATED: CPT

## 2024-06-01 PROCEDURE — 85014 HEMATOCRIT: CPT

## 2024-06-01 PROCEDURE — 82435 ASSAY OF BLOOD CHLORIDE: CPT

## 2024-06-01 DEVICE — ALBOGRAFT POLYESTER VASCULAR GRAFT 14CMX8MM
Type: IMPLANTABLE DEVICE | Site: AORTA | Status: FUNCTIONAL
Brand: ALBOGRAFT POLYESTER VASCULAR GRAFT

## 2024-06-01 RX ORDER — DEXTROSE MONOHYDRATE 100 MG/ML
INJECTION, SOLUTION INTRAVENOUS CONTINUOUS PRN
Status: DISCONTINUED | OUTPATIENT
Start: 2024-06-01 | End: 2024-06-09 | Stop reason: HOSPADM

## 2024-06-01 RX ORDER — ALBUMIN, HUMAN INJ 5% 5 %
SOLUTION INTRAVENOUS PRN
Status: DISCONTINUED | OUTPATIENT
Start: 2024-06-01 | End: 2024-06-01 | Stop reason: SDUPTHER

## 2024-06-01 RX ORDER — FENTANYL CITRATE 50 UG/ML
50 INJECTION, SOLUTION INTRAMUSCULAR; INTRAVENOUS
Status: COMPLETED | OUTPATIENT
Start: 2024-06-01 | End: 2024-06-01

## 2024-06-01 RX ORDER — ASPIRIN 81 MG/1
81 TABLET, CHEWABLE ORAL DAILY
Status: DISCONTINUED | OUTPATIENT
Start: 2024-06-01 | End: 2024-06-09 | Stop reason: HOSPADM

## 2024-06-01 RX ORDER — HEPARIN SODIUM 5000 [USP'U]/ML
5000 INJECTION, SOLUTION INTRAVENOUS; SUBCUTANEOUS EVERY 8 HOURS SCHEDULED
Status: DISCONTINUED | OUTPATIENT
Start: 2024-06-01 | End: 2024-06-09 | Stop reason: HOSPADM

## 2024-06-01 RX ORDER — LIDOCAINE HYDROCHLORIDE AND EPINEPHRINE BITARTRATE 20; .01 MG/ML; MG/ML
20 INJECTION, SOLUTION SUBCUTANEOUS ONCE
Status: COMPLETED | OUTPATIENT
Start: 2024-06-01 | End: 2024-06-01

## 2024-06-01 RX ORDER — MAGNESIUM SULFATE IN WATER 40 MG/ML
2000 INJECTION, SOLUTION INTRAVENOUS ONCE
Status: COMPLETED | OUTPATIENT
Start: 2024-06-01 | End: 2024-06-01

## 2024-06-01 RX ORDER — FLUOXETINE HYDROCHLORIDE 20 MG/1
20 CAPSULE ORAL DAILY
Status: DISCONTINUED | OUTPATIENT
Start: 2024-06-01 | End: 2024-06-09 | Stop reason: HOSPADM

## 2024-06-01 RX ORDER — SODIUM CHLORIDE, SODIUM LACTATE, POTASSIUM CHLORIDE, AND CALCIUM CHLORIDE .6; .31; .03; .02 G/100ML; G/100ML; G/100ML; G/100ML
1000 INJECTION, SOLUTION INTRAVENOUS ONCE
Status: COMPLETED | OUTPATIENT
Start: 2024-06-01 | End: 2024-06-01

## 2024-06-01 RX ORDER — POTASSIUM CHLORIDE 29.8 MG/ML
20 INJECTION INTRAVENOUS
Status: COMPLETED | OUTPATIENT
Start: 2024-06-01 | End: 2024-06-01

## 2024-06-01 RX ORDER — INSULIN LISPRO 100 [IU]/ML
0-4 INJECTION, SOLUTION INTRAVENOUS; SUBCUTANEOUS NIGHTLY
Status: DISCONTINUED | OUTPATIENT
Start: 2024-06-01 | End: 2024-06-02

## 2024-06-01 RX ORDER — NOREPINEPHRINE BITARTRATE 0.06 MG/ML
1-100 INJECTION, SOLUTION INTRAVENOUS CONTINUOUS
Status: DISCONTINUED | OUTPATIENT
Start: 2024-06-01 | End: 2024-06-02

## 2024-06-01 RX ORDER — PROPOFOL 10 MG/ML
INJECTION, EMULSION INTRAVENOUS CONTINUOUS PRN
Status: DISCONTINUED | OUTPATIENT
Start: 2024-06-01 | End: 2024-06-01 | Stop reason: SDUPTHER

## 2024-06-01 RX ORDER — GLUCAGON 1 MG/ML
1 KIT INJECTION PRN
Status: DISCONTINUED | OUTPATIENT
Start: 2024-06-01 | End: 2024-06-09 | Stop reason: HOSPADM

## 2024-06-01 RX ORDER — PROPOFOL 10 MG/ML
INJECTION, EMULSION INTRAVENOUS
Status: COMPLETED
Start: 2024-06-01 | End: 2024-06-01

## 2024-06-01 RX ORDER — PROPOFOL 10 MG/ML
5-50 INJECTION, EMULSION INTRAVENOUS CONTINUOUS
Status: DISCONTINUED | OUTPATIENT
Start: 2024-06-01 | End: 2024-06-02

## 2024-06-01 RX ORDER — CHLORHEXIDINE GLUCONATE ORAL RINSE 1.2 MG/ML
15 SOLUTION DENTAL 2 TIMES DAILY
Status: DISCONTINUED | OUTPATIENT
Start: 2024-06-01 | End: 2024-06-02

## 2024-06-01 RX ORDER — METOPROLOL TARTRATE 1 MG/ML
INJECTION, SOLUTION INTRAVENOUS
Status: COMPLETED
Start: 2024-06-01 | End: 2024-06-01

## 2024-06-01 RX ORDER — ATORVASTATIN CALCIUM 40 MG/1
40 TABLET, FILM COATED ORAL NIGHTLY
Status: DISCONTINUED | OUTPATIENT
Start: 2024-06-01 | End: 2024-06-07

## 2024-06-01 RX ORDER — GABAPENTIN 300 MG/1
300 CAPSULE ORAL EVERY 8 HOURS
Status: DISCONTINUED | OUTPATIENT
Start: 2024-06-01 | End: 2024-06-09 | Stop reason: HOSPADM

## 2024-06-01 RX ORDER — NICARDIPINE HYDROCHLORIDE 0.1 MG/ML
2.5-15 INJECTION INTRAVENOUS CONTINUOUS
Status: DISCONTINUED | OUTPATIENT
Start: 2024-06-01 | End: 2024-06-01

## 2024-06-01 RX ORDER — METOPROLOL TARTRATE 1 MG/ML
5 INJECTION, SOLUTION INTRAVENOUS ONCE
Status: COMPLETED | OUTPATIENT
Start: 2024-06-01 | End: 2024-06-01

## 2024-06-01 RX ORDER — CALCIUM CHLORIDE 100 MG/ML
INJECTION INTRAVENOUS; INTRAVENTRICULAR PRN
Status: DISCONTINUED | OUTPATIENT
Start: 2024-06-01 | End: 2024-06-01 | Stop reason: SDUPTHER

## 2024-06-01 RX ORDER — INSULIN LISPRO 100 [IU]/ML
0-16 INJECTION, SOLUTION INTRAVENOUS; SUBCUTANEOUS EVERY 4 HOURS
Status: DISCONTINUED | OUTPATIENT
Start: 2024-06-01 | End: 2024-06-09 | Stop reason: HOSPADM

## 2024-06-01 RX ORDER — ISOSORBIDE DINITRATE 10 MG/1
20 TABLET ORAL 2 TIMES DAILY
Status: DISCONTINUED | OUTPATIENT
Start: 2024-06-01 | End: 2024-06-09 | Stop reason: HOSPADM

## 2024-06-01 RX ORDER — DEXMEDETOMIDINE HYDROCHLORIDE 4 UG/ML
.1-1.5 INJECTION, SOLUTION INTRAVENOUS CONTINUOUS
Status: DISCONTINUED | OUTPATIENT
Start: 2024-06-01 | End: 2024-06-02

## 2024-06-01 RX ORDER — OXYCODONE HYDROCHLORIDE 5 MG/1
5 TABLET ORAL
Status: DISCONTINUED | OUTPATIENT
Start: 2024-06-01 | End: 2024-06-02

## 2024-06-01 RX ORDER — LABETALOL HYDROCHLORIDE 5 MG/ML
INJECTION, SOLUTION INTRAVENOUS PRN
Status: DISCONTINUED | OUTPATIENT
Start: 2024-06-01 | End: 2024-06-01 | Stop reason: SDUPTHER

## 2024-06-01 RX ORDER — METHOCARBAMOL 750 MG/1
750 TABLET, FILM COATED ORAL EVERY 6 HOURS
Status: DISCONTINUED | OUTPATIENT
Start: 2024-06-01 | End: 2024-06-09 | Stop reason: HOSPADM

## 2024-06-01 RX ORDER — MAGNESIUM SULFATE HEPTAHYDRATE 40 MG/ML
4000 INJECTION, SOLUTION INTRAVENOUS ONCE
Status: DISCONTINUED | OUTPATIENT
Start: 2024-06-01 | End: 2024-06-01

## 2024-06-01 RX ORDER — HEPARIN SODIUM 1000 [USP'U]/ML
INJECTION, SOLUTION INTRAVENOUS; SUBCUTANEOUS PRN
Status: DISCONTINUED | OUTPATIENT
Start: 2024-06-01 | End: 2024-06-01 | Stop reason: SDUPTHER

## 2024-06-01 RX ORDER — PHENYLEPHRINE HCL IN 0.9% NACL 1 MG/10 ML
SYRINGE (ML) INTRAVENOUS PRN
Status: DISCONTINUED | OUTPATIENT
Start: 2024-06-01 | End: 2024-06-01 | Stop reason: SDUPTHER

## 2024-06-01 RX ORDER — PANTOPRAZOLE SODIUM 40 MG/1
40 TABLET, DELAYED RELEASE ORAL
Status: DISCONTINUED | OUTPATIENT
Start: 2024-06-02 | End: 2024-06-01

## 2024-06-01 RX ADMIN — NICARDIPINE HYDROCHLORIDE 2.5 MG/HR: 0.1 INJECTION INTRAVENOUS at 04:47

## 2024-06-01 RX ADMIN — Medication 100 MCG: at 01:31

## 2024-06-01 RX ADMIN — CHLORHEXIDINE GLUCONATE 15 ML: 1.2 SOLUTION ORAL at 08:17

## 2024-06-01 RX ADMIN — SODIUM CHLORIDE 5 MCG/MIN: 9 INJECTION, SOLUTION INTRAVENOUS at 01:28

## 2024-06-01 RX ADMIN — FENTANYL CITRATE 50 MCG: 50 INJECTION, SOLUTION INTRAMUSCULAR; INTRAVENOUS at 21:55

## 2024-06-01 RX ADMIN — PROPOFOL 25 MCG/KG/MIN: 10 INJECTION, EMULSION INTRAVENOUS at 22:11

## 2024-06-01 RX ADMIN — NITROGLYCERIN 20 MCG: 20 INJECTION INTRAVENOUS at 00:01

## 2024-06-01 RX ADMIN — PROPOFOL 35 MCG/KG/MIN: 10 INJECTION, EMULSION INTRAVENOUS at 03:58

## 2024-06-01 RX ADMIN — NITROGLYCERIN 40 MCG: 20 INJECTION INTRAVENOUS at 00:10

## 2024-06-01 RX ADMIN — Medication 2000 MG: at 19:34

## 2024-06-01 RX ADMIN — HEPARIN SODIUM 5000 UNITS: 5000 INJECTION INTRAVENOUS; SUBCUTANEOUS at 23:40

## 2024-06-01 RX ADMIN — PROPOFOL 20 MG: 10 INJECTION, EMULSION INTRAVENOUS at 00:05

## 2024-06-01 RX ADMIN — NITROGLYCERIN 20 MCG: 20 INJECTION INTRAVENOUS at 00:06

## 2024-06-01 RX ADMIN — METOPROLOL TARTRATE 5 MG: 1 INJECTION, SOLUTION INTRAVENOUS at 09:05

## 2024-06-01 RX ADMIN — ESMOLOL HYDROCHLORIDE 30 MG: 10 INJECTION, SOLUTION INTRAVENOUS at 00:14

## 2024-06-01 RX ADMIN — LABETALOL HYDROCHLORIDE 5 MG: 5 INJECTION, SOLUTION INTRAVENOUS at 02:19

## 2024-06-01 RX ADMIN — SODIUM CHLORIDE, POTASSIUM CHLORIDE, SODIUM LACTATE AND CALCIUM CHLORIDE: 600; 310; 30; 20 INJECTION, SOLUTION INTRAVENOUS at 00:17

## 2024-06-01 RX ADMIN — OXYCODONE HYDROCHLORIDE 5 MG: 5 TABLET ORAL at 12:02

## 2024-06-01 RX ADMIN — CHLORHEXIDINE GLUCONATE 15 ML: 1.2 SOLUTION ORAL at 20:47

## 2024-06-01 RX ADMIN — OXYCODONE HYDROCHLORIDE 5 MG: 5 TABLET ORAL at 21:43

## 2024-06-01 RX ADMIN — NITROGLYCERIN 40 MCG: 20 INJECTION INTRAVENOUS at 02:08

## 2024-06-01 RX ADMIN — SODIUM CHLORIDE, POTASSIUM CHLORIDE, SODIUM LACTATE AND CALCIUM CHLORIDE 1000 ML: 600; 310; 30; 20 INJECTION, SOLUTION INTRAVENOUS at 20:08

## 2024-06-01 RX ADMIN — ASPIRIN 81 MG 81 MG: 81 TABLET ORAL at 08:17

## 2024-06-01 RX ADMIN — SODIUM CHLORIDE, PRESERVATIVE FREE 10 ML: 5 INJECTION INTRAVENOUS at 20:46

## 2024-06-01 RX ADMIN — SODIUM CHLORIDE, POTASSIUM CHLORIDE, SODIUM LACTATE AND CALCIUM CHLORIDE: 600; 310; 30; 20 INJECTION, SOLUTION INTRAVENOUS at 01:28

## 2024-06-01 RX ADMIN — NICARDIPINE HYDROCHLORIDE 2.5 MG/HR: 0.1 INJECTION INTRAVENOUS at 04:21

## 2024-06-01 RX ADMIN — NITROGLYCERIN 60 MCG: 20 INJECTION INTRAVENOUS at 00:13

## 2024-06-01 RX ADMIN — DEXMEDETOMIDINE HYDROCHLORIDE 0.9 MCG/KG/HR: 400 INJECTION, SOLUTION INTRAVENOUS at 22:14

## 2024-06-01 RX ADMIN — CALCIUM CHLORIDE 1 G: 100 INJECTION INTRAVENOUS; INTRAVENTRICULAR at 01:43

## 2024-06-01 RX ADMIN — FENTANYL CITRATE 100 MCG: 0.05 INJECTION, SOLUTION INTRAMUSCULAR; INTRAVENOUS at 02:04

## 2024-06-01 RX ADMIN — SODIUM CHLORIDE, PRESERVATIVE FREE 10 ML: 5 INJECTION INTRAVENOUS at 08:17

## 2024-06-01 RX ADMIN — SODIUM BICARBONATE 50 MEQ: 84 INJECTION INTRAVENOUS at 01:30

## 2024-06-01 RX ADMIN — ROCURONIUM BROMIDE 30 MG: 10 INJECTION, SOLUTION INTRAVENOUS at 01:14

## 2024-06-01 RX ADMIN — NITROGLYCERIN 40 MCG: 20 INJECTION INTRAVENOUS at 02:04

## 2024-06-01 RX ADMIN — LABETALOL HYDROCHLORIDE 5 MG: 5 INJECTION, SOLUTION INTRAVENOUS at 02:13

## 2024-06-01 RX ADMIN — Medication 2000 MG: at 08:16

## 2024-06-01 RX ADMIN — PANTOPRAZOLE SODIUM 40 MG: 40 INJECTION, POWDER, FOR SOLUTION INTRAVENOUS at 12:02

## 2024-06-01 RX ADMIN — SODIUM CHLORIDE, POTASSIUM CHLORIDE, SODIUM LACTATE AND CALCIUM CHLORIDE 1000 ML: 600; 310; 30; 20 INJECTION, SOLUTION INTRAVENOUS at 14:30

## 2024-06-01 RX ADMIN — METOPROLOL TARTRATE 5 MG: 5 INJECTION INTRAVENOUS at 09:05

## 2024-06-01 RX ADMIN — SODIUM CHLORIDE, POTASSIUM CHLORIDE, SODIUM LACTATE AND CALCIUM CHLORIDE 1000 ML: 600; 310; 30; 20 INJECTION, SOLUTION INTRAVENOUS at 06:00

## 2024-06-01 RX ADMIN — ROCURONIUM BROMIDE 20 MG: 10 INJECTION, SOLUTION INTRAVENOUS at 01:45

## 2024-06-01 RX ADMIN — LIDOCAINE HYDROCHLORIDE,EPINEPHRINE BITARTRATE 20 ML: 20; .01 INJECTION, SOLUTION INFILTRATION; PERINEURAL at 21:42

## 2024-06-01 RX ADMIN — NITROGLYCERIN 40 MCG: 20 INJECTION INTRAVENOUS at 02:10

## 2024-06-01 RX ADMIN — LABETALOL HYDROCHLORIDE 5 MG: 5 INJECTION, SOLUTION INTRAVENOUS at 02:16

## 2024-06-01 RX ADMIN — GABAPENTIN 300 MG: 300 CAPSULE ORAL at 21:43

## 2024-06-01 RX ADMIN — HEPARIN SODIUM 7000 UNITS: 1000 INJECTION INTRAVENOUS; SUBCUTANEOUS at 00:16

## 2024-06-01 RX ADMIN — SODIUM CHLORIDE, POTASSIUM CHLORIDE, SODIUM LACTATE AND CALCIUM CHLORIDE: 600; 310; 30; 20 INJECTION, SOLUTION INTRAVENOUS at 04:13

## 2024-06-01 RX ADMIN — PROPOFOL 25 MCG/KG/MIN: 10 INJECTION, EMULSION INTRAVENOUS at 02:24

## 2024-06-01 RX ADMIN — ROCURONIUM BROMIDE 50 MG: 10 INJECTION, SOLUTION INTRAVENOUS at 02:30

## 2024-06-01 RX ADMIN — SODIUM CHLORIDE: 9 INJECTION, SOLUTION INTRAVENOUS at 05:48

## 2024-06-01 RX ADMIN — ALBUMIN (HUMAN) 500 ML: 12.5 INJECTION, SOLUTION INTRAVENOUS at 00:42

## 2024-06-01 RX ADMIN — Medication 2 G: at 01:16

## 2024-06-01 RX ADMIN — HEPARIN SODIUM 1000 UNITS: 1000 INJECTION INTRAVENOUS; SUBCUTANEOUS at 01:00

## 2024-06-01 RX ADMIN — ESMOLOL HYDROCHLORIDE 20 MG: 10 INJECTION, SOLUTION INTRAVENOUS at 01:57

## 2024-06-01 RX ADMIN — ESMOLOL HYDROCHLORIDE 20 MG: 10 INJECTION, SOLUTION INTRAVENOUS at 00:40

## 2024-06-01 RX ADMIN — ROCURONIUM BROMIDE 50 MG: 10 INJECTION, SOLUTION INTRAVENOUS at 00:27

## 2024-06-01 RX ADMIN — FAMOTIDINE 20 MG: 10 INJECTION, SOLUTION INTRAVENOUS at 08:17

## 2024-06-01 RX ADMIN — MAGNESIUM SULFATE HEPTAHYDRATE 2000 MG: 40 INJECTION, SOLUTION INTRAVENOUS at 08:26

## 2024-06-01 RX ADMIN — DEXMEDETOMIDINE HYDROCHLORIDE 0.1 MCG/KG/HR: 400 INJECTION, SOLUTION INTRAVENOUS at 10:05

## 2024-06-01 RX ADMIN — Medication 100 MCG: at 01:46

## 2024-06-01 RX ADMIN — SODIUM CHLORIDE, POTASSIUM CHLORIDE, SODIUM LACTATE AND CALCIUM CHLORIDE: 600; 310; 30; 20 INJECTION, SOLUTION INTRAVENOUS at 23:02

## 2024-06-01 RX ADMIN — MAGNESIUM SULFATE HEPTAHYDRATE 2000 MG: 40 INJECTION, SOLUTION INTRAVENOUS at 05:51

## 2024-06-01 RX ADMIN — FENTANYL CITRATE 50 MCG: 0.05 INJECTION, SOLUTION INTRAMUSCULAR; INTRAVENOUS at 00:05

## 2024-06-01 RX ADMIN — Medication 75 MCG/HR: at 19:47

## 2024-06-01 RX ADMIN — LABETALOL HYDROCHLORIDE 5 MG: 5 INJECTION, SOLUTION INTRAVENOUS at 02:10

## 2024-06-01 RX ADMIN — LIDOCAINE HYDROCHLORIDE: 20 JELLY TOPICAL at 08:28

## 2024-06-01 RX ADMIN — Medication 50 MCG/HR: at 03:56

## 2024-06-01 RX ADMIN — OXYCODONE HYDROCHLORIDE 5 MG: 5 TABLET ORAL at 23:41

## 2024-06-01 RX ADMIN — METHOCARBAMOL 750 MG: 750 TABLET ORAL at 21:43

## 2024-06-01 RX ADMIN — POTASSIUM CHLORIDE 20 MEQ: 29.8 INJECTION, SOLUTION INTRAVENOUS at 09:14

## 2024-06-01 RX ADMIN — POTASSIUM CHLORIDE 20 MEQ: 29.8 INJECTION, SOLUTION INTRAVENOUS at 07:59

## 2024-06-01 ASSESSMENT — PULMONARY FUNCTION TESTS
PIF_VALUE: 24
PIF_VALUE: 22
PIF_VALUE: 26
PIF_VALUE: 23
PIF_VALUE: 18
PIF_VALUE: 23
PIF_VALUE: 18
PIF_VALUE: 24
PIF_VALUE: 25
PIF_VALUE: 16
PIF_VALUE: 24
PIF_VALUE: 21
PIF_VALUE: 25
PIF_VALUE: 24
PIF_VALUE: 9
PIF_VALUE: 24
PIF_VALUE: 26
PIF_VALUE: 23
PIF_VALUE: 24
PIF_VALUE: 25
PIF_VALUE: 27
PIF_VALUE: 25
PIF_VALUE: 28
PIF_VALUE: 23
PIF_VALUE: 25
PIF_VALUE: 23
PIF_VALUE: 25
PIF_VALUE: 24
PIF_VALUE: 24
PIF_VALUE: 25
PIF_VALUE: 25
PIF_VALUE: 23
PIF_VALUE: 23
PIF_VALUE: 26
PIF_VALUE: 23
PIF_VALUE: 18
PIF_VALUE: 27
PIF_VALUE: 24
PIF_VALUE: 23
PIF_VALUE: 19
PIF_VALUE: 27

## 2024-06-01 ASSESSMENT — PAIN DESCRIPTION - DESCRIPTORS: DESCRIPTORS: OTHER (COMMENT)

## 2024-06-01 NOTE — ANESTHESIA PRE PROCEDURE
Department of Anesthesiology  Preprocedure Note       Name:  David Vaca   Age:  58 y.o.  :  1965                                          MRN:  [id number]         Date:  2024      Surgeon: Surgeon(s):  Yen Montague MD    Procedure: Procedure(s):  AORTOBIFEMORAL BYPASS  LOWER EXTREMITY THROMBECTOMY POSSIBLE FASCIOTOMY**CELLSAVER- NOTIFIED    Medications prior to admission:   Prior to Admission medications    Medication Sig Start Date End Date Taking? Authorizing Provider   FLUoxetine (PROZAC) 20 MG capsule Take 1 capsule by mouth daily 5/15/24   Abelino Bond MD   cyclobenzaprine (FLEXERIL) 10 MG tablet take 1 tablet by mouth twice a day if needed for SPASM(S) 24   Abelino Bond MD   traMADol (ULTRAM) 50 MG tablet Take 1 tablet by mouth every 6 hours as needed for Pain. Max Daily Amount: 200 mg    Abelino Bond MD   loratadine (CLARITIN) 10 MG tablet Take by mouth 3/15/24   Abelino Bond MD   metoprolol tartrate (LOPRESSOR) 25 MG tablet Take 1 tablet by mouth twice daily 3/25/24   Maikel Carroll MD   isosorbide dinitrate (ISORDIL) 20 MG tablet Take 1 tablet by mouth twice daily 3/25/24   Maikel Carroll MD   tiZANidine (ZANAFLEX) 4 MG tablet Take 1 tablet by mouth 2 times daily as needed (spasms) 23  eRnetta Longoria APRN - CNP   OLANZapine (ZYPREXA) 10 MG tablet Take 1 tablet by mouth nightly    Abelino Bond MD   atorvastatin (LIPITOR) 40 MG tablet Take 1 tablet by mouth daily 23   Mortimer, Connor, PA-C   fenofibrate (TRICOR) 145 MG tablet Take 1 tablet by mouth daily 23   Mortimer, Connor, PA-C   pantoprazole (PROTONIX) 40 MG tablet Take 1 tablet by mouth every morning (before breakfast) 23   Mortimer, Connor, PA-C   tiotropium-olodaterol (STIOLTO) 2.5-2.5 MCG/ACT AERS Inhale 2 puffs into the lungs daily    Abelino Bond MD   fosamprenavir (LEXIVA) 700 MG tablet Take 2 tablets by mouth 2 times daily

## 2024-06-01 NOTE — ANESTHESIA POSTPROCEDURE EVALUATION
Department of Anesthesiology  Postprocedure Note    Patient: David Vaca  MRN: [id number]  YOB: 1965  Date of evaluation: 6/1/2024    Procedure Summary       Date: 05/31/24 Room / Location: [place] [address] / [facility]    Anesthesia Start: 2055 Anesthesia Stop: 06/01/24 0344    Procedures:       AORTOBIFEMORAL BYPASS (Abdomen)      LOWER EXTREMITY THROMBECTOMY POSSIBLE FASCIOTOMY**CELLSAVER- NOTIFIED      CYSTOSCOPY WITH ENRIQUEZ CATHETER PLACEMENT (Groin) Diagnosis:       Aortic occlusion (HCC)      (Aortic occlusion (HCC) [I70.0])    Surgeons: Yen Montague MD; Tru Kurtz MD Responsible Provider: Channing Rubio MD    Anesthesia Type: general ASA Status: 4 - Emergent            Anesthesia Type: No value filed.    Jaskaran Phase I:      Jaskaran Phase II:      Anesthesia Post Evaluation    Patient location during evaluation: ICU  Patient participation: complete - patient cannot participate  Level of consciousness: sedated and ventilated  Airway patency: patent  Cardiovascular status: blood pressure returned to baseline  Respiratory status: intubated and ventilator  Hydration status: euvolemic  Comments: No known anesthesia related complications    No notable events documented.

## 2024-06-01 NOTE — PROCEDURES
FACILITY: [place], [place]  David Vaca  1965  [id number]    DATE:  06/01/24  SURGEON:  Dr. Jerardo M.D.  ASSISTANT:  Dr. Frandy Adkins MD   PREOPERATIVE DIAGNOSIS: Difficult alejandre catheter placement  POSTOPERATIVE DIAGNOSIS: Same  PROCEDURES PERFORMED:  1. Cystourethroscopy.  2. Urethral dilation.  3. Difficult alejandre placement  ANESTHESIA: General  COMPLICATIONS: None.  DRAINS: 18 fr alejandre  SPECIMEN: none  ESTIMATED BLOOD LOSS: Less than 5 mL.      INDICATIONS FOR THE PROCEDURE:  David Vaca is a 58 y.o. male  with a history of urethral stricture.  Risks benefits and alternative procedures were explained and informed consents was explained.      DETAILS OF THE PROCEDURE:  Patient was already intubated and sedated at the time of consult.  Patient was then prepped and draped in the usual sterile fashion. Once an appropriate time out had been performed, with all parties consenting, we attempted to place a 0.035 Glidewire through the urethra into the bladder.  We were unable to do so.  We then assembled a flexible cystoscope.  We were able to enter the urethra.  There was noted false passage at the 12 o'clock position.  We were able to slide a Glidewire through the true lumen and into the bladder.  We reassured bladder entry by placing a 5 Nepali opening catheter over the Glidewire and appreciated urine return.  The Glidewire was then replaced through the 5 Nepali open-ended catheter.  We elected to dilate with Cook-S dilators, from 8 fr to 20 fr.  We then placed a difficult alejandre, 18 fr catheter was placed over the glide wire. The patient tolerated the procedure well and the primary service will proceed with their part of the operation.       Plan:  The patient can be discharged home with alejandre catheter  Follow up in 4-6 weeks    Frandy Adkins MD  Urology Resident, PGY-2

## 2024-06-01 NOTE — CARE COORDINATION
Case Management Assessment  Initial Evaluation    Date/Time of Evaluation: 6/1/2024 6:15 PM  Assessment Completed by: Fadia Yo RN    If patient is discharged prior to next notation, then this note serves as note for discharge by case management.    Patient Name: David Vaca                   YOB: 1965  Diagnosis: Aortic thrombus (HCC) [I74.10]  Aortic occlusion (HCC) [I70.0]                   Date / Time: 5/31/2024  6:27 PM    Patient Admission Status: Inpatient   Readmission Risk (Low < 19, Mod (19-27), High > 27): Readmission Risk Score: 14.6    Current PCP: Sandi Choudhary, APRN - CNP  PCP verified by CM? (P) Yes (Sandi Choudhary)    Chart Reviewed: Yes      History Provided by: (P) Child/Family (spoke to his sister Eri, she says she is his HCPOA-paperwork done yesterday)  Patient Orientation: (P) Unable to Assess (Intubated)    Patient Cognition:      Hospitalization in the last 30 days (Readmission):  No    If yes, Readmission Assessment in CM Navigator will be completed.    Advance Directives:      Code Status: Full Code   Patient's Primary Decision Maker is: (P) Named in Scanned ACP Document    Primary Decision Maker: Eri Hernandez - Brother/Sister - [phone]    Secondary Decision Maker: Augustina Mcgee - Brother/Sister - [phone]    Discharge Planning:    Patient lives with: (P) Alone Type of Home: (P) Apartment  Primary Care Giver: (P) Self  Patient Support Systems include: (P) Family Members   Current Financial resources: (P) Medicare  Current community resources: (P) None  Current services prior to admission: (P) Durable Medical Equipment            Current DME: (P) Walker            Type of Home Care services:  (P) None    ADLS  Prior functional level: (P) Independent in ADLs/IADLs  Current functional level: (P) Assistance with the following:, Bathing, Dressing, Toileting, Cooking, Feeding, Housework, Shopping, Mobility    PT AM-PAC:   /24  OT AM-PAC:   /24    Family can

## 2024-06-01 NOTE — ANESTHESIA PROCEDURE NOTES
Arterial Line:    An arterial line was placed using ultrasound guidance, in the OR for the following indication(s): continuous blood pressure monitoring and blood sampling needed.    A 22 gauge (size), 1 and 3/4 inch (length), Angiocath (type) catheter was placed, into the left radial artery, secured by tape and Tegaderm.  Anesthesia type: General    Events:  patient tolerated procedure well with no complications and EBL 0mL.5/31/2024 9:00 PM5/31/2024 9:03 PM  Anesthesiologist: Joni Lino MD  Resident/CRNA: Evelina Sheldon APRN - CRNA  Other anesthesia staff: Kyle Louis APRN - CRNA  Performed: Other anesthesia staff   Preanesthetic Checklist  Completed: patient identified, IV checked, site marked, risks and benefits discussed, surgical/procedural consents, equipment checked, pre-op evaluation, timeout performed, anesthesia consent given, oxygen available, monitors applied/VS acknowledged, fire risk safety assessment completed and verbalized and blood product R/B/A discussed and consented

## 2024-06-01 NOTE — ED NOTES
58 year old male    Pulseless LLE      Dr. Montague aware  Accepted by Dr Moralez  
Dr. Villalobos at bedside for consult and evaluation. And to discuss the surgery and secure the consent form. Patient is resting comfortably, NAD, VSS, Call light in reach. Plan of care on going.      
Jean Carlos transferring the patient to CVOR on full cardiac monitor. Patient is resting comfortably, NAD, VSS, Call light in reach. Plan of care on going.    
Pt arrives alert and oriented x4, via LF transferred from [facility]  Pt felt weakness in his L leg this morning when walking, and then fell, pt denies hitting head/LOC   Pt is on blood thinners   At [facility] they found a blood clot from his kidney all the way down to his L foot   Pt has total numbness and no pulse in his L foot   LF reports they gave fentanyl in route for pts pain  Pt also states increased SOB at this time   Pt placed on cardiac monitor, continuous pulse ox, and BP cuff.  RR even and unlabored.   NAD noted.   Whiteboard updated.  Will continue with plan of care.    
Received a call from Renetta in lab, with a critical APTT result of >180.0 . Writer verbalized an understanding of this result and repeated it back to caller for confirmation. Bernardo HUERTA notified of this result.    
Sisters is at bedside.  
Spoke to savanna from anesthesia and states keep the patient here in the ED soon they'll get the patient to go for surgery. Rina FRASER RN notified. Patient is resting comfortably, NAD, VSS, Call light in reach. Plan of care on going.    
The following labs were labeled with appropriate pt sticker and tubed to lab:     [] Blue     [] Lavender   [] on ice  [] Green/yellow  [] Green/black [] on ice  [] Grey  [] on ice  [] Yellow  [] Red  [] Pink  [x] Type/ Screen  [] ABG  [] VBG    [] COVID-19 swab    [] Rapid  [] PCR  [] Flu swab  [] Peds Viral Panel     [] Urine Sample  [] Fecal Sample  [] Pelvic Cultures  [] Blood Cultures  [] X 2  [] STREP Cultures  [] Wound Cultures    
Vascular surgery at bedside   
HISTORY Bilateral 01/17/2017    Lumbar facet     OTHER SURGICAL HISTORY Bilateral 02/20/2017    Lumbar Facet L3-S1    OTHER SURGICAL HISTORY Right 03/28/2017    Lumbar RFA L3-S1    OTHER SURGICAL HISTORY Bilateral 03/06/2018    Bilat SI joint injection    PAIN MANAGEMENT PROCEDURE Right 1/14/2020    Lumbar RFA  Right side first L3-4,4-5,5-S1 No sedation per patient request performed by Robert Diaz MD at [facility]    PAIN MANAGEMENT PROCEDURE Left 3/2/2020    Lumbar RFA Left side L3-4,4-5,5-S1- local per pt request performed by Robert Diaz MD at [facility] OR    PAIN MANAGEMENT PROCEDURE Right 10/8/2020    Lumbar RFA bilateral L3-4,4-5,5-S1  right side first -Local per patient request- performed by Robert Diaz MD at [facility] OR    PAIN MANAGEMENT PROCEDURE Left 11/12/2020    Lumbar RFA Left side L3-4, 4-5, 5-S1, performed by Robert Diaz MD at [facility] OR    PAIN MANAGEMENT PROCEDURE Bilateral 2/27/2023    Bilateral Lumbar 4-5, 5-sacral 1 radiofrequency ablation performed by Robert Diaz MD at [facility] OR    IA NJX DX/THER AGT PVRT FACET JT LMBR/SAC 2ND LEVEL Bilateral 3/6/2018    BILATERAL SI MBB #2 NO SEDATION performed by Robert Diaz MD at [facility] OR    UPPER GASTROINTESTINAL ENDOSCOPY  2005,2007x2, 2014,2017       PAST MEDICAL HISTORY       Past Medical History:   Diagnosis Date    Arthritis 06/01/2016    LUMBAR SPINE     Depression     Dysphagia     GERD (gastroesophageal reflux disease)     History of nephrolithiasis     HIV (human immunodeficiency virus infection) (HCC)     Hyperlipidemia        Labs:  Labs Reviewed   APTT   PROTIME-INR   ANTI-XA, UNFRACTIONATED HEPARIN   ANTI-XA, UNFRACTIONATED HEPARIN   ANTI-XA, UNFRACTIONATED HEPARIN       Electronically signed by Lico Clay RN on 5/31/2024 at 7:00 PM

## 2024-06-02 ENCOUNTER — APPOINTMENT (OUTPATIENT)
Dept: GENERAL RADIOLOGY | Age: 59
DRG: 270 | End: 2024-06-02
Payer: MEDICARE

## 2024-06-02 LAB
ALLEN TEST: ABNORMAL
ANION GAP SERPL CALCULATED.3IONS-SCNC: 8 MMOL/L (ref 9–16)
BASOPHILS # BLD: 0 K/UL (ref 0–0.2)
BASOPHILS NFR BLD: 0 % (ref 0–2)
BUN SERPL-MCNC: 18 MG/DL (ref 6–20)
CA-I BLD-SCNC: 1.14 MMOL/L (ref 1.13–1.33)
CALCIUM SERPL-MCNC: 7.5 MG/DL (ref 8.6–10.4)
CHLORIDE SERPL-SCNC: 108 MMOL/L (ref 98–107)
CK SERPL-CCNC: ABNORMAL U/L (ref 39–308)
CO2 SERPL-SCNC: 23 MMOL/L (ref 20–31)
CREAT SERPL-MCNC: 1.4 MG/DL (ref 0.7–1.2)
EOSINOPHIL # BLD: 0 K/UL (ref 0–0.4)
EOSINOPHILS RELATIVE PERCENT: 0 % (ref 1–4)
ERYTHROCYTE [DISTWIDTH] IN BLOOD BY AUTOMATED COUNT: 13.2 % (ref 11.8–14.4)
FIO2: 40
GFR, ESTIMATED: 57 ML/MIN/1.73M2
GLUCOSE BLD-MCNC: 104 MG/DL (ref 75–110)
GLUCOSE BLD-MCNC: 112 MG/DL (ref 75–110)
GLUCOSE BLD-MCNC: 112 MG/DL (ref 75–110)
GLUCOSE BLD-MCNC: 115 MG/DL (ref 74–100)
GLUCOSE BLD-MCNC: 120 MG/DL (ref 75–110)
GLUCOSE BLD-MCNC: 129 MG/DL (ref 75–110)
GLUCOSE SERPL-MCNC: 122 MG/DL (ref 74–99)
HCT VFR BLD AUTO: 22.1 % (ref 40.7–50.3)
HCT VFR BLD AUTO: 24.2 % (ref 40.7–50.3)
HGB BLD-MCNC: 7.6 G/DL (ref 13–17)
HGB BLD-MCNC: 8.3 G/DL (ref 13–17)
IMM GRANULOCYTES # BLD AUTO: 0.08 K/UL (ref 0–0.3)
IMM GRANULOCYTES NFR BLD: 1 %
LYMPHOCYTES NFR BLD: 1.56 K/UL (ref 1–4.8)
LYMPHOCYTES RELATIVE PERCENT: 20 % (ref 24–44)
MAGNESIUM SERPL-MCNC: 2.3 MG/DL (ref 1.6–2.6)
MCH RBC QN AUTO: 39.8 PG (ref 25.2–33.5)
MCHC RBC AUTO-ENTMCNC: 34.4 G/DL (ref 28.4–34.8)
MCV RBC AUTO: 115.7 FL (ref 82.6–102.9)
MODE: ABNORMAL
MONOCYTES NFR BLD: 0.47 K/UL (ref 0.1–0.8)
MONOCYTES NFR BLD: 6 % (ref 1–7)
MORPHOLOGY: ABNORMAL
MYOGLOBIN SERPL-MCNC: 6194 NG/ML (ref 28–72)
MYOGLOBIN SERPL-MCNC: 7226 NG/ML (ref 28–72)
MYOGLOBIN SERPL-MCNC: ABNORMAL NG/ML (ref 28–72)
NEUTROPHILS NFR BLD: 73 % (ref 36–66)
NEUTS SEG NFR BLD: 5.69 K/UL (ref 1.8–7.7)
NRBC BLD-RTO: 0 PER 100 WBC
O2 DELIVERY DEVICE: ABNORMAL
PHOSPHATE SERPL-MCNC: 2.8 MG/DL (ref 2.5–4.5)
PLATELET # BLD AUTO: 123 K/UL (ref 138–453)
PMV BLD AUTO: 8.9 FL (ref 8.1–13.5)
POC HCO3: 26.2 MMOL/L (ref 21–28)
POC LACTIC ACID: 0.7 MMOL/L (ref 0.56–1.39)
POC O2 SATURATION: 98.2 % (ref 94–98)
POC PCO2: 41.9 MM HG (ref 35–48)
POC PH: 7.4 (ref 7.35–7.45)
POC PO2: 108.7 MM HG (ref 83–108)
POSITIVE BASE EXCESS, ART: 1.3 MMOL/L (ref 0–3)
POTASSIUM SERPL-SCNC: 3.9 MMOL/L (ref 3.7–5.3)
RBC # BLD AUTO: 1.91 M/UL (ref 4.21–5.77)
SAMPLE SITE: ABNORMAL
SODIUM SERPL-SCNC: 139 MMOL/L (ref 136–145)
WBC OTHER # BLD: 7.8 K/UL (ref 3.5–11.3)

## 2024-06-02 PROCEDURE — 85014 HEMATOCRIT: CPT

## 2024-06-02 PROCEDURE — 80048 BASIC METABOLIC PNL TOTAL CA: CPT

## 2024-06-02 PROCEDURE — 94761 N-INVAS EAR/PLS OXIMETRY MLT: CPT

## 2024-06-02 PROCEDURE — 6360000002 HC RX W HCPCS: Performed by: STUDENT IN AN ORGANIZED HEALTH CARE EDUCATION/TRAINING PROGRAM

## 2024-06-02 PROCEDURE — 82947 ASSAY GLUCOSE BLOOD QUANT: CPT

## 2024-06-02 PROCEDURE — 84100 ASSAY OF PHOSPHORUS: CPT

## 2024-06-02 PROCEDURE — 83874 ASSAY OF MYOGLOBIN: CPT

## 2024-06-02 PROCEDURE — 99232 SBSQ HOSP IP/OBS MODERATE 35: CPT | Performed by: SURGERY

## 2024-06-02 PROCEDURE — 85018 HEMOGLOBIN: CPT

## 2024-06-02 PROCEDURE — 6370000000 HC RX 637 (ALT 250 FOR IP)

## 2024-06-02 PROCEDURE — 37799 UNLISTED PX VASCULAR SURGERY: CPT

## 2024-06-02 PROCEDURE — 82803 BLOOD GASES ANY COMBINATION: CPT

## 2024-06-02 PROCEDURE — C9113 INJ PANTOPRAZOLE SODIUM, VIA: HCPCS

## 2024-06-02 PROCEDURE — 2500000003 HC RX 250 WO HCPCS

## 2024-06-02 PROCEDURE — 36415 COLL VENOUS BLD VENIPUNCTURE: CPT

## 2024-06-02 PROCEDURE — 82550 ASSAY OF CK (CPK): CPT

## 2024-06-02 PROCEDURE — 82330 ASSAY OF CALCIUM: CPT

## 2024-06-02 PROCEDURE — 6360000002 HC RX W HCPCS

## 2024-06-02 PROCEDURE — 83735 ASSAY OF MAGNESIUM: CPT

## 2024-06-02 PROCEDURE — A4216 STERILE WATER/SALINE, 10 ML: HCPCS

## 2024-06-02 PROCEDURE — 94003 VENT MGMT INPAT SUBQ DAY: CPT

## 2024-06-02 PROCEDURE — 71045 X-RAY EXAM CHEST 1 VIEW: CPT

## 2024-06-02 PROCEDURE — 6370000000 HC RX 637 (ALT 250 FOR IP): Performed by: STUDENT IN AN ORGANIZED HEALTH CARE EDUCATION/TRAINING PROGRAM

## 2024-06-02 PROCEDURE — 2580000003 HC RX 258: Performed by: STUDENT IN AN ORGANIZED HEALTH CARE EDUCATION/TRAINING PROGRAM

## 2024-06-02 PROCEDURE — 83605 ASSAY OF LACTIC ACID: CPT

## 2024-06-02 PROCEDURE — 2580000003 HC RX 258

## 2024-06-02 PROCEDURE — 2000000000 HC ICU R&B

## 2024-06-02 PROCEDURE — 2700000000 HC OXYGEN THERAPY PER DAY

## 2024-06-02 PROCEDURE — 85025 COMPLETE CBC W/AUTO DIFF WBC: CPT

## 2024-06-02 RX ORDER — DEXTROSE MONOHYDRATE, SODIUM CHLORIDE, AND POTASSIUM CHLORIDE 50; 1.49; 4.5 G/1000ML; G/1000ML; G/1000ML
INJECTION, SOLUTION INTRAVENOUS CONTINUOUS
Status: DISCONTINUED | OUTPATIENT
Start: 2024-06-02 | End: 2024-06-05

## 2024-06-02 RX ORDER — FENTANYL CITRATE 50 UG/ML
50 INJECTION, SOLUTION INTRAMUSCULAR; INTRAVENOUS
Status: COMPLETED | OUTPATIENT
Start: 2024-06-02 | End: 2024-06-03

## 2024-06-02 RX ORDER — OXYCODONE HYDROCHLORIDE 5 MG/1
5 TABLET ORAL EVERY 4 HOURS PRN
Status: DISCONTINUED | OUTPATIENT
Start: 2024-06-02 | End: 2024-06-09 | Stop reason: HOSPADM

## 2024-06-02 RX ORDER — LABETALOL HYDROCHLORIDE 5 MG/ML
10 INJECTION, SOLUTION INTRAVENOUS EVERY 6 HOURS PRN
Status: DISCONTINUED | OUTPATIENT
Start: 2024-06-02 | End: 2024-06-03

## 2024-06-02 RX ORDER — FENTANYL CITRATE 50 UG/ML
50 INJECTION, SOLUTION INTRAMUSCULAR; INTRAVENOUS ONCE
Status: COMPLETED | OUTPATIENT
Start: 2024-06-02 | End: 2024-06-02

## 2024-06-02 RX ADMIN — CHLORHEXIDINE GLUCONATE 15 ML: 1.2 SOLUTION ORAL at 08:09

## 2024-06-02 RX ADMIN — FENTANYL CITRATE 50 MCG: 50 INJECTION, SOLUTION INTRAMUSCULAR; INTRAVENOUS at 19:11

## 2024-06-02 RX ADMIN — SODIUM CHLORIDE, POTASSIUM CHLORIDE, SODIUM LACTATE AND CALCIUM CHLORIDE: 600; 310; 30; 20 INJECTION, SOLUTION INTRAVENOUS at 07:07

## 2024-06-02 RX ADMIN — FENTANYL CITRATE 50 MCG: 50 INJECTION, SOLUTION INTRAMUSCULAR; INTRAVENOUS at 16:06

## 2024-06-02 RX ADMIN — POTASSIUM CHLORIDE, DEXTROSE MONOHYDRATE AND SODIUM CHLORIDE: 150; 5; 450 INJECTION, SOLUTION INTRAVENOUS at 14:08

## 2024-06-02 RX ADMIN — OXYCODONE HYDROCHLORIDE 5 MG: 5 TABLET ORAL at 16:57

## 2024-06-02 RX ADMIN — SODIUM CHLORIDE, PRESERVATIVE FREE 10 ML: 5 INJECTION INTRAVENOUS at 20:33

## 2024-06-02 RX ADMIN — FENTANYL CITRATE 50 MCG: 50 INJECTION, SOLUTION INTRAMUSCULAR; INTRAVENOUS at 15:13

## 2024-06-02 RX ADMIN — OXYCODONE HYDROCHLORIDE 5 MG: 5 TABLET ORAL at 08:09

## 2024-06-02 RX ADMIN — Medication 2000 MG: at 01:35

## 2024-06-02 RX ADMIN — OXYCODONE HYDROCHLORIDE 5 MG: 5 TABLET ORAL at 05:53

## 2024-06-02 RX ADMIN — METHOCARBAMOL 750 MG: 750 TABLET ORAL at 08:09

## 2024-06-02 RX ADMIN — METHOCARBAMOL 750 MG: 750 TABLET ORAL at 20:33

## 2024-06-02 RX ADMIN — HEPARIN SODIUM 5000 UNITS: 5000 INJECTION INTRAVENOUS; SUBCUTANEOUS at 12:38

## 2024-06-02 RX ADMIN — ASPIRIN 81 MG 81 MG: 81 TABLET ORAL at 08:09

## 2024-06-02 RX ADMIN — PANTOPRAZOLE SODIUM 40 MG: 40 INJECTION, POWDER, FOR SOLUTION INTRAVENOUS at 08:09

## 2024-06-02 RX ADMIN — DEXMEDETOMIDINE HYDROCHLORIDE 1.1 MCG/KG/HR: 400 INJECTION, SOLUTION INTRAVENOUS at 04:32

## 2024-06-02 RX ADMIN — OXYCODONE HYDROCHLORIDE 5 MG: 5 TABLET ORAL at 12:37

## 2024-06-02 RX ADMIN — METHOCARBAMOL 750 MG: 750 TABLET ORAL at 01:35

## 2024-06-02 RX ADMIN — Medication 2000 MG: at 08:10

## 2024-06-02 RX ADMIN — FENTANYL CITRATE 50 MCG: 50 INJECTION, SOLUTION INTRAMUSCULAR; INTRAVENOUS at 18:02

## 2024-06-02 RX ADMIN — FENTANYL CITRATE 50 MCG: 50 INJECTION, SOLUTION INTRAMUSCULAR; INTRAVENOUS at 22:53

## 2024-06-02 RX ADMIN — PROPOFOL 10 MCG/KG/MIN: 10 INJECTION, EMULSION INTRAVENOUS at 04:33

## 2024-06-02 RX ADMIN — GABAPENTIN 300 MG: 300 CAPSULE ORAL at 12:38

## 2024-06-02 RX ADMIN — METHOCARBAMOL 750 MG: 750 TABLET ORAL at 12:38

## 2024-06-02 RX ADMIN — Medication 0.2 MG/KG/HR: at 22:03

## 2024-06-02 RX ADMIN — HEPARIN SODIUM 5000 UNITS: 5000 INJECTION INTRAVENOUS; SUBCUTANEOUS at 06:30

## 2024-06-02 RX ADMIN — DEXMEDETOMIDINE HYDROCHLORIDE 1.3 MCG/KG/HR: 400 INJECTION, SOLUTION INTRAVENOUS at 09:38

## 2024-06-02 RX ADMIN — FENTANYL CITRATE 50 MCG: 50 INJECTION, SOLUTION INTRAMUSCULAR; INTRAVENOUS at 21:37

## 2024-06-02 RX ADMIN — Medication 2000 MG: at 17:04

## 2024-06-02 RX ADMIN — GABAPENTIN 300 MG: 300 CAPSULE ORAL at 05:52

## 2024-06-02 RX ADMIN — GABAPENTIN 300 MG: 300 CAPSULE ORAL at 20:33

## 2024-06-02 RX ADMIN — LABETALOL HYDROCHLORIDE 10 MG: 5 INJECTION, SOLUTION INTRAVENOUS at 21:38

## 2024-06-02 RX ADMIN — SODIUM CHLORIDE, PRESERVATIVE FREE 30 ML: 5 INJECTION INTRAVENOUS at 08:10

## 2024-06-02 RX ADMIN — HEPARIN SODIUM 5000 UNITS: 5000 INJECTION INTRAVENOUS; SUBCUTANEOUS at 22:03

## 2024-06-02 ASSESSMENT — PAIN SCALES - GENERAL
PAINLEVEL_OUTOF10: 10
PAINLEVEL_OUTOF10: 8
PAINLEVEL_OUTOF10: 10
PAINLEVEL_OUTOF10: 8
PAINLEVEL_OUTOF10: 10
PAINLEVEL_OUTOF10: 8
PAINLEVEL_OUTOF10: 8
PAINLEVEL_OUTOF10: 10

## 2024-06-02 ASSESSMENT — PAIN DESCRIPTION - ORIENTATION
ORIENTATION: MID
ORIENTATION: LEFT

## 2024-06-02 ASSESSMENT — PULMONARY FUNCTION TESTS
PIF_VALUE: 17
PIF_VALUE: 22
PIF_VALUE: 23
PIF_VALUE: 24
PIF_VALUE: 22
PIF_VALUE: 24
PIF_VALUE: 16
PIF_VALUE: 18
PIF_VALUE: 23
PIF_VALUE: 24
PIF_VALUE: 23
PIF_VALUE: 24
PIF_VALUE: 22
PIF_VALUE: 23
PIF_VALUE: 23
PIF_VALUE: 24
PIF_VALUE: 24
PIF_VALUE: 27
PIF_VALUE: 23
PIF_VALUE: 23
PIF_VALUE: 16
PIF_VALUE: 23
PIF_VALUE: 22
PIF_VALUE: 17
PIF_VALUE: 17
PIF_VALUE: 23

## 2024-06-02 ASSESSMENT — PAIN DESCRIPTION - LOCATION
LOCATION: ABDOMEN
LOCATION: LEG

## 2024-06-02 ASSESSMENT — PAIN DESCRIPTION - DESCRIPTORS: DESCRIPTORS: POUNDING

## 2024-06-03 ENCOUNTER — APPOINTMENT (OUTPATIENT)
Dept: GENERAL RADIOLOGY | Age: 59
DRG: 270 | End: 2024-06-03
Payer: MEDICARE

## 2024-06-03 ENCOUNTER — APPOINTMENT (OUTPATIENT)
Age: 59
DRG: 270 | End: 2024-06-03
Payer: MEDICARE

## 2024-06-03 LAB
ANION GAP SERPL CALCULATED.3IONS-SCNC: 8 MMOL/L (ref 9–16)
BASOPHILS # BLD: 0 K/UL (ref 0–0.2)
BASOPHILS NFR BLD: 0 % (ref 0–2)
BUN SERPL-MCNC: 12 MG/DL (ref 6–20)
CA-I BLD-SCNC: 1.11 MMOL/L (ref 1.13–1.33)
CALCIUM SERPL-MCNC: 8.3 MG/DL (ref 8.6–10.4)
CHLORIDE SERPL-SCNC: 104 MMOL/L (ref 98–107)
CK SERPL-CCNC: 6847 U/L (ref 39–308)
CK SERPL-CCNC: 9413 U/L (ref 39–308)
CK SERPL-CCNC: 9506 U/L (ref 39–308)
CO2 SERPL-SCNC: 28 MMOL/L (ref 20–31)
CREAT SERPL-MCNC: 1 MG/DL (ref 0.7–1.2)
ECHO AO ROOT DIAM: 3.8 CM
ECHO AO ROOT INDEX: 2.12 CM/M2
ECHO AV AREA PEAK VELOCITY: 2.7 CM2
ECHO AV AREA VTI: 2.9 CM2
ECHO AV AREA/BSA PEAK VELOCITY: 1.5 CM2/M2
ECHO AV AREA/BSA VTI: 1.6 CM2/M2
ECHO AV MEAN GRADIENT: 3 MMHG
ECHO AV MEAN VELOCITY: 0.8 M/S
ECHO AV PEAK GRADIENT: 6 MMHG
ECHO AV PEAK VELOCITY: 1.2 M/S
ECHO AV VELOCITY RATIO: 0.83
ECHO AV VTI: 23.7 CM
ECHO BSA: 1.81 M2
ECHO IVC PROX: 1.4 CM
ECHO LA AREA 2C: 15 CM2
ECHO LA AREA 4C: 14.9 CM2
ECHO LA DIAMETER INDEX: 1.9 CM/M2
ECHO LA DIAMETER: 3.4 CM
ECHO LA MAJOR AXIS: 4.3 CM
ECHO LA MINOR AXIS: 4.3 CM
ECHO LA TO AORTIC ROOT RATIO: 0.89
ECHO LA VOL BP: 38 ML (ref 18–58)
ECHO LA VOL MOD A2C: 41 ML (ref 18–58)
ECHO LA VOL MOD A4C: 36 ML (ref 18–58)
ECHO LA VOL/BSA BIPLANE: 21 ML/M2 (ref 16–34)
ECHO LA VOLUME INDEX MOD A2C: 23 ML/M2 (ref 16–34)
ECHO LA VOLUME INDEX MOD A4C: 20 ML/M2 (ref 16–34)
ECHO LV E' LATERAL VELOCITY: 13 CM/S
ECHO LV E' SEPTAL VELOCITY: 6 CM/S
ECHO LV EDV A2C: 50 ML
ECHO LV EDV A4C: 78 ML
ECHO LV EDV INDEX A4C: 44 ML/M2
ECHO LV EDV NDEX A2C: 28 ML/M2
ECHO LV EJECTION FRACTION A2C: 53 %
ECHO LV EJECTION FRACTION A4C: 60 %
ECHO LV ESV A2C: 24 ML
ECHO LV ESV A4C: 31 ML
ECHO LV ESV INDEX A2C: 13 ML/M2
ECHO LV ESV INDEX A4C: 17 ML/M2
ECHO LV INTERNAL DIMENSION DIASTOLE INDEX: 3.3 CM/M2
ECHO LV INTERNAL DIMENSION DIASTOLIC: 5.9 CM (ref 4.2–5.9)
ECHO LV IVSD: 0.8 CM (ref 0.6–1)
ECHO LV MASS 2D: 209.6 G (ref 88–224)
ECHO LV MASS INDEX 2D: 117.1 G/M2 (ref 49–115)
ECHO LV POSTERIOR WALL DIASTOLIC: 1 CM (ref 0.6–1)
ECHO LV RELATIVE WALL THICKNESS RATIO: 0.34
ECHO LVOT AREA: 3.5 CM2
ECHO LVOT AV VTI INDEX: 0.82
ECHO LVOT DIAM: 2.1 CM
ECHO LVOT MEAN GRADIENT: 2 MMHG
ECHO LVOT PEAK GRADIENT: 4 MMHG
ECHO LVOT PEAK VELOCITY: 1 M/S
ECHO LVOT STROKE VOLUME INDEX: 37.7 ML/M2
ECHO LVOT SV: 67.5 ML
ECHO LVOT VTI: 19.5 CM
ECHO MV A VELOCITY: 0.96 M/S
ECHO MV AREA VTI: 3.2 CM2
ECHO MV E DECELERATION TIME (DT): 165 MS
ECHO MV E VELOCITY: 0.98 M/S
ECHO MV E/A RATIO: 1.02
ECHO MV E/E' LATERAL: 7.54
ECHO MV E/E' RATIO (AVERAGED): 11.94
ECHO MV E/E' SEPTAL: 16.33
ECHO MV LVOT VTI INDEX: 1.07
ECHO MV MAX VELOCITY: 0.9 M/S
ECHO MV MEAN GRADIENT: 2 MMHG
ECHO MV MEAN VELOCITY: 0.6 M/S
ECHO MV PEAK GRADIENT: 3 MMHG
ECHO MV VTI: 20.8 CM
ECHO PV MAX VELOCITY: 1.1 M/S
ECHO PV PEAK GRADIENT: 5 MMHG
ECHO RV BASAL DIMENSION: 3.4 CM
ECHO RV INTERNAL DIMENSION: 1.7 CM
ECHO RV MID DIMENSION: 2.3 CM
ECHO RV TAPSE: 2.1 CM (ref 1.7–?)
ECHO TV REGURGITANT MAX VELOCITY: 1.89 M/S
ECHO TV REGURGITANT PEAK GRADIENT: 14 MMHG
EOSINOPHIL # BLD: 0 K/UL (ref 0–0.44)
EOSINOPHILS RELATIVE PERCENT: 0 % (ref 1–4)
ERYTHROCYTE [DISTWIDTH] IN BLOOD BY AUTOMATED COUNT: 13 % (ref 11.8–14.4)
GFR, ESTIMATED: 85 ML/MIN/1.73M2
GLUCOSE BLD-MCNC: 127 MG/DL (ref 75–110)
GLUCOSE BLD-MCNC: 140 MG/DL (ref 75–110)
GLUCOSE BLD-MCNC: 149 MG/DL (ref 75–110)
GLUCOSE BLD-MCNC: 155 MG/DL (ref 75–110)
GLUCOSE BLD-MCNC: 157 MG/DL (ref 75–110)
GLUCOSE SERPL-MCNC: 184 MG/DL (ref 74–99)
HCT VFR BLD AUTO: 25.1 % (ref 40.7–50.3)
HGB BLD-MCNC: 8.6 G/DL (ref 13–17)
IMM GRANULOCYTES # BLD AUTO: 0 K/UL (ref 0–0.3)
IMM GRANULOCYTES NFR BLD: 0 %
LYMPHOCYTES NFR BLD: 1.28 K/UL (ref 1.1–3.7)
LYMPHOCYTES RELATIVE PERCENT: 11 % (ref 24–43)
MAGNESIUM SERPL-MCNC: 2 MG/DL (ref 1.6–2.6)
MCH RBC QN AUTO: 39.6 PG (ref 25.2–33.5)
MCHC RBC AUTO-ENTMCNC: 34.3 G/DL (ref 28.4–34.8)
MCV RBC AUTO: 115.7 FL (ref 82.6–102.9)
MONOCYTES NFR BLD: 0.58 K/UL (ref 0.1–1.2)
MONOCYTES NFR BLD: 5 % (ref 3–12)
MORPHOLOGY: ABNORMAL
MYOGLOBIN SERPL-MCNC: 1567 NG/ML (ref 28–72)
MYOGLOBIN SERPL-MCNC: 2346 NG/ML (ref 28–72)
MYOGLOBIN SERPL-MCNC: 3383 NG/ML (ref 28–72)
NEUTROPHILS NFR BLD: 84 % (ref 36–65)
NEUTS SEG NFR BLD: 9.74 K/UL (ref 1.5–8.1)
NRBC BLD-RTO: 0 PER 100 WBC
PHOSPHATE SERPL-MCNC: 1.8 MG/DL (ref 2.5–4.5)
PLATELET # BLD AUTO: 169 K/UL (ref 138–453)
PMV BLD AUTO: 9.2 FL (ref 8.1–13.5)
POTASSIUM SERPL-SCNC: 3.4 MMOL/L (ref 3.7–5.3)
RBC # BLD AUTO: 2.17 M/UL (ref 4.21–5.77)
SODIUM SERPL-SCNC: 140 MMOL/L (ref 136–145)
WBC OTHER # BLD: 11.6 K/UL (ref 3.5–11.3)

## 2024-06-03 PROCEDURE — 82550 ASSAY OF CK (CPK): CPT

## 2024-06-03 PROCEDURE — 2500000003 HC RX 250 WO HCPCS

## 2024-06-03 PROCEDURE — 6360000002 HC RX W HCPCS

## 2024-06-03 PROCEDURE — A4216 STERILE WATER/SALINE, 10 ML: HCPCS

## 2024-06-03 PROCEDURE — 97162 PT EVAL MOD COMPLEX 30 MIN: CPT

## 2024-06-03 PROCEDURE — C9113 INJ PANTOPRAZOLE SODIUM, VIA: HCPCS

## 2024-06-03 PROCEDURE — 83874 ASSAY OF MYOGLOBIN: CPT

## 2024-06-03 PROCEDURE — 99232 SBSQ HOSP IP/OBS MODERATE 35: CPT | Performed by: SURGERY

## 2024-06-03 PROCEDURE — 94761 N-INVAS EAR/PLS OXIMETRY MLT: CPT

## 2024-06-03 PROCEDURE — 80048 BASIC METABOLIC PNL TOTAL CA: CPT

## 2024-06-03 PROCEDURE — 82947 ASSAY GLUCOSE BLOOD QUANT: CPT

## 2024-06-03 PROCEDURE — 6370000000 HC RX 637 (ALT 250 FOR IP)

## 2024-06-03 PROCEDURE — 2580000003 HC RX 258: Performed by: STUDENT IN AN ORGANIZED HEALTH CARE EDUCATION/TRAINING PROGRAM

## 2024-06-03 PROCEDURE — 71045 X-RAY EXAM CHEST 1 VIEW: CPT

## 2024-06-03 PROCEDURE — 84100 ASSAY OF PHOSPHORUS: CPT

## 2024-06-03 PROCEDURE — 2580000003 HC RX 258

## 2024-06-03 PROCEDURE — 93306 TTE W/DOPPLER COMPLETE: CPT

## 2024-06-03 PROCEDURE — 97166 OT EVAL MOD COMPLEX 45 MIN: CPT

## 2024-06-03 PROCEDURE — 97116 GAIT TRAINING THERAPY: CPT

## 2024-06-03 PROCEDURE — 36415 COLL VENOUS BLD VENIPUNCTURE: CPT

## 2024-06-03 PROCEDURE — 2700000000 HC OXYGEN THERAPY PER DAY

## 2024-06-03 PROCEDURE — 83735 ASSAY OF MAGNESIUM: CPT

## 2024-06-03 PROCEDURE — 93306 TTE W/DOPPLER COMPLETE: CPT | Performed by: INTERNAL MEDICINE

## 2024-06-03 PROCEDURE — 85025 COMPLETE CBC W/AUTO DIFF WBC: CPT

## 2024-06-03 PROCEDURE — 74018 RADEX ABDOMEN 1 VIEW: CPT

## 2024-06-03 PROCEDURE — 82330 ASSAY OF CALCIUM: CPT

## 2024-06-03 PROCEDURE — 6360000002 HC RX W HCPCS: Performed by: STUDENT IN AN ORGANIZED HEALTH CARE EDUCATION/TRAINING PROGRAM

## 2024-06-03 PROCEDURE — 2000000000 HC ICU R&B

## 2024-06-03 PROCEDURE — 6370000000 HC RX 637 (ALT 250 FOR IP): Performed by: STUDENT IN AN ORGANIZED HEALTH CARE EDUCATION/TRAINING PROGRAM

## 2024-06-03 PROCEDURE — 97535 SELF CARE MNGMENT TRAINING: CPT

## 2024-06-03 RX ORDER — POTASSIUM CHLORIDE 7.45 MG/ML
10 INJECTION INTRAVENOUS
Status: DISCONTINUED | OUTPATIENT
Start: 2024-06-03 | End: 2024-06-03

## 2024-06-03 RX ORDER — UREA 10 %
10 LOTION (ML) TOPICAL NIGHTLY
Status: DISCONTINUED | OUTPATIENT
Start: 2024-06-03 | End: 2024-06-09 | Stop reason: HOSPADM

## 2024-06-03 RX ORDER — POTASSIUM CHLORIDE 7.45 MG/ML
10 INJECTION INTRAVENOUS
Status: COMPLETED | OUTPATIENT
Start: 2024-06-03 | End: 2024-06-03

## 2024-06-03 RX ORDER — CALCIUM GLUCONATE 20 MG/ML
2000 INJECTION, SOLUTION INTRAVENOUS ONCE
Status: COMPLETED | OUTPATIENT
Start: 2024-06-03 | End: 2024-06-03

## 2024-06-03 RX ORDER — LABETALOL HYDROCHLORIDE 5 MG/ML
10 INJECTION, SOLUTION INTRAVENOUS
Status: DISCONTINUED | OUTPATIENT
Start: 2024-06-03 | End: 2024-06-09 | Stop reason: HOSPADM

## 2024-06-03 RX ADMIN — OXYCODONE HYDROCHLORIDE 5 MG: 5 TABLET ORAL at 22:17

## 2024-06-03 RX ADMIN — POTASSIUM CHLORIDE 10 MEQ: 7.46 INJECTION, SOLUTION INTRAVENOUS at 12:10

## 2024-06-03 RX ADMIN — OXYCODONE HYDROCHLORIDE 5 MG: 5 TABLET ORAL at 17:37

## 2024-06-03 RX ADMIN — METHOCARBAMOL 750 MG: 750 TABLET ORAL at 22:17

## 2024-06-03 RX ADMIN — LABETALOL HYDROCHLORIDE 10 MG: 5 INJECTION, SOLUTION INTRAVENOUS at 06:10

## 2024-06-03 RX ADMIN — POTASSIUM CHLORIDE, DEXTROSE MONOHYDRATE AND SODIUM CHLORIDE: 150; 5; 450 INJECTION, SOLUTION INTRAVENOUS at 04:42

## 2024-06-03 RX ADMIN — Medication 2000 MG: at 17:38

## 2024-06-03 RX ADMIN — LABETALOL HYDROCHLORIDE 10 MG: 5 INJECTION, SOLUTION INTRAVENOUS at 01:22

## 2024-06-03 RX ADMIN — CALCIUM GLUCONATE 2000 MG: 20 INJECTION, SOLUTION INTRAVENOUS at 11:11

## 2024-06-03 RX ADMIN — POTASSIUM CHLORIDE, DEXTROSE MONOHYDRATE AND SODIUM CHLORIDE: 150; 5; 450 INJECTION, SOLUTION INTRAVENOUS at 14:55

## 2024-06-03 RX ADMIN — METOPROLOL TARTRATE 25 MG: 25 TABLET, FILM COATED ORAL at 22:18

## 2024-06-03 RX ADMIN — FENTANYL CITRATE 50 MCG: 50 INJECTION, SOLUTION INTRAMUSCULAR; INTRAVENOUS at 02:30

## 2024-06-03 RX ADMIN — OXYCODONE HYDROCHLORIDE 5 MG: 5 TABLET ORAL at 08:50

## 2024-06-03 RX ADMIN — METHOCARBAMOL 750 MG: 750 TABLET ORAL at 13:16

## 2024-06-03 RX ADMIN — HEPARIN SODIUM 5000 UNITS: 5000 INJECTION INTRAVENOUS; SUBCUTANEOUS at 06:09

## 2024-06-03 RX ADMIN — Medication 2000 MG: at 01:03

## 2024-06-03 RX ADMIN — GABAPENTIN 300 MG: 300 CAPSULE ORAL at 13:16

## 2024-06-03 RX ADMIN — PANTOPRAZOLE SODIUM 40 MG: 40 INJECTION, POWDER, FOR SOLUTION INTRAVENOUS at 08:46

## 2024-06-03 RX ADMIN — ONDANSETRON 4 MG: 2 INJECTION INTRAMUSCULAR; INTRAVENOUS at 01:18

## 2024-06-03 RX ADMIN — METHOCARBAMOL 750 MG: 750 TABLET ORAL at 01:02

## 2024-06-03 RX ADMIN — LABETALOL HYDROCHLORIDE 10 MG: 5 INJECTION, SOLUTION INTRAVENOUS at 15:08

## 2024-06-03 RX ADMIN — HEPARIN SODIUM 5000 UNITS: 5000 INJECTION INTRAVENOUS; SUBCUTANEOUS at 22:18

## 2024-06-03 RX ADMIN — HEPARIN SODIUM 5000 UNITS: 5000 INJECTION INTRAVENOUS; SUBCUTANEOUS at 13:16

## 2024-06-03 RX ADMIN — FENTANYL CITRATE 50 MCG: 50 INJECTION, SOLUTION INTRAMUSCULAR; INTRAVENOUS at 06:09

## 2024-06-03 RX ADMIN — POTASSIUM CHLORIDE 10 MEQ: 7.46 INJECTION, SOLUTION INTRAVENOUS at 11:06

## 2024-06-03 RX ADMIN — METOPROLOL TARTRATE 25 MG: 25 TABLET, FILM COATED ORAL at 08:46

## 2024-06-03 RX ADMIN — GABAPENTIN 300 MG: 300 CAPSULE ORAL at 06:09

## 2024-06-03 RX ADMIN — FENTANYL CITRATE 50 MCG: 50 INJECTION, SOLUTION INTRAMUSCULAR; INTRAVENOUS at 00:13

## 2024-06-03 RX ADMIN — GABAPENTIN 300 MG: 300 CAPSULE ORAL at 22:18

## 2024-06-03 RX ADMIN — POTASSIUM CHLORIDE, DEXTROSE MONOHYDRATE AND SODIUM CHLORIDE: 150; 5; 450 INJECTION, SOLUTION INTRAVENOUS at 22:23

## 2024-06-03 RX ADMIN — Medication 10 MG: at 22:18

## 2024-06-03 RX ADMIN — Medication 2000 MG: at 08:53

## 2024-06-03 RX ADMIN — SODIUM CHLORIDE, PRESERVATIVE FREE 10 ML: 5 INJECTION INTRAVENOUS at 22:19

## 2024-06-03 RX ADMIN — ASPIRIN 81 MG 81 MG: 81 TABLET ORAL at 08:46

## 2024-06-03 RX ADMIN — METHOCARBAMOL 750 MG: 750 TABLET ORAL at 06:09

## 2024-06-03 RX ADMIN — OXYCODONE HYDROCHLORIDE 5 MG: 5 TABLET ORAL at 13:16

## 2024-06-03 RX ADMIN — Medication 10 MG: at 01:19

## 2024-06-03 RX ADMIN — FENTANYL CITRATE 50 MCG: 50 INJECTION, SOLUTION INTRAMUSCULAR; INTRAVENOUS at 04:39

## 2024-06-03 RX ADMIN — FENTANYL CITRATE 50 MCG: 50 INJECTION, SOLUTION INTRAMUSCULAR; INTRAVENOUS at 01:10

## 2024-06-03 RX ADMIN — POTASSIUM PHOSPHATE, MONOBASIC AND POTASSIUM PHOSPHATE, DIBASIC 20 MMOL: 224; 236 INJECTION, SOLUTION, CONCENTRATE INTRAVENOUS at 13:28

## 2024-06-03 ASSESSMENT — PAIN DESCRIPTION - ORIENTATION
ORIENTATION: MID
ORIENTATION: MID
ORIENTATION: LEFT
ORIENTATION: LEFT

## 2024-06-03 ASSESSMENT — PAIN DESCRIPTION - DESCRIPTORS
DESCRIPTORS: ACHING
DESCRIPTORS: STABBING;BURNING
DESCRIPTORS: ACHING
DESCRIPTORS: STABBING

## 2024-06-03 ASSESSMENT — PAIN DESCRIPTION - LOCATION
LOCATION: LEG
LOCATION: BACK;ABDOMEN
LOCATION: ABDOMEN;BACK
LOCATION: ABDOMEN;BACK
LOCATION: BACK;ABDOMEN
LOCATION: LEG
LOCATION: BACK;ABDOMEN
LOCATION: BACK;ABDOMEN
LOCATION: BACK

## 2024-06-03 ASSESSMENT — PAIN SCALES - GENERAL
PAINLEVEL_OUTOF10: 8
PAINLEVEL_OUTOF10: 7
PAINLEVEL_OUTOF10: 2
PAINLEVEL_OUTOF10: 10
PAINLEVEL_OUTOF10: 5
PAINLEVEL_OUTOF10: 5
PAINLEVEL_OUTOF10: 7
PAINLEVEL_OUTOF10: 8
PAINLEVEL_OUTOF10: 4
PAINLEVEL_OUTOF10: 10
PAINLEVEL_OUTOF10: 5
PAINLEVEL_OUTOF10: 7
PAINLEVEL_OUTOF10: 10
PAINLEVEL_OUTOF10: 9
PAINLEVEL_OUTOF10: 8

## 2024-06-03 ASSESSMENT — PAIN DESCRIPTION - PAIN TYPE: TYPE: SURGICAL PAIN

## 2024-06-04 ENCOUNTER — APPOINTMENT (OUTPATIENT)
Dept: GENERAL RADIOLOGY | Age: 59
DRG: 270 | End: 2024-06-04
Payer: MEDICARE

## 2024-06-04 ENCOUNTER — ANESTHESIA EVENT (OUTPATIENT)
Dept: OPERATING ROOM | Age: 59
End: 2024-06-04
Payer: MEDICARE

## 2024-06-04 ENCOUNTER — ANESTHESIA (OUTPATIENT)
Dept: OPERATING ROOM | Age: 59
End: 2024-06-04
Payer: MEDICARE

## 2024-06-04 LAB
ABO + RH BLD: NORMAL
ANION GAP SERPL CALCULATED.3IONS-SCNC: 7 MMOL/L (ref 9–16)
ARM BAND NUMBER: NORMAL
BASOPHILS # BLD: 0 K/UL (ref 0–0.2)
BASOPHILS NFR BLD: 0 % (ref 0–2)
BLOOD BANK SAMPLE EXPIRATION: NORMAL
BLOOD GROUP ANTIBODIES SERPL: NEGATIVE
BUN SERPL-MCNC: 10 MG/DL (ref 6–20)
CA-I BLD-SCNC: 1.16 MMOL/L (ref 1.13–1.33)
CALCIUM SERPL-MCNC: 8.4 MG/DL (ref 8.6–10.4)
CHLORIDE SERPL-SCNC: 104 MMOL/L (ref 98–107)
CK SERPL-CCNC: 7006 U/L (ref 39–308)
CO2 SERPL-SCNC: 29 MMOL/L (ref 20–31)
CREAT SERPL-MCNC: 0.9 MG/DL (ref 0.7–1.2)
EOSINOPHIL # BLD: 0.11 K/UL (ref 0–0.44)
EOSINOPHILS RELATIVE PERCENT: 1 % (ref 1–4)
ERYTHROCYTE [DISTWIDTH] IN BLOOD BY AUTOMATED COUNT: 13.2 % (ref 11.8–14.4)
GFR, ESTIMATED: >90 ML/MIN/1.73M2
GLUCOSE BLD-MCNC: 117 MG/DL (ref 75–110)
GLUCOSE BLD-MCNC: 141 MG/DL (ref 75–110)
GLUCOSE BLD-MCNC: 142 MG/DL (ref 75–110)
GLUCOSE BLD-MCNC: 145 MG/DL (ref 75–110)
GLUCOSE BLD-MCNC: 82 MG/DL (ref 75–110)
GLUCOSE SERPL-MCNC: 140 MG/DL (ref 74–99)
HCT VFR BLD AUTO: 21.7 % (ref 40.7–50.3)
HGB BLD-MCNC: 7.5 G/DL (ref 13–17)
IMM GRANULOCYTES # BLD AUTO: 0.11 K/UL (ref 0–0.3)
IMM GRANULOCYTES NFR BLD: 1 %
LYMPHOCYTES NFR BLD: 2.46 K/UL (ref 1.1–3.7)
LYMPHOCYTES RELATIVE PERCENT: 22 % (ref 24–43)
MAGNESIUM SERPL-MCNC: 1.7 MG/DL (ref 1.6–2.6)
MCH RBC QN AUTO: 40.8 PG (ref 25.2–33.5)
MCHC RBC AUTO-ENTMCNC: 34.6 G/DL (ref 28.4–34.8)
MCV RBC AUTO: 117.9 FL (ref 82.6–102.9)
MONOCYTES NFR BLD: 0.67 K/UL (ref 0.1–1.2)
MONOCYTES NFR BLD: 6 % (ref 3–12)
MORPHOLOGY: ABNORMAL
MYOGLOBIN SERPL-MCNC: 1551 NG/ML (ref 28–72)
NEUTROPHILS NFR BLD: 70 % (ref 36–65)
NEUTS SEG NFR BLD: 7.85 K/UL (ref 1.5–8.1)
NRBC BLD-RTO: 0 PER 100 WBC
PHOSPHATE SERPL-MCNC: 2.1 MG/DL (ref 2.5–4.5)
PLATELET # BLD AUTO: 257 K/UL (ref 138–453)
PMV BLD AUTO: 10 FL (ref 8.1–13.5)
POTASSIUM SERPL-SCNC: 3.7 MMOL/L (ref 3.7–5.3)
RBC # BLD AUTO: 1.84 M/UL (ref 4.21–5.77)
SODIUM SERPL-SCNC: 140 MMOL/L (ref 136–145)
WBC OTHER # BLD: 11.2 K/UL (ref 3.5–11.3)

## 2024-06-04 PROCEDURE — 2580000003 HC RX 258

## 2024-06-04 PROCEDURE — 7100000000 HC PACU RECOVERY - FIRST 15 MIN

## 2024-06-04 PROCEDURE — 86900 BLOOD TYPING SEROLOGIC ABO: CPT

## 2024-06-04 PROCEDURE — 6370000000 HC RX 637 (ALT 250 FOR IP)

## 2024-06-04 PROCEDURE — 2500000003 HC RX 250 WO HCPCS

## 2024-06-04 PROCEDURE — 83735 ASSAY OF MAGNESIUM: CPT

## 2024-06-04 PROCEDURE — 2060000000 HC ICU INTERMEDIATE R&B

## 2024-06-04 PROCEDURE — 0JQP0ZZ REPAIR LEFT LOWER LEG SUBCUTANEOUS TISSUE AND FASCIA, OPEN APPROACH: ICD-10-PCS | Performed by: SURGERY

## 2024-06-04 PROCEDURE — 6360000002 HC RX W HCPCS: Performed by: STUDENT IN AN ORGANIZED HEALTH CARE EDUCATION/TRAINING PROGRAM

## 2024-06-04 PROCEDURE — 84100 ASSAY OF PHOSPHORUS: CPT

## 2024-06-04 PROCEDURE — 82330 ASSAY OF CALCIUM: CPT

## 2024-06-04 PROCEDURE — 3700000000 HC ANESTHESIA ATTENDED CARE: Performed by: SURGERY

## 2024-06-04 PROCEDURE — 2580000003 HC RX 258: Performed by: STUDENT IN AN ORGANIZED HEALTH CARE EDUCATION/TRAINING PROGRAM

## 2024-06-04 PROCEDURE — 36415 COLL VENOUS BLD VENIPUNCTURE: CPT

## 2024-06-04 PROCEDURE — 6360000002 HC RX W HCPCS

## 2024-06-04 PROCEDURE — 83874 ASSAY OF MYOGLOBIN: CPT

## 2024-06-04 PROCEDURE — 3600000015 HC SURGERY LEVEL 5 ADDTL 15MIN: Performed by: SURGERY

## 2024-06-04 PROCEDURE — 86901 BLOOD TYPING SEROLOGIC RH(D): CPT

## 2024-06-04 PROCEDURE — 82550 ASSAY OF CK (CPK): CPT

## 2024-06-04 PROCEDURE — 2709999900 HC NON-CHARGEABLE SUPPLY: Performed by: SURGERY

## 2024-06-04 PROCEDURE — A4216 STERILE WATER/SALINE, 10 ML: HCPCS

## 2024-06-04 PROCEDURE — 82947 ASSAY GLUCOSE BLOOD QUANT: CPT

## 2024-06-04 PROCEDURE — 2580000003 HC RX 258: Performed by: SURGERY

## 2024-06-04 PROCEDURE — 3600000005 HC SURGERY LEVEL 5 BASE: Performed by: SURGERY

## 2024-06-04 PROCEDURE — 2W1RX6Z COMPRESSION OF LEFT LOWER LEG USING PRESSURE DRESSING: ICD-10-PCS | Performed by: SURGERY

## 2024-06-04 PROCEDURE — 85025 COMPLETE CBC W/AUTO DIFF WBC: CPT

## 2024-06-04 PROCEDURE — 80048 BASIC METABOLIC PNL TOTAL CA: CPT

## 2024-06-04 PROCEDURE — 86850 RBC ANTIBODY SCREEN: CPT

## 2024-06-04 PROCEDURE — 7100000001 HC PACU RECOVERY - ADDTL 15 MIN

## 2024-06-04 PROCEDURE — C9113 INJ PANTOPRAZOLE SODIUM, VIA: HCPCS

## 2024-06-04 PROCEDURE — 3700000001 HC ADD 15 MINUTES (ANESTHESIA): Performed by: SURGERY

## 2024-06-04 RX ORDER — PROPOFOL 10 MG/ML
INJECTION, EMULSION INTRAVENOUS PRN
Status: DISCONTINUED | OUTPATIENT
Start: 2024-06-04 | End: 2024-06-04 | Stop reason: SDUPTHER

## 2024-06-04 RX ORDER — FENTANYL CITRATE 50 UG/ML
INJECTION, SOLUTION INTRAMUSCULAR; INTRAVENOUS PRN
Status: DISCONTINUED | OUTPATIENT
Start: 2024-06-04 | End: 2024-06-04 | Stop reason: SDUPTHER

## 2024-06-04 RX ORDER — CEFAZOLIN SODIUM 2 G/50ML
SOLUTION INTRAVENOUS PRN
Status: DISCONTINUED | OUTPATIENT
Start: 2024-06-04 | End: 2024-06-04 | Stop reason: SDUPTHER

## 2024-06-04 RX ORDER — SODIUM CHLORIDE, SODIUM LACTATE, POTASSIUM CHLORIDE, CALCIUM CHLORIDE 600; 310; 30; 20 MG/100ML; MG/100ML; MG/100ML; MG/100ML
INJECTION, SOLUTION INTRAVENOUS CONTINUOUS PRN
Status: DISCONTINUED | OUTPATIENT
Start: 2024-06-04 | End: 2024-06-04 | Stop reason: SDUPTHER

## 2024-06-04 RX ORDER — BISACODYL 10 MG
10 SUPPOSITORY, RECTAL RECTAL DAILY
Status: DISCONTINUED | OUTPATIENT
Start: 2024-06-04 | End: 2024-06-09 | Stop reason: HOSPADM

## 2024-06-04 RX ORDER — MAGNESIUM SULFATE IN WATER 40 MG/ML
2000 INJECTION, SOLUTION INTRAVENOUS ONCE
Status: COMPLETED | OUTPATIENT
Start: 2024-06-04 | End: 2024-06-04

## 2024-06-04 RX ORDER — PHENYLEPHRINE HCL IN 0.9% NACL 1 MG/10 ML
SYRINGE (ML) INTRAVENOUS PRN
Status: DISCONTINUED | OUTPATIENT
Start: 2024-06-04 | End: 2024-06-04 | Stop reason: SDUPTHER

## 2024-06-04 RX ORDER — LIDOCAINE HYDROCHLORIDE 10 MG/ML
INJECTION, SOLUTION EPIDURAL; INFILTRATION; INTRACAUDAL; PERINEURAL PRN
Status: DISCONTINUED | OUTPATIENT
Start: 2024-06-04 | End: 2024-06-04 | Stop reason: SDUPTHER

## 2024-06-04 RX ORDER — ROCURONIUM BROMIDE 10 MG/ML
INJECTION, SOLUTION INTRAVENOUS PRN
Status: DISCONTINUED | OUTPATIENT
Start: 2024-06-04 | End: 2024-06-04 | Stop reason: SDUPTHER

## 2024-06-04 RX ORDER — DEXAMETHASONE SODIUM PHOSPHATE 10 MG/ML
INJECTION, SOLUTION INTRAMUSCULAR; INTRAVENOUS PRN
Status: DISCONTINUED | OUTPATIENT
Start: 2024-06-04 | End: 2024-06-04 | Stop reason: SDUPTHER

## 2024-06-04 RX ORDER — ONDANSETRON 2 MG/ML
INJECTION INTRAMUSCULAR; INTRAVENOUS PRN
Status: DISCONTINUED | OUTPATIENT
Start: 2024-06-04 | End: 2024-06-04 | Stop reason: SDUPTHER

## 2024-06-04 RX ORDER — MAGNESIUM HYDROXIDE 1200 MG/15ML
LIQUID ORAL CONTINUOUS PRN
Status: COMPLETED | OUTPATIENT
Start: 2024-06-04 | End: 2024-06-04

## 2024-06-04 RX ADMIN — HEPARIN SODIUM 5000 UNITS: 5000 INJECTION INTRAVENOUS; SUBCUTANEOUS at 04:38

## 2024-06-04 RX ADMIN — Medication 200 MCG: at 07:43

## 2024-06-04 RX ADMIN — DEXAMETHASONE SODIUM PHOSPHATE 4 MG: 10 INJECTION INTRAMUSCULAR; INTRAVENOUS at 07:36

## 2024-06-04 RX ADMIN — OXYCODONE HYDROCHLORIDE 5 MG: 5 TABLET ORAL at 02:19

## 2024-06-04 RX ADMIN — SODIUM CHLORIDE: 9 INJECTION, SOLUTION INTRAVENOUS at 04:40

## 2024-06-04 RX ADMIN — METOPROLOL TARTRATE 25 MG: 25 TABLET, FILM COATED ORAL at 09:02

## 2024-06-04 RX ADMIN — GABAPENTIN 300 MG: 300 CAPSULE ORAL at 04:39

## 2024-06-04 RX ADMIN — POTASSIUM CHLORIDE, DEXTROSE MONOHYDRATE AND SODIUM CHLORIDE: 150; 5; 450 INJECTION, SOLUTION INTRAVENOUS at 19:49

## 2024-06-04 RX ADMIN — METOPROLOL TARTRATE 25 MG: 25 TABLET, FILM COATED ORAL at 20:30

## 2024-06-04 RX ADMIN — GABAPENTIN 300 MG: 300 CAPSULE ORAL at 13:57

## 2024-06-04 RX ADMIN — Medication 100 MCG: at 07:52

## 2024-06-04 RX ADMIN — Medication 100 MCG: at 08:04

## 2024-06-04 RX ADMIN — SUGAMMADEX 200 MG: 100 INJECTION, SOLUTION INTRAVENOUS at 08:02

## 2024-06-04 RX ADMIN — FENTANYL CITRATE 100 MCG: 50 INJECTION, SOLUTION INTRAMUSCULAR; INTRAVENOUS at 07:32

## 2024-06-04 RX ADMIN — BISACODYL 10 MG: 10 SUPPOSITORY RECTAL at 09:02

## 2024-06-04 RX ADMIN — SODIUM CHLORIDE, PRESERVATIVE FREE 10 ML: 5 INJECTION INTRAVENOUS at 20:30

## 2024-06-04 RX ADMIN — POTASSIUM CHLORIDE, DEXTROSE MONOHYDRATE AND SODIUM CHLORIDE: 150; 5; 450 INJECTION, SOLUTION INTRAVENOUS at 05:17

## 2024-06-04 RX ADMIN — PROPOFOL 140 MG: 10 INJECTION, EMULSION INTRAVENOUS at 07:32

## 2024-06-04 RX ADMIN — SODIUM CHLORIDE, POTASSIUM CHLORIDE, SODIUM LACTATE AND CALCIUM CHLORIDE: 600; 310; 30; 20 INJECTION, SOLUTION INTRAVENOUS at 08:04

## 2024-06-04 RX ADMIN — LIDOCAINE HYDROCHLORIDE 50 MG: 10 INJECTION, SOLUTION EPIDURAL; INFILTRATION; INTRACAUDAL; PERINEURAL at 07:32

## 2024-06-04 RX ADMIN — GABAPENTIN 300 MG: 300 CAPSULE ORAL at 20:30

## 2024-06-04 RX ADMIN — POTASSIUM PHOSPHATE, MONOBASIC AND POTASSIUM PHOSPHATE, DIBASIC 20 MMOL: 224; 236 INJECTION, SOLUTION, CONCENTRATE INTRAVENOUS at 05:39

## 2024-06-04 RX ADMIN — METHOCARBAMOL 750 MG: 750 TABLET ORAL at 02:19

## 2024-06-04 RX ADMIN — HEPARIN SODIUM 5000 UNITS: 5000 INJECTION INTRAVENOUS; SUBCUTANEOUS at 23:09

## 2024-06-04 RX ADMIN — ONDANSETRON 4 MG: 2 INJECTION INTRAMUSCULAR; INTRAVENOUS at 07:56

## 2024-06-04 RX ADMIN — PANTOPRAZOLE SODIUM 40 MG: 40 INJECTION, POWDER, FOR SOLUTION INTRAVENOUS at 09:02

## 2024-06-04 RX ADMIN — MAGNESIUM SULFATE HEPTAHYDRATE 2000 MG: 40 INJECTION, SOLUTION INTRAVENOUS at 04:41

## 2024-06-04 RX ADMIN — METHOCARBAMOL 750 MG: 750 TABLET ORAL at 20:30

## 2024-06-04 RX ADMIN — Medication 200 MCG: at 07:55

## 2024-06-04 RX ADMIN — SODIUM CHLORIDE, POTASSIUM CHLORIDE, SODIUM LACTATE AND CALCIUM CHLORIDE: 600; 310; 30; 20 INJECTION, SOLUTION INTRAVENOUS at 07:27

## 2024-06-04 RX ADMIN — METHOCARBAMOL 750 MG: 750 TABLET ORAL at 13:57

## 2024-06-04 RX ADMIN — HEPARIN SODIUM 5000 UNITS: 5000 INJECTION INTRAVENOUS; SUBCUTANEOUS at 13:57

## 2024-06-04 RX ADMIN — Medication 2000 MG: at 02:19

## 2024-06-04 RX ADMIN — ASPIRIN 81 MG 81 MG: 81 TABLET ORAL at 09:02

## 2024-06-04 RX ADMIN — ROCURONIUM BROMIDE 50 MG: 10 INJECTION, SOLUTION INTRAVENOUS at 07:32

## 2024-06-04 RX ADMIN — CEFAZOLIN SODIUM 2000 MG: 2 SOLUTION INTRAVENOUS at 07:40

## 2024-06-04 RX ADMIN — Medication 10 MG: at 20:30

## 2024-06-04 ASSESSMENT — PAIN SCALES - GENERAL
PAINLEVEL_OUTOF10: 5
PAINLEVEL_OUTOF10: 6
PAINLEVEL_OUTOF10: 8

## 2024-06-04 ASSESSMENT — PAIN DESCRIPTION - LOCATION: LOCATION: LEG

## 2024-06-04 ASSESSMENT — PAIN DESCRIPTION - ORIENTATION
ORIENTATION: LEFT
ORIENTATION: LEFT

## 2024-06-04 NOTE — ANESTHESIA POSTPROCEDURE EVALUATION
Department of Anesthesiology  Postprocedure Note    Patient: David Vaca  MRN: [id number]  YOB: 1965  Date of evaluation: 6/4/2024    Procedure Summary       Date: 06/04/24 Room / Location: [place] [address] / [facility]    Anesthesia Start: 0727 Anesthesia Stop: 0817    Procedure: LOWER EXTREMITY WOUND VAC EXCHANGE / FASCIOTOMY CLOSURE (Left: Leg Lower) Diagnosis:       Ischemia of left lower extremity      (Ischemia of left lower extremity [I99.8])    Surgeons: Yen Montague MD Responsible Provider: Lizeth Prabhakar MD    Anesthesia Type: general ASA Status: 3            Anesthesia Type: No value filed.    Jaskaran Phase I: Jaskaran Score: 8    Jaskaran Phase II:      Anesthesia Post Evaluation    Patient location during evaluation: ICU  Patient participation: complete - patient participated  Level of consciousness: awake and alert  Pain score: 3  Airway patency: patent  Nausea & Vomiting: no vomiting and no nausea  Cardiovascular status: hemodynamically stable  Respiratory status: acceptable  Hydration status: stable  Pain management: adequate    No notable events documented.

## 2024-06-04 NOTE — ANESTHESIA PRE PROCEDURE
Department of Anesthesiology  Preprocedure Note       Name:  David Vaca   Age:  58 y.o.  :  1965                                          MRN:  [id number]         Date:  2024      Surgeon: Surgeon(s):  Yen Montague MD    Procedure: Procedure(s):  LOWER EXTREMITY WOUND VAC APPLICATION, POSSIBLE FASCIOTOMY    Medications prior to admission:   Prior to Admission medications    Medication Sig Start Date End Date Taking? Authorizing Provider   FLUoxetine (PROZAC) 20 MG capsule Take 1 capsule by mouth daily 5/15/24   Abelino Bond MD   cyclobenzaprine (FLEXERIL) 10 MG tablet take 1 tablet by mouth twice a day if needed for SPASM(S) 24   Abelino Bond MD   traMADol (ULTRAM) 50 MG tablet Take 1 tablet by mouth every 6 hours as needed for Pain. Max Daily Amount: 200 mg    Abelino Bond MD   loratadine (CLARITIN) 10 MG tablet Take by mouth 3/15/24   Abelino Bond MD   metoprolol tartrate (LOPRESSOR) 25 MG tablet Take 1 tablet by mouth twice daily 3/25/24   Maikel Carroll MD   isosorbide dinitrate (ISORDIL) 20 MG tablet Take 1 tablet by mouth twice daily 3/25/24   Makiel Carroll MD   tiZANidine (ZANAFLEX) 4 MG tablet Take 1 tablet by mouth 2 times daily as needed (spasms) 23  Renetta Longoria APRN - CNP   OLANZapine (ZYPREXA) 10 MG tablet Take 1 tablet by mouth nightly    Abelino Bond MD   atorvastatin (LIPITOR) 40 MG tablet Take 1 tablet by mouth daily 23   Mortimer, Connor, PA-C   fenofibrate (TRICOR) 145 MG tablet Take 1 tablet by mouth daily 23   Mortimer, Connor, PA-C   pantoprazole (PROTONIX) 40 MG tablet Take 1 tablet by mouth every morning (before breakfast) 23   Mortimer, Connor, PA-C   tiotropium-olodaterol (STIOLTO) 2.5-2.5 MCG/ACT AERS Inhale 2 puffs into the lungs daily    Abelino Bond MD   fosamprenavir (LEXIVA) 700 MG tablet Take 2 tablets by mouth 2 times daily    Abelino Bond MD

## 2024-06-05 LAB
ANION GAP SERPL CALCULATED.3IONS-SCNC: 4 MMOL/L (ref 9–16)
BUN SERPL-MCNC: 15 MG/DL (ref 6–20)
CALCIUM SERPL-MCNC: 8.3 MG/DL (ref 8.6–10.4)
CHLORIDE SERPL-SCNC: 105 MMOL/L (ref 98–107)
CO2 SERPL-SCNC: 30 MMOL/L (ref 20–31)
CREAT SERPL-MCNC: 0.9 MG/DL (ref 0.7–1.2)
GFR, ESTIMATED: >90 ML/MIN/1.73M2
GLUCOSE BLD-MCNC: 102 MG/DL (ref 75–110)
GLUCOSE BLD-MCNC: 109 MG/DL (ref 75–110)
GLUCOSE BLD-MCNC: 113 MG/DL (ref 75–110)
GLUCOSE BLD-MCNC: 143 MG/DL (ref 75–110)
GLUCOSE BLD-MCNC: 144 MG/DL (ref 75–110)
GLUCOSE SERPL-MCNC: 141 MG/DL (ref 74–99)
POTASSIUM SERPL-SCNC: 4.4 MMOL/L (ref 3.7–5.3)
SODIUM SERPL-SCNC: 139 MMOL/L (ref 136–145)

## 2024-06-05 PROCEDURE — 2060000000 HC ICU INTERMEDIATE R&B

## 2024-06-05 PROCEDURE — 6360000002 HC RX W HCPCS

## 2024-06-05 PROCEDURE — A4216 STERILE WATER/SALINE, 10 ML: HCPCS

## 2024-06-05 PROCEDURE — 82947 ASSAY GLUCOSE BLOOD QUANT: CPT

## 2024-06-05 PROCEDURE — 2700000000 HC OXYGEN THERAPY PER DAY

## 2024-06-05 PROCEDURE — C9113 INJ PANTOPRAZOLE SODIUM, VIA: HCPCS

## 2024-06-05 PROCEDURE — 80048 BASIC METABOLIC PNL TOTAL CA: CPT

## 2024-06-05 PROCEDURE — 6370000000 HC RX 637 (ALT 250 FOR IP)

## 2024-06-05 PROCEDURE — 2500000003 HC RX 250 WO HCPCS

## 2024-06-05 PROCEDURE — 2580000003 HC RX 258

## 2024-06-05 PROCEDURE — 94761 N-INVAS EAR/PLS OXIMETRY MLT: CPT

## 2024-06-05 PROCEDURE — 36415 COLL VENOUS BLD VENIPUNCTURE: CPT

## 2024-06-05 PROCEDURE — 97530 THERAPEUTIC ACTIVITIES: CPT

## 2024-06-05 PROCEDURE — 97110 THERAPEUTIC EXERCISES: CPT

## 2024-06-05 RX ADMIN — PANTOPRAZOLE SODIUM 40 MG: 40 INJECTION, POWDER, FOR SOLUTION INTRAVENOUS at 08:55

## 2024-06-05 RX ADMIN — METHOCARBAMOL 750 MG: 750 TABLET ORAL at 14:08

## 2024-06-05 RX ADMIN — GABAPENTIN 300 MG: 300 CAPSULE ORAL at 06:07

## 2024-06-05 RX ADMIN — Medication 10 MG: at 20:00

## 2024-06-05 RX ADMIN — METHOCARBAMOL 750 MG: 750 TABLET ORAL at 20:00

## 2024-06-05 RX ADMIN — OXYCODONE HYDROCHLORIDE 5 MG: 5 TABLET ORAL at 06:06

## 2024-06-05 RX ADMIN — POTASSIUM CHLORIDE, DEXTROSE MONOHYDRATE AND SODIUM CHLORIDE: 150; 5; 450 INJECTION, SOLUTION INTRAVENOUS at 12:30

## 2024-06-05 RX ADMIN — HEPARIN SODIUM 5000 UNITS: 5000 INJECTION INTRAVENOUS; SUBCUTANEOUS at 14:08

## 2024-06-05 RX ADMIN — GABAPENTIN 300 MG: 300 CAPSULE ORAL at 14:08

## 2024-06-05 RX ADMIN — METHOCARBAMOL 750 MG: 750 TABLET ORAL at 08:55

## 2024-06-05 RX ADMIN — METOPROLOL TARTRATE 25 MG: 25 TABLET, FILM COATED ORAL at 08:55

## 2024-06-05 RX ADMIN — ASPIRIN 81 MG 81 MG: 81 TABLET ORAL at 08:55

## 2024-06-05 RX ADMIN — METOPROLOL TARTRATE 25 MG: 25 TABLET, FILM COATED ORAL at 20:00

## 2024-06-05 RX ADMIN — METHOCARBAMOL 750 MG: 750 TABLET ORAL at 02:05

## 2024-06-05 RX ADMIN — GABAPENTIN 300 MG: 300 CAPSULE ORAL at 20:00

## 2024-06-05 RX ADMIN — HEPARIN SODIUM 5000 UNITS: 5000 INJECTION INTRAVENOUS; SUBCUTANEOUS at 20:01

## 2024-06-05 RX ADMIN — SODIUM CHLORIDE, PRESERVATIVE FREE 10 ML: 5 INJECTION INTRAVENOUS at 08:56

## 2024-06-05 RX ADMIN — HEPARIN SODIUM 5000 UNITS: 5000 INJECTION INTRAVENOUS; SUBCUTANEOUS at 06:06

## 2024-06-05 RX ADMIN — POTASSIUM CHLORIDE, DEXTROSE MONOHYDRATE AND SODIUM CHLORIDE: 150; 5; 450 INJECTION, SOLUTION INTRAVENOUS at 03:57

## 2024-06-05 ASSESSMENT — PAIN DESCRIPTION - LOCATION
LOCATION: LEG

## 2024-06-05 ASSESSMENT — PAIN SCALES - GENERAL
PAINLEVEL_OUTOF10: 10
PAINLEVEL_OUTOF10: 9
PAINLEVEL_OUTOF10: 0
PAINLEVEL_OUTOF10: 10
PAINLEVEL_OUTOF10: 10

## 2024-06-05 ASSESSMENT — PAIN DESCRIPTION - ORIENTATION
ORIENTATION: RIGHT
ORIENTATION: RIGHT
ORIENTATION: LEFT

## 2024-06-05 ASSESSMENT — PAIN DESCRIPTION - DESCRIPTORS: DESCRIPTORS: ACHING

## 2024-06-05 NOTE — CARE COORDINATION
Met with patient to discuss transitional planning. He would like to go to [facility] at [facility]. Referral sent. Provided SNF list as a backup option    1235 spoke to Radha from [facility]. They will need a PM&R consult. PS sent to Dr Navarro

## 2024-06-06 LAB
ANION GAP SERPL CALCULATED.3IONS-SCNC: 9 MMOL/L (ref 9–16)
BASOPHILS # BLD: 0 K/UL (ref 0–0.2)
BASOPHILS NFR BLD: 0 % (ref 0–2)
BUN SERPL-MCNC: 13 MG/DL (ref 6–20)
CALCIUM SERPL-MCNC: 8.5 MG/DL (ref 8.6–10.4)
CHLORIDE SERPL-SCNC: 104 MMOL/L (ref 98–107)
CO2 SERPL-SCNC: 23 MMOL/L (ref 20–31)
CREAT SERPL-MCNC: 0.9 MG/DL (ref 0.7–1.2)
EOSINOPHIL # BLD: 0.2 K/UL (ref 0–0.4)
EOSINOPHILS RELATIVE PERCENT: 2 % (ref 1–4)
ERYTHROCYTE [DISTWIDTH] IN BLOOD BY AUTOMATED COUNT: 13.1 % (ref 11.8–14.4)
GFR, ESTIMATED: >90 ML/MIN/1.73M2
GLUCOSE BLD-MCNC: 141 MG/DL (ref 75–110)
GLUCOSE BLD-MCNC: 207 MG/DL (ref 75–110)
GLUCOSE BLD-MCNC: 209 MG/DL (ref 75–110)
GLUCOSE BLD-MCNC: 88 MG/DL (ref 75–110)
GLUCOSE BLD-MCNC: 90 MG/DL (ref 75–110)
GLUCOSE BLD-MCNC: 92 MG/DL (ref 75–110)
GLUCOSE SERPL-MCNC: 90 MG/DL (ref 74–99)
HCT VFR BLD AUTO: 24.6 % (ref 40.7–50.3)
HGB BLD-MCNC: 8 G/DL (ref 13–17)
IMM GRANULOCYTES # BLD AUTO: 0.2 K/UL (ref 0–0.3)
IMM GRANULOCYTES NFR BLD: 2 %
LYMPHOCYTES NFR BLD: 3.14 K/UL (ref 1–4.8)
LYMPHOCYTES RELATIVE PERCENT: 32 % (ref 24–44)
MCH RBC QN AUTO: 39.6 PG (ref 25.2–33.5)
MCHC RBC AUTO-ENTMCNC: 32.5 G/DL (ref 28.4–34.8)
MCV RBC AUTO: 121.8 FL (ref 82.6–102.9)
MONOCYTES NFR BLD: 0.88 K/UL (ref 0.1–0.8)
MONOCYTES NFR BLD: 9 % (ref 1–7)
MORPHOLOGY: ABNORMAL
NEUTROPHILS NFR BLD: 55 % (ref 36–66)
NEUTS SEG NFR BLD: 5.38 K/UL (ref 1.8–7.7)
NRBC BLD-RTO: 0 PER 100 WBC
PLATELET # BLD AUTO: 252 K/UL (ref 138–453)
PMV BLD AUTO: 9.2 FL (ref 8.1–13.5)
POTASSIUM SERPL-SCNC: 4.5 MMOL/L (ref 3.7–5.3)
RBC # BLD AUTO: 2.02 M/UL (ref 4.21–5.77)
SODIUM SERPL-SCNC: 136 MMOL/L (ref 136–145)
WBC OTHER # BLD: 9.8 K/UL (ref 3.5–11.3)

## 2024-06-06 PROCEDURE — 6360000002 HC RX W HCPCS

## 2024-06-06 PROCEDURE — 97110 THERAPEUTIC EXERCISES: CPT

## 2024-06-06 PROCEDURE — 2580000003 HC RX 258

## 2024-06-06 PROCEDURE — 82947 ASSAY GLUCOSE BLOOD QUANT: CPT

## 2024-06-06 PROCEDURE — 6370000000 HC RX 637 (ALT 250 FOR IP)

## 2024-06-06 PROCEDURE — A4216 STERILE WATER/SALINE, 10 ML: HCPCS

## 2024-06-06 PROCEDURE — 80048 BASIC METABOLIC PNL TOTAL CA: CPT

## 2024-06-06 PROCEDURE — 85025 COMPLETE CBC W/AUTO DIFF WBC: CPT

## 2024-06-06 PROCEDURE — 36415 COLL VENOUS BLD VENIPUNCTURE: CPT

## 2024-06-06 PROCEDURE — 97535 SELF CARE MNGMENT TRAINING: CPT

## 2024-06-06 PROCEDURE — 2060000000 HC ICU INTERMEDIATE R&B

## 2024-06-06 PROCEDURE — C9113 INJ PANTOPRAZOLE SODIUM, VIA: HCPCS

## 2024-06-06 PROCEDURE — 99222 1ST HOSP IP/OBS MODERATE 55: CPT | Performed by: PHYSICAL MEDICINE & REHABILITATION

## 2024-06-06 PROCEDURE — 97530 THERAPEUTIC ACTIVITIES: CPT

## 2024-06-06 RX ADMIN — PANTOPRAZOLE SODIUM 40 MG: 40 INJECTION, POWDER, FOR SOLUTION INTRAVENOUS at 10:17

## 2024-06-06 RX ADMIN — METHOCARBAMOL 750 MG: 750 TABLET ORAL at 02:25

## 2024-06-06 RX ADMIN — Medication 10 MG: at 21:01

## 2024-06-06 RX ADMIN — METOPROLOL TARTRATE 25 MG: 25 TABLET, FILM COATED ORAL at 10:18

## 2024-06-06 RX ADMIN — HEPARIN SODIUM 5000 UNITS: 5000 INJECTION INTRAVENOUS; SUBCUTANEOUS at 05:48

## 2024-06-06 RX ADMIN — HEPARIN SODIUM 5000 UNITS: 5000 INJECTION INTRAVENOUS; SUBCUTANEOUS at 21:01

## 2024-06-06 RX ADMIN — METOPROLOL TARTRATE 25 MG: 25 TABLET, FILM COATED ORAL at 21:01

## 2024-06-06 RX ADMIN — GABAPENTIN 300 MG: 300 CAPSULE ORAL at 18:12

## 2024-06-06 RX ADMIN — METHOCARBAMOL 750 MG: 750 TABLET ORAL at 18:12

## 2024-06-06 RX ADMIN — GABAPENTIN 300 MG: 300 CAPSULE ORAL at 05:48

## 2024-06-06 RX ADMIN — GABAPENTIN 300 MG: 300 CAPSULE ORAL at 21:01

## 2024-06-06 RX ADMIN — SODIUM CHLORIDE, PRESERVATIVE FREE 10 ML: 5 INJECTION INTRAVENOUS at 10:18

## 2024-06-06 RX ADMIN — HEPARIN SODIUM 5000 UNITS: 5000 INJECTION INTRAVENOUS; SUBCUTANEOUS at 18:13

## 2024-06-06 RX ADMIN — SODIUM CHLORIDE, PRESERVATIVE FREE 10 ML: 5 INJECTION INTRAVENOUS at 21:01

## 2024-06-06 RX ADMIN — METHOCARBAMOL 750 MG: 750 TABLET ORAL at 05:48

## 2024-06-06 RX ADMIN — ASPIRIN 81 MG 81 MG: 81 TABLET ORAL at 10:18

## 2024-06-06 RX ADMIN — INSULIN LISPRO 4 UNITS: 100 INJECTION, SOLUTION INTRAVENOUS; SUBCUTANEOUS at 19:33

## 2024-06-06 ASSESSMENT — PAIN SCALES - GENERAL
PAINLEVEL_OUTOF10: 0

## 2024-06-06 NOTE — CARE COORDINATION
Met with patient and sister to discuss transitional planning. Updated on referral to [facility] and PM&R consult. Patient would like [facility] or [facility] for SNF options. Referrals sent    1441 voicemail left for Jan from [facility] to notify that Dr Quick evaluated patient    1509 received call from Jan from [facility]. They are able to accept    1720 patient updated. Precert submitted in [facility]. Authorization #[id number]

## 2024-06-06 NOTE — CONSULTS
ICU CONSULT NOTE    PATIENT NAME: David Vaca  MEDICAL RECORD NO. [id number]  DATE: 6/2/2024    HD: # 2  S/p Aorto-bifemoral bypass with infra-renal clamp, lower extremity fasciotomy, LLE thrombectomy, Urology consulted intra-op for cystoscopy and alejandre placement    ACUTE DIAGNOSES/PLAN  Neuro:  AOX2, following commands  Precedex, wean as tolerated    CV  Hemodynamically normal, was hypertensive  Cardene prn for SBP <160 - OFF   Pulse checks per protocol  ASA daily, ok for DVT ppx    Pulm  Extubated 6/2    GI/Nutrition  Strict NPO ok for meds, NG tube to LIWS  Protonix for prophylaxis    Renal/lytes  Alejandre in place, strict intake and output  Urology consulted intra-op: leave alejandre in place for 2 weeks  Na 139, K 3.9  BUN 18, Cr 1.4 (elevated, baseline 1.0)  Daily labs    Heme  DVT prophylaxis - subQ heparin   Hgb 7.6  Daily CBC    7.   Endocrine        HDSS     8.   Musculoskeletal  LLE fasciotomy incisions, wet-to-dry dressing changes daily  Return to [place] timing TBD  [place] [address], Audi 7226    9.  Skin        Midline incision with staples    10. Micro  Laina-op antibiotics for 3 more days per Dr. Montague    Lines  Right IJ CVC, NG tube, alejandre, R radial arterial line     CHECKLIST    CAM-ICU RASS: 0  RESTRAINTS: NO  IVF: 125 mL/hr D5 1/2NS w 20mEq Kcl per Dr. Montague   NUTRITION:  NPO  ANTIBIOTICS: Ancef x 3 days  GI: protonix  DVT: heparin subQ  GLYCEMIC CONTROL: HDSS  HOB >45: yes  MOBILITY: bedrest   SBT: Daily  IS: N/A  Wound care: daily packing changes LLE, midline wound dressing to be changed by resident POD 2    Chief Complaint: intubated and sedated    SUBJECTIVE    David Vaca is a 58 y.o. gentleman who presents to the ED today for as a transfer from [facility], with complaints of left lower extremity pain around the ankle.  Patient states this occurred suddenly this morning when he was on a walk and all of a sudden his legs gave out causing him to fall.  Patient states the 
  Urology Consult      Patient:  David Vaca  MRN: [id number]  YOB: 1965    CHIEF COMPLAINT: Difficult Valle catheter placement    HISTORY OF PRESENT ILLNESS:   The patient is a 58 y.o. male who presents with left ankle pain.  Review the chart shows that he does not have any urological history within the EMR.    Urology was consulted due to difficulty Valle catheterization.  Surgical team attempted to place a Valle catheter but was unable to do so.  Urology was consulted.    Review of systems deferred due to patient being intubated and sedated.    Urology was able to place 18 Hungarian Valle catheter.  Please refer to procedure note for further details.    Patient's old records, notes and chart reviewed and summarized above.    Past Medical History:    Past Medical History:   Diagnosis Date    Arthritis 06/01/2016    LUMBAR SPINE     Depression     Dysphagia     GERD (gastroesophageal reflux disease)     History of nephrolithiasis     HIV (human immunodeficiency virus infection) (HCC)     Hyperlipidemia        Past Surgical History:    Past Surgical History:   Procedure Laterality Date    CARDIAC CATHETERIZATION  2015???    OK    COLONOSCOPY  2016    COLONOSCOPY  2021    Dr Rutledge     EGD  2021    ENDOSCOPY, COLON, DIAGNOSTIC      egd with dilatation    FOOT SURGERY  1970's    left    HERNIA REPAIR Left 4/26/2024    Robotic Left Inguinal Hernia Repair and Right Inguinal Hernia Repair both with mesh performed by Jana Trent MD at [facility] OR    LUMBAR SPINE SURGERY Right 4/23/2019    Lumbar RFA  Right side first L3-4,4-5,5-S1 performed by Robert Diza MD at [facility] OR    LUMBAR SPINE SURGERY Left 5/21/2019    Lumbar RFA  Left side L3-4,4-5,5-S1 performed by Robert Diaz MD at [facility] OR    NERVE BLOCK LUMB FACET LEVEL 1 BILATERAL Bilateral 7/18/2017    SI MBB BILATERAL performed by Robert Diaz MD at [facility] OR    NERVE SURGERY Bilateral 
Please see consult note already written as H&P from writer.     - Timmy Villalobos, DO    
(Comment)  Fluid (ml/day): per MD    Nutrition Diagnosis:   Inadequate oral intake related to impaired respiratory function as evidenced by NPO or clear liquid status due to medical condition    Nutrition Interventions:   Food and/or Nutrient Delivery: Continue NPO  Nutrition Education/Counseling: No recommendation at this time  Coordination of Nutrition Care: Continue to monitor while inpatient       Goals:  Previous Goal Met:  (goal set)  Goals: Meet at least 75% of estimated needs, prior to discharge       Nutrition Monitoring and Evaluation:   Behavioral-Environmental Outcomes: None Identified  Food/Nutrient Intake Outcomes: Diet Advancement/Tolerance  Physical Signs/Symptoms Outcomes: GI Status, Weight, Skin, Biochemical Data, Nutrition Focused Physical Findings, Hemodynamic Status, Fluid Status or Edema    Discharge Planning:    Too soon to determine     Monique Vale MS, RD, LD  Contact: [phone] (weekend RD phone)    
Comments: RLE + PT pulse  LLE +DP pulse  Pulmonary:      Comments: Mechanically ventilated  Abdominal:      Comments: Midline dressing in place clean, dry and intact   Musculoskeletal:      Cervical back: Normal range of motion. No rigidity.      Comments: LLE with four compartment fasciotomy incisions   Skin:     General: Skin is warm and dry.   Neurological:      Comments: sedated       LAB:  CBC:   Recent Labs     05/31/24  1340 05/31/24 2152 06/01/24 0128 06/01/24  0200   WBC 16.3*  --   --   --    HGB 12.3* 11.1* 8.5* 8.8*   HCT 33.7* 34.2* 26.4* 27.4*   .8*  --   --   --      --   --   --      BMP:   Recent Labs     05/31/24  1340 05/31/24  1853 05/31/24 2152 06/01/24 0128 06/01/24  0200   *  --  134* 137 139   K 3.8  --  3.2* 3.4* 3.9   CL 97*  --   --   --   --    CO2 16*  --   --   --   --    BUN 20  --   --   --   --    CREATININE 1.2 1.0  --   --   --    GLUCOSE 112*  --   --   --   --          RADIOLOGY:  CXR:   XR CHEST PORTABLE    (Results Pending)   XR CHEST PORTABLE    (Results Pending)         Brinda Goddard DO  6/1/2024, 3:32 AM    
placement continue Valle for 2 weeks, urology to evaluate  Rhabdomyolysis left lower extremity fasciotomy-wound VAC and wound care  Ileus-patient with positive BM today  HIV per past medical history  Hyperlipidemia-hypertension-metoprolol, noted Lipitor on hold  Pain-Roxicodone, Robaxin  Depression-Prozac on hold  Anemia hemoglobin 8.0    Recommendations:  1. Diagnosis: Debility secondary aortobifemoral bypass secondary to bilateral thrombus with rhabdomyolysis and left lower extremity fasciotomy  2. Therapy: Min assist lower extremities otherwise standby assist, min assist ambulate 12 feet  3. Medical  Necessity: As above  4. Support: Clarify lives alone  5. Rehab recommendation: Would benefit from acute inpatient rehabilitation when medically ready to improve to modified independent level    In my opinion the patient will require acute inpatient rehabilitation and meets criteria for [facility] level care once medically stable per primary and consulting services. Anticipate he/she will be able to tolerate 3 hours of therapy per day or 900 minutes per week in rehabilitation. The patient requires multidisciplinary rehabilitation treatment including medical management by a PM&R physician, 24 hour rehabilitation nursing, Physical/Occupational therapy,, rehabilitation social work, and nutrition services. Patient and family also require education in post-hospital precautions and home exercise routine, adaptive techniques and deficit compensation strategies, strengthening and conditioning, equipment prescription and instructions in use. Provision of services in a less intensive environment risks significant complications and more limited functional outcomes.     6. DVT proph: Subcu heparin    It was my pleasure to evaluate David Vaca today.  Please call with questions.    Gregory Navarro MD          This note is created with the assistance of a speech recognition program.  While intending to generate a document that actually

## 2024-06-07 ENCOUNTER — ANESTHESIA (OUTPATIENT)
Dept: OPERATING ROOM | Age: 59
End: 2024-06-07
Payer: MEDICARE

## 2024-06-07 ENCOUNTER — ANESTHESIA EVENT (OUTPATIENT)
Dept: OPERATING ROOM | Age: 59
End: 2024-06-07
Payer: MEDICARE

## 2024-06-07 LAB
ANION GAP SERPL CALCULATED.3IONS-SCNC: 10 MMOL/L (ref 9–16)
BUN SERPL-MCNC: 12 MG/DL (ref 6–20)
CALCIUM SERPL-MCNC: 8.4 MG/DL (ref 8.6–10.4)
CHLORIDE SERPL-SCNC: 100 MMOL/L (ref 98–107)
CO2 SERPL-SCNC: 24 MMOL/L (ref 20–31)
CREAT SERPL-MCNC: 0.8 MG/DL (ref 0.7–1.2)
GFR, ESTIMATED: >90 ML/MIN/1.73M2
GLUCOSE BLD-MCNC: 115 MG/DL (ref 75–110)
GLUCOSE BLD-MCNC: 116 MG/DL (ref 75–110)
GLUCOSE BLD-MCNC: 118 MG/DL (ref 75–110)
GLUCOSE BLD-MCNC: 122 MG/DL (ref 75–110)
GLUCOSE BLD-MCNC: 154 MG/DL (ref 75–110)
GLUCOSE BLD-MCNC: 247 MG/DL (ref 75–110)
GLUCOSE SERPL-MCNC: 113 MG/DL (ref 74–99)
POTASSIUM SERPL-SCNC: 4 MMOL/L (ref 3.7–5.3)
SODIUM SERPL-SCNC: 134 MMOL/L (ref 136–145)

## 2024-06-07 PROCEDURE — 2580000003 HC RX 258

## 2024-06-07 PROCEDURE — 0J9C3ZZ DRAINAGE OF PELVIC REGION SUBCUTANEOUS TISSUE AND FASCIA, PERCUTANEOUS APPROACH: ICD-10-PCS | Performed by: SURGERY

## 2024-06-07 PROCEDURE — 2580000003 HC RX 258: Performed by: SURGERY

## 2024-06-07 PROCEDURE — 6360000002 HC RX W HCPCS

## 2024-06-07 PROCEDURE — 3700000001 HC ADD 15 MINUTES (ANESTHESIA): Performed by: SURGERY

## 2024-06-07 PROCEDURE — 6360000002 HC RX W HCPCS: Performed by: SURGERY

## 2024-06-07 PROCEDURE — 6370000000 HC RX 637 (ALT 250 FOR IP): Performed by: SURGERY

## 2024-06-07 PROCEDURE — 6370000000 HC RX 637 (ALT 250 FOR IP)

## 2024-06-07 PROCEDURE — 80048 BASIC METABOLIC PNL TOTAL CA: CPT

## 2024-06-07 PROCEDURE — 36415 COLL VENOUS BLD VENIPUNCTURE: CPT

## 2024-06-07 PROCEDURE — 2709999900 HC NON-CHARGEABLE SUPPLY: Performed by: SURGERY

## 2024-06-07 PROCEDURE — 3600000002 HC SURGERY LEVEL 2 BASE: Performed by: SURGERY

## 2024-06-07 PROCEDURE — 7100000001 HC PACU RECOVERY - ADDTL 15 MIN: Performed by: SURGERY

## 2024-06-07 PROCEDURE — 2500000003 HC RX 250 WO HCPCS

## 2024-06-07 PROCEDURE — 7100000000 HC PACU RECOVERY - FIRST 15 MIN: Performed by: SURGERY

## 2024-06-07 PROCEDURE — 82947 ASSAY GLUCOSE BLOOD QUANT: CPT

## 2024-06-07 PROCEDURE — 3700000000 HC ANESTHESIA ATTENDED CARE: Performed by: SURGERY

## 2024-06-07 PROCEDURE — 2060000000 HC ICU INTERMEDIATE R&B

## 2024-06-07 PROCEDURE — 0KXT0ZZ TRANSFER LEFT LOWER LEG MUSCLE, OPEN APPROACH: ICD-10-PCS | Performed by: SURGERY

## 2024-06-07 PROCEDURE — 3600000012 HC SURGERY LEVEL 2 ADDTL 15MIN: Performed by: SURGERY

## 2024-06-07 RX ORDER — MIDAZOLAM HYDROCHLORIDE 1 MG/ML
INJECTION INTRAMUSCULAR; INTRAVENOUS PRN
Status: DISCONTINUED | OUTPATIENT
Start: 2024-06-07 | End: 2024-06-07 | Stop reason: SDUPTHER

## 2024-06-07 RX ORDER — MAGNESIUM HYDROXIDE 1200 MG/15ML
LIQUID ORAL CONTINUOUS PRN
Status: COMPLETED | OUTPATIENT
Start: 2024-06-07 | End: 2024-06-07

## 2024-06-07 RX ORDER — FENTANYL CITRATE 50 UG/ML
INJECTION, SOLUTION INTRAMUSCULAR; INTRAVENOUS PRN
Status: DISCONTINUED | OUTPATIENT
Start: 2024-06-07 | End: 2024-06-07 | Stop reason: SDUPTHER

## 2024-06-07 RX ORDER — ROCURONIUM BROMIDE 10 MG/ML
INJECTION, SOLUTION INTRAVENOUS PRN
Status: DISCONTINUED | OUTPATIENT
Start: 2024-06-07 | End: 2024-06-07 | Stop reason: SDUPTHER

## 2024-06-07 RX ORDER — KETAMINE HCL IN NACL, ISO-OSM 100MG/10ML
SYRINGE (ML) INJECTION PRN
Status: DISCONTINUED | OUTPATIENT
Start: 2024-06-07 | End: 2024-06-07 | Stop reason: SDUPTHER

## 2024-06-07 RX ORDER — LIDOCAINE HYDROCHLORIDE 10 MG/ML
INJECTION, SOLUTION EPIDURAL; INFILTRATION; INTRACAUDAL; PERINEURAL PRN
Status: DISCONTINUED | OUTPATIENT
Start: 2024-06-07 | End: 2024-06-07 | Stop reason: SDUPTHER

## 2024-06-07 RX ORDER — DEXAMETHASONE SODIUM PHOSPHATE 10 MG/ML
INJECTION, SOLUTION INTRAMUSCULAR; INTRAVENOUS PRN
Status: DISCONTINUED | OUTPATIENT
Start: 2024-06-07 | End: 2024-06-07 | Stop reason: SDUPTHER

## 2024-06-07 RX ORDER — SODIUM CHLORIDE, SODIUM LACTATE, POTASSIUM CHLORIDE, CALCIUM CHLORIDE 600; 310; 30; 20 MG/100ML; MG/100ML; MG/100ML; MG/100ML
INJECTION, SOLUTION INTRAVENOUS CONTINUOUS PRN
Status: DISCONTINUED | OUTPATIENT
Start: 2024-06-07 | End: 2024-06-07 | Stop reason: SDUPTHER

## 2024-06-07 RX ORDER — ONDANSETRON 2 MG/ML
INJECTION INTRAMUSCULAR; INTRAVENOUS PRN
Status: DISCONTINUED | OUTPATIENT
Start: 2024-06-07 | End: 2024-06-07 | Stop reason: SDUPTHER

## 2024-06-07 RX ORDER — PROPOFOL 10 MG/ML
INJECTION, EMULSION INTRAVENOUS PRN
Status: DISCONTINUED | OUTPATIENT
Start: 2024-06-07 | End: 2024-06-07 | Stop reason: SDUPTHER

## 2024-06-07 RX ORDER — ATORVASTATIN CALCIUM 40 MG/1
40 TABLET, FILM COATED ORAL NIGHTLY
Status: DISCONTINUED | OUTPATIENT
Start: 2024-06-07 | End: 2024-06-09 | Stop reason: HOSPADM

## 2024-06-07 RX ORDER — ALBUTEROL SULFATE 90 UG/1
AEROSOL, METERED RESPIRATORY (INHALATION) PRN
Status: DISCONTINUED | OUTPATIENT
Start: 2024-06-07 | End: 2024-06-07 | Stop reason: SDUPTHER

## 2024-06-07 RX ADMIN — Medication 10 MG: at 20:52

## 2024-06-07 RX ADMIN — DEXAMETHASONE SODIUM PHOSPHATE 10 MG: 10 INJECTION, SOLUTION INTRAMUSCULAR; INTRAVENOUS at 10:21

## 2024-06-07 RX ADMIN — ONDANSETRON 4 MG: 2 INJECTION INTRAMUSCULAR; INTRAVENOUS at 10:42

## 2024-06-07 RX ADMIN — INSULIN LISPRO 4 UNITS: 100 INJECTION, SOLUTION INTRAVENOUS; SUBCUTANEOUS at 20:52

## 2024-06-07 RX ADMIN — SODIUM CHLORIDE, PRESERVATIVE FREE 10 ML: 5 INJECTION INTRAVENOUS at 20:52

## 2024-06-07 RX ADMIN — HEPARIN SODIUM 5000 UNITS: 5000 INJECTION INTRAVENOUS; SUBCUTANEOUS at 06:15

## 2024-06-07 RX ADMIN — MIDAZOLAM 2 MG: 1 INJECTION INTRAMUSCULAR; INTRAVENOUS at 10:08

## 2024-06-07 RX ADMIN — ROCURONIUM BROMIDE 10 MG: 10 INJECTION, SOLUTION INTRAVENOUS at 10:25

## 2024-06-07 RX ADMIN — ROCURONIUM BROMIDE 40 MG: 10 INJECTION, SOLUTION INTRAVENOUS at 10:12

## 2024-06-07 RX ADMIN — METHOCARBAMOL 750 MG: 750 TABLET ORAL at 06:15

## 2024-06-07 RX ADMIN — PHENYLEPHRINE HYDROCHLORIDE 100 MCG: 10 INJECTION INTRAVENOUS at 10:22

## 2024-06-07 RX ADMIN — PROPOFOL 50 MG: 10 INJECTION, EMULSION INTRAVENOUS at 10:18

## 2024-06-07 RX ADMIN — GABAPENTIN 300 MG: 300 CAPSULE ORAL at 21:39

## 2024-06-07 RX ADMIN — LIDOCAINE HYDROCHLORIDE 50 MG: 10 INJECTION, SOLUTION EPIDURAL; INFILTRATION; INTRACAUDAL; PERINEURAL at 10:12

## 2024-06-07 RX ADMIN — HEPARIN SODIUM 5000 UNITS: 5000 INJECTION INTRAVENOUS; SUBCUTANEOUS at 21:39

## 2024-06-07 RX ADMIN — Medication 2 PUFF: at 10:11

## 2024-06-07 RX ADMIN — FENTANYL CITRATE 100 MCG: 50 INJECTION, SOLUTION INTRAMUSCULAR; INTRAVENOUS at 10:12

## 2024-06-07 RX ADMIN — OXYCODONE HYDROCHLORIDE 5 MG: 5 TABLET ORAL at 18:14

## 2024-06-07 RX ADMIN — ATORVASTATIN CALCIUM 40 MG: 40 TABLET, FILM COATED ORAL at 20:52

## 2024-06-07 RX ADMIN — PROPOFOL 100 MG: 10 INJECTION, EMULSION INTRAVENOUS at 10:12

## 2024-06-07 RX ADMIN — Medication 30 MG: at 10:12

## 2024-06-07 RX ADMIN — Medication 20 MG: at 10:28

## 2024-06-07 RX ADMIN — Medication 2 G: at 10:24

## 2024-06-07 RX ADMIN — OXYCODONE HYDROCHLORIDE 5 MG: 5 TABLET ORAL at 14:00

## 2024-06-07 RX ADMIN — SUGAMMADEX 200 MG: 100 INJECTION, SOLUTION INTRAVENOUS at 10:48

## 2024-06-07 RX ADMIN — SODIUM CHLORIDE, POTASSIUM CHLORIDE, SODIUM LACTATE AND CALCIUM CHLORIDE: 600; 310; 30; 20 INJECTION, SOLUTION INTRAVENOUS at 10:05

## 2024-06-07 RX ADMIN — GABAPENTIN 300 MG: 300 CAPSULE ORAL at 06:15

## 2024-06-07 RX ADMIN — PROPOFOL 50 MG: 10 INJECTION, EMULSION INTRAVENOUS at 10:49

## 2024-06-07 RX ADMIN — METOPROLOL TARTRATE 25 MG: 25 TABLET, FILM COATED ORAL at 19:28

## 2024-06-07 RX ADMIN — METHOCARBAMOL 750 MG: 750 TABLET ORAL at 01:29

## 2024-06-07 RX ADMIN — METHOCARBAMOL 750 MG: 750 TABLET ORAL at 18:14

## 2024-06-07 RX ADMIN — Medication 8 PUFF: at 10:16

## 2024-06-07 ASSESSMENT — PAIN SCALES - GENERAL
PAINLEVEL_OUTOF10: 8
PAINLEVEL_OUTOF10: 0

## 2024-06-07 NOTE — ANESTHESIA POSTPROCEDURE EVALUATION
Department of Anesthesiology  Postprocedure Note    Patient: David Vaca  MRN: [id number]  YOB: 1965  Date of evaluation: 6/7/2024    Procedure Summary       Date: 06/07/24 Room / Location: [place] [address] / [facility]    Anesthesia Start: 1005 Anesthesia Stop: 1106    Procedure: LEFT LOWER EXTREMITY FASCIOTOMY CLOSURE, WOUND VAC REMOVAL (Left: Leg Lower) Diagnosis:       Extremity ischemia      (Extremity ischemia [I99.8])    Surgeons: Yen Montague MD Responsible Provider: Channing Rubio MD    Anesthesia Type: general ASA Status: 3            Anesthesia Type: No value filed.    Jaskaran Phase I: Jaskaran Score: 9    Jaskaran Phase II:      Anesthesia Post Evaluation    Patient location during evaluation: PACU  Patient participation: complete - patient participated  Level of consciousness: awake and alert  Airway patency: patent  Nausea & Vomiting: no nausea and no vomiting  Cardiovascular status: blood pressure returned to baseline  Respiratory status: acceptable  Hydration status: euvolemic  Comments: No known anesthesia related complication  Multimodal analgesia pain management approach  Pain management: adequate    No notable events documented.

## 2024-06-07 NOTE — PROGRESS NOTES
weeks    Current rehab issues: ADL dysfunction, bladder management, bowel management, carry over of therapy techniques, discharge planning, disease and co-morbidity management, gait/mobility dysfunction, medication management, nutrition and hydration management,Ongoing assessment of safety, Pain management, Patient and family education, Prevention of secondary complications, Skin Integrity.    Required therapy: Physical Therapy, Occupational Therapy 3 hours per day, 5-6 days per week.  Recreational Therapy 1 hour per week.    Expected Discharge Destination: Home    Expected Post Discharge Treatments: Out Patient    Other information relevant to the care needs:      Home going instructions for patient and family     Acute Inpatient Rehabilitation Disclosure Statement provided to patient.  Patient verbalized understanding.     I have reviewed and concur with the findings and results of the pre-admission screening assessment completed by the Inpatient Rehabilitation Admissions Coordinator.    HERB SUH MD

## 2024-06-07 NOTE — OP NOTE
Operative Note      Patient: David Vaca  YOB: 1965  MRN: [id number]    Date of Procedure: 5/31/2024    Preoperative Diagnosis:  Acute aortoiliac occlusion, left femoral occlusion, critical limb ischemia    Post-Op Diagnosis: Same       Procedure:  1) Aortobifemoral bypass  2) Left iliac, common femoral, profunda, superficial femoral and popliteal thromboembolectomy  3) Right iliac artery thrombectomy  4) Left lower extremity four compartment fasciotomy  5) lysis of adhesions        Assistant: Dr. ALLIE Goddard    Anesthesia: General    Estimated Blood Loss (mL): 555 ml     Cell Saver blood returned: 250 ml    IVF: 3700 ml, 500 ml Albumin    Urine Output: 1720 ml    Complications: None    Specimens: Acute thrombus in left iliac, femoral, popliteal, profunda arteries    Implants:  Implant Name Type Inv. Item Serial No.  Lot No. LRB No. Used Action   CLIP INT L ORNG TI TRNSVRS GRV CHEVRON SHP W/ PRECIS TIP TO - [id number]  CLIP INT L ORNG TI TRNSVRS GRV CHEVRON SHP W/ PRECIS TIP TO  [employer]   1 Implanted   CLIP INT M L GRN TI TRNSVRS GRV CHEVRON SHP W/ PRECIS TIP - [id number]  CLIP INT M L GRN TI TRNSVRS GRV CHEVRON SHP W/ PRECIS TIP  [employer]WD   1 Implanted   GRAFT VASC BIFUR 14X8 MMX50 CM POLYESTER ALBOGRAFT - [id number] Vascular grafts GRAFT VASC BIFUR 14X8 MMX50 CM POLYESTER ALBOGRAFT  [facility] VASCULAR [facility]- [id number] N/A 1 Implanted         Drains:   NG/OG/NJ/NE Tube Nasogastric 18 fr Right nostril (Active)       Urinary Catheter 05/31/24 Alejandre;Other (comment) (Active)       Findings:  Infection Present At Time Of Surgery (PATOS) (choose all levels that have infection present):  No infection present    Other Findings: Difficulty placing alejandre catheter, after several attempts, urology was consulted intraoperatively and they were able to successfully place alejandre catheter using cystoscopy.  Left renal vein ligated to get control of infrarenal aorta, 
Operative Note      Patient: David Vaca  YOB: 1965  MRN: [id number]    Date of Procedure: 6/4/2024    Pre-Op Diagnosis Codes:     * Ischemia of left lower extremity [I99.8]    Post-Op Diagnosis: Same       Procedure:  1) Closure of left medial fasciotomy incision measuring 20 cm in length  2) Placement of wound vac to left lateral fasciotomy site measuring 75 sq cm (15 x 5 cm)    Surgeon(s):  Yen Montague MD    Assistant: Dr. URMILA Navarro    Anesthesia: General    Estimated Blood Loss (mL): 7 ml    Complications: None    Specimens: none    Implants: none      Drains: wound vac    Findings:  Infection Present At Time Of Surgery (PATOS) (choose all levels that have infection present):  No infection present  Other Findings: muscle pink and healthy, all muscle compartments responded to electrocautery aside from small anterior lateral portion.  I was able to bring the medial compartment together but not the lateral compartment.  Will continue wound vac to this side until swelling comes down.    Plan:  start trickle feeds through NGT at 10 ml/hr, continue aspirin 81 mg daily.  Ok to work with physical therapy.  Ok to transfer out of ICU today.  Will likely be discharged to rehab facility in the upcoming few days, anticipate by end of the week if everything goes well.    Detailed Description of Procedure:     David Vaca was brought to the operating room for wound vac change and possible fasciotomy closure of his left lower extremity after he underwent major revascularization a few nights ago.  After appropriate general endotracheal anesthesia delivered, the wound vac to the left lower extremity was removed and the leg circumferentially prepped with Hibiclens and draped in standard sterile fashion.  Timeout performed and agreed upon, antibiotics given during this period.  I began by evaluating all the muscles in the compartments of the lower extremity, they were all pink, healthy and viable.  All 
was noted that he had significant seroma in his left groin and a smaller one over his right groin.  The groins were cleaned with chloroprep and a small opening made in both incisions with a hemostat.  Over 300 ml of serous fluid drained from the left groin and about 50 ml from the left groin.  The groins were softer now and covered with gauze and Tegaderm.  Patient tolerated procedure well and was brought to the recovery room.    Electronically signed by Yen Montague MD on 6/7/2024 at 11:03 AM

## 2024-06-07 NOTE — CARE COORDINATION
Precert pending in [facility] for [facility]. Dr Clari juarez with patient's sister transporting him to rehab    1327 received voicemail from [facility] offering a peer to peer. It needs scheduled by today at 4 pm by calling [phone]. Notified Huy from [facility]. She will notify Dr Quick    5456 precert approved for [facility] after peer to peer. Voicemail left for Rashad from [facility] to notify    1517 spoke to Rashad. They are able to accept on Sunday. They would like transportation around 11 am. Patient cannot admit after 2 pm. Patient notified. Patient's sister, Eri, notified via telephone. She is requesting transportation to be arranged. Rashad notified    Patient will go to [facility] 8K  P: [phone]  F: [phone]

## 2024-06-07 NOTE — H&P
Physical Medicine & Rehabilitation Admission History and Physical    Impression:  Debility/physical decondition secondary to acute aorta and left lower extremity arterial thrombosis complicated by ischemic rhabdomyolysis and left lower extremity compartment syndrome  Acute arterial thrombosis starting at the aorta at the level of the renal arteries continued down to both common iliac arteries, with majority of left iliac arteries, superficial femoral arteries, popliteal arteries and trifurcation arteries requiring emergency aortobifemoral bypass, thromboembolectomy of left iliac, left common femoral, left profunda, left superficial femoral and left popliteal arteries, right iliac artery thrombectomy, and left lower extremity 4 compartment fasciotomy, and lysis of adhesions and subsequent closure of fasciotomy  Ischemic rhabdomyolysis  Left lower extremity compartment syndrome with ischemic neuropathy resulting left foot drop  HIV infection  Hyperlipidemia  Hypertension  GERD  Urinary retention due to outlet stricture requiring cystourethroscopy with urethral dilation  History of diabetes mellitus  History of chronic lower back pain due to lumbar spine osteoarthritis  Depression      Plan:   Admit to the inpatient rehabilitation unit.  The patient demonstrates good potential to participate in an inpatient rehabilitation program involving at least 3 hours per day, 5 days per week of intensive rehabilitation.  Rehabilitation services will include PT and OT/RT in order to improve functional status prior to discharge.  Family education and training will be completed.  Equipment evaluations and recommendations will be completed as appropriate.       Rehabilitation nursing will be involved for bowel, bladder, skin, and pain management.  Nursing will also provide education and training to patient and family.    Prophylaxis:  DVT: Lovenox, NICK stocking, intermittent pneumatic compression device.  GI: Colace, Senokot,  Nerves :  grossly intact CN II to XII function  Sensory : intact and symmetrical light touch and pin prick sensation at bilateral upper extremities; reduced light touch and pinprick sensation at distal left lower extremity from lower leg level below the gauze bandage downward in socks distribution  Motor : normal tone at bilateral upper & lower extremities ; 4-/5 to 4+/5 muscle strength at bilateral hip flexion; 4+/5 to 5/5 muscle strength at bilateral knee extension; 4+/5 muscle strength at bilateral knee flexion; 3+/5 to 4-/5 muscle strength at the left ankle plantarflexion; 2-/5 to 2+/5 muscle strength at left ankle dorsiflexion; 2-/5 muscle strain at the left big toe extension; normal 5/5 muscle strength at the rest of bilateral upper & lower extremities  Reflex : 2+ bilateral biceps, bilateral triceps, bilateral brachioradialis, bilateral knee and right ankle reflexes; 0 left ankle reflex  Pathological Reflex :  No Asia's sign ; no Babinski sign ; no ankle clonus  Gait : Not assessed      Diagnostics:  Recent Results (from the past 24 hour(s))   POC Glucose Fingerstick    Collection Time: 06/08/24  7:14 PM   Result Value Ref Range    POC Glucose 206 (H) 75 - 110 mg/dL   POC Glucose Fingerstick    Collection Time: 06/09/24 12:01 AM   Result Value Ref Range    POC Glucose 112 (H) 75 - 110 mg/dL   POC Glucose Fingerstick    Collection Time: 06/09/24  3:51 AM   Result Value Ref Range    POC Glucose 108 75 - 110 mg/dL   POC Glucose Fingerstick    Collection Time: 06/09/24  7:16 AM   Result Value Ref Range    POC Glucose 95 75 - 110 mg/dL   POC Glucose Fingerstick    Collection Time: 06/09/24 11:46 AM   Result Value Ref Range    POC Glucose 189 (H) 75 - 110 mg/dL   POCT glucose    Collection Time: 06/09/24  4:41 PM   Result Value Ref Range    POC Glucose 108 70 - 108 mg/dl        Latest Reference Range & Units 06/05/24 07:20 06/06/24 05:59 06/07/24 06:22   Sodium 136 - 145 mmol/L 139 136 134 (L)   Potassium 3.7 -

## 2024-06-07 NOTE — ANESTHESIA PRE PROCEDURE
24.6 06/06/2024 05:59 AM    .8 06/06/2024 05:59 AM    RDW 13.1 06/06/2024 05:59 AM     06/06/2024 05:59 AM       CMP:   Lab Results   Component Value Date/Time     06/07/2024 06:22 AM    K 4.0 06/07/2024 06:22 AM    K 3.8 09/07/2023 09:19 PM     06/07/2024 06:22 AM    CO2 24 06/07/2024 06:22 AM    BUN 12 06/07/2024 06:22 AM    CREATININE 0.8 06/07/2024 06:22 AM    GFRAA >60 08/07/2019 01:45 PM    AGRATIO 1.6 12/21/2023 03:35 PM    LABGLOM >90 06/07/2024 06:22 AM    LABGLOM 69 04/02/2024 03:15 PM    LABGLOM 66 12/21/2023 02:30 PM    GLUCOSE 113 06/07/2024 06:22 AM    PROT 6.6 12/21/2023 03:35 PM    CALCIUM 8.4 06/07/2024 06:22 AM    BILITOT 0.4 12/21/2023 03:35 PM    ALKPHOS 37 12/21/2023 03:35 PM    AST 27 12/21/2023 03:35 PM    ALT 21 12/21/2023 03:35 PM       POC Tests:   Recent Labs     06/07/24  0652   POCGLU 115*         Coags:   Lab Results   Component Value Date/Time    PROTIME 14.4 06/01/2024 06:56 PM    INR 1.1 06/01/2024 06:56 PM    APTT >180.0 05/31/2024 06:56 PM    APTT 25.9 05/31/2024 01:40 PM       HCG (If Applicable): No results found for: \"PREGTESTUR\", \"PREGSERUM\", \"HCG\", \"HCGQUANT\"     ABGs:   Lab Results   Component Value Date/Time    PHART 7.317 06/01/2024 02:00 AM    PO2ART 232.0 06/01/2024 02:00 AM    RPR6CTI 44.6 06/01/2024 02:00 AM    UWX6CZU 22.2 06/01/2024 02:00 AM    A5RDILZQ 99.1 06/01/2024 02:00 AM        Type & Screen (If Applicable):  No results found for: \"LABABO\"    Drug/Infectious Status (If Applicable):  No results found for: \"HIV\", \"HEPCAB\"    COVID-19 Screening (If Applicable):   Lab Results   Component Value Date/Time    COVID19 NOT DETECTED 09/08/2023 06:05 AM           Anesthesia Evaluation  Patient summary reviewed   no history of anesthetic complications:   Airway: Mallampati: II  TM distance: >3 FB   Neck ROM: full  Mouth opening: > = 3 FB   Dental:          Pulmonary:Negative Pulmonary ROS and normal exam    (+)        wheezes

## 2024-06-08 LAB
GLUCOSE BLD-MCNC: 113 MG/DL (ref 75–110)
GLUCOSE BLD-MCNC: 116 MG/DL (ref 75–110)
GLUCOSE BLD-MCNC: 117 MG/DL (ref 75–110)
GLUCOSE BLD-MCNC: 128 MG/DL (ref 75–110)
GLUCOSE BLD-MCNC: 206 MG/DL (ref 75–110)

## 2024-06-08 PROCEDURE — 6370000000 HC RX 637 (ALT 250 FOR IP): Performed by: SURGERY

## 2024-06-08 PROCEDURE — A4216 STERILE WATER/SALINE, 10 ML: HCPCS | Performed by: SURGERY

## 2024-06-08 PROCEDURE — 6360000002 HC RX W HCPCS: Performed by: SURGERY

## 2024-06-08 PROCEDURE — 82947 ASSAY GLUCOSE BLOOD QUANT: CPT

## 2024-06-08 PROCEDURE — 2060000000 HC ICU INTERMEDIATE R&B

## 2024-06-08 PROCEDURE — 2580000003 HC RX 258: Performed by: SURGERY

## 2024-06-08 PROCEDURE — 94761 N-INVAS EAR/PLS OXIMETRY MLT: CPT

## 2024-06-08 PROCEDURE — C9113 INJ PANTOPRAZOLE SODIUM, VIA: HCPCS | Performed by: SURGERY

## 2024-06-08 RX ORDER — OXYCODONE HYDROCHLORIDE 5 MG/1
5 TABLET ORAL EVERY 6 HOURS PRN
Qty: 15 TABLET | Refills: 0 | Status: ON HOLD | OUTPATIENT
Start: 2024-06-08 | End: 2024-06-18

## 2024-06-08 RX ORDER — GABAPENTIN 300 MG/1
300 CAPSULE ORAL EVERY 8 HOURS
Qty: 30 CAPSULE | Refills: 0 | Status: ON HOLD | OUTPATIENT
Start: 2024-06-08 | End: 2024-06-09

## 2024-06-08 RX ORDER — ASPIRIN 81 MG/1
81 TABLET, CHEWABLE ORAL DAILY
Qty: 30 TABLET | Refills: 3 | Status: ON HOLD | OUTPATIENT
Start: 2024-06-09 | End: 2024-06-09

## 2024-06-08 RX ORDER — ATORVASTATIN CALCIUM 40 MG/1
40 TABLET, FILM COATED ORAL NIGHTLY
Qty: 30 TABLET | Refills: 3 | Status: ON HOLD | OUTPATIENT
Start: 2024-06-08 | End: 2024-06-09

## 2024-06-08 RX ORDER — METHOCARBAMOL 750 MG/1
750 TABLET, FILM COATED ORAL EVERY 6 HOURS
Qty: 40 TABLET | Refills: 0 | Status: ON HOLD | OUTPATIENT
Start: 2024-06-08 | End: 2024-06-09

## 2024-06-08 RX ORDER — BISACODYL 10 MG
10 SUPPOSITORY, RECTAL RECTAL DAILY
Qty: 30 SUPPOSITORY | Refills: 0 | Status: ON HOLD | OUTPATIENT
Start: 2024-06-09 | End: 2024-06-09

## 2024-06-08 RX ADMIN — GABAPENTIN 300 MG: 300 CAPSULE ORAL at 06:22

## 2024-06-08 RX ADMIN — GABAPENTIN 300 MG: 300 CAPSULE ORAL at 15:33

## 2024-06-08 RX ADMIN — SODIUM CHLORIDE, PRESERVATIVE FREE 10 ML: 5 INJECTION INTRAVENOUS at 10:23

## 2024-06-08 RX ADMIN — HEPARIN SODIUM 5000 UNITS: 5000 INJECTION INTRAVENOUS; SUBCUTANEOUS at 21:24

## 2024-06-08 RX ADMIN — SODIUM CHLORIDE, PRESERVATIVE FREE 10 ML: 5 INJECTION INTRAVENOUS at 20:07

## 2024-06-08 RX ADMIN — METHOCARBAMOL 750 MG: 750 TABLET ORAL at 20:06

## 2024-06-08 RX ADMIN — OXYCODONE HYDROCHLORIDE 5 MG: 5 TABLET ORAL at 15:38

## 2024-06-08 RX ADMIN — METOPROLOL TARTRATE 25 MG: 25 TABLET, FILM COATED ORAL at 10:22

## 2024-06-08 RX ADMIN — ATORVASTATIN CALCIUM 40 MG: 40 TABLET, FILM COATED ORAL at 20:06

## 2024-06-08 RX ADMIN — METHOCARBAMOL 750 MG: 750 TABLET ORAL at 10:22

## 2024-06-08 RX ADMIN — PANTOPRAZOLE SODIUM 40 MG: 40 INJECTION, POWDER, FOR SOLUTION INTRAVENOUS at 10:22

## 2024-06-08 RX ADMIN — METHOCARBAMOL 750 MG: 750 TABLET ORAL at 01:55

## 2024-06-08 RX ADMIN — OXYCODONE HYDROCHLORIDE 5 MG: 5 TABLET ORAL at 21:24

## 2024-06-08 RX ADMIN — Medication 10 MG: at 21:24

## 2024-06-08 RX ADMIN — OXYCODONE HYDROCHLORIDE 5 MG: 5 TABLET ORAL at 03:46

## 2024-06-08 RX ADMIN — METOPROLOL TARTRATE 25 MG: 25 TABLET, FILM COATED ORAL at 20:06

## 2024-06-08 RX ADMIN — HEPARIN SODIUM 5000 UNITS: 5000 INJECTION INTRAVENOUS; SUBCUTANEOUS at 15:33

## 2024-06-08 RX ADMIN — GABAPENTIN 300 MG: 300 CAPSULE ORAL at 20:06

## 2024-06-08 RX ADMIN — HEPARIN SODIUM 5000 UNITS: 5000 INJECTION INTRAVENOUS; SUBCUTANEOUS at 06:22

## 2024-06-08 RX ADMIN — ASPIRIN 81 MG 81 MG: 81 TABLET ORAL at 10:22

## 2024-06-08 RX ADMIN — INSULIN LISPRO 4 UNITS: 100 INJECTION, SOLUTION INTRAVENOUS; SUBCUTANEOUS at 20:11

## 2024-06-08 RX ADMIN — METHOCARBAMOL 750 MG: 750 TABLET ORAL at 15:33

## 2024-06-08 ASSESSMENT — PAIN SCALES - GENERAL
PAINLEVEL_OUTOF10: 0
PAINLEVEL_OUTOF10: 0
PAINLEVEL_OUTOF10: 7
PAINLEVEL_OUTOF10: 0
PAINLEVEL_OUTOF10: 9
PAINLEVEL_OUTOF10: 8
PAINLEVEL_OUTOF10: 0
PAINLEVEL_OUTOF10: 8

## 2024-06-08 ASSESSMENT — PAIN - FUNCTIONAL ASSESSMENT: PAIN_FUNCTIONAL_ASSESSMENT: PREVENTS OR INTERFERES SOME ACTIVE ACTIVITIES AND ADLS

## 2024-06-08 ASSESSMENT — PAIN SCALES - WONG BAKER
WONGBAKER_NUMERICALRESPONSE: NO HURT

## 2024-06-08 ASSESSMENT — PAIN DESCRIPTION - LOCATION
LOCATION: LEG

## 2024-06-08 ASSESSMENT — PAIN DESCRIPTION - DESCRIPTORS
DESCRIPTORS: DISCOMFORT;ACHING
DESCRIPTORS: DISCOMFORT
DESCRIPTORS: THROBBING

## 2024-06-08 ASSESSMENT — PAIN DESCRIPTION - ORIENTATION
ORIENTATION: LEFT

## 2024-06-08 NOTE — DISCHARGE INSTR - COC
Continuity of Care Form    Patient Name: David Vaca   :  1965  MRN:  [id number]    Admit date:  2024  Discharge date:  2024    Code Status Order: Full Code   Advance Directives:   Advance Care Flowsheet Documentation       Date/Time Healthcare Directive Type of Healthcare Directive Copy in Chart Healthcare Agent Appointed Healthcare Agent's Name Healthcare Agent's Phone Number    24 [phone] Yes, patient has an advance directive for healthcare treatment Durable power of  for health care Yes, copy in chart Adult siblings Eri Hernandez [phone]            Admitting Physician:  Yen Montague MD  PCP: Sandi Choudhary, APRN - CNP    Discharging Nurse: DEANNA Mancera  Discharging Hospital Unit/Room#:   Discharging Unit Phone Number: [phone]    Emergency Contact:   Extended Emergency Contact Information  Primary Emergency Contact: Eri Hernandez  Home Phone: [phone]  Relation: Brother/Sister  Preferred language: English   needed? No  Secondary Emergency Contact: Augustina Mcgee  Home Phone: [phone]  Relation: Brother/Sister    Past Surgical History:  Past Surgical History:   Procedure Laterality Date    ABDOMINAL AORTIC ANEURYSM REPAIR N/A 2024    AORTOBIFEMORAL BYPASS performed by Yen Montague MD at [facility]    CARDIAC CATHETERIZATION  ???    OK    COLONOSCOPY  2016    COLONOSCOPY      Dr Rutledge     CYSTOSCOPY  2024    CYSTOSCOPY WITH ENRIQUEZ CATHETER PLACEMENT performed by Tru Kurtz MD at [facility]    EGD      ENDOSCOPY, COLON, DIAGNOSTIC      egd with dilatation    FEMORAL BYPASS N/A 2024    LOWER EXTREMITY THROMBECTOMY POSSIBLE FASCIOTOMY**CELLSAVER- NOTIFIED performed by Yen Montague MD at [facility]    FOOT SURGERY  1970's    left    HERNIA REPAIR Left 2024    Robotic Left Inguinal Hernia Repair and Right Inguinal Hernia Repair both with mesh performed by Jana Trent MD at [facility]

## 2024-06-08 NOTE — CARE COORDINATION
Transitional Planning  Received approval for pt to admit to [facility]  [phone] left message for Rashad with admissions voicemail asking if pt is ready can they take over the weekend,  Await call back.    7:57am  Perfect served Joe Lewis to check if pt will be ready this weekend for discharge.  7:59 Response pt should be ready await response from [facility]    [facility] is able to take pt Sunday and would like pt set up for 11am.  Faxed transportation request  Await confirmation of time  4:11pm  Spoke with Teresa and confirmed 11am pickup 6/9 for [facility]

## 2024-06-09 ENCOUNTER — HOSPITAL ENCOUNTER (INPATIENT)
Age: 59
LOS: 6 days | Discharge: ANOTHER HOSPITAL | DRG: 949 | End: 2024-06-15
Attending: PHYSICAL MEDICINE & REHABILITATION | Admitting: PHYSICAL MEDICINE & REHABILITATION
Payer: MEDICARE

## 2024-06-09 VITALS
DIASTOLIC BLOOD PRESSURE: 78 MMHG | OXYGEN SATURATION: 94 % | RESPIRATION RATE: 20 BRPM | BODY MASS INDEX: 24.27 KG/M2 | HEIGHT: 66 IN | WEIGHT: 151.01 LBS | SYSTOLIC BLOOD PRESSURE: 137 MMHG | HEART RATE: 106 BPM | TEMPERATURE: 97.7 F

## 2024-06-09 PROBLEM — G57.92: Status: ACTIVE | Noted: 2024-06-09

## 2024-06-09 PROBLEM — Z87.39 HISTORY OF RHABDOMYOLYSIS: Status: ACTIVE | Noted: 2024-06-09

## 2024-06-09 PROBLEM — M21.372 LEFT FOOT DROP: Status: ACTIVE | Noted: 2024-06-09

## 2024-06-09 PROBLEM — R53.81 DEBILITY: Status: ACTIVE | Noted: 2024-06-09

## 2024-06-09 PROBLEM — Z95.828 S/P AORTOBIFEMORAL BYPASS SURGERY: Status: ACTIVE | Noted: 2024-06-09

## 2024-06-09 LAB
GLUCOSE BLD STRIP.AUTO-MCNC: 108 MG/DL (ref 70–108)
GLUCOSE BLD STRIP.AUTO-MCNC: 228 MG/DL (ref 70–108)
GLUCOSE BLD-MCNC: 108 MG/DL (ref 75–110)
GLUCOSE BLD-MCNC: 112 MG/DL (ref 75–110)
GLUCOSE BLD-MCNC: 189 MG/DL (ref 75–110)
GLUCOSE BLD-MCNC: 95 MG/DL (ref 75–110)

## 2024-06-09 PROCEDURE — 99222 1ST HOSP IP/OBS MODERATE 55: CPT | Performed by: PHYSICAL MEDICINE & REHABILITATION

## 2024-06-09 PROCEDURE — 6370000000 HC RX 637 (ALT 250 FOR IP): Performed by: SURGERY

## 2024-06-09 PROCEDURE — C9113 INJ PANTOPRAZOLE SODIUM, VIA: HCPCS | Performed by: SURGERY

## 2024-06-09 PROCEDURE — A4216 STERILE WATER/SALINE, 10 ML: HCPCS | Performed by: SURGERY

## 2024-06-09 PROCEDURE — 6360000002 HC RX W HCPCS: Performed by: SURGERY

## 2024-06-09 PROCEDURE — 2580000003 HC RX 258: Performed by: SURGERY

## 2024-06-09 PROCEDURE — 6370000000 HC RX 637 (ALT 250 FOR IP): Performed by: PHYSICAL MEDICINE & REHABILITATION

## 2024-06-09 PROCEDURE — 82947 ASSAY GLUCOSE BLOOD QUANT: CPT

## 2024-06-09 PROCEDURE — 82948 REAGENT STRIP/BLOOD GLUCOSE: CPT

## 2024-06-09 PROCEDURE — 1180000000 HC REHAB R&B

## 2024-06-09 PROCEDURE — 6360000002 HC RX W HCPCS: Performed by: PHYSICAL MEDICINE & REHABILITATION

## 2024-06-09 RX ORDER — OXYCODONE HYDROCHLORIDE 5 MG/1
2.5 TABLET ORAL EVERY 4 HOURS PRN
Status: DISCONTINUED | OUTPATIENT
Start: 2024-06-09 | End: 2024-06-15 | Stop reason: HOSPADM

## 2024-06-09 RX ORDER — FENOFIBRATE 54 MG/1
145 TABLET ORAL DAILY
Status: DISCONTINUED | OUTPATIENT
Start: 2024-06-10 | End: 2024-06-15 | Stop reason: HOSPADM

## 2024-06-09 RX ORDER — OXYCODONE HYDROCHLORIDE 5 MG/1
5 TABLET ORAL EVERY 4 HOURS PRN
Status: DISCONTINUED | OUTPATIENT
Start: 2024-06-09 | End: 2024-06-15 | Stop reason: HOSPADM

## 2024-06-09 RX ORDER — FOSAMPRENAVIR CALCIUM 700 MG/1
1400 TABLET, COATED ORAL 2 TIMES DAILY
Status: DISCONTINUED | OUTPATIENT
Start: 2024-06-09 | End: 2024-06-15 | Stop reason: HOSPADM

## 2024-06-09 RX ORDER — ISOSORBIDE DINITRATE 20 MG/1
20 TABLET ORAL 2 TIMES DAILY
Status: DISCONTINUED | OUTPATIENT
Start: 2024-06-09 | End: 2024-06-15 | Stop reason: HOSPADM

## 2024-06-09 RX ORDER — GABAPENTIN 300 MG/1
300 CAPSULE ORAL EVERY 8 HOURS
Status: DISCONTINUED | OUTPATIENT
Start: 2024-06-09 | End: 2024-06-15 | Stop reason: HOSPADM

## 2024-06-09 RX ORDER — INSULIN LISPRO 100 [IU]/ML
0-8 INJECTION, SOLUTION INTRAVENOUS; SUBCUTANEOUS
Status: DISCONTINUED | OUTPATIENT
Start: 2024-06-09 | End: 2024-06-10

## 2024-06-09 RX ORDER — ACETAMINOPHEN 325 MG/1
650 TABLET ORAL EVERY 4 HOURS PRN
Status: DISCONTINUED | OUTPATIENT
Start: 2024-06-09 | End: 2024-06-15 | Stop reason: HOSPADM

## 2024-06-09 RX ORDER — METHOCARBAMOL 500 MG/1
750 TABLET, FILM COATED ORAL EVERY 6 HOURS PRN
Status: DISCONTINUED | OUTPATIENT
Start: 2024-06-09 | End: 2024-06-15 | Stop reason: HOSPADM

## 2024-06-09 RX ORDER — BISACODYL 10 MG
10 SUPPOSITORY, RECTAL RECTAL DAILY PRN
Status: DISCONTINUED | OUTPATIENT
Start: 2024-06-09 | End: 2024-06-15 | Stop reason: HOSPADM

## 2024-06-09 RX ORDER — DOCUSATE SODIUM 100 MG/1
100 CAPSULE, LIQUID FILLED ORAL 2 TIMES DAILY
Status: DISCONTINUED | OUTPATIENT
Start: 2024-06-09 | End: 2024-06-12

## 2024-06-09 RX ORDER — OLANZAPINE 10 MG/1
10 TABLET ORAL NIGHTLY
Status: DISCONTINUED | OUTPATIENT
Start: 2024-06-09 | End: 2024-06-15 | Stop reason: HOSPADM

## 2024-06-09 RX ORDER — INSULIN LISPRO 100 [IU]/ML
0-4 INJECTION, SOLUTION INTRAVENOUS; SUBCUTANEOUS NIGHTLY
Status: DISCONTINUED | OUTPATIENT
Start: 2024-06-09 | End: 2024-06-10

## 2024-06-09 RX ORDER — FLUOXETINE HYDROCHLORIDE 20 MG/1
20 CAPSULE ORAL DAILY
Status: DISCONTINUED | OUTPATIENT
Start: 2024-06-10 | End: 2024-06-15 | Stop reason: HOSPADM

## 2024-06-09 RX ORDER — ATORVASTATIN CALCIUM 40 MG/1
40 TABLET, FILM COATED ORAL NIGHTLY
Status: DISCONTINUED | OUTPATIENT
Start: 2024-06-09 | End: 2024-06-15 | Stop reason: HOSPADM

## 2024-06-09 RX ORDER — LAMIVUDINE AND ZIDOVUDINE 150; 300 MG/1; MG/1
1 TABLET, FILM COATED ORAL 2 TIMES DAILY
Status: DISCONTINUED | OUTPATIENT
Start: 2024-06-09 | End: 2024-06-15 | Stop reason: HOSPADM

## 2024-06-09 RX ORDER — ACETAMINOPHEN 325 MG/1
650 TABLET ORAL EVERY 6 HOURS
Status: DISCONTINUED | OUTPATIENT
Start: 2024-06-09 | End: 2024-06-15 | Stop reason: HOSPADM

## 2024-06-09 RX ORDER — ASPIRIN 81 MG/1
81 TABLET, CHEWABLE ORAL DAILY
Status: DISCONTINUED | OUTPATIENT
Start: 2024-06-10 | End: 2024-06-15 | Stop reason: HOSPADM

## 2024-06-09 RX ORDER — SENNOSIDES A AND B 8.6 MG/1
2 TABLET, FILM COATED ORAL NIGHTLY
Status: DISCONTINUED | OUTPATIENT
Start: 2024-06-09 | End: 2024-06-12

## 2024-06-09 RX ORDER — TRAZODONE HYDROCHLORIDE 50 MG/1
50 TABLET ORAL NIGHTLY PRN
Status: DISCONTINUED | OUTPATIENT
Start: 2024-06-09 | End: 2024-06-15 | Stop reason: HOSPADM

## 2024-06-09 RX ORDER — POLYETHYLENE GLYCOL 3350 17 G/17G
17 POWDER, FOR SOLUTION ORAL DAILY PRN
Status: DISCONTINUED | OUTPATIENT
Start: 2024-06-09 | End: 2024-06-15 | Stop reason: HOSPADM

## 2024-06-09 RX ORDER — CYCLOBENZAPRINE HCL 10 MG
10 TABLET ORAL 3 TIMES DAILY PRN
Status: DISCONTINUED | OUTPATIENT
Start: 2024-06-09 | End: 2024-06-09

## 2024-06-09 RX ORDER — ENOXAPARIN SODIUM 100 MG/ML
40 INJECTION SUBCUTANEOUS EVERY 24 HOURS
Status: DISCONTINUED | OUTPATIENT
Start: 2024-06-09 | End: 2024-06-15 | Stop reason: HOSPADM

## 2024-06-09 RX ORDER — LANOLIN ALCOHOL/MO/W.PET/CERES
6 CREAM (GRAM) TOPICAL NIGHTLY
Status: DISCONTINUED | OUTPATIENT
Start: 2024-06-09 | End: 2024-06-15 | Stop reason: HOSPADM

## 2024-06-09 RX ORDER — BISACODYL 10 MG
10 SUPPOSITORY, RECTAL RECTAL DAILY
Status: DISCONTINUED | OUTPATIENT
Start: 2024-06-09 | End: 2024-06-09

## 2024-06-09 RX ORDER — CETIRIZINE HYDROCHLORIDE 10 MG/1
10 TABLET ORAL DAILY
Status: DISCONTINUED | OUTPATIENT
Start: 2024-06-10 | End: 2024-06-15 | Stop reason: HOSPADM

## 2024-06-09 RX ORDER — PANTOPRAZOLE SODIUM 40 MG/1
40 TABLET, DELAYED RELEASE ORAL
Status: DISCONTINUED | OUTPATIENT
Start: 2024-06-10 | End: 2024-06-15 | Stop reason: HOSPADM

## 2024-06-09 RX ADMIN — SENNOSIDES 17.2 MG: 8.6 TABLET, FILM COATED ORAL at 20:58

## 2024-06-09 RX ADMIN — HEPARIN SODIUM 5000 UNITS: 5000 INJECTION INTRAVENOUS; SUBCUTANEOUS at 07:19

## 2024-06-09 RX ADMIN — ENOXAPARIN SODIUM 40 MG: 100 INJECTION SUBCUTANEOUS at 21:00

## 2024-06-09 RX ADMIN — FOSAMPRENAVIR CALCIUM 1400 MG: 700 TABLET, COATED ORAL at 21:03

## 2024-06-09 RX ADMIN — ATORVASTATIN CALCIUM 40 MG: 40 TABLET, FILM COATED ORAL at 21:00

## 2024-06-09 RX ADMIN — OLANZAPINE 10 MG: 10 TABLET, FILM COATED ORAL at 22:14

## 2024-06-09 RX ADMIN — ISOSORBIDE DINITRATE 20 MG: 20 TABLET ORAL at 22:15

## 2024-06-09 RX ADMIN — GABAPENTIN 300 MG: 300 CAPSULE ORAL at 18:09

## 2024-06-09 RX ADMIN — METHOCARBAMOL 750 MG: 750 TABLET ORAL at 10:44

## 2024-06-09 RX ADMIN — DOCUSATE SODIUM 100 MG: 100 CAPSULE, LIQUID FILLED ORAL at 21:00

## 2024-06-09 RX ADMIN — METHOCARBAMOL 750 MG: 750 TABLET ORAL at 04:05

## 2024-06-09 RX ADMIN — Medication 6 MG: at 20:59

## 2024-06-09 RX ADMIN — SODIUM CHLORIDE, PRESERVATIVE FREE 10 ML: 5 INJECTION INTRAVENOUS at 10:45

## 2024-06-09 RX ADMIN — METOPROLOL TARTRATE 25 MG: 25 TABLET, FILM COATED ORAL at 22:14

## 2024-06-09 RX ADMIN — PANTOPRAZOLE SODIUM 40 MG: 40 INJECTION, POWDER, FOR SOLUTION INTRAVENOUS at 10:44

## 2024-06-09 RX ADMIN — ACETAMINOPHEN 650 MG: 325 TABLET ORAL at 20:58

## 2024-06-09 RX ADMIN — METOPROLOL TARTRATE 25 MG: 25 TABLET, FILM COATED ORAL at 10:44

## 2024-06-09 RX ADMIN — ASPIRIN 81 MG 81 MG: 81 TABLET ORAL at 10:44

## 2024-06-09 RX ADMIN — LAMIVUDINE AND ZIDOVUDINE 1 TABLET: 150; 300 TABLET, FILM COATED ORAL at 21:04

## 2024-06-09 RX ADMIN — OXYCODONE HYDROCHLORIDE 5 MG: 5 TABLET ORAL at 11:06

## 2024-06-09 RX ADMIN — GABAPENTIN 300 MG: 300 CAPSULE ORAL at 04:05

## 2024-06-09 ASSESSMENT — PAIN SCALES - GENERAL
PAINLEVEL_OUTOF10: 0
PAINLEVEL_OUTOF10: 7
PAINLEVEL_OUTOF10: 0

## 2024-06-09 ASSESSMENT — PAIN DESCRIPTION - LOCATION: LOCATION: LEG;INCISION

## 2024-06-09 ASSESSMENT — PAIN DESCRIPTION - ORIENTATION: ORIENTATION: LEFT

## 2024-06-09 ASSESSMENT — PAIN - FUNCTIONAL ASSESSMENT: PAIN_FUNCTIONAL_ASSESSMENT: PREVENTS OR INTERFERES SOME ACTIVE ACTIVITIES AND ADLS

## 2024-06-09 ASSESSMENT — PAIN DESCRIPTION - DESCRIPTORS: DESCRIPTORS: TIGHTNESS;THROBBING

## 2024-06-09 NOTE — PLAN OF CARE
Problem: Respiratory - Adult  Goal: Achieves optimal ventilation and oxygenation  Outcome: Progressing  Flowsheets (Taken 6/9/2024 1729)  Achieves optimal ventilation and oxygenation:   Assess for changes in respiratory status   Assess for changes in mentation and behavior     Problem: Cardiovascular - Adult  Goal: Maintains optimal cardiac output and hemodynamic stability  Outcome: Progressing  Flowsheets (Taken 6/9/2024 1729)  Maintains optimal cardiac output and hemodynamic stability: Monitor blood pressure and heart rate     Problem: Skin/Tissue Integrity - Adult  Goal: Skin integrity remains intact  Outcome: Progressing  Flowsheets (Taken 6/9/2024 1729)  Skin Integrity Remains Intact: Monitor for areas of redness and/or skin breakdown     Problem: Skin/Tissue Integrity - Adult  Goal: Incisions, wounds, or drain sites healing without S/S of infection  Outcome: Progressing  Flowsheets (Taken 6/9/2024 1729)  Incisions, Wounds, or Drain Sites Healing Without Sign and Symptoms of Infection: ADMISSION and DAILY: Assess and document risk factors for pressure ulcer development     Problem: Musculoskeletal - Adult  Goal: Return mobility to safest level of function  Outcome: Progressing  Flowsheets (Taken 6/9/2024 1729)  Return Mobility to Safest Level of Function:   Assess patient stability and activity tolerance for standing, transferring and ambulating with or without assistive devices   Assist with transfers and ambulation using safe patient handling equipment as needed     Problem: Musculoskeletal - Adult  Goal: Maintain proper alignment of affected body part  Outcome: Progressing  Flowsheets (Taken 6/9/2024 1729)  Maintain proper alignment of affected body part: Support and protect limb and body alignment per provider's orders     Problem: Gastrointestinal - Adult  Goal: Maintains or returns to baseline bowel function  Outcome: Progressing  Flowsheets (Taken 6/9/2024 1729)  Maintains or returns to baseline bowel

## 2024-06-09 NOTE — PROGRESS NOTES
Admitted to the Inpatient Rehabilitation Unit via  stretcher .  Patient was then oriented to room and unit.  Education provided on the rehabilitation routine: three hours of therapy five days per week.      Explained patients right to have family, representative or physician notified of their admission.  Patient has Declined for physician to be notified.  Patient has Declined for family/representative to be notified.    Admitting medication orders compared with acute stay medications; home medication list reviewed with patient/family.  Medication issues identified No    Transportation:   Has transportation kept you from medical appointments, meetings, work, or from getting things needed for daily living? (Check all that apply)  No.      Health Literacy:   How often do you need to have someone help you when you read instructions, pamphlets, or other written material from your doctor or pharmacy?  0. - Never    Social Isolation:  How often do you feel lonely or isolated from those around you?  0. Never    Patient Mood Interview (PHQ-2 to 9) (from [employer]©)   Say to Patient: \"Over the last 2 weeks, have you been bothered by any of the following problems?\"   If symptom is present, enter yes in column 1 (Symptom Presence)  If yes in column 1, then ask the patient: “About how often have you been bothered by this?” Indicate response in column 2, Symptom Frequency.   Symptom Presence  No    Yes   9.    No response  Symptom Frequency  Never or 1 day  2-6 days (several days)  7-11 days (half or more of the days)  12-14 days (nearly every day)    Symptom Presence Symptom Frequency   Little interest or pleasure in doing things 0. No 0. Never or 1 day   Feeling down, depressed, or hopeless 1. Yes 1. 2-6 days (several days)   If either A or B above has symptom frequency coded 2 or 3, CONTINUE asking questions below.      If not, END the interview  and right click on next table to delete.                 Pain:  Pain Effect on

## 2024-06-09 NOTE — DISCHARGE INSTRUCTIONS
appointment reminders:  (A list of any scheduled appointments is at the end of this document)     You will be contacted to make an appointment with Dr. Montague in 2 weeks.      If you have any problems or questions, feel free to call [phone]. Even after hours or on weekends, the answering service will answer this phone line.    Joe Davis MD

## 2024-06-09 NOTE — PLAN OF CARE
Problem: Discharge Planning  Goal: Discharge to home or other facility with appropriate resources  6/1/2024 2039 by Perla Lazaro RN  Outcome: Progressing  Flowsheets (Taken 6/1/2024 2000)  Discharge to home or other facility with appropriate resources:   Identify barriers to discharge with patient and caregiver   Arrange for needed discharge resources and transportation as appropriate  6/1/2024 0649 by Eunice Jarrett RN  Outcome: Progressing     Problem: Pain  Goal: Verbalizes/displays adequate comfort level or baseline comfort level  6/1/2024 2039 by Perla Lazaro RN  Outcome: Progressing  Flowsheets (Taken 6/1/2024 2000)  Verbalizes/displays adequate comfort level or baseline comfort level: Assess pain using appropriate pain scale  6/1/2024 0649 by Eunice Jarrett RN  Outcome: Progressing     Problem: Safety - Adult  Goal: Free from fall injury  6/1/2024 2039 by Perla Lazaro RN  Outcome: Progressing  6/1/2024 1054 by Reese Red RN  Outcome: Not Progressing  6/1/2024 0649 by Eunice Jarrett RN  Outcome: Progressing     Problem: Safety - Medical Restraint  Goal: Remains free of injury from restraints (Restraint for Interference with Medical Device)  Description: INTERVENTIONS:  1. Determine that other, less restrictive measures have been tried or would not be effective before applying the restraint  2. Evaluate the patient's condition at the time of restraint application  3. Inform patient/family regarding the reason for restraint  4. Q2H: Monitor safety, psychosocial status, comfort, nutrition and hydration  6/1/2024 2039 by Perla Lazaro RN  Outcome: Progressing  6/1/2024 1055 by Reese Red RN  Outcome: Not Progressing  6/1/2024 1054 by Reese Red RN  Outcome: Not Progressing  Flowsheets (Taken 6/1/2024 0800)  Remains free of injury from restraints (restraint for interference with medical device): Determine that other, less restrictive measures have been tried or 
  Problem: Discharge Planning  Goal: Discharge to home or other facility with appropriate resources  6/2/2024 1004 by Komal Magallanes RN  Outcome: Progressing  6/1/2024 2039 by Perla Lazaro RN  Outcome: Progressing  Flowsheets (Taken 6/1/2024 2000)  Discharge to home or other facility with appropriate resources:   Identify barriers to discharge with patient and caregiver   Arrange for needed discharge resources and transportation as appropriate     Problem: Pain  Goal: Verbalizes/displays adequate comfort level or baseline comfort level  6/2/2024 1004 by Komal Magallanes RN  Outcome: Progressing  Flowsheets  Taken 6/2/2024 0400 by Perla Lazaro RN  Verbalizes/displays adequate comfort level or baseline comfort level: Assess pain using appropriate pain scale  Taken 6/2/2024 0000 by Perla Lazaro RN  Verbalizes/displays adequate comfort level or baseline comfort level: Assess pain using appropriate pain scale  6/1/2024 2039 by Perla Lazaro RN  Outcome: Progressing  Flowsheets (Taken 6/1/2024 2000)  Verbalizes/displays adequate comfort level or baseline comfort level: Assess pain using appropriate pain scale     Problem: Safety - Adult  Goal: Free from fall injury  6/2/2024 1004 by Komal Magallanes RN  Outcome: Progressing  6/1/2024 2039 by Perla Lazaro RN  Outcome: Progressing     Problem: Safety - Medical Restraint  Goal: Remains free of injury from restraints (Restraint for Interference with Medical Device)  Description: INTERVENTIONS:  1. Determine that other, less restrictive measures have been tried or would not be effective before applying the restraint  2. Evaluate the patient's condition at the time of restraint application  3. Inform patient/family regarding the reason for restraint  4. Q2H: Monitor safety, psychosocial status, comfort, nutrition and hydration  6/2/2024 1004 by Komal Magallanes RN  Outcome: Progressing  Flowsheets  Taken 6/2/2024 0800 by Sona 
  Problem: Discharge Planning  Goal: Discharge to home or other facility with appropriate resources  6/2/2024 2119 by Perla Lazaro RN  Outcome: Progressing  Flowsheets (Taken 6/2/2024 2000)  Discharge to home or other facility with appropriate resources:   Identify barriers to discharge with patient and caregiver   Arrange for needed discharge resources and transportation as appropriate  6/2/2024 1004 by Komal Magallanes RN  Outcome: Progressing     Problem: Pain  Goal: Verbalizes/displays adequate comfort level or baseline comfort level  6/2/2024 2119 by Perla Lazaro RN  Outcome: Progressing  Flowsheets (Taken 6/2/2024 2000)  Verbalizes/displays adequate comfort level or baseline comfort level:   Encourage patient to monitor pain and request assistance   Assess pain using appropriate pain scale  6/2/2024 1004 by Komal Magallanes RN  Outcome: Progressing  Flowsheets  Taken 6/2/2024 0400 by Perla Lazaro RN  Verbalizes/displays adequate comfort level or baseline comfort level: Assess pain using appropriate pain scale  Taken 6/2/2024 0000 by Perla Lazaro RN  Verbalizes/displays adequate comfort level or baseline comfort level: Assess pain using appropriate pain scale     Problem: Safety - Adult  Goal: Free from fall injury  6/2/2024 2119 by Perla Lazaro RN  Outcome: Progressing  6/2/2024 1004 by Komal Magallanes RN  Outcome: Progressing     Problem: Nutrition Deficit:  Goal: Optimize nutritional status  Outcome: Progressing     Problem: Respiratory - Adult  Goal: Achieves optimal ventilation and oxygenation  Outcome: Progressing  Flowsheets (Taken 6/2/2024 2000)  Achieves optimal ventilation and oxygenation:   Assess for changes in respiratory status   Assess for changes in mentation and behavior     Problem: Skin/Tissue Integrity  Goal: Absence of new skin breakdown  Description: 1.  Monitor for areas of redness and/or skin breakdown  2.  Assess vascular access sites 
  Problem: Discharge Planning  Goal: Discharge to home or other facility with appropriate resources  6/9/2024 0022 by Jannette Viveros RN  Outcome: Progressing  6/8/2024 1945 by Calderon Lujan RN  Outcome: Progressing     Problem: Pain  Goal: Verbalizes/displays adequate comfort level or baseline comfort level  6/9/2024 0022 by Jannette Viveros RN  Outcome: Progressing  6/8/2024 1945 by Calderon Lujan RN  Outcome: Progressing  Flowsheets (Taken 6/8/2024 0855)  Verbalizes/displays adequate comfort level or baseline comfort level: Encourage patient to monitor pain and request assistance     Problem: Safety - Adult  Goal: Free from fall injury  6/9/2024 0022 by Jannette Viveros RN  Outcome: Progressing  6/8/2024 1945 by Calderon Lujan RN  Outcome: Progressing     Problem: Nutrition Deficit:  Goal: Optimize nutritional status  6/9/2024 0022 by Jannette Viveros RN  Outcome: Progressing  6/8/2024 1945 by Calderon Lujan RN  Outcome: Progressing     Problem: Respiratory - Adult  Goal: Achieves optimal ventilation and oxygenation  6/9/2024 0022 by Jannette Viveros RN  Outcome: Progressing  6/8/2024 1945 by Calderon Lujan RN  Outcome: Progressing     Problem: Skin/Tissue Integrity  Goal: Absence of new skin breakdown  Description: 1.  Monitor for areas of redness and/or skin breakdown  2.  Assess vascular access sites hourly  3.  Every 4-6 hours minimum:  Change oxygen saturation probe site  4.  Every 4-6 hours:  If on nasal continuous positive airway pressure, respiratory therapy assess nares and determine need for appliance change or resting period.  6/9/2024 0022 by Jannette Viveros RN  Outcome: Progressing  6/8/2024 1945 by Calderon Lujan RN  Outcome: Progressing     Problem: ABCDS Injury Assessment  Goal: Absence of physical injury  6/9/2024 0022 by Jannette Viveros RN  Outcome: Progressing  6/8/2024 1945 by Calderon Lujan RN  Outcome: Progressing     
  Problem: Discharge Planning  Goal: Discharge to home or other facility with appropriate resources  Outcome: Progressing     Problem: Pain  Goal: Verbalizes/displays adequate comfort level or baseline comfort level  Outcome: Progressing     Problem: Safety - Adult  Goal: Free from fall injury  Outcome: Progressing     Problem: Nutrition Deficit:  Goal: Optimize nutritional status  Outcome: Progressing     Problem: Respiratory - Adult  Goal: Achieves optimal ventilation and oxygenation  Outcome: Progressing     Problem: Skin/Tissue Integrity  Goal: Absence of new skin breakdown  Description: 1.  Monitor for areas of redness and/or skin breakdown  2.  Assess vascular access sites hourly  3.  Every 4-6 hours minimum:  Change oxygen saturation probe site  4.  Every 4-6 hours:  If on nasal continuous positive airway pressure, respiratory therapy assess nares and determine need for appliance change or resting period.  Outcome: Progressing     Problem: ABCDS Injury Assessment  Goal: Absence of physical injury  Outcome: Progressing     
  Problem: Discharge Planning  Goal: Discharge to home or other facility with appropriate resources  Outcome: Progressing     Problem: Pain  Goal: Verbalizes/displays adequate comfort level or baseline comfort level  Outcome: Progressing     Problem: Safety - Adult  Goal: Free from fall injury  Outcome: Progressing     Problem: Nutrition Deficit:  Goal: Optimize nutritional status  Outcome: Progressing     Problem: Respiratory - Adult  Goal: Achieves optimal ventilation and oxygenation  Outcome: Progressing     Problem: Skin/Tissue Integrity  Goal: Absence of new skin breakdown  Description: 1.  Monitor for areas of redness and/or skin breakdown  2.  Assess vascular access sites hourly  3.  Every 4-6 hours minimum:  Change oxygen saturation probe site  4.  Every 4-6 hours:  If on nasal continuous positive airway pressure, respiratory therapy assess nares and determine need for appliance change or resting period.  Outcome: Progressing     Problem: ABCDS Injury Assessment  Goal: Absence of physical injury  Outcome: Progressing     
  Problem: Discharge Planning  Goal: Discharge to home or other facility with appropriate resources  Outcome: Progressing     Problem: Pain  Goal: Verbalizes/displays adequate comfort level or baseline comfort level  Outcome: Progressing     Problem: Safety - Adult  Goal: Free from fall injury  Outcome: Progressing     Problem: Nutrition Deficit:  Goal: Optimize nutritional status  Outcome: Progressing  Flowsheets (Taken 6/4/2024 1410 by Tatyana Lopez, RD, LD)  Nutrient intake appropriate for improving, restoring, or maintaining nutritional needs:   Assess nutritional status and recommend course of action   Recommend, monitor, and adjust tube feedings and TPN/PPN based on assessed needs   Monitor oral intake, labs, and treatment plans     Problem: Respiratory - Adult  Goal: Achieves optimal ventilation and oxygenation  Outcome: Progressing  Flowsheets (Taken 6/4/2024 1730 by Polly Peres RN)  Achieves optimal ventilation and oxygenation:   Assess for changes in respiratory status   Assess for changes in mentation and behavior   Position to facilitate oxygenation and minimize respiratory effort   Oxygen supplementation based on oxygen saturation or arterial blood gases   Initiate smoking cessation protocol as indicated   Assess the need for suctioning and aspirate as needed   Assess and instruct to report shortness of breath or any respiratory difficulty   Respiratory therapy support as indicated     Problem: Skin/Tissue Integrity  Goal: Absence of new skin breakdown  Description: 1.  Monitor for areas of redness and/or skin breakdown  2.  Assess vascular access sites hourly  3.  Every 4-6 hours minimum:  Change oxygen saturation probe site  4.  Every 4-6 hours:  If on nasal continuous positive airway pressure, respiratory therapy assess nares and determine need for appliance change or resting period.  Outcome: Progressing     Problem: ABCDS Injury Assessment  Goal: Absence of physical injury  Outcome: 
  Problem: Discharge Planning  Goal: Discharge to home or other facility with appropriate resources  Outcome: Progressing     Problem: Pain  Goal: Verbalizes/displays adequate comfort level or baseline comfort level  Outcome: Progressing     Problem: Safety - Adult  Goal: Free from fall injury  Outcome: Progressing     Problem: Safety - Medical Restraint  Goal: Remains free of injury from restraints (Restraint for Interference with Medical Device)  Description: INTERVENTIONS:  1. Determine that other, less restrictive measures have been tried or would not be effective before applying the restraint  2. Evaluate the patient's condition at the time of restraint application  3. Inform patient/family regarding the reason for restraint  4. Q2H: Monitor safety, psychosocial status, comfort, nutrition and hydration  Outcome: Progressing  Flowsheets (Taken 6/1/2024 0412)  Remains free of injury from restraints (restraint for interference with medical device):   Determine that other, less restrictive measures have been tried or would not be effective before applying the restraint   Evaluate the patient's condition at the time of restraint application   Every 2 hours: Monitor safety, psychosocial status, comfort, nutrition and hydration     
  Problem: Discharge Planning  Goal: Discharge to home or other facility with appropriate resources  Outcome: Progressing  Flowsheets (Taken 6/3/2024 2000)  Discharge to home or other facility with appropriate resources:   Identify barriers to discharge with patient and caregiver   Arrange for needed discharge resources and transportation as appropriate   Identify discharge learning needs (meds, wound care, etc)   Refer to discharge planning if patient needs post-hospital services based on physician order or complex needs related to functional status, cognitive ability or social support system     Problem: Pain  Goal: Verbalizes/displays adequate comfort level or baseline comfort level  6/4/2024 0045 by Renetta Garcia RN  Outcome: Progressing  Flowsheets (Taken 6/3/2024 2000)  Verbalizes/displays adequate comfort level or baseline comfort level:   Encourage patient to monitor pain and request assistance   Assess pain using appropriate pain scale   Administer analgesics based on type and severity of pain and evaluate response   Implement non-pharmacological measures as appropriate and evaluate response   Notify Licensed Independent Practitioner if interventions unsuccessful or patient reports new pain  6/3/2024 1643 by Joslyn Bai RN  Outcome: Progressing     Problem: Safety - Adult  Goal: Free from fall injury  6/4/2024 0045 by Renetta Garcia RN  Outcome: Progressing  Flowsheets (Taken 6/4/2024 0000)  Free From Fall Injury:   Instruct family/caregiver on patient safety   Based on caregiver fall risk screen, instruct family/caregiver to ask for assistance with transferring infant if caregiver noted to have fall risk factors  6/3/2024 1643 by Joslyn Bai RN  Outcome: Progressing     Problem: Nutrition Deficit:  Goal: Optimize nutritional status  6/4/2024 0045 by Renetta Garcia RN  Outcome: Progressing  6/3/2024 1643 by Joslyn Bai RN  Outcome: Progressing     Problem: Respiratory - Adult  Goal: Achieves 
  Problem: Pain  Goal: Verbalizes/displays adequate comfort level or baseline comfort level  Outcome: Progressing     Problem: Safety - Adult  Goal: Free from fall injury  Outcome: Progressing     Problem: Nutrition Deficit:  Goal: Optimize nutritional status  Outcome: Progressing     Problem: Respiratory - Adult  Goal: Achieves optimal ventilation and oxygenation  Outcome: Progressing     Problem: Skin/Tissue Integrity  Goal: Absence of new skin breakdown  Description: 1.  Monitor for areas of redness and/or skin breakdown  2.  Assess vascular access sites hourly  3.  Every 4-6 hours minimum:  Change oxygen saturation probe site  4.  Every 4-6 hours:  If on nasal continuous positive airway pressure, respiratory therapy assess nares and determine need for appliance change or resting period.  Outcome: Progressing     Problem: ABCDS Injury Assessment  Goal: Absence of physical injury  Outcome: Progressing     
  Problem: Safety - Adult  Goal: Free from fall injury  6/1/2024 1054 by Reese Red RN  Outcome: Not Progressing  6/1/2024 0649 by Eunice Jarrett RN  Outcome: Progressing     Problem: Safety - Medical Restraint  Goal: Remains free of injury from restraints (Restraint for Interference with Medical Device)  Description: INTERVENTIONS:  1. Determine that other, less restrictive measures have been tried or would not be effective before applying the restraint  2. Evaluate the patient's condition at the time of restraint application  3. Inform patient/family regarding the reason for restraint  4. Q2H: Monitor safety, psychosocial status, comfort, nutrition and hydration  6/1/2024 1054 by Reese Red RN  Outcome: Not Progressing  Flowsheets (Taken 6/1/2024 0800)  Remains free of injury from restraints (restraint for interference with medical device): Determine that other, less restrictive measures have been tried or would not be effective before applying the restraint  6/1/2024 0649 by Eunice Jarrett RN  Outcome: Progressing  Flowsheets (Taken 6/1/2024 0412)  Remains free of injury from restraints (restraint for interference with medical device):   Determine that other, less restrictive measures have been tried or would not be effective before applying the restraint   Evaluate the patient's condition at the time of restraint application   Every 2 hours: Monitor safety, psychosocial status, comfort, nutrition and hydration     
  Problem: Safety - Medical Restraint  Goal: Remains free of injury from restraints (Restraint for Interference with Medical Device)  Description: INTERVENTIONS:  1. Determine that other, less restrictive measures have been tried or would not be effective before applying the restraint  2. Evaluate the patient's condition at the time of restraint application  3. Inform patient/family regarding the reason for restraint  4. Q2H: Monitor safety, psychosocial status, comfort, nutrition and hydration  6/2/2024 1220 by Komal Magallanes RN  Outcome: Completed  6/2/2024 1004 by Komal Magallanes RN  Outcome: Progressing  Flowsheets  Taken 6/2/2024 0800 by Komal Magallanes RN  Remains free of injury from restraints (restraint for interference with medical device):   Determine that other, less restrictive measures have been tried or would not be effective before applying the restraint   Evaluate the patient's condition at the time of restraint application   Inform patient/family regarding the reason for restraint   Every 2 hours: Monitor safety, psychosocial status, comfort, nutrition and hydration  Taken 6/2/2024 0600 by Perla Lazaro RN  Remains free of injury from restraints (restraint for interference with medical device):   Determine that other, less restrictive measures have been tried or would not be effective before applying the restraint   Every 2 hours: Monitor safety, psychosocial status, comfort, nutrition and hydration  Taken 6/2/2024 0400 by Perla Lazaro RN  Remains free of injury from restraints (restraint for interference with medical device):   Determine that other, less restrictive measures have been tried or would not be effective before applying the restraint   Every 2 hours: Monitor safety, psychosocial status, comfort, nutrition and hydration  Taken 6/2/2024 0200 by Perla Lazaro RN  Remains free of injury from restraints (restraint for interference with medical device):   
Problem: Discharge Planning  Goal: Discharge to home or other facility with appropriate resources  6/9/2024 1253 by Calderon Lujan RN  Outcome: Completed  6/9/2024 0022 by Jannette Viveros RN  Outcome: Progressing     Problem: Pain  Goal: Verbalizes/displays adequate comfort level or baseline comfort level  6/9/2024 1253 by Calderon Lujan RN  Outcome: Completed  Flowsheets (Taken 6/9/2024 1150)  Verbalizes/displays adequate comfort level or baseline comfort level: Encourage patient to monitor pain and request assistance  6/9/2024 0022 by Jannette Viveros RN  Outcome: Progressing     Problem: Safety - Adult  Goal: Free from fall injury  6/9/2024 1253 by Calderon Lujan RN  Outcome: Completed  6/9/2024 0022 by Jannette Viveros RN  Outcome: Progressing     Problem: Nutrition Deficit:  Goal: Optimize nutritional status  6/9/2024 1253 by Calderon Lujan RN  Outcome: Completed  6/9/2024 0022 by Jannette Viveros RN  Outcome: Progressing     Problem: Respiratory - Adult  Goal: Achieves optimal ventilation and oxygenation  6/9/2024 1253 by Calderon Lujan RN  Outcome: Completed  6/9/2024 0022 by Jannette Viveros RN  Outcome: Progressing     Problem: Skin/Tissue Integrity  Goal: Absence of new skin breakdown  Description: 1.  Monitor for areas of redness and/or skin breakdown  2.  Assess vascular access sites hourly  3.  Every 4-6 hours minimum:  Change oxygen saturation probe site  4.  Every 4-6 hours:  If on nasal continuous positive airway pressure, respiratory therapy assess nares and determine need for appliance change or resting period.  6/9/2024 1253 by Calderon Lujan RN  Outcome: Completed  6/9/2024 0022 by Jannette Viveros RN  Outcome: Progressing     Problem: ABCDS Injury Assessment  Goal: Absence of physical injury  6/9/2024 1253 by Calderon Lujan RN  Outcome: Completed  6/9/2024 0022 by Jannette Viveros RN  Outcome: Progressing     
Problem: Discharge Planning  Goal: Discharge to home or other facility with appropriate resources  Outcome: Progressing     Problem: Pain  Goal: Verbalizes/displays adequate comfort level or baseline comfort level  Outcome: Progressing  Flowsheets (Taken 6/8/2024 2189)  Verbalizes/displays adequate comfort level or baseline comfort level: Encourage patient to monitor pain and request assistance     Problem: Safety - Adult  Goal: Free from fall injury  Outcome: Progressing     Problem: Nutrition Deficit:  Goal: Optimize nutritional status  Outcome: Progressing     Problem: Respiratory - Adult  Goal: Achieves optimal ventilation and oxygenation  Outcome: Progressing     Problem: Skin/Tissue Integrity  Goal: Absence of new skin breakdown  Description: 1.  Monitor for areas of redness and/or skin breakdown  2.  Assess vascular access sites hourly  3.  Every 4-6 hours minimum:  Change oxygen saturation probe site  4.  Every 4-6 hours:  If on nasal continuous positive airway pressure, respiratory therapy assess nares and determine need for appliance change or resting period.  Outcome: Progressing     Problem: ABCDS Injury Assessment  Goal: Absence of physical injury  Outcome: Progressing     
Problem: OXYGENATION/RESPIRATORY FUNCTION  Goal: Patient will maintain patent airway  Outcome: Ongoing  Goal: Patient will achieve/maintain normal respiratory rate/effort  Respiratory rate and effort will be within normal limits for the patient  Outcome: Ongoing    Problem: MECHANICAL VENTILATION  Goal: Patient will maintain patent airway  Outcome: Ongoing  Goal: Oral health is maintained or improved  Outcome: Ongoing  Goal: ET tube will be managed safely  Outcome: Ongoing  Goal: Ability to express needs and understand communication  Outcome: Ongoing  Goal: Mobility/activity is maintained at optimum level for patient  Outcome: Ongoing    Problem: ASPIRATION PRECAUTIONS  Goal: Patient’s risk of aspiration is minimized  Outcome: Ongoing    Problem: SKIN INTEGRITY  Goal: Skin integrity is maintained or improved  Outcome: Ongoing                    
nutritional supplements, and vitamin/mineral supplements   Order, calculate, and assess calorie counts as needed   Recommend, monitor, and adjust tube feedings and TPN/PPN based on assessed needs   Provide specific nutrition education to patient or family as appropriate  6/4/2024 2354 by Cony Jorge, RN  Outcome: Progressing  Flowsheets (Taken 6/4/2024 1410 by Tatyana Lopez, RD, LD)  Nutrient intake appropriate for improving, restoring, or maintaining nutritional needs:   Assess nutritional status and recommend course of action   Recommend, monitor, and adjust tube feedings and TPN/PPN based on assessed needs   Monitor oral intake, labs, and treatment plans     Problem: Respiratory - Adult  Goal: Achieves optimal ventilation and oxygenation  6/5/2024 1330 by Polly Peres, RN  Outcome: Progressing  Flowsheets (Taken 6/5/2024 1330)  Achieves optimal ventilation and oxygenation:   Assess for changes in respiratory status   Assess for changes in mentation and behavior   Position to facilitate oxygenation and minimize respiratory effort   Initiate smoking cessation protocol as indicated   Oxygen supplementation based on oxygen saturation or arterial blood gases   Assess the need for suctioning and aspirate as needed   Assess and instruct to report shortness of breath or any respiratory difficulty   Respiratory therapy support as indicated  6/4/2024 2354 by Cony Jorge, RN  Outcome: Progressing  Flowsheets (Taken 6/4/2024 1730 by Polly Peres, RN)  Achieves optimal ventilation and oxygenation:   Assess for changes in respiratory status   Assess for changes in mentation and behavior   Position to facilitate oxygenation and minimize respiratory effort   Oxygen supplementation based on oxygen saturation or arterial blood gases   Initiate smoking cessation protocol as indicated   Assess the need for suctioning and aspirate as needed   Assess and instruct to report shortness of breath or any respiratory

## 2024-06-09 NOTE — CARE COORDINATION
Transitional Planning  Informed Calderon she need to complete the eugene for this pt discharge at 11am.  Henrietta from [facility] called to give report to Calderon who is busy with pt care.  Provided Calderon with number to call report.  FOR NURSING REPORT CALL Henrietta Rn at [facility] [phone]   fAX AVS AND AUTH PAPERWORK TO [facility]  Blue packet in chart rack

## 2024-06-09 NOTE — PROGRESS NOTES
Division of Vascular Surgery          Patient seen in the morning and again in the evening.  Doing well, requiring fluid bolus to maintain BP, Hgb stable, urine output responding to fluid bolus.  Muscle in anterior lateral compartments not very swollen, healthy appearing and pink.  Medial compartment muscle quite swollen.  Skin incision extended by a few centimeters to allow muscle to expand, which it did.  Hopefully this should help, wound vac applied as well to help with wound care.  Continue supportive care, trend CK.  Abdomen is distended but this was his baseline preoperatively.  NGT with minimal output.  Palpable distal pulses.  Glad to see that his left foot is not in rigor mortis anymore, easily able to bend at the ankle now.    Electronically signed by Yen Motnague MD on 6/1/24 at 10:07 PM [facility] [place]  O: [phone]  C: [phone]  Email: [email or web address]   
     Division of Vascular Surgery         Progress Note      Name: David Vaca  MRN: [id number]         Overnight Events:      none    Hospital Summary      Acute aortoiliac occlusion and left lower extremity acute limb ischemia  To OR for emergent aortobifemoral bypass, LLE thrombectomy and LLE fasciotomy  Subjective:     Patietn seen and examined, afebrile, Vital within normal. No acute events overnight. Awakens to voice, follows commands.  Intact motor and sensory function.  Complains of abdominal pain, abdominal distention, low gas or bowel movement last couple days.    Physical Exam:     Vitals:  BP (!) 145/106   Pulse (!) 108   Temp 98.1 °F (36.7 °C) (Oral)   Resp 22   Ht 1.676 m (5' 6\")   Wt 71 kg (156 lb 8.4 oz)   SpO2 95%   BMI 25.26 kg/m²     Physical Exam  Constitutional:       General: He is not in acute distress.     Appearance: He is normal weight.   HENT:      Mouth/Throat:      Mouth: Mucous membranes are moist.      Pharynx: Oropharynx is clear.   Eyes:      Extraocular Movements: Extraocular movements intact.      Pupils: Pupils are equal, round, and reactive to light.   Cardiovascular:      Rate and Rhythm: Normal rate and regular rhythm.      Comments: Palpable dp pulses and pt signals  Pulmonary:      Comments: Mechanically ventilated, symmetric chest rise and fall  Abdominal:      General: Abdomen is flat. There is distension.      Tenderness: There is abdominal tenderness.   Skin:     General: Skin is warm.      Capillary Refill: Capillary refill takes less than 2 seconds.      Coloration: Skin is not jaundiced.      Findings: No bruising.      Comments: Wound vac in place with good seal   Neurological:      General: No focal deficit present.      Mental Status: He is alert.   Psychiatric:         Mood and Affect: Mood normal.         Imaging/Labs:     XR CHEST PORTABLE    Result Date: 6/2/2024  1. Stable mild left basilar airspace disease and small left-sided pleural effusion. 2. 
     Division of Vascular Surgery         Progress Note      Name: David Vaca  MRN: [id number]     6/9/2024   5:10 AM     Overnight Events:      No significant overnight events.    Hospital Summary      Acute aortoiliac occlusion and left lower extremity acute limb ischemia  To OR on 6/4 for emergent aortobifemoral bypass, LLE thrombectomy and LLE fasciotomy.  Fasciotomy closure on 6/8/24      Subjective:     Patient is post-op day 2 from fasciotomy closure   The patient is awake and oriented. Patient continues to be afebrile.   Patient has intact sensory and motor function bilaterally. Surgical wound is non-erythematous and non-painful. Patient does not complain of pain in the abdomen.    Ambulating with help of walker    Physical Exam:     Vitals:  /64   Pulse 82   Temp 98.1 °F (36.7 °C) (Oral)   Resp 18   Ht 1.67 m (5' 5.75\")   Wt 68.5 kg (151 lb 0.2 oz)   SpO2 92%   BMI 24.56 kg/m²     Physical Exam  Constitutional:       General: He is not in acute distress.     Appearance: He is normal weight.   HENT:      Mouth: Mucous membranes are moist.      Pharynx: Oropharynx is clear.   Eyes:      Extraocular Movements: Extraocular movements intact.      Pupils: Pupils are equal, round, and reactive to light.   Cardiovascular:      Rate and Rhythm: Normal rate and regular rhythm.      Comments: Palpable dp pulses and pt signals  Pulmonary:      Comments: Good breath sounds bilaterally, no wheezing, rhonchi, or rales.   Abdominal:      General: Abdomen is flat and non distended.     Tenderness: No tenderness or masses   Incision has staples and are clean dry and intact  Skin:     General: Skin is warm. No erythema at surgical site     Capillary Refill: Capillary refill takes less than 2 seconds.      Coloration: Skin is not jaundiced.      Findings: No bruising.      Comments: wound is clean dry and intact  Neurological:      General: No focal deficit present.      Mental Status: He is alert. 
     Division of Vascular Surgery         Progress Note      Name: David Vaca  MRN: [id number]         Overnight Events:      none    Hospital Summary      Acute aortoiliac occlusion and left lower extremity acute limb ischemia  To OR for emergent aortobifemoral bypass, LLE thrombectomy and LLE fasciotomy  Subjective:     Patietn seen and examined, afebrile, Vital within normal. No acute events overnight. Awakens to voice, follows commands. He does wiggle his left toes a little, endorses sensation to toes. UO improved.     Physical Exam:     Vitals:  /80   Pulse 64   Temp 98.2 °F (36.8 °C)   Resp 11   Ht 1.676 m (5' 6\")   Wt 65.4 kg (144 lb 2.9 oz)   SpO2 100%   BMI 23.27 kg/m²     Physical Exam  Constitutional:       General: He is not in acute distress.     Appearance: He is normal weight.   HENT:      Mouth/Throat:      Mouth: Mucous membranes are moist.      Pharynx: Oropharynx is clear.   Eyes:      Extraocular Movements: Extraocular movements intact.      Pupils: Pupils are equal, round, and reactive to light.   Cardiovascular:      Rate and Rhythm: Normal rate and regular rhythm.      Comments: Palpable dp pulses and pt signals  Pulmonary:      Comments: Mechanically ventilated, symmetric chest rise and fall  Abdominal:      General: Abdomen is flat. There is distension.      Tenderness: There is abdominal tenderness.   Skin:     General: Skin is warm.      Capillary Refill: Capillary refill takes less than 2 seconds.      Coloration: Skin is not jaundiced.      Findings: No bruising.      Comments: Wound vac in place with good seal   Neurological:      General: No focal deficit present.      Mental Status: He is alert.   Psychiatric:         Mood and Affect: Mood normal.         Imaging/Labs:     XR CHEST PORTABLE    Result Date: 6/2/2024  1. Stable mild left basilar airspace disease and small left-sided pleural effusion. 2. Stable cardiomegaly.  Mild pulmonary vascular congestion. 3. Tubes 
     Division of Vascular Surgery         Progress Note      Name: David Vaca  MRN: [id number]     6/8/2024   11:33 AM     Overnight Events:      No significant overnight events.    Hospital Summary      Acute aortoiliac occlusion and left lower extremity acute limb ischemia  To OR on 6/4 for emergent aortobifemoral bypass, LLE thrombectomy and LLE fasciotomy.  Fasciotomy closure on 6/8/24    Subjective:     Patient is post-op day 1 from fasciotomy closure   The is awake and oriented. Patient continues to be afebrile.   Patient has intact sensory and motor function bilaterally. Surgical wound is non-erythematous and non-painful. Patient does not complain of pain in the abdomen.      Physical Exam:     Vitals:  BP (!) 139/90   Pulse (!) 106   Temp 98 °F (36.7 °C) (Oral)   Resp 19   Ht 1.67 m (5' 5.75\")   Wt 68.5 kg (151 lb 0.2 oz)   SpO2 95%   BMI 24.56 kg/m²     Physical Exam  Constitutional:       General: He is not in acute distress.     Appearance: He is normal weight.   HENT:      Mouth: Mucous membranes are moist.      Pharynx: Oropharynx is clear.   Eyes:      Extraocular Movements: Extraocular movements intact.      Pupils: Pupils are equal, round, and reactive to light.   Cardiovascular:      Rate and Rhythm: Normal rate and regular rhythm.      Comments: Palpable dp pulses and pt signals  Pulmonary:      Comments: Good breath sounds bilaterally, no wheezing, rhonchi, or rales.   Abdominal:      General: Abdomen is flat and non distended.     Tenderness: No tenderness or masses   Incision has staples and are clean dry and intact  Skin:     General: Skin is warm. No erythema at surgical site     Capillary Refill: Capillary refill takes less than 2 seconds.      Coloration: Skin is not jaundiced.      Findings: No bruising.      Comments: wound is clean dry and intact  Neurological:      General: No focal deficit present.      Mental Status: He is alert.   Psychiatric:         Mood and Affect: Mood 
     Division of Vascular Surgery         Progress Note      Name: David Vaca  MRN: [id number]         Overnight Events:      No significant overnight events .    Subjective:     Patient is post-op day 3 from fasciotomy that is awake and oriented. Patient continues to be afebrile. Patient continues to have no abdominal pain with no abdominal tenderness to palpation. Recent BM was soft and was recommended to continue to look at consistencies of BM's. There has been no drainage of surgical site and continues to have appropriate dressing changes. Foot and ankle orthotic could not be put on due to leg swelling on left side. Patient is on PT/OT and states he sometimes cannot get himself up from bed and toileting. He was able to walk briefly with PT assistance.     Physical Exam:     Vitals:  BP (!) 157/92   Pulse 91   Temp 98.2 °F (36.8 °C) (Oral)   Resp 20   Ht 1.67 m (5' 5.75\")   Wt 68.5 kg (151 lb 0.2 oz)   SpO2 94%   BMI 24.56 kg/m²     Physical Exam  Constitutional:       General: He is not in acute distress.     Appearance: He is normal weight.   HENT:      Mouth: Mucous membranes are moist.      Pharynx: Oropharynx is clear.   Eyes:      Extraocular Movements: Extraocular movements intact.      Pupils: Pupils are equal, round, and reactive to light.   Cardiovascular:      Rate and Rhythm: Normal rate and regular rhythm.      Comments: Palpable dp pulses and pt signals bilaterally via doppler  Pulmonary:      Comments: Good breath sounds bilaterally, no wheezing, rhonchi, or rales.   Abdominal:      General: Abdomen is flat and non distended.     Tenderness: No tenderness or masses  Skin:     General: Skin is warm. No erythema at surgical site     Capillary Refill: Capillary refill takes less than 2 seconds.      Coloration: Skin is not jaundiced.      Findings: No bruising.      Comments: Wound vac in place with good seal   Neurological:      General: No focal deficit present.      Mental Status: He is 
     Division of Vascular Surgery         Progress Note      Name: David Vaca  MRN: [id number]         Overnight Events:      none    Hospital Summary      5/31/24: Acute aortoiliac occlusion and left lower extremity acute limb ischemia, to OR for emergent aortobifemoral bypass, LLE thrombectomy and LLE fasciotomy  Subjective:     Patietn seen and examined, afebrile, Vital within normal. No acute events overnight. Awakens to voice, follows commands.  Intact motor and sensory function.  Complains of abdominal pain, abdominal distention, continue to have no gas or bowel movement last couple days. Patient is asking medication to help with BM. Patient was finally up to chair, and walked a few with PT/OT.     Physical Exam:     Vitals:  BP (!) 148/87   Pulse 86   Temp 97.9 °F (36.6 °C) (Oral)   Resp 19   Ht 1.67 m (5' 5.75\")   Wt 68.5 kg (151 lb 0.2 oz)   SpO2 98%   BMI 24.56 kg/m²     Physical Exam  Constitutional:       General: He is not in acute distress.     Appearance: He is normal weight.   HENT:      Mouth/Throat:      Mouth: Mucous membranes are moist.      Pharynx: Oropharynx is clear.   Eyes:      Extraocular Movements: Extraocular movements intact.      Pupils: Pupils are equal, round, and reactive to light.   Cardiovascular:      Rate and Rhythm: Normal rate and regular rhythm.      Comments: Palpable dp pulses and pt signals  Pulmonary:      Comments: Mechanically ventilated, symmetric chest rise and fall  Abdominal:      General: Abdomen is flat. There is distension.      Tenderness: There is abdominal tenderness.   Skin:     General: Skin is warm.      Capillary Refill: Capillary refill takes less than 2 seconds.      Coloration: Skin is not jaundiced.      Findings: No bruising.      Comments: Wound vac in place with good seal   Neurological:      General: No focal deficit present.      Mental Status: He is alert.   Psychiatric:         Mood and Affect: Mood normal.         Imaging/Labs:     XR 
   06/02/24 0814   Vent Information   Vent Mode (S)  CPAP/PS   Ventilator Settings   FiO2  (S)  40 %   PEEP/CPAP (cmH2O) (S)  8   Pressure Support (cm H2O) (S)  8 cm H2O       
  ICU PROGRESS NOTE    PATIENT NAME: David Vaca  MEDICAL RECORD NO. [id number]  DATE: 6/2/2024    HD: # 2  S/p Aorto-bifemoral bypass with infra-renal clamp, lower extremity fasciotomy, LLE thrombectomy, Urology consulted intra-op for cystoscopy and alejandre placement    ACUTE DIAGNOSES/PLAN  aorto-bifemoral bypass and LLE fasciotomy. Urology was consulted intra-op for difficulty with alejandre placement, cystoscopy was completed.   Rhabdomyolysis  Hx [place]   Ng to suction   Pulse checks per protocol  ASA daily, ok for DVT ppx  Alejandre in place, strict intake and output  Urology consulted intra-op: leave alejandre in place for 2 weeks  LLE fasciotomy incisions, wet-to-dry dressing changes daily  Return to OR timing TBD         Midline incision with staples  Laina-op antibiotics for 3 more days per Dr. Montague     PIV     PLAN doing well, still has movement in left foot. Palpable DP and PT pulses. Will plan for wound vac change and possible fasciotomy closure tomorrow in the OR.   Ng suction for ileus        SUBJECTIVE    David Vaca is a 58 y.o. gentleman who presents to the ED today for as a transfer from [facility], with complaints of left lower extremity pain around the ankle.  Patient states this occurred suddenly this morning when he was on a walk and all of a sudden his legs gave out causing him to fall.  Patient states the pain worsened and he presented to [facility] where it was suspected that he had a cold leg due to the symptomatology, and a CTA with aorta with runoff was performed.       The CT showed extensive acute appearing thrombus extending from the infrarenal aorta both common iliac arteries with absent flow in the left common iliac, with some reconstitution in the left common femoral, but the complete occlusion reoccurs in the SFA, popliteal, and trifurcation arteries.  On the right side it showed thrombus extending to the right common artery with retrograde supply supplying the common and 
  Physician Progress Note      PATIENT:               MICHELLE GARCÍA  CSN #:                  [id number]  :                       1965  ADMIT DATE:       2024 6:27 PM  DISCH DATE:  RESPONDING  PROVIDER #:        Yen Long MD          QUERY TEXT:    Pt admitted with omnou-rr-wogfk occlusion, s/p bypass. Pt noted to have BP as   low as 77/50 post-op with drop in Hgb from 11.1 to 7.5, requiring pressors. If   possible, please document in the progress notes and discharge summary if you   are evaluating and/or treating any of the following:    The medical record reflects the following:  Risk Factors: s/p Aortobifemoral bypass, Left iliac, common femoral, profunda,   superficial femoral and popliteal thromboembolectomy, Right iliac artery   thrombectomy, LLE four compartment fasciotomy  Clinical Indicators:  lowest BP 77/50, Hgb 7.5 down from 11.1 on , per   trauma consult note \"was hypotensive, required small amount pressors, LA 3.6,   will give 1 Liter bolus. wean pressors as able. Monitor bp\", creat 1.4>0.9 on   admit, per op note EBL 555cc  Treatment: Levophed gtt, per op note Cell Saver blood returned: 250 ml, IVF:   3700 ml, 500 ml Albumin, trauma surg consult, IVF bolus, ICU monitoring    Thank you, Isabelle SAM,RN, CDI  email - [email or web address]  cell- [phone]  office hours [id number]  Options provided:  -- Hemorrhagic Shock d/t ABLA, resolved  -- Hemorrhagic Shock, resolved  -- Hypovolemic Shock, resolved  -- Hypovolemia/ABLA without Shock  -- Hypotension/ABLA without Shock  -- Other - I will add my own diagnosis  -- Disagree - Not applicable / Not valid  -- Disagree - Clinically unable to determine / Unknown  -- Refer to Clinical Documentation Reviewer    PROVIDER RESPONSE TEXT:    This patient is in hemorrhagic shock secondary to ABLA which is now resolved.    Query created by: Isabelle Coon on 6/3/2024 9:27 AM      Electronically signed by:  Yen Long MD 
 visited patient at bedside once he was admitted to the TICU/CVICU. Patient had been in CVOR throughout the evening for intervention. Patient was \"intubated\" at time of visit.  placed copy of completed Advance Directives in patient's paper chart outside room.  returned original and several copies of Advance Directive to patient's side table, per previous shift  request. Chaplains can make follow-up visit, per request. Chaplains can be reached 24/7 via IDx.    Chaplain Barajas     06/01/24 0153   Encounter Summary   Encounter Overview/Reason Follow-up   Service Provided For Patient not available   Referral/Consult From Other    Complexity of Encounter Low   Begin Time 0596       
ATTENDING ATTESTATION: I personally examined David Vaca today and confirmed the pertinent history, exam and medical decision making in the resident's note. Please note there may be additional comments below.    Additional Comments: Doing well, ambulated with therapy today, having bowel function.  Left leg swelling improved, will plan for fasciotomy closure tomorrow.  Ok for rehab when bed available, hopefully without wound vac needs if I am able to close his lateral fasciotomy site.        Electronically signed by Yen Montague MD on 6/6/2024 at 5:44 PM      [facility] [place]  O: [phone]  C: [phone]  Email: [email or web address]              Division of Vascular Surgery         Progress Note      Name: David Vaca  MRN: [id number]         Overnight Events:      No significant overnight events.    Hospital Summary      Acute aortoiliac occlusion and left lower extremity acute limb ischemia  To OR on 6/4 for emergent aortobifemoral bypass, LLE thrombectomy and LLE fasciotomy    Subjective:     Patient is post-op day 2 from fasciotomy that is awake and oriented. Patient continues to be afebrile. Pulse has gone up to 114, with BP slight increase to 152/92. Patient has intact sensory and motor function bilaterally. Surgical wound is non-erythematous and non-painful. Patient does not complain of pain in the abdomin. Patient states he is pooping normally with his last BM \"a little runny\". Alejandre catheter has not been uncomfortable for patient and states it is not painful at alejandre site. Wound vac change is scheduled tomorrow.    Physical Exam:     Vitals:  BP (!) 146/93   Pulse 95   Temp 98.6 °F (37 °C) (Oral)   Resp 23   Ht 1.67 m (5' 5.75\")   Wt 68.5 kg (151 lb 0.2 oz)   SpO2 92%   BMI 24.56 kg/m²     Physical Exam  Constitutional:       General: He is not in acute distress.     Appearance: He is normal weight.   HENT:      Mouth: Mucous membranes are moist.      
Advance Care Planning     Advance Care Planning Inpatient Note  [facility] Department    Today's Date: 6/1/2024  Unit: [facility]    Received request from HealthCare Provider.  Upon review of chart and communication with care team, patient's decision making abilities are not in question.. Patient and Healthcare Decision Maker was/were present in the room during visit.    Goals of ACP Conversation:  Discuss advance care planning documents    Health Care Decision Makers:       Primary Decision Maker: Eri Hernandez - Brother/Sister - [phone]    Secondary Decision Maker: Augustina Mcgee - Brother/Sister - [phone]  Summary:  Completed New Documents    Advance Care Planning Documents (Patient Wishes):  Healthcare Power of /Advance Directive Appointment of Health Care Agent     Assessment:  Patient appears to be coping but anxious. Patient is preparing for heart surgery and family is bedside at this time.  Patient has requested POA and appointed sisters as primary and secondary.  Sisters are bedside and appear to be concerned but coping.      Interventions:  Assisted in the completion of documents according to patient's wishes at this time    Care Preferences Communicated:   No    Outcomes/Plan:  New advance directive completed.    Electronically signed by DAGOBERTO Gallardo on 6/1/2024 at 12:04 AM             05/31/24 2030   Encounter Summary   Encounter Overview/Reason Advance Care Planning   Service Provided For Patient and family together   Referral/Consult From Nurse   Support System Family members   Last Encounter  05/31/24   Complexity of Encounter Moderate   Begin Time 2025   End Time  2045   Total Time Calculated 20 min   Advance Care Planning   Type Completed AD/ACP document(s)   Assessment/Intervention/Outcome   Assessment Anxious;Coping   Intervention Active listening;Discussed illness injury and it’s impact;Explored/Affirmed feelings, thoughts, concerns;Explored Coping Skills/Resources 
CYSTOSCOPY START @2149  AORTOBIFEM START @223  
Can palpate pedal pulses bilaterally confirmed with doppler.     Posterior tibial pulses can be found with doppler bilaterally.   
Comprehensive Nutrition Assessment    Type and Reason for Visit:  Reassess    Nutrition Recommendations/Plan:   Continue trickle feedings of Immune Enhancing formula at 10 mL/hr.  Suggest slowly advancing to goal 50 mL/hr = 1800 kcals, 113 gm protein per day.  Will monitor plans for start of oral diet, TF tolerance, labs, weights, and plan of care.     Malnutrition Assessment:  Malnutrition Status:  Insufficient data (06/01/24 2930)    Context:  Acute Illness       Nutrition Assessment:    Pt extubated on 6/2.  Pt started on trickle feedings of Immune Enhancing formula at 10 mL/hr to \"stimulate bowels\".  Discussed start of TF with RN - will provide goal rate recommendations.  Weight fluctuations noted.  Labs reviewed: Phos 2.1 mg/dL, Glucose 140-145 mg/dL.  Meds include: Dulcolax.    Nutrition Related Findings:    meds/labs reviewed.  hypoactive bowel sounds, distended abdomen.   Wound Type: Wound Vac, Multiple, Surgical Incision       Current Nutrition Intake & Therapies:    Average Meal Intake: NPO  Average Supplements Intake: NPO  Diet NPO Exceptions are: Sips of Water with Meds, Ice Chips, Sips of Clear Liquids, Popsicles  ADULT TUBE FEEDING; Nasogastric; Immune Enhancing; Continuous; 10; No; 30; Q 4 hours  Current Tube Feeding (TF) Orders:  Feeding Route: Nasogastric  Formula: Immune Enhancing  Schedule: Continuous  Feeding Regimen: trickle feedings at 10 mL/hr  Additives/Modulars: None  Water Flushes: 30 mL q 4 hrs  Current TF & Flush Orders Provides: At 10 mL/hr = 360 kcals, 23 gm protein  Goal TF & Flush Orders Provides: Immune Enhancing at 10 mL/hr = 360 kcals, 23 gm protein.  Suggest goal rate 50 mL/hr = 1800 kcals, 113 gm protein per day.    Additional Calorie Sources:  D5%0.45%NaCl with 20 mEq KCl at 125 mL/hr = 510 kcals/day.    Anthropometric Measures:  Height: 167 cm (5' 5.75\")  Ideal Body Weight (IBW): 140 lbs (64 kg)    Admission Body Weight: 65.4 kg (144 lb 2.9 oz)  Current Body Weight: 68.5 kg 
Occupational Therapy    [facility]  Occupational Therapy Not Seen Note    DATE: 2024    NAME: David Vaca  MRN: [id number]   : 1965      Patient not seen this date for Occupational Therapy due to:    Surgery/Procedure: LOWER EXTREMITY WOUND VAC EXCHANGE / FASCIOTOMY CLOSURE     Next Scheduled Treatment:     Electronically signed by NICK Caicedo on 2024 at 9:08 AM   
Occupational Therapy    [facility]  Occupational Therapy Not Seen Note    DATE: 2024    NAME: David Vaca  MRN: [id number]   : 1965      Patient not seen this date for Occupational Therapy due to:    Surgery/Procedure: Fasciotomy closure will pursue as able.     Electronically signed by NICK Barrientos on 2024 at 10:13 AM   
Occupational Therapy  Facility/Department: [facility] CAR 1- SICU  Occupational Therapy Initial Assessment    Name: David Vaca  : 1965  MRN: [id number]  Date of Service: 6/3/2024  Chief Complaint   Patient presents with    Cold Extremity     L leg      Discharge Recommendations:  Patient would benefit from continued therapy after discharge.Patient is currently unsafe to return to prior living environment at this time d/t functional deficits impacting patient's performance and safety with ADLs and functional tasks.   OT Equipment Recommendations  Equipment Needed: Yes  Mobility Devices: ADL Assistive Devices  ADL Assistive Devices: Shower Chair with back;Grab Bars - shower;Grab Bars - toilet;Toileting - Raised Toilet Seat with arms       Patient Diagnosis(es): The encounter diagnosis was Aortic thrombus (HCC).  Past Medical History:  has a past medical history of Arthritis, Depression, Dysphagia, GERD (gastroesophageal reflux disease), History of nephrolithiasis, HIV (human immunodeficiency virus infection) (HCC), and Hyperlipidemia.  Past Surgical History:  has a past surgical history that includes Foot surgery ('s); Cardiac catheterization (???); Colonoscopy (); Endoscopy, colon, diagnostic; other surgical history (Bilateral, 2017); other surgical history (Bilateral, 2017); other surgical history (Right, 2017); Nerve Surgery (Bilateral, 2017); Nerve Block Lumb Facet Level 1 Bilateral (Bilateral, 2017); Upper gastrointestinal endoscopy (,2007x2, ,); other surgical history (Bilateral, 2018); pr njx dx/ther agt pvrt facet jt lmbr/sac 2nd level (Bilateral, 3/6/2018); Lumbar spine surgery (Right, 2019); Lumbar spine surgery (Left, 2019); Pain management procedure (Right, 2020); Pain management procedure (Left, 3/2/2020); Pain management procedure (Right, 10/8/2020); Pain management procedure (Left, 2020); EGD (); Colonoscopy (); 
Order obtained for extubation.  SpO2 of 98 on 40% FiO2.   Patient extubated and placed on 3 liters/min via nasal cannula.   Post extubation SpO2 is 95% with HR  80 bpm and RR 20 breaths/min.    Patient had mild cough that was non-productive.  Extubation Well tolerated by patient..   Breath Sounds: clear    Joslyn Avitia RCP   12:17 PM  
Patient transported via stretcher by [facility] to [facility]. Patient left with all belongings.  
Peer to peer completed with Dr. Bocanegra-discussed therapy needs, medical necessity, wound care needs, etc.  Denial overturned and patient approved for ARU  
Physical Therapy        Physical Therapy Cancel Note      DATE: 2024    NAME: David Vaca  MRN: [id number]   : 1965      Patient not seen this date for Physical Therapy due to:    Surgery/Procedure: Fasciotomy closure will pursue as able.       Electronically signed by Alvin Ewing PTA on 2024 at 8:55 AM      
Physical Therapy  Facility/Department: [facility] CAR 2- STEPDOWN  Daily Treatment Note    Name: David Vaca  : 1965  MRN: [id number]  Date of Service: 2024    Discharge Recommendations:  Patient would benefit from continued therapy after discharge   PT Equipment Recommendations  Equipment Needed: No      Patient Diagnosis(es): The encounter diagnosis was Aortic thrombus (HCC).  Past Medical History:  has a past medical history of Arthritis, Depression, Dysphagia, GERD (gastroesophageal reflux disease), History of nephrolithiasis, HIV (human immunodeficiency virus infection) (HCC), and Hyperlipidemia.  Past Surgical History:  has a past surgical history that includes Foot surgery ('s); Cardiac catheterization (???); Colonoscopy (); Endoscopy, colon, diagnostic; other surgical history (Bilateral, 2017); other surgical history (Bilateral, 2017); other surgical history (Right, 2017); Nerve Surgery (Bilateral, 2017); Nerve Block Lumb Facet Level 1 Bilateral (Bilateral, 2017); Upper gastrointestinal endoscopy (,2007x2, ,); other surgical history (Bilateral, 2018); pr njx dx/ther agt pvrt facet jt lmbr/sac 2nd level (Bilateral, 3/6/2018); Lumbar spine surgery (Right, 2019); Lumbar spine surgery (Left, 2019); Pain management procedure (Right, 2020); Pain management procedure (Left, 3/2/2020); Pain management procedure (Right, 10/8/2020); Pain management procedure (Left, 2020); EGD (); Colonoscopy (); Pain management procedure (Bilateral, 2023); hernia repair (Left, 2024); Abdominal aortic aneurysm repair (N/A, 2024); femoral bypass (N/A, 2024); Cystoscopy (2024); and Leg Surgery (Left, 2024).    Assessment   Body Structures, Functions, Activity Limitations Requiring Skilled Therapeutic Intervention: Decreased functional mobility ;Decreased cognition;Decreased posture;Increased pain;Decreased 
Problem: OXYGENATION/RESPIRATORY FUNCTION  Goal: Patient will maintain patent airway  Outcome: Ongoing  Goal: Patient will achieve/maintain normal respiratory rate/effort  Respiratory rate and effort will be within normal limits for the patient  Outcome: Ongoing    Problem: MECHANICAL VENTILATION  Goal: Patient will maintain patent airway  Outcome: Ongoing  Goal: Oral health is maintained or improved  Outcome: Ongoing  Goal: ET tube will be managed safely  Outcome: Ongoing  Goal: Ability to express needs and understand communication  Outcome: Ongoing  Goal: Mobility/activity is maintained at optimum level for patient  Outcome: Ongoing    Problem: ASPIRATION PRECAUTIONS  Goal: Patient’s risk of aspiration is minimized  Outcome: Ongoing    Problem: SKIN INTEGRITY  Goal: Skin integrity is maintained or improved  Outcome: Ongoing                    
Pt Sister Augustina Ralston called for updates, stated her and her sister will be in tomorrow to see him.   
Pt moved his feet bilaterally on command.   
RN called patient's sister Eri and informed her of patient being moved to stepdown today room 2003.  
Received in PCC 2 from OR Phase 1 recovery initiated. Report from CRNA.  
Received pt to CAR 1 from  OR at 0345. Patient placed on monitor.  
Report called, pt transferred to 2003 per bed.    
Report given to Henrietta at [facility].  
(Comment)  Fluid (ml/day): per MD    Nutrition Diagnosis:   Inadequate oral intake related to other (comment) (recent extubation, surgery) as evidenced by NPO or clear liquid status due to medical condition    Nutrition Interventions:   Food and/or Nutrient Delivery: Start Oral Diet  Nutrition Education/Counseling: No recommendation at this time  Coordination of Nutrition Care: Continue to monitor while inpatient  Plan of Care discussed with: RN    Goals:  Previous Goal Met: Progressing toward Goal(s)  Goals: Meet at least 75% of estimated needs, prior to discharge       Nutrition Monitoring and Evaluation:   Behavioral-Environmental Outcomes: None Identified  Food/Nutrient Intake Outcomes: Diet Advancement/Tolerance  Physical Signs/Symptoms Outcomes: Biochemical Data, GI Status, Constipation, Fluid Status or Edema, Weight, Skin, Nutrition Focused Physical Findings, Hemodynamic Status    Discharge Planning:    Too soon to determine     Polly Hayes RD  Contact: [phone]    
change MWF   - Encourage ambulation, PT/OT   - Placed PMNR for placement     Electronically signed by EVA BROWN on 6/5/24 at 6:39 AM EDT      [facility]                    
rate and regular rhythm.      Comments: RLE + PT pulse  LLE +DP pulse  Pulmonary:      Comments: Mechanically ventilated  Abdominal:      Comments: Midline dressing in place clean, dry and intact   Musculoskeletal:      Cervical back: Normal range of motion. No rigidity.      Comments: LLE with four compartment fasciotomy incisions   Skin:     General: Skin is warm and dry.   Neurological:      Comments: Sedated on prec, AOX2       LAB:  CBC:   Recent Labs     05/31/24  1340 05/31/24  2152 06/01/24  0421 06/01/24  1300 06/01/24  1856 06/02/24  0309   WBC 16.3*  --  10.5  --   --  7.8   HGB 12.3*   < > 10.9* 10.0* 8.4* 7.6*   HCT 33.7*   < > 29.8* 27.8* 24.0* 22.1*   .8*  --  110.0*  --   --  115.7*     --  177  --   --  123*    < > = values in this interval not displayed.       BMP:   Recent Labs     06/01/24  1509 06/01/24  1732 06/02/24  0309    138 139   K 4.4 4.4 3.9   * 107 108*   CO2 20 23 23   BUN 15 16 18   CREATININE 1.3* 1.4* 1.4*   GLUCOSE 124* 132* 122*           RADIOLOGY:  XR CHEST PORTABLE    Result Date: 6/2/2024  1. Stable mild left basilar airspace disease and small left-sided pleural effusion. 2. Stable cardiomegaly.  Mild pulmonary vascular congestion. 3. Tubes and right IJ line as above.     XR CHEST PORTABLE    Result Date: 6/1/2024  1. Mild left basilar airspace disease and small left-sided effusion. Follow-up is recommended to document resolution. 2. Mild elevation of the left hemidiaphragm.  Underlying emphysema. 3. Tubes and right IJ line as above.         Ranjit Osman MD  6/2/2024, 3:38 PM    
and mobility- min A-CGA + RW  Additional Comments: Pt. shane CURTIS Tolerance to ADL activity, Pt. is SOB, cues needed for pursed lip breathing/EC/WS, ptMaik CURTIS carryover.  Skin Care: Chlorhexidine solution;Soap and water                 Cognition  Overall Cognitive Status: Exceptions  Arousal/Alertness: Appropriate responses to stimuli  Following Commands: Follows one step commands with increased time;Follows one step commands with repetition  Attention Span: Attends with cues to redirect  Memory: Decreased recall of recent events  Safety Judgement: Good awareness of safety precautions;Decreased awareness of need for assistance  Problem Solving: Decreased awareness of errors;Assistance required to correct errors made;Assistance required to implement solutions;Assistance required to identify errors made;Assistance required to generate solutions  Initiation: Requires cues for all  Sequencing: Requires cues for all  Orientation  Overall Orientation Status: Within Functional Limits  Orientation Level: Oriented to place;Oriented to time;Oriented to person;Oriented to situation                  Education Given To: Patient  Education Provided: Role of Therapy;Plan of Care;Fall Prevention Strategies;Mobility Training;Precautions;ADL Adaptive Strategies;Transfer Training;Equipment;Orientation;Energy Conservation  Education Method: Verbal;Demonstration;Teach Back  Barriers to Learning: Cognition  Education Outcome: Verbalized understanding;Demonstrated understanding;Continued education needed                                                  AM-PAC - ADL  AM-PAC Daily Activity - Inpatient   How much help is needed for putting on and taking off regular lower body clothing?: A Lot  How much help is needed for bathing (which includes washing, rinsing, drying)?: A Little  How much help is needed for toileting (which includes using toilet, bedpan, or urinal)?: A Little  How much help is needed for putting on and taking off regular 
moving from lying on your back to sitting on the side of a flat bed without using bedrails?: A Little  How much help is needed moving to and from a bed to a chair?: A Little  How much help is needed standing up from a chair using your arms?: A Little  How much help is needed walking in hospital room?: A Lot  How much help is needed climbing 3-5 steps with a railing?: Total  AM-PAC Inpatient Mobility Raw Score : 15  AM-PAC Inpatient T-Scale Score : 39.45  Mobility Inpatient CMS 0-100% Score: 57.7  Mobility Inpatient CMS G-Code Modifier : CK       Goals  Short Term Goals  Time Frame for Short Term Goals: 14 visits  Short Term Goal 1: Supine to/from sit with SBA.  Short Term Goal 2: Sit to/from stand SBA.  Short Term Goal 3: Amb 50' RW CGA.  Short Term Goal 4: Improve LE strength to 4/5 to support function.       Education  Patient Education  Education Given To: Patient  Education Provided: Role of Therapy;Plan of Care;Transfer Training;Energy Conservation  Education Method: Verbal;Demonstration  Barriers to Learning: None  Education Outcome: Verbalized understanding      Therapy Time   Individual Concurrent Group Co-treatment   Time In 1025         Time Out 1120         Minutes 55         Timed Code Treatment Minutes: 40 Minutes       Alvin Young, PT         
     Time Out 1027         Minutes 30         Timed Code Treatment Minutes: 8 Minutes       Deedee Yepez, PT

## 2024-06-09 NOTE — PROGRESS NOTES
disease.  He says he does not use oxygen at home.  He is starting the intensive rehab treatment today.      Rehabilitation:  PT: Reviewed.    Initial evaluation in progress and pending      OT: Reviewed.    Initial evaluation in progress and pending      ST: N/A      Review of Systems:  Review of Systems   Constitutional:  Negative for chills, diaphoresis, fatigue and fever.   HENT:  Negative for hearing loss, rhinorrhea, sneezing, sore throat and trouble swallowing.    Eyes:  Negative for visual disturbance.   Respiratory:  Positive for shortness of breath (With exertion). Negative for cough and wheezing.    Cardiovascular:  Negative for chest pain and palpitations.   Gastrointestinal:  Negative for abdominal pain, constipation, diarrhea, nausea and vomiting.   Genitourinary:  Positive for urgency. Negative for difficulty urinating and dysuria.   Musculoskeletal:  Positive for back pain (Lower back), gait problem and myalgias (Right thigh). Negative for neck pain.   Skin:  Negative for rash.   Neurological:  Positive for weakness (Left foot) and numbness (Left lower extremity from the knee downward). Negative for dizziness, tremors, speech difficulty, light-headedness and headaches.   Psychiatric/Behavioral:  Negative for dysphoric mood, hallucinations and sleep disturbance. The patient is not nervous/anxious.         Objective:  BP (!) 123/94   Pulse 92   Temp 98.1 °F (36.7 °C) (Oral)   Resp 16   Ht 1.676 m (5' 6\")   Wt 63.5 kg (140 lb 1.6 oz)   SpO2 100%   BMI 22.61 kg/m²   Physical Exam   General:  well-developed, well nourished  male; in no acute distress ; appropriate affect & mood; sitting on reclining chair comfortably  Eyes: pupil equally round ; extra-ocular motion intact bilaterally  Head, Ear, Nose, Mouth & Throat : normocephalic ; no discharge from ears or nose ; no deformity ; no facial swelling ; oral mucosa pink   Neck :  supple ; no tenderness ; no muscle spasm  Cardiovascular :  sensation at bilateral upper extremities; reduced light touch and pinprick sensation at distal left lower extremity from lower leg level below the gauze bandage downward in socks distribution  Motor : normal tone at bilateral upper & lower extremities ; 4-/5 muscle strength at the right hip flexion ; 4-/5 to 4+/5 muscle strength at left hip flexion; 4+/5 to 5/5 muscle strength at bilateral knee extension; 4+/5 muscle strength at bilateral knee flexion; 3+/5 to 4-/5 muscle strength at the left ankle plantarflexion; 2-/5 to 2+/5 muscle strength at left ankle dorsiflexion; 2-/5 muscle strength at the left big toe extension; normal 5/5 muscle strength at the rest of bilateral upper & lower extremities  Reflex : 2+ bilateral biceps, bilateral brachioradialis, bilateral knee reflexes  Pathological Reflex :  No Asia's sign ; no ankle clonus  Gait : Not assessed      Diagnostics:   Recent Results (from the past 24 hour(s))   POC Glucose Fingerstick    Collection Time: 06/09/24 11:46 AM   Result Value Ref Range    POC Glucose 189 (H) 75 - 110 mg/dL   POCT glucose    Collection Time: 06/09/24  4:41 PM   Result Value Ref Range    POC Glucose 108 70 - 108 mg/dl   POCT glucose    Collection Time: 06/09/24  7:23 PM   Result Value Ref Range    POC Glucose 228 (H) 70 - 108 mg/dl   Prealbumin    Collection Time: 06/10/24  6:00 AM   Result Value Ref Range    Prealbumin 17.3 (L) 20.0 - 40.0 mg/dl   CBC auto differential    Collection Time: 06/10/24  6:00 AM   Result Value Ref Range    WBC 10.9 (H) 4.8 - 10.8 thou/mm3    RBC 2.12 (L) 4.70 - 6.10 mill/mm3    Hemoglobin 8.5 (L) 14.0 - 18.0 gm/dl    Hematocrit 24.9 (L) 42.0 - 52.0 %    .5 (H) 80.0 - 94.0 fL    MCH 40.1 (H) 26.0 - 33.0 pg    MCHC 34.1 32.2 - 35.5 gm/dl    RDW-CV 12.6 11.5 - 14.5 %    RDW-SD 54.1 (H) 35.0 - 45.0 fL    Platelets 351 130 - 400 thou/mm3    MPV 9.8 9.4 - 12.4 fL    Pathologist Review PCF     Seg Neutrophils 57.6 %    Lymphocytes 31.1 %    Monocytes %

## 2024-06-10 ENCOUNTER — APPOINTMENT (OUTPATIENT)
Dept: CT IMAGING | Age: 59
DRG: 949 | End: 2024-06-10
Attending: PHYSICAL MEDICINE & REHABILITATION
Payer: MEDICARE

## 2024-06-10 LAB
ALBUMIN SERPL BCG-MCNC: 2.7 G/DL (ref 3.5–5.1)
ALP SERPL-CCNC: 63 U/L (ref 38–126)
ALT SERPL W/O P-5'-P-CCNC: 69 U/L (ref 11–66)
ANION GAP SERPL CALC-SCNC: 8 MEQ/L (ref 8–16)
AST SERPL-CCNC: 48 U/L (ref 5–40)
BASOPHILS ABSOLUTE: 0 THOU/MM3 (ref 0–0.1)
BASOPHILS NFR BLD AUTO: 0.4 %
BILIRUB SERPL-MCNC: 0.3 MG/DL (ref 0.3–1.2)
BUN SERPL-MCNC: 16 MG/DL (ref 7–22)
CALCIUM SERPL-MCNC: 8.5 MG/DL (ref 8.5–10.5)
CHLORIDE SERPL-SCNC: 102 MEQ/L (ref 98–111)
CHOLEST SERPL-MCNC: 141 MG/DL (ref 100–199)
CK SERPL-CCNC: 113 U/L (ref 55–170)
CO2 SERPL-SCNC: 25 MEQ/L (ref 23–33)
CREAT SERPL-MCNC: 0.8 MG/DL (ref 0.4–1.2)
DEPRECATED MEAN GLUCOSE BLD GHB EST-ACNC: 114 MG/DL (ref 70–126)
DEPRECATED RDW RBC AUTO: 54.1 FL (ref 35–45)
EOSINOPHIL NFR BLD AUTO: 2.9 %
EOSINOPHILS ABSOLUTE: 0.3 THOU/MM3 (ref 0–0.4)
ERYTHROCYTE [DISTWIDTH] IN BLOOD BY AUTOMATED COUNT: 12.6 % (ref 11.5–14.5)
GFR SERPL CREATININE-BSD FRML MDRD: > 90 ML/MIN/1.73M2
GLUCOSE BLD STRIP.AUTO-MCNC: 110 MG/DL (ref 70–108)
GLUCOSE BLD STRIP.AUTO-MCNC: 92 MG/DL (ref 70–108)
GLUCOSE SERPL-MCNC: 121 MG/DL (ref 70–108)
HBA1C MFR BLD HPLC: 5.8 % (ref 4.4–6.4)
HCT VFR BLD AUTO: 24.9 % (ref 42–52)
HDLC SERPL-MCNC: 29 MG/DL
HGB BLD-MCNC: 8.5 GM/DL (ref 14–18)
IMM GRANULOCYTES # BLD AUTO: 0.28 THOU/MM3 (ref 0–0.07)
IMM GRANULOCYTES NFR BLD AUTO: 2.6 %
LDLC SERPL CALC-MCNC: 71 MG/DL
LYMPHOCYTES ABSOLUTE: 3.4 THOU/MM3 (ref 1–4.8)
LYMPHOCYTES NFR BLD AUTO: 31.1 %
MACROCYTES BLD QL SMEAR: PRESENT
MCH RBC QN AUTO: 40.1 PG (ref 26–33)
MCHC RBC AUTO-ENTMCNC: 34.1 GM/DL (ref 32.2–35.5)
MCV RBC AUTO: 117.5 FL (ref 80–94)
MONOCYTES ABSOLUTE: 0.6 THOU/MM3 (ref 0.4–1.3)
MONOCYTES NFR BLD AUTO: 5.4 %
NEUTROPHILS ABSOLUTE: 6.3 THOU/MM3 (ref 1.8–7.7)
NEUTROPHILS NFR BLD AUTO: 57.6 %
NRBC BLD AUTO-RTO: 0 /100 WBC
PATHOLOGIST REVIEW: ABNORMAL
PLATELET # BLD AUTO: 351 THOU/MM3 (ref 130–400)
PLATELET BLD QL SMEAR: ADEQUATE
PMV BLD AUTO: 9.8 FL (ref 9.4–12.4)
POLYCHROMASIA BLD QL SMEAR: ABNORMAL
POTASSIUM SERPL-SCNC: 4.2 MEQ/L (ref 3.5–5.2)
PREALB SERPL-MCNC: 17.3 MG/DL (ref 20–40)
PROT SERPL-MCNC: 5.3 G/DL (ref 6.1–8)
RBC # BLD AUTO: 2.12 MILL/MM3 (ref 4.7–6.1)
SCAN OF BLOOD SMEAR: NORMAL
SODIUM SERPL-SCNC: 135 MEQ/L (ref 135–145)
TRIGL SERPL-MCNC: 207 MG/DL (ref 0–199)
WBC # BLD AUTO: 10.9 THOU/MM3 (ref 4.8–10.8)

## 2024-06-10 PROCEDURE — 85025 COMPLETE CBC W/AUTO DIFF WBC: CPT

## 2024-06-10 PROCEDURE — 6370000000 HC RX 637 (ALT 250 FOR IP): Performed by: PHYSICAL MEDICINE & REHABILITATION

## 2024-06-10 PROCEDURE — 80053 COMPREHEN METABOLIC PANEL: CPT

## 2024-06-10 PROCEDURE — 80061 LIPID PANEL: CPT

## 2024-06-10 PROCEDURE — 72192 CT PELVIS W/O DYE: CPT

## 2024-06-10 PROCEDURE — 1180000000 HC REHAB R&B

## 2024-06-10 PROCEDURE — 94761 N-INVAS EAR/PLS OXIMETRY MLT: CPT

## 2024-06-10 PROCEDURE — 97110 THERAPEUTIC EXERCISES: CPT

## 2024-06-10 PROCEDURE — 97166 OT EVAL MOD COMPLEX 45 MIN: CPT

## 2024-06-10 PROCEDURE — 36415 COLL VENOUS BLD VENIPUNCTURE: CPT

## 2024-06-10 PROCEDURE — 97530 THERAPEUTIC ACTIVITIES: CPT

## 2024-06-10 PROCEDURE — 94640 AIRWAY INHALATION TREATMENT: CPT

## 2024-06-10 PROCEDURE — 6360000002 HC RX W HCPCS: Performed by: PHYSICAL MEDICINE & REHABILITATION

## 2024-06-10 PROCEDURE — 97162 PT EVAL MOD COMPLEX 30 MIN: CPT

## 2024-06-10 PROCEDURE — 97535 SELF CARE MNGMENT TRAINING: CPT

## 2024-06-10 PROCEDURE — 82948 REAGENT STRIP/BLOOD GLUCOSE: CPT

## 2024-06-10 PROCEDURE — 84134 ASSAY OF PREALBUMIN: CPT

## 2024-06-10 PROCEDURE — 83036 HEMOGLOBIN GLYCOSYLATED A1C: CPT

## 2024-06-10 PROCEDURE — 99232 SBSQ HOSP IP/OBS MODERATE 35: CPT | Performed by: PHYSICAL MEDICINE & REHABILITATION

## 2024-06-10 PROCEDURE — 82550 ASSAY OF CK (CPK): CPT

## 2024-06-10 RX ORDER — TAMSULOSIN HYDROCHLORIDE 0.4 MG/1
0.4 CAPSULE ORAL DAILY
Status: DISCONTINUED | OUTPATIENT
Start: 2024-06-10 | End: 2024-06-15 | Stop reason: HOSPADM

## 2024-06-10 RX ADMIN — LAMIVUDINE AND ZIDOVUDINE 1 TABLET: 150; 300 TABLET, FILM COATED ORAL at 09:23

## 2024-06-10 RX ADMIN — PANTOPRAZOLE SODIUM 40 MG: 40 TABLET, DELAYED RELEASE ORAL at 06:34

## 2024-06-10 RX ADMIN — ACETAMINOPHEN 650 MG: 325 TABLET ORAL at 14:02

## 2024-06-10 RX ADMIN — FOSAMPRENAVIR CALCIUM 1400 MG: 700 TABLET, COATED ORAL at 21:31

## 2024-06-10 RX ADMIN — Medication 6 MG: at 21:25

## 2024-06-10 RX ADMIN — DICLOFENAC SODIUM 2 G: 10 GEL TOPICAL at 12:25

## 2024-06-10 RX ADMIN — SENNOSIDES 17.2 MG: 8.6 TABLET, FILM COATED ORAL at 21:25

## 2024-06-10 RX ADMIN — CETIRIZINE HYDROCHLORIDE 10 MG: 10 TABLET ORAL at 09:25

## 2024-06-10 RX ADMIN — DICLOFENAC SODIUM 2 G: 10 GEL TOPICAL at 18:53

## 2024-06-10 RX ADMIN — FLUOXETINE HYDROCHLORIDE 20 MG: 20 CAPSULE ORAL at 09:25

## 2024-06-10 RX ADMIN — GABAPENTIN 300 MG: 300 CAPSULE ORAL at 18:53

## 2024-06-10 RX ADMIN — ISOSORBIDE DINITRATE 20 MG: 20 TABLET ORAL at 09:25

## 2024-06-10 RX ADMIN — DOCUSATE SODIUM 100 MG: 100 CAPSULE, LIQUID FILLED ORAL at 09:25

## 2024-06-10 RX ADMIN — METOPROLOL TARTRATE 25 MG: 25 TABLET, FILM COATED ORAL at 21:28

## 2024-06-10 RX ADMIN — OLANZAPINE 10 MG: 10 TABLET, FILM COATED ORAL at 21:28

## 2024-06-10 RX ADMIN — ENOXAPARIN SODIUM 40 MG: 100 INJECTION SUBCUTANEOUS at 21:25

## 2024-06-10 RX ADMIN — METHOCARBAMOL 750 MG: 500 TABLET ORAL at 21:27

## 2024-06-10 RX ADMIN — ASPIRIN 81 MG 81 MG: 81 TABLET ORAL at 09:25

## 2024-06-10 RX ADMIN — TRAZODONE HYDROCHLORIDE 50 MG: 50 TABLET ORAL at 21:26

## 2024-06-10 RX ADMIN — DICLOFENAC SODIUM 2 G: 10 GEL TOPICAL at 21:30

## 2024-06-10 RX ADMIN — METOPROLOL TARTRATE 25 MG: 25 TABLET, FILM COATED ORAL at 09:25

## 2024-06-10 RX ADMIN — TAMSULOSIN HYDROCHLORIDE 0.4 MG: 0.4 CAPSULE ORAL at 14:02

## 2024-06-10 RX ADMIN — LAMIVUDINE AND ZIDOVUDINE 1 TABLET: 150; 300 TABLET, FILM COATED ORAL at 21:31

## 2024-06-10 RX ADMIN — DOCUSATE SODIUM 100 MG: 100 CAPSULE, LIQUID FILLED ORAL at 21:26

## 2024-06-10 RX ADMIN — OXYCODONE HYDROCHLORIDE 5 MG: 5 TABLET ORAL at 21:26

## 2024-06-10 RX ADMIN — ATORVASTATIN CALCIUM 40 MG: 40 TABLET, FILM COATED ORAL at 21:28

## 2024-06-10 RX ADMIN — FOSAMPRENAVIR CALCIUM 1400 MG: 700 TABLET, COATED ORAL at 09:22

## 2024-06-10 RX ADMIN — ACETAMINOPHEN 650 MG: 325 TABLET ORAL at 18:53

## 2024-06-10 RX ADMIN — ISOSORBIDE DINITRATE 20 MG: 20 TABLET ORAL at 21:28

## 2024-06-10 RX ADMIN — NALOXEGOL OXALATE 12.5 MG: 12.5 TABLET, FILM COATED ORAL at 06:34

## 2024-06-10 RX ADMIN — TIOTROPIUM BROMIDE AND OLODATEROL 2 PUFF: 3.124; 2.736 SPRAY, METERED RESPIRATORY (INHALATION) at 09:22

## 2024-06-10 RX ADMIN — GABAPENTIN 300 MG: 300 CAPSULE ORAL at 09:24

## 2024-06-10 RX ADMIN — FENOFIBRATE 135 MG: 54 TABLET ORAL at 09:23

## 2024-06-10 RX ADMIN — ACETAMINOPHEN 650 MG: 325 TABLET ORAL at 06:34

## 2024-06-10 ASSESSMENT — ENCOUNTER SYMPTOMS
CONSTIPATION: 0
TROUBLE SWALLOWING: 0
SORE THROAT: 0
BACK PAIN: 1
RHINORRHEA: 0
VOMITING: 0
NAUSEA: 0
WHEEZING: 0
SHORTNESS OF BREATH: 1
ABDOMINAL PAIN: 0
COUGH: 0
DIARRHEA: 0

## 2024-06-10 ASSESSMENT — PAIN DESCRIPTION - PAIN TYPE: TYPE: SURGICAL PAIN

## 2024-06-10 ASSESSMENT — PAIN DESCRIPTION - ORIENTATION: ORIENTATION: LEFT

## 2024-06-10 ASSESSMENT — PAIN DESCRIPTION - LOCATION: LOCATION: LEG;INCISION

## 2024-06-10 ASSESSMENT — PAIN SCALES - GENERAL
PAINLEVEL_OUTOF10: 0
PAINLEVEL_OUTOF10: 8
PAINLEVEL_OUTOF10: 0

## 2024-06-10 ASSESSMENT — PAIN - FUNCTIONAL ASSESSMENT: PAIN_FUNCTIONAL_ASSESSMENT: ACTIVITIES ARE NOT PREVENTED

## 2024-06-10 ASSESSMENT — PAIN DESCRIPTION - DESCRIPTORS: DESCRIPTORS: THROBBING

## 2024-06-10 NOTE — PLAN OF CARE
Problem: Discharge Planning  Goal: Discharge to home or other facility with appropriate resources  Note:   [facility]  Physical Medicine Case Management Assessment    [x] Inpatient Rehabilitation Unit    Patient Name: David Vaca        MRN: [id number]    : 1965  (58 y.o.)  Gender: male   Date of Admission: 2024  2:50 PM    Family/Social/Home Environment:   Prior to admission, patient was living alone. Patient reported being independent at home. Patient was completing his ADLs, housekeeping, meal prep, errands, finances and driving. Patient reports having his own car, but having issues with it. Patient is requesting information on transportation services for discharge. His brother in law can assist with transport occasionally. Patient does not work and has been on SSDI. Patient's main support is his two sisters, Eri and Augustina. They both live in neighboring towns, but work during the day. Patient could not identify additional support. Patient's family practitioner is ALIZE Casanova. Patient prefers [facility]. Patient reports that he has been borrowing a walker from a family member. Patient is motivated to participate in therapy.    Social/Functional History  Lives With: Alone  Type of Home: Apartment  Home Layout: One level  Entrance Stairs - Number of Steps: 1, 2-3\" step to enter home  Entrance Stairs - Rails: None  Bathroom Shower/Tub: Tub/Shower unit  Bathroom Toilet: Standard (with sink and cabinet nearby.)  Bathroom Accessibility: Walker accessible  Home Equipment: None  Has the patient had two or more falls in the past year or any fall with injury in the past year?: No  ADL Assistance: Independent  Homemaking Assistance: Independent  Homemaking Responsibilities: Yes  Ambulation Assistance: Independent  Transfer Assistance: Independent  Active : Yes  Mode of Transportation: Car  Additional Comments: Patient independent at [facility], only used rollator for a day

## 2024-06-10 NOTE — PROGRESS NOTES
[facility]  Individualized Disclosure Statement      Patient: David Vaca      Scope of Service  [facility] provides 24 hour individualized service to patients with functional limitations due to, but not limited to: stroke, brain injury, spinal cord injury, major multiple trauma, fractures, amputation, and neurological disorders. The Rehabilitation Center provides rehabilitative nursing and medical services as well as physical, occupational, speech, and recreation therapies.  [facility] is fully accredited by the Commission on Accreditation of Rehabilitation Facilities (CARF) as a comprehensive provider of rehabilitation services.  Patients admitted to the [facility] receive a minimum of three hours of therapy per day, at least five days per week, with a revised therapy schedule on weekends and holidays.  Physical therapy, occupational therapy, and speech therapy are provided seven days per week including holidays.  Other therapeutic services are available on weekends and evenings as needed or scheduled.  Intensity of Treatment  Your treatment program will consist of Nursing Care and:  1.5 hours of Physical Therapy, per day  1.5 hours of Occupational Therapy, per day   30-60 minutes of Speech Therapy, per day  1 hour of Recreational Therapy, per week  Depending on your needs, the exact time spent with each of the above disciplines may fluctuate, however you will receive at least 3 hours of therapy at least 5 days per week.    [facility] maintains contracts with most insurance plans.  Depending on the type of coverage, the insurance may impose limits on the coverage for rehabilitation care.  Coverage is based on the premise that you are able to fully participate in the rehabilitation program and show continued progress. Please verify your own insurance information. A

## 2024-06-10 NOTE — PLAN OF CARE
Problem: Respiratory - Adult  Goal: Clear lung sounds  Outcome: Progressing  Note: Patients breath sounds are clear and diminished throughout all lung fields. Continue taking inhalers as ordered to improve aeration.

## 2024-06-10 NOTE — PLAN OF CARE
Individualized Plan of Care  [facility] Inpatient Rehabilitation Unit    Rehabilitation physician: Dr. Spears    Admit Date: 6/9/2024     Rehabilitation Diagnosis: Debility [R53.81]  S/P aortobifemoral bypass surgery [Z95.828]      Rehabilitation impairments: self care, mobility, motor dysfunction, bowel/bladder management, pain management, safety, and cognitive function    Factors facilitating achievement of predicted outcomes: Motivated, Cooperative, and Pleasant  Barriers to the achievement of predicted outcomes: Pain, No family support, Cognitive deficit, Impulsivity, Limited safety awareness, Limited insight into deficits, Unrealistic expectations, Decreased endurance, Decreased sensation, Lower extremity weakness, Incontinence of bladder, Stairs at home, Skin Care, and Wound Care    Patient Goals: Improve independence with mobility, Improvement of mobility at a wheelchair level, Increase overall strength and endurance, Increase balance, Increase endurance, Increase independence with activities of daily living, Improve cognition, Increase self-awareness, Increase safety awareness, Increase community integration, Increase socialization, Functional communication with caregivers, Integrate appropriate pain management plan, Assure adequate nutritional option for discharge, Continence of bowel and bladder, and Provide appropriate patient and family education      NURSING:  Nursing goals for David BREN Vaca while on the rehabilitation unit will include:  Continence of bowel and bladder, Adequate number of bowel movements, Urinate with no urinary retention >300ml in bladder, Complete bladder protocol with alejandre removal, Maintain O2 SATs at an acceptable level during stay, Effective pain management while on the rehabilitation unit, Establish adequate pain control plan for discharge, Absence of skin breakdown while on the rehabilitation unit, Improved skin integrity via assessments including wound  Patient will complete car transfer with modified independence to transfer in and out of vehicle safely.  Additional Goals?: Yes  Long term goal 6: Patient will increase tinetti to greater than or equal to 24/28 to reduce his risk for falls.    Plan of Care: Patient to be seen by physical therapy services  (5x/wk 90min)       Anticipated interventions may include therapeutic exercises, gait training, neuromuscular re-ed, transfer training, community reintegration, bed mobility, w/c mobility and training.      OCCUPATIONAL THERAPY:  Goals:             Short Term Goals  Time Frame for Short Term Goals: 1 week  Short Term Goal 1: GOAL MET, DISONTINUE  Short Term Goal 2: Pt will recall use of adaptive technique to thread LB clothing and manage over hips with min assist to improve indep with daily dressing tasks.  Short Term Goal 3: Pt will safely navigate RW within room to retreive 3 items with Mod I for walker safety to improve safety and ability to retrieve clothing from closet.  Short Term Goal 4: Pt complete tub/shower transfers using least restrictive AE with SBA to improve ability to shower in home tub/shower  Short Term Goal 5: Pt will describe 3 fall prevetion strategies he can implement at home to reduce fall risk.  Long Term Goals  Time Frame for Long Term Goals : 2 weeks from OT evaluation  Long Term Goal 1: Pt will use adaptive technique prn to complete bathing, dressing, toileting and grooming tasks with modified independence to improve indep within self care routine at home.  Long Term Goal 2: Pt will complete simple meal prep tasks with modified independence to be able to return to using air fryer at home.  Long Term Goal 3: Pt will use least restrictive AE to complete light housekeeping tasks with mod I to meet patient's goal of preparing for apartment inspection.    Plan of Care: Patient to be seen by occupational therapy services 5x/wk for 90 min     Anticipated interventions may include ADL and IADL

## 2024-06-10 NOTE — PROGRESS NOTES
This Pre Admission Screen is a refiled document from the acute stay. The Pre Admission was completed and signed on 2024 at 4:02 by Dr. Darrian Spears.      [facility]  Acute Inpatient Rehab Preadmission Assessment     Patient Name: David Vaca        Ethnicity:Not of , /a, or Gabonese origin  Race:White  MRN:   [id number]    : 1965  (58 y.o.)  Gender: male      Admitted from: [facility]   Initial Assessment Pre-admission assessment completed via review of faxed medical records and review of patient information with staff. Pre-Admission assessment discussed with physiatrist and approved for admission     Date of admission to the hospital: 2024  Date patient eligible for admission:2024     Primary Diagnosis: Debility secondary to aortobifemoral bypass      Did patient have surgery?  yes -      Procedure:  1) Creation of myocutaneous flap of left lower extremity  2) Closure of left lateral lower extremity fasciotomy incision measuring 15 cm  Dr. Yen Montague 2024     Procedure:  1) Closure of left medial fasciotomy incision measuring 20 cm in length  2) Placement of wound vac to left lateral fasciotomy site measuring 75 sq cm (15 x 5 cm)  Willie Quiroz [facility] 2024     Procedure:  1) Aortobifemoral bypass  2) Left iliac, common femoral, profunda, superficial femoral and popliteal thromboembolectomy  3) Right iliac artery thrombectomy  4) Left lower extremity four compartment fasciotomy  5) lysis of adhesions    Willie Quiroz [facility] 2024      S/p Robotic Left Inguinal Hernia Repair and Right Inguinal Hernia Repair both with mesh Dr. Jana Trent [facility] 2024      Physicians: Darrian Spears MD, Dr. Lerner     Risk for clinical complications/co-morbidities:   Past Medical History        Past Medical History:   Diagnosis Date    Arthritis 2016     LUMBAR SPINE     Depression

## 2024-06-10 NOTE — PROGRESS NOTES
[facility]  INPATIENT OCCUPATIONAL THERAPY  [facility]  EVALUATION    Time:    Time In: 1004  Time Out: 1134  Timed Code Treatment Minutes: 75 Minutes  Minutes: 90          Date: 6/10/2024  Patient Name: David Vaca,   Gender: male      MRN: [id number]  : 1965  (58 y.o.)  Referring Practitioner: Dr. KELLI Spears  Diagnosis: Debility  Additional Pertinent Hx: David Vaca is a 58 y.o. 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 The patient says he developed sudden onset legs weakness causing him to fall with left leg numbness and pain on 2024 when he was outside his apartment.  On 2024 he also noticed the left leg was cool to touch.  Therefore 911 was called.  The patient was brought to [facility] ER for evaluation.  CTA of abdominal aorta revealed extensive acute thrombus extending from level of renal arteries in the aorta and continuing down to the abdominal aorta into both common iliac arteries, with majority of left iliac arteries, superficial femoral arteries, popliteal arteries and trifurcation arteries completely occluded with thrombus. The patient then was emergently transferred and admitted to [facility] in [facility] via [facility] on 2024.  The patient underwent emergency aortobifemoral bypass, thromboembolectomy of left iliac, left common femoral, left profunda, left superficial femoral and left popliteal arteries, right iliac artery thrombectomy, and left lower extremity 4 compartment fasciotomy, and lysis of adhesions on 2024 by Dr.Mohammed Montague &

## 2024-06-10 NOTE — PLAN OF CARE
Problem: Discharge Planning  Goal: Discharge to home or other facility with appropriate resources  6/10/2024 1612 by Tipton, Rylee, LPN  Outcome: Progressing     Problem: Safety - Adult  Goal: Free from fall injury  Outcome: Progressing  Flowsheets (Taken 6/10/2024 1612)  Free From Fall Injury: Instruct family/caregiver on patient safety     Problem: Skin/Tissue Integrity  Goal: Absence of new skin breakdown  Description: 1.  Monitor for areas of redness and/or skin breakdown  2.  Assess vascular access sites hourly  3.  Every 4-6 hours minimum:  Change oxygen saturation probe site  4.  Every 4-6 hours:  If on nasal continuous positive airway pressure, respiratory therapy assess nares and determine need for appliance change or resting period.  Outcome: Progressing     Problem: ABCDS Injury Assessment  Goal: Absence of physical injury  Outcome: Progressing  Flowsheets (Taken 6/10/2024 1612)  Absence of Physical Injury: Implement safety measures based on patient assessment     Problem: Respiratory - Adult  Goal: Achieves optimal ventilation and oxygenation  Outcome: Progressing  Flowsheets (Taken 6/10/2024 1612)  Achieves optimal ventilation and oxygenation:   Assess for changes in respiratory status   Assess for changes in mentation and behavior       Problem: Skin/Tissue Integrity - Adult  Goal: Skin integrity remains intact  Outcome: Progressing  Flowsheets (Taken 6/10/2024 1612)  Skin Integrity Remains Intact:   Assess vascular access sites hourly   Monitor for areas of redness and/or skin breakdown     Problem: Skin/Tissue Integrity - Adult  Goal: Incisions, wounds, or drain sites healing without S/S of infection  Outcome: Progressing  Flowsheets (Taken 6/10/2024 1612)  Incisions, Wounds, or Drain Sites Healing Without Sign and Symptoms of Infection: TWICE DAILY: Assess and document skin integrity     Problem: Cardiovascular - Adult  Goal: Maintains optimal cardiac output and hemodynamic stability  Outcome:

## 2024-06-10 NOTE — PROGRESS NOTES
Wound ostomy consulted for a heavily draining left groin wound. Photo reviewed. Pt wound area appears closed with small pin point draining area. Noted in surgical report, pt to have had a significant seroma to left groin. At this point, would recommend opticell and ABD pads (tape in place) to groin site changing several times throughout day as needed and to report findings to Dr. Montague as left groin may need further drainage. Call wound ostomy as needed.    6/9/24

## 2024-06-10 NOTE — PROGRESS NOTES
[facility]  INPATIENT REHABILITATION  TEAM CONFERENCE NOTE    Conference Date: 2024  Admit Date:  2024  2:50 PM  Patient Name: David Vcaa    MRN: [id number]    : 1965  (58 y.o.)  Rehabilitation Admitting Diagnosis:  Debility [R53.81]  S/P aortobifemoral bypass surgery [Z95.828]  Referring Practitioner: Darrian Spears MD      CASE MANAGEMENT  Current issues/needs regarding patient and family discharge status: Prior to admission, patient was living alone. Patient reported being independent at home. Patient was completing his ADLs, housekeeping, meal prep, errands, finances and driving. Patient reports having his own car, but having issues with it. Patient is requesting information on transportation services for discharge. His brother in law can assist with transport occasionally. Patient does not work and has been on SSDI. Patient's main support is his two sisters, Eri and Augustina. They both live in neighboring towns, but work during the day. Patient could not identify additional support. Patient's family practitioner is ALIZE Casanova. Patient prefers [facility]. Patient reports that he has been borrowing a walker from a family member. Patient is motivated to participate in therapy.     PHYSICAL THERAPY  Mr Vaca met 1/4 STG's and 0/6 LTG's.  Patient met STG for steps, ascending/descend 1, 6\" step with a RW with CGA.  Patient has not met other goals, limited by short length of stay at this time.  Patient is completing sit  <> stand transfers and ambulating with a RW with CGA.  Pt presents with decreased left ankle DF strength impacting his gait.  Mr Vaca is also limited by upper leg pain at this time.  Patient is motivated to participate in PT and return to independent PLOF.  Pt will benefit from skilled PT services to improve his ability to complete functional mobility, reduce his risk for falls and allow pt to return to home safely.  Equipment Needed: Yes

## 2024-06-10 NOTE — PROGRESS NOTES
06/10/24 1240   Encounter Summary   Encounter Overview/Reason Spiritual/Emotional Needs   Service Provided For Patient   Referral/Consult From [facility]   Support System Family members   Last Encounter  06/10/24   Complexity of Encounter Low   Begin Time 1228   End Time  1240   Total Time Calculated 12 min   Spiritual/Emotional needs   Type Spiritual Support   Assessment/Intervention/Outcome   Assessment Coping   Intervention Prayer (assurance of)/[place];Nurtured Hope   Outcome Coping     Assessment:  During my encounter with the 58  yr old patient, I gave the patient a copy of the Advance Directive as requested. I assess the pt's spiritual needs. The pt was admitted due to bypass surgery/ SP.     Interventions:  I gave the pt the requested documents and gave a brochure and asked if the patient had any questions.   I offered words of comfort and prayer.  provided a listening presence and encouraged pt to share their beliefs and how they support him during their hospitalization.     Outcomes:  The pt was thankful for the spiritual support. The patient shared that they would complete the forms after they had the opportunity to review the documents.     Plan:  Chaplains will follow-up at a later time in order to assist the patient as they complete the Advance Directive documents.  The Chaplains will be available to provide further emotional support per request.

## 2024-06-10 NOTE — CONSULTS
Department of Family Practice  Consult Note        Reason for Consult:  Medical management while on the Inpatient Rehab unit.    Requesting Physician:  Dr Spears    CHIEF COMPLAINT:   The need to continue the intensive time with therapies following the acute hospital stay.    History Obtained From:  patient, EMR    HISTORY OF PRESENT ILLNESS:              The patient is a 58 y.o. male with significant past medical history of       Diagnosis Date    Arthritis 06/01/2016    LUMBAR SPINE     COPD (chronic obstructive pulmonary disease) (HCC)     found in late 2010s    Depression 2012    Diabetes mellitus (HCC)     For 1 month in 2000's    Dysphagia     Emphysema lung (HCC) 2023    GERD (gastroesophageal reflux disease) 2000    History of nephrolithiasis 2023    HIV (human immunodeficiency virus infection) ([facility]) 1991    Hx of blood clots 05/31/2024    Hyperlipidemia     Diagnosed in 2000's    Hypertension       who presents with sudden weakness, numbness and coolness to the legs causing him to fall. He came to the ED and was found to have extensive arterial thrombosis to the infrarenal aorta and legs. He required a fasciotomy to the left lower leg. He had surgery at [facility] and now has returned. He has come to the Inpatient Rehab Unit to continue the time with therapies prior to a discharge disposition being made.      Past Medical History:        Diagnosis Date    Arthritis 06/01/2016    LUMBAR SPINE     COPD (chronic obstructive pulmonary disease) (HCC)     found in late 2010s    Depression 2012    Diabetes mellitus (HCC)     For 1 month in 2000's    Dysphagia     Emphysema lung (HCC) 2023    GERD (gastroesophageal reflux disease) 2000    History of nephrolithiasis 2023    HIV (human immunodeficiency virus infection) ([facility]) 1991    Hx of blood clots 05/31/2024    Hyperlipidemia     Diagnosed in 2000's    Hypertension      Past Surgical History:        Procedure Laterality Date    ABDOMINAL AORTIC ANEURYSM REPAIR N/A  and weakness  PHYSICAL EXAM:      Vitals:    BP (!) 123/94   Pulse 92   Temp 98.1 °F (36.7 °C) (Oral)   Resp 16   Ht 1.676 m (5' 6\")   Wt 63.5 kg (140 lb 1.6 oz)   SpO2 100%   BMI 22.61 kg/m²     Well developed well nourished white male who is awake alert and cooperative  Skin atrophic  Membranes moist  Head normocephalic  Neck without mass  Chest symmetrical expansion  Heart S1S2 without murmur  Lungs CTA  Abd soft, non tender, normoactive BS and no mass. Several surgical dressings are present  Ext without edema, a dressing is on the left lower leg  Neuro weak  Psy pleasant      IMPRESSION/RECOMMENDATIONS:      Active Hospital Problems    Diagnosis Date Noted    S/P aortobifemoral bypass surgery [Z95.828] 06/09/2024    Debility [R53.81] 06/09/2024    Left foot drop [M21.372] 06/09/2024    Ischemic neuropathy of foot, left [G57.92] 06/09/2024    History of rhabdomyolysis [Z87.39] 06/09/2024    Hyperlipidemia [E78.5]     HIV (human immunodeficiency virus infection) (HCC) [B20]     Depression [F32.A]       Stop the CS as they have been acceptable.  Stop the correction scale insulin

## 2024-06-10 NOTE — PROGRESS NOTES
Respiratory:  Positive for shortness of breath (With exertion). Negative for cough and wheezing.    Cardiovascular:  Negative for chest pain and palpitations.   Gastrointestinal:  Negative for abdominal pain, constipation, diarrhea, nausea and vomiting.   Genitourinary:  Positive for urgency. Negative for difficulty urinating and dysuria.   Musculoskeletal:  Positive for arthralgias (Right hip/groin), back pain (Lower back), gait problem and myalgias (Right thigh). Negative for neck pain.   Skin:  Negative for rash.   Neurological:  Positive for weakness (Left foot) and numbness (Left lower extremity from the knee downward). Negative for dizziness, tremors, speech difficulty, light-headedness and headaches.   Psychiatric/Behavioral:  Negative for dysphoric mood, hallucinations and sleep disturbance. The patient is not nervous/anxious.         Objective:  /71   Pulse 96   Temp 97.5 °F (36.4 °C) (Oral)   Resp 16   Ht 1.676 m (5' 6\")   Wt 63.5 kg (140 lb 1.6 oz)   SpO2 99%   BMI 22.61 kg/m²   Physical Exam   General:  well-developed, well nourished  male; in no acute distress ; appropriate affect & mood; sitting on reclining chair comfortably  Eyes: pupil equally round ; extra-ocular motion intact bilaterally  Head, Ear, Nose, Mouth & Throat : normocephalic ; no discharge from ears or nose ; no deformity ; no facial swelling ; oral mucosa pink   Neck :  supple ; no tenderness ; no muscle spasm  Cardiovascular : regular rate & rhythm ; normal S1 & S2 heart sound ; no murmur ; normal peripheral pulse at the bilateral upper extremities; reduced pulse strength at the bilateral lower extremities  Pulmonary : breath sound present at bilateral lung field ; no wheezing ; no rale ; no crackle; no rhonchi  Gastrointestinal : soft, protruded abdomen without tenderness ; presence of gauze dressing at right paramedian abdomen without tenderness; normal bowel sound present    : Valle in place draining light  device.  GI: Colace, Senokot, Dulcolax suppository as needed, milk of magnesium as needed, GlycoLax as needed  Pain: Tylenol, Tylenol as needed, Robaxin, Flexeril as needed, oxycodone 2.5 to 5 mg as needed  Continue Voltaren gel application to right groin and right thigh painful area for pain control  Continue Neurontin for neuropathic pain  Continue aspirin for arterial thrombosis  Continue Lipitor and resume home fenofibrate for hyperlipidemia  Continue Zyrtec for allergy  Continue Protonix for gastric protection  Continue Prozac, Zyprexa for depression  Continue Lexiva, Combivir for HIV infection  Continue Isordil for (?) angina  Continue Flomax for urinary retention  Continue Stiolto inhaler for COPD/emphysema  Continue Lopressor for hypertension  Continue melatonin and add trazodone as needed for insomnia  Continue Humalog insulin coverage for history of diabetes mellitus  Nutrition: Continue regular diet  Bladder: Monitoring signs or symptoms of UTI  Bowel: Monitoring signs or symptoms of constipation   and case management for coordination of care and discharge planning      Missed Therapy Time:  None      HERB SUH MD     This report has been created using voice recognition software. It may contain minor errors which are inherent in voice recognition technology

## 2024-06-10 NOTE — PROGRESS NOTES
[facility]  Recreational Therapy  Inpatient Rehabilitation Evaluation        Time Spent with Patient: 15 minutes    Date:  6/10/2024       Patient Name: David Vaca      MRN: [id number]       YOB: 1965 (58 y.o.)       Gender: male          RESTRICTIONS/PRECAUTIONS:  Restrictions/Precautions: Weight Bearing, General Precautions     Hearing: Exceptions to WFL  Hearing Exceptions: Hard of hearing/hearing concerns, Bilateral hearing aid    PAIN: 8-R LE-just had medication     SUBJECTIVE:  pt lives alone in an apartment-he has 3 sisters and a brother that are supportive     VISION:  Glasses just for small print     HEARING: Hard of Hearing-states he has 50% hearing loss in B ears     LEISURE INTERESTS:   Pt states mostly he sits at home and watches tv-states he visits his sisters on Tuesday and Wednesday and his brother on Saturdays -he drives, gets his groceries, cooks and cleans for himself, plants a garden and goes out to eat on occasion -pt use to do word puzzles and gave him some word search to use in his free time-pleasant-    BARRIERS TO LEISURE INTERESTS:    Decreased endurance, Lower extremity weakness, and Pain           Patient Education  New Education Provided: Importance of Leisure, RT Plan of Care    Plan:  Continue to follow patient through this admission  Include patient in appropriate groups  See patient individually    Electronically signed by: JIGNA AhujaS  Date: 6/10/2024

## 2024-06-11 PROCEDURE — 97116 GAIT TRAINING THERAPY: CPT

## 2024-06-11 PROCEDURE — 92523 SPEECH SOUND LANG COMPREHEN: CPT | Performed by: SPEECH-LANGUAGE PATHOLOGIST

## 2024-06-11 PROCEDURE — 6360000002 HC RX W HCPCS: Performed by: PHYSICAL MEDICINE & REHABILITATION

## 2024-06-11 PROCEDURE — 97110 THERAPEUTIC EXERCISES: CPT

## 2024-06-11 PROCEDURE — 97530 THERAPEUTIC ACTIVITIES: CPT

## 2024-06-11 PROCEDURE — 1180000000 HC REHAB R&B

## 2024-06-11 PROCEDURE — 94640 AIRWAY INHALATION TREATMENT: CPT

## 2024-06-11 PROCEDURE — 6370000000 HC RX 637 (ALT 250 FOR IP): Performed by: PHYSICAL MEDICINE & REHABILITATION

## 2024-06-11 PROCEDURE — 97535 SELF CARE MNGMENT TRAINING: CPT

## 2024-06-11 PROCEDURE — 97129 THER IVNTJ 1ST 15 MIN: CPT | Performed by: SPEECH-LANGUAGE PATHOLOGIST

## 2024-06-11 PROCEDURE — 99232 SBSQ HOSP IP/OBS MODERATE 35: CPT | Performed by: PHYSICAL MEDICINE & REHABILITATION

## 2024-06-11 PROCEDURE — 94761 N-INVAS EAR/PLS OXIMETRY MLT: CPT

## 2024-06-11 RX ADMIN — OLANZAPINE 10 MG: 10 TABLET, FILM COATED ORAL at 21:29

## 2024-06-11 RX ADMIN — FOSAMPRENAVIR CALCIUM 1400 MG: 700 TABLET, COATED ORAL at 21:33

## 2024-06-11 RX ADMIN — FENOFIBRATE 135 MG: 54 TABLET ORAL at 08:24

## 2024-06-11 RX ADMIN — METOPROLOL TARTRATE 25 MG: 25 TABLET, FILM COATED ORAL at 08:21

## 2024-06-11 RX ADMIN — Medication 6 MG: at 21:29

## 2024-06-11 RX ADMIN — GABAPENTIN 300 MG: 300 CAPSULE ORAL at 18:24

## 2024-06-11 RX ADMIN — CETIRIZINE HYDROCHLORIDE 10 MG: 10 TABLET ORAL at 08:24

## 2024-06-11 RX ADMIN — OXYCODONE HYDROCHLORIDE 5 MG: 5 TABLET ORAL at 13:19

## 2024-06-11 RX ADMIN — DOCUSATE SODIUM 100 MG: 100 CAPSULE, LIQUID FILLED ORAL at 08:22

## 2024-06-11 RX ADMIN — ENOXAPARIN SODIUM 40 MG: 100 INJECTION SUBCUTANEOUS at 21:29

## 2024-06-11 RX ADMIN — GABAPENTIN 300 MG: 300 CAPSULE ORAL at 09:38

## 2024-06-11 RX ADMIN — NALOXEGOL OXALATE 12.5 MG: 12.5 TABLET, FILM COATED ORAL at 06:28

## 2024-06-11 RX ADMIN — DOCUSATE SODIUM 100 MG: 100 CAPSULE, LIQUID FILLED ORAL at 21:29

## 2024-06-11 RX ADMIN — TAMSULOSIN HYDROCHLORIDE 0.4 MG: 0.4 CAPSULE ORAL at 08:22

## 2024-06-11 RX ADMIN — FOSAMPRENAVIR CALCIUM 1400 MG: 700 TABLET, COATED ORAL at 08:27

## 2024-06-11 RX ADMIN — LAMIVUDINE AND ZIDOVUDINE 1 TABLET: 150; 300 TABLET, FILM COATED ORAL at 08:27

## 2024-06-11 RX ADMIN — ACETAMINOPHEN 650 MG: 325 TABLET ORAL at 06:28

## 2024-06-11 RX ADMIN — SENNOSIDES 17.2 MG: 8.6 TABLET, FILM COATED ORAL at 21:29

## 2024-06-11 RX ADMIN — TIOTROPIUM BROMIDE AND OLODATEROL 2 PUFF: 3.124; 2.736 SPRAY, METERED RESPIRATORY (INHALATION) at 09:17

## 2024-06-11 RX ADMIN — ACETAMINOPHEN 650 MG: 325 TABLET ORAL at 13:18

## 2024-06-11 RX ADMIN — FLUOXETINE HYDROCHLORIDE 20 MG: 20 CAPSULE ORAL at 08:26

## 2024-06-11 RX ADMIN — ACETAMINOPHEN 650 MG: 325 TABLET ORAL at 18:37

## 2024-06-11 RX ADMIN — OXYCODONE HYDROCHLORIDE 5 MG: 5 TABLET ORAL at 08:31

## 2024-06-11 RX ADMIN — DICLOFENAC SODIUM 2 G: 10 GEL TOPICAL at 13:19

## 2024-06-11 RX ADMIN — ASPIRIN 81 MG 81 MG: 81 TABLET ORAL at 08:22

## 2024-06-11 RX ADMIN — ISOSORBIDE DINITRATE 20 MG: 20 TABLET ORAL at 08:22

## 2024-06-11 RX ADMIN — DICLOFENAC SODIUM 2 G: 10 GEL TOPICAL at 16:40

## 2024-06-11 RX ADMIN — METOPROLOL TARTRATE 25 MG: 25 TABLET, FILM COATED ORAL at 21:29

## 2024-06-11 RX ADMIN — DICLOFENAC SODIUM 2 G: 10 GEL TOPICAL at 08:22

## 2024-06-11 RX ADMIN — PANTOPRAZOLE SODIUM 40 MG: 40 TABLET, DELAYED RELEASE ORAL at 06:28

## 2024-06-11 RX ADMIN — LAMIVUDINE AND ZIDOVUDINE 1 TABLET: 150; 300 TABLET, FILM COATED ORAL at 21:35

## 2024-06-11 RX ADMIN — ATORVASTATIN CALCIUM 40 MG: 40 TABLET, FILM COATED ORAL at 21:29

## 2024-06-11 ASSESSMENT — PAIN SCALES - GENERAL
PAINLEVEL_OUTOF10: 10
PAINLEVEL_OUTOF10: 8
PAINLEVEL_OUTOF10: 0
PAINLEVEL_OUTOF10: 10
PAINLEVEL_OUTOF10: 0
PAINLEVEL_OUTOF10: 10
PAINLEVEL_OUTOF10: 3
PAINLEVEL_OUTOF10: 10
PAINLEVEL_OUTOF10: 0
PAINLEVEL_OUTOF10: 0
PAINLEVEL_OUTOF10: 10

## 2024-06-11 ASSESSMENT — PAIN DESCRIPTION - ORIENTATION
ORIENTATION: RIGHT
ORIENTATION: RIGHT
ORIENTATION: LEFT
ORIENTATION: RIGHT

## 2024-06-11 ASSESSMENT — ENCOUNTER SYMPTOMS
WHEEZING: 0
CONSTIPATION: 0
TROUBLE SWALLOWING: 0
SORE THROAT: 0
BACK PAIN: 1
SHORTNESS OF BREATH: 1
COUGH: 0
RHINORRHEA: 0
NAUSEA: 0
DIARRHEA: 0
VOMITING: 0
ABDOMINAL PAIN: 0

## 2024-06-11 ASSESSMENT — PAIN DESCRIPTION - DESCRIPTORS
DESCRIPTORS: ACHING
DESCRIPTORS: ACHING
DESCRIPTORS: ACHING;BURNING
DESCRIPTORS: BURNING

## 2024-06-11 ASSESSMENT — PAIN DESCRIPTION - LOCATION
LOCATION: FOOT
LOCATION: LEG

## 2024-06-11 ASSESSMENT — PAIN - FUNCTIONAL ASSESSMENT: PAIN_FUNCTIONAL_ASSESSMENT: ACTIVITIES ARE NOT PREVENTED

## 2024-06-11 NOTE — PROGRESS NOTES
[facility]  Recreational Therapy  Daily Note  [facility]    Time Spent with Patient: 10 minutes    Date:  6/11/2024       Patient Name: David Vaca      MRN: [id number]      YOB: 1965 (58 y.o.)       Gender: male  Diagnosis: Debility  Referring Practitioner: Dr. KELLI Spears    RESTRICTIONS/PRECAUTIONS:  Restrictions/Precautions: Weight Bearing, General Precautions     Hearing: Exceptions to WFL  Hearing Exceptions: Hard of hearing/hearing concerns, Bilateral hearing aid    PAIN: 0-no c/o pain     SUBJECTIVE:  I would like just a hair cut    OBJECTIVE:  Upon arrival pt getting ready for breakfast-RT assisted pt in getting his legs down from reclined position in chair and tray moved up to him-pt states he would like to get a hair cut tomorrow and thinks he has enough money to cover it-he is going to shave later with the help of OT-pt stated yesterday that he goes by the name David but today wants his name tag to read Jian(his middle name)-pt pleasant and appreciative-wrote specifics down for him to help him remember          Patient Education  New Education Provided: Importance of Leisure,     Electronically signed by: Libra Garner, CTRS  Date: 6/11/2024

## 2024-06-11 NOTE — PROGRESS NOTES
(FIM 6) to resume independence within the home environment.      Melissa Maxwell MS, CCC-SLP [id number]

## 2024-06-11 NOTE — PROGRESS NOTES
restrictions: No heavy pushing or pulling or heavy lifting (no more than 10 pounds) for 4 weeks. 6/7: Close of Left lateral fasciotomy 6/4: Closure of left medial fasciotomy and Creation of myocutaneous flap of left lower extremity. Placement of wound vac. 5-31: S/p Aorto-bifemoral bypass with infra-renal clamp, lower extremity fasciotomy, LLE thrombectomy.  Pt has AFO for slight left sided foot drop. Ok to shower (per vascular MD).     SUBJECTIVE: Patient in room in bathroom, agreeable to PT.  Pt cooperative and pleasant. Nursing reporting plans to change dressing after therapy session over.  Significant drainage noted on pants and through pad in chair--nursing notified    PAIN: right LE, not rated    Vitals: Vitals not assessed per clinical judgement, see nursing flowsheet    OBJECTIVE:  Bed Mobility:  Not Tested    Transfers:  Sit to Stand: Contact Guard Assistance  Stand to Sit:Contact Guard Assistance    Ambulation:  Contact Guard Assistance  Distance: 10', 15'  Surface: Level Tile  Device:Rolling Walker  Gait Deviations:  Forward Flexed Posture, Slow Ethel, Decreased Step Length Bilaterally, Decreased Gait Speed, and Decreased Heel Strike Bilaterally    Stairs:  Contact Guard Assistance  Number of Steps: 3  Height: 4\" step with Bilateral Handrails     Stairs:  Contact Guard Assistance  Number of Steps: 1  Height: 6\" step with Rolling Walker   *demonstration and verbal cues for sequencing and RW placement    Balance:  Static Standing Balance: Contact Guard Assistance  Dynamic Standing Balance: Contact Guard Assistance    Exercise:  Patient was guided in 1 set(s) 10 reps of exercise to both lower extremities.  Exercises were completed for increased independence with functional mobility.  Supine:  -glute sets 5\" hold  -quad sets 5\" hold  -heel slides  -ankle pumps --some active left ankle DF  -short arc quad    Functional Outcome Measures: Not completed     Modified Waqas Scale:  Not

## 2024-06-11 NOTE — PROGRESS NOTES
[facility]  INPATIENT PHYSICAL THERAPY  Progress Note  [facility] - [id number]    Time In: 0700  Time Out: 0758  Timed Code Treatment Minutes: 58 Minutes  Minutes: 58          Date: 2024  Patient Name: David Vaca,  Gender:  male        MRN: [id number]  : 1965  (58 y.o.)  Referral Date : 24  Referring Practitioner: Darrian Spears MD  Diagnosis: S/P aortobifemoral bypass surgery  Additional Pertinent Hx: Per H&P:David Vaca is a 58 y.o. with a history of HIV infection, hypertension, kidney stone, hyperlipidemia, GERD, questionable , depression, status post bilateral inguinal hernia repair, status post left foot foreign body (needle) surgical removal, chronic low back pain requiring multiple interventional pain management procedures, is admitted to the inpatient rehabilitation unit on 24. The patient says he developed sudden onset legs weakness causing him to fall with left leg numbness and pain on 24 when he was outside his apartment.  He denies hitting his head or loss of consciousness. He says his neighbor helped him to get up.  On 24 he also noticed the left leg was cool to touch.  Therefore 911 was called.  The patient was brought to for evaluation by EMS on 24.  CT of head done on 24 showed no acute process.  CTA of abdominal aorta with bilateral runoff was performed on 24 and revealed extensive acute thrombus extending from level of renal arteries in the aorta and continuing down to the abdominal aorta into both common iliac arteries, with majority of left iliac arteries, superficial femoral arteries, popliteal arteries and trifurcation arteries completely occluded with thrombus. The patient then was emergently transferred to [facility] 24.  The patient underwent emergency aortobifemoral bypass, thromboembolectomy of left iliac, left common femoral, left profunda, left superficial femoral  ambulate safely.  Long Term Goal 2: Patient will complete bed < > chair transfer with modified independence to transfer surface to surface safely.  Long Term Goal 3: Patient will ambulate 150' with a rollator with modified independence to navigate home safely.  Long Term Goal 4: Patient will ascend/descend 1, 4\" step with a RW with modified independence to access/leave home safely. NOT MET, CONTINUE  Long Term Goal 5: Patient will complete car transfer with modified independence to transfer in and out of vehicle safely.  Additional Goals?: Yes  Long term goal 6: Patient will increase tinetti to greater than or equal to 24/28 to reduce his risk for falls.    Revised Short-Term Goals:    Short Term Goals  Time Frame for Short Term Goals: 1 week  Short Term Goal 1: Patient will complete supine < > sit and rolling right and left with head of bed flat and modified independence to transfer in and out of bed safely.  Short Term Goal 2: Patient will complete sit < > stand with SBA to stand to ambulate safely.  Short Term Goal 3: Patient will ambulate 50' with a RW with SBA to progress towards navigating home safely.  Short Term Goal 4: Patient will ascend/descend 1, 4\" step with a RW with CGA to prepare for safe home entry. GOAL MET, SEE LTG     Revised Long-Term Goals  Long Term Goals  Time Frame for Long Term Goals : 3 weeks from initial evaluation  Long Term Goal 1: Patient will complete sit < > stand with modified independence to stand to ambulate safely.  Long Term Goal 2: Patient will complete bed < > chair transfer with modified independence to transfer surface to surface safely.  Long Term Goal 3: Patient will ambulate 150' with a rollator with modified independence to navigate home safely.  Long Term Goal 4: Patient will ascend/descend 1, 4\" step with a RW with modified independence to access/leave home safely.  Long Term Goal 5: Patient will complete car transfer with modified independence to transfer in and out

## 2024-06-11 NOTE — DISCHARGE SUMMARY
MCG/ACT AERS Inhale 2 puffs into the lungs dailyHistorical Med      fosamprenavir (LEXIVA) 700 MG tablet Take 2 tablets by mouth 2 times dailyHistorical Med      lamivudine-zidovudine (COMBIVIR) 150-300 MG per tablet Take 1 tablet by mouth 2 times dailyHistorical Med           Activity: As tolerated without strenuous activity.  Diet: regular diet    Follow-up with your PCP in 7-10 days. Follow up with Dr. Montague in the [facility] in two weeks.    Discharge Medications: see above    Joe Davis MD, DO

## 2024-06-11 NOTE — PROGRESS NOTES
Comprehensive Nutrition Assessment    Type and Reason for Visit: Initial, Consult (Nutrition Needs)    Nutrition Recommendations/Plan:   Continue diet as ordered.   Initiate Ensure HP BID and continue ONS at discharge.   Recommend MVI and continue at discharge.      Malnutrition Assessment:  Malnutrition Status: At risk for malnutrition (Comment)  Context: Acute Illness       Findings of the 6 clinical characteristics of malnutrition:  Energy Intake:   (per patient good appetite)  Weight Loss:   (patient reports a ~5 lb weight loss d/t hospitalization)     Body Fat Loss:  No significant body fat loss     Muscle Mass Loss:  No significant muscle mass loss    Fluid Accumulation:  No significant fluid accumulation     Strength:  Not Performed    Nutrition Assessment:    Pt. nutritionally compromised AEB wounds. At risk for further nutrition compromise r/t increased nutrient needs for wound healing, patient admitted s/p aortobifemoral bypass graft surgery and left lower extremity thrombectomy and left fasciotomy for lower extremities arterial thrombosis. Noted underlying medical condition (PMHx arthritis, COPD, depression, GERD, HIV, HLD, HTN).     Nutrition Related Findings:    Pt. Report/Treatments/Miscellaneous: Per patient his appetite has been good. He has maybe lost a few pounds recently d/t being hospitalized. He denies any nausea or vomiting, but is having some diarrhea. He would like to continue to receive Ensure that he was receiving at [facility].   GI Status: Last BM 6/10  Wound: Surgical Incision (post aortobifemoral bypass graft surgery and left lower extremity thrombectomy and left fasciotomy for lower extremities arterial thrombosis)   Edema: generalized non-pitting edema,per flowsheet   Pertinent Labs:   Lab Results   Component Value Date    LABA1C 5.8 06/10/2024    LABA1C 5.8 10/05/2018     Recent Labs     06/10/24  0600      K 4.2      GLUCOSE 121*   BUN 16   CREATININE 0.8

## 2024-06-11 NOTE — PROGRESS NOTES
ankle dorsiflexion.  He is still on Valle catheter.  We plan to remove the Valle catheter on Friday morning for void trial.  She tolerated the rehab treatment well yesterday.  His function is improving slowly.    The patient currently is projected to be ready for discharge on 6/22/2024.      Plan:  Continue intensive PT/OT/SLP/RT inpatient rehabilitation program at least 3 hours per day, 5 days per week in order to improve functional status prior to discharge.  Family education and training will be completed.  Equipment evaluations and recommendations will be completed as appropriate.       Rehabilitation nursing continue to be involved for bowel, bladder, skin, and pain management.  Nursing will also provide education and training to patient and family.    Prophylaxis:  DVT: Lovenox, NICK stocking, intermittent pneumatic compression device.  GI: Colace, Senokot, Dulcolax suppository as needed, milk of magnesium as needed, GlycoLax as needed  Pain: Tylenol, Tylenol as needed, Robaxin, Flexeril as needed, oxycodone 2.5 to 5 mg as needed  Continue Voltaren gel application or heat pad application to right groin and right thigh painful area for pain control  Continue Neurontin for neuropathic pain  Continue aspirin for arterial thrombosis  Continue Lipitor and resume home fenofibrate for hyperlipidemia  Continue Zyrtec for allergy  Continue Protonix for gastric protection  Continue Prozac, Zyprexa for depression  Continue Lexiva, Combivir for HIV infection  Continue Isordil for (?) angina  Continue Flomax for urinary retention  Continue Stiolto inhaler for COPD/emphysema  Continue Lopressor for hypertension  Continue melatonin and add trazodone as needed for insomnia  Continue Humalog insulin coverage for history of diabetes mellitus  Nutrition: Continue regular diet  Bladder: Monitoring signs or symptoms of UTI; continue Valle catheter; plan to remove Valle catheter for void trial on Friday, 6/14/2024.  Bowel:  Monitoring signs or symptoms of constipation   and case management for coordination of care and discharge planning      Missed Therapy Time:  None      HERB SUH MD     This report has been created using voice recognition software. It may contain minor errors which are inherent in voice recognition technology

## 2024-06-11 NOTE — PLAN OF CARE
Problem: Discharge Planning  Goal: Discharge to home or other facility with appropriate resources  6/11/2024 0237 by Emma Santana RN  Outcome: Progressing  Flowsheets (Taken 6/11/2024 0237)  Discharge to home or other facility with appropriate resources:   Identify barriers to discharge with patient and caregiver   Identify discharge learning needs (meds, wound care, etc)     Problem: Safety - Adult  Goal: Free from fall injury  6/11/2024 0237 by Emma Santana RN  Outcome: Progressing  Flowsheets (Taken 6/11/2024 0237)  Free From Fall Injury: Instruct family/caregiver on patient safety     Problem: Skin/Tissue Integrity  Goal: Absence of new skin breakdown  Description: 1.  Monitor for areas of redness and/or skin breakdown  2.  Assess vascular access sites hourly  3.  Every 4-6 hours minimum:  Change oxygen saturation probe site  4.  Every 4-6 hours:  If on nasal continuous positive airway pressure, respiratory therapy assess nares and determine need for appliance change or resting period.  6/11/2024 0237 by Emma Santana RN  Outcome: Progressing     Problem: Respiratory - Adult  Goal: Achieves optimal ventilation and oxygenation  6/11/2024 0237 by Emma Santana RN  Outcome: Progressing  Flowsheets (Taken 6/11/2024 0237)  Achieves optimal ventilation and oxygenation:   Assess for changes in respiratory status   Position to facilitate oxygenation and minimize respiratory effort   Oxygen supplementation based on oxygen saturation or arterial blood gases   Encourage broncho-pulmonary hygiene including cough, deep breathe, incentive spirometry     Problem: Cardiovascular - Adult  Goal: Maintains optimal cardiac output and hemodynamic stability  6/11/2024 0237 by Emma Santana RN  Outcome: Progressing  Flowsheets (Taken 6/11/2024 0237)  Maintains optimal cardiac output and hemodynamic stability:   Monitor blood pressure and heart rate   Monitor urine output and notify Licensed Independent Practitioner for values  by Max, Emma, RN  Outcome: Progressing  Flowsheets  Taken 6/11/2024 0237 by Emma Santana RN  Urinary catheter remains patent: Assess patency of urinary catheter  Taken 6/10/2024 1615 by Tipton, Rylee, LPN  Urinary catheter remains patent: Assess patency of urinary catheter  6/10/2024 1612 by Tipton, Rylee, LPN  Outcome: Progressing  Flowsheets (Taken 6/9/2024 1729 by Henrietta Maki RN)  Urinary catheter remains patent: Assess patency of urinary catheter     Problem: Infection - Adult  Goal: Absence of infection at discharge  6/11/2024 0237 by Emma Santana RN  Outcome: Progressing  Flowsheets (Taken 6/11/2024 0237)  Absence of infection at discharge:   Assess and monitor for signs and symptoms of infection   Monitor lab/diagnostic results   Administer medications as ordered     Problem: Infection - Adult  Goal: Absence of infection during hospitalization  6/11/2024 0237 by Emma Santana RN  Outcome: Progressing  Flowsheets (Taken 6/11/2024 0237)  Absence of infection during hospitalization:   Assess and monitor for signs and symptoms of infection   Monitor lab/diagnostic results   Administer medications as ordered     Problem: Metabolic/Fluid and Electrolytes - Adult  Goal: Electrolytes maintained within normal limits  6/11/2024 0237 by Emma Santana RN  Outcome: Progressing  Flowsheets (Taken 6/11/2024 0237)  Electrolytes maintained within normal limits: Monitor labs and assess patient for signs and symptoms of electrolyte imbalances     Problem: Chronic Conditions and Co-morbidities  Goal: Patient's chronic conditions and co-morbidity symptoms are monitored and maintained or improved  6/11/2024 0237 by Emma Santana RN  Outcome: Progressing  Flowsheets (Taken 6/10/2024 1612)  Care Plan - Patient's Chronic Conditions and Co-Morbidity Symptoms are Monitored and Maintained or Improved:   Monitor and assess patient's chronic conditions and comorbid symptoms for stability, deterioration, or improvement

## 2024-06-11 NOTE — PROGRESS NOTES
°C) (Oral)   Resp 16   Ht 1.676 m (5' 6\")   Wt 63.5 kg (140 lb 1.6 oz)   SpO2 99%   BMI 22.61 kg/m²     General appearance: alert, appears stated age, and cooperative  Head: Normocephalic, without obvious abnormality, atraumatic  Lungs: clear to auscultation bilaterally  Chest wall: no tenderness  Heart: regular rate and rhythm, S1, S2 normal, no murmur, click, rub or gallop  Abdomen: soft, non-tender; bowel sounds normal; no masses,  no organomegaly  Extremities:  dressings present  Skin: Skin color, texture, turgor normal. No rashes or lesions  Neurologic:  weak    Electronically signed by Ryan Lerner MD on 6/11/2024 at 5:53 PM

## 2024-06-11 NOTE — PLAN OF CARE
Problem: Discharge Planning  Goal: Discharge to home or other facility with appropriate resources  6/11/2024 1058 by Ashley Prado LPN  Outcome: Progressing  Flowsheets (Taken 6/11/2024 1058)  Discharge to home or other facility with appropriate resources:   Identify barriers to discharge with patient and caregiver   Identify discharge learning needs (meds, wound care, etc)   Refer to discharge planning if patient needs post-hospital services based on physician order or complex needs related to functional status, cognitive ability or social support system   Arrange for needed discharge resources and transportation as appropriate  Note: Unknown date of discharge at this time  6/11/2024 0237 by Emma Santana, RN  Outcome: Progressing  Flowsheets (Taken 6/11/2024 0237)  Discharge to home or other facility with appropriate resources:   Identify barriers to discharge with patient and caregiver   Identify discharge learning needs (meds, wound care, etc)     Problem: Safety - Adult  Goal: Free from fall injury  6/11/2024 1058 by Ashley Prado LPN  Outcome: Progressing  Flowsheets (Taken 6/11/2024 1058)  Free From Fall Injury: Instruct family/caregiver on patient safety  6/11/2024 0237 by Emma Santana RN  Outcome: Progressing  Flowsheets (Taken 6/11/2024 0237)  Free From Fall Injury: Instruct family/caregiver on patient safety     Problem: Skin/Tissue Integrity  Goal: Absence of new skin breakdown  Description: 1.  Monitor for areas of redness and/or skin breakdown  2.  Assess vascular access sites hourly  3.  Every 4-6 hours minimum:  Change oxygen saturation probe site  4.  Every 4-6 hours:  If on nasal continuous positive airway pressure, respiratory therapy assess nares and determine need for appliance change or resting period.  6/11/2024 1058 by Ashley Prado LPN  Outcome: Progressing  6/11/2024 0237 by Emma Santana, RN  Outcome: Progressing     Problem: ABCDS Injury Assessment  Goal: Absence of  1058)  Maintain proper alignment of affected body part: Support and protect limb and body alignment per provider's orders  6/11/2024 0237 by Emma Santana RN  Outcome: Progressing  Flowsheets (Taken 6/11/2024 0237)  Maintain proper alignment of affected body part: Support and protect limb and body alignment per provider's orders     Problem: Gastrointestinal - Adult  Goal: Maintains or returns to baseline bowel function  6/11/2024 1058 by Ashley Prado LPN  Outcome: Progressing  Flowsheets (Taken 6/11/2024 1058)  Maintains or returns to baseline bowel function:   Assess bowel function   Administer ordered medications as needed  6/11/2024 0237 by Emma Santana RN  Outcome: Progressing  Flowsheets (Taken 6/11/2024 0237)  Maintains or returns to baseline bowel function:   Assess bowel function   Encourage mobilization and activity   Administer ordered medications as needed     Problem: Genitourinary - Adult  Goal: Urinary catheter remains patent  6/11/2024 1058 by Ashley Prado LPN  Outcome: Progressing  Flowsheets (Taken 6/11/2024 1058)  Urinary catheter remains patent: Assess patency of urinary catheter  6/11/2024 0237 by Emma Santana RN  Outcome: Progressing  Flowsheets  Taken 6/11/2024 0237 by Emma Santana RN  Urinary catheter remains patent: Assess patency of urinary catheter  Taken 6/10/2024 1615 by Tipton, Rylee, LPN  Urinary catheter remains patent: Assess patency of urinary catheter     Problem: Infection - Adult  Goal: Absence of infection at discharge  6/11/2024 1058 by Ashley Prado LPN  Outcome: Progressing  Flowsheets (Taken 6/11/2024 1058)  Absence of infection at discharge:   Assess and monitor for signs and symptoms of infection   Monitor lab/diagnostic results   Administer medications as ordered  6/11/2024 0237 by Emma Santana RN  Outcome: Progressing  Flowsheets (Taken 6/11/2024 0237)  Absence of infection at discharge:   Assess and monitor for signs and symptoms of infection

## 2024-06-11 NOTE — PROGRESS NOTES
[facility]  Inpatient Rehabilitation  Occupational Therapy  Progress Note  Time:  Time In: 1032  Time Out: 1202  Timed Code Treatment Minutes: 90 Minutes  Minutes: 90          Date: 2024  Patient Name: David Vaca,   Gender: male      Room: [facility]10010-A  MRN: [id number]  : 1965  (58 y.o.)  Referring Practitioner: Dr. KELLI Spears  Diagnosis: Debility  Additional Pertinent Hx: David Vaca is a 58 y.o. 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 The patient says he developed sudden onset legs weakness causing him to fall with left leg numbness and pain on 2024 when he was outside his apartment.  On 2024 he also noticed the left leg was cool to touch.  Therefore 911 was called.  The patient was brought to [facility] ER for evaluation.  CTA of abdominal aorta revealed extensive acute thrombus extending from level of renal arteries in the aorta and continuing down to the abdominal aorta into both common iliac arteries, with majority of left iliac arteries, superficial femoral arteries, popliteal arteries and trifurcation arteries completely occluded with thrombus. The patient then was emergently transferred and admitted to [facility] in [facility] via [facility] on 2024.  The patient underwent emergency aortobifemoral bypass, thromboembolectomy of left iliac, left common femoral, left profunda, left superficial femoral and left popliteal arteries, right iliac artery thrombectomy, and left lower extremity 4 compartment fasciotomy, and lysis of adhesions on 2024 by Dr.Mohammed Montague &

## 2024-06-12 PROCEDURE — 97110 THERAPEUTIC EXERCISES: CPT

## 2024-06-12 PROCEDURE — 99232 SBSQ HOSP IP/OBS MODERATE 35: CPT | Performed by: PHYSICAL MEDICINE & REHABILITATION

## 2024-06-12 PROCEDURE — 6360000002 HC RX W HCPCS: Performed by: PHYSICAL MEDICINE & REHABILITATION

## 2024-06-12 PROCEDURE — 97130 THER IVNTJ EA ADDL 15 MIN: CPT | Performed by: SPEECH-LANGUAGE PATHOLOGIST

## 2024-06-12 PROCEDURE — 97535 SELF CARE MNGMENT TRAINING: CPT

## 2024-06-12 PROCEDURE — 1180000000 HC REHAB R&B

## 2024-06-12 PROCEDURE — 6370000000 HC RX 637 (ALT 250 FOR IP): Performed by: PHYSICAL MEDICINE & REHABILITATION

## 2024-06-12 PROCEDURE — 97530 THERAPEUTIC ACTIVITIES: CPT

## 2024-06-12 PROCEDURE — 94640 AIRWAY INHALATION TREATMENT: CPT

## 2024-06-12 PROCEDURE — 90791 PSYCH DIAGNOSTIC EVALUATION: CPT | Performed by: PSYCHOLOGIST

## 2024-06-12 PROCEDURE — 97112 NEUROMUSCULAR REEDUCATION: CPT

## 2024-06-12 PROCEDURE — 97129 THER IVNTJ 1ST 15 MIN: CPT | Performed by: SPEECH-LANGUAGE PATHOLOGIST

## 2024-06-12 PROCEDURE — 97116 GAIT TRAINING THERAPY: CPT

## 2024-06-12 RX ORDER — DOCUSATE SODIUM 100 MG/1
100 CAPSULE, LIQUID FILLED ORAL DAILY
Status: DISCONTINUED | OUTPATIENT
Start: 2024-06-13 | End: 2024-06-15 | Stop reason: HOSPADM

## 2024-06-12 RX ORDER — SENNOSIDES A AND B 8.6 MG/1
2 TABLET, FILM COATED ORAL NIGHTLY PRN
Status: DISCONTINUED | OUTPATIENT
Start: 2024-06-12 | End: 2024-06-15 | Stop reason: HOSPADM

## 2024-06-12 RX ADMIN — NALOXEGOL OXALATE 12.5 MG: 12.5 TABLET, FILM COATED ORAL at 05:50

## 2024-06-12 RX ADMIN — DICLOFENAC SODIUM 2 G: 10 GEL TOPICAL at 11:46

## 2024-06-12 RX ADMIN — ENOXAPARIN SODIUM 40 MG: 100 INJECTION SUBCUTANEOUS at 22:57

## 2024-06-12 RX ADMIN — PANTOPRAZOLE SODIUM 40 MG: 40 TABLET, DELAYED RELEASE ORAL at 05:50

## 2024-06-12 RX ADMIN — LAMIVUDINE AND ZIDOVUDINE 1 TABLET: 150; 300 TABLET, FILM COATED ORAL at 23:09

## 2024-06-12 RX ADMIN — FOSAMPRENAVIR CALCIUM 1400 MG: 700 TABLET, COATED ORAL at 11:45

## 2024-06-12 RX ADMIN — TIOTROPIUM BROMIDE AND OLODATEROL 2 PUFF: 3.124; 2.736 SPRAY, METERED RESPIRATORY (INHALATION) at 08:27

## 2024-06-12 RX ADMIN — ACETAMINOPHEN 650 MG: 325 TABLET ORAL at 05:50

## 2024-06-12 RX ADMIN — FOSAMPRENAVIR CALCIUM 1400 MG: 700 TABLET, COATED ORAL at 23:08

## 2024-06-12 RX ADMIN — METOPROLOL TARTRATE 25 MG: 25 TABLET, FILM COATED ORAL at 23:00

## 2024-06-12 RX ADMIN — ISOSORBIDE DINITRATE 20 MG: 20 TABLET ORAL at 23:02

## 2024-06-12 RX ADMIN — ACETAMINOPHEN 650 MG: 325 TABLET ORAL at 02:02

## 2024-06-12 RX ADMIN — METOPROLOL TARTRATE 25 MG: 25 TABLET, FILM COATED ORAL at 11:46

## 2024-06-12 RX ADMIN — OXYCODONE HYDROCHLORIDE 5 MG: 5 TABLET ORAL at 13:31

## 2024-06-12 RX ADMIN — FLUOXETINE HYDROCHLORIDE 20 MG: 20 CAPSULE ORAL at 11:45

## 2024-06-12 RX ADMIN — GABAPENTIN 300 MG: 300 CAPSULE ORAL at 17:38

## 2024-06-12 RX ADMIN — ACETAMINOPHEN 650 MG: 325 TABLET ORAL at 15:40

## 2024-06-12 RX ADMIN — ACETAMINOPHEN 650 MG: 325 TABLET ORAL at 22:57

## 2024-06-12 RX ADMIN — DICLOFENAC SODIUM 2 G: 10 GEL TOPICAL at 23:05

## 2024-06-12 RX ADMIN — LAMIVUDINE AND ZIDOVUDINE 1 TABLET: 150; 300 TABLET, FILM COATED ORAL at 11:45

## 2024-06-12 RX ADMIN — METHOCARBAMOL 750 MG: 500 TABLET ORAL at 23:00

## 2024-06-12 RX ADMIN — GABAPENTIN 300 MG: 300 CAPSULE ORAL at 10:46

## 2024-06-12 RX ADMIN — GABAPENTIN 300 MG: 300 CAPSULE ORAL at 02:02

## 2024-06-12 RX ADMIN — OXYCODONE HYDROCHLORIDE 5 MG: 5 TABLET ORAL at 22:57

## 2024-06-12 RX ADMIN — ISOSORBIDE DINITRATE 20 MG: 20 TABLET ORAL at 11:46

## 2024-06-12 RX ADMIN — DOCUSATE SODIUM 100 MG: 100 CAPSULE, LIQUID FILLED ORAL at 07:50

## 2024-06-12 RX ADMIN — TAMSULOSIN HYDROCHLORIDE 0.4 MG: 0.4 CAPSULE ORAL at 10:47

## 2024-06-12 RX ADMIN — FENOFIBRATE 135 MG: 54 TABLET ORAL at 10:46

## 2024-06-12 RX ADMIN — ASPIRIN 81 MG 81 MG: 81 TABLET ORAL at 07:49

## 2024-06-12 RX ADMIN — OLANZAPINE 10 MG: 10 TABLET, FILM COATED ORAL at 23:02

## 2024-06-12 RX ADMIN — CETIRIZINE HYDROCHLORIDE 10 MG: 10 TABLET ORAL at 07:50

## 2024-06-12 RX ADMIN — ATORVASTATIN CALCIUM 40 MG: 40 TABLET, FILM COATED ORAL at 23:02

## 2024-06-12 RX ADMIN — ACETAMINOPHEN 650 MG: 325 TABLET ORAL at 10:46

## 2024-06-12 RX ADMIN — OXYCODONE HYDROCHLORIDE 5 MG: 5 TABLET ORAL at 07:49

## 2024-06-12 RX ADMIN — Medication 6 MG: at 22:57

## 2024-06-12 ASSESSMENT — ENCOUNTER SYMPTOMS
RHINORRHEA: 0
ABDOMINAL PAIN: 0
BACK PAIN: 1
WHEEZING: 0
NAUSEA: 0
CONSTIPATION: 0
TROUBLE SWALLOWING: 0
SORE THROAT: 0
DIARRHEA: 0
COUGH: 0
VOMITING: 0

## 2024-06-12 ASSESSMENT — PAIN DESCRIPTION - DESCRIPTORS
DESCRIPTORS: ACHING
DESCRIPTORS: ACHING;TINGLING

## 2024-06-12 ASSESSMENT — PAIN SCALES - GENERAL
PAINLEVEL_OUTOF10: 4
PAINLEVEL_OUTOF10: 7
PAINLEVEL_OUTOF10: 7
PAINLEVEL_OUTOF10: 5

## 2024-06-12 ASSESSMENT — PAIN - FUNCTIONAL ASSESSMENT
PAIN_FUNCTIONAL_ASSESSMENT: ACTIVITIES ARE NOT PREVENTED
PAIN_FUNCTIONAL_ASSESSMENT: ACTIVITIES ARE NOT PREVENTED

## 2024-06-12 ASSESSMENT — PAIN DESCRIPTION - LOCATION
LOCATION: LEG
LOCATION: FOOT

## 2024-06-12 ASSESSMENT — PAIN DESCRIPTION - ORIENTATION
ORIENTATION: LEFT
ORIENTATION: LEFT

## 2024-06-12 ASSESSMENT — PAIN DESCRIPTION - PAIN TYPE: TYPE: SURGICAL PAIN

## 2024-06-12 ASSESSMENT — PAIN SCALES - WONG BAKER: WONGBAKER_NUMERICALRESPONSE: NO HURT

## 2024-06-12 NOTE — CONSULTS
[place], [place]     PSYCHOLOGY CONSULTATION REPORT    TO:Darrian Spears MD  PATIENT: David Vaca  : 1965   MR NUMBER: [id number]  FROM: Slade Thayer PsyD  DATE: 2024      REPORT OF CONSULTATION: FINDINGS, OPINIONS and RECOMMENDATIONS     REASON FOR REFERRAL: The patient was referred for evaluation due to concerns about emotional status and coping in the wake of recent admission. This occurred after experiencing debility after lower leg weakness, falling, and having cardiac bypass surgery.    Patient presents with the following presenting problems:   Patient Active Problem List   Diagnosis    HIV (human immunodeficiency virus infection) (HCC)    Hyperlipidemia    Depression    Cardiac syndrome X (HCC)    Spondylosis of lumbar region without myelopathy or radiculopathy    Sacroiliac inflammation (HCC)    Lumbar spondylosis    CAD (coronary artery disease)    Abdominal pain    Abdominal pain, right upper quadrant    Hyponatremia    Aortic occlusion (HCC)    Critical ischemia of lower extremity (HCC)    Aortic thrombus (HCC)    S/P aortobifemoral bypass surgery    Debility    Left foot drop    Ischemic neuropathy of foot, left    History of rhabdomyolysis       BASIS OF EVALUATION:   Clinical assessment of the patient, review of medical records, Category Fluency Test (CFT), Controlled Oral Word Association Test (COWAT - FAS), consultation with Nursing, Physical Therapy, Occupational Therapy, and Speech Language Pathology and with attending physician.     BEHAVIORAL OBSERVATIONS: The patient presents as a 58 y.o.-year-old male of  descent. Grooming was adequate. Interpersonally, the patient was mostly guarded and not all that conversational. Cooperation with assessment was adequate. Level of consciousness was WNL.    Affect was flat, even blunted. Mood was depressed. Memory for long term recall appeared mostly intact, short-term and working is OK. Receptive language      Socioeconomic History    Marital status: Single     Spouse name: Not on file    Number of children: Not on file    Years of education: 12    Highest education level: Not on file   Occupational History    Worked various minimum wage jobs, said he hasn't worked in 25 years and been on SSDI.   Tobacco Use    Smoking status: Every Day     Current packs/day: 1.00     Average packs/day: 1 pack/day for 28.4 years (28.4 ttl pk-yrs)     Types: Cigarettes     Start date: 1996    Smokeless tobacco: Never   Vaping Use    Vaping Use: Never used   Substance and Sexual Activity    Alcohol use: No    Drug use: Not Currently     Types: Cocaine, Crack Cocaine, Marijuana (Weed)     Comment: Had tried crack/cocaine and marijuana in 1980s    Sexual activity: Not on file   Other Topics Concern    Not on file   Social History Narrative    Not on file     Social Determinants of Health     Financial Resource Strain: Not on file   Food Insecurity: No Food Insecurity (6/9/2024)    Hunger Vital Sign     Worried About Running Out of Food in the Last Year: Never true     Ran Out of Food in the Last Year: Never true   Transportation Needs: No Transportation Needs (6/9/2024)    PRAPARE - Transportation     Lack of Transportation (Medical): No     Lack of Transportation (Non-Medical): No   Physical Activity: Not on file   Stress: Not on file   Social Connections: Not on file   Intimate Partner Violence: Not on file   Housing Stability: Low Risk  (6/9/2024)    Housing Stability Vital Sign     Unable to Pay for Housing in the Last Year: No     Number of Places Lived in the Last Year: 1     Unstable Housing in the Last Year: No        IMPRESSIONS:   Cognitively, the patient is well intact in terms of semantic memory and executive functioning.    Presumed etiology is debility from leg weakness and recent cardiac bypass surgery.  The patient's emotional state is subdued, and he appears at least moderately depressed.  Diagnosis: Major Depressive

## 2024-06-12 NOTE — CARE COORDINATION
Patient had a good night, slept well thru the shift. Dressing on left upper groin was changed twice (soaked). Vital signs were within normal limits during the shift. No other pain or discomfort reported by the patient. Electronically signed by Robert Saeed RN on 6/12/2024 at 6:38 AM

## 2024-06-12 NOTE — PROGRESS NOTES
to ID and correct.     *Patient able to provide detailed account of his method for managing appointments at home stating that he takes the appointment cards or business cards home and puts them on his personal written calendar.     Goal settin) Be able to do laundry - takes his laundry to a community laundry area  2) Be able to wash floors on hands and knees  3) Be able to drive.   *Will utilize in future session to outline steps to achieving goals and identify safety concerns to be aware of.     Long-Term Goals:  Time Frame for Long Term Goals: 2 weeks    LONG TERM GOAL #1:  Goal 1: Patient will improve cognitive functioning to a modified independent level (FIM 6) to resume independence within the home environment.       Comprehension: 5 - Patient understands basic needs (hungry/hot/pain)  Expression: 6 - Device used to express complex ideas/needs  Social Interaction: 6 - Patient requires medication for mood and/or effect  Problem Solvin - Patient solves simple/routine tasks 75-90%+   Memory: 4 - Patient remembers 75-90%+ of the time    Functional Oral Intake Scale: Total Oral Intake: 7.  Total oral intake with no restrictions    EDUCATION:  Learner: Patient  Education:  Reviewed ST goals and Plan of Care, Reviewed recommendations for follow-up, Education Related to Health Promotion and Wellness, and Home Safety Education  Evaluation of Education: Verbalizes understanding, Needs further instruction, and Family not present    ASSESSMENT/PLAN:  Activity Tolerance:  Patient tolerance of  treatment: good. Appropriate participation      Assessment/Plan: Patient progressing toward established goals.  Continues to require skilled care of licensed speech pathologist to progress toward achievement of established goals and plan of care..     Plan for Next Session: Review of MAR, Filling pill box, Goal review- outlining steps to meeting goals and problem solving  Discharge Recommendations: Continue to Assess Pending  Progress     Melissa Maxwell, MS, CCC-SLP [id number]

## 2024-06-12 NOTE — PROGRESS NOTES
[facility] [place]  INPATIENT PHYSICAL THERAPY  [facility] - 8K-10/010-A  Daily Note    Time In: 0700  Time Out: 0730  Timed Code Treatment Minutes: 30 Minutes  Minutes: 30          Date: 2024  Patient Name: David Vaca,  Gender:  male        MRN: [id number]  : 1965  (58 y.o.)  Referral Date : 24  Referring Practitioner: Darrian Spears MD  Diagnosis: S/P aortobifemoral bypass surgery  Additional Pertinent Hx: Per H&P:David Vaca is a 58 y.o. with a history of HIV infection, hypertension, kidney stone, hyperlipidemia, GERD, questionable , depression, status post bilateral inguinal hernia repair, status post left foot foreign body (needle) surgical removal, chronic low back pain requiring multiple interventional pain management procedures, is admitted to the inpatient rehabilitation unit on 24. The patient says he developed sudden onset legs weakness causing him to fall with left leg numbness and pain on 24 when he was outside his apartment.  He denies hitting his head or loss of consciousness. He says his neighbor helped him to get up.  On 24 he also noticed the left leg was cool to touch.  Therefore 911 was called.  The patient was brought to for evaluation by EMS on 24.  CT of head done on 24 showed no acute process.  CTA of abdominal aorta with bilateral runoff was performed on 24 and revealed extensive acute thrombus extending from level of renal arteries in the aorta and continuing down to the abdominal aorta into both common iliac arteries, with majority of left iliac arteries, superficial femoral arteries, popliteal arteries and trifurcation arteries completely occluded with thrombus. The patient then was emergently transferred to [facility] 24.  The patient underwent emergency aortobifemoral bypass, thromboembolectomy of left iliac, left common femoral, left profunda, left superficial femoral and

## 2024-06-12 NOTE — PLAN OF CARE
Problem: Discharge Planning  Goal: Discharge to home or other facility with appropriate resources  6/11/2024 2333 by Robert Saeed, RN  Outcome: Progressing  Flowsheets (Taken 6/11/2024 2129)  Discharge to home or other facility with appropriate resources: Identify barriers to discharge with patient and caregiver     Problem: Safety - Adult  Goal: Free from fall injury  6/11/2024 2333 by Robert Saeed, RN  Outcome: Progressing  Note: Call light within reach and hourly rounds in place     Problem: Skin/Tissue Integrity  Goal: Absence of new skin breakdown  Description: 1.  Monitor for areas of redness and/or skin breakdown  2.  Assess vascular access sites hourly  3.  Every 4-6 hours minimum:  Change oxygen saturation probe site  4.  Every 4-6 hours:  If on nasal continuous positive airway pressure, respiratory therapy assess nares and determine need for appliance change or resting period.  6/11/2024 2333 by Robert Saeed, RN  Outcome: Progressing     Problem: ABCDS Injury Assessment  Goal: Absence of physical injury  6/11/2024 2333 by Robert Saeed, RN  Outcome: Progressing     Care plan reviewed with patient.  Patient verbalizes understanding of the plan of care and contributes to goal setting.

## 2024-06-12 NOTE — CARE COORDINATION
Dressing on left upper groin changed twice during shift. Patient tolerated well. All dressings changed and CHG's completed. Patient participated in all therapies during shift.

## 2024-06-12 NOTE — PROGRESS NOTES
[facility]  Occupational Therapy  Daily Note  Time:   Time In: 1135  Time Out: 1235  Timed Code Treatment Minutes: 60 Minutes  Minutes: 60          Date: 2024  Patient Name: David Vaca,   Gender: male      Room: Counts include 234 beds at the [facility]10/010-A  MRN: [id number]  : 1965  (58 y.o.)  Referring Practitioner: Dr. KELLI Spears  Diagnosis: Debility  Additional Pertinent Hx: David Vaca is a 58 y.o. 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 The patient says he developed sudden onset legs weakness causing him to fall with left leg numbness and pain on 2024 when he was outside his apartment.  On 2024 he also noticed the left leg was cool to touch.  Therefore 911 was called.  The patient was brought to [facility] ER for evaluation.  CTA of abdominal aorta revealed extensive acute thrombus extending from level of renal arteries in the aorta and continuing down to the abdominal aorta into both common iliac arteries, with majority of left iliac arteries, superficial femoral arteries, popliteal arteries and trifurcation arteries completely occluded with thrombus. The patient then was emergently transferred and admitted to [facility] via [facility] on 2024.  The patient underwent emergency aortobifemoral bypass, thromboembolectomy of left iliac, left common femoral, left profunda, left superficial femoral and left popliteal arteries, right iliac artery thrombectomy, and left lower extremity 4 compartment fasciotomy, and lysis of adhesions on 2024 by Dr.Mohammed Montague  he can implement at home to reduce fall risk.  Long Term Goals  Time Frame for Long Term Goals : 2 weeks from OT evaluation  Long Term Goal 1: Pt will use adaptive technique prn to complete bathing, dressing, toileting and grooming tasks with modified independence to improve indep within self care routine at home.  Long Term Goal 2: Pt will complete simple meal prep tasks with modified independence to be able to return to using air fryer at home.  Long Term Goal 3: Pt will use least restrictive AE to complete light housekeeping tasks with mod I to meet patient's goal of preparing for apartment inspection.    Following session, patient left in safe position with all fall risk precautions in place.

## 2024-06-12 NOTE — PROGRESS NOTES
[facility]  INPATIENT PHYSICAL THERAPY  [facility] - 8K-10/010-A  Daily Note    Time In: 1430  Time Out: 1530  Timed Code Treatment Minutes: 60 Minutes  Minutes: 60          Date: 2024  Patient Name: David Vaca,  Gender:  male        MRN: [id number]  : 1965  (58 y.o.)  Referral Date : 24  Referring Practitioner: Darrian Spears MD  Diagnosis: S/P aortobifemoral bypass surgery  Additional Pertinent Hx: Per H&P:David Vaca is a 58 y.o. with a history of HIV infection, hypertension, kidney stone, hyperlipidemia, GERD, questionable , depression, status post bilateral inguinal hernia repair, status post left foot foreign body (needle) surgical removal, chronic low back pain requiring multiple interventional pain management procedures, is admitted to the inpatient rehabilitation unit on 24. The patient says he developed sudden onset legs weakness causing him to fall with left leg numbness and pain on 24 when he was outside his apartment.  He denies hitting his head or loss of consciousness. He says his neighbor helped him to get up.  On 24 he also noticed the left leg was cool to touch.  Therefore 911 was called.  The patient was brought to for evaluation by EMS on 24.  CT of head done on 24 showed no acute process.  CTA of abdominal aorta with bilateral runoff was performed on 24 and revealed extensive acute thrombus extending from level of renal arteries in the aorta and continuing down to the abdominal aorta into both common iliac arteries, with majority of left iliac arteries, superficial femoral arteries, popliteal arteries and trifurcation arteries completely occluded with thrombus. The patient then was emergently transferred to [facility] 24.  The patient underwent emergency aortobifemoral bypass, thromboembolectomy of left iliac, left common femoral, left profunda, left superficial femoral and

## 2024-06-12 NOTE — PROGRESS NOTES
Physical Medicine & Rehabilitation Progress Note    Chief Complaint:  Status post aortobifemoral bypass graft surgery and left lower extremity thrombectomy and left fasciotomy for lower extremities arterial thrombosis     Subjective:    Dvaid Vaca is a 58 y.o. 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       The patient says he developed sudden onset legs weakness causing him to fall with left leg numbness and pain on 5/30/2024 when he was outside his apartment.  He denies hitting his head or loss of consciousness.  He says his neighbor helped him to get up.  On 5/31/2024 he also noticed the left leg was cool to touch.  Therefore 911 was called.  The patient was brought to [facility] ER for evaluation by EMS on 5/31/2024.  CT of head done on 5/31/2024 showed no acute process.  CTA of abdominal aorta with bilateral runoff was performed on 5/31/2024 and revealed extensive acute thrombus extending from level of renal arteries in the aorta and continuing down to the abdominal aorta into both common iliac arteries, with majority of left iliac arteries, superficial femoral arteries, popliteal arteries and trifurcation arteries completely occluded with thrombus. The patient then was emergently transferred and admitted to [facility]  for neuropathic pain  Continue aspirin for arterial thrombosis  Continue Lipitor and resume home fenofibrate for hyperlipidemia  Continue Zyrtec for allergy  Continue Protonix for gastric protection  Continue Prozac, Zyprexa for depression  Continue Lexiva, Combivir for HIV infection  Continue Isordil for (?) angina  Continue Flomax for urinary retention  Continue Stiolto inhaler for COPD/emphysema  Continue Lopressor for hypertension  Continue melatonin and add trazodone as needed for insomnia  Continue Humalog insulin coverage for history of diabetes mellitus  Nutrition: Continue regular diet  Bladder: Monitoring signs or symptoms of UTI; continue Valle catheter; plan to remove Valle catheter for void trial on Friday, 6/14/2024.  Bowel: Monitoring signs or symptoms of constipation   and case management for coordination of care and discharge planning      Missed Therapy Time:  None      HERB SUH MD     This report has been created using voice recognition software. It may contain minor errors which are inherent in voice recognition technology

## 2024-06-12 NOTE — PROGRESS NOTES
Patient: David Vaca  Unit/Bed: 8K-10/010-A  YOB: 1965  MRN: [id number] Acct: [id number]   Admitting Diagnosis: Debility [R53.81]  S/P aortobifemoral bypass surgery [Z95.828]  Admit Date:  6/9/2024  Hospital Day: 3    Assessment:     Principal Problem:    S/P aortobifemoral bypass surgery  Active Problems:    HIV (human immunodeficiency virus infection) (HCC)    Hyperlipidemia    Depression    Debility    Left foot drop    Ischemic neuropathy of foot, left    History of rhabdomyolysis  Resolved Problems:    * No resolved hospital problems. *      Plan:     Continue to follow        Subjective:     Patient has no complaint of CP or SOB. He does mention that there has been some low back pain he discussed with Dr Spears..   Medication side effects: none    Scheduled Meds:   diclofenac sodium  2 g Topical 4x daily    tamsulosin  0.4 mg Oral Daily    aspirin  81 mg Oral Daily    atorvastatin  40 mg Oral Nightly    fenofibrate  135 mg Oral Daily    FLUoxetine  20 mg Oral Daily    fosamprenavir  1,400 mg Oral BID    gabapentin  300 mg Oral q8h    isosorbide dinitrate  20 mg Oral BID    lamiVUDine-zidovudine  1 tablet Oral BID    metoprolol tartrate  25 mg Oral BID    pantoprazole  40 mg Oral QAM AC    enoxaparin  40 mg SubCUTAneous Q24H    docusate sodium  100 mg Oral BID    senna  2 tablet Oral Nightly    naloxegol  12.5 mg Oral QAM AC    acetaminophen  650 mg Oral Q6H    melatonin  6 mg Oral Nightly    cetirizine  10 mg Oral Daily    OLANZapine  10 mg Oral Nightly    tiotropium-olodaterol  2 puff Inhalation Daily     Continuous Infusions:  PRN Meds:methocarbamol, polyethylene glycol, bisacodyl, magnesium hydroxide, acetaminophen, oxyCODONE **OR** oxyCODONE, traZODone    Review of Systems  Pertinent items are noted in HPI.    Objective:     Patient Vitals for the past 8 hrs:   BP Temp Temp src Pulse Resp SpO2   06/12/24 1018 119/75 97.3 °F (36.3 °C) Oral 96 18 100 %   06/12/24 0827 -- -- -- 77 20 99 %      I/O last 3 completed shifts:  In: 1790 [P.O.:1790]  Out: 5775 [Urine:5775]  I/O this shift:  In: 240 [P.O.:240]  Out: -     /75   Pulse 96   Temp 97.3 °F (36.3 °C) (Oral)   Resp 18   Ht 1.676 m (5' 6\")   Wt 63.5 kg (140 lb 1.6 oz)   SpO2 100%   BMI 22.61 kg/m²     General appearance: alert, appears stated age, and cooperative  Head: Normocephalic, without obvious abnormality, atraumatic  Lungs: clear to auscultation bilaterally  Chest wall: no tenderness  Heart: regular rate and rhythm, S1, S2 normal, no murmur, click, rub or gallop  Abdomen: soft, non-tender; bowel sounds normal; no masses,  no organomegaly  Extremities: extremities normal, atraumatic, no cyanosis or edema  Skin: Skin color, texture, turgor normal. No rashes or lesions  Neurologic:  weak    Electronically signed by Ryan Lerner MD on 6/12/2024 at 10:28 AM

## 2024-06-12 NOTE — PROGRESS NOTES
[facility]  Recreational Therapy  Daily Note  [facility]    Time Spent with Patient: 15 minutes    Date:  6/12/2024       Patient Name: David Vaca      MRN: [id number]      YOB: 1965 (58 y.o.)       Gender: male  Diagnosis: Debility  Referring Practitioner: Dr. KELLI Spears    RESTRICTIONS/PRECAUTIONS:  Restrictions/Precautions: Weight Bearing, General Precautions     Hearing: Exceptions to WFL  Hearing Exceptions: Hard of hearing/hearing concerns, Bilateral hearing aid    PAIN: 0    SUBJECTIVE:  that's a good hair cut-can I come back next week    OBJECTIVE:  Pt wears 2 hearing aides and took them out before our beautician cut his hair-he was pleased with the cut and wants to come back again next week-pleasant-did not initiate a lot of conversation -appreciative         Patient Education  New Education Provided: Importance of Leisure,     Electronically signed by: Libra Garner, CTRS  Date: 6/12/2024

## 2024-06-12 NOTE — PROGRESS NOTES
pending progress and Patient would benefit from continued OT at discharge  Equipment Recommendations: Equipment Needed: Yes  Other: Recommend shower chair for his tub/shower.  Monitor for grab bars around his standard toilet.  Plan: Times Per Week: 5x/wk for 90 min  Current Treatment Recommendations: Strengthening, Functional mobility training, Balance training, Endurance training, Safety education & training, Patient/Caregiver education & training, Self-Care / ADL, Positioning, Equipment evaluation, education, & procurement, Home management training, Cognitive reorientation    Education:  Learners: Patient  ADL's, IADL's, Equipment Education, Home Safety, Fall Prevention, and Assistive Device Safety    Goals  Short Term Goals  Time Frame for Short Term Goals: 1 week  Short Term Goal 1: GOAL MET, DISONTINUE  Short Term Goal 2: Pt will recall use of adaptive technique to thread LB clothing and manage over hips with min assist to improve indep with daily dressing tasks.  Short Term Goal 3: Pt will safely navigate RW within room to retreive 3 items with Mod I for walker safety to improve safety and ability to retrieve clothing from closet.  Short Term Goal 4: Pt complete tub/shower transfers using least restrictive AE with SBA to improve ability to shower in home tub/shower  Short Term Goal 5: Pt will describe 3 fall prevetion strategies he can implement at home to reduce fall risk.  Long Term Goals  Time Frame for Long Term Goals : 2 weeks from OT evaluation  Long Term Goal 1: Pt will use adaptive technique prn to complete bathing, dressing, toileting and grooming tasks with modified independence to improve indep within self care routine at home.  Long Term Goal 2: Pt will complete simple meal prep tasks with modified independence to be able to return to using air fryer at home.  Long Term Goal 3: Pt will use least restrictive AE to complete light housekeeping tasks with mod I to meet patient's goal of preparing for  apartment inspection.    Following session, patient left in safe position with all fall risk precautions in place.

## 2024-06-12 NOTE — PLAN OF CARE
Problem: Discharge Planning  Goal: Discharge to home or other facility with appropriate resources  6/12/2024 1158 by Chikis Ochoa LISW  Note: Team conference held Wednesday, 6/12. Recommendations of the team were explained to the patient by Dr Spears and SW. Team is recommending that patient continue on acute inpatient rehab for PT, OT and ST, with expected discharge date of Saturday, 6/22. Following discharge, team is recommending home health services with RN, PT, OT and HHA. Care plan reviewed with patient. Patient verbalized understanding of the plan of care and contributed to goal setting. SW to follow and maintain involvement in discharge planning.

## 2024-06-13 PROCEDURE — 97530 THERAPEUTIC ACTIVITIES: CPT

## 2024-06-13 PROCEDURE — 99232 SBSQ HOSP IP/OBS MODERATE 35: CPT | Performed by: PHYSICAL MEDICINE & REHABILITATION

## 2024-06-13 PROCEDURE — 97110 THERAPEUTIC EXERCISES: CPT

## 2024-06-13 PROCEDURE — 97116 GAIT TRAINING THERAPY: CPT

## 2024-06-13 PROCEDURE — 97535 SELF CARE MNGMENT TRAINING: CPT

## 2024-06-13 PROCEDURE — 97112 NEUROMUSCULAR REEDUCATION: CPT

## 2024-06-13 PROCEDURE — 97130 THER IVNTJ EA ADDL 15 MIN: CPT | Performed by: SPEECH-LANGUAGE PATHOLOGIST

## 2024-06-13 PROCEDURE — 97129 THER IVNTJ 1ST 15 MIN: CPT | Performed by: SPEECH-LANGUAGE PATHOLOGIST

## 2024-06-13 PROCEDURE — 1180000000 HC REHAB R&B

## 2024-06-13 PROCEDURE — 6370000000 HC RX 637 (ALT 250 FOR IP): Performed by: PHYSICAL MEDICINE & REHABILITATION

## 2024-06-13 PROCEDURE — 6360000002 HC RX W HCPCS: Performed by: PHYSICAL MEDICINE & REHABILITATION

## 2024-06-13 RX ORDER — LACTOBACILLUS RHAMNOSUS GG 10B CELL
1 CAPSULE ORAL
Status: DISCONTINUED | OUTPATIENT
Start: 2024-06-14 | End: 2024-06-15 | Stop reason: HOSPADM

## 2024-06-13 RX ORDER — LOPERAMIDE HYDROCHLORIDE 2 MG/1
2 CAPSULE ORAL 4 TIMES DAILY PRN
Status: DISCONTINUED | OUTPATIENT
Start: 2024-06-13 | End: 2024-06-15 | Stop reason: HOSPADM

## 2024-06-13 RX ADMIN — ISOSORBIDE DINITRATE 20 MG: 20 TABLET ORAL at 19:49

## 2024-06-13 RX ADMIN — TIOTROPIUM BROMIDE AND OLODATEROL 2 PUFF: 3.124; 2.736 SPRAY, METERED RESPIRATORY (INHALATION) at 09:25

## 2024-06-13 RX ADMIN — TAMSULOSIN HYDROCHLORIDE 0.4 MG: 0.4 CAPSULE ORAL at 10:39

## 2024-06-13 RX ADMIN — ISOSORBIDE DINITRATE 20 MG: 20 TABLET ORAL at 10:39

## 2024-06-13 RX ADMIN — PANTOPRAZOLE SODIUM 40 MG: 40 TABLET, DELAYED RELEASE ORAL at 06:53

## 2024-06-13 RX ADMIN — ACETAMINOPHEN 650 MG: 325 TABLET ORAL at 19:50

## 2024-06-13 RX ADMIN — TRAZODONE HYDROCHLORIDE 50 MG: 50 TABLET ORAL at 19:49

## 2024-06-13 RX ADMIN — OXYCODONE HYDROCHLORIDE 5 MG: 5 TABLET ORAL at 07:38

## 2024-06-13 RX ADMIN — ACETAMINOPHEN 650 MG: 325 TABLET ORAL at 12:57

## 2024-06-13 RX ADMIN — ACETAMINOPHEN 650 MG: 325 TABLET ORAL at 06:53

## 2024-06-13 RX ADMIN — METOPROLOL TARTRATE 25 MG: 25 TABLET, FILM COATED ORAL at 19:49

## 2024-06-13 RX ADMIN — ATORVASTATIN CALCIUM 40 MG: 40 TABLET, FILM COATED ORAL at 19:49

## 2024-06-13 RX ADMIN — OLANZAPINE 10 MG: 10 TABLET, FILM COATED ORAL at 19:50

## 2024-06-13 RX ADMIN — METOPROLOL TARTRATE 25 MG: 25 TABLET, FILM COATED ORAL at 10:39

## 2024-06-13 RX ADMIN — GABAPENTIN 300 MG: 300 CAPSULE ORAL at 10:39

## 2024-06-13 RX ADMIN — FOSAMPRENAVIR CALCIUM 1400 MG: 700 TABLET, COATED ORAL at 10:40

## 2024-06-13 RX ADMIN — ASPIRIN 81 MG 81 MG: 81 TABLET ORAL at 10:39

## 2024-06-13 RX ADMIN — GABAPENTIN 300 MG: 300 CAPSULE ORAL at 17:46

## 2024-06-13 RX ADMIN — FLUOXETINE HYDROCHLORIDE 20 MG: 20 CAPSULE ORAL at 10:40

## 2024-06-13 RX ADMIN — FENOFIBRATE 135 MG: 54 TABLET ORAL at 10:39

## 2024-06-13 RX ADMIN — LAMIVUDINE AND ZIDOVUDINE 1 TABLET: 150; 300 TABLET, FILM COATED ORAL at 19:52

## 2024-06-13 RX ADMIN — CETIRIZINE HYDROCHLORIDE 10 MG: 10 TABLET ORAL at 10:39

## 2024-06-13 RX ADMIN — FOSAMPRENAVIR CALCIUM 1400 MG: 700 TABLET, COATED ORAL at 19:52

## 2024-06-13 RX ADMIN — LAMIVUDINE AND ZIDOVUDINE 1 TABLET: 150; 300 TABLET, FILM COATED ORAL at 10:40

## 2024-06-13 RX ADMIN — NALOXEGOL OXALATE 12.5 MG: 12.5 TABLET, FILM COATED ORAL at 06:53

## 2024-06-13 RX ADMIN — ENOXAPARIN SODIUM 40 MG: 100 INJECTION SUBCUTANEOUS at 19:50

## 2024-06-13 RX ADMIN — Medication 6 MG: at 19:49

## 2024-06-13 ASSESSMENT — ENCOUNTER SYMPTOMS
ABDOMINAL PAIN: 0
NAUSEA: 0
CONSTIPATION: 0
DIARRHEA: 0
TROUBLE SWALLOWING: 0
COUGH: 0
SHORTNESS OF BREATH: 0
BACK PAIN: 1
VOMITING: 0
SORE THROAT: 0
RHINORRHEA: 0
WHEEZING: 0

## 2024-06-13 ASSESSMENT — PAIN DESCRIPTION - ORIENTATION
ORIENTATION: LEFT
ORIENTATION: RIGHT

## 2024-06-13 ASSESSMENT — PAIN SCALES - GENERAL
PAINLEVEL_OUTOF10: 7
PAINLEVEL_OUTOF10: 10
PAINLEVEL_OUTOF10: 0

## 2024-06-13 ASSESSMENT — PAIN DESCRIPTION - DESCRIPTORS
DESCRIPTORS: ACHING
DESCRIPTORS: ACHING;DISCOMFORT

## 2024-06-13 ASSESSMENT — PAIN DESCRIPTION - LOCATION
LOCATION: GROIN;LEG
LOCATION: ANKLE

## 2024-06-13 NOTE — PROGRESS NOTES
[facility]  Diagnosis List for Inpatient Rehab facility (IRF) - Patient Assessment Instrument (VIANCA)    Patient Name: David Vaca        MRN: [id number]    : 1965  (58 y.o.)  Gender: male     Primary impairment requiring rehabilitation: 16 Debility      Etiologic Diagnosis that led to the condition: Acute aorta and left lower extremity arterial thrombosis, ischemic rhabdomyolysis and left lower extremity compartment syndrome     Comorbid conditions affecting rehabilitation:  Debility/physical decondition secondary to acute aorta and left lower extremity arterial thrombosis complicated by ischemic rhabdomyolysis and left lower extremity compartment syndrome  Arterial peripheral vascular disease with acute arterial thrombosis starting at the aorta at the level of the renal arteries continued down to both common iliac arteries, with majority of left iliac arteries, superficial femoral arteries, popliteal arteries and trifurcation arteries requiring emergency aortobifemoral bypass, thromboembolectomy of left iliac, left common femoral, left profunda, left superficial femoral and left popliteal arteries, right iliac artery thrombectomy, and left lower extremity 4 compartment fasciotomy, and lysis of adhesions and subsequent closure of fasciotomy  Ischemic rhabdomyolysis  Left lower extremity compartment syndrome with ischemic neuropathy resulting left foot drop  HIV infection  Hyperlipidemia  Hypertension  GERD  COPD / emphysema / chronic interstitial lung disease  Urinary retention due to outlet stricture requiring cystourethroscopy with urethral dilation  History of diabetes mellitus  History of chronic lower back pain due to lumbar spine osteoarthritis  Depression

## 2024-06-13 NOTE — PROGRESS NOTES
without UE support in sitting and standing, pt able to complete tasks without physical assist      Neuromuscular Re-education  Pt. Completed standing and Stepping activities using blazepods with normal ELVA and with SLS and various levels of UE support on  booyah stick/tray table  to improve proprioception, hip/quad stability, and reaction time  for improved functional mobility.     Exercise:  Patient was guided in 1 set(s) 10 reps of exercises: Glut sets, Seated marches, Seated hamstring curls, Seated heel/toe raises, Long arc quads, Seated isometric hip adduction, Seated abduction/adduction, Standing heel/toe raises, Standing marches, Mini squats.  Exercises were completed for increased independence with functional mobility.    Functional Outcome Measures:   Not completed  Modified Roberts Scale:  Not Applicable     ASSESSMENT:  Assessment: Patient progressing toward established goals.  Activity Tolerance:  Patient tolerance of  treatment: good.     Equipment Recommendations:Equipment Needed: Yes (rollator/RW)  Discharge Recommendations: Continue to assess pending progress, Patient would benefit from continued therapy after discharge     PLAN: Current Treatment Recommendations: Strengthening, Balance training, Functional mobility training, Transfer training, Endurance training, Gait training, Stair training, Neuromuscular re-education, Home exercise program, Safety education & training, Patient/Caregiver education & training, Equipment evaluation, education, & procurement, Therapeutic activities  General Plan:  (5x/wk 90min)    Patient Education  Patient Education: Plan of Care, Functional Mobility, Reviewed Prior Education, Health Promotion and Wellness Education, Safety, Verbal Exercise Instruction    Goals:  Patient Goals : go home  Short Term Goals  Time Frame for Short Term Goals: 1 week  Short Term Goal 1: Patient will complete supine < > sit and rolling right and left with head of bed flat and modified  independence to transfer in and out of bed safely.  Short Term Goal 2: Patient will complete sit < > stand with SBA to stand to ambulate safely.  Short Term Goal 3: Patient will ambulate 50' with a RW with SBA to progress towards navigating home safely.  Short Term Goal 4: Patient will ascend/descend 1, 4\" step with a RW with CGA to prepare for safe home entry. GOAL MET, SEE LTG  Long Term Goals  Time Frame for Long Term Goals : 3 weeks from initial evaluation  Long Term Goal 1: Patient will complete sit < > stand with modified independence to stand to ambulate safely.  Long Term Goal 2: Patient will complete bed < > chair transfer with modified independence to transfer surface to surface safely.  Long Term Goal 3: Patient will ambulate 150' with a rollator with modified independence to navigate home safely.  Long Term Goal 4: Patient will ascend/descend 1, 4\" step with a RW with modified independence to access/leave home safely.  Long Term Goal 5: Patient will complete car transfer with modified independence to transfer in and out of vehicle safely.  Additional Goals?: Yes  Long term goal 6: Patient will increase tinetti to greater than or equal to 24/28 to reduce his risk for falls.    Following session, patient left in safe position with all fall risk precautions in place.

## 2024-06-13 NOTE — PROGRESS NOTES
Physical Medicine & Rehabilitation Progress Note    Chief Complaint:  Status post aortobifemoral bypass graft surgery and left lower extremity thrombectomy and left fasciotomy for lower extremities arterial thrombosis     Subjective:    David Vaca is a 58 y.o. 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       The patient says he developed sudden onset legs weakness causing him to fall with left leg numbness and pain on 5/30/2024 when he was outside his apartment.  He denies hitting his head or loss of consciousness.  He says his neighbor helped him to get up.  On 5/31/2024 he also noticed the left leg was cool to touch.  Therefore 911 was called.  The patient was brought to [facility] ER for evaluation by EMS on 5/31/2024.  CT of head done on 5/31/2024 showed no acute process.  CTA of abdominal aorta with bilateral runoff was performed on 5/31/2024 and revealed extensive acute thrombus extending from level of renal arteries in the aorta and continuing down to the abdominal aorta into both common iliac arteries, with majority of left iliac arteries, superficial femoral arteries, popliteal arteries and trifurcation arteries completely occluded with thrombus. The patient then was emergently transferred and admitted to [email or web address]  extremity arterial thrombosis complicated by ischemic rhabdomyolysis and left lower extremity compartment syndrome  Arterial peripheral vascular disease with acute arterial thrombosis starting at the aorta at the level of the renal arteries continued down to both common iliac arteries, with majority of left iliac arteries, superficial femoral arteries, popliteal arteries and trifurcation arteries requiring emergency aortobifemoral bypass, thromboembolectomy of left iliac, left common femoral, left profunda, left superficial femoral and left popliteal arteries, right iliac artery thrombectomy, and left lower extremity 4 compartment fasciotomy, and lysis of adhesions and subsequent closure of fasciotomy  Ischemic rhabdomyolysis  Left lower extremity compartment syndrome with ischemic neuropathy resulting left foot drop  HIV infection  Hyperlipidemia  Hypertension  GERD  COPD / emphysema / chronic interstitial lung disease  Urinary retention due to outlet stricture requiring cystourethroscopy with urethral dilation  History of diabetes mellitus  History of chronic lower back pain due to lumbar spine osteoarthritis  Depression    The patient's condition remains stable.  His pain at the right hip groin and right thigh appear to be better after the heat pad application.  The left groin continue to drain fluid.  He continues to have significant weakness at the left ankle especially dorsiflexion.  Valle has been removed this morning.  We will check his postvoiding residual to make sure he does not have significant amount of postvoiding residual.  He did tolerate the intensive rehabilitation treatment well.  His function continues to improve slowly.    The patient currently is projected to be ready for discharge on 6/22/2024.      Plan:  Continue intensive PT/OT/SLP/RT inpatient rehabilitation program at least 3 hours per day, 5 days per week in order to improve functional status prior to discharge.  Family education and

## 2024-06-13 NOTE — PROGRESS NOTES
[facility]  INPATIENT SPEECH THERAPY  [facility]  DAILY NOTE    TIME   SLP Individual Minutes  Time In: 1400  Time Out: 1430  Minutes: 30  Timed Code Treatment Minutes: 30 Minutes       Date: 2024  Patient Name: David Vaca      CSN: [id number]   : 1965  (58 y.o.)  Gender: male   Referring Physician:  Dr. Darrian Spears MD  Diagnosis: s/p aortobifemoral bypass surgery   Precautions: Fall risk   Current Diet: Regular with Thin liquids  Respiratory Status: Room Air  Swallowing Strategies: Standard Universal Swallow Precautions  Date of Last MBS/FEES: Not Applicable    Pain:  No pain reported.    Subjective:  Upon arrival, patient positioned upright in recliner; agreeable to ST interventions.     Short-Term Goals:  SHORT TERM GOAL #1:  Goal 1: Patient will complete reasoning and problem solving tasks with 80% accuracy, mod cues to improve logical thinking needed for time/money management, and insight/judgement for safe behavior.  INTERVENTIONS: Did not address due to focus on other goals.     SHORT TERM GOAL #2:  Goal 2: Patient will complete thought organization, sequencing and executive functioning tasks with 80% accuracy, mod cues to improve planning, organization and execution of tasks (including daily scheduling, medication management.  INTERVENTIONS:  Medication management:  -Review of MAR: Assisted patient in reviewing current medications and outlining an updated, written home medication list including name of the medication, rationale, dosage, frequency and times taken.   Patient only able to recall 6/16 of the medications reviewed, indicating need for additional review.     -Pill organizer: Patient prompted to organize medications into a bi-daily organizer.   *2/2 indep  *Following completion of task, patient reporting he was not interested in using pill box at home,  stating he has 5 prescriptions he takes out of the bottles (not included in his

## 2024-06-13 NOTE — PROGRESS NOTES
Patient: David Vaca  Unit/Bed: 8K-10/010-A  YOB: 1965  MRN: [id number] Acct: [id number]   Admitting Diagnosis: Debility [R53.81]  S/P aortobifemoral bypass surgery [Z95.828]  Admit Date:  6/9/2024  Hospital Day: 4    Assessment:     Principal Problem:    S/P aortobifemoral bypass surgery  Active Problems:    HIV (human immunodeficiency virus infection) (HCC)    Hyperlipidemia    Depression    Debility    Left foot drop    Ischemic neuropathy of foot, left    History of rhabdomyolysis  Resolved Problems:    * No resolved hospital problems. *      Plan:     Continue to follow the draining seroma in the left groin.        Subjective:     Patient has no complaint of CP or SOB..   Medication side effects: none    Scheduled Meds:   [START ON 6/14/2024] lactobacillus  1 capsule Oral Daily with breakfast    [Held by provider] docusate sodium  100 mg Oral Daily    diclofenac sodium  2 g Topical 4x daily    tamsulosin  0.4 mg Oral Daily    aspirin  81 mg Oral Daily    atorvastatin  40 mg Oral Nightly    fenofibrate  135 mg Oral Daily    FLUoxetine  20 mg Oral Daily    fosamprenavir  1,400 mg Oral BID    gabapentin  300 mg Oral q8h    isosorbide dinitrate  20 mg Oral BID    lamiVUDine-zidovudine  1 tablet Oral BID    metoprolol tartrate  25 mg Oral BID    pantoprazole  40 mg Oral QAM AC    enoxaparin  40 mg SubCUTAneous Q24H    naloxegol  12.5 mg Oral QAM AC    acetaminophen  650 mg Oral Q6H    melatonin  6 mg Oral Nightly    cetirizine  10 mg Oral Daily    OLANZapine  10 mg Oral Nightly    tiotropium-olodaterol  2 puff Inhalation Daily     Continuous Infusions:  PRN Meds:loperamide, senna, methocarbamol, polyethylene glycol, bisacodyl, magnesium hydroxide, acetaminophen, oxyCODONE **OR** oxyCODONE, traZODone    Review of Systems  Pertinent items are noted in HPI.    Objective:     Patient Vitals for the past 8 hrs:   BP Temp Temp src Pulse Resp   06/13/24 1035 133/85 97.7 °F (36.5 °C) Oral (!) 103 18

## 2024-06-13 NOTE — PROGRESS NOTES
[facility]  Occupational Therapy  Daily Note  Time:    Time In: 1000  Time Out: 1138  Timed Code Treatment Minutes: 98 Minutes  Minutes: 98          Date: 2024  Patient Name: David Vaca,   Gender: male      Room: [facility]10/010-A  MRN: [id number]  : 1965  (58 y.o.)  Referring Practitioner: Dr. KELLI Spears  Diagnosis: Debility  Additional Pertinent Hx: David Vaca is a 58 y.o. 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 The patient says he developed sudden onset legs weakness causing him to fall with left leg numbness and pain on 2024 when he was outside his apartment.  On 2024 he also noticed the left leg was cool to touch.  Therefore 911 was called.  The patient was brought to [facility] ER for evaluation.  CTA of abdominal aorta revealed extensive acute thrombus extending from level of renal arteries in the aorta and continuing down to the abdominal aorta into both common iliac arteries, with majority of left iliac arteries, superficial femoral arteries, popliteal arteries and trifurcation arteries completely occluded with thrombus. The patient then was emergently transferred and admitted to [facility] in [facility] via [facility] on 2024.  The patient underwent emergency aortobifemoral bypass, thromboembolectomy of left iliac, left common femoral, left profunda, left superficial femoral and left popliteal arteries, right iliac artery thrombectomy, and left lower extremity 4 compartment fasciotomy, and lysis of adhesions on 2024 by Dr.Mohammed Montague

## 2024-06-14 LAB
ALBUMIN SERPL BCG-MCNC: 3.4 G/DL (ref 3.5–5.1)
ALP SERPL-CCNC: 50 U/L (ref 38–126)
ALT SERPL W/O P-5'-P-CCNC: 28 U/L (ref 11–66)
ANION GAP SERPL CALC-SCNC: 12 MEQ/L (ref 8–16)
AST SERPL-CCNC: 16 U/L (ref 5–40)
BASOPHILS ABSOLUTE: 0.1 THOU/MM3 (ref 0–0.1)
BASOPHILS NFR BLD AUTO: 0.7 %
BILIRUB SERPL-MCNC: 0.2 MG/DL (ref 0.3–1.2)
BUN SERPL-MCNC: 15 MG/DL (ref 7–22)
CALCIUM SERPL-MCNC: 8.5 MG/DL (ref 8.5–10.5)
CHLORIDE SERPL-SCNC: 106 MEQ/L (ref 98–111)
CO2 SERPL-SCNC: 24 MEQ/L (ref 23–33)
CREAT SERPL-MCNC: 1 MG/DL (ref 0.4–1.2)
DEPRECATED RDW RBC AUTO: 56.6 FL (ref 35–45)
EOSINOPHIL NFR BLD AUTO: 2.6 %
EOSINOPHILS ABSOLUTE: 0.2 THOU/MM3 (ref 0–0.4)
ERYTHROCYTE [DISTWIDTH] IN BLOOD BY AUTOMATED COUNT: 13.1 % (ref 11.5–14.5)
GFR SERPL CREATININE-BSD FRML MDRD: 86 ML/MIN/1.73M2
GLUCOSE SERPL-MCNC: 112 MG/DL (ref 70–108)
HCT VFR BLD AUTO: 27.1 % (ref 42–52)
HGB BLD-MCNC: 9 GM/DL (ref 14–18)
IMM GRANULOCYTES # BLD AUTO: 0.07 THOU/MM3 (ref 0–0.07)
IMM GRANULOCYTES NFR BLD AUTO: 0.8 %
LYMPHOCYTES ABSOLUTE: 3.4 THOU/MM3 (ref 1–4.8)
LYMPHOCYTES NFR BLD AUTO: 37 %
MACROCYTES BLD QL SMEAR: PRESENT
MCH RBC QN AUTO: 39.8 PG (ref 26–33)
MCHC RBC AUTO-ENTMCNC: 33.2 GM/DL (ref 32.2–35.5)
MCV RBC AUTO: 119.9 FL (ref 80–94)
MONOCYTES ABSOLUTE: 0.6 THOU/MM3 (ref 0.4–1.3)
MONOCYTES NFR BLD AUTO: 6.3 %
NEUTROPHILS ABSOLUTE: 4.8 THOU/MM3 (ref 1.8–7.7)
NEUTROPHILS NFR BLD AUTO: 52.6 %
NRBC BLD AUTO-RTO: 0 /100 WBC
PLATELET # BLD AUTO: 353 THOU/MM3 (ref 130–400)
PMV BLD AUTO: 9 FL (ref 9.4–12.4)
POTASSIUM SERPL-SCNC: 4 MEQ/L (ref 3.5–5.2)
PROT SERPL-MCNC: 6.3 G/DL (ref 6.1–8)
RBC # BLD AUTO: 2.26 MILL/MM3 (ref 4.7–6.1)
SODIUM SERPL-SCNC: 142 MEQ/L (ref 135–145)
WBC # BLD AUTO: 9.2 THOU/MM3 (ref 4.8–10.8)

## 2024-06-14 PROCEDURE — 51798 US URINE CAPACITY MEASURE: CPT

## 2024-06-14 PROCEDURE — 97112 NEUROMUSCULAR REEDUCATION: CPT

## 2024-06-14 PROCEDURE — 85025 COMPLETE CBC W/AUTO DIFF WBC: CPT

## 2024-06-14 PROCEDURE — 80053 COMPREHEN METABOLIC PANEL: CPT

## 2024-06-14 PROCEDURE — 97129 THER IVNTJ 1ST 15 MIN: CPT | Performed by: SPEECH-LANGUAGE PATHOLOGIST

## 2024-06-14 PROCEDURE — 97530 THERAPEUTIC ACTIVITIES: CPT

## 2024-06-14 PROCEDURE — 1180000000 HC REHAB R&B

## 2024-06-14 PROCEDURE — 94640 AIRWAY INHALATION TREATMENT: CPT

## 2024-06-14 PROCEDURE — 6360000002 HC RX W HCPCS: Performed by: PHYSICAL MEDICINE & REHABILITATION

## 2024-06-14 PROCEDURE — 36415 COLL VENOUS BLD VENIPUNCTURE: CPT

## 2024-06-14 PROCEDURE — 97535 SELF CARE MNGMENT TRAINING: CPT

## 2024-06-14 PROCEDURE — 97116 GAIT TRAINING THERAPY: CPT

## 2024-06-14 PROCEDURE — 97110 THERAPEUTIC EXERCISES: CPT

## 2024-06-14 PROCEDURE — 6370000000 HC RX 637 (ALT 250 FOR IP): Performed by: PHYSICAL MEDICINE & REHABILITATION

## 2024-06-14 PROCEDURE — 99232 SBSQ HOSP IP/OBS MODERATE 35: CPT | Performed by: PHYSICAL MEDICINE & REHABILITATION

## 2024-06-14 PROCEDURE — 97130 THER IVNTJ EA ADDL 15 MIN: CPT | Performed by: SPEECH-LANGUAGE PATHOLOGIST

## 2024-06-14 RX ADMIN — ACETAMINOPHEN 650 MG: 325 TABLET ORAL at 18:40

## 2024-06-14 RX ADMIN — CETIRIZINE HYDROCHLORIDE 10 MG: 10 TABLET ORAL at 09:43

## 2024-06-14 RX ADMIN — LAMIVUDINE AND ZIDOVUDINE 1 TABLET: 150; 300 TABLET, FILM COATED ORAL at 20:04

## 2024-06-14 RX ADMIN — FOSAMPRENAVIR CALCIUM 1400 MG: 700 TABLET, COATED ORAL at 09:46

## 2024-06-14 RX ADMIN — ATORVASTATIN CALCIUM 40 MG: 40 TABLET, FILM COATED ORAL at 20:02

## 2024-06-14 RX ADMIN — TRAZODONE HYDROCHLORIDE 50 MG: 50 TABLET ORAL at 20:02

## 2024-06-14 RX ADMIN — GABAPENTIN 300 MG: 300 CAPSULE ORAL at 09:43

## 2024-06-14 RX ADMIN — OXYCODONE HYDROCHLORIDE 5 MG: 5 TABLET ORAL at 20:02

## 2024-06-14 RX ADMIN — ISOSORBIDE DINITRATE 20 MG: 20 TABLET ORAL at 09:44

## 2024-06-14 RX ADMIN — FLUOXETINE HYDROCHLORIDE 20 MG: 20 CAPSULE ORAL at 09:49

## 2024-06-14 RX ADMIN — NALOXEGOL OXALATE 12.5 MG: 12.5 TABLET, FILM COATED ORAL at 05:43

## 2024-06-14 RX ADMIN — Medication 1 CAPSULE: at 09:43

## 2024-06-14 RX ADMIN — ASPIRIN 81 MG 81 MG: 81 TABLET ORAL at 09:51

## 2024-06-14 RX ADMIN — ENOXAPARIN SODIUM 40 MG: 100 INJECTION SUBCUTANEOUS at 20:09

## 2024-06-14 RX ADMIN — PANTOPRAZOLE SODIUM 40 MG: 40 TABLET, DELAYED RELEASE ORAL at 05:43

## 2024-06-14 RX ADMIN — DICLOFENAC SODIUM 2 G: 10 GEL TOPICAL at 18:05

## 2024-06-14 RX ADMIN — TIOTROPIUM BROMIDE AND OLODATEROL 2 PUFF: 3.124; 2.736 SPRAY, METERED RESPIRATORY (INHALATION) at 09:24

## 2024-06-14 RX ADMIN — ACETAMINOPHEN 650 MG: 325 TABLET ORAL at 05:43

## 2024-06-14 RX ADMIN — OLANZAPINE 10 MG: 10 TABLET, FILM COATED ORAL at 20:02

## 2024-06-14 RX ADMIN — ACETAMINOPHEN 650 MG: 325 TABLET ORAL at 01:07

## 2024-06-14 RX ADMIN — METHOCARBAMOL 750 MG: 500 TABLET ORAL at 20:06

## 2024-06-14 RX ADMIN — Medication 6 MG: at 20:02

## 2024-06-14 RX ADMIN — GABAPENTIN 300 MG: 300 CAPSULE ORAL at 01:07

## 2024-06-14 RX ADMIN — ACETAMINOPHEN 650 MG: 325 TABLET ORAL at 13:08

## 2024-06-14 RX ADMIN — GABAPENTIN 300 MG: 300 CAPSULE ORAL at 18:05

## 2024-06-14 RX ADMIN — METOPROLOL TARTRATE 25 MG: 25 TABLET, FILM COATED ORAL at 09:47

## 2024-06-14 RX ADMIN — METOPROLOL TARTRATE 25 MG: 25 TABLET, FILM COATED ORAL at 20:02

## 2024-06-14 RX ADMIN — FOSAMPRENAVIR CALCIUM 1400 MG: 700 TABLET, COATED ORAL at 20:04

## 2024-06-14 RX ADMIN — DICLOFENAC SODIUM 2 G: 10 GEL TOPICAL at 20:02

## 2024-06-14 RX ADMIN — FENOFIBRATE 135 MG: 54 TABLET ORAL at 09:44

## 2024-06-14 RX ADMIN — LAMIVUDINE AND ZIDOVUDINE 1 TABLET: 150; 300 TABLET, FILM COATED ORAL at 09:48

## 2024-06-14 RX ADMIN — TAMSULOSIN HYDROCHLORIDE 0.4 MG: 0.4 CAPSULE ORAL at 09:46

## 2024-06-14 ASSESSMENT — PAIN DESCRIPTION - LOCATION: LOCATION: BACK

## 2024-06-14 ASSESSMENT — PAIN DESCRIPTION - DESCRIPTORS: DESCRIPTORS: ACHING

## 2024-06-14 ASSESSMENT — PAIN SCALES - GENERAL
PAINLEVEL_OUTOF10: 0
PAINLEVEL_OUTOF10: 0
PAINLEVEL_OUTOF10: 2

## 2024-06-14 ASSESSMENT — PAIN DESCRIPTION - ORIENTATION: ORIENTATION: RIGHT;LOWER

## 2024-06-14 NOTE — PROGRESS NOTES
[facility]  INPATIENT SPEECH THERAPY  [facility]  DAILY NOTE    TIME   SLP Individual Minutes  Time In: 1213  Time Out: 1243  Minutes: 30  Timed Code Treatment Minutes: 30 Minutes       Date: 2024  Patient Name: David Vaca      CSN: [id number]   : 1965  (58 y.o.)  Gender: male   Referring Physician:  Dr. Darrian Spears MD  Diagnosis: s/p aortobifemoral bypass surgery   Precautions: Fall risk   Current Diet: Regular with Thin liquids  Respiratory Status: Room Air  Swallowing Strategies: Standard Universal Swallow Precautions  Date of Last MBS/FEES: Not Applicable    Pain:  No pain reported.    Subjective:  Patient positioned upright in chair, pleasant and cooperative. Agreeable to ST interventions.     Short-Term Goals:  SHORT TERM GOAL #1:  Goal 1: Patient will complete reasoning and problem solving tasks with 80% accuracy, mod cues to improve logical thinking needed for time/money management, and insight/judgement for safe behavior.  INTERVENTIONS:     Check writin/5 indep  Checkbook register organization:  indep  Balancing a checkbook: 3/3 indep with use of calculator    Problem solving for hypothetical situations:  -Emergency-  indep    GOAL REVIEW:  Walking:  -Progress thus far- Patient reporting walking 400 ft  -Barriers- Losing balance, feeling woosy  -Ways to build success at home- Walking each day, Eat before being physically active  Driving:  -Barriers: Needs the okay from the surgeon to drive; Needs to be moving well  Mopping the floors on hands and knees:  -Needs to be a future goal  -Barriers: Surgical incisions- allowing time for healing, Reduced strength  -Modifications: Using a mop stick- will need to check with PT to see if safe.     SHORT TERM GOAL #2:  Goal 2: Patient will complete thought organization, sequencing and executive functioning tasks with 80% accuracy, mod cues to improve planning, organization and execution of

## 2024-06-14 NOTE — PROGRESS NOTES
[facility]  Occupational Therapy  Daily Note  Time:   Time In: 0730  Time Out: 0900  Timed Code Treatment Minutes: 90 Minutes  Minutes: 90          Date: 2024  Patient Name: David Vaca,   Gender: male      Room: Counts include [facility] beds at the [facility]10/010-A  MRN: [id number]  : 1965  (58 y.o.)  Referring Practitioner: Dr. KELLI Spears  Diagnosis: Debility  Additional Pertinent Hx: David Vaca is a 58 y.o. 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 The patient says he developed sudden onset legs weakness causing him to fall with left leg numbness and pain on 2024 when he was outside his apartment.  On 2024 he also noticed the left leg was cool to touch.  Therefore 911 was called.  The patient was brought to [facility] ER for evaluation.  CTA of abdominal aorta revealed extensive acute thrombus extending from level of renal arteries in the aorta and continuing down to the abdominal aorta into both common iliac arteries, with majority of left iliac arteries, superficial femoral arteries, popliteal arteries and trifurcation arteries completely occluded with thrombus. The patient then was emergently transferred and admitted to [facility] in [facility] via [facility] on 2024.  The patient underwent emergency aortobifemoral bypass, thromboembolectomy of left iliac, left common femoral, left profunda, left superficial femoral and left popliteal arteries, right iliac artery thrombectomy, and left lower extremity 4 compartment fasciotomy, and lysis of adhesions on 2024 by Dr.Mohammed Montague  Week: 5x/wk for 90 min  Current Treatment Recommendations: Strengthening, Functional mobility training, Balance training, Endurance training, Safety education & training, Patient/Caregiver education & training, Self-Care / ADL, Positioning, Equipment evaluation, education, & procurement, Home management training, Cognitive reorientation    Education:  Learners: Patient  ADL's and IADL's    Goals  Short Term Goals  Time Frame for Short Term Goals: 1 week  Short Term Goal 1: GOAL MET, DISONTINUE  Short Term Goal 2: Pt will recall use of adaptive technique to thread LB clothing and manage over hips with min assist to improve indep with daily dressing tasks.  Short Term Goal 3: Pt will safely navigate RW within room to retreive 3 items with Mod I for walker safety to improve safety and ability to retrieve clothing from closet.  Short Term Goal 4: Pt complete tub/shower transfers using least restrictive AE with SBA to improve ability to shower in home tub/shower  Short Term Goal 5: Pt will describe 3 fall prevetion strategies he can implement at home to reduce fall risk.  Long Term Goals  Time Frame for Long Term Goals : 2 weeks from OT evaluation  Long Term Goal 1: Pt will use adaptive technique prn to complete bathing, dressing, toileting and grooming tasks with modified independence to improve indep within self care routine at home.  Long Term Goal 2: Pt will complete simple meal prep tasks with modified independence to be able to return to using air fryer at home.  Long Term Goal 3: Pt will use least restrictive AE to complete light housekeeping tasks with mod I to meet patient's goal of preparing for apartment inspection.    Following session, patient left in safe position with all fall risk precautions in place.

## 2024-06-14 NOTE — PLAN OF CARE
Problem: Safety - Adult  Goal: Free from fall injury  Outcome: Progressing  Flowsheets (Taken 6/14/2024 1606)  Free From Fall Injury: Instruct family/caregiver on patient safety     Problem: Skin/Tissue Integrity  Goal: Absence of new skin breakdown  Description: 1.  Monitor for areas of redness and/or skin breakdown  2.  Assess vascular access sites hourly  Outcome: Progressing     Problem: Respiratory - Adult  Goal: Achieves optimal ventilation and oxygenation  Outcome: Progressing     Problem: Skin/Tissue Integrity - Adult  Goal: Skin integrity remains intact  Outcome: Progressing  Note:  No new skin issues noted.

## 2024-06-14 NOTE — PROGRESS NOTES
position/activity restrictions: No heavy pushing or pulling or heavy lifting (no more than 10 pounds) for 4 weeks. 6/7: Close of Left lateral fasciotomy 6/4: Closure of left medial fasciotomy and Creation of myocutaneous flap of left lower extremity. Placement of wound vac. 5-31: S/p Aorto-bifemoral bypass with infra-renal clamp, lower extremity fasciotomy, LLE thrombectomy.  Pt has AFO for slight left sided foot drop. Ok to shower (per vascular MD).     SUBJECTIVE: Pt. Laying in his bed and pleasantly agrees to therapy session. Pt. Motivated for session.     PAIN: Not rated: R thigh    Vitals: Vitals not assessed per clinical judgement, see nursing flowsheet    OBJECTIVE:  Bed Mobility:  Rolling to Right: Supervision   Supine to Sit: Supervision    Transfers:  Sit to Stand: Stand By Assistance  Stand to Sit:Stand By Assistance  To/From Bed and Chair: Stand By Assistance    Ambulation:  Stand By Assistance  Distance: 400' x 2, 30' x 2, multiple shorter distances.   Surface: Level Tile  Device:4 Wheeled Walker  Gait Deviations:  Slow Ethel, Decreased Step Length Bilaterally, Decreased Gait Speed, Decreased Foot Clearance Left at times    Balance:  Pt. Completed standing dynamic balance activity: ring toss with Narrow ELVA on level tile and No UE support with Stand By Assistance. Activity completed to improve balance, enhance functional mobility, and reduce risk of falls.     Neuromuscular Re-ed  Pt. Completed activities using blazepods requiring pt. To laterally sidestep L and R and tap pods with LEs and UEs with No UE support to improve hip/quad stability, lateral weight shifting, Visual tracking, and Environmental awareness for improved functional mobility. Activity progressed by using 3kg med ball in B UEs.   Pt. Completed standing activity tapping blazepods on wall using Zelnasah stick while standing on airex balance pad to improve coordination, core stability, and lateral weight shifting for improved functional  complete sit < > stand with SBA to stand to ambulate safely.  Short Term Goal 3: Patient will ambulate 50' with a RW with SBA to progress towards navigating home safely.  Short Term Goal 4: Patient will ascend/descend 1, 4\" step with a RW with CGA to prepare for safe home entry. GOAL MET, SEE LTG  Long Term Goals  Time Frame for Long Term Goals : 3 weeks from initial evaluation  Long Term Goal 1: Patient will complete sit < > stand with modified independence to stand to ambulate safely.  Long Term Goal 2: Patient will complete bed < > chair transfer with modified independence to transfer surface to surface safely.  Long Term Goal 3: Patient will ambulate 150' with a rollator with modified independence to navigate home safely.  Long Term Goal 4: Patient will ascend/descend 1, 4\" step with a RW with modified independence to access/leave home safely.  Long Term Goal 5: Patient will complete car transfer with modified independence to transfer in and out of vehicle safely.  Additional Goals?: Yes  Long term goal 6: Patient will increase tinetti to greater than or equal to 24/28 to reduce his risk for falls.    Following session, patient left in safe position with all fall risk precautions in place.

## 2024-06-14 NOTE — PLAN OF CARE
Problem: Safety - Adult  Goal: Free from fall injury  Outcome: Progressing  Flowsheets (Taken 6/13/2024 2240)  Free From Fall Injury: Instruct family/caregiver on patient safety     Problem: ABCDS Injury Assessment  Goal: Absence of physical injury  Outcome: Progressing  Flowsheets (Taken 6/13/2024 2240)  Absence of Physical Injury: Implement safety measures based on patient assessment     Problem: Skin/Tissue Integrity - Adult  Goal: Skin integrity remains intact  Outcome: Progressing  Flowsheets (Taken 6/13/2024 2240)  Skin Integrity Remains Intact: Monitor for areas of redness and/or skin breakdown

## 2024-06-15 ENCOUNTER — HOSPITAL ENCOUNTER (INPATIENT)
Age: 59
LOS: 1 days | Discharge: HOME HEALTH | DRG: 948 | End: 2024-06-18
Attending: OPHTHALMOLOGY | Admitting: OPHTHALMOLOGY
Payer: MEDICARE

## 2024-06-15 ENCOUNTER — APPOINTMENT (OUTPATIENT)
Dept: GENERAL RADIOLOGY | Age: 59
DRG: 948 | End: 2024-06-15
Attending: OPHTHALMOLOGY
Payer: MEDICARE

## 2024-06-15 VITALS
HEART RATE: 99 BPM | HEIGHT: 66 IN | BODY MASS INDEX: 22.52 KG/M2 | RESPIRATION RATE: 16 BRPM | WEIGHT: 140.1 LBS | SYSTOLIC BLOOD PRESSURE: 113 MMHG | OXYGEN SATURATION: 97 % | DIASTOLIC BLOOD PRESSURE: 78 MMHG | TEMPERATURE: 98.1 F

## 2024-06-15 DIAGNOSIS — R07.9 CHEST PAIN, UNSPECIFIED TYPE: Primary | ICD-10-CM

## 2024-06-15 DIAGNOSIS — R06.02 SHORTNESS OF BREATH: ICD-10-CM

## 2024-06-15 PROBLEM — R79.89 ELEVATED TROPONIN: Status: ACTIVE | Noted: 2024-06-15

## 2024-06-15 PROBLEM — I70.0 AORTIC OCCLUSION (HCC): Status: RESOLVED | Noted: 2024-05-31 | Resolved: 2024-06-15

## 2024-06-15 PROBLEM — I25.10 CAD (CORONARY ARTERY DISEASE): Status: RESOLVED | Noted: 2022-07-12 | Resolved: 2024-06-15

## 2024-06-15 PROBLEM — Z87.39 HISTORY OF RHABDOMYOLYSIS: Status: RESOLVED | Noted: 2024-06-09 | Resolved: 2024-06-15

## 2024-06-15 LAB
ANION GAP SERPL CALC-SCNC: 13 MEQ/L (ref 8–16)
ANION GAP SERPL CALC-SCNC: 15 MEQ/L (ref 8–16)
APTT PPP: 28.9 SECONDS (ref 22–38)
BACTERIA: ABNORMAL
BASOPHILS ABSOLUTE: 0.1 THOU/MM3 (ref 0–0.1)
BASOPHILS NFR BLD AUTO: 0.6 %
BILIRUB UR QL STRIP: NEGATIVE
BUN SERPL-MCNC: 11 MG/DL (ref 7–22)
BUN SERPL-MCNC: 13 MG/DL (ref 7–22)
CA-I BLD ISE-SCNC: 0.94 MMOL/L (ref 1.12–1.32)
CA-I BLD ISE-SCNC: 0.98 MMOL/L (ref 1.12–1.32)
CALCIUM SERPL-MCNC: 7.3 MG/DL (ref 8.5–10.5)
CALCIUM SERPL-MCNC: 7.8 MG/DL (ref 8.5–10.5)
CASTS #/AREA URNS LPF: ABNORMAL /LPF
CASTS #/AREA URNS LPF: ABNORMAL /LPF
CHARACTER UR: ABNORMAL
CHARCOAL URNS QL MICRO: ABNORMAL
CHLORIDE SERPL-SCNC: 100 MEQ/L (ref 98–111)
CHLORIDE SERPL-SCNC: 99 MEQ/L (ref 98–111)
CO2 SERPL-SCNC: 20 MEQ/L (ref 23–33)
CO2 SERPL-SCNC: 21 MEQ/L (ref 23–33)
COLOR UR: YELLOW
CREAT SERPL-MCNC: 1 MG/DL (ref 0.4–1.2)
CREAT SERPL-MCNC: 1.1 MG/DL (ref 0.4–1.2)
CRYSTALS URNS QL MICRO: ABNORMAL
D DIMER PPP IA.FEU-MCNC: 6861 NG/ML FEU (ref 0–500)
DEPRECATED RDW RBC AUTO: 55.7 FL (ref 35–45)
EOSINOPHIL NFR BLD AUTO: 1.9 %
EOSINOPHILS ABSOLUTE: 0.2 THOU/MM3 (ref 0–0.4)
EPITHELIAL CELLS, UA: ABNORMAL /HPF
ERYTHROCYTE [DISTWIDTH] IN BLOOD BY AUTOMATED COUNT: 12.9 % (ref 11.5–14.5)
FLUAV RNA RESP QL NAA+PROBE: NOT DETECTED
FLUBV RNA RESP QL NAA+PROBE: NOT DETECTED
GFR SERPL CREATININE-BSD FRML MDRD: 77 ML/MIN/1.73M2
GFR SERPL CREATININE-BSD FRML MDRD: 86 ML/MIN/1.73M2
GLUCOSE SERPL-MCNC: 143 MG/DL (ref 70–108)
GLUCOSE SERPL-MCNC: 157 MG/DL (ref 70–108)
GLUCOSE UR QL STRIP.AUTO: NEGATIVE MG/DL
HCT VFR BLD AUTO: 25.4 % (ref 42–52)
HEPARIN UNFRACTIONATED: 0.06 U/ML (ref 0.3–0.7)
HGB BLD-MCNC: 8.1 GM/DL (ref 14–18)
HGB UR QL STRIP.AUTO: ABNORMAL
IMM GRANULOCYTES # BLD AUTO: 0.07 THOU/MM3 (ref 0–0.07)
IMM GRANULOCYTES NFR BLD AUTO: 0.8 %
INR PPP: 1.12 (ref 0.85–1.13)
KETONES UR QL STRIP.AUTO: NEGATIVE
LEUKOCYTE ESTERASE UR QL STRIP.AUTO: ABNORMAL
LYMPHOCYTES ABSOLUTE: 2.6 THOU/MM3 (ref 1–4.8)
LYMPHOCYTES NFR BLD AUTO: 29 %
MACROCYTES BLD QL SMEAR: PRESENT
MAGNESIUM SERPL-MCNC: 1.4 MG/DL (ref 1.6–2.4)
MAGNESIUM SERPL-MCNC: < 0.3 MG/DL (ref 1.6–2.4)
MAGNESIUM SERPL-MCNC: < 0.3 MG/DL (ref 1.6–2.4)
MCH RBC QN AUTO: 38.9 PG (ref 26–33)
MCHC RBC AUTO-ENTMCNC: 31.9 GM/DL (ref 32.2–35.5)
MCV RBC AUTO: 122.1 FL (ref 80–94)
MONOCYTES ABSOLUTE: 0.6 THOU/MM3 (ref 0.4–1.3)
MONOCYTES NFR BLD AUTO: 6.2 %
NEUTROPHILS ABSOLUTE: 5.6 THOU/MM3 (ref 1.8–7.7)
NEUTROPHILS NFR BLD AUTO: 61.5 %
NITRITE UR QL STRIP.AUTO: POSITIVE
NRBC BLD AUTO-RTO: 0 /100 WBC
NT-PROBNP SERPL IA-MCNC: 173.9 PG/ML (ref 0–124)
OSMOLALITY UR: 237 MOSMOL/KG (ref 250–750)
PH UR STRIP.AUTO: 6 [PH] (ref 5–9)
PHOSPHATE SERPL-MCNC: 3.7 MG/DL (ref 2.4–4.7)
PLATELET # BLD AUTO: 287 THOU/MM3 (ref 130–400)
PMV BLD AUTO: 8.9 FL (ref 9.4–12.4)
POTASSIUM SERPL-SCNC: 3.4 MEQ/L (ref 3.5–5.2)
POTASSIUM SERPL-SCNC: 3.7 MEQ/L (ref 3.5–5.2)
PROT UR STRIP.AUTO-MCNC: 100 MG/DL
RBC # BLD AUTO: 2.08 MILL/MM3 (ref 4.7–6.1)
RBC #/AREA URNS HPF: ABNORMAL /HPF
RENAL EPI CELLS #/AREA URNS HPF: ABNORMAL /[HPF]
SARS-COV-2 RNA RESP QL NAA+PROBE: NOT DETECTED
SODIUM SERPL-SCNC: 133 MEQ/L (ref 135–145)
SODIUM SERPL-SCNC: 135 MEQ/L (ref 135–145)
SODIUM UR-SCNC: 21 MEQ/L
SPECIFIC GRAVITY UA: 1.01 (ref 1–1.03)
TROPONIN, HIGH SENSITIVITY: 73 NG/L (ref 0–12)
TROPONIN, HIGH SENSITIVITY: 77 NG/L (ref 0–12)
TSH SERPL DL<=0.005 MIU/L-ACNC: 0.89 UIU/ML (ref 0.4–4.2)
UROBILINOGEN, URINE: 0.2 EU/DL (ref 0–1)
WBC # BLD AUTO: 9.1 THOU/MM3 (ref 4.8–10.8)
WBC #/AREA URNS HPF: > 200 /HPF
YEAST LIKE FUNGI URNS QL MICRO: ABNORMAL

## 2024-06-15 PROCEDURE — 85520 HEPARIN ASSAY: CPT

## 2024-06-15 PROCEDURE — 2580000003 HC RX 258

## 2024-06-15 PROCEDURE — 96361 HYDRATE IV INFUSION ADD-ON: CPT

## 2024-06-15 PROCEDURE — 71045 X-RAY EXAM CHEST 1 VIEW: CPT

## 2024-06-15 PROCEDURE — 93005 ELECTROCARDIOGRAM TRACING: CPT | Performed by: FAMILY MEDICINE

## 2024-06-15 PROCEDURE — 96365 THER/PROPH/DIAG IV INF INIT: CPT

## 2024-06-15 PROCEDURE — 96375 TX/PRO/DX INJ NEW DRUG ADDON: CPT

## 2024-06-15 PROCEDURE — 85025 COMPLETE CBC W/AUTO DIFF WBC: CPT

## 2024-06-15 PROCEDURE — 94640 AIRWAY INHALATION TREATMENT: CPT

## 2024-06-15 PROCEDURE — 6370000000 HC RX 637 (ALT 250 FOR IP): Performed by: NURSE PRACTITIONER

## 2024-06-15 PROCEDURE — G0379 DIRECT REFER HOSPITAL OBSERV: HCPCS

## 2024-06-15 PROCEDURE — 80048 BASIC METABOLIC PNL TOTAL CA: CPT

## 2024-06-15 PROCEDURE — 97130 THER IVNTJ EA ADDL 15 MIN: CPT

## 2024-06-15 PROCEDURE — G0378 HOSPITAL OBSERVATION PER HR: HCPCS

## 2024-06-15 PROCEDURE — 84443 ASSAY THYROID STIM HORMONE: CPT

## 2024-06-15 PROCEDURE — 97129 THER IVNTJ 1ST 15 MIN: CPT

## 2024-06-15 PROCEDURE — 96366 THER/PROPH/DIAG IV INF ADDON: CPT

## 2024-06-15 PROCEDURE — 84100 ASSAY OF PHOSPHORUS: CPT

## 2024-06-15 PROCEDURE — 96368 THER/DIAG CONCURRENT INF: CPT

## 2024-06-15 PROCEDURE — 93005 ELECTROCARDIOGRAM TRACING: CPT

## 2024-06-15 PROCEDURE — 83935 ASSAY OF URINE OSMOLALITY: CPT

## 2024-06-15 PROCEDURE — 85730 THROMBOPLASTIN TIME PARTIAL: CPT

## 2024-06-15 PROCEDURE — 85379 FIBRIN DEGRADATION QUANT: CPT

## 2024-06-15 PROCEDURE — 84484 ASSAY OF TROPONIN QUANT: CPT

## 2024-06-15 PROCEDURE — 87636 SARSCOV2 & INF A&B AMP PRB: CPT

## 2024-06-15 PROCEDURE — 36415 COLL VENOUS BLD VENIPUNCTURE: CPT

## 2024-06-15 PROCEDURE — 6370000000 HC RX 637 (ALT 250 FOR IP): Performed by: PHYSICAL MEDICINE & REHABILITATION

## 2024-06-15 PROCEDURE — 83880 ASSAY OF NATRIURETIC PEPTIDE: CPT

## 2024-06-15 PROCEDURE — 82330 ASSAY OF CALCIUM: CPT

## 2024-06-15 PROCEDURE — 85610 PROTHROMBIN TIME: CPT

## 2024-06-15 PROCEDURE — 81001 URINALYSIS AUTO W/SCOPE: CPT

## 2024-06-15 PROCEDURE — 84300 ASSAY OF URINE SODIUM: CPT

## 2024-06-15 PROCEDURE — 6360000002 HC RX W HCPCS

## 2024-06-15 PROCEDURE — 2500000003 HC RX 250 WO HCPCS

## 2024-06-15 PROCEDURE — 83735 ASSAY OF MAGNESIUM: CPT

## 2024-06-15 PROCEDURE — 99223 1ST HOSP IP/OBS HIGH 75: CPT

## 2024-06-15 RX ORDER — GABAPENTIN 300 MG/1
300 CAPSULE ORAL EVERY 8 HOURS
Status: DISCONTINUED | OUTPATIENT
Start: 2024-06-16 | End: 2024-06-18 | Stop reason: HOSPADM

## 2024-06-15 RX ORDER — FENOFIBRATE 160 MG/1
160 TABLET ORAL DAILY
Status: CANCELLED | OUTPATIENT
Start: 2024-06-16

## 2024-06-15 RX ORDER — OLANZAPINE 10 MG/1
10 TABLET ORAL NIGHTLY
Status: CANCELLED | OUTPATIENT
Start: 2024-06-15

## 2024-06-15 RX ORDER — FOSAMPRENAVIR CALCIUM 700 MG/1
1400 TABLET, COATED ORAL 2 TIMES DAILY
Status: DISCONTINUED | OUTPATIENT
Start: 2024-06-15 | End: 2024-06-15 | Stop reason: SDUPTHER

## 2024-06-15 RX ORDER — ATORVASTATIN CALCIUM 80 MG/1
80 TABLET, FILM COATED ORAL NIGHTLY
Status: DISCONTINUED | OUTPATIENT
Start: 2024-06-15 | End: 2024-06-18 | Stop reason: HOSPADM

## 2024-06-15 RX ORDER — ONDANSETRON 4 MG/1
4 TABLET, ORALLY DISINTEGRATING ORAL EVERY 8 HOURS PRN
Status: DISCONTINUED | OUTPATIENT
Start: 2024-06-15 | End: 2024-06-15 | Stop reason: SDUPTHER

## 2024-06-15 RX ORDER — FOSAMPRENAVIR CALCIUM 700 MG/1
1400 TABLET, COATED ORAL 2 TIMES DAILY
Status: DISCONTINUED | OUTPATIENT
Start: 2024-06-15 | End: 2024-06-18 | Stop reason: HOSPADM

## 2024-06-15 RX ORDER — CALCIUM GLUCONATE 20 MG/ML
2000 INJECTION, SOLUTION INTRAVENOUS PRN
Status: DISCONTINUED | OUTPATIENT
Start: 2024-06-15 | End: 2024-06-18 | Stop reason: HOSPADM

## 2024-06-15 RX ORDER — MAGNESIUM SULFATE IN WATER 40 MG/ML
2000 INJECTION, SOLUTION INTRAVENOUS PRN
Status: DISCONTINUED | OUTPATIENT
Start: 2024-06-15 | End: 2024-06-18 | Stop reason: HOSPADM

## 2024-06-15 RX ORDER — ISOSORBIDE DINITRATE 20 MG/1
20 TABLET ORAL 2 TIMES DAILY
Status: DISCONTINUED | OUTPATIENT
Start: 2024-06-15 | End: 2024-06-18 | Stop reason: HOSPADM

## 2024-06-15 RX ORDER — ACETAMINOPHEN 650 MG/1
650 SUPPOSITORY RECTAL EVERY 6 HOURS PRN
Status: DISCONTINUED | OUTPATIENT
Start: 2024-06-15 | End: 2024-06-18 | Stop reason: HOSPADM

## 2024-06-15 RX ORDER — TRAMADOL HYDROCHLORIDE 50 MG/1
50 TABLET ORAL EVERY 6 HOURS PRN
Status: DISCONTINUED | OUTPATIENT
Start: 2024-06-15 | End: 2024-06-18 | Stop reason: ALTCHOICE

## 2024-06-15 RX ORDER — ASPIRIN 81 MG/1
81 TABLET, CHEWABLE ORAL DAILY
Status: DISCONTINUED | OUTPATIENT
Start: 2024-06-16 | End: 2024-06-15 | Stop reason: SDUPTHER

## 2024-06-15 RX ORDER — CETIRIZINE HYDROCHLORIDE 10 MG/1
10 TABLET ORAL DAILY
Status: CANCELLED | OUTPATIENT
Start: 2024-06-16

## 2024-06-15 RX ORDER — TRAZODONE HYDROCHLORIDE 50 MG/1
50 TABLET ORAL NIGHTLY PRN
Status: CANCELLED | OUTPATIENT
Start: 2024-06-15

## 2024-06-15 RX ORDER — SODIUM CHLORIDE 9 MG/ML
INJECTION, SOLUTION INTRAVENOUS PRN
Status: DISCONTINUED | OUTPATIENT
Start: 2024-06-15 | End: 2024-06-18 | Stop reason: HOSPADM

## 2024-06-15 RX ORDER — LANOLIN ALCOHOL/MO/W.PET/CERES
6 CREAM (GRAM) TOPICAL NIGHTLY
Status: CANCELLED | OUTPATIENT
Start: 2024-06-15

## 2024-06-15 RX ORDER — ONDANSETRON 2 MG/ML
4 INJECTION INTRAMUSCULAR; INTRAVENOUS EVERY 6 HOURS PRN
Status: DISCONTINUED | OUTPATIENT
Start: 2024-06-15 | End: 2024-06-18 | Stop reason: HOSPADM

## 2024-06-15 RX ORDER — SENNOSIDES A AND B 8.6 MG/1
2 TABLET, FILM COATED ORAL NIGHTLY PRN
Status: DISCONTINUED | OUTPATIENT
Start: 2024-06-15 | End: 2024-06-18 | Stop reason: HOSPADM

## 2024-06-15 RX ORDER — LACTOBACILLUS RHAMNOSUS GG 10B CELL
1 CAPSULE ORAL
Status: CANCELLED | OUTPATIENT
Start: 2024-06-16

## 2024-06-15 RX ORDER — TAMSULOSIN HYDROCHLORIDE 0.4 MG/1
0.4 CAPSULE ORAL DAILY
Status: CANCELLED | OUTPATIENT
Start: 2024-06-16

## 2024-06-15 RX ORDER — BISACODYL 10 MG
10 SUPPOSITORY, RECTAL RECTAL DAILY PRN
Status: DISCONTINUED | OUTPATIENT
Start: 2024-06-15 | End: 2024-06-15 | Stop reason: SDUPTHER

## 2024-06-15 RX ORDER — POLYETHYLENE GLYCOL 3350 17 G/17G
17 POWDER, FOR SOLUTION ORAL DAILY PRN
Status: CANCELLED | OUTPATIENT
Start: 2024-06-15

## 2024-06-15 RX ORDER — METHOCARBAMOL 500 MG/1
750 TABLET, FILM COATED ORAL EVERY 6 HOURS PRN
Status: DISCONTINUED | OUTPATIENT
Start: 2024-06-15 | End: 2024-06-16

## 2024-06-15 RX ORDER — LOPERAMIDE HYDROCHLORIDE 2 MG/1
2 CAPSULE ORAL 4 TIMES DAILY PRN
Status: CANCELLED | OUTPATIENT
Start: 2024-06-15

## 2024-06-15 RX ORDER — ONDANSETRON 4 MG/1
4 TABLET, ORALLY DISINTEGRATING ORAL EVERY 8 HOURS PRN
Status: CANCELLED | OUTPATIENT
Start: 2024-06-15

## 2024-06-15 RX ORDER — ENOXAPARIN SODIUM 100 MG/ML
40 INJECTION SUBCUTANEOUS EVERY 24 HOURS
Status: DISCONTINUED | OUTPATIENT
Start: 2024-06-15 | End: 2024-06-15 | Stop reason: ALTCHOICE

## 2024-06-15 RX ORDER — SODIUM CHLORIDE 9 MG/ML
INJECTION, SOLUTION INTRAVENOUS CONTINUOUS
Status: ACTIVE | OUTPATIENT
Start: 2024-06-15 | End: 2024-06-16

## 2024-06-15 RX ORDER — ONDANSETRON 4 MG/1
4 TABLET, ORALLY DISINTEGRATING ORAL EVERY 8 HOURS PRN
Status: DISCONTINUED | OUTPATIENT
Start: 2024-06-15 | End: 2024-06-15 | Stop reason: HOSPADM

## 2024-06-15 RX ORDER — ENOXAPARIN SODIUM 100 MG/ML
40 INJECTION SUBCUTANEOUS EVERY 24 HOURS
Status: CANCELLED | OUTPATIENT
Start: 2024-06-15

## 2024-06-15 RX ORDER — ACETAMINOPHEN 325 MG/1
650 TABLET ORAL EVERY 4 HOURS PRN
Status: CANCELLED | OUTPATIENT
Start: 2024-06-15

## 2024-06-15 RX ORDER — OXYCODONE HYDROCHLORIDE 5 MG/1
5 TABLET ORAL EVERY 4 HOURS PRN
Status: CANCELLED | OUTPATIENT
Start: 2024-06-15

## 2024-06-15 RX ORDER — HEPARIN SODIUM 1000 [USP'U]/ML
60 INJECTION, SOLUTION INTRAVENOUS; SUBCUTANEOUS ONCE
Status: COMPLETED | OUTPATIENT
Start: 2024-06-15 | End: 2024-06-15

## 2024-06-15 RX ORDER — OLANZAPINE 10 MG/1
10 TABLET ORAL NIGHTLY
Status: DISCONTINUED | OUTPATIENT
Start: 2024-06-15 | End: 2024-06-18 | Stop reason: HOSPADM

## 2024-06-15 RX ORDER — CETIRIZINE HYDROCHLORIDE 10 MG/1
10 TABLET ORAL DAILY
Status: DISCONTINUED | OUTPATIENT
Start: 2024-06-16 | End: 2024-06-15 | Stop reason: SDUPTHER

## 2024-06-15 RX ORDER — ASPIRIN 81 MG/1
81 TABLET, CHEWABLE ORAL DAILY
Status: DISCONTINUED | OUTPATIENT
Start: 2024-06-16 | End: 2024-06-18 | Stop reason: HOSPADM

## 2024-06-15 RX ORDER — ISOSORBIDE DINITRATE 20 MG/1
20 TABLET ORAL 2 TIMES DAILY
Status: CANCELLED | OUTPATIENT
Start: 2024-06-15

## 2024-06-15 RX ORDER — FOSAMPRENAVIR CALCIUM 700 MG/1
1400 TABLET, COATED ORAL 2 TIMES DAILY
Status: CANCELLED | OUTPATIENT
Start: 2024-06-15

## 2024-06-15 RX ORDER — ONDANSETRON 4 MG/1
4 TABLET, ORALLY DISINTEGRATING ORAL EVERY 8 HOURS PRN
Status: DISCONTINUED | OUTPATIENT
Start: 2024-06-15 | End: 2024-06-18 | Stop reason: HOSPADM

## 2024-06-15 RX ORDER — OLANZAPINE 10 MG/1
10 TABLET ORAL NIGHTLY
Status: DISCONTINUED | OUTPATIENT
Start: 2024-06-15 | End: 2024-06-15 | Stop reason: SDUPTHER

## 2024-06-15 RX ORDER — FLUOXETINE HYDROCHLORIDE 20 MG/1
20 CAPSULE ORAL DAILY
Status: DISCONTINUED | OUTPATIENT
Start: 2024-06-16 | End: 2024-06-18 | Stop reason: HOSPADM

## 2024-06-15 RX ORDER — LAMIVUDINE AND ZIDOVUDINE 150; 300 MG/1; MG/1
1 TABLET, FILM COATED ORAL 2 TIMES DAILY
Status: DISCONTINUED | OUTPATIENT
Start: 2024-06-15 | End: 2024-06-18 | Stop reason: HOSPADM

## 2024-06-15 RX ORDER — BISACODYL 10 MG
10 SUPPOSITORY, RECTAL RECTAL DAILY PRN
Status: CANCELLED | OUTPATIENT
Start: 2024-06-15

## 2024-06-15 RX ORDER — FLUOXETINE HYDROCHLORIDE 20 MG/1
20 CAPSULE ORAL DAILY
Status: DISCONTINUED | OUTPATIENT
Start: 2024-06-16 | End: 2024-06-15 | Stop reason: SDUPTHER

## 2024-06-15 RX ORDER — ACETAMINOPHEN 325 MG/1
650 TABLET ORAL EVERY 6 HOURS
Status: DISCONTINUED | OUTPATIENT
Start: 2024-06-15 | End: 2024-06-18 | Stop reason: HOSPADM

## 2024-06-15 RX ORDER — POLYETHYLENE GLYCOL 3350 17 G/17G
17 POWDER, FOR SOLUTION ORAL DAILY PRN
Status: DISCONTINUED | OUTPATIENT
Start: 2024-06-15 | End: 2024-06-18 | Stop reason: HOSPADM

## 2024-06-15 RX ORDER — HEPARIN SODIUM 10000 [USP'U]/100ML
5-30 INJECTION, SOLUTION INTRAVENOUS CONTINUOUS
Status: DISCONTINUED | OUTPATIENT
Start: 2024-06-15 | End: 2024-06-17

## 2024-06-15 RX ORDER — ATORVASTATIN CALCIUM 40 MG/1
40 TABLET, FILM COATED ORAL NIGHTLY
Status: CANCELLED | OUTPATIENT
Start: 2024-06-15

## 2024-06-15 RX ORDER — LOPERAMIDE HYDROCHLORIDE 2 MG/1
2 CAPSULE ORAL 4 TIMES DAILY PRN
Status: DISCONTINUED | OUTPATIENT
Start: 2024-06-15 | End: 2024-06-16

## 2024-06-15 RX ORDER — ACETAMINOPHEN 325 MG/1
650 TABLET ORAL EVERY 6 HOURS
Status: CANCELLED | OUTPATIENT
Start: 2024-06-15

## 2024-06-15 RX ORDER — FENOFIBRATE 160 MG/1
160 TABLET ORAL DAILY
Status: DISCONTINUED | OUTPATIENT
Start: 2024-06-16 | End: 2024-06-18 | Stop reason: HOSPADM

## 2024-06-15 RX ORDER — ASPIRIN 81 MG/1
81 TABLET, CHEWABLE ORAL DAILY
Status: CANCELLED | OUTPATIENT
Start: 2024-06-16

## 2024-06-15 RX ORDER — MAGNESIUM SULFATE IN WATER 40 MG/ML
2000 INJECTION, SOLUTION INTRAVENOUS ONCE
Status: COMPLETED | OUTPATIENT
Start: 2024-06-16 | End: 2024-06-15

## 2024-06-15 RX ORDER — PROMETHAZINE HYDROCHLORIDE 25 MG/ML
12.5 INJECTION, SOLUTION INTRAMUSCULAR; INTRAVENOUS EVERY 6 HOURS PRN
Status: DISCONTINUED | OUTPATIENT
Start: 2024-06-15 | End: 2024-06-18 | Stop reason: HOSPADM

## 2024-06-15 RX ORDER — CYCLOBENZAPRINE HCL 10 MG
10 TABLET ORAL 3 TIMES DAILY PRN
Status: DISCONTINUED | OUTPATIENT
Start: 2024-06-15 | End: 2024-06-18 | Stop reason: HOSPADM

## 2024-06-15 RX ORDER — OXYCODONE HYDROCHLORIDE 5 MG/1
5 TABLET ORAL EVERY 4 HOURS PRN
Status: DISCONTINUED | OUTPATIENT
Start: 2024-06-15 | End: 2024-06-18 | Stop reason: HOSPADM

## 2024-06-15 RX ORDER — LACTOBACILLUS RHAMNOSUS GG 10B CELL
1 CAPSULE ORAL
Status: DISCONTINUED | OUTPATIENT
Start: 2024-06-16 | End: 2024-06-18 | Stop reason: HOSPADM

## 2024-06-15 RX ORDER — HEPARIN SODIUM 1000 [USP'U]/ML
30 INJECTION, SOLUTION INTRAVENOUS; SUBCUTANEOUS PRN
Status: DISCONTINUED | OUTPATIENT
Start: 2024-06-15 | End: 2024-06-17

## 2024-06-15 RX ORDER — ACETAMINOPHEN 325 MG/1
650 TABLET ORAL EVERY 6 HOURS PRN
Status: DISCONTINUED | OUTPATIENT
Start: 2024-06-15 | End: 2024-06-18 | Stop reason: HOSPADM

## 2024-06-15 RX ORDER — OXYCODONE HYDROCHLORIDE 5 MG/1
2.5 TABLET ORAL EVERY 4 HOURS PRN
Status: DISCONTINUED | OUTPATIENT
Start: 2024-06-15 | End: 2024-06-18 | Stop reason: HOSPADM

## 2024-06-15 RX ORDER — LAMIVUDINE AND ZIDOVUDINE 150; 300 MG/1; MG/1
1 TABLET, FILM COATED ORAL 2 TIMES DAILY
Status: DISCONTINUED | OUTPATIENT
Start: 2024-06-15 | End: 2024-06-15 | Stop reason: SDUPTHER

## 2024-06-15 RX ORDER — LAMIVUDINE AND ZIDOVUDINE 150; 300 MG/1; MG/1
1 TABLET, FILM COATED ORAL 2 TIMES DAILY
Status: CANCELLED | OUTPATIENT
Start: 2024-06-15

## 2024-06-15 RX ORDER — FLUOXETINE HYDROCHLORIDE 20 MG/1
20 CAPSULE ORAL DAILY
Status: CANCELLED | OUTPATIENT
Start: 2024-06-16

## 2024-06-15 RX ORDER — LANOLIN ALCOHOL/MO/W.PET/CERES
6 CREAM (GRAM) TOPICAL NIGHTLY
Status: DISCONTINUED | OUTPATIENT
Start: 2024-06-16 | End: 2024-06-18 | Stop reason: HOSPADM

## 2024-06-15 RX ORDER — FENOFIBRATE 160 MG/1
160 TABLET ORAL DAILY
Status: DISCONTINUED | OUTPATIENT
Start: 2024-06-16 | End: 2024-06-15 | Stop reason: SDUPTHER

## 2024-06-15 RX ORDER — TAMSULOSIN HYDROCHLORIDE 0.4 MG/1
0.4 CAPSULE ORAL DAILY
Status: DISCONTINUED | OUTPATIENT
Start: 2024-06-16 | End: 2024-06-18 | Stop reason: HOSPADM

## 2024-06-15 RX ORDER — ATORVASTATIN CALCIUM 40 MG/1
40 TABLET, FILM COATED ORAL NIGHTLY
Status: DISCONTINUED | OUTPATIENT
Start: 2024-06-15 | End: 2024-06-15

## 2024-06-15 RX ORDER — OXYCODONE HYDROCHLORIDE 5 MG/1
5 TABLET ORAL EVERY 6 HOURS PRN
Status: DISCONTINUED | OUTPATIENT
Start: 2024-06-15 | End: 2024-06-15

## 2024-06-15 RX ORDER — MAGNESIUM SULFATE IN WATER 40 MG/ML
2000 INJECTION, SOLUTION INTRAVENOUS ONCE
Status: COMPLETED | OUTPATIENT
Start: 2024-06-15 | End: 2024-06-15

## 2024-06-15 RX ORDER — PANTOPRAZOLE SODIUM 40 MG/1
40 TABLET, DELAYED RELEASE ORAL
Status: DISCONTINUED | OUTPATIENT
Start: 2024-06-16 | End: 2024-06-18 | Stop reason: HOSPADM

## 2024-06-15 RX ORDER — SODIUM CHLORIDE 0.9 % (FLUSH) 0.9 %
5-40 SYRINGE (ML) INJECTION PRN
Status: DISCONTINUED | OUTPATIENT
Start: 2024-06-15 | End: 2024-06-18 | Stop reason: HOSPADM

## 2024-06-15 RX ORDER — METOPROLOL TARTRATE 1 MG/ML
5 INJECTION, SOLUTION INTRAVENOUS ONCE
Status: DISCONTINUED | OUTPATIENT
Start: 2024-06-15 | End: 2024-06-16

## 2024-06-15 RX ORDER — OXYCODONE HYDROCHLORIDE 5 MG/1
2.5 TABLET ORAL EVERY 4 HOURS PRN
Status: CANCELLED | OUTPATIENT
Start: 2024-06-15

## 2024-06-15 RX ORDER — GABAPENTIN 300 MG/1
300 CAPSULE ORAL EVERY 8 HOURS
Status: CANCELLED | OUTPATIENT
Start: 2024-06-15

## 2024-06-15 RX ORDER — SENNOSIDES A AND B 8.6 MG/1
2 TABLET, FILM COATED ORAL NIGHTLY PRN
Status: CANCELLED | OUTPATIENT
Start: 2024-06-15

## 2024-06-15 RX ORDER — HEPARIN SODIUM 1000 [USP'U]/ML
60 INJECTION, SOLUTION INTRAVENOUS; SUBCUTANEOUS PRN
Status: DISCONTINUED | OUTPATIENT
Start: 2024-06-15 | End: 2024-06-17

## 2024-06-15 RX ORDER — METHOCARBAMOL 500 MG/1
750 TABLET, FILM COATED ORAL EVERY 6 HOURS PRN
Status: CANCELLED | OUTPATIENT
Start: 2024-06-15

## 2024-06-15 RX ORDER — POLYETHYLENE GLYCOL 3350 17 G/17G
17 POWDER, FOR SOLUTION ORAL DAILY PRN
Status: DISCONTINUED | OUTPATIENT
Start: 2024-06-15 | End: 2024-06-15 | Stop reason: SDUPTHER

## 2024-06-15 RX ORDER — CETIRIZINE HYDROCHLORIDE 10 MG/1
10 TABLET ORAL DAILY
Status: DISCONTINUED | OUTPATIENT
Start: 2024-06-16 | End: 2024-06-18 | Stop reason: HOSPADM

## 2024-06-15 RX ORDER — BISACODYL 10 MG
10 SUPPOSITORY, RECTAL RECTAL DAILY PRN
Status: DISCONTINUED | OUTPATIENT
Start: 2024-06-15 | End: 2024-06-18 | Stop reason: HOSPADM

## 2024-06-15 RX ORDER — PANTOPRAZOLE SODIUM 40 MG/1
40 TABLET, DELAYED RELEASE ORAL
Status: CANCELLED | OUTPATIENT
Start: 2024-06-16

## 2024-06-15 RX ORDER — ACETAMINOPHEN 325 MG/1
650 TABLET ORAL EVERY 4 HOURS PRN
Status: DISCONTINUED | OUTPATIENT
Start: 2024-06-15 | End: 2024-06-15 | Stop reason: SDUPTHER

## 2024-06-15 RX ORDER — SODIUM CHLORIDE 0.9 % (FLUSH) 0.9 %
5-40 SYRINGE (ML) INJECTION EVERY 12 HOURS SCHEDULED
Status: DISCONTINUED | OUTPATIENT
Start: 2024-06-15 | End: 2024-06-18 | Stop reason: HOSPADM

## 2024-06-15 RX ORDER — POTASSIUM CHLORIDE 7.45 MG/ML
10 INJECTION INTRAVENOUS PRN
Status: DISCONTINUED | OUTPATIENT
Start: 2024-06-15 | End: 2024-06-18 | Stop reason: HOSPADM

## 2024-06-15 RX ORDER — ISOSORBIDE DINITRATE 20 MG/1
20 TABLET ORAL 2 TIMES DAILY
Status: DISCONTINUED | OUTPATIENT
Start: 2024-06-15 | End: 2024-06-15 | Stop reason: SDUPTHER

## 2024-06-15 RX ORDER — POTASSIUM CHLORIDE 20 MEQ/1
40 TABLET, EXTENDED RELEASE ORAL PRN
Status: DISCONTINUED | OUTPATIENT
Start: 2024-06-15 | End: 2024-06-18 | Stop reason: HOSPADM

## 2024-06-15 RX ORDER — PANTOPRAZOLE SODIUM 40 MG/1
40 TABLET, DELAYED RELEASE ORAL
Status: DISCONTINUED | OUTPATIENT
Start: 2024-06-16 | End: 2024-06-15 | Stop reason: SDUPTHER

## 2024-06-15 RX ORDER — TRAZODONE HYDROCHLORIDE 50 MG/1
50 TABLET ORAL NIGHTLY PRN
Status: DISCONTINUED | OUTPATIENT
Start: 2024-06-15 | End: 2024-06-18 | Stop reason: HOSPADM

## 2024-06-15 RX ADMIN — FOSAMPRENAVIR CALCIUM 1400 MG: 700 TABLET, COATED ORAL at 10:40

## 2024-06-15 RX ADMIN — ACETAMINOPHEN 650 MG: 325 TABLET ORAL at 06:45

## 2024-06-15 RX ADMIN — TIOTROPIUM BROMIDE AND OLODATEROL 2 PUFF: 3.124; 2.736 SPRAY, METERED RESPIRATORY (INHALATION) at 09:14

## 2024-06-15 RX ADMIN — SODIUM CHLORIDE: 9 INJECTION, SOLUTION INTRAVENOUS at 20:38

## 2024-06-15 RX ADMIN — OXYCODONE HYDROCHLORIDE 5 MG: 5 TABLET ORAL at 10:41

## 2024-06-15 RX ADMIN — GABAPENTIN 300 MG: 300 CAPSULE ORAL at 01:36

## 2024-06-15 RX ADMIN — ACETAMINOPHEN 650 MG: 325 TABLET ORAL at 01:36

## 2024-06-15 RX ADMIN — NALOXEGOL OXALATE 12.5 MG: 12.5 TABLET, FILM COATED ORAL at 06:46

## 2024-06-15 RX ADMIN — ONDANSETRON 4 MG: 2 INJECTION INTRAMUSCULAR; INTRAVENOUS at 21:23

## 2024-06-15 RX ADMIN — CETIRIZINE HYDROCHLORIDE 10 MG: 10 TABLET ORAL at 10:41

## 2024-06-15 RX ADMIN — HEPARIN SODIUM 3800 UNITS: 1000 INJECTION INTRAVENOUS; SUBCUTANEOUS at 21:23

## 2024-06-15 RX ADMIN — GABAPENTIN 300 MG: 300 CAPSULE ORAL at 10:41

## 2024-06-15 RX ADMIN — FENOFIBRATE 135 MG: 54 TABLET ORAL at 10:41

## 2024-06-15 RX ADMIN — DICLOFENAC SODIUM 2 G: 10 GEL TOPICAL at 13:45

## 2024-06-15 RX ADMIN — DICLOFENAC SODIUM 2 G: 10 GEL TOPICAL at 16:17

## 2024-06-15 RX ADMIN — METOPROLOL TARTRATE 25 MG: 25 TABLET, FILM COATED ORAL at 10:41

## 2024-06-15 RX ADMIN — ASPIRIN 81 MG 81 MG: 81 TABLET ORAL at 10:41

## 2024-06-15 RX ADMIN — HEPARIN SODIUM 12 UNITS/KG/HR: 10000 INJECTION, SOLUTION INTRAVENOUS at 21:25

## 2024-06-15 RX ADMIN — CALCIUM GLUCONATE 2000 MG: 20 INJECTION, SOLUTION INTRAVENOUS at 23:39

## 2024-06-15 RX ADMIN — FLUOXETINE HYDROCHLORIDE 20 MG: 20 CAPSULE ORAL at 10:42

## 2024-06-15 RX ADMIN — MAGNESIUM SULFATE HEPTAHYDRATE 2000 MG: 40 INJECTION, SOLUTION INTRAVENOUS at 22:25

## 2024-06-15 RX ADMIN — Medication 1 CAPSULE: at 10:41

## 2024-06-15 RX ADMIN — OXYCODONE HYDROCHLORIDE 5 MG: 5 TABLET ORAL at 06:46

## 2024-06-15 RX ADMIN — DICLOFENAC SODIUM 2 G: 10 GEL TOPICAL at 10:40

## 2024-06-15 RX ADMIN — OXYCODONE HYDROCHLORIDE 5 MG: 5 TABLET ORAL at 01:36

## 2024-06-15 RX ADMIN — ONDANSETRON 4 MG: 4 TABLET, ORALLY DISINTEGRATING ORAL at 13:49

## 2024-06-15 RX ADMIN — ACETAMINOPHEN 650 MG: 325 TABLET ORAL at 13:45

## 2024-06-15 RX ADMIN — ISOSORBIDE DINITRATE 20 MG: 20 TABLET ORAL at 10:41

## 2024-06-15 RX ADMIN — MAGNESIUM SULFATE HEPTAHYDRATE 2000 MG: 40 INJECTION, SOLUTION INTRAVENOUS at 22:40

## 2024-06-15 RX ADMIN — LAMIVUDINE AND ZIDOVUDINE 1 TABLET: 150; 300 TABLET, FILM COATED ORAL at 10:40

## 2024-06-15 RX ADMIN — TAMSULOSIN HYDROCHLORIDE 0.4 MG: 0.4 CAPSULE ORAL at 10:41

## 2024-06-15 RX ADMIN — PANTOPRAZOLE SODIUM 40 MG: 40 TABLET, DELAYED RELEASE ORAL at 06:45

## 2024-06-15 ASSESSMENT — PAIN DESCRIPTION - ORIENTATION: ORIENTATION: RIGHT

## 2024-06-15 ASSESSMENT — PAIN SCALES - GENERAL
PAINLEVEL_OUTOF10: 0
PAINLEVEL_OUTOF10: 9
PAINLEVEL_OUTOF10: 0
PAINLEVEL_OUTOF10: 8
PAINLEVEL_OUTOF10: 7

## 2024-06-15 ASSESSMENT — PAIN - FUNCTIONAL ASSESSMENT: PAIN_FUNCTIONAL_ASSESSMENT: ACTIVITIES ARE NOT PREVENTED

## 2024-06-15 ASSESSMENT — PAIN DESCRIPTION - LOCATION: LOCATION: LEG

## 2024-06-15 ASSESSMENT — PAIN DESCRIPTION - DESCRIPTORS: DESCRIPTORS: ACHING;DISCOMFORT

## 2024-06-15 ASSESSMENT — PAIN SCALES - WONG BAKER: WONGBAKER_NUMERICALRESPONSE: NO HURT

## 2024-06-15 NOTE — PROGRESS NOTES
[facility]  INPATIENT SPEECH THERAPY  [facility]  DAILY NOTE    TIME   SLP Individual Minutes  Time In: 1000  Time Out: 1030  Minutes: 30  Timed Code Treatment Minutes: 30 Minutes       Date: 6/15/2024  Patient Name: David Vaca      CSN: [id number]   : 1965  (58 y.o.)  Gender: male   Referring Physician:  Dr. Darrian Spears MD  Diagnosis: s/p aortobifemoral bypass surgery   Precautions: Fall risk   Current Diet: Regular with Thin liquids  Respiratory Status: Room Air  Swallowing Strategies: Standard Universal Swallow Precautions  Date of Last MBS/FEES: Not Applicable    Pain:  No pain reported.    Subjective:  Patient seen resting in bed upon ST arrival. Agreeable to ST interventions; cooperative throughout, but with flat affect.    Short-Term Goals:  SHORT TERM GOAL #1:  Goal 1: Patient will complete reasoning and problem solving tasks with 80% accuracy, mod cues to improve logical thinking needed for time/money management, and insight/judgement for safe behavior.  INTERVENTIONS:     Finance Management - Money Word Problems: 8/10 independent, 2/10 max cues  *Patient utilized calculator for complex calculations following ST recommendation; good math computation noted with utilization of calculator  *Patient required max cues for calculating percentages  *Reduced processing speed observed for complex problems     Writing Appointments on Calendar (given appt. cards): 5/5 independent  *Good visual reasoning; patient independently and accurately recorded appointments and listed times on correct calendar dates  *Good processing speed evidenced by timely task completion  *Patient omitted location information provided on x1 time card; ST verbalized importance of writing down location information for appts. with new doctors, etc. following hospital discharge. Patient verbalized agreement.    SHORT TERM GOAL #2:  Goal 2: Patient will complete thought organization,  sequencing and executive functioning tasks with 80% accuracy, mod cues to improve planning, organization and execution of tasks (including daily scheduling, medication management.  INTERVENTIONS: DNT d/t focus on other goals.     Long-Term Goals:  Time Frame for Long Term Goals: 2 weeks    LONG TERM GOAL #1:  Goal 1: Patient will improve cognitive functioning to a modified independent level (FIM 6) to resume independence within the home environment.       Comprehension: 5 - Patient understands basic needs (hungry/hot/pain)  Expression: 6 - Device used to express complex ideas/needs  Social Interaction: 6 - Patient requires medication for mood and/or effect  Problem Solvin - Patient able to solve simple/routine tasks  Memory: 6 - Patient requires device to recall (e.g. memory book)    Functional Oral Intake Scale: Total Oral Intake: 7.  Total oral intake with no restrictions    EDUCATION:  Learner: Patient  Education:  Reviewed ST goals and Plan of Care, Reviewed recommendations for follow-up, Education Related to Health Promotion and Wellness, and Home Safety Education  Evaluation of Education: Verbalizes understanding, Needs further instruction, and Family not present    ASSESSMENT/PLAN:  Activity Tolerance:  Patient tolerance of  treatment: good. Appropriate participation      Assessment/Plan: Patient progressing toward established goals.  Continues to require skilled care of licensed speech pathologist to progress toward achievement of established goals and plan of care.  Plan for Next Session: Executive functioning (scheduling task)  Discharge Recommendations: Continue to Assess Pending Progress     MOIZ FreyS., CF-SLP, [id number]

## 2024-06-15 NOTE — PROGRESS NOTES
Pt admitted to 8AB room 7  Transported by 8K staff   Complaints at arrival to room: nausea     IIV site free of s/s of infection or infiltration  Vital signs obtained  Assessment and data collection initiated   Oriented to room  Policies and procedures for 8AB explained   All questions answered with no further questions at this time  Fall prevention and safety brochure given and discussed with patient

## 2024-06-15 NOTE — DISCHARGE SUMMARY
WBCUA 10-15 09/07/2023 09:50 PM    WBCUA 50-75 11/04/2011 10:30 AM    RBCUA 3-5 09/07/2023 09:50 PM    YEAST NONE SEEN 09/07/2023 09:50 PM    BACTERIA NONE SEEN 09/07/2023 09:50 PM    LEUKOCYTESUR TRACE 09/07/2023 09:50 PM    UROBILINOGEN 0.2 09/07/2023 09:50 PM    BILIRUBINUR NEGATIVE 09/07/2023 09:50 PM    BILIRUBINUR NEGATIVE 11/04/2011 10:30 AM    BLOODU MODERATE 09/07/2023 09:50 PM    GLUCOSEU NEGATIVE 09/07/2023 09:50 PM     VBG:    Lab Results   Component Value Date/Time    PHVEN 7.327 05/31/2024 06:53 PM    WID9PBX 41.8 05/31/2024 06:53 PM    D9QVUGJW 79.9 05/31/2024 06:53 PM     HgBA1c:    Lab Results   Component Value Date/Time    LABA1C 5.8 06/10/2024 06:00 AM       Patient Instructions:   Patient was discharged to acute care       Discharge Medications:  Discharge Medication List as of 6/15/2024  6:16 PM             Details   oxyCODONE (ROXICODONE) 5 MG immediate release tablet Take 1 tablet by mouth every 6 hours as needed for Pain for up to 10 days. Max Daily Amount: 20 mg, Disp-15 tablet, R-0Print      FLUoxetine (PROZAC) 20 MG capsule Take 1 capsule by mouth dailyHistorical Med      cyclobenzaprine (FLEXERIL) 10 MG tablet take 1 tablet by mouth twice a day if needed for SPASM(S)Historical Med      traMADol (ULTRAM) 50 MG tablet Take 1 tablet by mouth every 6 hours as needed for Pain.Historical Med      loratadine (CLARITIN) 10 MG tablet Take by mouthHistorical Med      metoprolol tartrate (LOPRESSOR) 25 MG tablet Take 1 tablet by mouth twice daily, Disp-180 tablet, R-0Normal      isosorbide dinitrate (ISORDIL) 20 MG tablet Take 1 tablet by mouth twice daily, Disp-180 tablet, R-0Normal      tiZANidine (ZANAFLEX) 4 MG tablet Take 1 tablet by mouth 2 times daily as needed (spasms), Disp-180 tablet, R-2Normal      OLANZapine (ZYPREXA) 10 MG tablet Take 1 tablet by mouth nightlyHistorical Med      fenofibrate (TRICOR) 145 MG tablet Take 1 tablet by mouth daily, Disp-30 tablet, R-11Normal       pantoprazole (PROTONIX) 40 MG tablet Take 1 tablet by mouth every morning (before breakfast), Disp-90 tablet, R-11Normal      tiotropium-olodaterol (STIOLTO) 2.5-2.5 MCG/ACT AERS Inhale 2 puffs into the lungs dailyHistorical Med      fosamprenavir (LEXIVA) 700 MG tablet Take 2 tablets by mouth 2 times dailyHistorical Med      lamivudine-zidovudine (COMBIVIR) 150-300 MG per tablet Take 1 tablet by mouth 2 times dailyHistorical Med              Controlled substances monitoring: not applicable.     20  minutes spent preparing the patient for discharge    Joslyn Olson, APRN - CNP

## 2024-06-15 NOTE — PROCEDURES
PROCEDURE NOTE  Date: 6/15/2024   Name: David CORREIA Lio  YOB: 1965    Procedures EKG completed, given to Noe HUERTA

## 2024-06-15 NOTE — CARE COORDINATION
EKG and labs completed per Dr. Lerner d/t complains of nausea and shortness of breath. Dr. Lerner notified of lab and EKG values. Troponin elevated at 77. No complaints of pain or nausea at this time. Hospitalist Umm Velazquez contacted. Order received for patient to be discharged back to acute via discharge/readmit. Patient to be transferred to [facility].

## 2024-06-15 NOTE — PLAN OF CARE
Problem: Discharge Planning  Goal: Discharge to home or other facility with appropriate resources  6/15/2024 1219 by Tipton, Rylee, LPN  Outcome: Progressing  Flowsheets (Taken 6/15/2024 1100)  Discharge to home or other facility with appropriate resources:   Identify barriers to discharge with patient and caregiver   Identify discharge learning needs (meds, wound care, etc)     Problem: Safety - Adult  Goal: Free from fall injury  6/15/2024 1219 by Tipton, Rylee, LPN  Outcome: Progressing  Flowsheets (Taken 6/15/2024 1219)  Free From Fall Injury: Instruct family/caregiver on patient safety     Problem: Skin/Tissue Integrity  Goal: Absence of new skin breakdown  Description: 1.  Monitor for areas of redness and/or skin breakdown  2.  Assess vascular access sites hourly  3.  Every 4-6 hours minimum:  Change oxygen saturation probe site  4.  Every 4-6 hours:  If on nasal continuous positive airway pressure, respiratory therapy assess nares and determine need for appliance change or resting period.  6/15/2024 1219 by Tipton, Rylee, LPN  Outcome: Progressing     Problem: ABCDS Injury Assessment  Goal: Absence of physical injury  6/15/2024 1219 by Tipton, Rylee, LPN  Outcome: Progressing  Flowsheets (Taken 6/15/2024 1219)  Absence of Physical Injury: Implement safety measures based on patient assessment     Problem: Respiratory - Adult  Goal: Achieves optimal ventilation and oxygenation  6/15/2024 1219 by Tipton, Rylee, LPN  Outcome: Progressing  Flowsheets (Taken 6/15/2024 1100)  Achieves optimal ventilation and oxygenation:   Assess for changes in respiratory status   Assess for changes in mentation and behavior     Problem: Cardiovascular - Adult  Goal: Maintains optimal cardiac output and hemodynamic stability  6/15/2024 1219 by Tipton, Rylee, LPN  Outcome: Progressing  Flowsheets (Taken 6/15/2024 1100)  Maintains optimal cardiac output and hemodynamic stability: Monitor blood pressure and heart rate     Problem:  Skin/Tissue Integrity - Adult  Goal: Skin integrity remains intact  6/15/2024 1219 by Tipton, Rylee, LPN  Outcome: Progressing  Flowsheets (Taken 6/15/2024 1100)  Skin Integrity Remains Intact:   Monitor for areas of redness and/or skin breakdown   Assess vascular access sites hourly     Problem: Skin/Tissue Integrity - Adult  Goal: Incisions, wounds, or drain sites healing without S/S of infection  6/15/2024 1219 by Tipton, Rylee, LPN  Outcome: Progressing  Flowsheets (Taken 6/15/2024 1219)  Incisions, Wounds, or Drain Sites Healing Without Sign and Symptoms of Infection: TWICE DAILY: Assess and document skin integrity     Problem: Musculoskeletal - Adult  Goal: Return mobility to safest level of function  6/15/2024 1219 by Tipton, Rylee, LPN  Outcome: Progressing  Flowsheets (Taken 6/15/2024 1100)  Return Mobility to Safest Level of Function:   Assess patient stability and activity tolerance for standing, transferring and ambulating with or without assistive devices   Ensure adequate protection for wounds/incisions during mobilization   Assist with transfers and ambulation using safe patient handling equipment as needed   Apply continuous passive motion per provider or physical therapy orders to increase flexion toward goal   Instruct patient/family in ordered activity level     Problem: Musculoskeletal - Adult  Goal: Maintain proper alignment of affected body part  6/15/2024 1219 by Tipton, Rylee, LPN  Outcome: Progressing  Flowsheets (Taken 6/15/2024 1100)  Maintain proper alignment of affected body part:   Instruct and reinforce with patient and family use of appropriate assistive device and precautions (e.g. spinal or hip dislocation precautions)   Support and protect limb and body alignment per provider's orders     Problem: Gastrointestinal - Adult  Goal: Maintains or returns to baseline bowel function  6/15/2024 1219 by Tipton, Rylee, LPN  Outcome: Progressing  Flowsheets (Taken 6/15/2024 1100)  Maintains

## 2024-06-15 NOTE — PROGRESS NOTES
Earlier today he had some nausea and vomiting. His nurse contacted me at 1411 that he continued with nausea. He wanted to go on a walk but felt SOB, winded, and off balance. Orthostatics were done with some drop 124/79 with pulse 98 laying and 103/78 with 112 standing.   I ordered stat labs and an EKG. The  EKG was compared to 6/1/24 and showed an increase in the V1 R wave but  otherwise ok. The troponin was 77. He had been 37 on 6/1/24 and in the 30's in September 2023.  His nurse said he was now in bed and felt tired.   ISABEL kauffman served Dr Holloway  and left a message.   ISABEL kauffman served Brinda Velazquez NP who accepted him into the acute hospital.  I spoke to Yasmin on for rehab and she will place the transfer order.  Dr Holloway then called back and said that they could see him tomorrow as the Hospitalist would likely consult him.

## 2024-06-15 NOTE — FLOWSHEET NOTE
06/15/24 1345   Vital Signs   Orthostatic B/P and Pulse? Yes   Blood Pressure Lying 124/79   Pulse Lying 98 PER MINUTE   Blood Pressure Sitting 131/83   Pulse Sitting 109 PER MINUTE   Blood Pressure Standing 103/75   Pulse Standing 112 PER MINUTE     Patient complaint of nausea.Zofran administered PRN. Patient states during a walk that he feels very \"winded\" and \"off balance\". DEANNA Mauricio notified and assessed patient with this nurse. Orthostatics obtained. Dr. Lerner notified of patient complaints and orthostatic BP data. See orders.

## 2024-06-15 NOTE — CARE COORDINATION
Patient experienced nausea and vomiting this shift. Emesis dark brown. No complaints of constipation or abdominal pain. NP Joslyn Olson notified, see orders. All incisions and wounds cleansed with CHG. All dressings changed. Copious amount of serous flood draining from L groin incision. L groin dressing requires changing Q2 to avoid breakthrough drainage.

## 2024-06-15 NOTE — PLAN OF CARE
Problem: Discharge Planning  Goal: Discharge to home or other facility with appropriate resources  Outcome: Progressing     Problem: Safety - Adult  Goal: Free from fall injury  6/15/2024 0349 by Swathi Walter RN  Outcome: Progressing  6/14/2024 1450 by Jo Gaviria RN  Outcome: Progressing  Flowsheets (Taken 6/14/2024 1450)  Free From Fall Injury: Instruct family/caregiver on patient safety     Problem: Skin/Tissue Integrity  Goal: Absence of new skin breakdown  Description: 1.  Monitor for areas of redness and/or skin breakdown  2.  Assess vascular access sites hourly  3.  Every 4-6 hours minimum:  Change oxygen saturation probe site  4.  Every 4-6 hours:  If on nasal continuous positive airway pressure, respiratory therapy assess nares and determine need for appliance change or resting period.  6/15/2024 0349 by Swathi Walter RN  Outcome: Progressing  6/14/2024 1450 by Jo Gaviria RN  Outcome: Progressing     Problem: ABCDS Injury Assessment  Goal: Absence of physical injury  Outcome: Progressing     Problem: Respiratory - Adult  Goal: Achieves optimal ventilation and oxygenation  6/15/2024 0349 by Swathi Walter RN  Outcome: Progressing  6/14/2024 1450 by Jo Gaviria RN  Outcome: Progressing     Problem: Cardiovascular - Adult  Goal: Maintains optimal cardiac output and hemodynamic stability  Outcome: Progressing     Problem: Skin/Tissue Integrity - Adult  Goal: Skin integrity remains intact  6/15/2024 0349 by Swathi Walter RN  Outcome: Progressing  6/14/2024 1450 by Jo Gaviria RN  Outcome: Progressing  Goal: Incisions, wounds, or drain sites healing without S/S of infection  Outcome: Progressing     Problem: Musculoskeletal - Adult  Goal: Return mobility to safest level of function  Outcome: Progressing  Goal: Maintain proper alignment of affected body part  Outcome: Progressing     Problem: Gastrointestinal - Adult  Goal: Maintains or returns to baseline bowel

## 2024-06-15 NOTE — PROGRESS NOTES
Patient discharged in stable condition as per order of attending physician to Transfer to Acute Care Hospital per staff at Time: 1800. and Via Wheelchair to 8A07.    AVS provided by LPN at time of discharge, which includes all necessary medical information pertaining to the patients current course of illness, treatment, medications, post-discharge goals of care, and treatment preferences.     Patient/ family verbalize understanding of discharge plan and are in agreement with goal/plan/treatment preferences.     Belongings including Glasses, Hearing Aides bilateral, clothing, personal care items sent with patient.      Home medications sent home with patient yes    Availability of \"My Chart\" offered to patient as a tool for updated health record.  Steps for activation discussed with patient as mentioned on AVS.

## 2024-06-15 NOTE — PROGRESS NOTES
Patient: David Vaca  Unit/Bed: 8K-10/010-A  YOB: 1965  MRN: [id number] Acct: [id number]   Admitting Diagnosis: Debility [R53.81]  S/P aortobifemoral bypass surgery [Z95.828]  Admit Date:  6/9/2024  Hospital Day: 5    Assessment:     Principal Problem:    S/P aortobifemoral bypass surgery  Active Problems:    HIV (human immunodeficiency virus infection) (HCC)    Hyperlipidemia    Depression    Debility    Left foot drop    Ischemic neuropathy of foot, left    History of rhabdomyolysis  Resolved Problems:    * No resolved hospital problems. *      Plan:     Continue to follow        Subjective:     Patient has no complaint of CP or SOB..   Medication side effects: none    Scheduled Meds:   lactobacillus  1 capsule Oral Daily with breakfast    [Held by provider] docusate sodium  100 mg Oral Daily    diclofenac sodium  2 g Topical 4x daily    tamsulosin  0.4 mg Oral Daily    aspirin  81 mg Oral Daily    atorvastatin  40 mg Oral Nightly    fenofibrate  135 mg Oral Daily    FLUoxetine  20 mg Oral Daily    fosamprenavir  1,400 mg Oral BID    gabapentin  300 mg Oral q8h    isosorbide dinitrate  20 mg Oral BID    lamiVUDine-zidovudine  1 tablet Oral BID    metoprolol tartrate  25 mg Oral BID    pantoprazole  40 mg Oral QAM AC    enoxaparin  40 mg SubCUTAneous Q24H    naloxegol  12.5 mg Oral QAM AC    acetaminophen  650 mg Oral Q6H    melatonin  6 mg Oral Nightly    cetirizine  10 mg Oral Daily    OLANZapine  10 mg Oral Nightly    tiotropium-olodaterol  2 puff Inhalation Daily     Continuous Infusions:  PRN Meds:loperamide, senna, methocarbamol, polyethylene glycol, bisacodyl, magnesium hydroxide, acetaminophen, oxyCODONE **OR** oxyCODONE, traZODone    Review of Systems  Pertinent items are noted in HPI.    Objective:     Patient Vitals for the past 8 hrs:   BP Temp Temp src Pulse SpO2   06/14/24 1957 116/80 98.4 °F (36.9 °C) Oral 96 99 %     I/O last 3 completed shifts:  In: 2840 [P.O.:2840]  Out: 4096  [Urine:4096]  No intake/output data recorded.    /80   Pulse 96   Temp 98.4 °F (36.9 °C) (Oral)   Resp 18   Ht 1.676 m (5' 6\")   Wt 63.5 kg (140 lb 1.6 oz)   SpO2 99%   BMI 22.61 kg/m²     General appearance: alert, appears stated age, and cooperative  Head: Normocephalic, without obvious abnormality, atraumatic  Lungs: clear to auscultation bilaterally  Chest wall: no tenderness  Heart: regular rate and rhythm, S1, S2 normal, no murmur, click, rub or gallop  Abdomen: soft, non-tender; bowel sounds normal; no masses,  no organomegaly  Extremities: extremities normal, atraumatic, no cyanosis or edema  Skin: Skin color, texture, turgor normal. No rashes or lesions  Neurologic:  weak    Electronically signed by Ryan Lerner MD on 6/14/2024 at 9:12 PM

## 2024-06-16 PROBLEM — R53.81 PHYSICAL DECONDITIONING: Status: ACTIVE | Noted: 2024-06-16

## 2024-06-16 PROBLEM — R06.02 SHORTNESS OF BREATH: Status: ACTIVE | Noted: 2024-06-16

## 2024-06-16 LAB
ANION GAP SERPL CALC-SCNC: 9 MEQ/L (ref 8–16)
BASOPHILS ABSOLUTE: 0 THOU/MM3 (ref 0–0.1)
BASOPHILS NFR BLD AUTO: 0.5 %
BUN SERPL-MCNC: 10 MG/DL (ref 7–22)
C CAYETANENSIS DNA SPEC QL NAA+PROBE: NOT DETECTED
C DIFF GDH STL QL: NEGATIVE
CA-I BLD ISE-SCNC: 1.16 MMOL/L (ref 1.12–1.32)
CALCIUM SERPL-MCNC: 8.3 MG/DL (ref 8.5–10.5)
CALCIUM UR-MCNC: 6.2 MG/DL
CAMPY SP DNA.DIARRHEA STL QL NAA+PROBE: NOT DETECTED
CHLORIDE SERPL-SCNC: 105 MEQ/L (ref 98–111)
CHOLEST SERPL-MCNC: 147 MG/DL (ref 100–199)
CO2 SERPL-SCNC: 26 MEQ/L (ref 23–33)
CREAT SERPL-MCNC: 1 MG/DL (ref 0.4–1.2)
CRYPTOSP DNA SPEC QL NAA+PROBE: NOT DETECTED
DEPRECATED RDW RBC AUTO: 56 FL (ref 35–45)
E COLI O157H7 DNA SPEC QL NAA+PROBE: NORMAL
E HISTOLYT DNA SPEC QL NAA+PROBE: NOT DETECTED
EAEC PAA PLAS AGGR+AATA ST NAA+NON-PRB: NOT DETECTED
EC STX1+STX2 + H7 FLIC SPEC NAA+PROBE: NOT DETECTED
EKG ATRIAL RATE: 102 BPM
EKG ATRIAL RATE: 114 BPM
EKG ATRIAL RATE: 85 BPM
EKG ATRIAL RATE: 96 BPM
EKG P AXIS: 32 DEGREES
EKG P AXIS: 42 DEGREES
EKG P AXIS: 50 DEGREES
EKG P AXIS: 90 DEGREES
EKG P-R INTERVAL: 130 MS
EKG P-R INTERVAL: 136 MS
EKG P-R INTERVAL: 154 MS
EKG P-R INTERVAL: 160 MS
EKG Q-T INTERVAL: 342 MS
EKG Q-T INTERVAL: 344 MS
EKG Q-T INTERVAL: 348 MS
EKG Q-T INTERVAL: 374 MS
EKG QRS DURATION: 100 MS
EKG QRS DURATION: 88 MS
EKG QRS DURATION: 90 MS
EKG QRS DURATION: 96 MS
EKG QTC CALCULATION (BAZETT): 434 MS
EKG QTC CALCULATION (BAZETT): 445 MS
EKG QTC CALCULATION (BAZETT): 445 MS
EKG QTC CALCULATION (BAZETT): 479 MS
EKG R AXIS: -2 DEGREES
EKG R AXIS: 11 DEGREES
EKG R AXIS: 11 DEGREES
EKG R AXIS: 83 DEGREES
EKG T AXIS: 34 DEGREES
EKG T AXIS: 43 DEGREES
EKG T AXIS: 45 DEGREES
EKG T AXIS: 87 DEGREES
EKG VENTRICULAR RATE: 102 BPM
EKG VENTRICULAR RATE: 114 BPM
EKG VENTRICULAR RATE: 85 BPM
EKG VENTRICULAR RATE: 96 BPM
EOSINOPHIL NFR BLD AUTO: 2.4 %
EOSINOPHILS ABSOLUTE: 0.2 THOU/MM3 (ref 0–0.4)
EPEC EAE GENE STL QL NAA+NON-PROBE: NOT DETECTED
ERYTHROCYTE [DISTWIDTH] IN BLOOD BY AUTOMATED COUNT: 13.5 % (ref 11.5–14.5)
ETEC LTA+ST1A+ST1B TOX ST NAA+NON-PROBE: NOT DETECTED
G LAMBLIA DNA SPEC QL NAA+PROBE: NOT DETECTED
GFR SERPL CREATININE-BSD FRML MDRD: 86 ML/MIN/1.73M2
GLUCOSE BLD STRIP.AUTO-MCNC: 111 MG/DL (ref 70–108)
GLUCOSE BLD STRIP.AUTO-MCNC: 122 MG/DL (ref 70–108)
GLUCOSE BLD STRIP.AUTO-MCNC: 130 MG/DL (ref 70–108)
GLUCOSE BLD STRIP.AUTO-MCNC: 160 MG/DL (ref 70–108)
GLUCOSE SERPL-MCNC: 100 MG/DL (ref 70–108)
HADV DNA SPEC QL NAA+PROBE: NOT DETECTED
HASTV RNA SPEC QL NAA+PROBE: NOT DETECTED
HCT VFR BLD AUTO: 24.7 % (ref 42–52)
HDLC SERPL-MCNC: 33 MG/DL
HEPARIN UNFRACTIONATED: 0.2 U/ML (ref 0.3–0.7)
HEPARIN UNFRACTIONATED: 0.27 U/ML (ref 0.3–0.7)
HEPARIN UNFRACTIONATED: 0.37 U/ML (ref 0.3–0.7)
HEPARIN UNFRACTIONATED: 0.47 U/ML (ref 0.3–0.7)
HGB BLD-MCNC: 8.4 GM/DL (ref 14–18)
IMM GRANULOCYTES # BLD AUTO: 0.06 THOU/MM3 (ref 0–0.07)
IMM GRANULOCYTES NFR BLD AUTO: 0.7 %
LDLC SERPL CALC-MCNC: 79 MG/DL
LYMPHOCYTES ABSOLUTE: 2.4 THOU/MM3 (ref 1–4.8)
LYMPHOCYTES NFR BLD AUTO: 27.7 %
MACROCYTES BLD QL SMEAR: PRESENT
MAGNESIUM SERPL-MCNC: 1.5 MG/DL (ref 1.6–2.4)
MCH RBC QN AUTO: 40 PG (ref 26–33)
MCHC RBC AUTO-ENTMCNC: 34 GM/DL (ref 32.2–35.5)
MCV RBC AUTO: 117.6 FL (ref 80–94)
MONOCYTES ABSOLUTE: 0.5 THOU/MM3 (ref 0.4–1.3)
MONOCYTES NFR BLD AUTO: 5.4 %
NEUTROPHILS ABSOLUTE: 5.5 THOU/MM3 (ref 1.8–7.7)
NEUTROPHILS NFR BLD AUTO: 63.3 %
NOROVIRUS GI + GII RNA STL NAA+PROBE: NOT DETECTED
NRBC BLD AUTO-RTO: 0 /100 WBC
P SHIGELLOIDES DNA STL QL NAA+PROBE: NOT DETECTED
PLATELET # BLD AUTO: 341 THOU/MM3 (ref 130–400)
PMV BLD AUTO: 8.9 FL (ref 9.4–12.4)
POTASSIUM SERPL-SCNC: 3.9 MEQ/L (ref 3.5–5.2)
RBC # BLD AUTO: 2.1 MILL/MM3 (ref 4.7–6.1)
RV RNA SPEC QL NAA+PROBE: NOT DETECTED
SALMONELLA DNA SPEC QL NAA+PROBE: NOT DETECTED
SAPOVIRUS RNA SPEC QL NAA+PROBE: NOT DETECTED
SHIGELLA SP+EIEC IPAH ST NAA+NON-PROBE: NOT DETECTED
SODIUM SERPL-SCNC: 140 MEQ/L (ref 135–145)
TRIGL SERPL-MCNC: 174 MG/DL (ref 0–199)
TROPONIN, HIGH SENSITIVITY: 78 NG/L (ref 0–12)
V CHOLERAE DNA SPEC QL NAA+PROBE: NOT DETECTED
VIBRIO DNA SPEC NAA+PROBE: NOT DETECTED
WBC # BLD AUTO: 8.7 THOU/MM3 (ref 4.8–10.8)
Y ENTERO RECN STL QL NAA+PROBE: NOT DETECTED

## 2024-06-16 PROCEDURE — 96368 THER/DIAG CONCURRENT INF: CPT

## 2024-06-16 PROCEDURE — 85520 HEPARIN ASSAY: CPT

## 2024-06-16 PROCEDURE — 96366 THER/PROPH/DIAG IV INF ADDON: CPT

## 2024-06-16 PROCEDURE — 6370000000 HC RX 637 (ALT 250 FOR IP): Performed by: FAMILY MEDICINE

## 2024-06-16 PROCEDURE — 93005 ELECTROCARDIOGRAM TRACING: CPT

## 2024-06-16 PROCEDURE — 80061 LIPID PANEL: CPT

## 2024-06-16 PROCEDURE — 6360000002 HC RX W HCPCS

## 2024-06-16 PROCEDURE — 82340 ASSAY OF CALCIUM IN URINE: CPT

## 2024-06-16 PROCEDURE — 85025 COMPLETE CBC W/AUTO DIFF WBC: CPT

## 2024-06-16 PROCEDURE — 82948 REAGENT STRIP/BLOOD GLUCOSE: CPT

## 2024-06-16 PROCEDURE — 99222 1ST HOSP IP/OBS MODERATE 55: CPT | Performed by: PHYSICAL MEDICINE & REHABILITATION

## 2024-06-16 PROCEDURE — 93010 ELECTROCARDIOGRAM REPORT: CPT | Performed by: INTERNAL MEDICINE

## 2024-06-16 PROCEDURE — 6370000000 HC RX 637 (ALT 250 FOR IP)

## 2024-06-16 PROCEDURE — G0378 HOSPITAL OBSERVATION PER HR: HCPCS

## 2024-06-16 PROCEDURE — 99223 1ST HOSP IP/OBS HIGH 75: CPT | Performed by: NUCLEAR MEDICINE

## 2024-06-16 PROCEDURE — 84484 ASSAY OF TROPONIN QUANT: CPT

## 2024-06-16 PROCEDURE — 87449 NOS EACH ORGANISM AG IA: CPT

## 2024-06-16 PROCEDURE — 36415 COLL VENOUS BLD VENIPUNCTURE: CPT

## 2024-06-16 PROCEDURE — 82330 ASSAY OF CALCIUM: CPT

## 2024-06-16 PROCEDURE — 87507 IADNA-DNA/RNA PROBE TQ 12-25: CPT

## 2024-06-16 PROCEDURE — 83735 ASSAY OF MAGNESIUM: CPT

## 2024-06-16 PROCEDURE — 80048 BASIC METABOLIC PNL TOTAL CA: CPT

## 2024-06-16 PROCEDURE — 94640 AIRWAY INHALATION TREATMENT: CPT

## 2024-06-16 PROCEDURE — 2580000003 HC RX 258

## 2024-06-16 RX ORDER — DEXTROSE MONOHYDRATE 100 MG/ML
INJECTION, SOLUTION INTRAVENOUS CONTINUOUS PRN
Status: DISCONTINUED | OUTPATIENT
Start: 2024-06-16 | End: 2024-06-18 | Stop reason: HOSPADM

## 2024-06-16 RX ORDER — INSULIN LISPRO 100 [IU]/ML
0-4 INJECTION, SOLUTION INTRAVENOUS; SUBCUTANEOUS NIGHTLY
Status: DISCONTINUED | OUTPATIENT
Start: 2024-06-16 | End: 2024-06-18 | Stop reason: HOSPADM

## 2024-06-16 RX ORDER — INSULIN LISPRO 100 [IU]/ML
0-4 INJECTION, SOLUTION INTRAVENOUS; SUBCUTANEOUS
Status: DISCONTINUED | OUTPATIENT
Start: 2024-06-16 | End: 2024-06-18 | Stop reason: HOSPADM

## 2024-06-16 RX ORDER — GLUCAGON 1 MG/ML
1 KIT INJECTION PRN
Status: DISCONTINUED | OUTPATIENT
Start: 2024-06-16 | End: 2024-06-18 | Stop reason: HOSPADM

## 2024-06-16 RX ADMIN — Medication 6 MG: at 21:10

## 2024-06-16 RX ADMIN — FOSAMPRENAVIR CALCIUM 1400 MG: 700 TABLET, COATED ORAL at 03:29

## 2024-06-16 RX ADMIN — FOSAMPRENAVIR CALCIUM 1400 MG: 700 TABLET, COATED ORAL at 21:11

## 2024-06-16 RX ADMIN — HEPARIN SODIUM 1900 UNITS: 1000 INJECTION INTRAVENOUS; SUBCUTANEOUS at 09:03

## 2024-06-16 RX ADMIN — METOPROLOL TARTRATE 25 MG: 25 TABLET, FILM COATED ORAL at 08:12

## 2024-06-16 RX ADMIN — METOPROLOL TARTRATE 25 MG: 25 TABLET, FILM COATED ORAL at 00:48

## 2024-06-16 RX ADMIN — HEPARIN SODIUM 1900 UNITS: 1000 INJECTION INTRAVENOUS; SUBCUTANEOUS at 23:25

## 2024-06-16 RX ADMIN — ATORVASTATIN CALCIUM 80 MG: 80 TABLET, FILM COATED ORAL at 00:48

## 2024-06-16 RX ADMIN — Medication 1 CAPSULE: at 08:12

## 2024-06-16 RX ADMIN — ISOSORBIDE DINITRATE 20 MG: 20 TABLET ORAL at 21:10

## 2024-06-16 RX ADMIN — ACETAMINOPHEN 650 MG: 325 TABLET ORAL at 21:10

## 2024-06-16 RX ADMIN — FOSAMPRENAVIR CALCIUM 1400 MG: 700 TABLET, COATED ORAL at 08:16

## 2024-06-16 RX ADMIN — DICLOFENAC SODIUM 2 G: 10 GEL TOPICAL at 12:57

## 2024-06-16 RX ADMIN — DICLOFENAC SODIUM 2 G: 10 GEL TOPICAL at 08:17

## 2024-06-16 RX ADMIN — FENOFIBRATE 160 MG: 160 TABLET ORAL at 08:12

## 2024-06-16 RX ADMIN — TIOTROPIUM BROMIDE AND OLODATEROL 2 PUFF: 3.124; 2.736 SPRAY, METERED RESPIRATORY (INHALATION) at 07:45

## 2024-06-16 RX ADMIN — OLANZAPINE 10 MG: 10 TABLET, FILM COATED ORAL at 00:48

## 2024-06-16 RX ADMIN — ACETAMINOPHEN 650 MG: 325 TABLET ORAL at 10:18

## 2024-06-16 RX ADMIN — ISOSORBIDE DINITRATE 20 MG: 20 TABLET ORAL at 00:48

## 2024-06-16 RX ADMIN — CETIRIZINE HYDROCHLORIDE 10 MG: 10 TABLET ORAL at 08:12

## 2024-06-16 RX ADMIN — DICLOFENAC SODIUM 2 G: 10 GEL TOPICAL at 16:50

## 2024-06-16 RX ADMIN — LAMIVUDINE AND ZIDOVUDINE 1 TABLET: 150; 300 TABLET, FILM COATED ORAL at 03:30

## 2024-06-16 RX ADMIN — OLANZAPINE 10 MG: 10 TABLET, FILM COATED ORAL at 21:10

## 2024-06-16 RX ADMIN — ACETAMINOPHEN 650 MG: 325 TABLET ORAL at 16:50

## 2024-06-16 RX ADMIN — DICLOFENAC SODIUM 2 G: 10 GEL TOPICAL at 21:11

## 2024-06-16 RX ADMIN — ATORVASTATIN CALCIUM 80 MG: 80 TABLET, FILM COATED ORAL at 21:10

## 2024-06-16 RX ADMIN — LAMIVUDINE AND ZIDOVUDINE 1 TABLET: 150; 300 TABLET, FILM COATED ORAL at 21:13

## 2024-06-16 RX ADMIN — ASPIRIN 81 MG 81 MG: 81 TABLET ORAL at 08:12

## 2024-06-16 RX ADMIN — TAMSULOSIN HYDROCHLORIDE 0.4 MG: 0.4 CAPSULE ORAL at 08:12

## 2024-06-16 RX ADMIN — MAGNESIUM SULFATE HEPTAHYDRATE 2000 MG: 40 INJECTION, SOLUTION INTRAVENOUS at 04:01

## 2024-06-16 RX ADMIN — ACETAMINOPHEN 650 MG: 325 TABLET ORAL at 03:28

## 2024-06-16 RX ADMIN — CEFTRIAXONE SODIUM 1000 MG: 1 INJECTION, POWDER, FOR SOLUTION INTRAMUSCULAR; INTRAVENOUS at 00:52

## 2024-06-16 RX ADMIN — FLUOXETINE HYDROCHLORIDE 20 MG: 20 CAPSULE ORAL at 08:13

## 2024-06-16 RX ADMIN — LAMIVUDINE AND ZIDOVUDINE 1 TABLET: 150; 300 TABLET, FILM COATED ORAL at 08:16

## 2024-06-16 RX ADMIN — CEFTRIAXONE SODIUM 1000 MG: 1 INJECTION, POWDER, FOR SOLUTION INTRAMUSCULAR; INTRAVENOUS at 21:56

## 2024-06-16 RX ADMIN — ISOSORBIDE DINITRATE 20 MG: 20 TABLET ORAL at 08:12

## 2024-06-16 RX ADMIN — METOPROLOL TARTRATE 25 MG: 25 TABLET, FILM COATED ORAL at 21:10

## 2024-06-16 ASSESSMENT — PAIN - FUNCTIONAL ASSESSMENT: PAIN_FUNCTIONAL_ASSESSMENT: PREVENTS OR INTERFERES SOME ACTIVE ACTIVITIES AND ADLS

## 2024-06-16 NOTE — PROGRESS NOTES
Hospitalist Progress Note    Patient:  David Vaca      Unit/Bed:3B-25/025-A    YOB: 1965    MRN: [id number]       Acct: [id number]     PCP: Sandi Choudhary APRN - CNP    Date of Admission: 6/15/2024    Assessment/Plan:    Elevated troponin:   Cardiology consulted, not planning intervention, manage medically at this time  Troponin trend flat  Continue Heparin  Continue telemetry  Echo ordered  Diet ordered     Hypomagnesemia, severe:   Continue magnesium replacement protocol  Continue Telemetry  Holding PPI     Flu-like symptoms: Complains of nausea, emesis, fatigue, generalized weakness, dizziness, SOB. Likely caused by above.   COVID/Influenza negative  GI panel, Cdiff ordered     Acute cystitis with hematuria: UA positive nitrite, moderate leukocyte, hematuria, many bacteria.   Continue Rocephin   Gentle IVF  Follow cultures and sensitivity report     Aortic occlusion (POA): S/p aortobifemoral bypass, lower extremity thrombectomy on 5/31/2024 by Dr. Montague.  Continue ASA, statin  Pain control: Voltaren gel, Scheduled Tylenol, PRN Flexeril, Oxycodone, Tramadol  May apply heat to groin/hip area for comfort PRN  Dressing changes as ordered  Bilateral knee high NICK hose     Macrocytic anemia, chronic: Hgb 8.1 and stable. No overt signs of bleeding.   Transfuse if Hgb <7 or hemodynamically unstable  CBC in AM     COPD:   Continue Stiolto     HTN:   Continue isosorbide, metoprolol     NIDDMII with neuropathy: 6/10/2024 Hgb A1c 5.8. Not on any home medications.   Blood glucose ACHS  Low dose SSI  Continue gabapentin  Hypoglycemic protocol  Carb control diet     HLD:   Lipid panel in AM  Continue atorvastatin, fenofibrate     HIV:   Continue Lexiva, Combivir      LDA: []CVC / []PICC / []Midline / []Valle / []Drains / []Mediport / [x]None  Antibiotics: Yes  Steroids: No  Labs (still needed?): []Yes / []No  IVF (still needed?): []Yes / []No    Level of care: [x]Step Down / []Med-Surg  Bed

## 2024-06-16 NOTE — PROCEDURES
PROCEDURE NOTE  Date: 6/16/2024   Name: David Vaca  YOB: 1965    Procedures  12 lead EKG completed. Results handed to Umm HUERTA.

## 2024-06-16 NOTE — CONSULTS
[address] [address]                              CONSULTATION      PATIENT NAME: MICHELLE GARCÍA              : 1965  MED REC NO: [id number]                       ROOM: [facility]  ACCOUNT NO: [id number]                       ADMIT DATE: 06/15/2024  PROVIDER: Flo Oneal MD    CARDIOLOGY CONSULTATION    CONSULT DATE:  2024    REFERRING PHYSICIAN:  Dr. Lerner      REASON FOR CONSULTATION:  Shortness of breath and nonspecific chest pain.    HISTORY OF PRESENT ILLNESS:  This is a pleasant 58-year-old gentleman who is not the best historian, who is somehow hard of hearing.  Apparently, he was transferred from rehab floor after he experienced symptoms of weakness, fatigue, shortness of breath yesterday.  The patient is up there recovering from an aortofemoral bypass from few weeks ago when he was admitted, was found to have a distal aortic thrombus and significant peripheral vascular disease for which he underwent bypass and was in rehab recovering from that.  The patient is not aware of any previous cardiac history.  He had a stress test about 6 months ago showing no ischemia with no previous cardiac catheterization.  He had some symptoms of shortness of breath, however, denies previous chest pain.  Has had atypical chest pain since he has been up here.  We were consulted to assist in management of the patient.    REVIEW OF SYSTEMS:  1. No obvious fevers or chills.  2. No hematuria or dysuria.  3. No abdominal pain, nausea, vomiting, or diarrhea.  4. No obvious active psych problems or suicidal ideation.  5. No skin rashes.  6. No obvious dizziness, lightheadedness, or loss of consciousness except as above.  7. No recent trauma.  8. No bleeding problem.  9. No HEENT-related problem.    PAST MEDICAL HISTORY:  Significant for;  1. Peripheral vascular disease.  2. Diabetes.  3. Hypertension.  4. Hyperlipidemia.  5. No known

## 2024-06-16 NOTE — H&P
Hyperlipidemia     Diagnosed in 2000's    Hypertension        Past Surgical History:        Procedure Laterality Date    ABDOMINAL AORTIC ANEURYSM REPAIR N/A 05/31/2024    AORTOBIFEMORAL BYPASS performed by Yen Montague MD at [facility]    CARDIAC CATHETERIZATION  2015???    OK    COLONOSCOPY  2016    COLONOSCOPY  2021    Dr Rutledge     CYSTOSCOPY  05/31/2024    CYSTOSCOPY WITH ENRIQUEZ CATHETER PLACEMENT performed by Tru Kurtz MD at [facility]    EGD  2021    ENDOSCOPY, COLON, DIAGNOSTIC      egd with dilatation    FEMORAL BYPASS N/A 05/31/2024    LOWER EXTREMITY THROMBECTOMY POSSIBLE FASCIOTOMY**CELLSAVER- NOTIFIED performed by Yen Montague MD at [facility]    FOOT SURGERY Left 1970's    left foot needle foreign body removal    HERNIA REPAIR Left 04/26/2024    Robotic Left Inguinal Hernia Repair and Right Inguinal Hernia Repair both with mesh performed by Jana Trent MD at [facility] OR    LEG SURGERY Left 06/04/2024    LOWER EXTREMITY WOUND VAC EXCHANGE / FASCIOTOMY CLOSURE performed by Yen Montague MD at [facility]    LEG SURGERY Left 6/7/2024    LEFT LOWER EXTREMITY FASCIOTOMY CLOSURE, WOUND VAC REMOVAL performed by Yen Montague MD at [facility]    LUMBAR SPINE SURGERY Right 04/23/2019    Lumbar RFA  Right side first L3-4,4-5,5-S1 performed by Robert Diaz MD at [facility] [place]    LUMBAR SPINE SURGERY Left 05/21/2019    Lumbar RFA  Left side L3-4,4-5,5-S1 performed by Robert Diaz MD at [facility] [place] [facility]    NERVE BLOCK LUMB FACET LEVEL 1 BILATERAL Bilateral 07/18/2017    SI MBB BILATERAL performed by Robert Diaz MD at [facility] OR    NERVE SURGERY Bilateral 07/18/2017    SI BLOCK    OTHER SURGICAL HISTORY Bilateral 01/17/2017    Lumbar facet     OTHER SURGICAL HISTORY Bilateral 02/20/2017    Lumbar Facet L3-S1    OTHER SURGICAL HISTORY Right 03/28/2017    Lumbar RFA L3-S1    OTHER SURGICAL HISTORY Bilateral 03/06/2018  20 MG tablet Take 1 tablet by mouth twice daily 180 tablet 0    tiZANidine (ZANAFLEX) 4 MG tablet Take 1 tablet by mouth 2 times daily as needed (spasms) 180 tablet 2    OLANZapine (ZYPREXA) 10 MG tablet Take 1 tablet by mouth nightly      fenofibrate (TRICOR) 145 MG tablet Take 1 tablet by mouth daily 30 tablet 11    pantoprazole (PROTONIX) 40 MG tablet Take 1 tablet by mouth every morning (before breakfast) 90 tablet 11    tiotropium-olodaterol (STIOLTO) 2.5-2.5 MCG/ACT AERS Inhale 2 puffs into the lungs daily      fosamprenavir (LEXIVA) 700 MG tablet Take 2 tablets by mouth 2 times daily      lamivudine-zidovudine (COMBIVIR) 150-300 MG per tablet Take 1 tablet by mouth 2 times daily         Allergies:    Patient has no known allergies.    Social History:    reports that he has been smoking cigarettes. He started smoking about 28 years ago. He has a 28.5 pack-year smoking history. He has never used smokeless tobacco. He reports that he does not currently use drugs after having used the following drugs: Cocaine, Crack Cocaine, and Marijuana (Weed). He reports that he does not drink alcohol.    Family History:       Problem Relation Age of Onset    Diabetes Mother     High Blood Pressure Mother     Colon Cancer Neg Hx     Cancer Neg Hx     Heart Disease Neg Hx     Stroke Neg Hx        Diet:  Diet NPO Exceptions are: Sips of Water with Meds  ADULT DIET; Regular  ADULT ORAL NUTRITION SUPPLEMENT; Breakfast, Dinner; Low Calorie/High Protein Oral Supplement      Physical Exam:  /79   Pulse 94   Temp 98.6 °F (37 °C) (Oral)   Resp 16   Ht 1.676 m (5' 5.98\")   Wt 63.5 kg (140 lb)   BMI 22.61 kg/m²   General appearance: No apparent distress, acutely ill appearing and cooperative.  HEENT: Normal cephalic, atraumatic without obvious deformity. Pupils equal, round, and reactive to light.  Extra ocular muscles intact. Conjunctivae/corneas clear.  Neck: Supple, with full range of motion. No jugular venous distention.

## 2024-06-16 NOTE — CARE COORDINATION
06/16/24 0915   Readmission Assessment   Number of Days since last admission? 1-7 days   Previous Disposition Acute Rehab   Who is being Interviewed Patient   What was the patient's/caregiver's perception as to why they think they needed to return back to the hospital? Other (Comment)  (sent by doctor)   Did you visit your Primary Care Physician after you left the hospital, before you returned this time? No   Why weren't you able to visit your PCP? Other (Comment)  (IPR)   Did you see a specialist, such as Cardiac, Pulmonary, Orthopedic Physician, etc. after you left the hospital? No   Who advised the patient to return to the hospital? Physician   Does the patient report anything that got in the way of taking their medications? No   In our efforts to provide the best possible care to you and others like you, can you think of anything that we could have done to help you after you left the hospital the first time, so that you might not have needed to return so soon? Other (Comment)  (denies)

## 2024-06-16 NOTE — CARE COORDINATION
06/16/24 0912   Service Assessment   Patient Orientation Alert and Oriented   Cognition Alert   History Provided By Patient   Primary Caregiver Other (Comment)  (staff at [facility])   Support Systems Family Members   Patient's Healthcare Decision Maker is: Named in Scanned ACP Document   PCP Verified by CM Yes   Last Visit to PCP Within last year   Prior Functional Level Assistance with the following:;Bathing;Dressing;Toileting;Housework;Cooking;Shopping;Mobility   Current Functional Level Assistance with the following:;Bathing;Dressing;Toileting;Cooking;Housework;Shopping;Mobility   Can patient return to prior living arrangement Unknown at present   Ability to make needs known: Good   Family able to assist with home care needs: No   Would you like for me to discuss the discharge plan with any other family members/significant others, and if so, who? No   Financial Resources Medicare;Medicaid   Community Resources None   Social/Functional History   Active  No   Patient's  Info drove prior to rehab stay.   Discharge Planning   Type of Residence Other (Comment)  ([facility], prior lived alone in apartment)   Living Arrangements Other (Comment)  (IPR)   Current Services Prior To Admission Durable Medical Equipment   Current DME Prior to Arrival Walker   Potential Assistance Needed Other (Comment)  (rehab)   Potential DME Needed Wheelchair   DME Ordered? No   Potential Assistance Purchasing Medications No   Type of Home Care Services None   Patient expects to be discharged to: Acute rehab   Services At/After Discharge   Transition of Care Consult (CM Consult) Discharge Planning   Services At/After Discharge Inpatient rehab   Mode of Transport at Discharge Other (see comment)  (family)   Confirm Follow Up Transport Family   Condition of Participation: Discharge Planning   The Plan for Transition of Care is related to the following treatment goals: get stronger     Patient Goals/Plan/Treatment Preferences: Spoke with

## 2024-06-16 NOTE — CONSULTS
Physical Medicine & Rehabilitation Consultation Note      Admitting Physician: Benedict Wladen PA-C    Primary Care Provider: Sandi Choudhary APRN - CNP     Reason for Consult: Patient preferred return to inpatient rehab if able    History of Present Illness:  David Vaca is a 58 y.o. right-handed  male with history of HIV infection, hypertension, kidney stone, hyperlipidemia, GERD, COPD/emphysema, questionable diabetes mellitus, depression, status post bilateral inguinal hernia repair, status post left foot foreign body (needle) surgical removal, chronic low back pain requiring multiple interventional pain management procedures, was admitted to [facility] on 6/15/2024 for elevated troponin and EKG abnormality suggesting of possible posterior infarct.    The patient says he developed sudden onset legs weakness causing him to fall with left leg numbness and pain on 5/30/2024 when he was outside his apartment.  He denies hitting his head or loss of consciousness.  He says his neighbor helped him to get up.  On 5/31/2024 he also noticed the left leg was cool to touch.  Therefore 911 was called.  The patient was brought to [facility] ER for evaluation by EMS on 5/31/2024.  CT of head done on 5/31/2024 showed no acute process.  CTA of abdominal aorta with bilateral runoff was performed on 5/31/2024 and revealed extensive acute thrombus extending from level of renal arteries in the aorta and continuing down to the abdominal aorta into both common iliac arteries, with majority of left iliac arteries, superficial femoral arteries, popliteal arteries and trifurcation arteries completely occluded with thrombus. The patient then was emergently transferred and admitted to [facility] in [facility] via [facility] on 5/31/2024.  The patient underwent emergency aortobifemoral bypass, thromboembolectomy of left iliac, left common femoral, left profunda, left

## 2024-06-16 NOTE — PROGRESS NOTES
Patient was admitted for Nausea, abd pain and SOB. He was a transfer from . Given orders to start IV fluids and heparin. Samples was collected for urine, blood and nasal swab. Hook to tele on sinus rhythm to sinus tachy. Dressing changed on both femoral side since it was noted to be soaked. Noted to be above 140s when he was vomiting. PRN nausea meds given. Heparin started at 12 units. Received panic value for magnesium at <0.03. Informed MD, received orders for correction. Called a rapid due to the patient heart rate (>150s-170s). MD decided to transfer him to step down unit. Handed off to arcelia of 3B.

## 2024-06-16 NOTE — PROGRESS NOTES
[facility]  INPATIENT OCCUPATIONAL THERAPY  [facility]  DISCHARGE NOTE    Date: 2024  Patient Name: David Vaca,   Gender: male      MRN: [id number]  : 1965  (58 y.o.)  Referring Practitioner: Dr. KELLI Spears  Diagnosis: Debility  Additional Pertinent Hx: David Vaca is a 58 y.o. 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 The patient says he developed sudden onset legs weakness causing him to fall with left leg numbness and pain on 2024 when he was outside his apartment.  On 2024 he also noticed the left leg was cool to touch.  Therefore 911 was called.  The patient was brought to [facility] ER for evaluation.  CTA of abdominal aorta revealed extensive acute thrombus extending from level of renal arteries in the aorta and continuing down to the abdominal aorta into both common iliac arteries, with majority of left iliac arteries, superficial femoral arteries, popliteal arteries and trifurcation arteries completely occluded with thrombus. The patient then was emergently transferred and admitted to [facility] via [facility] on 2024.  The patient underwent emergency aortobifemoral bypass, thromboembolectomy of left iliac, left common femoral, left profunda, left superficial femoral and left popliteal arteries, right iliac artery thrombectomy, and left lower extremity 4 compartment fasciotomy, and lysis of adhesions on 2024 by Dr.Mohammed Montague & Dr. Laura Valdivia.  Wound VAC was applied over the fasciotomy sites postoperatively for wound care.

## 2024-06-16 NOTE — PROCEDURES
PROCEDURE NOTE  Date: 6/15/2024   Name: David Vaca  YOB: 1965    Procedures    12 lead EKG completed. Results handed to Ange YEPEZ.

## 2024-06-16 NOTE — PROGRESS NOTES
Hospitalist Progress Note    Patient:  David Vaca      Unit/Bed:3B-25/025-A    YOB: 1965    MRN: [id number]       Acct: [id number]     PCP: Sandi Choudhary APRN - CNP    Date of Admission: 6/15/2024    Assessment/Plan:    Elevated troponin:   Cardiology consulted, not planning intervention, manage medically at this time  Troponin trend flat  Continue Heparin  Continue telemetry  Echo ordered  Diet ordered     Hypomagnesemia, severe:   Continue magnesium replacement protocol  Continue Telemetry  Holding PPI     Flu-like symptoms: Complains of nausea, emesis, fatigue, generalized weakness, dizziness, SOB. Likely caused by above.   COVID/Influenza negative  GI panel, Cdiff ordered     Acute cystitis with hematuria: UA positive nitrite, moderate leukocyte, hematuria, many bacteria.   Continue Rocephin   Gentle IVF  Follow cultures and sensitivity report     Aortic occlusion (POA): S/p aortobifemoral bypass, lower extremity thrombectomy on 5/31/2024 by Dr. Montague.  Continue ASA, statin  Pain control: Voltaren gel, Scheduled Tylenol, PRN Flexeril, Oxycodone, Tramadol  May apply heat to groin/hip area for comfort PRN  Dressing changes as ordered  Bilateral knee high NICK hose     Macrocytic anemia, chronic: Hgb 8.1 and stable. No overt signs of bleeding.   Transfuse if Hgb <7 or hemodynamically unstable  CBC in AM     COPD:   Continue Stiolto     HTN:   Continue isosorbide, metoprolol     NIDDMII with neuropathy: 6/10/2024 Hgb A1c 5.8. Not on any home medications.   Blood glucose ACHS  Low dose SSI  Continue gabapentin  Hypoglycemic protocol  Carb control diet     HLD:   Lipid panel in AM  Continue atorvastatin, fenofibrate     HIV:   Continue Lexiva, Combivir      LDA: []CVC / []PICC / []Midline / []Valle / []Drains / []Mediport / [x]None  Antibiotics: Yes  Steroids: No  Labs (still needed?): []Yes / []No  IVF (still needed?): []Yes / []No    Level of care: [x]Step Down / []Med-Surg  Bed

## 2024-06-17 ENCOUNTER — APPOINTMENT (OUTPATIENT)
Age: 59
DRG: 948 | End: 2024-06-17
Payer: MEDICARE

## 2024-06-17 LAB
ECHO AV CUSP MM: 1.8 CM
ECHO BSA: 1.72 M2
ECHO LA AREA 2C: 13.3 CM2
ECHO LA AREA 4C: 13.4 CM2
ECHO LA DIAMETER INDEX: 1.98 CM/M2
ECHO LA DIAMETER: 3.4 CM
ECHO LA MAJOR AXIS: 4.7 CM
ECHO LA MINOR AXIS: 4.6 CM
ECHO LA VOL BP: 31 ML (ref 18–58)
ECHO LA VOL MOD A2C: 31 ML (ref 18–58)
ECHO LA VOL MOD A4C: 30 ML (ref 18–58)
ECHO LA VOL/BSA BIPLANE: 18 ML/M2 (ref 16–34)
ECHO LA VOLUME INDEX MOD A2C: 18 ML/M2 (ref 16–34)
ECHO LA VOLUME INDEX MOD A4C: 17 ML/M2 (ref 16–34)
ECHO LV FRACTIONAL SHORTENING: 30 % (ref 28–44)
ECHO LV INTERNAL DIMENSION DIASTOLE INDEX: 3.14 CM/M2
ECHO LV INTERNAL DIMENSION DIASTOLIC: 5.4 CM (ref 4.2–5.9)
ECHO LV INTERNAL DIMENSION SYSTOLIC INDEX: 2.21 CM/M2
ECHO LV INTERNAL DIMENSION SYSTOLIC: 3.8 CM
ECHO LV IVSD: 0.7 CM (ref 0.6–1)
ECHO LV MASS 2D: 142.9 G (ref 88–224)
ECHO LV MASS INDEX 2D: 83.1 G/M2 (ref 49–115)
ECHO LV POSTERIOR WALL DIASTOLIC: 0.8 CM (ref 0.6–1)
ECHO LV RELATIVE WALL THICKNESS RATIO: 0.3
ECHO RV INTERNAL DIMENSION: 3 CM
GLUCOSE BLD STRIP.AUTO-MCNC: 101 MG/DL (ref 70–108)
GLUCOSE BLD STRIP.AUTO-MCNC: 131 MG/DL (ref 70–108)
GLUCOSE BLD STRIP.AUTO-MCNC: 144 MG/DL (ref 70–108)
GLUCOSE BLD STRIP.AUTO-MCNC: 163 MG/DL (ref 70–108)
HEPARIN UNFRACTIONATED: 0.33 U/ML (ref 0.3–0.7)
HEPARIN UNFRACTIONATED: 0.4 U/ML (ref 0.3–0.7)
MAGNESIUM SERPL-MCNC: 1.5 MG/DL (ref 1.6–2.4)
MAGNESIUM SERPL-MCNC: 2.2 MG/DL (ref 1.6–2.4)

## 2024-06-17 PROCEDURE — 94761 N-INVAS EAR/PLS OXIMETRY MLT: CPT

## 2024-06-17 PROCEDURE — 6370000000 HC RX 637 (ALT 250 FOR IP)

## 2024-06-17 PROCEDURE — 97166 OT EVAL MOD COMPLEX 45 MIN: CPT

## 2024-06-17 PROCEDURE — 99232 SBSQ HOSP IP/OBS MODERATE 35: CPT | Performed by: NURSE PRACTITIONER

## 2024-06-17 PROCEDURE — 6370000000 HC RX 637 (ALT 250 FOR IP): Performed by: FAMILY MEDICINE

## 2024-06-17 PROCEDURE — 6360000002 HC RX W HCPCS

## 2024-06-17 PROCEDURE — 92523 SPEECH SOUND LANG COMPREHEN: CPT

## 2024-06-17 PROCEDURE — 97535 SELF CARE MNGMENT TRAINING: CPT

## 2024-06-17 PROCEDURE — 6360000002 HC RX W HCPCS: Performed by: PHYSICIAN ASSISTANT

## 2024-06-17 PROCEDURE — 99232 SBSQ HOSP IP/OBS MODERATE 35: CPT | Performed by: PHYSICIAN ASSISTANT

## 2024-06-17 PROCEDURE — 97162 PT EVAL MOD COMPLEX 30 MIN: CPT

## 2024-06-17 PROCEDURE — 83735 ASSAY OF MAGNESIUM: CPT

## 2024-06-17 PROCEDURE — 93307 TTE W/O DOPPLER COMPLETE: CPT

## 2024-06-17 PROCEDURE — 6370000000 HC RX 637 (ALT 250 FOR IP): Performed by: NURSE PRACTITIONER

## 2024-06-17 PROCEDURE — 82948 REAGENT STRIP/BLOOD GLUCOSE: CPT

## 2024-06-17 PROCEDURE — 96368 THER/DIAG CONCURRENT INF: CPT

## 2024-06-17 PROCEDURE — 1200000003 HC TELEMETRY R&B

## 2024-06-17 PROCEDURE — 36415 COLL VENOUS BLD VENIPUNCTURE: CPT

## 2024-06-17 PROCEDURE — 93307 TTE W/O DOPPLER COMPLETE: CPT | Performed by: INTERNAL MEDICINE

## 2024-06-17 PROCEDURE — 2580000003 HC RX 258

## 2024-06-17 PROCEDURE — 96366 THER/PROPH/DIAG IV INF ADDON: CPT

## 2024-06-17 PROCEDURE — 85520 HEPARIN ASSAY: CPT

## 2024-06-17 PROCEDURE — 97530 THERAPEUTIC ACTIVITIES: CPT

## 2024-06-17 PROCEDURE — 94640 AIRWAY INHALATION TREATMENT: CPT

## 2024-06-17 PROCEDURE — 97116 GAIT TRAINING THERAPY: CPT

## 2024-06-17 RX ORDER — ENOXAPARIN SODIUM 100 MG/ML
30 INJECTION SUBCUTANEOUS 2 TIMES DAILY
Status: DISCONTINUED | OUTPATIENT
Start: 2024-06-17 | End: 2024-06-18 | Stop reason: HOSPADM

## 2024-06-17 RX ORDER — DOCUSATE SODIUM 100 MG/1
100 CAPSULE, LIQUID FILLED ORAL 2 TIMES DAILY
Status: DISCONTINUED | OUTPATIENT
Start: 2024-06-17 | End: 2024-06-18 | Stop reason: HOSPADM

## 2024-06-17 RX ORDER — LANOLIN ALCOHOL/MO/W.PET/CERES
400 CREAM (GRAM) TOPICAL 3 TIMES DAILY
Status: DISCONTINUED | OUTPATIENT
Start: 2024-06-17 | End: 2024-06-18 | Stop reason: HOSPADM

## 2024-06-17 RX ADMIN — OXYCODONE HYDROCHLORIDE 5 MG: 5 TABLET ORAL at 21:29

## 2024-06-17 RX ADMIN — METOPROLOL TARTRATE 25 MG: 25 TABLET, FILM COATED ORAL at 20:23

## 2024-06-17 RX ADMIN — ENOXAPARIN SODIUM 30 MG: 100 INJECTION SUBCUTANEOUS at 20:22

## 2024-06-17 RX ADMIN — METOPROLOL TARTRATE 25 MG: 25 TABLET, FILM COATED ORAL at 09:41

## 2024-06-17 RX ADMIN — FOSAMPRENAVIR CALCIUM 1400 MG: 700 TABLET, COATED ORAL at 09:47

## 2024-06-17 RX ADMIN — TIOTROPIUM BROMIDE AND OLODATEROL 2 PUFF: 3.124; 2.736 SPRAY, METERED RESPIRATORY (INHALATION) at 08:51

## 2024-06-17 RX ADMIN — CETIRIZINE HYDROCHLORIDE 10 MG: 10 TABLET ORAL at 09:40

## 2024-06-17 RX ADMIN — DICLOFENAC SODIUM 2 G: 10 GEL TOPICAL at 09:41

## 2024-06-17 RX ADMIN — Medication 1 CAPSULE: at 09:40

## 2024-06-17 RX ADMIN — GABAPENTIN 300 MG: 300 CAPSULE ORAL at 09:40

## 2024-06-17 RX ADMIN — GABAPENTIN 300 MG: 300 CAPSULE ORAL at 21:37

## 2024-06-17 RX ADMIN — Medication 400 MG: at 21:35

## 2024-06-17 RX ADMIN — DICLOFENAC SODIUM 2 G: 10 GEL TOPICAL at 14:37

## 2024-06-17 RX ADMIN — TAMSULOSIN HYDROCHLORIDE 0.4 MG: 0.4 CAPSULE ORAL at 09:40

## 2024-06-17 RX ADMIN — Medication 6 MG: at 20:23

## 2024-06-17 RX ADMIN — ISOSORBIDE DINITRATE 20 MG: 20 TABLET ORAL at 20:23

## 2024-06-17 RX ADMIN — Medication 400 MG: at 14:42

## 2024-06-17 RX ADMIN — SODIUM CHLORIDE, PRESERVATIVE FREE 10 ML: 5 INJECTION INTRAVENOUS at 20:23

## 2024-06-17 RX ADMIN — CYCLOBENZAPRINE 10 MG: 10 TABLET, FILM COATED ORAL at 17:33

## 2024-06-17 RX ADMIN — FENOFIBRATE 160 MG: 160 TABLET ORAL at 09:40

## 2024-06-17 RX ADMIN — ASPIRIN 81 MG 81 MG: 81 TABLET ORAL at 09:40

## 2024-06-17 RX ADMIN — LAMIVUDINE AND ZIDOVUDINE 1 TABLET: 150; 300 TABLET, FILM COATED ORAL at 20:26

## 2024-06-17 RX ADMIN — HEPARIN SODIUM 16 UNITS/KG/HR: 10000 INJECTION, SOLUTION INTRAVENOUS at 05:40

## 2024-06-17 RX ADMIN — ACETAMINOPHEN 650 MG: 325 TABLET ORAL at 17:26

## 2024-06-17 RX ADMIN — ATORVASTATIN CALCIUM 80 MG: 80 TABLET, FILM COATED ORAL at 20:22

## 2024-06-17 RX ADMIN — MAGNESIUM SULFATE HEPTAHYDRATE 2000 MG: 40 INJECTION, SOLUTION INTRAVENOUS at 08:23

## 2024-06-17 RX ADMIN — FLUOXETINE HYDROCHLORIDE 20 MG: 20 CAPSULE ORAL at 09:40

## 2024-06-17 RX ADMIN — LAMIVUDINE AND ZIDOVUDINE 1 TABLET: 150; 300 TABLET, FILM COATED ORAL at 09:45

## 2024-06-17 RX ADMIN — DICLOFENAC SODIUM 2 G: 10 GEL TOPICAL at 17:27

## 2024-06-17 RX ADMIN — DICLOFENAC SODIUM 2 G: 10 GEL TOPICAL at 20:22

## 2024-06-17 RX ADMIN — OXYCODONE HYDROCHLORIDE 5 MG: 5 TABLET ORAL at 14:42

## 2024-06-17 RX ADMIN — CEFTRIAXONE SODIUM 1000 MG: 1 INJECTION, POWDER, FOR SOLUTION INTRAMUSCULAR; INTRAVENOUS at 21:26

## 2024-06-17 RX ADMIN — OLANZAPINE 10 MG: 10 TABLET, FILM COATED ORAL at 20:27

## 2024-06-17 RX ADMIN — ACETAMINOPHEN 650 MG: 325 TABLET ORAL at 22:39

## 2024-06-17 RX ADMIN — ISOSORBIDE DINITRATE 20 MG: 20 TABLET ORAL at 09:40

## 2024-06-17 RX ADMIN — ACETAMINOPHEN 650 MG: 325 TABLET ORAL at 09:41

## 2024-06-17 RX ADMIN — GABAPENTIN 300 MG: 300 CAPSULE ORAL at 14:37

## 2024-06-17 RX ADMIN — GABAPENTIN 300 MG: 300 CAPSULE ORAL at 01:59

## 2024-06-17 RX ADMIN — SODIUM CHLORIDE, PRESERVATIVE FREE 10 ML: 5 INJECTION INTRAVENOUS at 09:47

## 2024-06-17 RX ADMIN — FOSAMPRENAVIR CALCIUM 1400 MG: 700 TABLET, COATED ORAL at 20:26

## 2024-06-17 ASSESSMENT — PAIN SCALES - GENERAL
PAINLEVEL_OUTOF10: 10
PAINLEVEL_OUTOF10: 3
PAINLEVEL_OUTOF10: 0
PAINLEVEL_OUTOF10: 0
PAINLEVEL_OUTOF10: 9
PAINLEVEL_OUTOF10: 0
PAINLEVEL_OUTOF10: 10

## 2024-06-17 ASSESSMENT — PAIN - FUNCTIONAL ASSESSMENT
PAIN_FUNCTIONAL_ASSESSMENT: PREVENTS OR INTERFERES SOME ACTIVE ACTIVITIES AND ADLS

## 2024-06-17 ASSESSMENT — PAIN DESCRIPTION - ORIENTATION
ORIENTATION: RIGHT
ORIENTATION: LEFT

## 2024-06-17 ASSESSMENT — PAIN DESCRIPTION - DESCRIPTORS
DESCRIPTORS: ACHING
DESCRIPTORS: ACHING
DESCRIPTORS: SPASM
DESCRIPTORS: THROBBING

## 2024-06-17 ASSESSMENT — PAIN DESCRIPTION - LOCATION
LOCATION: FOOT
LOCATION: LEG

## 2024-06-17 NOTE — PROGRESS NOTES
Spoke with patient in attendance with Dr. Spears, explained to patient that he is doing very well from a therapy perspective and that likelihood of approving IPR is not favorable.  Patient states that he feels safe when medically ready to return to home with home health therapies.  Primary RN Courtney and MAREK Dominique updated on this conversation.      Spoke with patient sister Eri, updated her on the above conversation.      Recommend home with home health services.

## 2024-06-17 NOTE — PROGRESS NOTES
Cardiology Progress Note      Patient:  David Vaca  YOB: 1965  MRN: [id number]   Acct: [id number]  Admit Date:  6/15/2024  Primary Cardiologist: Maikel Carroll MD  Seen by Dr. Holloway    Per prior cardiology consult note-  REFERRING PHYSICIAN:  Dr. Lerner        REASON FOR CONSULTATION:  Shortness of breath and nonspecific chest pain.     HISTORY OF PRESENT ILLNESS:  This is a pleasant 58-year-old gentleman who is not the best historian, who is somehow hard of hearing.  Apparently, he was transferred from rehab floor after he experienced symptoms of weakness, fatigue, shortness of breath yesterday.  The patient is up there recovering from an aortofemoral bypass from few weeks ago when he was admitted, was found to have a distal aortic thrombus and significant peripheral vascular disease for which he underwent bypass and was in rehab recovering from that.  The patient is not aware of any previous cardiac history.  He had a stress test about 6 months ago showing no ischemia with no previous cardiac catheterization.  He had some symptoms of shortness of breath, however, denies previous chest pain.  Has had atypical chest pain since he has been up here.  We were consulted to assist in management of the patient.       Subjective (Events in last 24 hours):   Pt up in chair   Stated he walked around the unit this am - felt tired after walking   No chest pain or Sob co     VSS  Tele SR no ectopy     Abd dressing with yellow drainage   LE warm and dry - no pains per pt     Objective:   BP (!) 101/54   Pulse 73   Temp 97.8 °F (36.6 °C) (Oral)   Resp 18   Ht 1.676 m (5' 5.98\")   Wt 63.4 kg (139 lb 12.8 oz)   SpO2 100%   BMI 22.58 kg/m²        TELEMETRY: SR no ectopy     Physical Exam:  General Appearance: alert and oriented to person, place and time, in no acute distress  Cardiovascular: normal rate, regular rhythm, normal S1 and S2, no murmurs, rubs, clicks, or gallops, distal pulses

## 2024-06-17 NOTE — PLAN OF CARE
Problem: Chronic Conditions and Co-morbidities  Goal: Patient's chronic conditions and co-morbidity symptoms are monitored and maintained or improved  Outcome: Progressing  Flowsheets (Taken 6/17/2024 0033)  Care Plan - Patient's Chronic Conditions and Co-Morbidity Symptoms are Monitored and Maintained or Improved: Monitor and assess patient's chronic conditions and comorbid symptoms for stability, deterioration, or improvement     Problem: Discharge Planning  Goal: Discharge to home or other facility with appropriate resources  Outcome: Progressing  Flowsheets (Taken 6/17/2024 0033)  Discharge to home or other facility with appropriate resources:   Identify barriers to discharge with patient and caregiver   Identify discharge learning needs (meds, wound care, etc)     Problem: Safety - Adult  Goal: Free from fall injury  Outcome: Progressing  Flowsheets (Taken 6/17/2024 0033)  Free From Fall Injury: Based on caregiver fall risk screen, instruct family/caregiver to ask for assistance with transferring infant if caregiver noted to have fall risk factors     Problem: Skin/Tissue Integrity  Goal: Absence of new skin breakdown  Description: 1.  Monitor for areas of redness and/or skin breakdown  2.  Assess vascular access sites hourly  3.  Every 4-6 hours minimum:  Change oxygen saturation probe site  4.  Every 4-6 hours:  If on nasal continuous positive airway pressure, respiratory therapy assess nares and determine need for appliance change or resting period.  Outcome: Progressing  Note: Continued assessing for new skin breakdown       Problem: ABCDS Injury Assessment  Goal: Absence of physical injury  Outcome: Progressing  Flowsheets (Taken 6/17/2024 0033)  Absence of Physical Injury: Implement safety measures based on patient assessment

## 2024-06-17 NOTE — PROGRESS NOTES
[facility]  INPATIENT REHABILITATION  ADMISSIONS COORDINATOR CONSULT    Referral Type: internal    Patient Name: David Vaca      MRN: [id number]    : 1965  (58 y.o.)  Gender: male   Race:White (non-)     Payor Source: Payor: [facility] MEDICARE / Plan: ANTHNARCISA DUAL ADVANTAGE / Product Type: *No Product type* /   Secondary Payor Source:  MEDICAID [place]    Isolation Status: C Diff Contact    Lives With: Alone  Type of Home: Apartment  Home Layout: One level  Home Access: Level entry, Stairs to enter without rails  Entrance Stairs - Number of Steps: 1, 2-3\" step to enter home     Occupation: On disability  Type of Occupation: [employer], [employer], [employer];  Additional Comments: Patient independent at [facility], only used rollator for a day prior to coming to hospital. Pt used electric cart in larger grocery stores. 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    What is treatment plan?  Disciplines Required upon Admission to Inpatient Rehabilitation: Physical Therapy and Occupational Therapy  Post operative: Yes  Fall: Yes  Dialysis: No  Diet: ADULT DIET; Regular; 5 carb choices (75 gm/meal); Low Fat/Low Chol/High Fiber/2 gm Na  Discussed patient with  and PM&R provider:  Patient is known to writer as the patient was recently admitted to [facility] [place].  Will visit the patient today to discuss discharge planning.

## 2024-06-17 NOTE — PROGRESS NOTES
[facility]  INPATIENT PHYSICAL THERAPY  EVALUATION  [facility] 3B-25/025-A    Time In: 0720  Time Out: 0739  Timed Code Treatment Minutes: 8 Minutes  Minutes: 19          Date: 2024  Patient Name: David Vaca,  Gender:  male        MRN: [id number]  : 1965  (58 y.o.)      Referring Practitioner: JAVAD Aparicio CNP  Diagnosis: elevated troponin  Additional Pertinent Hx: 58 y.o. male admitted to the hospitalist service for elevated troponin. Cardiology evaluated, not planning any intervention. Patient denies chest pain. He denies SOB. He reports vomiting. Acute cystitis with hematuria: UA positive nitrite, moderate leukocyte, hematuria, many bacteria. Pt has recent history for Aortic occlusion (POA): S/p aortobifemoral bypass, lower extremity thrombectomy on 2024 by Dr. Montague.     Restrictions/Precautions:  Restrictions/Precautions: Weight Bearing, General Precautions  Left Lower Extremity Weight Bearing: Weight Bearing As Tolerated  Position Activity Restriction  Other position/activity restrictions: No heavy pushing or pulling or heavy lifting (no more than 10 pounds) for 4 weeks. : Close of Left lateral fasciotomy : Closure of left medial fasciotomy and Creation of myocutaneous flap of left lower extremity. Placement of wound vac. : S/p Aorto-bifemoral bypass with infra-renal clamp, lower extremity fasciotomy, LLE thrombectomy.  Pt has AFO for slight left sided foot drop. Ok to shower (per vascular MD).    Subjective:  Chart Reviewed: Yes  Patient assessed for rehabilitation services?: Yes  Subjective: pleasant and cooperative    General:     Vision: Impaired  Vision Exceptions: Wears glasses for reading  Hearing: Exceptions to WFL  Hearing Exceptions: Hard of hearing/hearing concerns, Bilateral hearing aid       Pain: 8/10: RLE per pt    Vitals: Vitals not assessed per clinical judgement, see nursing flowsheet    Social/Functional History:    Lives With:  and return pt to PLOF.     Therapy Prognosis: Good    Requires PT Follow-Up: Yes    Discharge Recommendations:  Discharge Recommendations: Continue to assess pending progress    Patient Education:      .    Patient Education  Education Given To: Patient  Education Provided: Role of Therapy, Plan of Care, Transfer Training  Education Method: Verbal, Demonstration  Barriers to Learning: None  Education Outcome: Verbalized understanding, Demonstrated understanding, Continued education needed       Equipment Recommendations:  Equipment Needed: Yes  Mobility Devices: Walker  Walker: Rolling, Rollator (4 Wheeled)    Plan:  Current Treatment Recommendations: Strengthening, Balance training, Functional mobility training, Transfer training, Endurance training, Gait training, Stair training, Neuromuscular re-education, Home exercise program, Safety education & training, Patient/Caregiver education & training, Equipment evaluation, education, & procurement, Therapeutic activities, Pain management  General Plan:  (5X GM)    Goals:  Patient Goals : go home, less leg pain  Short Term Goals  Time Frame for Short Term Goals: by discharge  Short Term Goal 1: Patient will complete supine < > sit and rolling right and left with head of bed flat and modified independence to transfer in and out of bed safely.  Short Term Goal 2: Patient will complete sit < > stand with MOD I to stand to ambulate safely.  Short Term Goal 3: Patient will ambulate 150' with a RW with MOD I to progress towards navigating home safely.  Short Term Goal 4: Patient will ascend/descend 1step with a RW with MOD I to prepare for safe home entry.  Long Term Goals  Time Frame for Long Term Goals : no LTGs set secondary to short ELOS    Following session, patient left in safe position with all fall risk precautions in place.

## 2024-06-17 NOTE — PLAN OF CARE
Problem: Chronic Conditions and Co-morbidities  Goal: Patient's chronic conditions and co-morbidity symptoms are monitored and maintained or improved  Outcome: Progressing  Flowsheets (Taken 6/17/2024 0930)  Care Plan - Patient's Chronic Conditions and Co-Morbidity Symptoms are Monitored and Maintained or Improved: Monitor and assess patient's chronic conditions and comorbid symptoms for stability, deterioration, or improvement     Problem: Discharge Planning  Goal: Discharge to home or other facility with appropriate resources  Outcome: Progressing  Flowsheets (Taken 6/17/2024 0930)  Discharge to home or other facility with appropriate resources: Identify barriers to discharge with patient and caregiver     Problem: Safety - Adult  Goal: Free from fall injury  Outcome: Progressing     Problem: Skin/Tissue Integrity  Goal: Absence of new skin breakdown  Description: 1.  Monitor for areas of redness and/or skin breakdown  2.  Assess vascular access sites hourly  3.  Every 4-6 hours minimum:  Change oxygen saturation probe site  4.  Every 4-6 hours:  If on nasal continuous positive airway pressure, respiratory therapy assess nares and determine need for appliance change or resting period.  Outcome: Progressing     Problem: ABCDS Injury Assessment  Goal: Absence of physical injury  Outcome: Progressing     Problem: Respiratory - Adult  Goal: Clear lung sounds  Description: Clear lung sounds  6/17/2024 0855 by Adalid Rajput, [facility]  Outcome: Progressing     Problem: Pain  Goal: Verbalizes/displays adequate comfort level or baseline comfort level  Outcome: Progressing     Problem: Cardiovascular - Adult  Goal: Maintains optimal cardiac output and hemodynamic stability  Outcome: Progressing  Flowsheets (Taken 6/17/2024 0930)  Maintains optimal cardiac output and hemodynamic stability: Monitor blood pressure and heart rate     Problem: Cardiovascular - Adult  Goal: Absence of cardiac dysrhythmias or at  baseline  Outcome: Progressing     Problem: Musculoskeletal - Adult  Goal: Return mobility to safest level of function  Outcome: Progressing  Flowsheets (Taken 6/17/2024 0930)  Return Mobility to Safest Level of Function: Assess patient stability and activity tolerance for standing, transferring and ambulating with or without assistive devices     Problem: Infection - Adult  Goal: Absence of infection at discharge  Outcome: Progressing  Flowsheets (Taken 6/17/2024 0930)  Absence of infection at discharge: Assess and monitor for signs and symptoms of infection     Problem: Infection - Adult  Goal: Absence of infection during hospitalization  Outcome: Progressing  Flowsheets (Taken 6/17/2024 0930)  Absence of infection during hospitalization: Assess and monitor for signs and symptoms of infection     Problem: Metabolic/Fluid and Electrolytes - Adult  Goal: Electrolytes maintained within normal limits  Outcome: Progressing  Flowsheets (Taken 6/17/2024 0930)  Electrolytes maintained within normal limits: Monitor labs and assess patient for signs and symptoms of electrolyte imbalances   Care plan reviewed with patient, patient voiced understanding of the care plan.

## 2024-06-17 NOTE — CARE COORDINATION
Case Management Assessment Initial Evaluation    Date/Time of Evaluation: 2024 7:50 AM  Assessment Completed by: Catina Snyder RN    If patient is discharged prior to next notation, then this note serves as note for discharge by case management.    Patient Name: David Vaca                   YOB: 1965  Diagnosis: Elevated troponin [R79.89]                   Date / Time: 6/15/2024  6:30 PM  Location: [address] [place] [address]     Patient Admission Status: Observation   Readmission Risk Low 0-14, Mod 15-19), High > 20: Readmission Risk Score: 25.9    Current PCP: Sandi Choudhary, APRN - CNP    Additional Case Management Notes: Transferred from [facility] due to \"flu like symptoms\". Positive Orthostatics. Troponin 77. Consulted Cardiology. Plan to manage medically currently. PM&R consulted as pt wants to return to [facility]. Heparin gtt. Rocephin iv daily. PT/OT/SLP. GI panel negative.     Procedures:    Echo: ordered 6/15    Imagin/15 CXR: nothing acute.     Patient Goals/Plan/Treatment Preferences: Pt was originally from home alone. He was readmitted from [facility], plans to return there at discharge if they will accept.

## 2024-06-17 NOTE — PROGRESS NOTES
[facility]  INPATIENT OCCUPATIONAL THERAPY  [facility] CCU-STEPDOWN 3B  EVALUATION    Time:   Time In: 950  Time Out: 1038  Timed Code Treatment Minutes: 38 Minutes  Minutes: 48          Date: 2024  Patient Name: David Vaca,   Gender: male      MRN: [id number]  : 1965  (58 y.o.)  Referring Practitioner: Henrietta Aparicio APRN - CNP  Diagnosis: elevated troponin  Additional Pertinent Hx: 58 y.o. male admitted to the hospitalist service for elevated troponin. Cardiology evaluated, not planning any intervention. Patient denies chest pain. He denies SOB. He reports vomiting. Acute cystitis with hematuria: UA positive nitrite, moderate leukocyte, hematuria, many bacteria. Pt has recent history for Aortic occlusion (POA): S/p aortobifemoral bypass, lower extremity thrombectomy on 2024 by Dr. Montague.    Restrictions/Precautions:  Restrictions/Precautions: Weight Bearing, General Precautions  Left Lower Extremity Weight Bearing: Weight Bearing As Tolerated  Position Activity Restriction  Other position/activity restrictions: No heavy pushing or pulling or heavy lifting (no more than 10 pounds) for 4 weeks. : Close of Left lateral fasciotomy : Closure of left medial fasciotomy and Creation of myocutaneous flap of left lower extremity. Placement of wound vac. : S/p Aorto-bifemoral bypass with infra-renal clamp, lower extremity fasciotomy, LLE thrombectomy.  Pt has AFO for slight left sided foot drop. Ok to shower (per vascular MD).    Subjective  Chart Reviewed: Yes, Orders, Progress Notes, History and Physical, Operative Notes, Previous Admission    Nurse approved OT eval. Pt in bed on OT arrival, pleasant and cooperative. Pt is agreeable to OT eval. He verbalizes he does have his AFO here but is unable to state purpose of AFO or when he should use it. (Education provided)     Pain: does not c/o pain    Vitals: Vitals not assessed per clinical judgement, see nursing  Pt will safely navigate RW within room to retreive 3 items with Mod I for walker safety to improve safety and ability to retrieve clothing from closet.  Short Term Goal 4: Pt complete tub/shower transfers using least restrictive AE with supervision to improve ability to shower in home tub/shower  Short Term Goal 5: Pt will demo indep with LB dressing including AFO adn zander hose to improve indep with ADLs and reduce risk for falls.    AM-PAC Inpatient Daily Activity Raw Score: 22  AM-PAC Inpatient ADL T-Scale Score : 47.1    Following session, patient left in safe position with all fall risk precautions in place.

## 2024-06-17 NOTE — PROGRESS NOTES
Hospitalist Progress Note    Patient:  David Vaca      Unit/Bed:3B-25/025-A    YOB: 1965    MRN: [id number]       Acct: [id number]     PCP: Sandi Choudhary APRN - CNP    Date of Admission: 6/15/2024    Assessment/Plan:    Elevated troponin:   Cardiology consulted, not planning intervention, manage medically at this time  Troponin trend flat  Heparin gtt discontinued  Continue telemetry  Echo ordered  Diet ordered     Hypomagnesemia, severe:   Continue magnesium replacement protocol  Continue Telemetry  Holding PPI     Flu-like symptoms: Complains of nausea, emesis, fatigue, generalized weakness, dizziness, SOB. Likely caused by above.   COVID/Influenza negative  GI panel & C. diff stool screens negative     Acute cystitis with hematuria: UA positive nitrite, moderate leukocyte, hematuria, many bacteria.   Continue Rocephin   Gentle IVF  Follow cultures and sensitivity report     Aortic occlusion (POA): S/p aortobifemoral bypass, lower extremity thrombectomy on 5/31/2024 by Dr. Montague.  Continue ASA, statin  Pain control: Voltaren gel, Scheduled Tylenol, PRN Flexeril, Oxycodone, Tramadol  May apply heat to groin/hip area for comfort PRN  Dressing changes as ordered  Bilateral knee high NICK hose     Macrocytic anemia, chronic: Hgb 8.1 and stable. No overt signs of bleeding.   Transfuse if Hgb <7 or hemodynamically unstable  CBC in AM     COPD:   Continue Stiolto     HTN:   Continue isosorbide, metoprolol     NIDDMII with neuropathy: 6/10/2024 Hgb A1c 5.8. Not on any home medications.   Blood glucose ACHS  Low dose SSI  Continue gabapentin  Hypoglycemic protocol  Carb control diet     HLD:   Lipid panel in AM  Continue atorvastatin, fenofibrate     HIV:   Continue Lexiva, Combivir      LDA: []CVC / []PICC / []Midline / []Valle / []Drains / []Mediport / [x]None  Antibiotics: Yes  Steroids: No  Labs (still needed?): [x]Yes / []No  IVF (still needed?): []Yes / [x]No    Level of care: [x]Step  PORTABLE   Final Result   1. No acute disease.      This document has been electronically signed by: Thang Mack M.D. on    06/16/2024 12:33 AM          Diet: ADULT DIET; Regular; 5 carb choices (75 gm/meal); Low Fat/Low Chol/High Fiber/2 gm Na      Code Status: Full Code      Electronically signed by Benedict Walden PA-C on 6/17/2024 at 9:54 AM

## 2024-06-17 NOTE — DISCHARGE SUMMARY
[facility]  Speech Therapy  Discharge Note  Date: 2024  Patient Name: David Vaca        MRN: [id number]   : 1965  (58 y.o.)  Gender: male       ASSESSMENT:  Discharge Recommendations: Pending medical progress  Plan: Discharged to acute floor    See last note for details related to goals.  All goals unmet secondary to unexpected discharge.    Pricilla Coates M.S., CF-SLP, [id number]

## 2024-06-17 NOTE — PLAN OF CARE
Problem: Respiratory - Adult  Goal: Clear lung sounds  Description: Clear lung sounds  Outcome: Progressing   Patient mutually agreed on goals.

## 2024-06-17 NOTE — PROGRESS NOTES
[facility]  SPEECH THERAPY  STR[facility] CCU-STEPDOWN 3B  Speech - Language - Cognitive Evaluation    SLP Individual Minutes  Time In: 0857  Time Out: 0916  Minutes: 19  Timed Code Treatment Minutes: 0 Minutes       Date: 2024  Patient Name: David Vaca      CSN: [id number]   : 1965  (58 y.o.)  Gender: male   Referring Physician:  Darrian Spears MD  Diagnosis: Elevated troponin   Precautions: Fall Risk, Seizure   History of Present Illness/Injury: Patient admitted with above diagnosis. Per chart review, \"David Vaca is a 58 y.o. right-handed  male with history of HIV infection, hypertension, kidney stone, hyperlipidemia, GERD, COPD/emphysema, questionable diabetes mellitus, depression, status post bilateral inguinal hernia repair, status post left foot foreign body (needle) surgical removal, chronic low back pain requiring multiple interventional pain management procedures, was admitted to [facility] on 6/15/2024 for elevated troponin and EKG abnormality suggesting of possible posterior infarct.     The patient says he developed sudden onset legs weakness causing him to fall with left leg numbness and pain on 2024 when he was outside his apartment.  He denies hitting his head or loss of consciousness.  He says his neighbor helped him to get up.  On 2024 he also noticed the left leg was cool to touch.  Therefore 911 was called.  The patient was brought to [facility] ER for evaluation by EMS on 2024.  CT of head done on 2024 showed no acute process.  CTA of abdominal aorta with bilateral runoff was performed on 2024 and revealed extensive acute thrombus extending from level of renal arteries in the aorta and continuing down to the abdominal aorta into both common iliac arteries, with majority of left iliac arteries, superficial femoral arteries, popliteal arteries and trifurcation arteries completely occluded with thrombus.  Education Related to Potential Risks and Complications Due to Impairment/Illness/Injury, and Education Related to Health Promotion and Wellness  Evaluation of Education: Verbalizes understanding and Needs further instruction    PLAN:  Skilled SLP intervention on acute care 3-5 x per week or until goals met and/or patient plateaus in function.  Specific interventions for next session may include: executive functioning, reasoning, thought organization.    PATIENT GOAL:    Return to prior level of function.    SHORT TERM GOALS:  Short Term Goals  Time Frame for Short Term Goals: 2 weeks  Goal 1: Patient will complete reasoning and problem solving tasks with 80% accuracy, mod cues to improve logical thinking needed for time/money management, and insight/judgement for safe behavior.  Goal 2: Patient will complete thought organization, sequencing and executive functioning tasks with 80% accuracy, mod cues to improve planning, organization and execution of tasks (including daily scheduling, medication management.  Goal 3: Patient will complete complex attention tasks (sustained, selective, alternating, divided) with no more than 5 errors/redirections in order to resume multi-tasking within ADL/IADL completion and make safe returnt to active driving.    LONG TERM GOALS:  No long term goals recommended d/t short ELOS;tg    Dayanna Carrero M.A., CCC-SLP [id number]

## 2024-06-17 NOTE — PROGRESS NOTES
Chief Complaint   Patient presents with   • Sleep Problem     One year follow up/RICHY       Visit Vitals  /72 (BP Location: LUE - Left upper extremity, Patient Position: Sitting, Cuff Size: Large Adult)   Pulse 75   Resp 20   Ht 5' 3\" (1.6 m)   Wt 99.3 kg   SpO2 95%   BMI 38.79 kg/m²       HISTORY OF PRESENT ILLNESS     HPI  Here for RICHY follow up and cpap management after receiving a new cpap. Patient wears AutoPAP 6 to 12 cmH20 every night, denies a large amount of leak. She is tolerating new cpap and mask much better.  She was in the hospital in the last few months due to pneumonia. Patient has improved fatigue, daytime somnolence, and improved sleep since using CPAP. Wells: 6/24.    PAST MEDICAL, FAMILY AND SOCIAL HISTORY     Patient Active Problem List   Diagnosis   • Essential hypertension, benign   • Asthma   • RICHY (obstructive sleep apnea)   • Diabetes (CMS/[facility])   • Hemorrhoids   • Polymyalgia rheumatica (CMS/[facility])   • Glaucoma suspect of both eyes   • Bilateral dry eyes   • Blepharitis of upper and lower eyelids of both eyes   • Pseudophakia   • Posterior capsular opacification of both eyes, obscuring vision   • Type 2 diabetes mellitus without complication, without long-term current use of insulin (CMS/[facility])   • Acute UTI   • Likely TIA (transient ischemic attack)   • CAP (community acquired pneumonia)   • Acute respiratory failure with hypoxia (CMS/[facility])   • Sepsis (CMS/[facility])   • Diastolic dysfunction   • MCI (mild cognitive impairment)     I have personally reviewed and updated the following EPIC sections: Current medications, Allergies, Problem list, Past Medical History and Social History      REVIEW OF SYSTEMS     Review of Systems   Constitutional: Negative for fatigue, fever and chills.   HENT: Negative for ear pain, nosebleeds, congestion, sore throat, postnasal drip and sinus pressure.    Respiratory: History of apnea. Negative for cough, chest tightness and wheezing.    Cardiovascular:  Negative for chest pain and leg swelling.   Gastrointestinal: Negative for bloating, nausea, vomiting  Genitourinary: Negative for dysuria and frequency.   Musculoskeletal: Negative for myalgias and back pain.   Skin: Negative for rash and wound.   Psychiatric/Behavioral: Negative for sleep disturbance, depression, anxiety.    PHYSICAL EXAM     Physical Exam   Constitutional: Oriented to person, place, and time. Well-developed and well-nourished. Normal mood and affect. Behavior is normal.   Cardiovascular: Normal rate, regular rhythm and normal heart sounds. No lower extremity edema.     Pulmonary/Chest: Effort normal and breath sounds clear to auscultation. No wheezes.  Lymphadenopathy: No cervical or clavicular adenopathy.   Skin: Skin is warm and dry. No rash noted.   Auto/CPAP download not available    ASSESSMENT/PLAN     ASSESSMENT:  1. RICHY (obstructive sleep apnea)        PLAN:  Patient is compliant with CPAP usage and is reporting benefits of improved sleep, decreased fatigue.  Reviewed schedule of cleaning equipment.  Reviewed schedule of changing equipment.  Return in about 1 year (around 8/19/2021) for RICHY.       MET  Long Term Goal 4: Patient will ascend/descend 1, 4\" step with a RW with modified independence to access/leave home safely. GOAL NOT MET  Long Term Goal 5: Patient will complete car transfer with modified independence to transfer in and out of vehicle safely. GOAL NOT MET  Additional Goals?: Yes  Long term goal 6: Patient will increase tinetti to greater than or equal to 24/28 to reduce his risk for falls. GOAL NOT MET

## 2024-06-18 VITALS
SYSTOLIC BLOOD PRESSURE: 140 MMHG | TEMPERATURE: 98.4 F | HEIGHT: 66 IN | DIASTOLIC BLOOD PRESSURE: 78 MMHG | HEART RATE: 77 BPM | RESPIRATION RATE: 18 BRPM | OXYGEN SATURATION: 97 % | BODY MASS INDEX: 22.39 KG/M2 | WEIGHT: 139.33 LBS

## 2024-06-18 LAB — GLUCOSE BLD STRIP.AUTO-MCNC: 93 MG/DL (ref 70–108)

## 2024-06-18 PROCEDURE — 6370000000 HC RX 637 (ALT 250 FOR IP)

## 2024-06-18 PROCEDURE — 97530 THERAPEUTIC ACTIVITIES: CPT

## 2024-06-18 PROCEDURE — 94640 AIRWAY INHALATION TREATMENT: CPT

## 2024-06-18 PROCEDURE — 99239 HOSP IP/OBS DSCHRG MGMT >30: CPT | Performed by: PHYSICIAN ASSISTANT

## 2024-06-18 PROCEDURE — 94761 N-INVAS EAR/PLS OXIMETRY MLT: CPT

## 2024-06-18 PROCEDURE — 2580000003 HC RX 258

## 2024-06-18 PROCEDURE — 82948 REAGENT STRIP/BLOOD GLUCOSE: CPT

## 2024-06-18 PROCEDURE — 6370000000 HC RX 637 (ALT 250 FOR IP): Performed by: NURSE PRACTITIONER

## 2024-06-18 PROCEDURE — 97110 THERAPEUTIC EXERCISES: CPT

## 2024-06-18 PROCEDURE — 6360000002 HC RX W HCPCS: Performed by: PHYSICIAN ASSISTANT

## 2024-06-18 PROCEDURE — 97116 GAIT TRAINING THERAPY: CPT

## 2024-06-18 PROCEDURE — 6370000000 HC RX 637 (ALT 250 FOR IP): Performed by: FAMILY MEDICINE

## 2024-06-18 RX ORDER — ASPIRIN 81 MG/1
81 TABLET, CHEWABLE ORAL DAILY
Qty: 30 TABLET | Refills: 3 | Status: SHIPPED | OUTPATIENT
Start: 2024-06-19

## 2024-06-18 RX ORDER — CEFDINIR 300 MG/1
300 CAPSULE ORAL 2 TIMES DAILY
Qty: 10 CAPSULE | Refills: 0 | Status: SHIPPED | OUTPATIENT
Start: 2024-06-18 | End: 2024-06-23

## 2024-06-18 RX ORDER — ATORVASTATIN CALCIUM 80 MG/1
80 TABLET, FILM COATED ORAL NIGHTLY
Qty: 30 TABLET | Refills: 3 | Status: SHIPPED | OUTPATIENT
Start: 2024-06-18

## 2024-06-18 RX ORDER — TAMSULOSIN HYDROCHLORIDE 0.4 MG/1
0.4 CAPSULE ORAL DAILY
Qty: 30 CAPSULE | Refills: 3 | Status: SHIPPED | OUTPATIENT
Start: 2024-06-19

## 2024-06-18 RX ADMIN — DICLOFENAC SODIUM 2 G: 10 GEL TOPICAL at 09:02

## 2024-06-18 RX ADMIN — ISOSORBIDE DINITRATE 20 MG: 20 TABLET ORAL at 09:03

## 2024-06-18 RX ADMIN — METOPROLOL TARTRATE 25 MG: 25 TABLET, FILM COATED ORAL at 09:03

## 2024-06-18 RX ADMIN — Medication 1 CAPSULE: at 09:03

## 2024-06-18 RX ADMIN — FLUOXETINE HYDROCHLORIDE 20 MG: 20 CAPSULE ORAL at 09:03

## 2024-06-18 RX ADMIN — TAMSULOSIN HYDROCHLORIDE 0.4 MG: 0.4 CAPSULE ORAL at 09:03

## 2024-06-18 RX ADMIN — TIOTROPIUM BROMIDE AND OLODATEROL 2 PUFF: 3.124; 2.736 SPRAY, METERED RESPIRATORY (INHALATION) at 08:17

## 2024-06-18 RX ADMIN — DOCUSATE SODIUM 100 MG: 100 CAPSULE, LIQUID FILLED ORAL at 09:03

## 2024-06-18 RX ADMIN — ASPIRIN 81 MG 81 MG: 81 TABLET ORAL at 09:03

## 2024-06-18 RX ADMIN — LAMIVUDINE AND ZIDOVUDINE 1 TABLET: 150; 300 TABLET, FILM COATED ORAL at 09:05

## 2024-06-18 RX ADMIN — SODIUM CHLORIDE, PRESERVATIVE FREE 10 ML: 5 INJECTION INTRAVENOUS at 09:03

## 2024-06-18 RX ADMIN — ACETAMINOPHEN 650 MG: 325 TABLET ORAL at 09:07

## 2024-06-18 RX ADMIN — ENOXAPARIN SODIUM 30 MG: 100 INJECTION SUBCUTANEOUS at 09:03

## 2024-06-18 RX ADMIN — GABAPENTIN 300 MG: 300 CAPSULE ORAL at 05:39

## 2024-06-18 RX ADMIN — FENOFIBRATE 160 MG: 160 TABLET ORAL at 09:03

## 2024-06-18 RX ADMIN — CETIRIZINE HYDROCHLORIDE 10 MG: 10 TABLET ORAL at 09:03

## 2024-06-18 RX ADMIN — ACETAMINOPHEN 650 MG: 325 TABLET ORAL at 04:39

## 2024-06-18 RX ADMIN — Medication 400 MG: at 09:03

## 2024-06-18 RX ADMIN — FOSAMPRENAVIR CALCIUM 1400 MG: 700 TABLET, COATED ORAL at 09:04

## 2024-06-18 ASSESSMENT — PAIN SCALES - GENERAL: PAINLEVEL_OUTOF10: 0

## 2024-06-18 NOTE — PLAN OF CARE
Problem: Chronic Conditions and Co-morbidities  Goal: Patient's chronic conditions and co-morbidity symptoms are monitored and maintained or improved  6/17/2024 2216 by Peg Escudero RN  Outcome: Progressing  Flowsheets (Taken 6/17/2024 0930 by Courtney Bermudez, RN)  Care Plan - Patient's Chronic Conditions and Co-Morbidity Symptoms are Monitored and Maintained or Improved: Monitor and assess patient's chronic conditions and comorbid symptoms for stability, deterioration, or improvement     Problem: Discharge Planning  Goal: Discharge to home or other facility with appropriate resources  6/17/2024 2216 by Peg Escudero RN  Outcome: Progressing  Flowsheets (Taken 6/17/2024 0930 by Courtney Bermudez, RN)  Discharge to home or other facility with appropriate resources: Identify barriers to discharge with patient and caregiver     Problem: Safety - Adult  Goal: Free from fall injury  6/17/2024 2216 by Peg Escudero RN  Outcome: Progressing  Flowsheets (Taken 6/17/2024 0033)  Free From Fall Injury: Based on caregiver fall risk screen, instruct family/caregiver to ask for assistance with transferring infant if caregiver noted to have fall risk factors     Problem: Skin/Tissue Integrity  Goal: Absence of new skin breakdown  Description: 1.  Monitor for areas of redness and/or skin breakdown  2.  Assess vascular access sites hourly  3.  Every 4-6 hours minimum:  Change oxygen saturation probe site  4.  Every 4-6 hours:  If on nasal continuous positive airway pressure, respiratory therapy assess nares and determine need for appliance change or resting period.  6/17/2024 2216 by Peg Escudero RN  Outcome: Progressing     Problem: ABCDS Injury Assessment  Goal: Absence of physical injury  6/17/2024 2216 by Peg Escudero RN  Outcome: Progressing  Flowsheets (Taken 6/17/2024 0033)  Absence of Physical Injury: Implement safety measures based on patient assessment     Problem: Cardiovascular - Adult  Goal:  comfort level  6/17/2024 2216 by Peg Escudero, RN  Outcome: Progressing  Flowsheets (Taken 6/17/2024 2216)  Verbalizes/displays adequate comfort level or baseline comfort level: Encourage patient to monitor pain and request assistance

## 2024-06-18 NOTE — PROGRESS NOTES
AVS reviewed, all questions answered. Order for walker given. Patients brother in law here to transport to private address.

## 2024-06-18 NOTE — CARE COORDINATION
6/18/24, 11:06 AM EDT    Patient goals/plan/ treatment preferences discussed by  and .  Patient goals/plan/ treatment preferences reviewed with patient/ family.  Patient/ family verbalize understanding of discharge plan and are in agreement with goal/plan/treatment preferences.  Understanding was demonstrated using the teach back method.  AVS provided by RN at time of discharge, which includes all necessary medical information pertaining to the patients current course of illness, treatment, post-discharge goals of care, and treatment preferences.     Services At/After Discharge: DME, Home Health, Nursing service, OT, and PT    Pt being discharged home today, with new [facility] for RN (including wound care), PT and OT. Also orders given to pt for new Rollator. [facility]. Notified pt wants blue.     Electronically signed by Catina Snyder RN on 6/18/2024 at 11:07 AM

## 2024-06-18 NOTE — PROGRESS NOTES
Pharmacy Medication History Note      List of current medications patient is taking is complete.    Source of information: Refill history    Changes made to medication list:  Medications removed (include reason, ex. therapy complete or physician discontinued):  none    Medications added/doses adjusted:  Adjusted olanzapine to 20 mg PO daily      Other notes (ex. Recent course of antibiotics, Coumadin dosing):    Denies use of other OTC or herbal medications.    Allergies reviewed    Electronically signed by Harpreet Osborn RPH on 6/18/2024 at 10:28 AM

## 2024-06-18 NOTE — PROGRESS NOTES
[facility]  Recreational Therapy  Discharge Note  Inpatient Rehabilitation Unit         Date:  6/18/2024       Patient Name: David Vaca      MRN: [id number]       YOB: 1965 (58 y.o.)       Gender: male  Diagnosis: Debility  Referring Practitioner: Dr. KELLI Spears    Patient discharged from Recreational Therapy at this time.  See recreational therapy notes for details.    Electronically signed by: Libra Garner, CTRS  Date: 6/18/2024

## 2024-06-18 NOTE — PROGRESS NOTES
[facility]  INPATIENT PHYSICAL THERAPY  DAILY NOTE  STRZ CCU-STEPDOWN 3B - 3B-25/025-A      Time In: 0855  Time Out: 0934  Timed Code Treatment Minutes: 39 Minutes  Minutes: 39          Date: 2024  Patient Name: David Vaca,  Gender:  male        MRN: [id number]  : 1965  (58 y.o.)     Referring Practitioner: JAVAD Aparicio CNP  Diagnosis: elevated troponin  Additional Pertinent Hx: 58 y.o. male admitted to the hospitalist service for elevated troponin. Cardiology evaluated, not planning any intervention. Patient denies chest pain. He denies SOB. He reports vomiting. Acute cystitis with hematuria: UA positive nitrite, moderate leukocyte, hematuria, many bacteria. Pt has recent history for Aortic occlusion (POA): S/p aortobifemoral bypass, lower extremity thrombectomy on 2024 by Dr. Montague.     Prior Level of Function:  Lives With: Alone  Type of Home: Apartment  Home Layout: One level  Home Access: Level entry, Stairs to enter without rails  Entrance Stairs - Number of Steps: 1, 2-3\" step to enter home  Entrance Stairs - Rails: None  Home Equipment: None   Bathroom Shower/Tub: Tub/Shower unit  Bathroom Toilet: Standard  Bathroom Accessibility: Walker accessible    ADL Assistance: Independent  Homemaking Assistance: Independent  Homemaking Responsibilities: Yes  Ambulation Assistance: Independent  Transfer Assistance: Independent  Active : No  Additional Comments: Patient independent at [facility], only used rollator for a day prior to coming to hospital. Pt used electric cart in larger grocery stores. 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    Restrictions/Precautions:  Restrictions/Precautions: Weight Bearing, General Precautions  Left Lower Extremity Weight Bearing: Weight Bearing As Tolerated  Position Activity Restriction  Other position/activity restrictions: No heavy pushing or pulling or heavy lifting (no more than 10 pounds) for 4 weeks. : Close of Left  3: Patient will ambulate 150' with a RW with MOD I to progress towards navigating home safely.  Short Term Goal 4: Patient will ascend/descend 1step with a RW with MOD I to prepare for safe home entry.  Long Term Goals  Time Frame for Long Term Goals : no LTGs set secondary to short ELOS    Following session, patient left in safe position with all fall risk precautions in place.

## 2024-06-18 NOTE — PROGRESS NOTES
Date: 2024  Patient Name: David Vaca        MRN: [id number]    Account Number: [id number]  : 1965  (58 y.o.)  Gender: male   Referring Practitioner: Henrietta Aparicio APRN - CNP  Diagnosis: elevated troponin    David Vaca requires a wheeled walker with a seat to complete bathing, toileting, dressing and grooming tasks. A wheeled walker with a seat is also needed for use during transfer to commode, wheelchair, or bed.  These tasks cannot be completed by utilizing a cane secondary to upper body weakness, unsteady gait and/or the ability to ambulate only short distances by pushing a walker prior to requiring a seated rest break.

## 2024-06-19 ENCOUNTER — TELEPHONE (OUTPATIENT)
Dept: DERMATOLOGY | Age: 59
End: 2024-06-19

## 2024-06-19 NOTE — TELEPHONE ENCOUNTER
Called and spoke with the rehab and they stated that the patient has been discharged to home health and I should call and speak with his sister for more details. Called and spoke with patients sister Eri and advised her that staples should be removed as well as scheduled a follow up appointment for patient.     ----- Message from Yen Montague MD sent at 6/9/2024  6:21 PM EDT -----  He is going to [facility]'s rehab, let them know he can have his sutures in his leg and staples from his abdomen removed in 2 weeks.    If I can see him out in [place] in 1 month that would be great otherwise I would like to see him back out in [place] in 1-2 months

## 2024-06-24 RX ORDER — ISOSORBIDE DINITRATE 20 MG/1
TABLET ORAL
Qty: 60 TABLET | Refills: 11 | OUTPATIENT
Start: 2024-06-24

## 2024-07-10 ENCOUNTER — APPOINTMENT (OUTPATIENT)
Dept: GENERAL RADIOLOGY | Age: 59
DRG: 862 | End: 2024-07-10
Payer: MEDICARE

## 2024-07-10 ENCOUNTER — HOSPITAL ENCOUNTER (INPATIENT)
Age: 59
LOS: 6 days | Discharge: HOME HEALTH | DRG: 862 | End: 2024-07-17
Attending: EMERGENCY MEDICINE | Admitting: NURSE PRACTITIONER
Payer: MEDICARE

## 2024-07-10 ENCOUNTER — APPOINTMENT (OUTPATIENT)
Dept: CT IMAGING | Age: 59
End: 2024-07-10
Payer: MEDICARE

## 2024-07-10 ENCOUNTER — HOSPITAL ENCOUNTER (EMERGENCY)
Age: 59
Discharge: ANOTHER HOSPITAL | End: 2024-07-10
Attending: FAMILY MEDICINE
Payer: MEDICARE

## 2024-07-10 VITALS
DIASTOLIC BLOOD PRESSURE: 58 MMHG | HEART RATE: 108 BPM | HEIGHT: 66 IN | TEMPERATURE: 97.4 F | RESPIRATION RATE: 38 BRPM | OXYGEN SATURATION: 99 % | BODY MASS INDEX: 20.89 KG/M2 | SYSTOLIC BLOOD PRESSURE: 102 MMHG | WEIGHT: 130 LBS

## 2024-07-10 DIAGNOSIS — N17.9 AKI (ACUTE KIDNEY INJURY) (HCC): ICD-10-CM

## 2024-07-10 DIAGNOSIS — E83.42 HYPOMAGNESEMIA: ICD-10-CM

## 2024-07-10 DIAGNOSIS — T88.8XXA FLUID COLLECTION AT SURGICAL SITE, INITIAL ENCOUNTER: ICD-10-CM

## 2024-07-10 DIAGNOSIS — J44.1 COPD EXACERBATION (HCC): ICD-10-CM

## 2024-07-10 DIAGNOSIS — R79.89 ELEVATED TROPONIN: ICD-10-CM

## 2024-07-10 DIAGNOSIS — L02.214 ABSCESS OF GROIN, LEFT: ICD-10-CM

## 2024-07-10 DIAGNOSIS — T81.49XA SURGICAL SITE INFECTION: ICD-10-CM

## 2024-07-10 DIAGNOSIS — A41.9 SEPTICEMIA (HCC): Primary | ICD-10-CM

## 2024-07-10 DIAGNOSIS — Z95.828 S/P AORTO-BIFEMORAL BYPASS SURGERY: ICD-10-CM

## 2024-07-10 LAB
ALBUMIN SERPL BCG-MCNC: 4 G/DL (ref 3.5–5.1)
ALP SERPL-CCNC: 51 U/L (ref 38–126)
ALT SERPL W/O P-5'-P-CCNC: 12 U/L (ref 11–66)
ANION GAP SERPL CALC-SCNC: 20 MEQ/L (ref 8–16)
AST SERPL-CCNC: 24 U/L (ref 5–40)
BACTERIA: ABNORMAL
BASOPHILS ABSOLUTE: 0 THOU/MM3 (ref 0–0.1)
BASOPHILS NFR BLD AUTO: 0.2 %
BILIRUB CONJ SERPL-MCNC: < 0.1 MG/DL (ref 0.1–13.8)
BILIRUB SERPL-MCNC: 0.5 MG/DL (ref 0.3–1.2)
BILIRUB UR QL STRIP: NEGATIVE
BUN SERPL-MCNC: 14 MG/DL (ref 7–22)
CALCIUM SERPL-MCNC: 8.3 MG/DL (ref 8.5–10.5)
CASTS #/AREA URNS LPF: ABNORMAL /LPF
CASTS #/AREA URNS LPF: ABNORMAL /LPF
CHARACTER UR: ABNORMAL
CHARCOAL URNS QL MICRO: ABNORMAL
CHLORIDE SERPL-SCNC: 99 MEQ/L (ref 98–111)
CO2 SERPL-SCNC: 17 MEQ/L (ref 23–33)
COLOR UR: YELLOW
CREAT SERPL-MCNC: 1.4 MG/DL (ref 0.4–1.2)
CRYSTALS URNS QL MICRO: ABNORMAL
DEPRECATED RDW RBC AUTO: 55.4 FL (ref 35–45)
EKG ATRIAL RATE: 118 BPM
EKG P AXIS: 61 DEGREES
EKG P-R INTERVAL: 134 MS
EKG Q-T INTERVAL: 318 MS
EKG QRS DURATION: 94 MS
EKG QTC CALCULATION (BAZETT): 445 MS
EKG R AXIS: 22 DEGREES
EKG T AXIS: 66 DEGREES
EKG VENTRICULAR RATE: 118 BPM
EOSINOPHIL NFR BLD AUTO: 0 %
EOSINOPHILS ABSOLUTE: 0 THOU/MM3 (ref 0–0.4)
EPITHELIAL CELLS, UA: ABNORMAL /HPF
ERYTHROCYTE [DISTWIDTH] IN BLOOD BY AUTOMATED COUNT: 13.3 % (ref 11.5–14.5)
FLUAV RNA RESP QL NAA+PROBE: NOT DETECTED
FLUBV RNA RESP QL NAA+PROBE: NOT DETECTED
GFR SERPL CREATININE-BSD FRML MDRD: 58 ML/MIN/1.73M2
GLUCOSE SERPL-MCNC: 168 MG/DL (ref 70–108)
GLUCOSE UR QL STRIP.AUTO: NEGATIVE MG/DL
HCT VFR BLD AUTO: 35.2 % (ref 42–52)
HGB BLD-MCNC: 12.4 GM/DL (ref 14–18)
HGB UR QL STRIP.AUTO: ABNORMAL
IMM GRANULOCYTES # BLD AUTO: 0.38 THOU/MM3 (ref 0–0.07)
IMM GRANULOCYTES NFR BLD AUTO: 1.6 %
KETONES UR QL STRIP.AUTO: NEGATIVE
LACTIC ACID, SEPSIS: < 0.2 MMOL/L (ref 0.5–1.9)
LEUKOCYTE ESTERASE UR QL STRIP.AUTO: ABNORMAL
LIPASE SERPL-CCNC: 24.4 U/L (ref 5.6–51.3)
LYMPHOCYTES ABSOLUTE: 1.2 THOU/MM3 (ref 1–4.8)
LYMPHOCYTES NFR BLD AUTO: 5 %
MACROCYTES BLD QL SMEAR: PRESENT
MAGNESIUM SERPL-MCNC: < 0.3 MG/DL (ref 1.6–2.4)
MCH RBC QN AUTO: 39.9 PG (ref 26–33)
MCHC RBC AUTO-ENTMCNC: 35.2 GM/DL (ref 32.2–35.5)
MCV RBC AUTO: 113.2 FL (ref 80–94)
MONOCYTES ABSOLUTE: 1.2 THOU/MM3 (ref 0.4–1.3)
MONOCYTES NFR BLD AUTO: 5.1 %
NEUTROPHILS ABSOLUTE: 21.3 THOU/MM3 (ref 1.8–7.7)
NEUTROPHILS NFR BLD AUTO: 88.1 %
NITRITE UR QL STRIP.AUTO: NEGATIVE
NRBC BLD AUTO-RTO: 0 /100 WBC
NT-PROBNP SERPL IA-MCNC: 1206 PG/ML (ref 0–124)
OSMOLALITY SERPL CALC.SUM OF ELEC: 276.3 MOSMOL/KG (ref 275–300)
PH UR STRIP.AUTO: 5.5 [PH] (ref 5–9)
PLATELET # BLD AUTO: 365 THOU/MM3 (ref 130–400)
PMV BLD AUTO: 9.3 FL (ref 9.4–12.4)
POTASSIUM SERPL-SCNC: 3.2 MEQ/L (ref 3.5–5.2)
PROT SERPL-MCNC: 7.4 G/DL (ref 6.1–8)
PROT UR STRIP.AUTO-MCNC: 300 MG/DL
RBC # BLD AUTO: 3.11 MILL/MM3 (ref 4.7–6.1)
RBC #/AREA URNS HPF: ABNORMAL /HPF
RENAL EPI CELLS #/AREA URNS HPF: ABNORMAL /[HPF]
SARS-COV-2 RNA RESP QL NAA+PROBE: NOT DETECTED
SODIUM SERPL-SCNC: 136 MEQ/L (ref 135–145)
SP GR UR REFRACT.AUTO: 1.02 (ref 1–1.03)
TROPONIN, HIGH SENSITIVITY: 59 NG/L (ref 0–12)
TROPONIN, HIGH SENSITIVITY: 70 NG/L (ref 0–12)
UROBILINOGEN UR QL STRIP.AUTO: 0.2 EU/DL (ref 0–1)
WBC # BLD AUTO: 24.2 THOU/MM3 (ref 4.8–10.8)
WBC #/AREA URNS HPF: ABNORMAL /HPF
YEAST LIKE FUNGI URNS QL MICRO: ABNORMAL

## 2024-07-10 PROCEDURE — 84484 ASSAY OF TROPONIN QUANT: CPT

## 2024-07-10 PROCEDURE — 80053 COMPREHEN METABOLIC PANEL: CPT

## 2024-07-10 PROCEDURE — 99285 EMERGENCY DEPT VISIT HI MDM: CPT

## 2024-07-10 PROCEDURE — 74177 CT ABD & PELVIS W/CONTRAST: CPT

## 2024-07-10 PROCEDURE — 6360000002 HC RX W HCPCS: Performed by: STUDENT IN AN ORGANIZED HEALTH CARE EDUCATION/TRAINING PROGRAM

## 2024-07-10 PROCEDURE — 87636 SARSCOV2 & INF A&B AMP PRB: CPT

## 2024-07-10 PROCEDURE — 87040 BLOOD CULTURE FOR BACTERIA: CPT

## 2024-07-10 PROCEDURE — 96365 THER/PROPH/DIAG IV INF INIT: CPT

## 2024-07-10 PROCEDURE — 85025 COMPLETE CBC W/AUTO DIFF WBC: CPT

## 2024-07-10 PROCEDURE — 83690 ASSAY OF LIPASE: CPT

## 2024-07-10 PROCEDURE — 93010 ELECTROCARDIOGRAM REPORT: CPT | Performed by: INTERNAL MEDICINE

## 2024-07-10 PROCEDURE — 94640 AIRWAY INHALATION TREATMENT: CPT

## 2024-07-10 PROCEDURE — 83605 ASSAY OF LACTIC ACID: CPT

## 2024-07-10 PROCEDURE — 83880 ASSAY OF NATRIURETIC PEPTIDE: CPT

## 2024-07-10 PROCEDURE — 2580000003 HC RX 258: Performed by: STUDENT IN AN ORGANIZED HEALTH CARE EDUCATION/TRAINING PROGRAM

## 2024-07-10 PROCEDURE — 96375 TX/PRO/DX INJ NEW DRUG ADDON: CPT

## 2024-07-10 PROCEDURE — 83735 ASSAY OF MAGNESIUM: CPT

## 2024-07-10 PROCEDURE — 6360000004 HC RX CONTRAST MEDICATION: Performed by: EMERGENCY MEDICINE

## 2024-07-10 PROCEDURE — 96366 THER/PROPH/DIAG IV INF ADDON: CPT

## 2024-07-10 PROCEDURE — 93005 ELECTROCARDIOGRAM TRACING: CPT | Performed by: STUDENT IN AN ORGANIZED HEALTH CARE EDUCATION/TRAINING PROGRAM

## 2024-07-10 PROCEDURE — 71275 CT ANGIOGRAPHY CHEST: CPT

## 2024-07-10 PROCEDURE — 96368 THER/DIAG CONCURRENT INF: CPT

## 2024-07-10 PROCEDURE — 82248 BILIRUBIN DIRECT: CPT

## 2024-07-10 PROCEDURE — 96374 THER/PROPH/DIAG INJ IV PUSH: CPT

## 2024-07-10 PROCEDURE — 6370000000 HC RX 637 (ALT 250 FOR IP): Performed by: STUDENT IN AN ORGANIZED HEALTH CARE EDUCATION/TRAINING PROGRAM

## 2024-07-10 PROCEDURE — 81001 URINALYSIS AUTO W/SCOPE: CPT

## 2024-07-10 PROCEDURE — 36415 COLL VENOUS BLD VENIPUNCTURE: CPT

## 2024-07-10 RX ORDER — IPRATROPIUM BROMIDE AND ALBUTEROL SULFATE 2.5; .5 MG/3ML; MG/3ML
2 SOLUTION RESPIRATORY (INHALATION) ONCE
Status: COMPLETED | OUTPATIENT
Start: 2024-07-10 | End: 2024-07-10

## 2024-07-10 RX ORDER — MAGNESIUM SULFATE IN WATER 40 MG/ML
2000 INJECTION, SOLUTION INTRAVENOUS ONCE
Status: COMPLETED | OUTPATIENT
Start: 2024-07-10 | End: 2024-07-10

## 2024-07-10 RX ORDER — ONDANSETRON 2 MG/ML
4 INJECTION INTRAMUSCULAR; INTRAVENOUS ONCE
Status: COMPLETED | OUTPATIENT
Start: 2024-07-10 | End: 2024-07-10

## 2024-07-10 RX ORDER — 0.9 % SODIUM CHLORIDE 0.9 %
500 INTRAVENOUS SOLUTION INTRAVENOUS ONCE
Status: COMPLETED | OUTPATIENT
Start: 2024-07-10 | End: 2024-07-10

## 2024-07-10 RX ADMIN — SODIUM CHLORIDE 500 ML: 9 INJECTION, SOLUTION INTRAVENOUS at 17:47

## 2024-07-10 RX ADMIN — WATER 125 MG: 1 INJECTION INTRAMUSCULAR; INTRAVENOUS; SUBCUTANEOUS at 17:44

## 2024-07-10 RX ADMIN — ONDANSETRON 4 MG: 2 INJECTION INTRAMUSCULAR; INTRAVENOUS at 17:44

## 2024-07-10 RX ADMIN — PIPERACILLIN AND TAZOBACTAM 4500 MG: 4; .5 INJECTION, POWDER, LYOPHILIZED, FOR SOLUTION INTRAVENOUS at 20:25

## 2024-07-10 RX ADMIN — IPRATROPIUM BROMIDE AND ALBUTEROL SULFATE 2 DOSE: .5; 3 SOLUTION RESPIRATORY (INHALATION) at 19:11

## 2024-07-10 RX ADMIN — IOPAMIDOL 80 ML: 755 INJECTION, SOLUTION INTRAVENOUS at 18:42

## 2024-07-10 RX ADMIN — MAGNESIUM SULFATE HEPTAHYDRATE 2000 MG: 40 INJECTION, SOLUTION INTRAVENOUS at 18:25

## 2024-07-10 ASSESSMENT — PAIN - FUNCTIONAL ASSESSMENT
PAIN_FUNCTIONAL_ASSESSMENT: NONE - DENIES PAIN

## 2024-07-10 NOTE — ED PROVIDER NOTES
[facility] EMERGENCY DEPT      EMERGENCY MEDICINE     Pt Name: David Vaca  MRN: [id number]  Birthdate 1965  Date of evaluation: 7/10/2024  Provider: Kannan Garcia MD    CHIEF COMPLAINT       Chief Complaint   Patient presents with    Emesis    Shortness of Breath     HISTORY OF PRESENT ILLNESS   David Vaca is a pleasant 58 y.o. male who presents to the emergency department from from home, by private vehicle for evaluation of shortness of breath.  Patient presents emergency department brought by ambulance for worsening shortness of breath that has been getting worse since 2 days ago; patient denies chest pain, no cough, no lightheadedness or syncope, no leg swelling but family member present in the room states patient has been weak fatigue and with some abdominal pain.  When asked patient patient is more aware about abdominal pain however at physical examination abdomen is pretty tender especially on bilateral surgical wounds on groins.  When asked patient denies urinary symptoms no diarrhea or constipation.      PASTMEDICAL HISTORY     Past Medical History:   Diagnosis Date    Arthritis 06/01/2016    LUMBAR SPINE     COPD (chronic obstructive pulmonary disease) ([facility])     found in late 2010s    Depression 2012    Diabetes mellitus ([facility])     For 1 month in 2000's    Dysphagia     Emphysema lung ([facility]) 2023    GERD (gastroesophageal reflux disease) 2000    History of nephrolithiasis 2023    HIV (human immunodeficiency virus infection) ([facility]) 1991    Hx of blood clots 05/31/2024    Hyperlipidemia     Diagnosed in 2000's    Hypertension        Patient Active Problem List   Diagnosis Code    HIV (human immunodeficiency virus infection) ([facility]) B20    Chest pain R07.9    Hyperlipidemia E78.5    Depression F32.A    Cardiac syndrome X ([facility]) I20.89    Spondylosis of lumbar region without myelopathy or radiculopathy M47.816    Sacroiliac inflammation ([facility]) M46.1    Lumbar spondylosis M47.816    Abdominal

## 2024-07-10 NOTE — ED NOTES
Pt resting comfortably with call light in reach. Family at bedside. Pt denies further needs at this time

## 2024-07-10 NOTE — ED TRIAGE NOTES
Pt presents to ED with chief complaint of emesis and shortness of breath. Pt states it started yesterday. Reports feeling fatigued. States he had recent revascularization on left leg. Pt pale on arrival with shallow breathing and is tachypneic.

## 2024-07-10 NOTE — ED NOTES
Pt resting comfortably in bed with family at bedside. Pt has call light in reach. No needs expressed

## 2024-07-11 ENCOUNTER — APPOINTMENT (OUTPATIENT)
Dept: GENERAL RADIOLOGY | Age: 59
DRG: 862 | End: 2024-07-11
Payer: MEDICARE

## 2024-07-11 PROBLEM — A41.9 SEPSIS (HCC): Status: ACTIVE | Noted: 2024-07-11

## 2024-07-11 PROBLEM — T81.49XA POST-OPERATIVE WOUND ABSCESS: Status: ACTIVE | Noted: 2024-07-11

## 2024-07-11 LAB
ABO + RH BLD: NORMAL
ANION GAP SERPL CALCULATED.3IONS-SCNC: 18 MMOL/L (ref 9–16)
ARM BAND NUMBER: NORMAL
BASOPHILS # BLD: 0 K/UL (ref 0–0.2)
BASOPHILS NFR BLD: 0 % (ref 0–2)
BLOOD BANK SAMPLE EXPIRATION: NORMAL
BLOOD GROUP ANTIBODIES SERPL: NEGATIVE
BUN SERPL-MCNC: 16 MG/DL (ref 6–20)
CA-I BLD-SCNC: 1.04 MMOL/L (ref 1.13–1.33)
CALCIUM SERPL-MCNC: 7.6 MG/DL (ref 8.6–10.4)
CD3+CD4+ CELLS # BLD: 114 /UL (ref 309–1571)
CD3+CD4+ CELLS NFR BLD: 22 % (ref 27–64)
CHLORIDE SERPL-SCNC: 102 MMOL/L (ref 98–107)
CO2 SERPL-SCNC: 15 MMOL/L (ref 20–31)
CREAT SERPL-MCNC: 1.3 MG/DL (ref 0.7–1.2)
EOSINOPHIL # BLD: 0 K/UL (ref 0–0.44)
EOSINOPHILS RELATIVE PERCENT: 0 % (ref 1–4)
ERYTHROCYTE [DISTWIDTH] IN BLOOD BY AUTOMATED COUNT: 14 % (ref 11.8–14.4)
GFR, ESTIMATED: 66 ML/MIN/1.73M2
GLUCOSE SERPL-MCNC: 182 MG/DL (ref 74–99)
HCT VFR BLD AUTO: 31.2 % (ref 40.7–50.3)
HGB BLD-MCNC: 10.6 G/DL (ref 13–17)
IMM GRANULOCYTES # BLD AUTO: 0.34 K/UL (ref 0–0.3)
IMM GRANULOCYTES NFR BLD: 2 %
LACTIC ACID, SEPSIS WHOLE BLOOD: 1.6 MMOL/L (ref 0.5–1.9)
LYMPHOCYTES # BLD: 3 % (ref 24–44)
LYMPHOCYTES NFR BLD: 0.52 K/UL (ref 1.1–3.7)
LYMPHOCYTES RELATIVE PERCENT: 3 % (ref 24–43)
MAGNESIUM SERPL-MCNC: 0.9 MG/DL (ref 1.6–2.6)
MAGNESIUM SERPL-MCNC: 0.9 MG/DL (ref 1.6–2.6)
MAGNESIUM SERPL-MCNC: 1 MG/DL (ref 1.6–2.6)
MAGNESIUM SERPL-MCNC: 1.5 MG/DL (ref 1.6–2.6)
MAGNESIUM SERPL-MCNC: 1.6 MG/DL (ref 1.6–2.6)
MCH RBC QN AUTO: 39.1 PG (ref 25.2–33.5)
MCHC RBC AUTO-ENTMCNC: 34 G/DL (ref 28.4–34.8)
MCV RBC AUTO: 115.1 FL (ref 82.6–102.9)
MONOCYTES NFR BLD: 0.34 K/UL (ref 0.1–1.2)
MONOCYTES NFR BLD: 2 % (ref 3–12)
MORPHOLOGY: ABNORMAL
NEUTROPHILS NFR BLD: 93 % (ref 36–65)
NEUTS SEG NFR BLD: 16 K/UL (ref 1.5–8.1)
NRBC BLD-RTO: 0 PER 100 WBC
PHOSPHATE SERPL-MCNC: 3 MG/DL (ref 2.5–4.5)
PLATELET # BLD AUTO: 274 K/UL (ref 138–453)
PMV BLD AUTO: 8.9 FL (ref 8.1–13.5)
POTASSIUM SERPL-SCNC: 3.2 MMOL/L (ref 3.7–5.3)
RBC # BLD AUTO: 2.71 M/UL (ref 4.21–5.77)
SODIUM SERPL-SCNC: 135 MMOL/L (ref 136–145)
WBC # BLD: 17.2 K/UL (ref 3.5–11)
WBC OTHER # BLD: 17.2 K/UL (ref 3.5–11.3)

## 2024-07-11 PROCEDURE — 2060000000 HC ICU INTERMEDIATE R&B

## 2024-07-11 PROCEDURE — 86361 T CELL ABSOLUTE COUNT: CPT

## 2024-07-11 PROCEDURE — 86901 BLOOD TYPING SEROLOGIC RH(D): CPT

## 2024-07-11 PROCEDURE — 93005 ELECTROCARDIOGRAM TRACING: CPT

## 2024-07-11 PROCEDURE — 86900 BLOOD TYPING SEROLOGIC ABO: CPT

## 2024-07-11 PROCEDURE — 2580000003 HC RX 258: Performed by: STUDENT IN AN ORGANIZED HEALTH CARE EDUCATION/TRAINING PROGRAM

## 2024-07-11 PROCEDURE — 6360000002 HC RX W HCPCS: Performed by: EMERGENCY MEDICINE

## 2024-07-11 PROCEDURE — 2580000003 HC RX 258: Performed by: NURSE PRACTITIONER

## 2024-07-11 PROCEDURE — 2580000003 HC RX 258: Performed by: EMERGENCY MEDICINE

## 2024-07-11 PROCEDURE — 2580000003 HC RX 258

## 2024-07-11 PROCEDURE — 6360000002 HC RX W HCPCS: Performed by: NURSE PRACTITIONER

## 2024-07-11 PROCEDURE — 80048 BASIC METABOLIC PNL TOTAL CA: CPT

## 2024-07-11 PROCEDURE — 6360000002 HC RX W HCPCS: Performed by: INTERNAL MEDICINE

## 2024-07-11 PROCEDURE — 85025 COMPLETE CBC W/AUTO DIFF WBC: CPT

## 2024-07-11 PROCEDURE — 82330 ASSAY OF CALCIUM: CPT

## 2024-07-11 PROCEDURE — 83605 ASSAY OF LACTIC ACID: CPT

## 2024-07-11 PROCEDURE — 99223 1ST HOSP IP/OBS HIGH 75: CPT | Performed by: INTERNAL MEDICINE

## 2024-07-11 PROCEDURE — 6370000000 HC RX 637 (ALT 250 FOR IP): Performed by: INTERNAL MEDICINE

## 2024-07-11 PROCEDURE — 83735 ASSAY OF MAGNESIUM: CPT

## 2024-07-11 PROCEDURE — 6360000002 HC RX W HCPCS

## 2024-07-11 PROCEDURE — 6370000000 HC RX 637 (ALT 250 FOR IP): Performed by: NURSE PRACTITIONER

## 2024-07-11 PROCEDURE — 71045 X-RAY EXAM CHEST 1 VIEW: CPT

## 2024-07-11 PROCEDURE — 86850 RBC ANTIBODY SCREEN: CPT

## 2024-07-11 PROCEDURE — 84100 ASSAY OF PHOSPHORUS: CPT

## 2024-07-11 PROCEDURE — 36415 COLL VENOUS BLD VENIPUNCTURE: CPT

## 2024-07-11 RX ORDER — SODIUM CHLORIDE 0.9 % (FLUSH) 0.9 %
5-40 SYRINGE (ML) INJECTION EVERY 12 HOURS SCHEDULED
Status: DISCONTINUED | OUTPATIENT
Start: 2024-07-11 | End: 2024-07-17 | Stop reason: HOSPADM

## 2024-07-11 RX ORDER — POTASSIUM CHLORIDE 20 MEQ/1
40 TABLET, EXTENDED RELEASE ORAL PRN
Status: DISCONTINUED | OUTPATIENT
Start: 2024-07-11 | End: 2024-07-17 | Stop reason: HOSPADM

## 2024-07-11 RX ORDER — TIZANIDINE 4 MG/1
4 TABLET ORAL 2 TIMES DAILY PRN
Status: DISCONTINUED | OUTPATIENT
Start: 2024-07-11 | End: 2024-07-17 | Stop reason: HOSPADM

## 2024-07-11 RX ORDER — ISOSORBIDE DINITRATE 10 MG/1
20 TABLET ORAL 2 TIMES DAILY
Status: DISCONTINUED | OUTPATIENT
Start: 2024-07-11 | End: 2024-07-17 | Stop reason: HOSPADM

## 2024-07-11 RX ORDER — SODIUM CHLORIDE, SODIUM LACTATE, POTASSIUM CHLORIDE, AND CALCIUM CHLORIDE .6; .31; .03; .02 G/100ML; G/100ML; G/100ML; G/100ML
1000 INJECTION, SOLUTION INTRAVENOUS ONCE
Status: COMPLETED | OUTPATIENT
Start: 2024-07-11 | End: 2024-07-11

## 2024-07-11 RX ORDER — FLUOXETINE HYDROCHLORIDE 20 MG/1
20 CAPSULE ORAL DAILY
Status: DISCONTINUED | OUTPATIENT
Start: 2024-07-11 | End: 2024-07-17 | Stop reason: HOSPADM

## 2024-07-11 RX ORDER — SODIUM CHLORIDE 9 MG/ML
INJECTION, SOLUTION INTRAVENOUS CONTINUOUS
Status: ACTIVE | OUTPATIENT
Start: 2024-07-11 | End: 2024-07-13

## 2024-07-11 RX ORDER — CETIRIZINE HYDROCHLORIDE 10 MG/1
10 TABLET ORAL DAILY
Status: DISCONTINUED | OUTPATIENT
Start: 2024-07-11 | End: 2024-07-17 | Stop reason: HOSPADM

## 2024-07-11 RX ORDER — SODIUM CHLORIDE 9 MG/ML
INJECTION, SOLUTION INTRAVENOUS PRN
Status: DISCONTINUED | OUTPATIENT
Start: 2024-07-11 | End: 2024-07-17 | Stop reason: HOSPADM

## 2024-07-11 RX ORDER — TAMSULOSIN HYDROCHLORIDE 0.4 MG/1
0.4 CAPSULE ORAL DAILY
Status: DISCONTINUED | OUTPATIENT
Start: 2024-07-11 | End: 2024-07-17 | Stop reason: HOSPADM

## 2024-07-11 RX ORDER — LAMIVUDINE AND ZIDOVUDINE 150; 300 MG/1; MG/1
1 TABLET, FILM COATED ORAL 2 TIMES DAILY
Status: DISCONTINUED | OUTPATIENT
Start: 2024-07-11 | End: 2024-07-17 | Stop reason: HOSPADM

## 2024-07-11 RX ORDER — MAGNESIUM SULFATE IN WATER 40 MG/ML
2000 INJECTION, SOLUTION INTRAVENOUS PRN
Status: DISCONTINUED | OUTPATIENT
Start: 2024-07-11 | End: 2024-07-17 | Stop reason: HOSPADM

## 2024-07-11 RX ORDER — FOSAMPRENAVIR CALCIUM 700 MG/1
1400 TABLET, COATED ORAL 2 TIMES DAILY
Status: DISCONTINUED | OUTPATIENT
Start: 2024-07-11 | End: 2024-07-17 | Stop reason: HOSPADM

## 2024-07-11 RX ORDER — OLANZAPINE 10 MG/1
20 TABLET ORAL NIGHTLY
Status: DISCONTINUED | OUTPATIENT
Start: 2024-07-11 | End: 2024-07-17 | Stop reason: HOSPADM

## 2024-07-11 RX ORDER — POTASSIUM CHLORIDE 7.45 MG/ML
10 INJECTION INTRAVENOUS PRN
Status: DISCONTINUED | OUTPATIENT
Start: 2024-07-11 | End: 2024-07-17 | Stop reason: HOSPADM

## 2024-07-11 RX ORDER — ENOXAPARIN SODIUM 100 MG/ML
40 INJECTION SUBCUTANEOUS DAILY
Status: DISCONTINUED | OUTPATIENT
Start: 2024-07-11 | End: 2024-07-17 | Stop reason: HOSPADM

## 2024-07-11 RX ORDER — ATORVASTATIN CALCIUM 80 MG/1
80 TABLET, FILM COATED ORAL NIGHTLY
Status: DISCONTINUED | OUTPATIENT
Start: 2024-07-11 | End: 2024-07-17 | Stop reason: HOSPADM

## 2024-07-11 RX ORDER — ACETAMINOPHEN 650 MG/1
650 SUPPOSITORY RECTAL EVERY 6 HOURS PRN
Status: DISCONTINUED | OUTPATIENT
Start: 2024-07-11 | End: 2024-07-17 | Stop reason: HOSPADM

## 2024-07-11 RX ORDER — ASPIRIN 81 MG/1
81 TABLET, CHEWABLE ORAL DAILY
Status: DISCONTINUED | OUTPATIENT
Start: 2024-07-11 | End: 2024-07-17 | Stop reason: HOSPADM

## 2024-07-11 RX ORDER — FENOFIBRATE 160 MG/1
160 TABLET ORAL DAILY
Status: DISCONTINUED | OUTPATIENT
Start: 2024-07-11 | End: 2024-07-17 | Stop reason: HOSPADM

## 2024-07-11 RX ORDER — SODIUM CHLORIDE 0.9 % (FLUSH) 0.9 %
5-40 SYRINGE (ML) INJECTION PRN
Status: DISCONTINUED | OUTPATIENT
Start: 2024-07-11 | End: 2024-07-17 | Stop reason: HOSPADM

## 2024-07-11 RX ORDER — ACETAMINOPHEN 325 MG/1
650 TABLET ORAL EVERY 6 HOURS PRN
Status: DISCONTINUED | OUTPATIENT
Start: 2024-07-11 | End: 2024-07-17 | Stop reason: HOSPADM

## 2024-07-11 RX ORDER — PANTOPRAZOLE SODIUM 40 MG/1
40 TABLET, DELAYED RELEASE ORAL
Status: DISCONTINUED | OUTPATIENT
Start: 2024-07-11 | End: 2024-07-17 | Stop reason: HOSPADM

## 2024-07-11 RX ADMIN — FENOFIBRATE 160 MG: 160 TABLET ORAL at 09:39

## 2024-07-11 RX ADMIN — PIPERACILLIN AND TAZOBACTAM 4500 MG: 4; .5 INJECTION, POWDER, LYOPHILIZED, FOR SOLUTION INTRAVENOUS at 06:33

## 2024-07-11 RX ADMIN — SODIUM CHLORIDE, PRESERVATIVE FREE 10 ML: 5 INJECTION INTRAVENOUS at 20:23

## 2024-07-11 RX ADMIN — ASPIRIN 81 MG 81 MG: 81 TABLET ORAL at 09:38

## 2024-07-11 RX ADMIN — OLANZAPINE 20 MG: 10 TABLET, FILM COATED ORAL at 20:23

## 2024-07-11 RX ADMIN — PIPERACILLIN AND TAZOBACTAM 4500 MG: 4; .5 INJECTION, POWDER, LYOPHILIZED, FOR SOLUTION INTRAVENOUS at 20:25

## 2024-07-11 RX ADMIN — SODIUM CHLORIDE, POTASSIUM CHLORIDE, SODIUM LACTATE AND CALCIUM CHLORIDE 1000 ML: 600; 310; 30; 20 INJECTION, SOLUTION INTRAVENOUS at 00:34

## 2024-07-11 RX ADMIN — ENOXAPARIN SODIUM 40 MG: 100 INJECTION SUBCUTANEOUS at 09:37

## 2024-07-11 RX ADMIN — ISOSORBIDE DINITRATE 20 MG: 10 TABLET ORAL at 09:39

## 2024-07-11 RX ADMIN — PIPERACILLIN AND TAZOBACTAM 4500 MG: 4; .5 INJECTION, POWDER, LYOPHILIZED, FOR SOLUTION INTRAVENOUS at 13:18

## 2024-07-11 RX ADMIN — SODIUM CHLORIDE 1500 MG: 9 INJECTION, SOLUTION INTRAVENOUS at 00:37

## 2024-07-11 RX ADMIN — PANTOPRAZOLE SODIUM 40 MG: 40 TABLET, DELAYED RELEASE ORAL at 06:34

## 2024-07-11 RX ADMIN — CETIRIZINE HYDROCHLORIDE 10 MG: 10 TABLET ORAL at 09:39

## 2024-07-11 RX ADMIN — FLUOXETINE HYDROCHLORIDE 20 MG: 20 CAPSULE ORAL at 09:39

## 2024-07-11 RX ADMIN — METOPROLOL TARTRATE 25 MG: 25 TABLET ORAL at 20:23

## 2024-07-11 RX ADMIN — METOPROLOL TARTRATE 25 MG: 25 TABLET ORAL at 09:38

## 2024-07-11 RX ADMIN — SODIUM CHLORIDE: 9 INJECTION, SOLUTION INTRAVENOUS at 06:32

## 2024-07-11 RX ADMIN — SODIUM CHLORIDE, PRESERVATIVE FREE 10 ML: 5 INJECTION INTRAVENOUS at 09:38

## 2024-07-11 RX ADMIN — MAGNESIUM SULFATE HEPTAHYDRATE 2000 MG: 40 INJECTION, SOLUTION INTRAVENOUS at 05:18

## 2024-07-11 RX ADMIN — TAMSULOSIN HYDROCHLORIDE 0.4 MG: 0.4 CAPSULE ORAL at 09:38

## 2024-07-11 RX ADMIN — ATORVASTATIN CALCIUM 80 MG: 80 TABLET, FILM COATED ORAL at 20:23

## 2024-07-11 RX ADMIN — MAGNESIUM SULFATE HEPTAHYDRATE 2000 MG: 40 INJECTION, SOLUTION INTRAVENOUS at 08:35

## 2024-07-11 RX ADMIN — ISOSORBIDE DINITRATE 20 MG: 10 TABLET ORAL at 20:23

## 2024-07-11 RX ADMIN — SODIUM CHLORIDE, POTASSIUM CHLORIDE, SODIUM LACTATE AND CALCIUM CHLORIDE 1000 ML: 600; 310; 30; 20 INJECTION, SOLUTION INTRAVENOUS at 05:03

## 2024-07-11 ASSESSMENT — PAIN - FUNCTIONAL ASSESSMENT: PAIN_FUNCTIONAL_ASSESSMENT: NONE - DENIES PAIN

## 2024-07-11 NOTE — ED NOTES
The following labs labeled with pt sticker and tubed to lab:     [x] Blue     [x] Lavender   [] on ice  [x] Green/yellow  [] Green/black [] on ice  [] Yellow  [] Red  [x] Pink      [] COVID-19 swab    [] Rapid  [] PCR  [] Flu Swab  [] Strep Swab  [] Peds Viral Panel     [] Urine Sample  [] Pelvic Cultures  [] Blood Cultures   [] Wound Cultures

## 2024-07-11 NOTE — CARE COORDINATION
07/11/24 1410   Readmission Assessment   Number of Days since last admission?   (Not a RAC, transfer)   Previous Disposition Other (comment)  (Not a RAC, transfer)   Who is being Interviewed   (Not a RAC, transfer)   What was the patient's/caregiver's perception as to why they think they needed to return back to the hospital? Other (Comment)  (Not a RAC, transfer)   Did you visit your Primary Care Physician after you left the hospital, before you returned this time?   (Not a RAC, transfer)   Why weren't you able to visit your PCP? Other (Comment)  (Not a RAC, transfer)   Did you see a specialist, such as Cardiac, Pulmonary, Orthopedic Physician, etc. after you left the hospital? Other (Comment)  (Not a RAC, transfer)   Who advised the patient to return to the hospital? Other (Comment)  (Not a RAC, transfer)   Does the patient report anything that got in the way of taking their medications?   (Not a RAC, transfer)   In our efforts to provide the best possible care to you and others like you, can you think of anything that we could have done to help you after you left the hospital the first time, so that you might not have needed to return so soon? Other (Comment)  (Not a RAC, transfer)

## 2024-07-11 NOTE — CARE COORDINATION
.Case Management Assessment  Initial Evaluation    Date/Time of Evaluation: 7/11/2024 2:22 PM  Assessment Completed by: Brinda Gil RN    If patient is discharged prior to next notation, then this note serves as note for discharge by case management.    Patient Name: David Vaca                   YOB: 1965  Diagnosis: Hypomagnesemia [E83.42]  Septicemia (HCC) [A41.9]  KEHINDE (acute kidney injury) (HCC) [N17.9]  Abscess of groin, left [L02.214]  Sepsis (HCC) [A41.9]  Surgical site infection [T81.49XA]                   Date / Time: 7/10/2024 11:11 PM    Patient Admission Status: Inpatient   Readmission Risk (Low < 19, Mod (19-27), High > 27): Readmission Risk Score: 28.7    Current PCP: Sandi Choudhary APRN - CNP  PCP verified by CM? (P) Yes    Chart Reviewed: Yes      History Provided by: (P) Patient  Patient Orientation: (P) Alert and Oriented    Patient Cognition: (P) Alert    Hospitalization in the last 30 days (Readmission):  No    If yes, Readmission Assessment in CM Navigator will be completed.    Advance Directives:      Code Status: Full Code   Patient's Primary Decision Maker is: (P) Named in Scanned ACP Document    Primary Decision Maker: Eri Hernandez - Brother/Sister - [phone]    Secondary Decision Maker: Augustina Mcgee - Brother/Sister - [phone]    Discharge Planning:    Patient lives with: (P) Alone Type of Home: (P) Apartment  Primary Care Giver: (P) Self  Patient Support Systems include: (P) Family Members   Current Financial resources: (P) Medicare, Medicaid  Current community resources: (P) None  Current services prior to admission: (P) Other (Comment) ([facility])            Current DME: (P) Walker            Type of Home Care services:  (P) None    ADLS  Prior functional level: (P) Independent in ADLs/IADLs  Current functional level: (P) Assistance with the following:, Mobility, Shopping, Housework, Bathing    PT AM-PAC:   /24  OT AM-PAC:   /24    Family can provide assistance

## 2024-07-11 NOTE — ED PROVIDER NOTES
[facility]     Emergency Department     Faculty Attestation    I performed a history and physical examination of the patient and discussed management with the resident. I have reviewed and agree with the resident’s findings including all diagnostic interpretations, and treatment plans as written. Any areas of disagreement are noted on the chart. I was personally present for the key portions of any procedures. I have documented in the chart those procedures where I was not present during the key portions. I have reviewed the emergency nurses triage note. I agree with the chief complaint, past medical history, past surgical history, allergies, medications, social and family history as documented unless otherwise noted below. Documentation of the HPI, Physical Exam and Medical Decision Making performed by [email or web address] is based on my personal performance of the HPI, PE and MDM. For Physician Assistant/ Nurse Practitioner cases/documentation I have personally evaluated this patient and have completed at least one if not all key elements of the E/M (history, physical exam, and MDM). Additional findings are as noted.    Note Started: 11:25 PM EDT     57 yo M transferred for groin swelling, aorto fem bypass / fasciotomy in June,   No fever, no sob,   PE vss chronically ill appearing male, repetitive upper extremity tremor, swelling L>R groin, firm to palpation with tenderness, no erythema, nothing pulsatile,   L d pedal pulse weak, R d pedal pulse palpable,   -ct chest reviewed from [facility]'s no pe, seroma bilateral inguinal region,     Admit for septicemia, KEHINDE,  Vascular consult    EKG Interpretation    Interpreted by me  Normal sinus, hr 89, no flaco, normal axis, qtc 447    CRITICAL CARE: There was a high probability of clinically significant/life threatening deterioration in this patient's condition which required my urgent intervention.  Total critical care time was 
disturbances.      CONSULTS:  IP CONSULT TO VASCULAR SURGERY  PHARMACY TO DOSE VANCOMYCIN  IP CONSULT TO HOSPITALIST  PHARMACY TO DOSE VANCOMYCIN  IP CONSULT TO INFECTIOUS DISEASES  IP CONSULT TO DIETITIAN    CRITICAL CARE:  There was significant risk of life threatening deterioration of patient's condition requiring my direct management. Critical care time 0 minutes, excluding any documented procedures.    FINAL IMPRESSION      1. Septicemia (HCC)    2. Abscess of groin, left    3. Surgical site infection    4. KEHINDE (acute kidney injury) (HCC)    5. Hypomagnesemia          DISPOSITION / PLAN     DISPOSITION Admitted 07/11/2024 12:40:47 AM      PATIENT REFERRED TO:  No follow-up provider specified.    DISCHARGE MEDICATIONS:  New Prescriptions    No medications on file       Vicki Agee MD  Emergency Medicine Resident    (Please note that portions of thisnote were completed with a voice recognition program.  Efforts were made to edit the dictations but occasionally words are mis-transcribed.)

## 2024-07-11 NOTE — CONSULTS
Division of Vascular Surgery        New Consult      Physician Requesting Consult:  Dr. Jack    Reason for Consult:  s/p aortobifem    Chief Complaint:     Nausea and vomiting    History of Present Illness:      David Vaca is a 58 y.o. gentleman who presents with nausea and vomiting for a few weeks. Recent unintentional weight lost of about 10-15 lbs over the last month. He reports increased fatigue and shortness of breath and has had to limit his walking and mobility due to dyspnea and balance issues. Denies claudication symptoms. Pt reports chest pain with vomiting and back pain. Denies fever, chills. Has not had a bowel movement in a while but isn't eating or drinking very much. Pt denies noticing any swelling to his groins or his surgical sites recently.     Pt had aortobifemoral bypass with left iliac/common femoral/profunda/SFA and popliteal thrombectomy and right iliac artery thrombectomy with left lower extremity four compartment fasciotomy on 5/31/2024. After that he returned to the OR on 6/4 for closure of left medial fasciotomy incision and wound vac to left lateral fasciotomy incision. The left lateral fasciotomy incision was closed on 6/7. Other medical history includes HIV, COPD, HTN, HLD, arthritis, DM.      On arrival pt was hypotensive and tachycardic, but responded to IV fluid bolus with normal vitals. WBC elevated at 24.2, BNP and trop slightly elevated as well. Pt has KEHINDE with Cr 1.4 and UTI. Lactate <0.2. Blood cultures pending. CT scan concerning for fluid collection around the left groin, possible abscess.    Medical History:     Past Medical History:   Diagnosis Date    Arthritis 06/01/2016    LUMBAR SPINE     COPD (chronic obstructive pulmonary disease) (HCC)     found in late 2010s    Depression 2012    Diabetes mellitus (HCC)     For 1 month in 2000's    Dysphagia     Emphysema lung (HCC) 2023    GERD (gastroesophageal reflux disease) 2000    History of nephrolithiasis 2023    
Comprehensive Nutrition Assessment    Type and Reason for Visit:  Initial, Consult    Nutrition Recommendations/Plan:   Send clear liquid oral nutrition supplement once daily with meals (apple flavor per pt request)  Send standard high calorie, high protein oral nutrition supplement twice daily with meals (strawberry per Pt request)     Malnutrition Assessment:  Malnutrition Status:  Severe malnutrition (07/11/24 1219)    Context:  Acute Illness     Findings of the 6 clinical characteristics of malnutrition:  Energy Intake:  50% or less of estimated energy requirements for 5 or more days  Weight Loss:  Greater than 5% over 1 month     Body Fat Loss:  Mild body fat loss Orbital, Buccal region   Muscle Mass Loss:  Mild muscle mass loss Temples (temporalis), Clavicles (pectoralis & deltoids), Hand (interosseous)  Fluid Accumulation:  Mild  (groin)   Strength:  Not Performed    Nutrition Assessment:    Pt admitted for N/V, possible groin abscess and possible sepsis with Hx recent Fem bypass 5/31 with Hx COPD, DM, Depression, Dysphagia, EGD with Dilation (2021), GERD, HIV, HTN, HLD and blood clots.  Weight records show significant weight loss, going from 63.4 kg on 6/17/24 to CBW 59 kg, a loss of 6.9% x 2 weeks.  Pt confirms this weight loss, stating he has had poor appetite with N/V for several weeks now since his surgery on 5/31.  He has been able to tolerate only a few foods at home, cereal with milk at breakfast, meatballs at dinner, and occasionally some fruit cups between meals.  Pt estimates he is consuming less than 25% of his usual intake, which is the likely reason for significant weight loss.  Pt has not tried any ONS at home as he cannot afford it, but agrees to try while here.  Pt disliked the chocolate Ensure during his last hospital stay, stating it \"tastes like medicine.\"  He requests strawberry flavor instead.   Pt is also drinking apple juice during visit, and agrees to try Ensure Clear apple flavor 
nasopharyngeal and nasal swab specimens.       Influenza A NOT DETECTED    Influenza B NOT DETECTED    Comment: Performed at [facility] [address] [place] [address]      MRSA DNA Probe, Nasal [[id number]] Collected: 06/01/24 0500   Order Status: Completed Specimen: Nasal Updated: 06/01/24 1252    Specimen Description .NASAL SWAB    MRSA, DNA, Nasal NEGATIVE    Comment: NEGATIVE:  MRSA DNA not detected by nucleic acid amplification.                                                   Results should be used as an adjunct to nosocomial control efforts to identify patients  needing enhanced precautions.    The test is not intended to identify patients with staphylococcal infections.  Results  should not be used to guide or monitor treatment for MRSA infections.        Electronically signed by Sammi Maldonado MD on 7/11/2024 at 2:05 PM      [facility]  Sammi Maldonado MD  Perfect Serve messaging  OFFICE: [phone]    Thank you for allowing us to participate in the care of this patient. Please call with questions.     This note is created with the assistance of a speech recognition program.  While intending to generate a document that actually reflects the content of the visit, the document can still have some errors including those of syntax and sound a like substitutions which may escape proof reading.  In such instances, actual meaning can be extrapolated by contextual diversion.

## 2024-07-11 NOTE — ED TRIAGE NOTES
Pt presents to the ED as a transfer from [facility] via EMS with c/o of inguinal mass, seroma vs abscess.   Pt is hard of hearing, poor historian, had CT ABD/P with contrast @ [facility], septic workup, CTPE that showed no PE.   Per notes at [facility], patient initially presented via private auto for SOB. Pt was diaphoretic, tachycardic, and tachypnenic.   Upon arrival on RA, patient is not diaphoretic, but tachypenic.   Pt is SP aortofemoral bypass, has red wound dishisence with mild fluid drainage. Pt has history of HIV, COPD, and hypertension.  Pt received zosyn 4.5mg, 4mg of zofran, 500mL NaCl bolus, albuterol, 125mg solumedrol, 2g of magnesium, PTA.   Pt placed on full cardiac monitor, Call light in reach, white board updated.

## 2024-07-11 NOTE — ED PROVIDER NOTES
[facility]  EMERGENCY DEPARTMENT ENCOUNTER   PHYSICIAN ATTESTATION    Pt Name: aDvid Vaca  MRN: [id number]  Birthdate 1965  Date of evaluation: 9/12/20      I personally saw and examined the patient. I have reviewed and agree with the Resident findings, including all diagnostic interpretations and treatment plans as written. I was present for the key portion of any procedures performed and the inclusive time noted in any critical care statement.       History:     This patient was seen with Kannan Johnston, resident physician.  See his note for further details    58-year-old male initially here with complaint of shortness of breath suggestive of COPD exacerbation with wheezing but also noted to have abdominal pain.  He seemed to particularly have some discomfort over the left groin.  It turns out that he has had recent vascular surgery at [place].  A few weeks ago had an aortofemoral bypass.  He is noted to have a fluid collection there which could potentially be a seroma versus source of infection and we are looking for sources of infection because he has a white count of 24,000.  Urine is also noted to be positive for leukocyte.  The resident has ordered IV Zosyn.  He was also found to have a very low magnesium.  Magnesium was ordered.  The COPD was addressed with DuoNebs and Solu-Medrol.  Due to the complication being related to his vascular surgery in [place] we have arranged to transfer him back.  I have spoken to the vascular surgeon on-call as well as the emergency physician.  The patient is going to be excepted to the emergency department.    Diagnosis: Fluid collection near area of recent aortofemoral bypass, rule out infection, exacerbation of COPD, hypomagnesemia    Transferred to [place].              DO Hussain Putnam Lawrence, DO  07/10/24 1688

## 2024-07-11 NOTE — H&P
Pending ID to evaluate, pending CD4  PVD with dyslipidemia, HTN.  Resume medical management with aspirin, high intensity statin, blood pressure control.  Chronic GERD continue PPI  Depression with anxiety.  Resume home meds.        Prior records and chart reviewed independently myself.  Home indications reviewed.  OARRS report reviewed.      Question concerns and treatment plan discussed with patient.    If further vascular surgery is required patient likely low to moderate risk for perioperative cardiopulmonary complications, consider cardiac evaluation if further clinical concern.      Consultations:   IP CONSULT TO VASCULAR SURGERY  PHARMACY TO DOSE VANCOMYCIN  IP CONSULT TO HOSPITALIST  PHARMACY TO DOSE VANCOMYCIN  IP CONSULT TO INFECTIOUS DISEASES    Patient is admitted as inpatient status because of co-morbidities listed above, severity of signs and symptoms as outlined, requirement for current medical therapies and most importantly because of direct risk to patient if care not provided in a hospital setting.  Expected length of stay > 48 hours.    Giselle Norman MD  7/11/2024  4:15 AM    Copy sent to Sandi Vieira, APRN - CNP

## 2024-07-11 NOTE — ED NOTES
ED to inpatient nurses report      Chief Complaint:  Chief Complaint   Patient presents with    Post-op Problem     Transfer from [facility], possible post op abdominal seroma or abscess      Present to ED from: Transfer from [facility]     MOA:     LOC: Axo x4- confused at baseline   Mobility: Fully dependent  Oxygen Baseline: RA    Current needs required: RA while awake 2L while sleeping   Pending ED orders: MAGNESIUM REPLACMENT-MAG 0.9 RECEIVED 2G IN ED SO FAR AND 2G AT [facility] PTA   Present condition: stable    Why did the patient come to the ED?   m Pt presents to the ED as a transfer from [facility] via EMS with c/o of inguinal mass, seroma vs abscess.   Pt is hard of hearing, poor historian, had CT ABD/P with contrast @ [facility], septic workup, CTPE that showed no PE.   Per notes at [facility], patient initially presented via private auto for SOB. Pt was diaphoretic, tachycardic, and tachypnenic.   Upon arrival on RA, patient is not diaphoretic, but tachypenic.   Pt is SP aortofemoral bypass, has red wound dishisence with mild fluid drainage. Pt has history of HIV, COPD, and hypertension.  Pt received zosyn 4.5mg, 4mg of zofran, 500mL NaCl bolus, albuterol, 125mg solumedrol, 2g of magnesium, PTA.   Pt placed on full cardiac monitor, Call light in reach, white board updated.       What is the plan? Admission for vascular   Any procedures or intervention occur? Type and screen, abx, labs, EKG   Any safety concerns?? fall    -VASCULAR TO DECIDE IF THEY WILL DO SURGERY TODAY OR ABX, NPO EXCEPT FOR SIPS OF WATER WITH MEDS  -CONFUSED AT TIMES  -HARD OF HEARING   -BILATERAL HEARING AIDS   -HIV   -INTERMED ADMISSION       Mental Status:  Level of Consciousness: Alert (0)    Psych Assessment:   Psychosocial  Psychosocial (WDL): Exceptions to WDL  Vital signs   Vitals:    07/11/24 0633 07/11/24 0636 07/11/24 0637 07/11/24 0639   BP:   124/72    Pulse: 79 78 77 80   Resp:    21   Temp:    97 °F (36.1 °C)

## 2024-07-12 ENCOUNTER — ANESTHESIA EVENT (OUTPATIENT)
Dept: OPERATING ROOM | Age: 59
End: 2024-07-12
Payer: MEDICARE

## 2024-07-12 ENCOUNTER — ANESTHESIA (OUTPATIENT)
Dept: OPERATING ROOM | Age: 59
End: 2024-07-12
Payer: MEDICARE

## 2024-07-12 LAB
ALBUMIN SERPL-MCNC: 2.6 G/DL (ref 3.5–5.2)
ALBUMIN/GLOB SERPL: 1 {RATIO} (ref 1–2.5)
ALP SERPL-CCNC: 60 U/L (ref 40–129)
ALT SERPL-CCNC: 13 U/L (ref 10–50)
ANION GAP SERPL CALCULATED.3IONS-SCNC: 10 MMOL/L (ref 9–16)
AST SERPL-CCNC: 30 U/L (ref 10–50)
BACTERIA BLD AEROBE CULT: NORMAL
BACTERIA BLD AEROBE CULT: NORMAL
BILIRUB SERPL-MCNC: <0.2 MG/DL (ref 0–1.2)
BUN SERPL-MCNC: 16 MG/DL (ref 6–20)
CALCIUM SERPL-MCNC: 7.3 MG/DL (ref 8.6–10.4)
CHLORIDE SERPL-SCNC: 109 MMOL/L (ref 98–107)
CO2 SERPL-SCNC: 17 MMOL/L (ref 20–31)
CREAT SERPL-MCNC: 1 MG/DL (ref 0.7–1.2)
ERYTHROCYTE [DISTWIDTH] IN BLOOD BY AUTOMATED COUNT: 14.2 % (ref 11.8–14.4)
GFR, ESTIMATED: >90 ML/MIN/1.73M2
GLUCOSE SERPL-MCNC: 94 MG/DL (ref 74–99)
HCT VFR BLD AUTO: 26.3 % (ref 40.7–50.3)
HGB BLD-MCNC: 8.2 G/DL (ref 13–17)
INR PPP: 1.1
MAGNESIUM SERPL-MCNC: 1.5 MG/DL (ref 1.6–2.6)
MAGNESIUM SERPL-MCNC: 2 MG/DL (ref 1.6–2.6)
MAGNESIUM SERPL-MCNC: 2 MG/DL (ref 1.6–2.6)
MAGNESIUM SERPL-MCNC: 2.1 MG/DL (ref 1.6–2.6)
MCH RBC QN AUTO: 39.2 PG (ref 25.2–33.5)
MCHC RBC AUTO-ENTMCNC: 31.2 G/DL (ref 28.4–34.8)
MCV RBC AUTO: 125.8 FL (ref 82.6–102.9)
NRBC BLD-RTO: 0 PER 100 WBC
PHOSPHATE SERPL-MCNC: 1.6 MG/DL (ref 2.5–4.5)
PLATELET # BLD AUTO: 259 K/UL (ref 138–453)
PMV BLD AUTO: 9.1 FL (ref 8.1–13.5)
POTASSIUM SERPL-SCNC: 3.4 MMOL/L (ref 3.7–5.3)
PROT SERPL-MCNC: 5 G/DL (ref 6.6–8.7)
PROTHROMBIN TIME: 13.8 SEC (ref 11.7–14.9)
RBC # BLD AUTO: 2.09 M/UL (ref 4.21–5.77)
SODIUM SERPL-SCNC: 136 MMOL/L (ref 136–145)
VANCOMYCIN SERPL-MCNC: 5.4 UG/ML (ref 5–40)
WBC OTHER # BLD: 15 K/UL (ref 3.5–11.3)

## 2024-07-12 PROCEDURE — 2580000003 HC RX 258: Performed by: NURSE PRACTITIONER

## 2024-07-12 PROCEDURE — 6360000002 HC RX W HCPCS: Performed by: ANESTHESIOLOGY

## 2024-07-12 PROCEDURE — 85027 COMPLETE CBC AUTOMATED: CPT

## 2024-07-12 PROCEDURE — 6370000000 HC RX 637 (ALT 250 FOR IP): Performed by: INTERNAL MEDICINE

## 2024-07-12 PROCEDURE — 80053 COMPREHEN METABOLIC PANEL: CPT

## 2024-07-12 PROCEDURE — 85610 PROTHROMBIN TIME: CPT

## 2024-07-12 PROCEDURE — 84100 ASSAY OF PHOSPHORUS: CPT

## 2024-07-12 PROCEDURE — 87184 SC STD DISK METHOD PER PLATE: CPT

## 2024-07-12 PROCEDURE — 6370000000 HC RX 637 (ALT 250 FOR IP)

## 2024-07-12 PROCEDURE — 99232 SBSQ HOSP IP/OBS MODERATE 35: CPT | Performed by: INTERNAL MEDICINE

## 2024-07-12 PROCEDURE — 87077 CULTURE AEROBIC IDENTIFY: CPT

## 2024-07-12 PROCEDURE — 36415 COLL VENOUS BLD VENIPUNCTURE: CPT

## 2024-07-12 PROCEDURE — 87075 CULTR BACTERIA EXCEPT BLOOD: CPT

## 2024-07-12 PROCEDURE — 80202 ASSAY OF VANCOMYCIN: CPT

## 2024-07-12 PROCEDURE — 2580000003 HC RX 258: Performed by: SURGERY

## 2024-07-12 PROCEDURE — 83735 ASSAY OF MAGNESIUM: CPT

## 2024-07-12 PROCEDURE — 2580000003 HC RX 258

## 2024-07-12 PROCEDURE — 6360000002 HC RX W HCPCS

## 2024-07-12 PROCEDURE — 3600000004 HC SURGERY LEVEL 4 BASE: Performed by: SURGERY

## 2024-07-12 PROCEDURE — 3700000000 HC ANESTHESIA ATTENDED CARE: Performed by: SURGERY

## 2024-07-12 PROCEDURE — 2500000003 HC RX 250 WO HCPCS

## 2024-07-12 PROCEDURE — 7100000000 HC PACU RECOVERY - FIRST 15 MIN: Performed by: SURGERY

## 2024-07-12 PROCEDURE — 3600000014 HC SURGERY LEVEL 4 ADDTL 15MIN: Performed by: SURGERY

## 2024-07-12 PROCEDURE — 7100000010 HC PHASE II RECOVERY - FIRST 15 MIN: Performed by: SURGERY

## 2024-07-12 PROCEDURE — 6360000002 HC RX W HCPCS: Performed by: SURGERY

## 2024-07-12 PROCEDURE — 6360000002 HC RX W HCPCS: Performed by: INTERNAL MEDICINE

## 2024-07-12 PROCEDURE — 6360000002 HC RX W HCPCS: Performed by: EMERGENCY MEDICINE

## 2024-07-12 PROCEDURE — 2580000003 HC RX 258: Performed by: EMERGENCY MEDICINE

## 2024-07-12 PROCEDURE — 2W17X6Z COMPRESSION OF LEFT INGUINAL REGION USING PRESSURE DRESSING: ICD-10-PCS | Performed by: SURGERY

## 2024-07-12 PROCEDURE — 7100000001 HC PACU RECOVERY - ADDTL 15 MIN: Performed by: SURGERY

## 2024-07-12 PROCEDURE — C1894 INTRO/SHEATH, NON-LASER: HCPCS | Performed by: SURGERY

## 2024-07-12 PROCEDURE — 0J9C0ZZ DRAINAGE OF PELVIC REGION SUBCUTANEOUS TISSUE AND FASCIA, OPEN APPROACH: ICD-10-PCS | Performed by: SURGERY

## 2024-07-12 PROCEDURE — 87205 SMEAR GRAM STAIN: CPT

## 2024-07-12 PROCEDURE — 2709999900 HC NON-CHARGEABLE SUPPLY: Performed by: SURGERY

## 2024-07-12 PROCEDURE — 99232 SBSQ HOSP IP/OBS MODERATE 35: CPT | Performed by: STUDENT IN AN ORGANIZED HEALTH CARE EDUCATION/TRAINING PROGRAM

## 2024-07-12 PROCEDURE — 87070 CULTURE OTHR SPECIMN AEROBIC: CPT

## 2024-07-12 PROCEDURE — 6370000000 HC RX 637 (ALT 250 FOR IP): Performed by: NURSE PRACTITIONER

## 2024-07-12 PROCEDURE — 87186 SC STD MICRODIL/AGAR DIL: CPT

## 2024-07-12 PROCEDURE — 2060000000 HC ICU INTERMEDIATE R&B

## 2024-07-12 PROCEDURE — 3700000001 HC ADD 15 MINUTES (ANESTHESIA): Performed by: SURGERY

## 2024-07-12 RX ORDER — SODIUM CHLORIDE 0.9 % (FLUSH) 0.9 %
5-40 SYRINGE (ML) INJECTION PRN
Status: DISCONTINUED | OUTPATIENT
Start: 2024-07-12 | End: 2024-07-12 | Stop reason: HOSPADM

## 2024-07-12 RX ORDER — VANCOMYCIN HYDROCHLORIDE 1 G/20ML
INJECTION, POWDER, LYOPHILIZED, FOR SOLUTION INTRAVENOUS
Status: DISPENSED
Start: 2024-07-12 | End: 2024-07-12

## 2024-07-12 RX ORDER — EPHEDRINE SULFATE/0.9% NACL/PF 25 MG/5 ML
SYRINGE (ML) INTRAVENOUS PRN
Status: DISCONTINUED | OUTPATIENT
Start: 2024-07-12 | End: 2024-07-12 | Stop reason: SDUPTHER

## 2024-07-12 RX ORDER — FENTANYL CITRATE 50 UG/ML
INJECTION, SOLUTION INTRAMUSCULAR; INTRAVENOUS PRN
Status: DISCONTINUED | OUTPATIENT
Start: 2024-07-12 | End: 2024-07-12 | Stop reason: SDUPTHER

## 2024-07-12 RX ORDER — PROCHLORPERAZINE EDISYLATE 5 MG/ML
5 INJECTION INTRAMUSCULAR; INTRAVENOUS
Status: DISCONTINUED | OUTPATIENT
Start: 2024-07-12 | End: 2024-07-12 | Stop reason: HOSPADM

## 2024-07-12 RX ORDER — PROPOFOL 10 MG/ML
INJECTION, EMULSION INTRAVENOUS PRN
Status: DISCONTINUED | OUTPATIENT
Start: 2024-07-12 | End: 2024-07-12 | Stop reason: SDUPTHER

## 2024-07-12 RX ORDER — HYDRALAZINE HYDROCHLORIDE 20 MG/ML
10 INJECTION INTRAMUSCULAR; INTRAVENOUS
Status: DISCONTINUED | OUTPATIENT
Start: 2024-07-12 | End: 2024-07-12 | Stop reason: HOSPADM

## 2024-07-12 RX ORDER — OXYCODONE HYDROCHLORIDE 5 MG/1
10 TABLET ORAL PRN
Status: DISCONTINUED | OUTPATIENT
Start: 2024-07-12 | End: 2024-07-12 | Stop reason: HOSPADM

## 2024-07-12 RX ORDER — DIPHENHYDRAMINE HYDROCHLORIDE 50 MG/ML
12.5 INJECTION INTRAMUSCULAR; INTRAVENOUS
Status: DISCONTINUED | OUTPATIENT
Start: 2024-07-12 | End: 2024-07-12 | Stop reason: HOSPADM

## 2024-07-12 RX ORDER — DIPHENHYDRAMINE HYDROCHLORIDE 50 MG/ML
INJECTION INTRAMUSCULAR; INTRAVENOUS PRN
Status: DISCONTINUED | OUTPATIENT
Start: 2024-07-12 | End: 2024-07-12 | Stop reason: SDUPTHER

## 2024-07-12 RX ORDER — FENTANYL CITRATE 50 UG/ML
25 INJECTION, SOLUTION INTRAMUSCULAR; INTRAVENOUS EVERY 5 MIN PRN
Status: DISCONTINUED | OUTPATIENT
Start: 2024-07-12 | End: 2024-07-12 | Stop reason: HOSPADM

## 2024-07-12 RX ORDER — MAGNESIUM HYDROXIDE 1200 MG/15ML
LIQUID ORAL CONTINUOUS PRN
Status: COMPLETED | OUTPATIENT
Start: 2024-07-12 | End: 2024-07-12

## 2024-07-12 RX ORDER — ONDANSETRON 2 MG/ML
INJECTION INTRAMUSCULAR; INTRAVENOUS PRN
Status: DISCONTINUED | OUTPATIENT
Start: 2024-07-12 | End: 2024-07-12 | Stop reason: SDUPTHER

## 2024-07-12 RX ORDER — LABETALOL HYDROCHLORIDE 5 MG/ML
10 INJECTION, SOLUTION INTRAVENOUS
Status: DISCONTINUED | OUTPATIENT
Start: 2024-07-12 | End: 2024-07-12 | Stop reason: HOSPADM

## 2024-07-12 RX ORDER — NALOXONE HYDROCHLORIDE 0.4 MG/ML
INJECTION, SOLUTION INTRAMUSCULAR; INTRAVENOUS; SUBCUTANEOUS PRN
Status: DISCONTINUED | OUTPATIENT
Start: 2024-07-12 | End: 2024-07-12 | Stop reason: HOSPADM

## 2024-07-12 RX ORDER — OXYCODONE HYDROCHLORIDE 5 MG/1
5 TABLET ORAL PRN
Status: DISCONTINUED | OUTPATIENT
Start: 2024-07-12 | End: 2024-07-12 | Stop reason: HOSPADM

## 2024-07-12 RX ORDER — DROPERIDOL 2.5 MG/ML
0.62 INJECTION, SOLUTION INTRAMUSCULAR; INTRAVENOUS
Status: DISCONTINUED | OUTPATIENT
Start: 2024-07-12 | End: 2024-07-12 | Stop reason: HOSPADM

## 2024-07-12 RX ORDER — LORAZEPAM 2 MG/ML
0.5 INJECTION INTRAMUSCULAR
Status: DISCONTINUED | OUTPATIENT
Start: 2024-07-12 | End: 2024-07-12 | Stop reason: HOSPADM

## 2024-07-12 RX ORDER — SODIUM CHLORIDE 9 MG/ML
INJECTION, SOLUTION INTRAVENOUS PRN
Status: DISCONTINUED | OUTPATIENT
Start: 2024-07-12 | End: 2024-07-12 | Stop reason: HOSPADM

## 2024-07-12 RX ORDER — SODIUM CHLORIDE 0.9 % (FLUSH) 0.9 %
5-40 SYRINGE (ML) INJECTION EVERY 12 HOURS SCHEDULED
Status: DISCONTINUED | OUTPATIENT
Start: 2024-07-12 | End: 2024-07-12 | Stop reason: HOSPADM

## 2024-07-12 RX ORDER — LIDOCAINE HYDROCHLORIDE 10 MG/ML
INJECTION, SOLUTION EPIDURAL; INFILTRATION; INTRACAUDAL; PERINEURAL PRN
Status: DISCONTINUED | OUTPATIENT
Start: 2024-07-12 | End: 2024-07-12 | Stop reason: SDUPTHER

## 2024-07-12 RX ADMIN — VANCOMYCIN HYDROCHLORIDE 750 MG: 750 INJECTION, POWDER, LYOPHILIZED, FOR SOLUTION INTRAVENOUS at 09:09

## 2024-07-12 RX ADMIN — TAMSULOSIN HYDROCHLORIDE 0.4 MG: 0.4 CAPSULE ORAL at 09:05

## 2024-07-12 RX ADMIN — ACETAMINOPHEN 650 MG: 325 TABLET ORAL at 00:29

## 2024-07-12 RX ADMIN — ONDANSETRON 4 MG: 2 INJECTION INTRAMUSCULAR; INTRAVENOUS at 11:30

## 2024-07-12 RX ADMIN — FENTANYL CITRATE 25 MCG: 50 INJECTION, SOLUTION INTRAMUSCULAR; INTRAVENOUS at 12:04

## 2024-07-12 RX ADMIN — EPHEDRINE SULFATE 10 MG: 5 INJECTION INTRAVENOUS at 10:55

## 2024-07-12 RX ADMIN — LAMIVUDINE AND ZIDOVUDINE 1 TABLET: 150; 300 TABLET, FILM COATED ORAL at 21:41

## 2024-07-12 RX ADMIN — DIPHENHYDRAMINE HYDROCHLORIDE 12.5 MG: 50 INJECTION INTRAMUSCULAR; INTRAVENOUS at 10:40

## 2024-07-12 RX ADMIN — LIDOCAINE HYDROCHLORIDE 50 MG: 10 INJECTION, SOLUTION EPIDURAL; INFILTRATION; INTRACAUDAL; PERINEURAL at 10:36

## 2024-07-12 RX ADMIN — PIPERACILLIN AND TAZOBACTAM 4500 MG: 4; .5 INJECTION, POWDER, LYOPHILIZED, FOR SOLUTION INTRAVENOUS at 04:38

## 2024-07-12 RX ADMIN — MAGNESIUM SULFATE HEPTAHYDRATE 2000 MG: 40 INJECTION, SOLUTION INTRAVENOUS at 00:36

## 2024-07-12 RX ADMIN — FLUOXETINE HYDROCHLORIDE 20 MG: 20 CAPSULE ORAL at 09:04

## 2024-07-12 RX ADMIN — SODIUM CHLORIDE: 9 INJECTION, SOLUTION INTRAVENOUS at 11:06

## 2024-07-12 RX ADMIN — FOSAMPRENAVIR CALCIUM 1400 MG: 700 TABLET, COATED ORAL at 21:45

## 2024-07-12 RX ADMIN — PIPERACILLIN AND TAZOBACTAM 4500 MG: 4; .5 INJECTION, POWDER, LYOPHILIZED, FOR SOLUTION INTRAVENOUS at 20:49

## 2024-07-12 RX ADMIN — FENTANYL CITRATE 100 MCG: 50 INJECTION, SOLUTION INTRAMUSCULAR; INTRAVENOUS at 10:36

## 2024-07-12 RX ADMIN — OLANZAPINE 20 MG: 10 TABLET, FILM COATED ORAL at 21:41

## 2024-07-12 RX ADMIN — ACETAMINOPHEN 650 MG: 325 TABLET ORAL at 18:18

## 2024-07-12 RX ADMIN — SODIUM CHLORIDE, PRESERVATIVE FREE 10 ML: 5 INJECTION INTRAVENOUS at 20:49

## 2024-07-12 RX ADMIN — ASPIRIN 81 MG 81 MG: 81 TABLET ORAL at 09:05

## 2024-07-12 RX ADMIN — MAGNESIUM SULFATE HEPTAHYDRATE 2000 MG: 40 INJECTION, SOLUTION INTRAVENOUS at 22:14

## 2024-07-12 RX ADMIN — SODIUM CHLORIDE, PRESERVATIVE FREE 10 ML: 5 INJECTION INTRAVENOUS at 09:05

## 2024-07-12 RX ADMIN — EPHEDRINE SULFATE 5 MG: 5 INJECTION INTRAVENOUS at 11:05

## 2024-07-12 RX ADMIN — PROPOFOL 150 MG: 10 INJECTION, EMULSION INTRAVENOUS at 10:36

## 2024-07-12 RX ADMIN — ATORVASTATIN CALCIUM 80 MG: 80 TABLET, FILM COATED ORAL at 21:42

## 2024-07-12 RX ADMIN — EPHEDRINE SULFATE 10 MG: 5 INJECTION INTRAVENOUS at 10:58

## 2024-07-12 RX ADMIN — FENOFIBRATE 160 MG: 160 TABLET ORAL at 09:04

## 2024-07-12 RX ADMIN — CETIRIZINE HYDROCHLORIDE 10 MG: 10 TABLET ORAL at 09:05

## 2024-07-12 RX ADMIN — ISOSORBIDE DINITRATE 20 MG: 10 TABLET ORAL at 09:04

## 2024-07-12 RX ADMIN — METOPROLOL TARTRATE 25 MG: 25 TABLET ORAL at 09:04

## 2024-07-12 RX ADMIN — ISOSORBIDE DINITRATE 20 MG: 10 TABLET ORAL at 21:41

## 2024-07-12 RX ADMIN — PANTOPRAZOLE SODIUM 40 MG: 40 TABLET, DELAYED RELEASE ORAL at 06:24

## 2024-07-12 RX ADMIN — METOPROLOL TARTRATE 25 MG: 25 TABLET ORAL at 21:42

## 2024-07-12 ASSESSMENT — COPD QUESTIONNAIRES: CAT_SEVERITY: MODERATE

## 2024-07-12 ASSESSMENT — PAIN DESCRIPTION - DESCRIPTORS: DESCRIPTORS: ACHING

## 2024-07-12 ASSESSMENT — PAIN DESCRIPTION - LOCATION: LOCATION: LEG

## 2024-07-12 ASSESSMENT — PAIN DESCRIPTION - ORIENTATION: ORIENTATION: LEFT

## 2024-07-12 ASSESSMENT — PAIN SCALES - GENERAL
PAINLEVEL_OUTOF10: 10
PAINLEVEL_OUTOF10: 10

## 2024-07-12 ASSESSMENT — ENCOUNTER SYMPTOMS: SHORTNESS OF BREATH: 1

## 2024-07-12 NOTE — CARE COORDINATION
Transitional planning    Call from Henrietta with [facility], will review patient after OR and after getting therapy evals. Plan for left groin washout in the OR today.

## 2024-07-12 NOTE — ANESTHESIA PRE PROCEDURE
Oliva Dafne, APRN - CNP   40 mg at 07/12/24 0624   • [MAR Hold] tamsulosin (FLOMAX) capsule 0.4 mg  0.4 mg Oral Daily Waterhouse, Shirley Ann, APRN - CNP   0.4 mg at 07/12/24 0905   • [MAR Hold] tiZANidine (ZANAFLEX) tablet 4 mg  4 mg Oral BID PRN Waterhouse, Shirley Ann, APRN - CNP       • [MAR Hold] sodium chloride flush 0.9 % injection 5-40 mL  5-40 mL IntraVENous 2 times per day Waterhouse, Shirley Ann, APRN - CNP   10 mL at 07/12/24 0905   • [MAR Hold] sodium chloride flush 0.9 % injection 5-40 mL  5-40 mL IntraVENous PRN Waterhouse, Shirley Ann, APRN - CNP       • [MAR Hold] 0.9 % sodium chloride infusion   IntraVENous PRN Waterhouse, Shirley Ann, APRN - CNP       • [MAR Hold] enoxaparin (LOVENOX) injection 40 mg  40 mg SubCUTAneous Daily Waterhouse, Shirley Ann, APRN - CNP   40 mg at 07/11/24 0937   • [MAR Hold] acetaminophen (TYLENOL) tablet 650 mg  650 mg Oral Q6H PRN Waterhouse, Shirley Ann, APRN - CNP   650 mg at 07/12/24 0029    Or   • [MAR Hold] acetaminophen (TYLENOL) suppository 650 mg  650 mg Rectal Q6H PRN Waterhouse, Shirley Ann, APRN - CNP       • [MAR Hold] 0.9 % sodium chloride infusion   IntraVENous Continuous Waterhouse, Shirley Ann, APRN - CNP 75 mL/hr at 07/12/24 1031 New Bag at 07/12/24 1106   • [MAR Hold] piperacillin-tazobactam (ZOSYN) 4,500 mg in sodium chloride 0.9 % 100 mL IVPB (mini-bag)  4,500 mg IntraVENous Q8H Iain Jack DO Stopped at 07/12/24 0953   • [MAR Hold] potassium chloride (KLOR-CON M) extended release tablet 40 mEq  40 mEq Oral PRN Giselle Norman MD        Or   • [MAR Hold] potassium bicarb-citric acid (EFFER-K) effervescent tablet 40 mEq  40 mEq Oral PRN Giselle Norman MD        Or   • [MAR Hold] potassium chloride 10 mEq/100 mL IVPB (Peripheral Line)  10 mEq IntraVENous PRN Giselle Norman MD       • [MAR Hold] magnesium sulfate 2000 mg in 50 mL IVPB premix  2,000 mg IntraVENous PRN Giselle Norman MD   Stopped at 07/12/24 0312   •

## 2024-07-12 NOTE — CARE COORDINATION
Transitional planning    Writer to room to discuss d/c plan, referrals were sent to [facility], [facility], [facility] (if needed), goal is home with home care, follow for needs,Wound vac to left groin at 75 mm Hg low continuous suction, paperwork in chart for MD to fill out , ps to vascular to make aware.

## 2024-07-12 NOTE — ANESTHESIA POSTPROCEDURE EVALUATION
Department of Anesthesiology  Postprocedure Note    Patient: David Vaca  MRN: [id number]  YOB: 1965  Date of evaluation: 7/12/2024    Procedure Summary       Date: 07/12/24 Room / Location: [place] [address] / [facility]    Anesthesia Start: 1031 Anesthesia Stop: 1153    Procedure: LEFT GROIN INCISION AND DRAINAGE, WOUND VA PLACEMENT (Left: Groin) Diagnosis:       Fluid collection at surgical site, initial encounter      (Fluid collection at surgical site, initial encounter [T88.8XXA])    Surgeons: Yen Montague MD Responsible Provider: Joni Lino MD    Anesthesia Type: MAC ASA Status: 3            Anesthesia Type: No value filed.    Jaskaran Phase I: Jaskaran Score: 10    Jaskaran Phase II:      Anesthesia Post Evaluation    Patient location during evaluation: PACU  Patient participation: complete - patient participated  Level of consciousness: awake and alert  Pain score: 2  Airway patency: patent  Nausea & Vomiting: no vomiting and no nausea  Cardiovascular status: hemodynamically stable  Respiratory status: acceptable  Hydration status: stable  Pain management: adequate    No notable events documented.

## 2024-07-12 NOTE — OP NOTE
Operative Note      Patient: David Vaca  YOB: 1965  MRN: [id number]    Date of Procedure: 7/12/2024    Pre-Op Diagnosis Codes:     * Fluid collection at surgical site, initial encounter [T88.8XXA]    Post-Op Diagnosis: Same       Procedure:  1) Incision and drainage of left groin measuring 7 x 2 x 3 cubic centimeters  2) Placement of negative pressure wound vac therapy <50 sq cm    Surgeon(s):  Yen Montague MD    Anesthesia: General    Estimated Blood Loss (mL): 13 ml    Complications: None    Specimens:   ID Type Source Tests Collected by Time Destination   1 : CULTURES OF LEFT GROIN FLUID Body Fluid Fluid CULTURE WITH SMEAR, ACID FAST BACILLIUS, CULTURE, ANAEROBIC AND AEROBIC Yen Montague MD 7/12/2024 1126        Implants: none      Drains: Wound vac to left groin at 75 mm Hg low continuous suction    Findings:  Infection Present At Time Of Surgery (PATOS) (choose all levels that have infection present): yellow tinged fluid draining from incision.    Other Findings: Necrotic subcutaneous fat that did not granulate, yellow tinged fluid, non-purulent noted; cultures sent.  The bypass graft was not exposed and the deep layer was well granulated without evidence of purulence.  Wound vac applied to help with secondary delayed closure.  Will plan on return to OR on Monday for re-evaluation.    Detailed Description of Procedure:     David Vaca was brought to the operating room for incision and drainage of left groin fluid collection and erythema after undergoing aortobifemroal bypass 6 weeks ago.  After appropriate general endotracheal anesthesia was delivered the left groin and upper thigh was prepped and draped in standard sterile fashion.  Timeout performed and agreed upon, systemic antibiotics had been given to patient earlier int he day.  There was some yellowish fluid draining from small opening and this opening was extended with spreading a hemostat.  The fluid was sent

## 2024-07-13 PROBLEM — L02.214 ABSCESS OF GROIN, LEFT: Status: ACTIVE | Noted: 2024-07-13

## 2024-07-13 PROBLEM — E83.42 HYPOMAGNESEMIA: Status: ACTIVE | Noted: 2024-07-13

## 2024-07-13 PROBLEM — N17.9 AKI (ACUTE KIDNEY INJURY) (HCC): Status: ACTIVE | Noted: 2024-07-13

## 2024-07-13 LAB
ANION GAP SERPL CALCULATED.3IONS-SCNC: 8 MMOL/L (ref 9–16)
BUN SERPL-MCNC: 12 MG/DL (ref 6–20)
CALCIUM SERPL-MCNC: 7.6 MG/DL (ref 8.6–10.4)
CHLORIDE SERPL-SCNC: 110 MMOL/L (ref 98–107)
CO2 SERPL-SCNC: 19 MMOL/L (ref 20–31)
CREAT SERPL-MCNC: 0.9 MG/DL (ref 0.7–1.2)
ERYTHROCYTE [DISTWIDTH] IN BLOOD BY AUTOMATED COUNT: 14.1 % (ref 11.8–14.4)
GFR, ESTIMATED: >90 ML/MIN/1.73M2
GLUCOSE SERPL-MCNC: 153 MG/DL (ref 74–99)
HCT VFR BLD AUTO: 25.8 % (ref 40.7–50.3)
HGB BLD-MCNC: 8.6 G/DL (ref 13–17)
MAGNESIUM SERPL-MCNC: 1.4 MG/DL (ref 1.6–2.6)
MAGNESIUM SERPL-MCNC: 1.7 MG/DL (ref 1.6–2.6)
MAGNESIUM SERPL-MCNC: 1.7 MG/DL (ref 1.6–2.6)
MAGNESIUM SERPL-MCNC: 1.9 MG/DL (ref 1.6–2.6)
MAGNESIUM SERPL-MCNC: 1.9 MG/DL (ref 1.6–2.6)
MCH RBC QN AUTO: 39.6 PG (ref 25.2–33.5)
MCHC RBC AUTO-ENTMCNC: 33.3 G/DL (ref 28.4–34.8)
MCV RBC AUTO: 118.9 FL (ref 82.6–102.9)
NRBC BLD-RTO: 0 PER 100 WBC
PLATELET # BLD AUTO: 298 K/UL (ref 138–453)
PMV BLD AUTO: 8.9 FL (ref 8.1–13.5)
POTASSIUM SERPL-SCNC: 3.4 MMOL/L (ref 3.7–5.3)
RBC # BLD AUTO: 2.17 M/UL (ref 4.21–5.77)
SODIUM SERPL-SCNC: 137 MMOL/L (ref 136–145)
WBC OTHER # BLD: 9.8 K/UL (ref 3.5–11.3)

## 2024-07-13 PROCEDURE — 6360000002 HC RX W HCPCS

## 2024-07-13 PROCEDURE — 83735 ASSAY OF MAGNESIUM: CPT

## 2024-07-13 PROCEDURE — 6370000000 HC RX 637 (ALT 250 FOR IP)

## 2024-07-13 PROCEDURE — 6360000002 HC RX W HCPCS: Performed by: NURSE PRACTITIONER

## 2024-07-13 PROCEDURE — 85027 COMPLETE CBC AUTOMATED: CPT

## 2024-07-13 PROCEDURE — 2580000003 HC RX 258

## 2024-07-13 PROCEDURE — 99232 SBSQ HOSP IP/OBS MODERATE 35: CPT | Performed by: INTERNAL MEDICINE

## 2024-07-13 PROCEDURE — 6360000002 HC RX W HCPCS: Performed by: INTERNAL MEDICINE

## 2024-07-13 PROCEDURE — 2060000000 HC ICU INTERMEDIATE R&B

## 2024-07-13 PROCEDURE — 80048 BASIC METABOLIC PNL TOTAL CA: CPT

## 2024-07-13 PROCEDURE — 94640 AIRWAY INHALATION TREATMENT: CPT

## 2024-07-13 PROCEDURE — 94761 N-INVAS EAR/PLS OXIMETRY MLT: CPT

## 2024-07-13 PROCEDURE — 99232 SBSQ HOSP IP/OBS MODERATE 35: CPT | Performed by: NURSE PRACTITIONER

## 2024-07-13 PROCEDURE — 36415 COLL VENOUS BLD VENIPUNCTURE: CPT

## 2024-07-13 RX ORDER — MAGNESIUM SULFATE 1 G/100ML
1000 INJECTION INTRAVENOUS ONCE
Status: COMPLETED | OUTPATIENT
Start: 2024-07-13 | End: 2024-07-13

## 2024-07-13 RX ORDER — ALBUTEROL SULFATE 2.5 MG/3ML
2.5 SOLUTION RESPIRATORY (INHALATION) EVERY 4 HOURS PRN
Status: DISCONTINUED | OUTPATIENT
Start: 2024-07-13 | End: 2024-07-17 | Stop reason: HOSPADM

## 2024-07-13 RX ADMIN — MAGNESIUM SULFATE HEPTAHYDRATE 1000 MG: 1 INJECTION, SOLUTION INTRAVENOUS at 08:58

## 2024-07-13 RX ADMIN — OLANZAPINE 20 MG: 10 TABLET, FILM COATED ORAL at 20:26

## 2024-07-13 RX ADMIN — ACETAMINOPHEN 650 MG: 325 TABLET ORAL at 01:52

## 2024-07-13 RX ADMIN — ENOXAPARIN SODIUM 40 MG: 100 INJECTION SUBCUTANEOUS at 08:15

## 2024-07-13 RX ADMIN — VANCOMYCIN HYDROCHLORIDE 750 MG: 750 INJECTION, POWDER, LYOPHILIZED, FOR SOLUTION INTRAVENOUS at 01:25

## 2024-07-13 RX ADMIN — LAMIVUDINE AND ZIDOVUDINE 1 TABLET: 150; 300 TABLET, FILM COATED ORAL at 20:27

## 2024-07-13 RX ADMIN — PANTOPRAZOLE SODIUM 40 MG: 40 TABLET, DELAYED RELEASE ORAL at 05:06

## 2024-07-13 RX ADMIN — TIZANIDINE 4 MG: 4 TABLET ORAL at 20:27

## 2024-07-13 RX ADMIN — METOPROLOL TARTRATE 25 MG: 25 TABLET ORAL at 20:27

## 2024-07-13 RX ADMIN — FLUOXETINE HYDROCHLORIDE 20 MG: 20 CAPSULE ORAL at 08:16

## 2024-07-13 RX ADMIN — ALBUTEROL SULFATE 2.5 MG: 2.5 SOLUTION RESPIRATORY (INHALATION) at 22:09

## 2024-07-13 RX ADMIN — ATORVASTATIN CALCIUM 80 MG: 80 TABLET, FILM COATED ORAL at 20:27

## 2024-07-13 RX ADMIN — VANCOMYCIN HYDROCHLORIDE 750 MG: 750 INJECTION, POWDER, LYOPHILIZED, FOR SOLUTION INTRAVENOUS at 13:15

## 2024-07-13 RX ADMIN — FENOFIBRATE 160 MG: 160 TABLET ORAL at 08:16

## 2024-07-13 RX ADMIN — SODIUM CHLORIDE, PRESERVATIVE FREE 10 ML: 5 INJECTION INTRAVENOUS at 20:25

## 2024-07-13 RX ADMIN — SODIUM CHLORIDE, PRESERVATIVE FREE 10 ML: 5 INJECTION INTRAVENOUS at 08:21

## 2024-07-13 RX ADMIN — POTASSIUM CHLORIDE 40 MEQ: 1500 TABLET, EXTENDED RELEASE ORAL at 13:17

## 2024-07-13 RX ADMIN — ACETAMINOPHEN 650 MG: 325 TABLET ORAL at 08:51

## 2024-07-13 RX ADMIN — CETIRIZINE HYDROCHLORIDE 10 MG: 10 TABLET ORAL at 08:15

## 2024-07-13 RX ADMIN — PIPERACILLIN AND TAZOBACTAM 4500 MG: 4; .5 INJECTION, POWDER, LYOPHILIZED, FOR SOLUTION INTRAVENOUS at 12:23

## 2024-07-13 RX ADMIN — LAMIVUDINE AND ZIDOVUDINE 1 TABLET: 150; 300 TABLET, FILM COATED ORAL at 08:16

## 2024-07-13 RX ADMIN — PIPERACILLIN AND TAZOBACTAM 4500 MG: 4; .5 INJECTION, POWDER, LYOPHILIZED, FOR SOLUTION INTRAVENOUS at 04:15

## 2024-07-13 RX ADMIN — ISOSORBIDE DINITRATE 20 MG: 10 TABLET ORAL at 20:26

## 2024-07-13 RX ADMIN — ISOSORBIDE DINITRATE 20 MG: 10 TABLET ORAL at 08:15

## 2024-07-13 RX ADMIN — FOSAMPRENAVIR CALCIUM 1400 MG: 700 TABLET, COATED ORAL at 08:20

## 2024-07-13 RX ADMIN — FOSAMPRENAVIR CALCIUM 1400 MG: 700 TABLET, COATED ORAL at 20:27

## 2024-07-13 RX ADMIN — ASPIRIN 81 MG 81 MG: 81 TABLET ORAL at 08:15

## 2024-07-13 RX ADMIN — TAMSULOSIN HYDROCHLORIDE 0.4 MG: 0.4 CAPSULE ORAL at 08:16

## 2024-07-13 RX ADMIN — METOPROLOL TARTRATE 25 MG: 25 TABLET ORAL at 08:16

## 2024-07-13 RX ADMIN — PIPERACILLIN AND TAZOBACTAM 4500 MG: 4; .5 INJECTION, POWDER, LYOPHILIZED, FOR SOLUTION INTRAVENOUS at 20:26

## 2024-07-13 ASSESSMENT — PAIN DESCRIPTION - LOCATION
LOCATION: HIP
LOCATION: LEG

## 2024-07-13 ASSESSMENT — PAIN DESCRIPTION - DESCRIPTORS
DESCRIPTORS: ACHING
DESCRIPTORS: BURNING

## 2024-07-13 ASSESSMENT — PAIN SCALES - GENERAL
PAINLEVEL_OUTOF10: 10
PAINLEVEL_OUTOF10: 4
PAINLEVEL_OUTOF10: 10

## 2024-07-13 ASSESSMENT — PAIN DESCRIPTION - ORIENTATION
ORIENTATION: LEFT
ORIENTATION: LEFT

## 2024-07-14 ENCOUNTER — APPOINTMENT (OUTPATIENT)
Dept: GENERAL RADIOLOGY | Age: 59
DRG: 862 | End: 2024-07-14
Payer: MEDICARE

## 2024-07-14 LAB
ANION GAP SERPL CALCULATED.3IONS-SCNC: 8 MMOL/L (ref 9–16)
BASOPHILS # BLD: 0.08 K/UL (ref 0–0.2)
BASOPHILS NFR BLD: 1 % (ref 0–2)
BUN SERPL-MCNC: 7 MG/DL (ref 6–20)
CALCIUM SERPL-MCNC: 7.8 MG/DL (ref 8.6–10.4)
CHLORIDE SERPL-SCNC: 112 MMOL/L (ref 98–107)
CO2 SERPL-SCNC: 19 MMOL/L (ref 20–31)
CREAT SERPL-MCNC: 0.9 MG/DL (ref 0.7–1.2)
EKG ATRIAL RATE: 89 BPM
EKG P AXIS: 54 DEGREES
EKG P-R INTERVAL: 130 MS
EKG Q-T INTERVAL: 368 MS
EKG QRS DURATION: 102 MS
EKG QTC CALCULATION (BAZETT): 447 MS
EKG R AXIS: 37 DEGREES
EKG T AXIS: 82 DEGREES
EKG VENTRICULAR RATE: 89 BPM
EOSINOPHIL # BLD: 0.23 K/UL (ref 0–0.44)
EOSINOPHILS RELATIVE PERCENT: 3 % (ref 1–4)
ERYTHROCYTE [DISTWIDTH] IN BLOOD BY AUTOMATED COUNT: 13.6 % (ref 11.8–14.4)
GFR, ESTIMATED: >90 ML/MIN/1.73M2
GLUCOSE SERPL-MCNC: 148 MG/DL (ref 74–99)
HCT VFR BLD AUTO: 27.7 % (ref 40.7–50.3)
HGB BLD-MCNC: 9.2 G/DL (ref 13–17)
IMM GRANULOCYTES # BLD AUTO: 0.08 K/UL (ref 0–0.3)
IMM GRANULOCYTES NFR BLD: 1 %
LYMPHOCYTES NFR BLD: 2.54 K/UL (ref 1.1–3.7)
LYMPHOCYTES RELATIVE PERCENT: 33 % (ref 24–43)
MAGNESIUM SERPL-MCNC: 1.9 MG/DL (ref 1.6–2.6)
MCH RBC QN AUTO: 38.7 PG (ref 25.2–33.5)
MCHC RBC AUTO-ENTMCNC: 33.2 G/DL (ref 28.4–34.8)
MCV RBC AUTO: 116.4 FL (ref 82.6–102.9)
MICROORGANISM SPEC CULT: ABNORMAL
MICROORGANISM SPEC CULT: ABNORMAL
MICROORGANISM/AGENT SPEC: ABNORMAL
MICROORGANISM/AGENT SPEC: ABNORMAL
MONOCYTES NFR BLD: 0.46 K/UL (ref 0.1–1.2)
MONOCYTES NFR BLD: 6 % (ref 3–12)
MORPHOLOGY: ABNORMAL
NEUTROPHILS NFR BLD: 56 % (ref 36–65)
NEUTS SEG NFR BLD: 4.31 K/UL (ref 1.5–8.1)
NRBC BLD-RTO: 0 PER 100 WBC
PLATELET # BLD AUTO: 319 K/UL (ref 138–453)
PMV BLD AUTO: 9.1 FL (ref 8.1–13.5)
POTASSIUM SERPL-SCNC: 4.4 MMOL/L (ref 3.7–5.3)
RBC # BLD AUTO: 2.38 M/UL (ref 4.21–5.77)
SERVICE CMNT-IMP: ABNORMAL
SODIUM SERPL-SCNC: 139 MMOL/L (ref 136–145)
SPECIMEN DESCRIPTION: ABNORMAL
VANCOMYCIN SERPL-MCNC: 13.4 UG/ML (ref 5–40)
WBC OTHER # BLD: 7.7 K/UL (ref 3.5–11.3)

## 2024-07-14 PROCEDURE — 6370000000 HC RX 637 (ALT 250 FOR IP)

## 2024-07-14 PROCEDURE — 6360000002 HC RX W HCPCS

## 2024-07-14 PROCEDURE — 85025 COMPLETE CBC W/AUTO DIFF WBC: CPT

## 2024-07-14 PROCEDURE — 94640 AIRWAY INHALATION TREATMENT: CPT

## 2024-07-14 PROCEDURE — 80048 BASIC METABOLIC PNL TOTAL CA: CPT

## 2024-07-14 PROCEDURE — 99231 SBSQ HOSP IP/OBS SF/LOW 25: CPT | Performed by: INTERNAL MEDICINE

## 2024-07-14 PROCEDURE — 2580000003 HC RX 258

## 2024-07-14 PROCEDURE — 94761 N-INVAS EAR/PLS OXIMETRY MLT: CPT

## 2024-07-14 PROCEDURE — 2060000000 HC ICU INTERMEDIATE R&B

## 2024-07-14 PROCEDURE — 71045 X-RAY EXAM CHEST 1 VIEW: CPT

## 2024-07-14 PROCEDURE — 83735 ASSAY OF MAGNESIUM: CPT

## 2024-07-14 PROCEDURE — 6360000002 HC RX W HCPCS: Performed by: NURSE PRACTITIONER

## 2024-07-14 PROCEDURE — 80202 ASSAY OF VANCOMYCIN: CPT

## 2024-07-14 PROCEDURE — 87506 IADNA-DNA/RNA PROBE TQ 6-11: CPT

## 2024-07-14 PROCEDURE — 36415 COLL VENOUS BLD VENIPUNCTURE: CPT

## 2024-07-14 RX ADMIN — FLUOXETINE HYDROCHLORIDE 20 MG: 20 CAPSULE ORAL at 08:27

## 2024-07-14 RX ADMIN — ASPIRIN 81 MG 81 MG: 81 TABLET ORAL at 09:31

## 2024-07-14 RX ADMIN — LAMIVUDINE AND ZIDOVUDINE 1 TABLET: 150; 300 TABLET, FILM COATED ORAL at 21:35

## 2024-07-14 RX ADMIN — METOPROLOL TARTRATE 25 MG: 25 TABLET ORAL at 09:31

## 2024-07-14 RX ADMIN — ISOSORBIDE DINITRATE 20 MG: 10 TABLET ORAL at 21:35

## 2024-07-14 RX ADMIN — FOSAMPRENAVIR CALCIUM 1400 MG: 700 TABLET, COATED ORAL at 21:35

## 2024-07-14 RX ADMIN — TAMSULOSIN HYDROCHLORIDE 0.4 MG: 0.4 CAPSULE ORAL at 08:27

## 2024-07-14 RX ADMIN — ISOSORBIDE DINITRATE 20 MG: 10 TABLET ORAL at 08:26

## 2024-07-14 RX ADMIN — ATORVASTATIN CALCIUM 80 MG: 80 TABLET, FILM COATED ORAL at 21:35

## 2024-07-14 RX ADMIN — SODIUM CHLORIDE, PRESERVATIVE FREE 10 ML: 5 INJECTION INTRAVENOUS at 21:40

## 2024-07-14 RX ADMIN — LAMIVUDINE AND ZIDOVUDINE 1 TABLET: 150; 300 TABLET, FILM COATED ORAL at 08:26

## 2024-07-14 RX ADMIN — SODIUM CHLORIDE, PRESERVATIVE FREE 10 ML: 5 INJECTION INTRAVENOUS at 09:31

## 2024-07-14 RX ADMIN — MAGNESIUM SULFATE HEPTAHYDRATE 2000 MG: 40 INJECTION, SOLUTION INTRAVENOUS at 01:05

## 2024-07-14 RX ADMIN — VANCOMYCIN HYDROCHLORIDE 750 MG: 750 INJECTION, POWDER, LYOPHILIZED, FOR SOLUTION INTRAVENOUS at 01:08

## 2024-07-14 RX ADMIN — FOSAMPRENAVIR CALCIUM 1400 MG: 700 TABLET, COATED ORAL at 08:27

## 2024-07-14 RX ADMIN — ENOXAPARIN SODIUM 40 MG: 100 INJECTION SUBCUTANEOUS at 08:26

## 2024-07-14 RX ADMIN — METOPROLOL TARTRATE 25 MG: 25 TABLET ORAL at 21:35

## 2024-07-14 RX ADMIN — ALBUTEROL SULFATE 2.5 MG: 2.5 SOLUTION RESPIRATORY (INHALATION) at 15:36

## 2024-07-14 RX ADMIN — CETIRIZINE HYDROCHLORIDE 10 MG: 10 TABLET ORAL at 08:27

## 2024-07-14 RX ADMIN — PIPERACILLIN AND TAZOBACTAM 4500 MG: 4; .5 INJECTION, POWDER, LYOPHILIZED, FOR SOLUTION INTRAVENOUS at 04:21

## 2024-07-14 RX ADMIN — TIZANIDINE 4 MG: 4 TABLET ORAL at 21:35

## 2024-07-14 RX ADMIN — PANTOPRAZOLE SODIUM 40 MG: 40 TABLET, DELAYED RELEASE ORAL at 08:27

## 2024-07-14 RX ADMIN — VANCOMYCIN HYDROCHLORIDE 750 MG: 750 INJECTION, POWDER, LYOPHILIZED, FOR SOLUTION INTRAVENOUS at 12:48

## 2024-07-14 RX ADMIN — FENOFIBRATE 160 MG: 160 TABLET ORAL at 08:27

## 2024-07-14 RX ADMIN — PIPERACILLIN AND TAZOBACTAM 4500 MG: 4; .5 INJECTION, POWDER, LYOPHILIZED, FOR SOLUTION INTRAVENOUS at 12:47

## 2024-07-14 NOTE — FLOWSHEET NOTE
MD made aware @ 1630:    This pt seems to have developed some exp wheezing- seems upper airway, resp did do some albuteral which helped a tad, however it does seem to be new onset since his nap @ 1300 hour. VSS, no SOB etc.   CXR ORDER obtained

## 2024-07-15 ENCOUNTER — ANESTHESIA (OUTPATIENT)
Dept: OPERATING ROOM | Age: 59
DRG: 862 | End: 2024-07-15
Payer: MEDICARE

## 2024-07-15 ENCOUNTER — ANESTHESIA EVENT (OUTPATIENT)
Dept: OPERATING ROOM | Age: 59
DRG: 862 | End: 2024-07-15
Payer: MEDICARE

## 2024-07-15 PROBLEM — R79.89 ELEVATED TROPONIN: Status: RESOLVED | Noted: 2024-06-15 | Resolved: 2024-07-15

## 2024-07-15 LAB
ANION GAP SERPL CALCULATED.3IONS-SCNC: 10 MMOL/L (ref 9–16)
BACTERIA BLD AEROBE CULT: NORMAL
BACTERIA BLD AEROBE CULT: NORMAL
BASOPHILS # BLD: 0.09 K/UL (ref 0–0.2)
BASOPHILS NFR BLD: 1 % (ref 0–2)
BUN SERPL-MCNC: 7 MG/DL (ref 6–20)
CALCIUM SERPL-MCNC: 8.1 MG/DL (ref 8.6–10.4)
CAMPYLOBACTER DNA SPEC NAA+PROBE: NORMAL
CHLORIDE SERPL-SCNC: 111 MMOL/L (ref 98–107)
CO2 SERPL-SCNC: 19 MMOL/L (ref 20–31)
CREAT SERPL-MCNC: 0.9 MG/DL (ref 0.7–1.2)
EOSINOPHIL # BLD: 0.44 K/UL (ref 0–0.44)
EOSINOPHILS RELATIVE PERCENT: 5 % (ref 1–4)
ERYTHROCYTE [DISTWIDTH] IN BLOOD BY AUTOMATED COUNT: 13.5 % (ref 11.8–14.4)
ETEC ELTA+ESTB GENES STL QL NAA+PROBE: NORMAL
GFR, ESTIMATED: >90 ML/MIN/1.73M2
GLUCOSE SERPL-MCNC: 87 MG/DL (ref 74–99)
HCT VFR BLD AUTO: 27.8 % (ref 40.7–50.3)
HGB BLD-MCNC: 8.9 G/DL (ref 13–17)
IMM GRANULOCYTES # BLD AUTO: 0.09 K/UL (ref 0–0.3)
IMM GRANULOCYTES NFR BLD: 1 %
LYMPHOCYTES NFR BLD: 2.82 K/UL (ref 1.1–3.7)
LYMPHOCYTES RELATIVE PERCENT: 32 % (ref 24–43)
MCH RBC QN AUTO: 38.5 PG (ref 25.2–33.5)
MCHC RBC AUTO-ENTMCNC: 32 G/DL (ref 28.4–34.8)
MCV RBC AUTO: 120.3 FL (ref 82.6–102.9)
MONOCYTES NFR BLD: 0.62 K/UL (ref 0.1–1.2)
MONOCYTES NFR BLD: 7 % (ref 3–12)
MORPHOLOGY: ABNORMAL
NEUTROPHILS NFR BLD: 54 % (ref 36–65)
NEUTS SEG NFR BLD: 4.74 K/UL (ref 1.5–8.1)
NRBC BLD-RTO: 0.2 PER 100 WBC
P SHIGELLOIDES DNA STL QL NAA+PROBE: NORMAL
PLATELET # BLD AUTO: 317 K/UL (ref 138–453)
PMV BLD AUTO: 8.9 FL (ref 8.1–13.5)
POTASSIUM SERPL-SCNC: 4.4 MMOL/L (ref 3.7–5.3)
RBC # BLD AUTO: 2.31 M/UL (ref 4.21–5.77)
SALMONELLA DNA SPEC QL NAA+PROBE: NORMAL
SHIGA TOXIN STX GENE SPEC NAA+PROBE: NORMAL
SHIGELLA DNA SPEC QL NAA+PROBE: NORMAL
SODIUM SERPL-SCNC: 140 MMOL/L (ref 136–145)
SPECIMEN DESCRIPTION: NORMAL
V CHOL+PARA RFBL+TRKH+TNAA STL QL NAA+PR: NORMAL
WBC OTHER # BLD: 8.8 K/UL (ref 3.5–11.3)
Y ENTERO RECN STL QL NAA+PROBE: NORMAL

## 2024-07-15 PROCEDURE — 6370000000 HC RX 637 (ALT 250 FOR IP)

## 2024-07-15 PROCEDURE — 7100000011 HC PHASE II RECOVERY - ADDTL 15 MIN: Performed by: SURGERY

## 2024-07-15 PROCEDURE — 36415 COLL VENOUS BLD VENIPUNCTURE: CPT

## 2024-07-15 PROCEDURE — 02HV33Z INSERTION OF INFUSION DEVICE INTO SUPERIOR VENA CAVA, PERCUTANEOUS APPROACH: ICD-10-PCS | Performed by: SURGERY

## 2024-07-15 PROCEDURE — 3600000014 HC SURGERY LEVEL 4 ADDTL 15MIN: Performed by: SURGERY

## 2024-07-15 PROCEDURE — 2W07X6Z CHANGE PRESSURE DRESSING ON LEFT INGUINAL REGION: ICD-10-PCS | Performed by: SURGERY

## 2024-07-15 PROCEDURE — 80048 BASIC METABOLIC PNL TOTAL CA: CPT

## 2024-07-15 PROCEDURE — 2060000000 HC ICU INTERMEDIATE R&B

## 2024-07-15 PROCEDURE — 76937 US GUIDE VASCULAR ACCESS: CPT

## 2024-07-15 PROCEDURE — 2580000003 HC RX 258

## 2024-07-15 PROCEDURE — C1894 INTRO/SHEATH, NON-LASER: HCPCS | Performed by: SURGERY

## 2024-07-15 PROCEDURE — 7100000010 HC PHASE II RECOVERY - FIRST 15 MIN: Performed by: SURGERY

## 2024-07-15 PROCEDURE — C1751 CATH, INF, PER/CENT/MIDLINE: HCPCS

## 2024-07-15 PROCEDURE — 6360000002 HC RX W HCPCS

## 2024-07-15 PROCEDURE — 6370000000 HC RX 637 (ALT 250 FOR IP): Performed by: NURSE PRACTITIONER

## 2024-07-15 PROCEDURE — 85025 COMPLETE CBC W/AUTO DIFF WBC: CPT

## 2024-07-15 PROCEDURE — 3600000004 HC SURGERY LEVEL 4 BASE: Performed by: SURGERY

## 2024-07-15 PROCEDURE — 3700000001 HC ADD 15 MINUTES (ANESTHESIA): Performed by: SURGERY

## 2024-07-15 PROCEDURE — 3700000000 HC ANESTHESIA ATTENDED CARE: Performed by: SURGERY

## 2024-07-15 PROCEDURE — 2709999900 HC NON-CHARGEABLE SUPPLY: Performed by: SURGERY

## 2024-07-15 PROCEDURE — 99232 SBSQ HOSP IP/OBS MODERATE 35: CPT | Performed by: NURSE PRACTITIONER

## 2024-07-15 PROCEDURE — 2500000003 HC RX 250 WO HCPCS

## 2024-07-15 PROCEDURE — 2580000003 HC RX 258: Performed by: SURGERY

## 2024-07-15 PROCEDURE — 36569 INSJ PICC 5 YR+ W/O IMAGING: CPT

## 2024-07-15 RX ORDER — SODIUM CHLORIDE 9 MG/ML
INJECTION, SOLUTION INTRAVENOUS PRN
Status: DISCONTINUED | OUTPATIENT
Start: 2024-07-15 | End: 2024-07-17 | Stop reason: HOSPADM

## 2024-07-15 RX ORDER — SODIUM CHLORIDE 0.9 % (FLUSH) 0.9 %
5-40 SYRINGE (ML) INJECTION EVERY 12 HOURS SCHEDULED
Status: DISCONTINUED | OUTPATIENT
Start: 2024-07-15 | End: 2024-07-17 | Stop reason: HOSPADM

## 2024-07-15 RX ORDER — LIDOCAINE HYDROCHLORIDE 10 MG/ML
INJECTION, SOLUTION EPIDURAL; INFILTRATION; INTRACAUDAL; PERINEURAL PRN
Status: DISCONTINUED | OUTPATIENT
Start: 2024-07-15 | End: 2024-07-15 | Stop reason: SDUPTHER

## 2024-07-15 RX ORDER — MAGNESIUM HYDROXIDE 1200 MG/15ML
LIQUID ORAL CONTINUOUS PRN
Status: COMPLETED | OUTPATIENT
Start: 2024-07-15 | End: 2024-07-15

## 2024-07-15 RX ORDER — SODIUM CHLORIDE 0.9 % (FLUSH) 0.9 %
5-40 SYRINGE (ML) INJECTION PRN
Status: DISCONTINUED | OUTPATIENT
Start: 2024-07-15 | End: 2024-07-17 | Stop reason: HOSPADM

## 2024-07-15 RX ORDER — SODIUM CHLORIDE 9 MG/ML
INJECTION, SOLUTION INTRAVENOUS CONTINUOUS PRN
Status: DISCONTINUED | OUTPATIENT
Start: 2024-07-15 | End: 2024-07-15 | Stop reason: SDUPTHER

## 2024-07-15 RX ORDER — HYDROCODONE BITARTRATE AND ACETAMINOPHEN 5; 325 MG/1; MG/1
1 TABLET ORAL ONCE
Status: COMPLETED | OUTPATIENT
Start: 2024-07-15 | End: 2024-07-15

## 2024-07-15 RX ORDER — MIDAZOLAM HYDROCHLORIDE 1 MG/ML
INJECTION INTRAMUSCULAR; INTRAVENOUS PRN
Status: DISCONTINUED | OUTPATIENT
Start: 2024-07-15 | End: 2024-07-15 | Stop reason: SDUPTHER

## 2024-07-15 RX ORDER — FENTANYL CITRATE 50 UG/ML
INJECTION, SOLUTION INTRAMUSCULAR; INTRAVENOUS PRN
Status: DISCONTINUED | OUTPATIENT
Start: 2024-07-15 | End: 2024-07-15 | Stop reason: SDUPTHER

## 2024-07-15 RX ORDER — PROPOFOL 10 MG/ML
INJECTION, EMULSION INTRAVENOUS CONTINUOUS PRN
Status: DISCONTINUED | OUTPATIENT
Start: 2024-07-15 | End: 2024-07-15 | Stop reason: SDUPTHER

## 2024-07-15 RX ADMIN — ACETAMINOPHEN 650 MG: 325 TABLET ORAL at 16:35

## 2024-07-15 RX ADMIN — FLUOXETINE HYDROCHLORIDE 20 MG: 20 CAPSULE ORAL at 13:58

## 2024-07-15 RX ADMIN — OLANZAPINE 20 MG: 10 TABLET, FILM COATED ORAL at 21:06

## 2024-07-15 RX ADMIN — LAMIVUDINE AND ZIDOVUDINE 1 TABLET: 150; 300 TABLET, FILM COATED ORAL at 20:58

## 2024-07-15 RX ADMIN — FENTANYL CITRATE 25 MCG: 50 INJECTION, SOLUTION INTRAMUSCULAR; INTRAVENOUS at 12:22

## 2024-07-15 RX ADMIN — HYDROCODONE BITARTRATE AND ACETAMINOPHEN 1 TABLET: 5; 325 TABLET ORAL at 20:58

## 2024-07-15 RX ADMIN — ASPIRIN 81 MG 81 MG: 81 TABLET ORAL at 08:50

## 2024-07-15 RX ADMIN — ENOXAPARIN SODIUM 40 MG: 100 INJECTION SUBCUTANEOUS at 08:50

## 2024-07-15 RX ADMIN — PIPERACILLIN AND TAZOBACTAM 4500 MG: 4; .5 INJECTION, POWDER, LYOPHILIZED, FOR SOLUTION INTRAVENOUS at 16:45

## 2024-07-15 RX ADMIN — OLANZAPINE 20 MG: 10 TABLET, FILM COATED ORAL at 01:03

## 2024-07-15 RX ADMIN — CETIRIZINE HYDROCHLORIDE 10 MG: 10 TABLET ORAL at 08:50

## 2024-07-15 RX ADMIN — TAMSULOSIN HYDROCHLORIDE 0.4 MG: 0.4 CAPSULE ORAL at 08:50

## 2024-07-15 RX ADMIN — FOSAMPRENAVIR CALCIUM 1400 MG: 700 TABLET, COATED ORAL at 20:57

## 2024-07-15 RX ADMIN — FENOFIBRATE 160 MG: 160 TABLET ORAL at 08:50

## 2024-07-15 RX ADMIN — MIDAZOLAM 2 MG: 1 INJECTION INTRAMUSCULAR; INTRAVENOUS at 12:19

## 2024-07-15 RX ADMIN — FOSAMPRENAVIR CALCIUM 1400 MG: 700 TABLET, COATED ORAL at 08:50

## 2024-07-15 RX ADMIN — ISOSORBIDE DINITRATE 20 MG: 10 TABLET ORAL at 08:50

## 2024-07-15 RX ADMIN — TIZANIDINE 4 MG: 4 TABLET ORAL at 16:35

## 2024-07-15 RX ADMIN — SODIUM CHLORIDE: 9 INJECTION, SOLUTION INTRAVENOUS at 12:15

## 2024-07-15 RX ADMIN — METOPROLOL TARTRATE 25 MG: 25 TABLET ORAL at 08:50

## 2024-07-15 RX ADMIN — SODIUM CHLORIDE, PRESERVATIVE FREE 10 ML: 5 INJECTION INTRAVENOUS at 08:51

## 2024-07-15 RX ADMIN — PANTOPRAZOLE SODIUM 40 MG: 40 TABLET, DELAYED RELEASE ORAL at 08:50

## 2024-07-15 RX ADMIN — PROPOFOL 100 MCG/KG/MIN: 10 INJECTION, EMULSION INTRAVENOUS at 12:22

## 2024-07-15 RX ADMIN — SODIUM CHLORIDE, PRESERVATIVE FREE 10 ML: 5 INJECTION INTRAVENOUS at 20:54

## 2024-07-15 RX ADMIN — LIDOCAINE HYDROCHLORIDE 50 MG: 10 INJECTION, SOLUTION EPIDURAL; INFILTRATION; INTRACAUDAL; PERINEURAL at 12:22

## 2024-07-15 RX ADMIN — PIPERACILLIN AND TAZOBACTAM 4500 MG: 4; .5 INJECTION, POWDER, LYOPHILIZED, FOR SOLUTION INTRAVENOUS at 01:03

## 2024-07-15 RX ADMIN — METOPROLOL TARTRATE 25 MG: 25 TABLET ORAL at 20:58

## 2024-07-15 RX ADMIN — ATORVASTATIN CALCIUM 80 MG: 80 TABLET, FILM COATED ORAL at 20:58

## 2024-07-15 RX ADMIN — LAMIVUDINE AND ZIDOVUDINE 1 TABLET: 150; 300 TABLET, FILM COATED ORAL at 08:50

## 2024-07-15 RX ADMIN — FENTANYL CITRATE 50 MCG: 50 INJECTION, SOLUTION INTRAMUSCULAR; INTRAVENOUS at 12:28

## 2024-07-15 RX ADMIN — FENTANYL CITRATE 25 MCG: 50 INJECTION, SOLUTION INTRAMUSCULAR; INTRAVENOUS at 12:25

## 2024-07-15 RX ADMIN — ISOSORBIDE DINITRATE 20 MG: 10 TABLET ORAL at 20:58

## 2024-07-15 RX ADMIN — PIPERACILLIN AND TAZOBACTAM 4500 MG: 4; .5 INJECTION, POWDER, LYOPHILIZED, FOR SOLUTION INTRAVENOUS at 08:57

## 2024-07-15 ASSESSMENT — ENCOUNTER SYMPTOMS
ABDOMINAL DISTENTION: 0
APNEA: 0
SHORTNESS OF BREATH: 1
EYE DISCHARGE: 0
COLOR CHANGE: 0

## 2024-07-15 ASSESSMENT — PAIN - FUNCTIONAL ASSESSMENT: PAIN_FUNCTIONAL_ASSESSMENT: PREVENTS OR INTERFERES SOME ACTIVE ACTIVITIES AND ADLS

## 2024-07-15 ASSESSMENT — PAIN SCALES - GENERAL
PAINLEVEL_OUTOF10: 8
PAINLEVEL_OUTOF10: 9
PAINLEVEL_OUTOF10: 0

## 2024-07-15 ASSESSMENT — PAIN DESCRIPTION - ORIENTATION: ORIENTATION: LEFT

## 2024-07-15 ASSESSMENT — COPD QUESTIONNAIRES: CAT_SEVERITY: MODERATE

## 2024-07-15 ASSESSMENT — PAIN DESCRIPTION - DESCRIPTORS: DESCRIPTORS: ACHING;DISCOMFORT

## 2024-07-15 ASSESSMENT — PAIN DESCRIPTION - LOCATION: LOCATION: GROIN

## 2024-07-15 NOTE — PROCEDURES
PROCEDURE NOTE  Date: 7/15/2024   Name: David Vaca  YOB: 1965    Procedures    Picc placement order:    Benefits include stable, long term intravenous access. Blood draws. Peripheral vein preservation. Safely deliver vesicant medications.    Risks include failure to obtain the desired result(s) of the procedure, discomfort, injury, the need for additional procedures/therapies, permanent loss of body function, bleeding/bruising, arterial puncture, air embolism, nerve damage, hematoma, phlebitis, catheter fracture/rupture, catheter embolism, catheter occlusion, catheter migration, catheter site infection, unintentional/accidental removal of catheter, bloodstream infection, infiltration, cardiac arrhythmia, vein thrombosis, difficult catheter removal.   Alternatives discussed including centrally inserted central catheter, as well as less invasive procedures such as multiple peripheral IVs, extended dwell catheters and midline placements.     Consent obtained by proceduralist, signed by Patient    Prescribed therapy: 6 weeks IV antibiotics per ID  Peripheral ultrasound assessment done. Plan for right brachial vein insertion.   Vein measurement = .34cm and area based CVR= 15.1%, preferable CVR based on area would be less than 20% (which correlates to less than 45% linear CVR).  Product type: Arrow non tapered power injectable PICC  History/Labs/Allergies Reviewed  Placed By: Yariel - RN IV Team  Assistant: Staff - RN  Time out Performed using Two Identifiers  Lot #: [id number]  Expiration date: 07/09/2025  Catheter info: 4 Sinhala - single lumen  Total length: 37 cm  External catheter length: 0 cm  Extremity circumference at site = 26 cm  Number of attempts: 1  Estimated blood loss: 1 ml  Placement verified by: positive blood return & flushes easily  Special equipment used: [facility] ECG Tip tracker system, ultrasound, MST, and micro-introducer needle.   Catheter securement: Adhesive 3M securement

## 2024-07-15 NOTE — CARE COORDINATION
Transitional Planning:   Possibly home with [facility]. Pt may need 6wks IV ATB, awaiting plan    1630- Message rec'd from Henrietta at [facility] stating they were tentatively interpreted in pt after they see therapy notes [phone]  1645- Called and LM for Henrietta to f/u on message rec'd, awaiting call back

## 2024-07-15 NOTE — OP NOTE
Operative Note      Patient: David Vaca  YOB: 1965  MRN: [id number]    Date of Procedure: 7/15/2024    Pre-Op Diagnosis Codes:     * Surgical site infection [T81.49XA]    Post-Op Diagnosis: Same       Procedure(s):  LEFT GROIN WASHOUT, WOUND VAC PLACEMENT, <50 sq cm    Surgeon(s):  Yen Montague MD Sharma, Kartikeya, MD PGY 4    Anesthesia: General    Estimated Blood Loss (mL): 1 ml    Complications: None    Specimens: none    Implants: none      Drains: wound vac to left groin    Findings:  Infection Present At Time Of Surgery (PATOS) (choose all levels that have infection present):  No infection present  Other Findings: wound looking very good, 95% granulation tissue.  No purulent material, measurement is 7 x 1 x 1 cubic cm    Plan:  Ok to discharge home with wound vac changes 2-3 times a week.  Per infectious disease patient will need IV antibiotics so ideally getting PICC line on him will help his discharge process.  Will arrange for outpatient follow up in the next 2-3 weeks.      Detailed Description of Procedure:     David Vaca was brought to the operating room for re-evaluation of left groin and wound vac change.  After appropriate anesthesia delivered the left groin wound vac was removed and prepped and draped in standard sterile fashion.  Timeout performed and agreed upon.  I evaluated the wound and it looked great.  It was not as deep and granulating very well with >95% granulation tissue and minimal fibrinous exudate.  There was no murky or purulent fluid, there was not tracking deep and the graft was not exposed.  Wound vac was replaced placing smaller amount sponge into it and obtaining good seal.  Wound vac was placed on low continuous suction at 75 mm Hg.    Electronically signed by Yen Montague MD on 7/15/2024 at 12:44 PM

## 2024-07-15 NOTE — ANESTHESIA PRE PROCEDURE
tablet 1,400 mg (Patient Supplied)  1,400 mg Oral BID Joe Davis MD   1,400 mg at 07/15/24 0850   • [MAR Hold] lamiVUDine-zidovudine (COMBIVIR) 150-300 MG per tablet 1 tablet  1 tablet Oral BID Joe Davis MD   1 tablet at 07/15/24 0850   • [MAR Hold] cetirizine (ZYRTEC) tablet 10 mg  10 mg Oral Daily Joe Davis MD   10 mg at 07/15/24 0850       Allergies:  No Known Allergies    Problem List:    Patient Active Problem List   Diagnosis Code   • HIV (human immunodeficiency virus infection) ([facility]) B20   • Chest pain R07.9   • Hyperlipidemia E78.5   • Depression F32.A   • Cardiac syndrome X ([facility]) I20.89   • Spondylosis of lumbar region without myelopathy or radiculopathy M47.816   • Sacroiliac inflammation ([facility]) M46.1   • Lumbar spondylosis M47.816   • Abdominal pain R10.9   • Abdominal pain, right upper quadrant R10.11   • Hyponatremia E87.1   • Critical ischemia of lower extremity ([facility]) I70.229   • Aortic thrombus ([facility]) I74.10   • S/P aortobifemoral bypass surgery Z95.828   • Debility R53.81   • Left foot drop M21.372   • Ischemic neuropathy of foot, left G57.92   • Elevated troponin R79.89   • Shortness of breath R06.02   • Physical deconditioning R53.81   • Septicemia ([facility]) A41.9   • Surgical site infection T81.49XA   • Abscess of groin, left L02.214   • KEHINDE (acute kidney injury) ([facility]) N17.9   • Hypomagnesemia E83.42       Past Medical History:        Diagnosis Date   • Arthritis 06/01/2016    LUMBAR SPINE    • COPD (chronic obstructive pulmonary disease) ([facility])     found in late 2010s   • Depression 2012   • Diabetes mellitus ([facility])     For 1 month in 2000's   • Dysphagia    • Emphysema lung ([facility]) 2023   • GERD (gastroesophageal reflux disease) 2000   • History of nephrolithiasis 2023   • HIV (human immunodeficiency virus infection) ([facility]) 1991   • Hx of blood clots 05/31/2024   • Hyperlipidemia     Diagnosed in 2000's   • Hypertension        Past Surgical History:        Procedure Laterality

## 2024-07-15 NOTE — DISCHARGE INSTR - COC
Patient Infection Status       None to display                     Nurse Assessment:  Last Vital Signs: /66   Pulse 89   Temp 98.2 °F (36.8 °C) (Oral)   Resp 22   Ht 1.676 m (5' 5.98\")   SpO2 94%   BMI 20.99 kg/m²     Last documented pain score (0-10 scale): Pain Level: 7  Last Weight:   Wt Readings from Last 1 Encounters:   07/10/24 59 kg (130 lb)     Mental Status:  oriented    IV Access:  - Peripheral IV - site  {Anatomy; iv placement site:[id number]}, insertion date: ***    Nursing Mobility/ADLs:  Walking   Assisted  Transfer  Assisted  Bathing  Independent  Dressing  Independent  Toileting  Assisted  Feeding  Assisted  Med Admin  Assisted  Med Delivery   whole    Wound Care Documentation and Therapy:  Negative Pressure Wound Therapy Groin (Active)   Wound Type Surgical 07/17/24 1424   Dressing Type Black Foam 07/17/24 1424   Target Pressure (mmHg) 75 07/17/24 1424   Dressing Status New dressing applied 07/17/24 1424   Dressing Changed Changed/New 07/17/24 1424   Number of days: 2       Wound 06/09/24 Ankle Left;Anterior (Active)   Wound Image   06/09/24 1940   Dressing Status Clean;Dry;Intact 07/17/24 1200   Wound Cleansed Other (Comment) 06/15/24 1100   Dressing/Treatment Roll gauze;Tape/Soft cloth adhesive tape 07/17/24 1200   Wound Assessment Other (Comment) 07/16/24 0400   Drainage Amount None (dry) 07/16/24 0400   Odor None 07/16/24 0400   Laina-wound Assessment Dry/flaky 06/12/24 2250   Margins Attached edges 06/15/24 1100   Number of days: 37       Incision 05/31/24 Pelvis Left (Active)   Wound Image   06/15/24 1100   Dressing Status Old drainage noted 06/18/24 1000   Dressing Change Due 06/17/24 06/17/24 2009   Incision Cleansed Soap and water 07/12/24 0400   Dressing/Treatment ABD pad 06/18/24 1000   Incision Length (cm) 9 06/09/24 1559   Incision Width (cm) 0 cm 06/09/24 1559   Incision Depth (cm) 0 cm 06/09/24 1559   Incision Volume (cm^3) 0 cm^3 06/09/24 1559   Closure Surgical glue

## 2024-07-15 NOTE — ANESTHESIA POSTPROCEDURE EVALUATION
Department of Anesthesiology  Postprocedure Note    Patient: David Vaca  MRN: [id number]  YOB: 1965  Date of evaluation: 7/15/2024    Procedure Summary       Date: 07/15/24 Room / Location: [place] [address] / [facility]    Anesthesia Start: 1215 Anesthesia Stop: 1246    Procedure: LEFT GROIN WASHOUT, WOUND VAC PLACEMENT (Left: Groin) Diagnosis:       Surgical site infection      (Surgical site infection [T81.49XA])    Surgeons: Yen Montague MD Responsible Provider: Joni Lino MD    Anesthesia Type: TIVA ASA Status: 3            Anesthesia Type: No value filed.    Jaskaran Phase I: Jaskaran Score: 10    Jaskaran Phase II:      Anesthesia Post Evaluation    Patient location during evaluation: PACU  Patient participation: complete - patient participated  Level of consciousness: awake and alert  Airway patency: patent  Nausea & Vomiting: no nausea and no vomiting  Cardiovascular status: blood pressure returned to baseline  Respiratory status: acceptable  Hydration status: euvolemic  Pain management: adequate    No notable events documented.

## 2024-07-16 LAB
ANION GAP SERPL CALCULATED.3IONS-SCNC: 8 MMOL/L (ref 9–16)
BUN SERPL-MCNC: 8 MG/DL (ref 6–20)
CALCIUM SERPL-MCNC: 9 MG/DL (ref 8.6–10.4)
CHLORIDE SERPL-SCNC: 107 MMOL/L (ref 98–107)
CO2 SERPL-SCNC: 25 MMOL/L (ref 20–31)
CREAT SERPL-MCNC: 1.1 MG/DL (ref 0.7–1.2)
ERYTHROCYTE [DISTWIDTH] IN BLOOD BY AUTOMATED COUNT: 13.6 % (ref 11.8–14.4)
GFR, ESTIMATED: 81 ML/MIN/1.73M2
GLUCOSE SERPL-MCNC: 92 MG/DL (ref 74–99)
HCT VFR BLD AUTO: 31.1 % (ref 40.7–50.3)
HGB BLD-MCNC: 10.3 G/DL (ref 13–17)
MCH RBC QN AUTO: 39 PG (ref 25.2–33.5)
MCHC RBC AUTO-ENTMCNC: 33.1 G/DL (ref 28.4–34.8)
MCV RBC AUTO: 117.8 FL (ref 82.6–102.9)
NRBC BLD-RTO: 0 PER 100 WBC
PLATELET # BLD AUTO: 311 K/UL (ref 138–453)
PMV BLD AUTO: 8.8 FL (ref 8.1–13.5)
POTASSIUM SERPL-SCNC: 4.8 MMOL/L (ref 3.7–5.3)
RBC # BLD AUTO: 2.64 M/UL (ref 4.21–5.77)
SODIUM SERPL-SCNC: 140 MMOL/L (ref 136–145)
WBC OTHER # BLD: 8 K/UL (ref 3.5–11.3)

## 2024-07-16 PROCEDURE — 6370000000 HC RX 637 (ALT 250 FOR IP): Performed by: NURSE PRACTITIONER

## 2024-07-16 PROCEDURE — 99232 SBSQ HOSP IP/OBS MODERATE 35: CPT | Performed by: INTERNAL MEDICINE

## 2024-07-16 PROCEDURE — 97530 THERAPEUTIC ACTIVITIES: CPT

## 2024-07-16 PROCEDURE — 97162 PT EVAL MOD COMPLEX 30 MIN: CPT

## 2024-07-16 PROCEDURE — 97166 OT EVAL MOD COMPLEX 45 MIN: CPT

## 2024-07-16 PROCEDURE — 6360000002 HC RX W HCPCS

## 2024-07-16 PROCEDURE — 97535 SELF CARE MNGMENT TRAINING: CPT

## 2024-07-16 PROCEDURE — 85027 COMPLETE CBC AUTOMATED: CPT

## 2024-07-16 PROCEDURE — 6370000000 HC RX 637 (ALT 250 FOR IP)

## 2024-07-16 PROCEDURE — 2580000003 HC RX 258

## 2024-07-16 PROCEDURE — 80048 BASIC METABOLIC PNL TOTAL CA: CPT

## 2024-07-16 PROCEDURE — 36415 COLL VENOUS BLD VENIPUNCTURE: CPT

## 2024-07-16 PROCEDURE — 2060000000 HC ICU INTERMEDIATE R&B

## 2024-07-16 PROCEDURE — 99232 SBSQ HOSP IP/OBS MODERATE 35: CPT | Performed by: NURSE PRACTITIONER

## 2024-07-16 RX ORDER — HYDROCODONE BITARTRATE AND ACETAMINOPHEN 5; 325 MG/1; MG/1
1 TABLET ORAL ONCE
Status: COMPLETED | OUTPATIENT
Start: 2024-07-16 | End: 2024-07-16

## 2024-07-16 RX ADMIN — SODIUM CHLORIDE, PRESERVATIVE FREE 10 ML: 5 INJECTION INTRAVENOUS at 10:02

## 2024-07-16 RX ADMIN — ISOSORBIDE DINITRATE 20 MG: 10 TABLET ORAL at 20:56

## 2024-07-16 RX ADMIN — ENOXAPARIN SODIUM 40 MG: 100 INJECTION SUBCUTANEOUS at 10:02

## 2024-07-16 RX ADMIN — PIPERACILLIN AND TAZOBACTAM 4500 MG: 4; .5 INJECTION, POWDER, LYOPHILIZED, FOR SOLUTION INTRAVENOUS at 10:14

## 2024-07-16 RX ADMIN — SODIUM CHLORIDE, PRESERVATIVE FREE 10 ML: 5 INJECTION INTRAVENOUS at 20:59

## 2024-07-16 RX ADMIN — FLUOXETINE HYDROCHLORIDE 20 MG: 20 CAPSULE ORAL at 10:01

## 2024-07-16 RX ADMIN — LAMIVUDINE AND ZIDOVUDINE 1 TABLET: 150; 300 TABLET, FILM COATED ORAL at 20:56

## 2024-07-16 RX ADMIN — OLANZAPINE 20 MG: 10 TABLET, FILM COATED ORAL at 20:56

## 2024-07-16 RX ADMIN — HYDROCODONE BITARTRATE AND ACETAMINOPHEN 1 TABLET: 5; 325 TABLET ORAL at 20:57

## 2024-07-16 RX ADMIN — TIZANIDINE 4 MG: 4 TABLET ORAL at 20:23

## 2024-07-16 RX ADMIN — PANTOPRAZOLE SODIUM 40 MG: 40 TABLET, DELAYED RELEASE ORAL at 10:00

## 2024-07-16 RX ADMIN — CETIRIZINE HYDROCHLORIDE 10 MG: 10 TABLET ORAL at 10:01

## 2024-07-16 RX ADMIN — PIPERACILLIN AND TAZOBACTAM 4500 MG: 4; .5 INJECTION, POWDER, LYOPHILIZED, FOR SOLUTION INTRAVENOUS at 17:42

## 2024-07-16 RX ADMIN — LAMIVUDINE AND ZIDOVUDINE 1 TABLET: 150; 300 TABLET, FILM COATED ORAL at 10:00

## 2024-07-16 RX ADMIN — FENOFIBRATE 160 MG: 160 TABLET ORAL at 10:01

## 2024-07-16 RX ADMIN — ACETAMINOPHEN 650 MG: 325 TABLET ORAL at 20:25

## 2024-07-16 RX ADMIN — ATORVASTATIN CALCIUM 80 MG: 80 TABLET, FILM COATED ORAL at 20:56

## 2024-07-16 RX ADMIN — FOSAMPRENAVIR CALCIUM 1400 MG: 700 TABLET, COATED ORAL at 20:58

## 2024-07-16 RX ADMIN — ISOSORBIDE DINITRATE 20 MG: 10 TABLET ORAL at 10:01

## 2024-07-16 RX ADMIN — METOPROLOL TARTRATE 25 MG: 25 TABLET ORAL at 10:01

## 2024-07-16 RX ADMIN — ASPIRIN 81 MG 81 MG: 81 TABLET ORAL at 10:02

## 2024-07-16 RX ADMIN — PIPERACILLIN AND TAZOBACTAM 4500 MG: 4; .5 INJECTION, POWDER, LYOPHILIZED, FOR SOLUTION INTRAVENOUS at 01:28

## 2024-07-16 RX ADMIN — TAMSULOSIN HYDROCHLORIDE 0.4 MG: 0.4 CAPSULE ORAL at 10:00

## 2024-07-16 RX ADMIN — SODIUM CHLORIDE, PRESERVATIVE FREE 10 ML: 5 INJECTION INTRAVENOUS at 20:57

## 2024-07-16 RX ADMIN — METOPROLOL TARTRATE 25 MG: 25 TABLET ORAL at 20:56

## 2024-07-16 RX ADMIN — FOSAMPRENAVIR CALCIUM 1400 MG: 700 TABLET, COATED ORAL at 10:02

## 2024-07-16 ASSESSMENT — PAIN SCALES - GENERAL
PAINLEVEL_OUTOF10: 7
PAINLEVEL_OUTOF10: 7
PAINLEVEL_OUTOF10: 8
PAINLEVEL_OUTOF10: 8
PAINLEVEL_OUTOF10: 7

## 2024-07-16 ASSESSMENT — PAIN DESCRIPTION - LOCATION: LOCATION: LEG

## 2024-07-16 NOTE — CARE COORDINATION
Transitional planning    Need therapy notes,, plan is [facility], has wound vac, also has referral to [facility] [place].        1000 writer to room to discuss d/c plan and plan for vac, would still like to return home with home care since gabriel hunt qualify for ARU or SNF..      1400 received script for IV ATB, to room to discuss process of home ATB infusion with patient no preference on infusion company and is willing to learn. Referral to [facility], call to teresita and updated. Call to [facility] in [place], spoke with Tamela, they can accept for [place] location, made aware of vac and IV ATB. Faxed vac paperwork to [facility], call to Leyla and updated.      1630 call from Teresita with [facility], patient covered at 100%, patient updated.      1700 PS sent to MD for picc line order.

## 2024-07-17 VITALS
OXYGEN SATURATION: 94 % | SYSTOLIC BLOOD PRESSURE: 111 MMHG | HEART RATE: 89 BPM | RESPIRATION RATE: 22 BRPM | BODY MASS INDEX: 20.99 KG/M2 | DIASTOLIC BLOOD PRESSURE: 66 MMHG | TEMPERATURE: 98.2 F | HEIGHT: 66 IN

## 2024-07-17 PROBLEM — D53.9 MACROCYTIC ANEMIA: Status: ACTIVE | Noted: 2024-07-17

## 2024-07-17 LAB
ANION GAP SERPL CALCULATED.3IONS-SCNC: 10 MMOL/L (ref 9–16)
BUN SERPL-MCNC: 11 MG/DL (ref 6–20)
CALCIUM SERPL-MCNC: 8.9 MG/DL (ref 8.6–10.4)
CHLORIDE SERPL-SCNC: 102 MMOL/L (ref 98–107)
CO2 SERPL-SCNC: 25 MMOL/L (ref 20–31)
CREAT SERPL-MCNC: 1 MG/DL (ref 0.7–1.2)
ERYTHROCYTE [DISTWIDTH] IN BLOOD BY AUTOMATED COUNT: 13.7 % (ref 11.8–14.4)
GFR, ESTIMATED: 84 ML/MIN/1.73M2
GLUCOSE SERPL-MCNC: 124 MG/DL (ref 74–99)
HCT VFR BLD AUTO: 30.6 % (ref 40.7–50.3)
HGB BLD-MCNC: 9.7 G/DL (ref 13–17)
MCH RBC QN AUTO: 39 PG (ref 25.2–33.5)
MCHC RBC AUTO-ENTMCNC: 31.7 G/DL (ref 28.4–34.8)
MCV RBC AUTO: 122.9 FL (ref 82.6–102.9)
NRBC BLD-RTO: 0 PER 100 WBC
PLATELET # BLD AUTO: 277 K/UL (ref 138–453)
PMV BLD AUTO: 9 FL (ref 8.1–13.5)
POTASSIUM SERPL-SCNC: 4.8 MMOL/L (ref 3.7–5.3)
RBC # BLD AUTO: 2.49 M/UL (ref 4.21–5.77)
SODIUM SERPL-SCNC: 137 MMOL/L (ref 136–145)
WBC OTHER # BLD: 6.9 K/UL (ref 3.5–11.3)

## 2024-07-17 PROCEDURE — 6370000000 HC RX 637 (ALT 250 FOR IP)

## 2024-07-17 PROCEDURE — 6360000002 HC RX W HCPCS: Performed by: INTERNAL MEDICINE

## 2024-07-17 PROCEDURE — 99232 SBSQ HOSP IP/OBS MODERATE 35: CPT | Performed by: NURSE PRACTITIONER

## 2024-07-17 PROCEDURE — 6370000000 HC RX 637 (ALT 250 FOR IP): Performed by: NURSE PRACTITIONER

## 2024-07-17 PROCEDURE — 97605 NEG PRS WND THER DME<=50SQCM: CPT

## 2024-07-17 PROCEDURE — 99232 SBSQ HOSP IP/OBS MODERATE 35: CPT | Performed by: INTERNAL MEDICINE

## 2024-07-17 PROCEDURE — 2580000003 HC RX 258

## 2024-07-17 PROCEDURE — 36415 COLL VENOUS BLD VENIPUNCTURE: CPT

## 2024-07-17 PROCEDURE — 6360000002 HC RX W HCPCS

## 2024-07-17 PROCEDURE — 80048 BASIC METABOLIC PNL TOTAL CA: CPT

## 2024-07-17 PROCEDURE — 85027 COMPLETE CBC AUTOMATED: CPT

## 2024-07-17 RX ORDER — LACTOBACILLUS RHAMNOSUS GG 10B CELL
1 CAPSULE ORAL
Status: DISCONTINUED | OUTPATIENT
Start: 2024-07-17 | End: 2024-07-17 | Stop reason: HOSPADM

## 2024-07-17 RX ADMIN — ASPIRIN 81 MG 81 MG: 81 TABLET ORAL at 09:18

## 2024-07-17 RX ADMIN — ISOSORBIDE DINITRATE 20 MG: 10 TABLET ORAL at 09:18

## 2024-07-17 RX ADMIN — TAMSULOSIN HYDROCHLORIDE 0.4 MG: 0.4 CAPSULE ORAL at 09:18

## 2024-07-17 RX ADMIN — FENOFIBRATE 160 MG: 160 TABLET ORAL at 09:18

## 2024-07-17 RX ADMIN — METOPROLOL TARTRATE 25 MG: 25 TABLET ORAL at 09:18

## 2024-07-17 RX ADMIN — SODIUM CHLORIDE, PRESERVATIVE FREE 10 ML: 5 INJECTION INTRAVENOUS at 09:19

## 2024-07-17 RX ADMIN — PANTOPRAZOLE SODIUM 40 MG: 40 TABLET, DELAYED RELEASE ORAL at 06:12

## 2024-07-17 RX ADMIN — CETIRIZINE HYDROCHLORIDE 10 MG: 10 TABLET ORAL at 09:18

## 2024-07-17 RX ADMIN — LAMIVUDINE AND ZIDOVUDINE 1 TABLET: 150; 300 TABLET, FILM COATED ORAL at 09:18

## 2024-07-17 RX ADMIN — FOSAMPRENAVIR CALCIUM 1400 MG: 700 TABLET, COATED ORAL at 09:18

## 2024-07-17 RX ADMIN — ENOXAPARIN SODIUM 40 MG: 100 INJECTION SUBCUTANEOUS at 09:18

## 2024-07-17 RX ADMIN — Medication 2000 MG: at 15:50

## 2024-07-17 RX ADMIN — PIPERACILLIN AND TAZOBACTAM 4500 MG: 4; .5 INJECTION, POWDER, LYOPHILIZED, FOR SOLUTION INTRAVENOUS at 01:03

## 2024-07-17 RX ADMIN — PIPERACILLIN AND TAZOBACTAM 4500 MG: 4; .5 INJECTION, POWDER, LYOPHILIZED, FOR SOLUTION INTRAVENOUS at 09:26

## 2024-07-17 RX ADMIN — FLUOXETINE HYDROCHLORIDE 20 MG: 20 CAPSULE ORAL at 09:18

## 2024-07-17 RX ADMIN — Medication 1 CAPSULE: at 09:50

## 2024-07-17 ASSESSMENT — ENCOUNTER SYMPTOMS
COLOR CHANGE: 0
ABDOMINAL DISTENTION: 0
APNEA: 0
EYE PAIN: 0
EYE REDNESS: 0
EYE DISCHARGE: 0

## 2024-07-17 NOTE — DISCHARGE SUMMARY
Discharge teaching and instructions completed with patient using teachback method. AVS reviewed. Patient voiced understanding regarding prescriptions, follow up appointments, and care of self at home. All questions answered.    Currently just waiting on wound vac to be delivered and switched over to.     1855: wound vac delivered and switched over.

## 2024-07-17 NOTE — PLAN OF CARE
Problem: Safety - Adult  Goal: Free from fall injury  7/11/2024 2313 by Hiren Clark, RN  Outcome: Progressing  Flowsheets (Taken 7/11/2024 2313)  Free From Fall Injury: Instruct family/caregiver on patient safety  7/11/2024 1037 by Sivakumar Gutiérrez, RN  Outcome: Progressing  Flowsheets (Taken 7/11/2024 1037)  Free From Fall Injury: Instruct family/caregiver on patient safety     Problem: ABCDS Injury Assessment  Goal: Absence of physical injury  Outcome: Progressing  Flowsheets (Taken 7/11/2024 2313)  Absence of Physical Injury: Implement safety measures based on patient assessment     Problem: Chronic Conditions and Co-morbidities  Goal: Patient's chronic conditions and co-morbidity symptoms are monitored and maintained or improved  Outcome: Progressing  Flowsheets (Taken 7/11/2024 2313)  Care Plan - Patient's Chronic Conditions and Co-Morbidity Symptoms are Monitored and Maintained or Improved:   Monitor and assess patient's chronic conditions and comorbid symptoms for stability, deterioration, or improvement   Collaborate with multidisciplinary team to address chronic and comorbid conditions and prevent exacerbation or deterioration   Update acute care plan with appropriate goals if chronic or comorbid symptoms are exacerbated and prevent overall improvement and discharge     
  Problem: Safety - Adult  Goal: Free from fall injury  7/13/2024 0923 by Vicki Solorzano RN  Outcome: Progressing  7/12/2024 2332 by Teri Rushing RN  Outcome: Progressing     Problem: ABCDS Injury Assessment  Goal: Absence of physical injury  7/13/2024 0923 by Vicki Solorzano RN  Outcome: Progressing  7/12/2024 2332 by Teri Rushing RN  Outcome: Progressing     Problem: Chronic Conditions and Co-morbidities  Goal: Patient's chronic conditions and co-morbidity symptoms are monitored and maintained or improved  7/13/2024 0923 by Vicki Soolrzano RN  Outcome: Progressing  Flowsheets (Taken 7/13/2024 0813)  Care Plan - Patient's Chronic Conditions and Co-Morbidity Symptoms are Monitored and Maintained or Improved:   Monitor and assess patient's chronic conditions and comorbid symptoms for stability, deterioration, or improvement   Collaborate with multidisciplinary team to address chronic and comorbid conditions and prevent exacerbation or deterioration   Update acute care plan with appropriate goals if chronic or comorbid symptoms are exacerbated and prevent overall improvement and discharge  7/12/2024 2332 by eTri Rushing RN  Outcome: Progressing     Problem: Skin/Tissue Integrity  Goal: Absence of new skin breakdown  Description: 1.  Monitor for areas of redness and/or skin breakdown  2.  Assess vascular access sites hourly  3.  Every 4-6 hours minimum:  Change oxygen saturation probe site  4.  Every 4-6 hours:  If on nasal continuous positive airway pressure, respiratory therapy assess nares and determine need for appliance change or resting period.  7/13/2024 0923 by Vicki Solorzano RN  Outcome: Progressing  7/12/2024 2332 by Teri Rushing RN  Outcome: Progressing     Problem: Pain  Goal: Verbalizes/displays adequate comfort level or baseline comfort level  7/13/2024 0923 by Vicki Solorzano RN  Outcome: Progressing  7/12/2024 2332 by Teri Rushing RN  Outcome: Progressing     Problem: Discharge 
  Problem: Safety - Adult  Goal: Free from fall injury  Outcome: Progressing     Problem: ABCDS Injury Assessment  Goal: Absence of physical injury  Outcome: Progressing     Problem: Chronic Conditions and Co-morbidities  Goal: Patient's chronic conditions and co-morbidity symptoms are monitored and maintained or improved  Outcome: Progressing     Problem: Skin/Tissue Integrity  Goal: Absence of new skin breakdown  Description: 1.  Monitor for areas of redness and/or skin breakdown  2.  Assess vascular access sites hourly  3.  Every 4-6 hours minimum:  Change oxygen saturation probe site  4.  Every 4-6 hours:  If on nasal continuous positive airway pressure, respiratory therapy assess nares and determine need for appliance change or resting period.  Outcome: Progressing     Problem: Pain  Goal: Verbalizes/displays adequate comfort level or baseline comfort level  Outcome: Progressing     Problem: Discharge Planning  Goal: Discharge to home or other facility with appropriate resources  Outcome: Progressing     
  Problem: Safety - Adult  Goal: Free from fall injury  Outcome: Progressing  Flowsheets (Taken 7/11/2024 1037)  Free From Fall Injury: Instruct family/caregiver on patient safety     
  Problem: Safety - Adult  Goal: Free from fall injury  Outcome: Progressing  Flowsheets (Taken 7/15/2024 2030)  Free From Fall Injury: Instruct family/caregiver on patient safety     Problem: ABCDS Injury Assessment  Goal: Absence of physical injury  Outcome: Progressing  Flowsheets (Taken 7/15/2024 2030)  Absence of Physical Injury: Implement safety measures based on patient assessment     Problem: Chronic Conditions and Co-morbidities  Goal: Patient's chronic conditions and co-morbidity symptoms are monitored and maintained or improved  Outcome: Progressing  Flowsheets (Taken 7/15/2024 2100)  Care Plan - Patient's Chronic Conditions and Co-Morbidity Symptoms are Monitored and Maintained or Improved: Monitor and assess patient's chronic conditions and comorbid symptoms for stability, deterioration, or improvement     Problem: Skin/Tissue Integrity  Goal: Absence of new skin breakdown  Description: 1.  Monitor for areas of redness and/or skin breakdown  2.  Assess vascular access sites hourly  3.  Every 4-6 hours minimum:  Change oxygen saturation probe site  4.  Every 4-6 hours:  If on nasal continuous positive airway pressure, respiratory therapy assess nares and determine need for appliance change or resting period.  Outcome: Progressing     Problem: Pain  Goal: Verbalizes/displays adequate comfort level or baseline comfort level  Outcome: Progressing     Problem: Discharge Planning  Goal: Discharge to home or other facility with appropriate resources  Outcome: Progressing  Flowsheets (Taken 7/15/2024 2100)  Discharge to home or other facility with appropriate resources: Identify barriers to discharge with patient and caregiver     Problem: Nutrition Deficit:  Goal: Optimize nutritional status  Outcome: Progressing     
7/11/2024  [id number]      David Vaca  1965      Patient who is s/p aortobifemoral bypass on 5/31 presents with nausea and vomiting with left groin edema and swelling.  Patient had seromas in bilateral groin.  On examination there is blanching erythema present over the left groin however the temperature of the skin is not raised.    Plan as discussed with Dr. Montague  -Will evaluate the patient with attending  -No active intervention planned for today.  -Okay to give diet to the patient from vascular surgery standpoint.  -Will plan for intervention if required, tomorrow, 7/12/2024.    Joe Davis MD  PGY 4  Surgery Resident    
Groin I/D 712 w enterobacter and no anaerobes - S ceftriaxone and levaquin    Stop vanco  Keep zosyn and readjust AB further per final cx results.  Will need 6 weeks systemic AB likely iv antibiotics  due to vasc infection.  Defer levaquin  due to DM and vasc issues -  Defer bactrim due to CKD and DM -  No other good po alternative    Shobha Mcintosh MD. Infectious Diseases   
Patient admitted for septicemia, with a history of HIV, type 2 diabetes, COPD, HTN, and aortobifemoral bypass on 5/31. A&Ox4 and ambulated to the bedside once with stand by assist. Right forearm IV saline locked. Right brachial PICC running NS at KVO. Current vitals are as follows: /76   Pulse 67   Temp 98.1 °F (36.7 °C) (Oral)   Resp 16   Ht 1.676 m (5' 5.98\")   SpO2 96%   BMI 20.99 kg/m² on room air. Patient currently sleeping, RR even and unlabored, no signs of distress, and call light at bedside. Plan of care ongoing.     Problem: Safety - Adult  Goal: Free from fall injury  Outcome: Progressing     Problem: ABCDS Injury Assessment  Goal: Absence of physical injury  Outcome: Progressing  Absence of Physical Injury: Implement safety measures based on patient assessment     Problem: Chronic Conditions and Co-morbidities  Goal: Patient's chronic conditions and co-morbidity symptoms are monitored and maintained or improved  Outcome: Progressing  Care Plan - Patient's Chronic Conditions and Co-Morbidity Symptoms are Monitored and Maintained or Improved: Monitor and assess patient's chronic conditions and comorbid symptoms for stability, deterioration, or improvement     Problem: Pain  Goal: Verbalizes/displays adequate comfort level or baseline comfort level  Outcome: Progressing  Verbalizes/displays adequate comfort level or baseline comfort level:   Encourage patient to monitor pain and request assistance   Administer analgesics based on type and severity of pain and evaluate response   Assess pain using appropriate pain scale   Implement non-pharmacological measures as appropriate and evaluate response     Problem: Discharge Planning  Goal: Discharge to home or other facility with appropriate resources  Outcome: Progressing  Discharge to home or other facility with appropriate resources: Identify barriers to discharge with patient and caregiver     Problem: Nutrition Deficit:  Goal: Optimize 
non-pharmacological measures as appropriate and evaluate response     Problem: Discharge Planning  Goal: Discharge to home or other facility with appropriate resources  7/17/2024 1238 by Adelina Aguirre, RN  Outcome: Completed  7/17/2024 0505 by Loren Stinson, RN  Outcome: Progressing  Flowsheets (Taken 7/17/2024 0505)  Discharge to home or other facility with appropriate resources: Identify barriers to discharge with patient and caregiver     Problem: Nutrition Deficit:  Goal: Optimize nutritional status  7/17/2024 1238 by Adelina Aguirre, RN  Outcome: Completed  7/17/2024 0505 by Loren Stinson, RN  Outcome: Progressing

## 2024-07-17 NOTE — CARE COORDINATION
Transitional planning      0830 call to [facility] to inquire on vac, left vm,     1010 call from Olga Lidia with [facility], will deliver meds tonight before 9pm, needs dose of todays ATB before leaving today, primary RN updated. [facility] to do SOC tomorrow, patient updated.        1140 call to [facility], spoke to Leyla , she will call with [facility] for vac. Call to [facility], phone number [phone], spoke with ann España for SOC tomorrow, call to Olga Lidia and updated.            1530 call back from Leyla, vac will be here by 7, RN updated.

## 2024-07-17 NOTE — PROGRESS NOTES
Division of Vascular Surgery         Progress Note      Name: David Vaca  MRN: [id number]     7/15/2024  5:11 PM     Overnight Events:      No acute events overnight.       Subjective:     Patient seen and examined at bedside.  No acute events overnight.  Patient tolerated diet, pain well-controlled.  Currently NPO for Procedure in OR today. Denies nausea, vomiting, fever, chills, shortness of breath, chest pain.    Physical Exam:     Vitals:  BP (!) 141/84   Pulse 72   Temp 97.3 °F (36.3 °C) (Oral)   Resp 16   Ht 1.676 m (5' 5.98\")   SpO2 97%   BMI 20.99 kg/m²       Intake/Output Summary (Last 24 hours) at 7/15/2024 1711  Last data filed at 7/15/2024 1651  Gross per 24 hour   Intake 560 ml   Output 2403 ml   Net -1843 ml            Constitutional:       Appearance: He is ill-appearing.   Cardiovascular:      Rate and Rhythm: Normal rate and regular rhythm.      Pulses: Normal pulses.      Comments: B/L DP and PT pulses palpable  Pulmonary:      Effort: Pulmonary effort is normal. No respiratory distress.   Abdominal:      General: Abdomen is flat. There is no distension.      Palpations: Abdomen is soft.   Musculoskeletal:         General: Normal range of motion.      Cervical back: Normal range of motion. No rigidity.   Skin:     General: Skin is warm and dry.      Capillary Refill: Capillary refill takes less than 2 seconds.      Comments: Wound VAC sponge clean dry and intact  Neurological:      General: No focal deficit present.      Mental Status: He is oriented to person, place, and time.   Psychiatric:         Mood and Affect: Mood normal.         Thought Content: Thought content normal.      Imaging/Labs:     No results found.      Assessment/Plan:     57 yo male s/p Aortobifemoral bypass 5/31 with seroma over left groin s/p left groin incision and drainage with application of wound VAC 7/12     Continue IV antibiotics, f/u blood cultures  Continue wound vac therapy at 75mmHg, Wound vac 
     Division of Vascular Surgery         Progress Note      Name: David Vaca  MRN: [id number]         Overnight Events:      No acute events overnight.       Subjective:     Patient seen and examined at bedside.  No acute events overnight.  Patient tolerated diet, pain well-controlled.  Patient has multiple questions about wound VAC, all were answered.  Denies nausea, vomiting, fever, chills, shortness of breath, chest pain.    Physical Exam:     Vitals:  /69   Pulse 85   Temp 99.3 °F (37.4 °C) (Oral)   Resp 16   Ht 1.676 m (5' 5.98\")   SpO2 90%   BMI 20.99 kg/m²       Intake/Output Summary (Last 24 hours) at 7/13/2024 1925  Last data filed at 7/13/2024 1523  Gross per 24 hour   Intake 597 ml   Output 475 ml   Net 122 ml            Constitutional:       Appearance: He is ill-appearing.   Cardiovascular:      Rate and Rhythm: Normal rate and regular rhythm.      Pulses: Normal pulses.      Comments: B/L DP and PT pulses palpable  Pulmonary:      Effort: Pulmonary effort is normal. No respiratory distress.   Abdominal:      General: Abdomen is flat. There is no distension.      Palpations: Abdomen is soft.   Musculoskeletal:         General: Normal range of motion.      Cervical back: Normal range of motion. No rigidity.   Skin:     General: Skin is warm and dry.      Capillary Refill: Capillary refill takes less than 2 seconds.      Comments: Wound VAC clean dry and intact with good seal over left groin  Neurological:      General: No focal deficit present.      Mental Status: He is oriented to person, place, and time.   Psychiatric:         Mood and Affect: Mood normal.         Thought Content: Thought content normal.      Imaging/Labs:     No results found.      Assessment/Plan:     59 yo male s/p Aortobifemoral bypass 5/31 with seroma over left groin s/p left groin incision and drainage with application of wound VAC 7/12     Continue IV antibiotics, f/u blood cultures  Continue wound vac therapy at 
     Division of Vascular Surgery         Progress Note      Name: David Vaca  MRN: [id number]     7/16/2024  9:21 AM     Overnight Events:      No acute events overnight.       Subjective:     Patient seen and examined at bedside.  No acute events overnight.  Patient tolerated diet, pain well-controlled.  Patient is POD #1 after right groin washout and wound VAC exchange.  Denies nausea, vomiting, fever, chills, shortness of breath, chest pain.    Physical Exam:     Vitals:  BP (!) 146/91   Pulse 68   Temp 98 °F (36.7 °C) (Oral)   Resp 18   Ht 1.676 m (5' 5.98\")   SpO2 94%   BMI 20.99 kg/m²       Intake/Output Summary (Last 24 hours) at 7/16/2024 0921  Last data filed at 7/16/2024 0702  Gross per 24 hour   Intake 3380.51 ml   Output 2653 ml   Net 727.51 ml            Constitutional:       Appearance: He is ill-appearing.   Cardiovascular:      Rate and Rhythm: Normal rate and regular rhythm.      Pulses: Normal pulses.      Comments: B/L DP and PT pulses palpable  Pulmonary:      Effort: Pulmonary effort is normal. No respiratory distress.   Abdominal:      General: Abdomen is flat. There is no distension.      Palpations: Abdomen is soft.   Musculoskeletal:         General: Normal range of motion.      Cervical back: Normal range of motion. No rigidity.   Skin:     General: Skin is warm and dry.      Capillary Refill: Capillary refill takes less than 2 seconds.      Comments: Wound VAC with adequate seal.  Neurological:      General: No focal deficit present.      Mental Status: He is oriented to person, place, and time.   Psychiatric:         Mood and Affect: Mood normal.         Thought Content: Thought content normal.      Imaging/Labs:     No results found.      Assessment/Plan:     57 yo male s/p Aortobifemoral bypass 5/31 with seroma over left groin s/p left groin incision and drainage with application of wound VAC 7/12.  Repeat left groin washout and wound VAC change on 7/15/2024       Continue 
     Division of Vascular Surgery         Progress Note      Name: David Vaca  MRN: [id number]     7/17/2024  5:26 PM     Overnight Events:      No acute events overnight.       Subjective:     Patient seen and examined at bedside.  No acute events overnight.  Patient tolerated diet, pain well-controlled.  Patient is POD #2 after right groin washout and wound VAC exchange.  Wound vac was malfunctioning yesterday night. Denies nausea, vomiting, fever, chills, shortness of breath, chest pain.    Physical Exam:     Vitals:  /66   Pulse 89   Temp 98.2 °F (36.8 °C) (Oral)   Resp 22   Ht 1.676 m (5' 5.98\")   SpO2 94%   BMI 20.99 kg/m²       Intake/Output Summary (Last 24 hours) at 7/17/2024 1726  Last data filed at 7/17/2024 1320  Gross per 24 hour   Intake 480 ml   Output 3080 ml   Net -2600 ml            Constitutional:       Appearance: He is ill-appearing.   Cardiovascular:      Rate and Rhythm: Normal rate and regular rhythm.      Pulses: Normal pulses.      Comments: B/L DP and PT pulses palpable  Pulmonary:      Effort: Pulmonary effort is normal. No respiratory distress.   Abdominal:      General: Abdomen is flat. There is no distension.      Palpations: Abdomen is soft.   Musculoskeletal:         General: Normal range of motion.      Cervical back: Normal range of motion. No rigidity.   Skin:     General: Skin is warm and dry.      Capillary Refill: Capillary refill takes less than 2 seconds.      Comments: Wound VAC with adequate seal.   Neurological:      General: No focal deficit present.      Mental Status: He is oriented to person, place, and time.   Psychiatric:         Mood and Affect: Mood normal.         Thought Content: Thought content normal.      Imaging/Labs:     No results found.      Assessment/Plan:     57 yo male s/p Aortobifemoral bypass 5/31 with seroma over left groin s/p left groin incision and drainage with application of wound VAC 7/12.  Repeat left groin washout and wound VAC 
     Division of Vascular Surgery         Progress Note      Name: aDvid Vaca  MRN: [id number]         Overnight Events:      No acute events overnight.     Hospital Summary      David Vaca is a 58 y.o. male with past medical hx of HIV (CD4 22 as of 07/11/24), aortobifemoral bypass with left iliac/common femoral/profunda/SFA and popliteal thrombectomy and right iliac artery thrombectomy with left lower extremity four compartment fasciotomy on 5/31/2024 who presented with nausea and vomiting for the past couple weeks along with diarrhea that is been going on for the last four months.     He returned to the OR on 6/4 for closure of left medial fasciotomy incision and wound vac to left lateral fasciotomy incision. The left lateral fasciotomy incision was closed on 6/7.     At the ER he was hypotensive and tachycardic with elevated WBC, been on IV fluid and  antibiotic. CT scan concerning for fluid collection around the left groin, possible abscess.    Subjective:     No acute events overnight. Had episodes of diarrhea yesterday. Systolic blood pressure running at 90s. Denies nausea, vomiting, fever or chills.     Physical Exam:     Vitals:  /65   Pulse 66   Temp 97.3 °F (36.3 °C) (Oral)   Resp 24   Ht 1.676 m (5' 5.98\")   SpO2 96%   BMI 20.99 kg/m²       Intake/Output Summary (Last 24 hours) at 7/12/2024 0719  Last data filed at 7/11/2024 0938  Gross per 24 hour   Intake 10 ml   Output --   Net 10 ml          Constitutional:       Appearance: He is ill-appearing.   Cardiovascular:      Rate and Rhythm: Normal rate and regular rhythm.      Pulses: Normal pulses.      Comments: B/L DP and PT signals present  Pulmonary:      Effort: Pulmonary effort is normal. No respiratory distress.   Abdominal:      General: Abdomen is flat. There is no distension.      Palpations: Abdomen is soft.   Musculoskeletal:         General: Normal range of motion.      Cervical back: Normal range of motion. No rigidity. 
   07/13/24 9676   Care Plan - Respiratory Goals   Achieves optimal ventilation and oxygenation Respiratory therapy support as indicated;Assess and instruct to report shortness of breath or any respiratory difficulty;Encourage broncho-pulmonary hygiene including cough, deep breathe, incentive spirometry;Assess the need for suctioning and aspirate as needed;Oxygen supplementation based on oxygen saturation or arterial blood gases;Initiate smoking cessation protocol as indicated;Position to facilitate oxygenation and minimize respiratory effort;Assess for changes in mentation and behavior;Assess for changes in respiratory status       
  [facility]  Occupational Therapy Not Seen Note    DATE: 7/15/2024    NAME: David Vaca  MRN: [id number]   : 1965      Patient not seen this date for Occupational Therapy due to:    Surgery/Procedure: groin washout    Next Scheduled Treatment: Ck      Electronically signed by Anne Matta OT on 7/15/2024 at 11:09 AM    
  [facility]  Progress Note    David Vaca  MRN: [id number]  Date: 7/16/2024  LOS: 5     Reason for F/U :   Left groin postoperative abscess    Impression :   Previous aortobifemoral bypass 5/31/2024  Postoperative left groin abscess with seropurulent drainage and a small opening along the incision down the lower leg  Status post I&D of the left groin with wound VAC placement  Enterobacter S ceftriaxone  I/D 7/15 L groin w/ VAC  HIV infection  T2DM with associated neuropathy  Essential pretension  COPD    Recommendations:   I&D of the left groin with wound VAC placement - cx enterobacter S ceftriaxone  Zosyn vanco stopped  7/14 start ceftriaxone 2 g daily  - total Abx 6 weeks till 8/24 - reconsiled w a picc - no good po option for long suppression - this is concerning for a high rate of relapse  FU in a month     Infection Control Recommendations:   Universal precautions      Medical Decision making / Summary of Stay:   David Vaca is a 58 y.o.-year-old male who was initially admitted on 7/10/2024.   David has multiple medical problems including hypertension, diabetes mellitus type 2 with associated neuropathy, hyperlipidemia, COPD, HIV infection on Combivir and Lexiva, gastroesophageal reflux disease, urinary retention due to outlet stricture requiring cystoscopy ureteroscopy and urethral dilatation, chronic low back pain, depression among other medical problems.     The patient was recently hospitalized with an acute aortic occlusion status post aortobifemoral bypass and left iliac, common femoral, profunda, superficial femoral, and popliteal thromboembolectomy, right iliac artery  thrombectomy 5/31/2024 and left lower extremity 4 compartment fasciotomy.  The patient subsequently had closure of the left medial fasciotomy incision 6/4/2024 and creation of a myocutaneous flap over the left lower extremity with closure of the left lateral extremity fasciotomy 6/7/2024     The patient was 
  [facility]  Progress Note    David Vaca  MRN: [id number]  Date: 7/17/2024  LOS: 6     Reason for F/U :   Left groin postoperative abscess    Impression :   Previous aortobifemoral bypass 5/31/2024  Postoperative left groin abscess with seropurulent drainage and a small opening along the incision down the lower leg  Status post I&D of the left groin with wound VAC placement  Enterobacter S ceftriaxone  I/D 7/15 L groin w/ VAC  HIV infection  T2DM with associated neuropathy  Essential pretension  COPD    Recommendations:   I&D of the left groin with wound VAC placement - cx enterobacter S ceftriaxone  Zosyn vanco stopped  7/14 start ceftriaxone 2 g daily  - total Abx 6 weeks till 8/24 - reconsiled w a picc - no good po option for long suppression - this is concerning for a high rate of relapse  FU in a month     Infection Control Recommendations:   Universal precautions      Medical Decision making / Summary of Stay:   David Vaca is a 58 y.o.-year-old male who was initially admitted on 7/10/2024.   David has multiple medical problems including hypertension, diabetes mellitus type 2 with associated neuropathy, hyperlipidemia, COPD, HIV infection on Combivir and Lexiva, gastroesophageal reflux disease, urinary retention due to outlet stricture requiring cystoscopy ureteroscopy and urethral dilatation, chronic low back pain, depression among other medical problems.     The patient was recently hospitalized with an acute aortic occlusion status post aortobifemoral bypass and left iliac, common femoral, profunda, superficial femoral, and popliteal thromboembolectomy, right iliac artery  thrombectomy 5/31/2024 and left lower extremity 4 compartment fasciotomy.  The patient subsequently had closure of the left medial fasciotomy incision 6/4/2024 and creation of a myocutaneous flap over the left lower extremity with closure of the left lateral extremity fasciotomy 6/7/2024     The patient was 
  [facility]  Progress Note    David Vaca  MRN: [id number]  Date: 7/13/2024  LOS: 2     Reason for F/U :   Left groin postoperative abscess    Impression :   Previous aortobifemoral bypass 5/31/2024  Postoperative left groin abscess with seropurulent drainage and a small opening along the incision  Status post I&D of the left groin with wound VAC placement  HIV infection  Diabetes mellitus type 2 with associated neuropathy  Essential pretension  COPD    Recommendations:   Continue empiric antimicrobial therapy with Zosyn and vancomycin  Patient went to the operating room yesterday for I&D of the left groin with wound VAC placement  We will continue to follow operative culture data and adjust therapy accordingly    Infection Control Recommendations:   Universal precautions    Discharge Planning:   Estimated Length of IV antimicrobials: To be determined  Patient will need Midline Catheter Insertion/ PICC line Insertion: No  Patient will need: Home IV , Infusion Center,  SNF,  LTAC: Undetermined  Patient willneed outpatient wound care: No    Medical Decision making / Summary of Stay:   David Vaca is a 58 y.o.-year-old male who was initially admitted on 7/10/2024.   Daivd has multiple medical problems including hypertension, diabetes mellitus type 2 with associated neuropathy, hyperlipidemia, COPD, HIV infection on Combivir and Lexiva, gastroesophageal reflux disease, urinary retention due to outlet stricture requiring cystoscopy ureteroscopy and urethral dilatation, chronic low back pain, depression among other medical problems.     The patient was recently hospitalized with an acute aortic occlusion status post aortobifemoral bypass and left iliac, common femoral, profunda, superficial femoral, and popliteal thromboembolectomy, right iliac artery  thrombectomy 5/31/2024 and left lower extremity 4 compartment fasciotomy.  The patient subsequently had closure of the left medial fasciotomy 
  [facility]  Occupational Therapy Not Seen Note    DATE: 2024    NAME: David Vaca  MRN: [id number]   : 1965      Patient not seen this date for Occupational Therapy due to:    Surgery/Procedure: Groin I&D with wound vac placement per chart     Next Scheduled Treatment: Ck     Electronically signed by Anne Matta OT on 2024 at 10:32 AM    
At the start of night shift- wound vac beeping stating blockage alert. RN notified vascular- vascular came to bedside- machine continued to beep- vascular directed RN to remove wound vac and do a wet to dry dressing.     Loren Stinson RN  
[facility]  Office: [phone]  Edward Dhaliwal DO, Raymond Bright, DO, James Fang DO, Rizwan Hansen, DO, Jeremias Loomis MD, Pauline Garber MD, Dang Espinoza MD, Bettie Contreras MD,  Antoine Navarro MD, Martha Valdivia MD, Amanda Gaitan MD,  Miguel Al DO, Skyler Lancaster MD, Trino Jolly MD, Robert Dhaliwal DO, Giselle Norman MD,  Sanya Myles DO, Dasha Quinones MD, Megan Mcdaniel MD, Danielle Medina MD, Artie Duggan MD,  Negrito Woodard MD, Abhilash Nobles MD, Nette Craft MD, Jovita Cohn MD, Orlando Rousseau MD, Le Lopez MD, Mario Smith DO, Thang Sutherland DO, Leroy Babcock MD,  Jacob Ham MD, Shirley Waterhouse, CNP,  Nidia Tony CNP, Roman Lebron, CNP,  Marilee Sales, DNP, Mary Beth Boss, CNP, Kim Thomason, CNP, Brinda Moran, CNP, Margarette Young, CNP, Eunice Pedro, PA-C, Cathie Najera PA-C, Cherie Meier, CNP, Leann Tuttle, CNP, Christal Odom, CNP, Guillermina Sheldon, CNP, Areli Balderas, CNP, Krista Troy, CNS, Amy Gil, CNP, Sol Saavedra CNP, Tracy Schwab, CNP         [facility]   IN-PATIENT SERVICE   [facility]    Progress Note    7/12/2024    10:49 AM    Name:   David Vaca  MRN:     [id number]     Acct:      [id number]   Room:   0441/0441-01   Day:  1  Admit Date:  7/10/2024 11:11 PM    PCP:   Sandi Choudhary APRN - CNP  Code Status:  Full Code    Subjective:     C/C:   Chief Complaint   Patient presents with    Post-op Problem     Transfer from [facility], possible post op abdominal seroma or abscess      Interval History Status: not changed.   Seen and examined, continues to report pain in left groin area, continue to receive wide spectrum IV antibiotic, plan to go to the OR today, for I&D today  Lab vital signs consultant note were reviewed    Brief History:   a 58 y.o.  male w/ HIV, COPD, diet controlled DM, PAD s/p prior complicated revascularization 05/31/2024, with subsequent fasciotomy closure 
[facility]  Office: [phone]  Edward Dhaliwal DO, Raymond Bright, DO, James Fang DO, Rizwan Hansen, DO, Jeremias Loomis MD, Pauline Garber MD, Dang Espinoza MD, Bettie Contreras MD,  Antoine Navarro MD, Martha Valdivia MD, Amanda Gaitan MD,  Miguel Al DO, Skyler Lancaster MD, Trino Jolly MD, Robert Dhaliwal DO, Giselle Norman MD,  Sanya Myles DO, Dasha Quinones MD, Megan Mcdaniel MD, Danielle Medina MD, Artie Duggan MD,  Ngerito Woodard MD, Abhilash Nobles MD, Nette Craft MD, Jovita Cohn MD, Orlando Rousseau MD, Le Lopez MD, Mario Smith DO, Thang Sutherland DO, Leroy Babcock MD,  Jacob Ham MD, Shirley Waterhouse, CNP,  Nidia Tony CNP, Roman Lebron, CNP,  Marilee Sales, DNP, Mary Beth Boss, CNP, Kim Thomason, CNP, Brinda Moran CNP, Margarette Young, CNP, Eunice Pedro, PA-C, Cathie Najera PA-C, Cherie Meier, CNP, Leann Tuttle, CNP, Christal Odom, CNP, Guillermina Sheldon, CNP, Areli Balderas, CNP, Krista Troy, CNS, Amy Gil, CNP, Sol Saavedra CNP, Tracy Schwab, CNP         [facility]   IN-PATIENT SERVICE   [facility]    Progress Note    7/15/2024    9:03 AM    Name:   David Vaca  MRN:     [id number]     Acct:      [id number]   Room:   0441/0441-01   Day:  4  Admit Date:  7/10/2024 11:11 PM    PCP:   Sandi Choudhary APRN - CNP  Code Status:  Full Code    Subjective:     C/C:   Chief Complaint   Patient presents with    Post-op Problem     Transfer from [facility], possible post op abdominal seroma or abscess      Interval History Status: not changed, has been stable for discharge    Patient seen and evaluated in room sitting up at bedside eating breakfast in no acute distress.  Endorses 2 episodes of diarrhea overnight.  Discussed GI upset with antibiotic therapy.  Patient agreeable to probiotic.  States pain is well-controlled.  Wound VAC has been broken down for home care already with wet-to-dry 
[facility]  Office: [phone]  Edward Dhaliwal DO, Raymond Bright, DO, James Fang DO, Rizwan Hansen, DO, Jeremias Loomis MD, Pauline Garber MD, Dang Espinoza MD, Bettie Contreras MD,  Antoine Navarro MD, Martha Valdivia MD, Amanda Gaitan MD,  Miguel Al DO, Skyler Lancaster MD, Trino Jolly MD, Robert Dhaliwal DO, Giselle Norman MD,  Sanya Myles DO, Dasha Quinones MD, Megan Mcdaniel MD, Danielle Medina MD, Artie Duggan MD,  Negrito Woodard MD, Abhilash Nobles MD, Nette Craft MD, Jovita Cohn MD, Orlando Rousseau MD, Le Lopez MD, Mario Smith DO, Thang Sutherland DO, Leroy Babcock MD,  Jacob Ham MD, Shirley Waterhouse, CNP,  Nidia Tony CNP, Roman Lebron, CNP,  Marilee Sales, DNP, Mary Beth Boss, CNP, Kim Thomason, CNP, Brinda Moran CNP, Margarette Young, CNP, Eunice Pedro, PA-C, Cathie Najera PA-C, Cherie Meier, CNP, Leann Tuttle, CNP, Christal Odom, CNP, Guillermina Sheldon, CNP, Areli Balderas, CNP, Krista Troy, CNS, Amy Gil, CNP, Sol Saavedra CNP, Tracy Schwab, CNP         [facility]   IN-PATIENT SERVICE   [facility]    Progress Note    7/14/2024    6:42 PM    Name:   David Vaca  MRN:     [id number]     Acct:      [id number]   Room:   0441/0441-01   Day:  3  Admit Date:  7/10/2024 11:11 PM    PCP:   Sandi Choudhary APRN - CNP  Code Status:  Full Code    Subjective:     C/C:   Chief Complaint   Patient presents with    Post-op Problem     Transfer from [place], possible post op abdominal seroma or abscess      Interval History Status:     Patient is having some diarrhea but that this is chronic.  Still having some groin pain.  Otherwise denies chest pain or acute issues overnight.      Brief History:   Per Documentation: 58 y.o.  male w/ HIV, COPD, diet controlled DM, PAD s/p prior complicated revascularization 05/31/2024, with subsequent fasciotomy closure and wound VAC exchange 06/04 & 06/07.   Patient 
[facility]   Pharmacy Pharmacokinetic Monitoring Service - Vancomycin     Consulting Provider: Cyndie   Indication: surgical site infection  Target Concentration: Goal AUC/ANTWON 400-600 mg*hr/L  Day of Therapy: 4  Additional Antimicrobials: Zosyn    Pertinent Laboratory Values:   Wt Readings from Last 1 Encounters:   07/10/24 59 kg (130 lb)     Temp Readings from Last 1 Encounters:   07/14/24 98.2 °F (36.8 °C)     Estimated Creatinine Clearance: 74 mL/min (based on SCr of 0.9 mg/dL).  Recent Labs     07/13/24  0730 07/13/24  1207 07/14/24  0728   CREATININE  --  0.9 0.9   BUN  --  12 7   WBC 9.8  --  7.7     MRSA Nasal Swab: N/A. Non-respiratory infection.    Recent vancomycin administrations                     vancomycin (VANCOCIN) 750 mg in sodium chloride 0.9 % 250 mL IVPB ([id number]) (mg) 750 mg New Bag 07/14/24 0108     750 mg New Bag 07/13/24 1315     750 mg New Bag  0125     750 mg New Bag 07/12/24 0909    sod chloride IRR soln 0.9 % 3,000 mL with vancomycin (VANCOCIN) 3,000 mg (mL) 3,000 mL Given 07/12/24 1023                    Assessment:  Date/Time Current Dose Concentration Timing of Concentration (h) AUC   7/14/24 0728 750mg q12h 13.4 [id number]   Note: Serum concentrations collected for AUC dosing may appear elevated if collected in close proximity to the dose administered, this is not necessarily an indication of toxicity    Plan:  Current dosing regimen is therapeutic  Continue current dose vanco 750mg q12h  Repeat vancomycin concentration not ordered    Pharmacy will continue to monitor patient and adjust therapy as indicated    Thank you for the consult,  Perico Castillo RPH  7/14/2024 10:13 AM   
[facility]   Pharmacy Pharmacokinetic Monitoring Service - Vancomycin    Consulting Provider: Cyndie   Indication: surgical site infection  Target Concentration: Goal AUC/ANTWON 400-600 mg*hr/L  Day of Therapy: 2  Additional Antimicrobials: Zosyn    Pertinent Laboratory Values:   Wt Readings from Last 1 Encounters:   07/10/24 59 kg (130 lb)     Temp Readings from Last 1 Encounters:   07/12/24 97.6 °F (36.4 °C) (Oral)     Estimated Creatinine Clearance: 66 mL/min (based on SCr of 1 mg/dL).  Recent Labs     07/10/24  1720 07/11/24  0021 07/12/24  0753   CREATININE 1.4* 1.3* 1.0   BUN 14 16 16   WBC 24.2* 17.2*  17.2*  --        Recent vancomycin administrations                     vancomycin (VANCOCIN) 1,500 mg in sodium chloride 0.9 % 250 mL IVPB ([id number]) (mg) 1,500 mg New Bag 07/11/24 0037                    Assessment:  Date/Time Current Dose Concentration Timing of Concentration (h) AUC   7/12/24 1500 mg IV x1 5.4 mcg/mL 32 hours post-dose <400   Note: Serum concentrations collected for AUC dosing may appear elevated if collected in close proximity to the dose administered, this is not necessarily an indication of toxicity    Plan:  Given improvement in renal function and demonstration of drug clearance, will schedule vancomycin regimen. Current trough and AUC are subtherapeutic.  Initiate vancomycin 750 mg IV q12h, which predicts a trough of 15.4 mcg/mL and an AUC of 472  Pharmacy will continue to monitor patient and adjust therapy as indicated    Thank you for the consult,  Trino Subramanian Pharm.D., RUPAL, BCCCP  7/12/2024 8:46 AM        
[facility] Nurse consulted for NPWT dressing application.  VAC interrupted last evening and saline mositened gauze had been applied.    Patient to be discharged to home; awaiting ActiVAC delivery.  ActiVAC is compatible with dressing; bedside RN will apply upon delivery;  reminded to adjust the vacuum to -75 mmHg by going into Clinician Mode on the screen.  Discussed with patient to coordinate showering at home with his Home Care nurse and not to take a shower with the VAC on       07/17/24 1424   Incision 07/12/24 Groin Left   Date First Assessed/Time First Assessed: 07/12/24 1110   Present on Original Admission: No  Location: Groin  Incision Location Orientation: Left  Incision Description (Comments): WOUND VAC APPLIED   Wound Image    Dressing Status New dressing applied   Dressing Change Due 07/19/24   Incision Cleansed Cleansed with saline   Dressing/Treatment Negative pressure wound therapy   Incision Length (cm) 1.5   Incision Width (cm) 6.8 cm   Incision Depth (cm) 2 cm   Incision Volume (cm^3) 20.4 cm^3   Incision Assessment   (clean red granular)   Drainage Amount Small (< 25%)   Drainage Description Serous   Laina-incision Assessment Intact  (small tension blister and inderior edge)     NPWt dressing applied to the left groin incision with black granufoam at -75 mmHg.  Periwound was bordered with drape; a small tension blister noted at the inferior wound edge was gently decompressed.  Track pad was bridged superiorly so he can pull on pants.  Good seal was achieved.    
Occupational Therapy  Facility/Department: [address] ONC/MED SURG  Occupational Therapy Initial Assessment    Name: David Vaca  : 1965  MRN: [id number]  Date of Service: 2024  Chief Complaint   Patient presents with    Post-op Problem     Transfer from [facility], possible post op abdominal seroma or abscess      Discharge Recommendations:  Patient would benefit from continued therapy after discharge       Patient Diagnosis(es): The primary encounter diagnosis was Septicemia (HCC). Diagnoses of Abscess of groin, left, Surgical site infection, KEHINDE (acute kidney injury) (HCC), Hypomagnesemia, and Fluid collection at surgical site, initial encounter were also pertinent to this visit.  Past Medical History:  has a past medical history of Arthritis, COPD (chronic obstructive pulmonary disease) (HCC), Depression, Diabetes mellitus (HCC), Dysphagia, Emphysema lung (HCC), GERD (gastroesophageal reflux disease), History of nephrolithiasis, HIV (human immunodeficiency virus infection) (HCC), Hx of blood clots, Hyperlipidemia, and Hypertension.  Past Surgical History:  has a past surgical history that includes Foot surgery (Left, 's); Cardiac catheterization (???); Colonoscopy (); Endoscopy, colon, diagnostic; other surgical history (Bilateral, 2017); other surgical history (Bilateral, 2017); other surgical history (Right, 2017); Nerve Surgery (Bilateral, 2017); Nerve Block Lumb Facet Level 1 Bilateral (Bilateral, 2017); Upper gastrointestinal endoscopy (,2007x2, ,); other surgical history (Bilateral, 2018); pr njx dx/ther agt pvrt facet jt lmbr/sac 2nd level (Bilateral, 2018); Lumbar spine surgery (Right, 2019); Lumbar spine surgery (Left, 2019); Pain management procedure (Right, 2020); Pain management procedure (Left, 2020); Pain management procedure (Right, 10/08/2020); Pain management procedure (Left, 2020); EGD 
Patient admitted, consent signed and questions answered. Patient ready for procedure. Call light to reach with side rails up 2 of 2. No family at bedside with patient.  History and physical complete.  
Patient brought to PCC 5 from OR. Pt in phase I recovery. Anesthesia with pt and primary RN. No complications, bedside report given, all questions answered. Patient alert/oriented x4, answering all questions appropriately. Wound vac in place and working correctly.   
Patient wound vac came off. Notified surgery. Wet to dry dressing placed to left groin  
Physical Therapy        Physical Therapy Cancel Note      DATE: 2024    NAME: David Vaca  MRN: [id number]   : 1965      Patient not seen this date for Physical Therapy due to:    Surgery/Procedure: Groin I&D with wound vac placement, ck pm as able      Electronically signed by Duke Richards PT on 2024 at 1:41 PM      
Physical Therapy        Physical Therapy Cancel Note      DATE: 7/15/2024    NAME: David Vaca  MRN: [id number]   : 1965      Patient not seen this date for Physical Therapy due to:    Surgery/Procedure: groin washout with wound vac application, check back as appropriate      Electronically signed by Senia Rich PT on 7/15/2024 at 2:59 PM    
Physical Therapy  Facility/Department: [address] ONC/MED SURG  Physical Therapy Initial Assessment    Name: David Vaca  : 1965  MRN: [id number]  Date of Service: 2024  Chief Complaint   Patient presents with    Post-op Problem     Transfer from [facility], possible post op abdominal seroma or abscess       Discharge Recommendations:  Patient would benefit from continued therapy after discharge   PT Equipment Recommendations  Equipment Needed: No (Pt reports owning a rollator)      Patient Diagnosis(es): The primary encounter diagnosis was Septicemia (HCC). Diagnoses of Abscess of groin, left, Surgical site infection, KEHINDE (acute kidney injury) ([facility]), Hypomagnesemia, and Fluid collection at surgical site, initial encounter were also pertinent to this visit.  Past Medical History:  has a past medical history of Arthritis, COPD (chronic obstructive pulmonary disease) (HCC), Depression, Diabetes mellitus (HCC), Dysphagia, Emphysema lung (HCC), GERD (gastroesophageal reflux disease), History of nephrolithiasis, HIV (human immunodeficiency virus infection) ([facility]), Hx of blood clots, Hyperlipidemia, and Hypertension.  Past Surgical History:  has a past surgical history that includes Foot surgery (Left, 's); Cardiac catheterization (???); Colonoscopy (2016); Endoscopy, colon, diagnostic; other surgical history (Bilateral, 2017); other surgical history (Bilateral, 2017); other surgical history (Right, 2017); Nerve Surgery (Bilateral, 2017); Nerve Block Lumb Facet Level 1 Bilateral (Bilateral, 2017); Upper gastrointestinal endoscopy (,2007x2, ,); other surgical history (Bilateral, 2018); pr njx dx/ther agt pvrt facet jt lmbr/sac 2nd level (Bilateral, 2018); Lumbar spine surgery (Right, 2019); Lumbar spine surgery (Left, 2019); Pain management procedure (Right, 2020); Pain management procedure (Left, 2020); Pain management procedure 
Received post procedure to PCC to room 9. Assessment obtained. Restrictions reviewed with patient. Post procedure pathway initiated.  Left  groin wound vac in place.    
Report called to  Marivel HUERTA  
Report called to 4C RN, all questions answered. Pt transported to [facility] with all belongings.   
Two episodes of diarrhea in the morning.    Loren Stinson RN  
Rate and Rhythm: Normal rate.      Heart sounds: Normal heart sounds.      No friction rub. No gallop.   Pulmonary:      Effort: Pulmonary effort is normal.      Breath sounds: Normal breath sounds. No wheezing.   Abdominal:      General: Bowel sounds are normal.      Palpations: Abdomen is soft. There is no mass.      Tenderness: There is no abdominal tenderness.   Musculoskeletal:         General: Normal range of motion.      Cervical back: Normal range of motion and neck supple.   Lymphadenopathy:      Cervical: No cervical adenopathy.   Skin:     General: Skin is warm and dry.      Findings: Erythema (The left groin erythema is decreased) present.   Neurological:      Mental Status: He is alert and oriented to person, place, and time.         Laboratory data:   I have independently reviewed the followinglabs:  CBC with Differential:   Recent Labs     07/10/24  1720 07/11/24  0021 07/12/24  0755   WBC 24.2* 17.2*  17.2* 15.0*   HGB 12.4* 10.6* 8.2*   HCT 35.2* 31.2* 26.3*    274 259   LYMPHOPCT  --  3*  --    MONOPCT 5.1 2*  --    EOSPCT  --  0*  --      BMP:   Recent Labs     07/11/24  0021 07/11/24  0526 07/12/24  0225 07/12/24  0753   *  --   --  136   K 3.2*  --   --  3.4*     --   --  109*   CO2 15*  --   --  17*   BUN 16  --   --  16   CREATININE 1.3*  --   --  1.0   MG 1.0*   < > 2.1 2.0    < > = values in this interval not displayed.     Hepatic Function Panel:   Recent Labs     07/10/24  1720 07/12/24  0753   BILIDIR <0.1  --    BILITOT 0.5 <0.2   ALKPHOS 51 60   ALT 12 13   AST 24 30         Lab Results   Component Value Date/Time    PROCAL 0.14 09/08/2023 12:28 AM     Lab Results   Component Value Date/Time    CRP 6.25 09/08/2023 12:28 AM     Lab Results   Component Value Date    SEDRATE 46 (H) 09/08/2023         Lab Results   Component Value Date/Time    DDIMER 6861.00 06/15/2024 08:39 PM    DDIMER 488.00 09/08/2023 09:08 AM     No results found for: \"FERRITIN\"  No results found 
1.04*  --   --   --   --   --   --    PHOS  --  3.0  --   --   --  1.6*  --   --   --   --    TROPHS 59*  --   --   --   --   --   --   --   --   --     < > = values in this interval not displayed.     Recent Labs     07/12/24  0753   AST 30   ALT 13   ALKPHOS 60   BILITOT <0.2     ABG:  Lab Results   Component Value Date/Time    POCPH 7.403 06/02/2024 05:33 AM    PHART 7.317 06/01/2024 02:00 AM    POCPCO2 41.9 06/02/2024 05:33 AM    DSY7EDM 44.6 06/01/2024 02:00 AM    POCPO2 108.7 06/02/2024 05:33 AM    PO2ART 232.0 06/01/2024 02:00 AM    POCHCO3 26.2 06/02/2024 05:33 AM    RCF4MYU 22.2 06/01/2024 02:00 AM    NBEA 0.1 06/01/2024 09:31 PM    PBEA 1.3 06/02/2024 05:33 AM    IPPB4NME 98.2 06/02/2024 05:33 AM    O1HODMFB 99.1 06/01/2024 02:00 AM    FIO2 40.0 06/02/2024 05:33 AM     Lab Results   Component Value Date/Time    SPECIAL Site: Body Fluid 07/12/2024 02:28 PM     Lab Results   Component Value Date/Time    CULTURE (A) 07/12/2024 02:28 PM     ENTEROBACTER CLOACAE COMPLEX HEAVY GROWTH Identification by MALDI-TOF       Radiology:  XR CHEST PORTABLE    Result Date: 7/11/2024  Normal examination.     CTA Chest W/WO Contrast Pulmonary Embolism  Eval    Result Date: 7/10/2024  Impression: No PE is identified. There is hyperdense material in the stomach and duodenal that may be material ingested by the patient. If there is any concern for active GI bleeding active extravasation of contrast should be excluded correlate clinically. Nonspecific interstitial disease in the lungs with mild airspace opacities. This is likely at least partially chronic in nature. A superimposed acute process including infection is technically a possibility. This document has been electronically signed by: Suhas Lyman MD on 07/10/2024 07:32 PM All CTs at this facility use dose modulation techniques and iterative reconstructions, and/or weight-based dosing when appropriate to reduce radiation to a low as reasonably achievable. 3D 
MD Esmer on 07/10/2024 07:32 PM All CTs at this facility use dose modulation techniques and iterative reconstructions, and/or weight-based dosing when appropriate to reduce radiation to a low as reasonably achievable. 3D Post-processing was performed on this study.    CT ABDOMEN PELVIS W IV CONTRAST Additional Contrast? None    Result Date: 7/10/2024  Impression: Interval aortofemoral bypass. Fluid collection adjacent to the proximal anastomosis may represent a postoperative seroma. Recommend follow-up to assess for interval change to exclude the possibility of infection. In the left inguinal region there is a fluid collection with a thin enhancing wall and adjacent edema. While this may be postoperative seroma an abscess is a possibility recommend further evaluation or follow-up. Fluid collection in the right inguinal region most likely postoperative seroma. Mild bladder wall thickening may relate to chronic outlet obstruction. Cystitis less likely. Other findings as above. This document has been electronically signed by: Suhas Lyman MD on 07/10/2024 07:23 PM All CTs at this facility use dose modulation techniques and iterative reconstructions, and/or weight-based dosing when appropriate to reduce radiation to a low as reasonably achievable.      Physical Examination:        General appearance:  alert, cooperative and no distress  Mental Status:  oriented to person, place and time and normal affect  Lungs: Prolonged expiratory phase with wheezing, on room air  Heart:  regular rate and rhythm, no murmur  Abdomen:  soft, nontender, nondistended, normal bowel sounds, no masses, hepatomegaly, splenomegaly  Extremities:  no edema, redness, tenderness in the calves, lower extremity inaccessible secondary to Ace wrap  Skin:  no gross lesions, rashes, induration, vac placed    Assessment:        Hospital Problems             Last Modified POA    * (Principal) Septicemia (HCC) 7/12/2024 Yes    Hyperlipidemia 7/13/2024 Yes 
technically a possibility. This document has been electronically signed by: Suhas Lyman MD on 07/10/2024 07:32 PM All CTs at this facility use dose modulation techniques and iterative reconstructions, and/or weight-based dosing when appropriate to reduce radiation to a low as reasonably achievable. 3D Post-processing was performed on this study.    CT ABDOMEN PELVIS W IV CONTRAST Additional Contrast? None    Result Date: 7/10/2024  Impression: Interval aortofemoral bypass. Fluid collection adjacent to the proximal anastomosis may represent a postoperative seroma. Recommend follow-up to assess for interval change to exclude the possibility of infection. In the left inguinal region there is a fluid collection with a thin enhancing wall and adjacent edema. While this may be postoperative seroma an abscess is a possibility recommend further evaluation or follow-up. Fluid collection in the right inguinal region most likely postoperative seroma. Mild bladder wall thickening may relate to chronic outlet obstruction. Cystitis less likely. Other findings as above. This document has been electronically signed by: Suhas Lyman MD on 07/10/2024 07:23 PM All CTs at this facility use dose modulation techniques and iterative reconstructions, and/or weight-based dosing when appropriate to reduce radiation to a low as reasonably achievable.      Physical Examination:        General appearance:  alert, cooperative and no distress  Mental Status:  oriented to person, place and time and normal affect  Lungs: Prolonged expiratory phase with wheezing, on room air  Heart:  regular rate and rhythm, no murmur  Abdomen:  soft, nontender, nondistended, normal bowel sounds, no masses, hepatomegaly, splenomegaly  Extremities:  no edema, redness, tenderness in the calves, lower extremity inaccessible secondary to Ace wrap  Skin:  no gross lesions, rashes, induration, vac placed    Assessment:        Hospital Problems             Last Modified POA

## 2024-07-18 NOTE — DISCHARGE SUMMARY
[facility]  Office: [phone]  Edward Dhaliwal DO, Raymond Bright DO, James Fang DO, Rizwan Hansen DO, Jeremias Loomis MD, Pauline Garber MD, Dang Espinoza MD, Bettie Contreras MD,  Antoine Navarro MD, Martha Valdivia MD, Amanda Gaitan MD,  Miguel Al DO, Skyler Lancaster MD, Trino Jolly MD, Robert Dhaliwal DO, Giselle Norman MD,  Sanya Myles DO, Dasha Quinones MD, Megan Mcdaniel MD, Danielle Medina MD, Artie Duggan MD,  Negrito Woodard MD, Abhilash Nobles MD, Nette Craft MD, Jovita Cohn MD, Orlando Rousseau MD, Le Lopez MD, Mario Smith DO, Thang Sutherland DO, Leroy Babcock MD,  Jacob Ham MD, Shirley Waterhouse, CNP,  Nidia Tony CNP, Roman Lebron, CNP,  Marilee Sales, HONG, Mary Beth Boss CNP, Kim Thomason CNP, Brinda Moran CNP, Margarette Young CNP, Eunice Pedro, PA-C, Cathie Najera PA-C, Cherie Meier, CNP, Leann Tuttle, CNP, Christal Odom, CNP, Guillermina Sheldon, CNP, Areli Balderas CNP, Krista Troy, CNS, Amy Gil, CNP, Sol Saavedra CNP, Tracy Schwab, CNP         [facility]   IN-PATIENT SERVICE   [facility]    Discharge Summary     Patient ID: David Vaca  :  1965   MRN: [id number]     ACCOUNT:  [id number]   Patient's PCP: Sandi Choudhary APRN - CNP  Admit Date: 7/10/2024   Discharge Date: 24  Length of Stay: 6  Code Status:  Prior  Admitting Physician: Shirley Ann Waterhouse, APRN - CNP  Discharge Physician: Marilee Schira, APRN - NP     Active Discharge Diagnoses:     Hospital Problem Lists:  Principal Problem:    Septicemia (HCC)  Active Problems:    Hyperlipidemia    Cardiac syndrome X (HCC)    S/P aortobifemoral bypass surgery    Surgical site infection    Abscess of groin, left    KEHINDE (acute kidney injury) (HCC)    Hypomagnesemia    Macrocytic anemia  Resolved Problems:    * No resolved hospital problems. *      Admission Condition:  fair     Discharged Condition: stable    Hospital

## 2024-07-22 ENCOUNTER — APPOINTMENT (OUTPATIENT)
Dept: GENERAL RADIOLOGY | Age: 59
End: 2024-07-22
Payer: MEDICARE

## 2024-07-22 ENCOUNTER — APPOINTMENT (OUTPATIENT)
Dept: CT IMAGING | Age: 59
End: 2024-07-22
Payer: MEDICARE

## 2024-07-22 ENCOUNTER — HOSPITAL ENCOUNTER (INPATIENT)
Age: 59
LOS: 4 days | Discharge: NURSING, REHAB OR LONG-TERM CARE | End: 2024-07-26
Attending: EMERGENCY MEDICINE
Payer: MEDICARE

## 2024-07-22 DIAGNOSIS — K21.9 GASTROESOPHAGEAL REFLUX DISEASE WITHOUT ESOPHAGITIS: ICD-10-CM

## 2024-07-22 DIAGNOSIS — R06.02 SHORTNESS OF BREATH: ICD-10-CM

## 2024-07-22 DIAGNOSIS — R11.2 NAUSEA AND VOMITING, UNSPECIFIED VOMITING TYPE: Primary | ICD-10-CM

## 2024-07-22 DIAGNOSIS — Z76.0 ENCOUNTER FOR MEDICATION REFILL: ICD-10-CM

## 2024-07-22 DIAGNOSIS — E83.42 HYPOMAGNESEMIA: ICD-10-CM

## 2024-07-22 DIAGNOSIS — R07.81 PLEURITIC CHEST PAIN: ICD-10-CM

## 2024-07-22 DIAGNOSIS — R05.8 PRODUCTIVE COUGH: ICD-10-CM

## 2024-07-22 DIAGNOSIS — K22.70 BARRETT'S ESOPHAGUS WITHOUT DYSPLASIA: ICD-10-CM

## 2024-07-22 LAB
ALBUMIN SERPL BCG-MCNC: 3.9 G/DL (ref 3.5–5.1)
ALP SERPL-CCNC: 43 U/L (ref 38–126)
ALT SERPL W/O P-5'-P-CCNC: 15 U/L (ref 11–66)
ANION GAP SERPL CALC-SCNC: 14 MEQ/L (ref 8–16)
AST SERPL-CCNC: 49 U/L (ref 5–40)
BASOPHILS ABSOLUTE: 0 THOU/MM3 (ref 0–0.1)
BASOPHILS NFR BLD AUTO: 0.3 %
BILIRUB CONJ SERPL-MCNC: < 0.1 MG/DL (ref 0.1–13.8)
BILIRUB SERPL-MCNC: 0.2 MG/DL (ref 0.3–1.2)
BUN SERPL-MCNC: 7 MG/DL (ref 7–22)
CA-I BLD ISE-SCNC: 0.78 MMOL/L (ref 1.12–1.32)
CALCIUM SERPL-MCNC: 7.3 MG/DL (ref 8.5–10.5)
CHLORIDE SERPL-SCNC: 98 MEQ/L (ref 98–111)
CO2 SERPL-SCNC: 25 MEQ/L (ref 23–33)
CREAT SERPL-MCNC: 1.1 MG/DL (ref 0.4–1.2)
DEPRECATED RDW RBC AUTO: 58 FL (ref 35–45)
EOSINOPHIL NFR BLD AUTO: 0.7 %
EOSINOPHILS ABSOLUTE: 0.1 THOU/MM3 (ref 0–0.4)
ERYTHROCYTE [DISTWIDTH] IN BLOOD BY AUTOMATED COUNT: 13.6 % (ref 11.5–14.5)
FLUAV RNA RESP QL NAA+PROBE: NOT DETECTED
FLUBV RNA RESP QL NAA+PROBE: NOT DETECTED
GFR SERPL CREATININE-BSD FRML MDRD: 77 ML/MIN/1.73M2
GLUCOSE BLD STRIP.AUTO-MCNC: 153 MG/DL (ref 70–108)
GLUCOSE SERPL-MCNC: 113 MG/DL (ref 70–108)
HCT VFR BLD AUTO: 30.6 % (ref 42–52)
HGB BLD-MCNC: 10.4 GM/DL (ref 14–18)
IMM GRANULOCYTES # BLD AUTO: 0.06 THOU/MM3 (ref 0–0.07)
IMM GRANULOCYTES NFR BLD AUTO: 0.6 %
LACTIC ACID, SEPSIS: 1.3 MMOL/L (ref 0.5–1.9)
LIPASE SERPL-CCNC: 20.3 U/L (ref 5.6–51.3)
LYMPHOCYTES ABSOLUTE: 2 THOU/MM3 (ref 1–4.8)
LYMPHOCYTES NFR BLD AUTO: 20.6 %
MACROCYTES BLD QL SMEAR: PRESENT
MAGNESIUM SERPL-MCNC: 0.5 MG/DL (ref 1.6–2.4)
MAGNESIUM SERPL-MCNC: 0.8 MG/DL (ref 1.6–2.4)
MCH RBC QN AUTO: 39 PG (ref 26–33)
MCHC RBC AUTO-ENTMCNC: 34 GM/DL (ref 32.2–35.5)
MCV RBC AUTO: 114.6 FL (ref 80–94)
MONOCYTES ABSOLUTE: 0.6 THOU/MM3 (ref 0.4–1.3)
MONOCYTES NFR BLD AUTO: 6.1 %
NEUTROPHILS ABSOLUTE: 6.8 THOU/MM3 (ref 1.8–7.7)
NEUTROPHILS NFR BLD AUTO: 71.7 %
NRBC BLD AUTO-RTO: 0 /100 WBC
NT-PROBNP SERPL IA-MCNC: 297.7 PG/ML (ref 0–124)
OSMOLALITY SERPL CALC.SUM OF ELEC: 272.6 MOSMOL/KG (ref 275–300)
PLATELET # BLD AUTO: 374 THOU/MM3 (ref 130–400)
PMV BLD AUTO: 9.5 FL (ref 9.4–12.4)
POTASSIUM SERPL-SCNC: 4.4 MEQ/L (ref 3.5–5.2)
PROCALCITONIN SERPL IA-MCNC: 0.11 NG/ML (ref 0.01–0.09)
PROT SERPL-MCNC: 6.8 G/DL (ref 6.1–8)
RBC # BLD AUTO: 2.67 MILL/MM3 (ref 4.7–6.1)
SARS-COV-2 RNA RESP QL NAA+PROBE: NOT DETECTED
SODIUM SERPL-SCNC: 137 MEQ/L (ref 135–145)
TROPONIN, HIGH SENSITIVITY: 68 NG/L (ref 0–12)
TROPONIN, HIGH SENSITIVITY: 68 NG/L (ref 0–12)
WBC # BLD AUTO: 9.5 THOU/MM3 (ref 4.8–10.8)

## 2024-07-22 PROCEDURE — 82330 ASSAY OF CALCIUM: CPT

## 2024-07-22 PROCEDURE — 99285 EMERGENCY DEPT VISIT HI MDM: CPT

## 2024-07-22 PROCEDURE — 71275 CT ANGIOGRAPHY CHEST: CPT

## 2024-07-22 PROCEDURE — 2500000003 HC RX 250 WO HCPCS

## 2024-07-22 PROCEDURE — 83880 ASSAY OF NATRIURETIC PEPTIDE: CPT

## 2024-07-22 PROCEDURE — 96368 THER/DIAG CONCURRENT INF: CPT

## 2024-07-22 PROCEDURE — 96365 THER/PROPH/DIAG IV INF INIT: CPT

## 2024-07-22 PROCEDURE — 93005 ELECTROCARDIOGRAM TRACING: CPT

## 2024-07-22 PROCEDURE — 86361 T CELL ABSOLUTE COUNT: CPT

## 2024-07-22 PROCEDURE — 36415 COLL VENOUS BLD VENIPUNCTURE: CPT

## 2024-07-22 PROCEDURE — 83605 ASSAY OF LACTIC ACID: CPT

## 2024-07-22 PROCEDURE — 96375 TX/PRO/DX INJ NEW DRUG ADDON: CPT

## 2024-07-22 PROCEDURE — 2580000003 HC RX 258

## 2024-07-22 PROCEDURE — 84145 PROCALCITONIN (PCT): CPT

## 2024-07-22 PROCEDURE — 74177 CT ABD & PELVIS W/CONTRAST: CPT

## 2024-07-22 PROCEDURE — 82248 BILIRUBIN DIRECT: CPT

## 2024-07-22 PROCEDURE — 85025 COMPLETE CBC W/AUTO DIFF WBC: CPT

## 2024-07-22 PROCEDURE — 6360000004 HC RX CONTRAST MEDICATION

## 2024-07-22 PROCEDURE — 6360000002 HC RX W HCPCS

## 2024-07-22 PROCEDURE — 1200000003 HC TELEMETRY R&B

## 2024-07-22 PROCEDURE — 87641 MR-STAPH DNA AMP PROBE: CPT

## 2024-07-22 PROCEDURE — 96367 TX/PROPH/DG ADDL SEQ IV INF: CPT

## 2024-07-22 PROCEDURE — 82948 REAGENT STRIP/BLOOD GLUCOSE: CPT

## 2024-07-22 PROCEDURE — 80053 COMPREHEN METABOLIC PANEL: CPT

## 2024-07-22 PROCEDURE — 87636 SARSCOV2 & INF A&B AMP PRB: CPT

## 2024-07-22 PROCEDURE — 87040 BLOOD CULTURE FOR BACTERIA: CPT

## 2024-07-22 PROCEDURE — 83735 ASSAY OF MAGNESIUM: CPT

## 2024-07-22 PROCEDURE — 96361 HYDRATE IV INFUSION ADD-ON: CPT

## 2024-07-22 PROCEDURE — 84484 ASSAY OF TROPONIN QUANT: CPT

## 2024-07-22 PROCEDURE — 71045 X-RAY EXAM CHEST 1 VIEW: CPT

## 2024-07-22 PROCEDURE — 6370000000 HC RX 637 (ALT 250 FOR IP)

## 2024-07-22 PROCEDURE — 83690 ASSAY OF LIPASE: CPT

## 2024-07-22 RX ORDER — METOPROLOL TARTRATE 25 MG/1
TABLET, FILM COATED ORAL
Qty: 60 TABLET | Refills: 11 | OUTPATIENT
Start: 2024-07-22

## 2024-07-22 RX ORDER — TIZANIDINE 4 MG/1
4 TABLET ORAL 2 TIMES DAILY PRN
Status: DISCONTINUED | OUTPATIENT
Start: 2024-07-22 | End: 2024-07-22

## 2024-07-22 RX ORDER — OLANZAPINE 10 MG/1
20 TABLET ORAL NIGHTLY
Status: DISCONTINUED | OUTPATIENT
Start: 2024-07-22 | End: 2024-07-26 | Stop reason: HOSPADM

## 2024-07-22 RX ORDER — CALCIUM GLUCONATE 20 MG/ML
2000 INJECTION, SOLUTION INTRAVENOUS ONCE
Status: COMPLETED | OUTPATIENT
Start: 2024-07-22 | End: 2024-07-22

## 2024-07-22 RX ORDER — ISOSORBIDE DINITRATE 20 MG/1
TABLET ORAL
Qty: 60 TABLET | Refills: 11 | OUTPATIENT
Start: 2024-07-22

## 2024-07-22 RX ORDER — ASPIRIN 81 MG/1
81 TABLET, CHEWABLE ORAL DAILY
Status: DISCONTINUED | OUTPATIENT
Start: 2024-07-22 | End: 2024-07-26 | Stop reason: HOSPADM

## 2024-07-22 RX ORDER — GLUCAGON 1 MG/ML
1 KIT INJECTION PRN
Status: DISCONTINUED | OUTPATIENT
Start: 2024-07-22 | End: 2024-07-26 | Stop reason: HOSPADM

## 2024-07-22 RX ORDER — POTASSIUM CHLORIDE 7.45 MG/ML
10 INJECTION INTRAVENOUS PRN
Status: DISCONTINUED | OUTPATIENT
Start: 2024-07-22 | End: 2024-07-26 | Stop reason: HOSPADM

## 2024-07-22 RX ORDER — ISOSORBIDE DINITRATE 20 MG/1
20 TABLET ORAL 2 TIMES DAILY
Status: DISCONTINUED | OUTPATIENT
Start: 2024-07-22 | End: 2024-07-26 | Stop reason: HOSPADM

## 2024-07-22 RX ORDER — INSULIN LISPRO 100 [IU]/ML
0-4 INJECTION, SOLUTION INTRAVENOUS; SUBCUTANEOUS
Status: DISCONTINUED | OUTPATIENT
Start: 2024-07-23 | End: 2024-07-26 | Stop reason: HOSPADM

## 2024-07-22 RX ORDER — POTASSIUM CHLORIDE 20 MEQ/1
40 TABLET, EXTENDED RELEASE ORAL PRN
Status: DISCONTINUED | OUTPATIENT
Start: 2024-07-22 | End: 2024-07-26 | Stop reason: HOSPADM

## 2024-07-22 RX ORDER — ONDANSETRON 2 MG/ML
4 INJECTION INTRAMUSCULAR; INTRAVENOUS EVERY 6 HOURS PRN
Status: DISCONTINUED | OUTPATIENT
Start: 2024-07-22 | End: 2024-07-26 | Stop reason: HOSPADM

## 2024-07-22 RX ORDER — INSULIN LISPRO 100 [IU]/ML
0-4 INJECTION, SOLUTION INTRAVENOUS; SUBCUTANEOUS NIGHTLY
Status: DISCONTINUED | OUTPATIENT
Start: 2024-07-22 | End: 2024-07-26 | Stop reason: HOSPADM

## 2024-07-22 RX ORDER — ALBUTEROL SULFATE 2.5 MG/3ML
2.5 SOLUTION RESPIRATORY (INHALATION) EVERY 6 HOURS PRN
Status: DISCONTINUED | OUTPATIENT
Start: 2024-07-22 | End: 2024-07-26 | Stop reason: HOSPADM

## 2024-07-22 RX ORDER — TRAMADOL HYDROCHLORIDE 50 MG/1
50 TABLET ORAL EVERY 6 HOURS PRN
Status: DISCONTINUED | OUTPATIENT
Start: 2024-07-22 | End: 2024-07-22

## 2024-07-22 RX ORDER — IPRATROPIUM BROMIDE AND ALBUTEROL SULFATE 2.5; .5 MG/3ML; MG/3ML
1 SOLUTION RESPIRATORY (INHALATION)
Status: DISCONTINUED | OUTPATIENT
Start: 2024-07-23 | End: 2024-07-22

## 2024-07-22 RX ORDER — SODIUM CHLORIDE 0.9 % (FLUSH) 0.9 %
5-40 SYRINGE (ML) INJECTION PRN
Status: DISCONTINUED | OUTPATIENT
Start: 2024-07-22 | End: 2024-07-26 | Stop reason: HOSPADM

## 2024-07-22 RX ORDER — ALBUTEROL SULFATE 90 UG/1
2 AEROSOL, METERED RESPIRATORY (INHALATION)
Status: ON HOLD | COMMUNITY
End: 2024-07-26

## 2024-07-22 RX ORDER — MAGNESIUM SULFATE IN WATER 40 MG/ML
2000 INJECTION, SOLUTION INTRAVENOUS PRN
Status: DISCONTINUED | OUTPATIENT
Start: 2024-07-22 | End: 2024-07-26 | Stop reason: HOSPADM

## 2024-07-22 RX ORDER — LAMIVUDINE AND ZIDOVUDINE 150; 300 MG/1; MG/1
1 TABLET, FILM COATED ORAL 2 TIMES DAILY
Status: DISCONTINUED | OUTPATIENT
Start: 2024-07-22 | End: 2024-07-26 | Stop reason: HOSPADM

## 2024-07-22 RX ORDER — 0.9 % SODIUM CHLORIDE 0.9 %
1000 INTRAVENOUS SOLUTION INTRAVENOUS ONCE
Status: COMPLETED | OUTPATIENT
Start: 2024-07-22 | End: 2024-07-22

## 2024-07-22 RX ORDER — SODIUM CHLORIDE 9 MG/ML
INJECTION, SOLUTION INTRAVENOUS CONTINUOUS
Status: ACTIVE | OUTPATIENT
Start: 2024-07-22 | End: 2024-07-23

## 2024-07-22 RX ORDER — ACETAMINOPHEN 325 MG/1
650 TABLET ORAL EVERY 6 HOURS PRN
Status: DISCONTINUED | OUTPATIENT
Start: 2024-07-22 | End: 2024-07-26 | Stop reason: HOSPADM

## 2024-07-22 RX ORDER — DEXTROSE MONOHYDRATE 100 MG/ML
INJECTION, SOLUTION INTRAVENOUS CONTINUOUS PRN
Status: DISCONTINUED | OUTPATIENT
Start: 2024-07-22 | End: 2024-07-26 | Stop reason: HOSPADM

## 2024-07-22 RX ORDER — SODIUM CHLORIDE 0.9 % (FLUSH) 0.9 %
5-40 SYRINGE (ML) INJECTION EVERY 12 HOURS SCHEDULED
Status: DISCONTINUED | OUTPATIENT
Start: 2024-07-22 | End: 2024-07-26 | Stop reason: HOSPADM

## 2024-07-22 RX ORDER — ATORVASTATIN CALCIUM 80 MG/1
80 TABLET, FILM COATED ORAL NIGHTLY
Status: DISCONTINUED | OUTPATIENT
Start: 2024-07-22 | End: 2024-07-26 | Stop reason: HOSPADM

## 2024-07-22 RX ORDER — ONDANSETRON 2 MG/ML
4 INJECTION INTRAMUSCULAR; INTRAVENOUS ONCE
Status: COMPLETED | OUTPATIENT
Start: 2024-07-22 | End: 2024-07-22

## 2024-07-22 RX ORDER — ONDANSETRON 4 MG/1
4 TABLET, ORALLY DISINTEGRATING ORAL EVERY 8 HOURS PRN
Status: DISCONTINUED | OUTPATIENT
Start: 2024-07-22 | End: 2024-07-26 | Stop reason: HOSPADM

## 2024-07-22 RX ORDER — MAGNESIUM SULFATE IN WATER 40 MG/ML
4000 INJECTION, SOLUTION INTRAVENOUS ONCE
Status: COMPLETED | OUTPATIENT
Start: 2024-07-22 | End: 2024-07-23

## 2024-07-22 RX ORDER — FOSAMPRENAVIR CALCIUM 700 MG/1
1400 TABLET, COATED ORAL 2 TIMES DAILY
Status: DISCONTINUED | OUTPATIENT
Start: 2024-07-22 | End: 2024-07-26 | Stop reason: HOSPADM

## 2024-07-22 RX ORDER — ENOXAPARIN SODIUM 100 MG/ML
40 INJECTION SUBCUTANEOUS EVERY 24 HOURS
Status: DISCONTINUED | OUTPATIENT
Start: 2024-07-22 | End: 2024-07-26 | Stop reason: HOSPADM

## 2024-07-22 RX ORDER — POLYETHYLENE GLYCOL 3350 17 G/17G
17 POWDER, FOR SOLUTION ORAL DAILY PRN
Status: DISCONTINUED | OUTPATIENT
Start: 2024-07-22 | End: 2024-07-26 | Stop reason: HOSPADM

## 2024-07-22 RX ORDER — MAGNESIUM SULFATE IN WATER 40 MG/ML
2000 INJECTION, SOLUTION INTRAVENOUS ONCE
Status: COMPLETED | OUTPATIENT
Start: 2024-07-22 | End: 2024-07-22

## 2024-07-22 RX ORDER — FENOFIBRATE 160 MG/1
160 TABLET ORAL DAILY
Status: DISCONTINUED | OUTPATIENT
Start: 2024-07-22 | End: 2024-07-26 | Stop reason: HOSPADM

## 2024-07-22 RX ORDER — FLUOXETINE HYDROCHLORIDE 20 MG/1
20 CAPSULE ORAL DAILY
Status: DISCONTINUED | OUTPATIENT
Start: 2024-07-22 | End: 2024-07-26 | Stop reason: HOSPADM

## 2024-07-22 RX ORDER — PANTOPRAZOLE SODIUM 40 MG/1
40 TABLET, DELAYED RELEASE ORAL
Status: DISCONTINUED | OUTPATIENT
Start: 2024-07-23 | End: 2024-07-26 | Stop reason: HOSPADM

## 2024-07-22 RX ORDER — TAMSULOSIN HYDROCHLORIDE 0.4 MG/1
0.4 CAPSULE ORAL DAILY
Status: DISCONTINUED | OUTPATIENT
Start: 2024-07-22 | End: 2024-07-26 | Stop reason: HOSPADM

## 2024-07-22 RX ORDER — ACETAMINOPHEN 650 MG/1
650 SUPPOSITORY RECTAL EVERY 6 HOURS PRN
Status: DISCONTINUED | OUTPATIENT
Start: 2024-07-22 | End: 2024-07-26 | Stop reason: HOSPADM

## 2024-07-22 RX ORDER — SODIUM CHLORIDE 9 MG/ML
INJECTION, SOLUTION INTRAVENOUS PRN
Status: DISCONTINUED | OUTPATIENT
Start: 2024-07-22 | End: 2024-07-26 | Stop reason: HOSPADM

## 2024-07-22 RX ADMIN — FENOFIBRATE 160 MG: 160 TABLET ORAL at 22:27

## 2024-07-22 RX ADMIN — FOSAMPRENAVIR CALCIUM 1400 MG: 700 TABLET, COATED ORAL at 22:30

## 2024-07-22 RX ADMIN — SODIUM CHLORIDE, PRESERVATIVE FREE 10 ML: 5 INJECTION INTRAVENOUS at 22:39

## 2024-07-22 RX ADMIN — MAGNESIUM SULFATE HEPTAHYDRATE 2000 MG: 40 INJECTION, SOLUTION INTRAVENOUS at 17:04

## 2024-07-22 RX ADMIN — CEFEPIME 2000 MG: 2 INJECTION, POWDER, FOR SOLUTION INTRAVENOUS at 15:33

## 2024-07-22 RX ADMIN — SODIUM CHLORIDE 1000 ML: 9 INJECTION, SOLUTION INTRAVENOUS at 14:45

## 2024-07-22 RX ADMIN — MAGNESIUM SULFATE HEPTAHYDRATE 4000 MG: 40 INJECTION, SOLUTION INTRAVENOUS at 21:52

## 2024-07-22 RX ADMIN — Medication 1500 MG: at 17:04

## 2024-07-22 RX ADMIN — CEFEPIME 2000 MG: 2 INJECTION, POWDER, FOR SOLUTION INTRAVENOUS at 23:02

## 2024-07-22 RX ADMIN — TAMSULOSIN HYDROCHLORIDE 0.4 MG: 0.4 CAPSULE ORAL at 22:39

## 2024-07-22 RX ADMIN — IOPAMIDOL 80 ML: 755 INJECTION, SOLUTION INTRAVENOUS at 15:51

## 2024-07-22 RX ADMIN — SODIUM CHLORIDE: 9 INJECTION, SOLUTION INTRAVENOUS at 21:48

## 2024-07-22 RX ADMIN — ENOXAPARIN SODIUM 40 MG: 100 INJECTION SUBCUTANEOUS at 22:27

## 2024-07-22 RX ADMIN — LAMIVUDINE AND ZIDOVUDINE 1 TABLET: 150; 300 TABLET, FILM COATED ORAL at 22:30

## 2024-07-22 RX ADMIN — ISOSORBIDE DINITRATE 20 MG: 20 TABLET ORAL at 22:27

## 2024-07-22 RX ADMIN — CALCIUM GLUCONATE 2000 MG: 20 INJECTION, SOLUTION INTRAVENOUS at 18:51

## 2024-07-22 RX ADMIN — ATORVASTATIN CALCIUM 80 MG: 80 TABLET, FILM COATED ORAL at 22:27

## 2024-07-22 RX ADMIN — ONDANSETRON 4 MG: 2 INJECTION INTRAMUSCULAR; INTRAVENOUS at 14:45

## 2024-07-22 RX ADMIN — METOPROLOL TARTRATE 25 MG: 25 TABLET, FILM COATED ORAL at 22:29

## 2024-07-22 RX ADMIN — ASPIRIN 81 MG 81 MG: 81 TABLET ORAL at 22:34

## 2024-07-22 RX ADMIN — FLUOXETINE HYDROCHLORIDE 20 MG: 20 CAPSULE ORAL at 22:27

## 2024-07-22 RX ADMIN — OLANZAPINE 20 MG: 10 TABLET, FILM COATED ORAL at 22:27

## 2024-07-22 ASSESSMENT — PAIN - FUNCTIONAL ASSESSMENT: PAIN_FUNCTIONAL_ASSESSMENT: NONE - DENIES PAIN

## 2024-07-22 NOTE — ED NOTES
ED to inpatient nurses report      Chief Complaint:  Chief Complaint   Patient presents with    Emesis     Present to ED from: Home    MOA:     LOC: alert and orientated to name, place, date  Mobility: Requires assistance * 1  Oxygen Baseline: Room Air     Current needs required:      Code Status:   Prior    What abnormal results were found and what did you give/do to treat them? Blood work - See results  Any procedures or intervention occur? See MAR    Mental Status:  Level of Consciousness: Alert (0)    Psych Assessment:        Vitals:  Patient Vitals for the past 24 hrs:   BP Temp Temp src Pulse Resp SpO2 Height Weight   07/22/24 1829 123/82 -- -- 98 (!) 31 98 % -- --   07/22/24 1815 114/62 -- -- 99 28 98 % -- --   07/22/24 1659 (!) 141/92 -- -- 97 (!) 31 -- -- --   07/22/24 1530 130/70 -- -- 95 (!) 33 96 % -- --   07/22/24 1510 133/88 -- -- 96 (!) 31 96 % -- --   07/22/24 1430 125/72 -- -- 93 29 96 % -- --   07/22/24 1342 125/89 -- -- 98 (!) 36 96 % -- --   07/22/24 1338 -- 97.3 °F (36.3 °C) Oral (!) 106 -- -- 1.651 m (5' 5\") 61.5 kg (135 lb 9.3 oz)        LDAs:      Ambulatory Status:  No data recorded    Diagnosis:  DISPOSITION Decision To Admit 07/22/2024 07:20:15 PM   Final diagnoses:   Nausea and vomiting, unspecified vomiting type   Productive cough   Pleuritic chest pain   Shortness of breath        Consults:  PHARMACY TO DOSE VANCOMYCIN     Pain Score:  Pain Assessment  Pain Assessment: None - Denies Pain    C-SSRS:        Sepsis Screening:       [place] Fall Risk:       Swallow Screening        Preferred Language:   English      ALLERGIES     Patient has no known allergies.    SURGICAL HISTORY       Past Surgical History:   Procedure Laterality Date    ABDOMINAL AORTIC ANEURYSM REPAIR N/A 05/31/2024    AORTOBIFEMORAL BYPASS performed by Yen Montague MD at [facility]    CARDIAC CATHETERIZATION  2015???    OK    COLONOSCOPY  2016    COLONOSCOPY  2021    Dr Rutledge     CYSTOSCOPY  05/31/2024

## 2024-07-22 NOTE — ED NOTES
D/t incompality of ordered medications, this RN offered to obtain additional IV access. Pt refused. Pt in stable condition

## 2024-07-22 NOTE — ED PROVIDER NOTES
[facility] EMERGENCY DEPT  EMERGENCY DEPARTMENT ENCOUNTER          Pt Name: David Vaca  MRN: [id number]  Birthdate 1965  Date of evaluation: 7/22/2024  Physician: Sivakumar Elise MD  Supervising Attending Physician: Bulmaro Garcia DO       CHIEF COMPLAINT       Chief Complaint   Patient presents with    Emesis         HISTORY OF PRESENT ILLNESS    HPI  David Vaca is a 58 y.o. male with PMH significant for COPD (not on home O2), DMT2, GERD, HIV, HTN, HLD, h/o blood clots, s/p AAA repair with aortobifem (5/31/2024) and left groin I&D with wound VAC placement and washout (7/15/24) [at [facility]] with R-PICC placement for daily Rocephin infusions until 8/24 who presents to the emergency department from home, by private vehicle for evaluation of multiple episodes of NB emesis that started yesterday.  States that he had 7 episodes of emesis today as well as woke up and cold sweat this morning.  States that only thing that he ate different yesterday was rotisserie chicken in which no one else at home ate.  Started experiencing symptoms soon afterwards.  Reports that his stools are loose but not watery and this has been present since starting the antibiotic.  Also reports productive cough starting today.  Has had decreased appetite since emesis started.  Reports associated lightheadedness and pleuritic chest pain that comes and goes, at rest, left-sided, associated with SOB - however reports that this CP and SOB has been present over the past 6 weeks. Does also note some numbness in the distal LLE that is new over the past couple days. Has not been up and moving much since surgery - mainly lying in bed.  No recent sick contacts.     Denies fever, chills, headache, lightheadedness, dizziness, vision changes, tinnitus, congestion, sore throat, neck pain/stiffness, abdominal pain, constipation, diarrhea, dysuria, blood in the urine or stool, weakness in extremities.    The patient has no other

## 2024-07-22 NOTE — ED NOTES
Handoff report received from Eli HUERTA. Pt resting comfortably on cot with no acute distress noted. RR regular and unlabored. Family at bedside. Telemetry in place and call light in reach.

## 2024-07-23 PROBLEM — E43 SEVERE MALNUTRITION (HCC): Status: ACTIVE | Noted: 2024-07-23

## 2024-07-23 PROBLEM — R11.2 NAUSEA AND VOMITING: Status: ACTIVE | Noted: 2024-07-23

## 2024-07-23 LAB
ANION GAP SERPL CALC-SCNC: 11 MEQ/L (ref 8–16)
BACTERIA: ABNORMAL
BILIRUB UR QL STRIP: NEGATIVE
BUN SERPL-MCNC: 7 MG/DL (ref 7–22)
CA-I BLD ISE-SCNC: 1.03 MMOL/L (ref 1.12–1.32)
CALCIUM SERPL-MCNC: 7.7 MG/DL (ref 8.5–10.5)
CASTS #/AREA URNS LPF: ABNORMAL /LPF
CASTS #/AREA URNS LPF: ABNORMAL /LPF
CHARACTER UR: CLEAR
CHARCOAL URNS QL MICRO: ABNORMAL
CHLORIDE SERPL-SCNC: 106 MEQ/L (ref 98–111)
CO2 SERPL-SCNC: 23 MEQ/L (ref 23–33)
COLOR UR: YELLOW
CREAT SERPL-MCNC: 0.9 MG/DL (ref 0.4–1.2)
CRYSTALS URNS QL MICRO: ABNORMAL
DEPRECATED RDW RBC AUTO: 58.5 FL (ref 35–45)
EPITHELIAL CELLS, UA: ABNORMAL /HPF
ERYTHROCYTE [DISTWIDTH] IN BLOOD BY AUTOMATED COUNT: 13.7 % (ref 11.5–14.5)
GFR SERPL CREATININE-BSD FRML MDRD: > 90 ML/MIN/1.73M2
GLUCOSE BLD STRIP.AUTO-MCNC: 101 MG/DL (ref 70–108)
GLUCOSE BLD STRIP.AUTO-MCNC: 127 MG/DL (ref 70–108)
GLUCOSE BLD STRIP.AUTO-MCNC: 133 MG/DL (ref 70–108)
GLUCOSE BLD STRIP.AUTO-MCNC: 159 MG/DL (ref 70–108)
GLUCOSE SERPL-MCNC: 104 MG/DL (ref 70–108)
GLUCOSE UR QL STRIP.AUTO: NEGATIVE MG/DL
HCT VFR BLD AUTO: 26.7 % (ref 42–52)
HGB BLD-MCNC: 9.2 GM/DL (ref 14–18)
HGB UR QL STRIP.AUTO: NEGATIVE
KETONES UR QL STRIP.AUTO: ABNORMAL
LEUKOCYTE ESTERASE UR QL STRIP.AUTO: NEGATIVE
MAGNESIUM SERPL-MCNC: 2 MG/DL (ref 1.6–2.4)
MAGNESIUM SERPL-MCNC: 2.2 MG/DL (ref 1.6–2.4)
MCH RBC QN AUTO: 40.2 PG (ref 26–33)
MCHC RBC AUTO-ENTMCNC: 34.5 GM/DL (ref 32.2–35.5)
MCV RBC AUTO: 116.6 FL (ref 80–94)
MRSA DNA SPEC QL NAA+PROBE: NEGATIVE
NITRITE UR QL STRIP.AUTO: NEGATIVE
OSMOLALITY SERPL CALC.SUM OF ELEC: 277.7 MOSMOL/KG (ref 275–300)
PH UR STRIP.AUTO: 6 [PH] (ref 5–9)
PLATELET # BLD AUTO: 351 THOU/MM3 (ref 130–400)
PMV BLD AUTO: 9.1 FL (ref 9.4–12.4)
POTASSIUM SERPL-SCNC: 4 MEQ/L (ref 3.5–5.2)
PROT UR STRIP.AUTO-MCNC: 100 MG/DL
RBC # BLD AUTO: 2.29 MILL/MM3 (ref 4.7–6.1)
RBC #/AREA URNS HPF: ABNORMAL /HPF
RENAL EPI CELLS #/AREA URNS HPF: ABNORMAL /[HPF]
SODIUM SERPL-SCNC: 140 MEQ/L (ref 135–145)
SP GR UR REFRACT.AUTO: 1.03 (ref 1–1.03)
UROBILINOGEN UR QL STRIP.AUTO: 0.2 EU/DL (ref 0–1)
WBC # BLD AUTO: 6.8 THOU/MM3 (ref 4.8–10.8)
WBC #/AREA URNS HPF: ABNORMAL /HPF
YEAST LIKE FUNGI URNS QL MICRO: ABNORMAL

## 2024-07-23 PROCEDURE — 81001 URINALYSIS AUTO W/SCOPE: CPT

## 2024-07-23 PROCEDURE — 83735 ASSAY OF MAGNESIUM: CPT

## 2024-07-23 PROCEDURE — 99223 1ST HOSP IP/OBS HIGH 75: CPT | Performed by: INTERNAL MEDICINE

## 2024-07-23 PROCEDURE — 97162 PT EVAL MOD COMPLEX 30 MIN: CPT

## 2024-07-23 PROCEDURE — 6370000000 HC RX 637 (ALT 250 FOR IP)

## 2024-07-23 PROCEDURE — 1200000003 HC TELEMETRY R&B

## 2024-07-23 PROCEDURE — 82330 ASSAY OF CALCIUM: CPT

## 2024-07-23 PROCEDURE — 82948 REAGENT STRIP/BLOOD GLUCOSE: CPT

## 2024-07-23 PROCEDURE — 2580000003 HC RX 258

## 2024-07-23 PROCEDURE — 85027 COMPLETE CBC AUTOMATED: CPT

## 2024-07-23 PROCEDURE — 97116 GAIT TRAINING THERAPY: CPT

## 2024-07-23 PROCEDURE — 6360000002 HC RX W HCPCS

## 2024-07-23 PROCEDURE — 6360000002 HC RX W HCPCS: Performed by: INTERNAL MEDICINE

## 2024-07-23 PROCEDURE — 80048 BASIC METABOLIC PNL TOTAL CA: CPT

## 2024-07-23 PROCEDURE — 36415 COLL VENOUS BLD VENIPUNCTURE: CPT

## 2024-07-23 PROCEDURE — 2580000003 HC RX 258: Performed by: INTERNAL MEDICINE

## 2024-07-23 RX ADMIN — TAMSULOSIN HYDROCHLORIDE 0.4 MG: 0.4 CAPSULE ORAL at 08:21

## 2024-07-23 RX ADMIN — LAMIVUDINE AND ZIDOVUDINE 1 TABLET: 150; 300 TABLET, FILM COATED ORAL at 21:34

## 2024-07-23 RX ADMIN — ASPIRIN 81 MG 81 MG: 81 TABLET ORAL at 08:21

## 2024-07-23 RX ADMIN — FENOFIBRATE 160 MG: 160 TABLET ORAL at 08:23

## 2024-07-23 RX ADMIN — CEFTRIAXONE SODIUM 2000 MG: 2 INJECTION, POWDER, FOR SOLUTION INTRAMUSCULAR; INTRAVENOUS at 16:49

## 2024-07-23 RX ADMIN — SODIUM CHLORIDE, PRESERVATIVE FREE 10 ML: 5 INJECTION INTRAVENOUS at 21:36

## 2024-07-23 RX ADMIN — CEFEPIME 2000 MG: 2 INJECTION, POWDER, FOR SOLUTION INTRAVENOUS at 08:32

## 2024-07-23 RX ADMIN — METOPROLOL TARTRATE 25 MG: 25 TABLET, FILM COATED ORAL at 08:21

## 2024-07-23 RX ADMIN — ATORVASTATIN CALCIUM 80 MG: 80 TABLET, FILM COATED ORAL at 21:32

## 2024-07-23 RX ADMIN — ISOSORBIDE DINITRATE 20 MG: 20 TABLET ORAL at 21:35

## 2024-07-23 RX ADMIN — OLANZAPINE 20 MG: 10 TABLET, FILM COATED ORAL at 21:35

## 2024-07-23 RX ADMIN — FOSAMPRENAVIR CALCIUM 1400 MG: 700 TABLET, COATED ORAL at 21:33

## 2024-07-23 RX ADMIN — FLUOXETINE HYDROCHLORIDE 20 MG: 20 CAPSULE ORAL at 08:23

## 2024-07-23 RX ADMIN — METOPROLOL TARTRATE 25 MG: 25 TABLET, FILM COATED ORAL at 21:35

## 2024-07-23 RX ADMIN — LAMIVUDINE AND ZIDOVUDINE 1 TABLET: 150; 300 TABLET, FILM COATED ORAL at 08:25

## 2024-07-23 RX ADMIN — FOSAMPRENAVIR CALCIUM 1400 MG: 700 TABLET, COATED ORAL at 08:24

## 2024-07-23 RX ADMIN — PANTOPRAZOLE SODIUM 40 MG: 40 TABLET, DELAYED RELEASE ORAL at 08:26

## 2024-07-23 RX ADMIN — ENOXAPARIN SODIUM 40 MG: 100 INJECTION SUBCUTANEOUS at 21:32

## 2024-07-23 RX ADMIN — ISOSORBIDE DINITRATE 20 MG: 20 TABLET ORAL at 08:22

## 2024-07-23 NOTE — H&P
History & Physical  Internal Medicine Resident         Patient: David Vaca 58 y.o. male      : 1965  Date of Admission: 2024  Date of Service: Pt seen/examined on 24 and Admitted to Inpatient with expected LOS greater than two midnights due to medical therapy.       ASSESSMENT AND PLAN  Nausea, vomiting: began 24 with multiple episodes of NBNB emesis, poor PO intake/appetite, lightheadedness, nausea, chills, night sweats. Afebrile. Tachycardic and tachypneic OA; tachycardia resolved. S/p 1L NS bolus. No leukocytosis, LA WNL, procal slightly elevated at 0.11. Denies sick contacts. Noted recent admission to [facility] for sepsis/bacteremia.   Blood cx x2 obtained, follow results  PNA panel + resp cx ordered  UA w/ reflex to cx ordered  Continue empiric Cefepime + Vanc; de-escalate as appropriate   IVF w/ NS at 75 cc/hr   Antiemetics PRN for nausea  Tylenol for fevers   Severe hypomagnesemia: possibly 2/2 emesis vs poor PO intake vs recent sepsis. Has noted h/o hypomagnesemia. K within normal limits. Mg 0.5 OA, s/p 2g IV Mg with improvement to 0.8. 4g IV Mg ordered.   Repeat Mg 0100  Daily Mg, replacement protocol in place  Keep Mg >2  Hypocalcemia: possibly 2/2 emesis vs poor PO intake vs recent sepsis. iCal 0.78, s/p 2g IV Ca. Will continue to monitor w/ daily BMP.   Recent left groin I&D w/ wound VAC placement and washout (24): At [facility] in [place]. C/b bacteremia w/ Enterobacter, s/p R-PICC placement for daily Rocephin ( - ). CT A/P noted fluid collection in R inguinal region (3.3 x 2.6 cm) w/ resolution of fluid collection in L groin. Denies pain in R groin.   Wound care consulted   Continue wound vac  Blood cx x2  PAD s/p prior complicated revascularization (5/10/24) w/ subsequent fasciotomy closure (24, 24), S/p AAA repair w/ aortobifem (24): follows w/ vascular surgery at [facility]. Continue home ASA.   Acute? COPD, emphysema: No PFTs available  RFA L3-S1    OTHER SURGICAL HISTORY Bilateral 03/06/2018    Bilat SI joint injection    PAIN MANAGEMENT PROCEDURE Right 01/14/2020    Lumbar RFA  Right side first L3-4,4-5,5-S1 No sedation per patient request performed by Robert Diaz MD at [facility] [place]    PAIN MANAGEMENT PROCEDURE Left 03/02/2020    Lumbar RFA Left side L3-4,4-5,5-S1- local per pt request performed by Robert Diaz MD at [facility] [place]    PAIN MANAGEMENT PROCEDURE Right 10/08/2020    Lumbar RFA bilateral L3-4,4-5,5-S1  right side first -Local per patient request- performed by Robert Diaz MD at [place]    PAIN MANAGEMENT PROCEDURE Left 11/12/2020    Lumbar RFA Left side L3-4, 4-5, 5-S1, performed by Robert Diaz MD at [place]    PAIN MANAGEMENT PROCEDURE Bilateral 02/27/2023    Bilateral Lumbar 4-5, 5-sacral 1 radiofrequency ablation performed by Robert Diaz MD at [place]    NE NJX DX/THER AGT PVRT FACET JT LMBR/SAC 2ND LEVEL Bilateral 03/06/2018    BILATERAL SI MBB #2 NO SEDATION performed by Robert Diaz MD at [place]    UPPER GASTROINTESTINAL ENDOSCOPY  2005,2007x2, 2014,2017         Problem Relation Age of Onset    Diabetes Mother     High Blood Pressure Mother     Colon Cancer Neg Hx     Cancer Neg Hx     Heart Disease Neg Hx     Stroke Neg Hx      Social History     Socioeconomic History    Marital status: Single     Spouse name: None    Number of children: None    Years of education: None    Highest education level: None   Tobacco Use    Smoking status: Every Day     Current packs/day: 1.00     Average packs/day: 1 pack/day for 28.6 years (28.6 ttl pk-yrs)     Types: Cigarettes     Start date: 1996    Smokeless tobacco: Never   Vaping Use    Vaping Use: Never used   Substance and Sexual Activity    Alcohol use: No    Drug use: Not Currently     Types: Cocaine, Crack Cocaine, Marijuana (Weed)     Comment: Had

## 2024-07-23 NOTE — CARE COORDINATION
7/23/24, 2:29 PM EDT    DISCHARGE PLANNING EVALUATION       Received a message that [facility] not [facility] care needs.  Spoke with sister Eri and  update of patient's willingness to go to a skilled facility.  Explained insurance pre-cert process.  She prefers [facility] and then [place].  Referral made to Angeline at [facility] and she will check in network status of patient.

## 2024-07-23 NOTE — PROGRESS NOTES
Pharmacy Medication History Note    List of current medications patient is taking is complete.    Source of information: Patient    Changes made to medication list:  Medications removed:  Patient reported not using Stiolto inhaler or taking lactobacillus at this time    Other notes:  Last dose of ceftriaxone was 7/21/2024    Electronically signed by Cherie Garcia RPH on 7/22/2024 at 8:58 PM

## 2024-07-23 NOTE — PROGRESS NOTES
[facility]  INPATIENT PHYSICAL THERAPY  EVALUATION  STR MED SURG 8AB - 8A-06/006-A    Time In: 1002  Time Out: 1022  Timed Code Treatment Minutes: 11 Minutes  Minutes: 20          Date: 2024  Patient Name: David Vaca,  Gender:  male        MRN: [id number]  : 1965  (58 y.o.)      Referring Practitioner: Dr. JIN Beckman  Diagnosis: SOB  Additional Pertinent Hx: H&P:58 y.o. male with PMHx of COPD, depression, emphysema, GERD, HIV, T2DM, HLD, HTN, h/o blood clots, s/p AAA repair w/ aortobifem (24) and L groin I&D w/ wound VAC placement and washout (7/15/24 at [facility]) w/ R-PICC who presents to [facility] with multiple episodes of non-bloody emesis. Pt states that Saturday he felt fine and ate as usual, then  () he did not eat at all, and did not feel well. Today he had ~7 episodes of NBNB emesis. A/w nausea, chills, night sweats, lightheadedness, generalized weakness, poor appetite/PO intake, mild CP, SOB, productive cough. Also reports having loose stools since starting on IV Rocephin daily (through PICC). Does state that this CP and SOB have been present over the past 6 weeks, and is intermittent. He denies fevers, HA, dizziness, sore throat, neck pain, abdominal pain, dysuria, constipation, melena, hematochezia. Denies any pain in groin or at prior fasciotomy sites.     Restrictions/Precautions:  Restrictions/Precautions: Fall Risk  Position Activity Restriction  Other position/activity restrictions: .    Subjective:  Chart Reviewed: Yes  Patient assessed for rehabilitation services?: Yes  Subjective: cooperative, pt requested use of bathroom, pt stated has been feeling unsteady with home mobility    General:        Hearing: Within functional limits       Pain: no pain per pt just recently received pain meds    Vitals: Vitals not assessed per clinical judgement, see nursing flowsheet    Social/Functional History:    Lives With: Alone  Type of Home:  House  Home Layout: One level  Home Access: Stairs to enter without rails  Entrance Stairs - Number of Steps: 1  Home Equipment: Walker - 4-Wheeled                   Ambulation Assistance: Independent  Transfer Assistance: Independent               OBJECTIVE:  Range of Motion:  Bilateral Lower Extremity: WFL    Strength:  Bilateral Lower Extremity: >/=3/5, generalized weakness    Balance:  Static Sitting Balance:  Modified Independent  Dynamic Sitting Balance: Supervision, reaching head to knee level, no UE support, no LOB  Static Standing Balance: Stand By Assistance, no UE support, min guarded and unsteady  Dynamic Standing Balance: Contact Guard Assistance, reaching head to knee level, no UE support, min unsteady, fatigued quickly    Bed Mobility:  Rolling to Right: Supervision   Supine to Sit: Stand By Assistance  Sit to Supine: Stand By Assistance   Scooting: Stand By Assistance  HOB up 10 degrees  Transfers:  Sit to Stand: Contact Guard Assistance  Stand to Sit:Contact Guard Assistance  EOB, toilet x2 trials  Ambulation:  Contact Guard Assistance  Distance: 10'x1, 2'x2  Surface: Level Tile  Device:No Device  Gait Deviations:  Slow Ethel, Decreased Step Length Bilaterally, Narrow Base of Support, and Unsteady Gait, fatigued quickly        Functional Outcome Measures:   [facility] (6 CLICK) BASIC MOBILITY     AM-PAC Inpatient Mobility without Stair Climbing Raw Score : 15  AM-PAC Inpatient without Stair Climbing T-Scale Score : 43.03  Mobility Inpatient CMS 0-100% Score: 47.43  Mobility Inpatient without Stair CMS G-Code Modifier : CK       Modified Waqas:  Premorbid Functional Status: Not Applicable  Current Functional Status:  Not Applicable    ASSESSMENT:  Activity Tolerance:  Patient tolerance of  treatment: fair.       Treatment Initiated: Treatment and education initiated within context of evaluation.  Evaluation time included review of current medical information, gathering information related to past

## 2024-07-23 NOTE — PROGRESS NOTES
[facility]   Pharmacy Pharmacokinetic Monitoring Service - Vancomycin     David Vaca is a 58 y.o. male starting on vancomycin therapy for nosocomial pneumonia. Pharmacy consulted by Dr. Turpin for monitoring and adjustment.    Target Concentration: Goal AUC/ANTWON 400-600 mg*hr/L    Additional Antimicrobials: cefepime    Pertinent Laboratory Values:   Wt Readings from Last 1 Encounters:   07/22/24 61.5 kg (135 lb 9.3 oz)     Temp Readings from Last 1 Encounters:   07/22/24 97.3 °F (36.3 °C) (Oral)     Estimated Creatinine Clearance: 63 mL/min (based on SCr of 1.1 mg/dL).  Recent Labs     07/22/24  1350   CREATININE 1.1   BUN 7   WBC 9.5     Pertinent Cultures:  Date Source Results   7/22/24 BCx2 ---   MRSA Nasal Swab: not ordered. Order placed by pharmacy.    Plan:  Dosing recommendations based on Bayesian software  Received vancomycin 1500 mg IV once in ER at 1704 today. Start vancomycin 1250 mg every 24 hours  Anticipated AUC of 432 and trough concentration of 9.5 at steady state  Renal labs as indicated   Pharmacy will continue to monitor patient and adjust therapy as indicated    Thank you for the consult,  Marilee Hernandez [facility]  7/22/2024  9:22 PM

## 2024-07-23 NOTE — PROGRESS NOTES
Comprehensive Nutrition Assessment    Type and Reason for Visit: Initial, Positive Nutrition Screen (MST Score 2)    Nutrition Recommendations/Plan:   Continue diet as ordered.   Continue strawberry Ensure Plus TID and continue at discharge.      Malnutrition Assessment:  Malnutrition Status: Severe malnutrition  Context: Acute Illness       Findings of the 6 clinical characteristics of malnutrition:  Energy Intake:  50% or less of estimated energy requirements for 5 or more days  Weight Loss:  Greater than 5% over 1 month (7.5% (11 lb) in the past 2 months)     Body Fat Loss:  Mild body fat loss Orbital, Triceps   Muscle Mass Loss:  Mild muscle mass loss Temples (temporalis), Hand (interosseous)  Fluid Accumulation:  No significant fluid accumulation     Strength:  Not Performed    Nutrition Assessment:    Pt. severely malnourished AEB criteria listed above. At risk for further nutritional compromise r/t admitted d/t emesis and diarrhea. Noted L groin wound with wound vac in place on admission, had wound vac removed today. Has been on IV abx via PICC for last ~week. Noted underlying medical condition (PMHx arthritis, COPD, depression, T2DM, dysphagia, emphysema, GERD, HIV, HLD, HTN and L groin I&D with wound vac placement on 7/15 at [facility]).    Nutrition Related Findings:    Pt. Report/Treatments/Miscellaneous: Patient seen, sitting up in bed. Per patient he has lost around 10-15 lb in the past few months d/t a poor appetite. He has been having nausea and vomiting and last vomited yesterday morning. Patient has been having diarrhea today. Per patient when he is at home he usually eats 1-2 meals per day, he is just not hungry enough to eat more often than that. He has tried ONS in the past and tolerates them ok, willing to drink.   GI Status: Last BM PTA  Wound: Multiple (b/l pelvic wounds, L pre-tibial wounds, L groin wound with wound vac in place)   Edema:  trace , per flowsheet   Pertinent Labs:   Lab

## 2024-07-23 NOTE — CARE COORDINATION
DISCHARGE PLANNING EVALUATION  7/23/24, 10:17 AM EDT    Reason for Referral: Possible placement  Decision Maker:  Self, patient also has some confusion at times.   Current Services: [facility] [place] [facility]  New Services Requested:  Possible ECF placement  Family/ Social/ Home environment: Patient resides at home alone.  He has a wound vac and IV antibiotics and has been struggling at home.  Family is hoping for ECF placement and patient is agreeable.   Payment Source: [facility]   Transportation at Discharge:  Unknown  Post-acute (PAC) provider list was provided to patient. Patient was informed of their freedom to choose PAC provider. Discussed and offered to show the patient the relevant PAC Providers quality and resource use measures on Medicare Compare web site via computer based on patient's goals of care and treatment preferences. Questions regarding selection process were answered.      Teach Back Method used with  David regarding care plan and options for discharge.   Patient  verbalized understanding of the plan of care and contribute to goal setting.       Patient preferences and discharge plan: Patient agreeable to possible ECF placement.  Will need a pre-cert PT/OT ordered.  Patient stated either ECF in [place] is fine but would prefer [facility].  He wants sister Eri updated.  Attempted to call no answer and generic voicemail.  Referral made to Guillermina at [facility].     Electronically signed by FAHAD Huston on 7/23/2024 at 10:17 AM

## 2024-07-23 NOTE — CARE COORDINATION
Case Management Assessment Initial Evaluation    Date/Time of Evaluation: 2024 8:04 AM  Assessment Completed by: Melissa Pablo RN    If patient is discharged prior to next notation, then this note serves as note for discharge by case management.    Patient Name: David Vaca                   YOB: 1965  Diagnosis: Shortness of breath [R06.02]  Hypomagnesemia [E83.42]  Pleuritic chest pain [R07.81]  Productive cough [R05.8]  Nausea and vomiting, unspecified vomiting type [R11.2]                   Date / Time: 2024  1:35 PM  [facility]     Patient Admission Status: Inpatient   Readmission Risk Low 0-14, Mod 15-19), High > 20: Readmission Risk Score: 32.7    Current PCP: Sandi Choudhary, APRN - CNP    Additional Case Management Notes: Admit from ER with emesis. Mag 0.5 on admission, 2.0 this am.   Continue with current wound vac.   Blood cultures collected.   IVF. IV Maxipime and Vancocin. Patient supplied Lexiva and Combivir.     Procedures: none    Imagin/22 CT Abd/Plv W:   1. Status post aortobifemoral graft placement.   2. Fluid collection in the right inguinal region measuring 3.3 x 2.6 cm in size. The previously noted fluid collection in the left inguinal region is no longer seen   3. Slightly atrophic pancreas.   4. Small hiatal hernia. Mucosal thickening in the fundus of the stomach.   5. Otherwise negative CT scan of the abdomen and pelvis.     Patient Goals/Plan/Treatment Preferences: From apartment alone. Has [employer] wound vac, Home infusions through [facility] for IV Rocephin.   Agrees he is not doing well at home and would benefit from a SNF stay.   MACY spoke with [facility] as they called in to check on the patient.   CM reached out to Erich with [facility] and updated on patient's status and potential SNF stay.   Called and updated [facility] on patient's current admission. Selected to speak with a nurse, they did not have him as a current patient. MAREK called to  [facility] to inquire on HIS provider. Supervisor from [facility] states it is [facility]. She gave contact number of [phone]. CM Called again and selected to speaking Pharmacist. They verified that they are current, with his home infusions.  Will need to update these agencies with discharge plans.   [phone] Call received from Bernardo from [facility], updated again. His direct number is [phone] to call with updates on disposition.        07/23/24 1100   Service Assessment   Patient Orientation Alert and Oriented   Cognition Alert   History Provided By Patient   Primary Caregiver Self   Accompanied By/Relationship alone   Support Systems Family Members   Patient's Healthcare Decision Maker is: Named in Scanned ACP Document   PCP Verified by CM Yes   Last Visit to PCP   (\"I don't know\")   Prior Functional Level Assistance with the following:   Current Functional Level Assistance with the following:   Can patient return to prior living arrangement Unknown at present   Ability to make needs known: Fair   Family able to assist with home care needs: No   Would you like for me to discuss the discharge plan with any other family members/significant others, and if so, who? Yes   Financial Resources Medicare;Medicaid   Community Resources ECF/Home Care   Discharge Planning   Type of Residence Apartment   Living Arrangements Alone   Current Services Prior To Admission Durable Medical Equipment;Home Care;Home Infusion   Current DME Prior to Arrival Walker;Wound Vac   Potential Assistance Needed Skilled Nursing Facility   DME Ordered? No   Potential Assistance Purchasing Medications No   Type of Home Care Services None   Patient expects to be discharged to: Skilled nursing facility   Services At/After Discharge   Transition of Care Consult (CM Consult) Discharge Planning   Mode of Transport at Discharge Other (see comment)  (TBD)   Confirm Follow Up Transport Family

## 2024-07-23 NOTE — PROGRESS NOTES
See full H&P from resident physician-seen patient today    Electronically signed by Linsey Beckman MD on 7/23/2024 at 12:00 PM

## 2024-07-23 NOTE — CARE COORDINATION
07/23/24 1400   Readmission Assessment   Number of Days since last admission? 1-7 days   Previous Disposition Home with Home Health   Who is being Interviewed Patient   What was the patient's/caregiver's perception as to why they think they needed to return back to the hospital? Other (Comment)  (vommiting)   Did you visit your Primary Care Physician after you left the hospital, before you returned this time? No   Why weren't you able to visit your PCP? Did not have an appointment   Did you see a specialist, such as Cardiac, Pulmonary, Orthopedic Physician, etc. after you left the hospital? No   Who advised the patient to return to the hospital? Caregiver  (sister)   Does the patient report anything that got in the way of taking their medications? No   In our efforts to provide the best possible care to you and others like you, can you think of anything that we could have done to help you after you left the hospital the first time, so that you might not have needed to return so soon? Other (Comment)  (nothing)     Was previously admitted to [facility]

## 2024-07-23 NOTE — PROGRESS NOTES
Wound ostomy consulted for wound vac replacement to left groin. Nursing to remove wound vac, place wet to dry dressing, obtain orders for wound vac change from attending or other provider. Thank you.

## 2024-07-23 NOTE — PROGRESS NOTES
Pharmacy Note - Extended Infusion Beta-Lactam Dose Adjustment    Cefepime 2000 mg q12h extended infusion for treatment of Community acquired pneumonia. Per [facility] Extended Infusion Beta-Lactam Policy, cefepime will be changed to 2000 mg loading dose followed by 2000 mg q8h extended infusion     Estimated Creatinine Clearance: Estimated Creatinine Clearance: 63 mL/min (based on SCr of 1.1 mg/dL).    Dialysis Status, KEHINDE, CKD: Normal    BMI: Body mass index is 22.56 kg/m².    Rationale for Adjustment: Dose adjusted per [facility] Extended Infusion Policy based on renal function and indication. The above medication is renally eliminated and demonstrates time-dependent effects on bacterial eradication. Extended-infusion dosing strategy aims to enhance microbiologic and clinical efficacy.     Pharmacy will monitor renal function daily and adjust dose as necessary.      Please call with any questions.    Thank you,  Heather Espinosa [facility], [facility]  7/22/2024     8:34 PM

## 2024-07-23 NOTE — ED NOTES
Pt resting on cot with RR regular and unlabored. Pt provided with meal and beverages, denies further needs at this time. Telemetry in place and call light in reach.

## 2024-07-23 NOTE — ED NOTES
Spoke to Vicki on 8AB who approved patient transfer to 8A-06. Patient transported upstairs in stable condition.

## 2024-07-24 LAB
ANION GAP SERPL CALC-SCNC: 8 MEQ/L (ref 8–16)
BUN SERPL-MCNC: 12 MG/DL (ref 7–22)
CALCIUM SERPL-MCNC: 8.4 MG/DL (ref 8.5–10.5)
CHLORIDE SERPL-SCNC: 107 MEQ/L (ref 98–111)
CO2 SERPL-SCNC: 22 MEQ/L (ref 23–33)
CREAT SERPL-MCNC: 0.9 MG/DL (ref 0.4–1.2)
DEPRECATED RDW RBC AUTO: 55.4 FL (ref 35–45)
EKG ATRIAL RATE: 98 BPM
EKG P AXIS: 57 DEGREES
EKG P-R INTERVAL: 126 MS
EKG Q-T INTERVAL: 348 MS
EKG QRS DURATION: 90 MS
EKG QTC CALCULATION (BAZETT): 444 MS
EKG R AXIS: 11 DEGREES
EKG T AXIS: 53 DEGREES
EKG VENTRICULAR RATE: 98 BPM
ERYTHROCYTE [DISTWIDTH] IN BLOOD BY AUTOMATED COUNT: 13.1 % (ref 11.5–14.5)
GFR SERPL CREATININE-BSD FRML MDRD: > 90 ML/MIN/1.73M2
GLUCOSE BLD STRIP.AUTO-MCNC: 112 MG/DL (ref 70–108)
GLUCOSE BLD STRIP.AUTO-MCNC: 159 MG/DL (ref 70–108)
GLUCOSE BLD STRIP.AUTO-MCNC: 168 MG/DL (ref 70–108)
GLUCOSE BLD STRIP.AUTO-MCNC: 96 MG/DL (ref 70–108)
GLUCOSE SERPL-MCNC: 92 MG/DL (ref 70–108)
HCT VFR BLD AUTO: 28.9 % (ref 42–52)
HGB BLD-MCNC: 9.5 GM/DL (ref 14–18)
MAGNESIUM SERPL-MCNC: 1.6 MG/DL (ref 1.6–2.4)
MCH RBC QN AUTO: 38.5 PG (ref 26–33)
MCHC RBC AUTO-ENTMCNC: 32.9 GM/DL (ref 32.2–35.5)
MCV RBC AUTO: 117 FL (ref 80–94)
PLATELET # BLD AUTO: 377 THOU/MM3 (ref 130–400)
PMV BLD AUTO: 9.1 FL (ref 9.4–12.4)
POTASSIUM SERPL-SCNC: 4.5 MEQ/L (ref 3.5–5.2)
RBC # BLD AUTO: 2.47 MILL/MM3 (ref 4.7–6.1)
SODIUM SERPL-SCNC: 137 MEQ/L (ref 135–145)
VANCOMYCIN SERPL-MCNC: < 4 UG/ML (ref 0.1–39.9)
WBC # BLD AUTO: 6.5 THOU/MM3 (ref 4.8–10.8)

## 2024-07-24 PROCEDURE — 2580000003 HC RX 258

## 2024-07-24 PROCEDURE — 6360000002 HC RX W HCPCS

## 2024-07-24 PROCEDURE — 6370000000 HC RX 637 (ALT 250 FOR IP)

## 2024-07-24 PROCEDURE — 6360000002 HC RX W HCPCS: Performed by: INTERNAL MEDICINE

## 2024-07-24 PROCEDURE — 82948 REAGENT STRIP/BLOOD GLUCOSE: CPT

## 2024-07-24 PROCEDURE — 97165 OT EVAL LOW COMPLEX 30 MIN: CPT

## 2024-07-24 PROCEDURE — 85027 COMPLETE CBC AUTOMATED: CPT

## 2024-07-24 PROCEDURE — 1200000003 HC TELEMETRY R&B

## 2024-07-24 PROCEDURE — 83735 ASSAY OF MAGNESIUM: CPT

## 2024-07-24 PROCEDURE — 2580000003 HC RX 258: Performed by: INTERNAL MEDICINE

## 2024-07-24 PROCEDURE — 97535 SELF CARE MNGMENT TRAINING: CPT

## 2024-07-24 PROCEDURE — 99232 SBSQ HOSP IP/OBS MODERATE 35: CPT | Performed by: NURSE PRACTITIONER

## 2024-07-24 PROCEDURE — 80202 ASSAY OF VANCOMYCIN: CPT

## 2024-07-24 PROCEDURE — 97605 NEG PRS WND THER DME<=50SQCM: CPT

## 2024-07-24 PROCEDURE — 80048 BASIC METABOLIC PNL TOTAL CA: CPT

## 2024-07-24 PROCEDURE — 36415 COLL VENOUS BLD VENIPUNCTURE: CPT

## 2024-07-24 PROCEDURE — 93010 ELECTROCARDIOGRAM REPORT: CPT | Performed by: INTERNAL MEDICINE

## 2024-07-24 RX ADMIN — TAMSULOSIN HYDROCHLORIDE 0.4 MG: 0.4 CAPSULE ORAL at 08:38

## 2024-07-24 RX ADMIN — ASPIRIN 81 MG 81 MG: 81 TABLET ORAL at 08:38

## 2024-07-24 RX ADMIN — FENOFIBRATE 160 MG: 160 TABLET ORAL at 08:38

## 2024-07-24 RX ADMIN — LAMIVUDINE AND ZIDOVUDINE 1 TABLET: 150; 300 TABLET, FILM COATED ORAL at 20:06

## 2024-07-24 RX ADMIN — ISOSORBIDE DINITRATE 20 MG: 20 TABLET ORAL at 20:05

## 2024-07-24 RX ADMIN — PANTOPRAZOLE SODIUM 40 MG: 40 TABLET, DELAYED RELEASE ORAL at 08:38

## 2024-07-24 RX ADMIN — ISOSORBIDE DINITRATE 20 MG: 20 TABLET ORAL at 08:38

## 2024-07-24 RX ADMIN — SODIUM CHLORIDE, PRESERVATIVE FREE 10 ML: 5 INJECTION INTRAVENOUS at 20:05

## 2024-07-24 RX ADMIN — METOPROLOL TARTRATE 25 MG: 25 TABLET, FILM COATED ORAL at 20:05

## 2024-07-24 RX ADMIN — LAMIVUDINE AND ZIDOVUDINE 1 TABLET: 150; 300 TABLET, FILM COATED ORAL at 08:39

## 2024-07-24 RX ADMIN — FOSAMPRENAVIR CALCIUM 1400 MG: 700 TABLET, COATED ORAL at 20:06

## 2024-07-24 RX ADMIN — ATORVASTATIN CALCIUM 80 MG: 80 TABLET, FILM COATED ORAL at 20:05

## 2024-07-24 RX ADMIN — FOSAMPRENAVIR CALCIUM 1400 MG: 700 TABLET, COATED ORAL at 08:39

## 2024-07-24 RX ADMIN — METOPROLOL TARTRATE 25 MG: 25 TABLET, FILM COATED ORAL at 08:38

## 2024-07-24 RX ADMIN — OLANZAPINE 20 MG: 10 TABLET, FILM COATED ORAL at 20:05

## 2024-07-24 RX ADMIN — ENOXAPARIN SODIUM 40 MG: 100 INJECTION SUBCUTANEOUS at 20:05

## 2024-07-24 RX ADMIN — CEFTRIAXONE SODIUM 2000 MG: 2 INJECTION, POWDER, FOR SOLUTION INTRAMUSCULAR; INTRAVENOUS at 16:10

## 2024-07-24 RX ADMIN — FLUOXETINE HYDROCHLORIDE 20 MG: 20 CAPSULE ORAL at 08:38

## 2024-07-24 ASSESSMENT — PAIN SCALES - GENERAL: PAINLEVEL_OUTOF10: 0

## 2024-07-24 NOTE — PROGRESS NOTES
Hospitalist Progress Note    Patient:  David Vaca      Unit/Bed:8A-06/006-A    YOB: 1965    MRN: [id number]       Acct: [id number]     PCP: Sandi Choudhary APRN - CNP    Date of Admission: 7/22/2024    Assessment/Plan:    Nausea and vomiting--resolved, possibly secondary to #3  Mild hypotension--on Lopressor with hold parameters noted; improved  Hypomagnesia, severe--resolved  Hypocalcemia--recheck in the morning  Recent left groin I&D with wound VAC placement and washout on July 12, 2024 and culture showing Enterobacter--had PICC line placed on July 15, 2024 and to receive Rocephin-started July 14 through August 24  PAD status post prior complicated revascularization on May 10, 2024 with subsequent fasciotomy closure on June 4 and June 7  Status post AAA repair with aortobifem on May 31, 2024  COPD/emphysema--stable, on room air, on Spiriva  HIV--on antiviral agents  Diabetes mellitus type 2, controlled with diabetic peripheral neuropathy--on low-dose sliding scale  Elevated troponin--likely secondary to demand ischemia  BPH without obstruction--on Flomax  Depression--treated with Prozac  Primary hypertension, uncontrolled--on Lopressor; please see #2  Hyperlipidemia--on statin, fenofibrate  GERD--on Protonix  Severe malnutrition--per dietitian  History of blood clots--on Lovenox for DVT prophylaxis  Physical deconditioning/debility--secondary to numerous comorbid medical conditions, PT and OT on the case as well as social work      Expected discharge date: Pending clinical course    Disposition:    [x] Home       [] TCU       [] Rehab       [] Psych       [] SNF       [] Long Term Care Facility       [] Other-    Chief Complaint: Nausea and vomiting    Hospital Course: Per H&P done 7/22/2024: \" David Vaca is a 58 y.o. male with PMHx of COPD, depression, emphysema, GERD, HIV, T2DM, HLD, HTN, h/o blood clots, s/p AAA repair w/ aortobifem (5/31/24) and L groin I&D w/ wound

## 2024-07-24 NOTE — PROGRESS NOTES
[facility] Contin[facility] Nurse  Progress Note       NAME:  David Vaca  MEDICAL RECORD NUMBER:  [id number]  AGE: 58 y.o.   GENDER: male  : 1965  TODAY'S DATE:  2024    Subjective   Reason for WOCN Evaluation and Assessment: wound vac in L groin, possible exchange       David Vaca is a 58 y.o. male referred by:   [x] Physician/ PA/ APRN-CNP  [] Nursing  [] Other:     Wound Identification:  Wound Type: non-healing surgical  Wound Location: Left groin   Modifying factors:  surgical site infection    Patient Goal of Care:  [x] Wound Healing  [x] Drainage Management   [] Odor Control  [] Palliative Care  [x] Pain Control         Objective     Bishnu Risk Score: Bishnu Scale Score: 21    Assessment     Encounter: Wound ostomy present to room for application of wound vac therapy to left groin wound per order from Dr Beckman. Patient in bed upon arrival. Patient has home [employer] wound vac in room. Removed old dressing. Wound photo and assessment to follow. Cleansed wound with normal saline and gauze. Applied skin prep to laina wound. Applied stoma wafer to laina wound to protect good skin. Applied snaked foam x 1 piece to wound bed followed by clear drape. Cut quarter size hole in center of drape over sponge area and apply vac suction. Wound vac settings at 75mmHg continuous suction. Staff to apply extra clear drape as needed if leaks noted and to change canister as needed when full. Wound ostomy to change wound vac three times weekly. Wound has granulated in, nearly to surface. Patient could benefit from surgical closure. Will continue to follow patient. Bed in low, call light in reach.    Photo:     Wound type: Left groin wound  Wound size: 6cm x 0.2cm x 1.3cm  Undermining or Tunneling: None  Wound assessment/color: Granulation red  Drainage amount: Minimal  Drainage description: Serosanguinous  Odor: None  Margins: Attached  Laina wound: Intact  Exposed structure: None     Pain

## 2024-07-24 NOTE — CARE COORDINATION
7/24/24, 1:56 PM EDT    DISCHARGE PLANNING EVALUATION    Spoke with Kirstie at [facility], they do not have a bed.      Spoke with Abdirizak at [facility], referral made.  They will review clinicals and do cost out of medication.  Advised if accepting to start precert.    Spoke with patient, informed of above.  Spoke with patient sister Eri o the phone, informed of above.

## 2024-07-24 NOTE — CARE COORDINATION
7/24/24, 1:44 PM EDT    DISCHARGE ON GOING EVALUATION    David [facility] day: 2  Location: 8A-06/006-A Reason for admit: Shortness of breath [R06.02]  Hypomagnesemia [E83.42]  Pleuritic chest pain [R07.81]  Productive cough [R05.8]  Nausea and vomiting, unspecified vomiting type [R11.2]     Procedures: none    Imaging since last note: none    Barriers to Discharge: Wound vac being reapplied today. Full liquid diet. PT/OT. IV rocephin. Combivir. Requiring PRN zofran. Pain control.       PCP: Sandi Choudhary, ALIZE Kenyon CNP  Readmission Risk Score: 32.5    Patient Goals/Plan/Treatment Preferences: Await decision from the [facility]. Precert required if accepted. Not a Karthik candidate at this time as he has not met stepdown status x 3 nights.     Prior home alone with [facility] SHERRIE, KALEB wound vac, and HIS. Chang from [facility] [phone],  call with updates on disposition.

## 2024-07-24 NOTE — PLAN OF CARE
Problem: Discharge Planning  Goal: Discharge to home or other facility with appropriate resources  7/23/2024 2125 by Shira Mcintyre RN  Outcome: Progressing  7/23/2024 1222 by Dawit Clarke RN  Outcome: Progressing     Problem: Safety - Adult  Goal: Free from fall injury  7/23/2024 2125 by Shira Mcintyre RN  Outcome: Progressing  7/23/2024 1222 by Dawit Clarke RN  Outcome: Progressing     Problem: Skin/Tissue Integrity  Goal: Absence of new skin breakdown  Description: 1.  Monitor for areas of redness and/or skin breakdown  2.  Assess vascular access sites hourly  3.  Every 4-6 hours minimum:  Change oxygen saturation probe site  4.  Every 4-6 hours:  If on nasal continuous positive airway pressure, respiratory therapy assess nares and determine need for appliance change or resting period.  7/23/2024 2125 by Shira Mcintyre RN  Outcome: Progressing  7/23/2024 1222 by Dawit Clarke RN  Outcome: Progressing     Problem: Chronic Conditions and Co-morbidities  Goal: Patient's chronic conditions and co-morbidity symptoms are monitored and maintained or improved  7/23/2024 2125 by Shira Mcintyre RN  Outcome: Progressing  7/23/2024 1222 by Dawit Clarke RN  Outcome: Progressing     Problem: Nutrition Deficit:  Goal: Optimize nutritional status  7/23/2024 2125 by Shira Mcintyre RN  Outcome: Progressing  7/23/2024 1222 by Dawit Clarke RN  Outcome: Progressing

## 2024-07-24 NOTE — PLAN OF CARE
Problem: Discharge Planning  Goal: Discharge to home or other facility with appropriate resources  7/24/2024 1011 by Natasha Momin RN  Outcome: Progressing  7/23/2024 2125 by Shira Mcintyre RN  Outcome: Progressing     Problem: Safety - Adult  Goal: Free from fall injury  7/24/2024 1011 by Natasha Momin RN  Outcome: Progressing  7/23/2024 2125 by Shira Mcintyre RN  Outcome: Progressing     Problem: Skin/Tissue Integrity  Goal: Absence of new skin breakdown  Description: 1.  Monitor for areas of redness and/or skin breakdown  2.  Assess vascular access sites hourly  3.  Every 4-6 hours minimum:  Change oxygen saturation probe site  4.  Every 4-6 hours:  If on nasal continuous positive airway pressure, respiratory therapy assess nares and determine need for appliance change or resting period.  7/24/2024 1011 by Natasha Momin RN  Outcome: Progressing  7/23/2024 2125 by Shira Mcintyre RN  Outcome: Progressing     Problem: Chronic Conditions and Co-morbidities  Goal: Patient's chronic conditions and co-morbidity symptoms are monitored and maintained or improved  7/23/2024 2125 by Shira Mcintyre RN  Outcome: Progressing      Addended by: Carlos Rendon on: 4/23/2018 05:18 PM     Modules accepted: Orders

## 2024-07-24 NOTE — PROGRESS NOTES
[facility]  INPATIENT OCCUPATIONAL THERAPY  STRZ MED SURG 8AB  EVALUATION    Time:     Time In: 08  Time Out: 0915  Timed Code Treatment Minutes: 9 Minutes  Minutes: 19          Date: 2024  Patient Name: David Vaca,   Gender: male      MRN: [id number]  : 1965  (58 y.o.)  Referring Practitioner: Linsey Beckman MD  Diagnosis: SOB  Additional Pertinent Hx: Per MD H & P:58 y.o. male with PMHx of COPD, depression, emphysema, GERD, HIV, T2DM, HLD, HTN, h/o blood clots, s/p AAA repair w/ aortobifem (24) and L groin I&D w/ wound VAC placement and washout (7/15/24 at [facility]) w/ R-PICC who presents to [facility] with multiple episodes of non-bloody emesis. Pt states that Saturday he felt fine and ate as usual, then  () he did not eat at all, and did not feel well. Today he had ~7 episodes of NBNB emesis. A/w nausea, chills, night sweats, lightheadedness, generalized weakness, poor appetite/PO intake, mild CP, SOB, productive cough. Also reports having loose stools since starting on IV Rocephin daily (through PICC). Does state that this CP and SOB have been present over the past 6 weeks, and is intermittent.    Restrictions/Precautions:  Restrictions/Precautions: Fall Risk  Position Activity Restriction  Other position/activity restrictions:      Subjective  Chart Reviewed: Yes, Orders, Progress Notes, History and Physical  Patient assessed for rehabilitation services?: Yes  Family / Caregiver Present: No    Subjective: cooperative    Pain: 10/10: LLE (no physical signs of high levels of pain)    Vitals: Vitals not assessed per clinical judgement, see nursing flowsheet    Social/Functional History:  Lives With: Alone  Type of Home: Apartment  Home Layout: One level  Home Access: Stairs to enter without rails  Entrance Stairs - Number of Steps: 1-threshold  Home Equipment: Walker - 4-Wheeled   Bathroom Shower/Tub: Tub/Shower unit  Bathroom Toilet:  of current medical information, gathering information related to past medical, social and functional history, completion of standardized testing, formal and informal observation of tasks, assessment of data and development of plan of care and goals.  Treatment time included skilled education and facilitation of tasks to increase safety and independence with ADL's for improved functional independence and quality of life.    Discharge Recommendations:  Continue to assess pending progress, Subacute/Skilled Nursing Facility (vs HH OT)    Patient Education:          Patient Education  Education Given To: Patient  Education Provided: Role of Therapy;Plan of Care;Energy Conservation  Education Method: Demonstration;Verbal  Barriers to Learning: None  Education Outcome: Continued education needed    Equipment Recommendations:  Equipment Needed: No    Plan:  Times Per Week: 3-5x  Current Treatment Recommendations: Balance training, Strengthening, Functional mobility training, Endurance training, Self-Care / ADL, Safety education & training.  See long-term goal time frame for expected duration of plan of care.  If no long-term goals established, a short length of stay is anticipated.    Goals:  Patient goals : get out of here today  Short Term Goals  Time Frame for Short Term Goals: until discharge  Short Term Goal 1: Pt will complete BADL routine with mod I & 0 vcs for safety  Short Term Goal 2: Pt will demo indep with BUE moderate resistance HEP to increase endurance for returning to IADLs at home  Short Term Goal 3: Pt will demo good understanding of energy conservation strategies AEB id 3 ways with 0 vcs to incorporate with returning to IADLs at home  Long Term Goals  Time Frame for Long Term Goals : No LTG set d/t short ELOS    AM-PAC Inpatient Daily Activity Raw Score: 24  AM-PAC Inpatient ADL T-Scale Score : 57.54    Following session, patient left in safe position with all fall risk precautions in place.

## 2024-07-25 LAB
ANION GAP SERPL CALC-SCNC: 8 MEQ/L (ref 8–16)
BUN SERPL-MCNC: 11 MG/DL (ref 7–22)
CA-I BLD ISE-SCNC: 1.27 MMOL/L (ref 1.12–1.32)
CALCIUM SERPL-MCNC: 8.6 MG/DL (ref 8.5–10.5)
CD4 T-HELPER CELLS: NORMAL
CHLORIDE SERPL-SCNC: 106 MEQ/L (ref 98–111)
CO2 SERPL-SCNC: 24 MEQ/L (ref 23–33)
CREAT SERPL-MCNC: 0.9 MG/DL (ref 0.4–1.2)
DEPRECATED RDW RBC AUTO: 55.6 FL (ref 35–45)
ERYTHROCYTE [DISTWIDTH] IN BLOOD BY AUTOMATED COUNT: 13.1 % (ref 11.5–14.5)
GFR SERPL CREATININE-BSD FRML MDRD: > 90 ML/MIN/1.73M2
GLUCOSE BLD STRIP.AUTO-MCNC: 116 MG/DL (ref 70–108)
GLUCOSE BLD STRIP.AUTO-MCNC: 168 MG/DL (ref 70–108)
GLUCOSE BLD STRIP.AUTO-MCNC: 179 MG/DL (ref 70–108)
GLUCOSE SERPL-MCNC: 94 MG/DL (ref 70–108)
HCT VFR BLD AUTO: 25.6 % (ref 42–52)
HGB BLD-MCNC: 8.7 GM/DL (ref 14–18)
MAGNESIUM SERPL-MCNC: 1.2 MG/DL (ref 1.6–2.4)
MCH RBC QN AUTO: 39.7 PG (ref 26–33)
MCHC RBC AUTO-ENTMCNC: 34 GM/DL (ref 32.2–35.5)
MCV RBC AUTO: 116.9 FL (ref 80–94)
PLATELET # BLD AUTO: 351 THOU/MM3 (ref 130–400)
PMV BLD AUTO: 9.1 FL (ref 9.4–12.4)
POTASSIUM SERPL-SCNC: 5.1 MEQ/L (ref 3.5–5.2)
RBC # BLD AUTO: 2.19 MILL/MM3 (ref 4.7–6.1)
SODIUM SERPL-SCNC: 138 MEQ/L (ref 135–145)
WBC # BLD AUTO: 5.5 THOU/MM3 (ref 4.8–10.8)

## 2024-07-25 PROCEDURE — 6370000000 HC RX 637 (ALT 250 FOR IP)

## 2024-07-25 PROCEDURE — 97116 GAIT TRAINING THERAPY: CPT

## 2024-07-25 PROCEDURE — 1200000003 HC TELEMETRY R&B

## 2024-07-25 PROCEDURE — 80048 BASIC METABOLIC PNL TOTAL CA: CPT

## 2024-07-25 PROCEDURE — 6370000000 HC RX 637 (ALT 250 FOR IP): Performed by: NURSE PRACTITIONER

## 2024-07-25 PROCEDURE — 85027 COMPLETE CBC AUTOMATED: CPT

## 2024-07-25 PROCEDURE — 2580000003 HC RX 258: Performed by: INTERNAL MEDICINE

## 2024-07-25 PROCEDURE — 36415 COLL VENOUS BLD VENIPUNCTURE: CPT

## 2024-07-25 PROCEDURE — 82330 ASSAY OF CALCIUM: CPT

## 2024-07-25 PROCEDURE — 83735 ASSAY OF MAGNESIUM: CPT

## 2024-07-25 PROCEDURE — 82948 REAGENT STRIP/BLOOD GLUCOSE: CPT

## 2024-07-25 PROCEDURE — 6360000002 HC RX W HCPCS: Performed by: INTERNAL MEDICINE

## 2024-07-25 PROCEDURE — 2580000003 HC RX 258

## 2024-07-25 PROCEDURE — 99232 SBSQ HOSP IP/OBS MODERATE 35: CPT | Performed by: NURSE PRACTITIONER

## 2024-07-25 PROCEDURE — 97530 THERAPEUTIC ACTIVITIES: CPT

## 2024-07-25 PROCEDURE — 6360000002 HC RX W HCPCS

## 2024-07-25 RX ORDER — LANOLIN ALCOHOL/MO/W.PET/CERES
400 CREAM (GRAM) TOPICAL 2 TIMES DAILY
Status: DISCONTINUED | OUTPATIENT
Start: 2024-07-25 | End: 2024-07-26 | Stop reason: HOSPADM

## 2024-07-25 RX ADMIN — ISOSORBIDE DINITRATE 20 MG: 20 TABLET ORAL at 09:00

## 2024-07-25 RX ADMIN — OLANZAPINE 20 MG: 10 TABLET, FILM COATED ORAL at 19:32

## 2024-07-25 RX ADMIN — FOSAMPRENAVIR CALCIUM 1400 MG: 700 TABLET, COATED ORAL at 08:59

## 2024-07-25 RX ADMIN — FENOFIBRATE 160 MG: 160 TABLET ORAL at 08:59

## 2024-07-25 RX ADMIN — LAMIVUDINE AND ZIDOVUDINE 1 TABLET: 150; 300 TABLET, FILM COATED ORAL at 08:59

## 2024-07-25 RX ADMIN — ISOSORBIDE DINITRATE 20 MG: 20 TABLET ORAL at 19:32

## 2024-07-25 RX ADMIN — ATORVASTATIN CALCIUM 80 MG: 80 TABLET, FILM COATED ORAL at 19:32

## 2024-07-25 RX ADMIN — SODIUM CHLORIDE, PRESERVATIVE FREE 10 ML: 5 INJECTION INTRAVENOUS at 19:34

## 2024-07-25 RX ADMIN — MAGNESIUM SULFATE HEPTAHYDRATE 2000 MG: 40 INJECTION, SOLUTION INTRAVENOUS at 11:29

## 2024-07-25 RX ADMIN — Medication 400 MG: at 09:00

## 2024-07-25 RX ADMIN — ASPIRIN 81 MG 81 MG: 81 TABLET ORAL at 09:00

## 2024-07-25 RX ADMIN — ENOXAPARIN SODIUM 40 MG: 100 INJECTION SUBCUTANEOUS at 19:33

## 2024-07-25 RX ADMIN — FOSAMPRENAVIR CALCIUM 1400 MG: 700 TABLET, COATED ORAL at 19:33

## 2024-07-25 RX ADMIN — PANTOPRAZOLE SODIUM 40 MG: 40 TABLET, DELAYED RELEASE ORAL at 04:21

## 2024-07-25 RX ADMIN — CEFTRIAXONE SODIUM 2000 MG: 2 INJECTION, POWDER, FOR SOLUTION INTRAMUSCULAR; INTRAVENOUS at 17:08

## 2024-07-25 RX ADMIN — METOPROLOL TARTRATE 25 MG: 25 TABLET, FILM COATED ORAL at 19:32

## 2024-07-25 RX ADMIN — METOPROLOL TARTRATE 25 MG: 25 TABLET, FILM COATED ORAL at 09:00

## 2024-07-25 RX ADMIN — FLUOXETINE HYDROCHLORIDE 20 MG: 20 CAPSULE ORAL at 09:00

## 2024-07-25 RX ADMIN — Medication 400 MG: at 19:34

## 2024-07-25 RX ADMIN — TAMSULOSIN HYDROCHLORIDE 0.4 MG: 0.4 CAPSULE ORAL at 09:00

## 2024-07-25 RX ADMIN — LAMIVUDINE AND ZIDOVUDINE 1 TABLET: 150; 300 TABLET, FILM COATED ORAL at 19:34

## 2024-07-25 RX ADMIN — MAGNESIUM SULFATE HEPTAHYDRATE 2000 MG: 40 INJECTION, SOLUTION INTRAVENOUS at 08:48

## 2024-07-25 ASSESSMENT — PAIN SCALES - GENERAL: PAINLEVEL_OUTOF10: 0

## 2024-07-25 NOTE — PROGRESS NOTES
[facility]  INPATIENT PHYSICAL THERAPY  DAILY NOTE  STRZ MED SURG 8AB - 8A-06/006-A    Time In: 0940  Time Out: 1004  Timed Code Treatment Minutes: 24 Minutes  Minutes: 24          Date: 2024  Patient Name: David Vaca,  Gender:  male        MRN: [id number]  : 1965  (58 y.o.)     Referring Practitioner: Dr. JIN Beckman  Diagnosis: SOB  Additional Pertinent Hx: H&P:58 y.o. male with PMHx of COPD, depression, emphysema, GERD, HIV, T2DM, HLD, HTN, h/o blood clots, s/p AAA repair w/ aortobifem (24) and L groin I&D w/ wound VAC placement and washout (7/15/24 at [facility]) w/ R-PICC who presents to [facility] with multiple episodes of non-bloody emesis. Pt states that Saturday he felt fine and ate as usual, then  () he did not eat at all, and did not feel well. Today he had ~7 episodes of NBNB emesis. A/w nausea, chills, night sweats, lightheadedness, generalized weakness, poor appetite/PO intake, mild CP, SOB, productive cough. Also reports having loose stools since starting on IV Rocephin daily (through PICC). Does state that this CP and SOB have been present over the past 6 weeks, and is intermittent. He denies fevers, HA, dizziness, sore throat, neck pain, abdominal pain, dysuria, constipation, melena, hematochezia. Denies any pain in groin or at prior fasciotomy sites.     Prior Level of Function:  Lives With: Alone  Type of Home: Apartment  Home Layout: One level  Home Access: Stairs to enter without rails  Entrance Stairs - Number of Steps: 1-threshold  Home Equipment: Walker - 4-Wheeled   Bathroom Shower/Tub: Tub/Shower unit  Bathroom Toilet: Standard    ADL Assistance: Independent  Homemaking Assistance: Independent  Ambulation Assistance: Independent  Transfer Assistance: Independent  Active : Yes  Additional Comments: Pt reports using 4 wheeled walker occassionally at home-especially with IADLs-ex moving laundry. Pt reports recent with HH since  recent sx for IV antiboitics.    Restrictions/Precautions:  Restrictions/Precautions: Fall Risk  Position Activity Restriction  Other position/activity restrictions: .     SUBJECTIVE: DEANNA Metcalf approved therapy session. Patient supine in bed upon PTA arrival and agreeable to therapy treatment. A&Ox4/4. Patient seated EOB at end of therapy session with breakfast tray.    PAIN: 0/10: denies    Vitals: Vitals not assessed per clinical judgement, see nursing flowsheet    OBJECTIVE:  Bed Mobility:  Rolling to Right: Modified Independent   Supine to Sit: Modified Independent  Scooting: Modified Independent    Transfers:  Sit to Stand: Stand By Assistance  Stand to Sit:Stand By Assistance    Ambulation:  Stand By Assistance, Contact Guard Assistance  Distance: 30'x1  Surface: Level Tile  Device: No Device and patient holding onto IV pole with bilateral UE  Gait Deviations:  Forward Flexed Posture, Slow Ethel, Decreased Step Length on Right, Decreased Arm Swing, Decreased Trunk Rotation, Decreased Gait Speed, Decreased Heel Strike Bilaterally, Narrow Base of Support, Unsteady Gait.    Balance:  Pt completed functional tasks at toilet and at bathroom sink with assist for stability and safety. Various functional tasks completed with and without UE support in sitting and standing, Pt stood statically without UE support, pt able to complete tasks without physical assist. CGA/SBA required for increased safety with IV pole and wound vac tube.      Exercise:  None    Functional Outcome Measures:  [facility] (6 CLICK) BASIC MOBILITY     AM-PAC Inpatient Mobility without Stair Climbing Raw Score : 15  AM-PAC Inpatient without Stair Climbing T-Scale Score : 43.03  Mobility Inpatient CMS 0-100% Score: 47.43  Mobility Inpatient without Stair CMS G-Code Modifier : CK     Modified [place] Scale:  Not Applicable    ASSESSMENT:  Assessment: Patient progressing toward established goals.  Activity Tolerance:  Patient tolerance of

## 2024-07-25 NOTE — PLAN OF CARE
Problem: Discharge Planning  Goal: Discharge to home or other facility with appropriate resources  7/25/2024 1930 by Shira Mcintyre RN  Outcome: Progressing  7/25/2024 1031 by Natasha Momin RN  Outcome: Progressing     Problem: Safety - Adult  Goal: Free from fall injury  7/25/2024 1930 by Shira Mcintyre RN  Outcome: Progressing  7/25/2024 1031 by Natasha Momin RN  Outcome: Progressing     Problem: Skin/Tissue Integrity  Goal: Absence of new skin breakdown  Description: 1.  Monitor for areas of redness and/or skin breakdown  2.  Assess vascular access sites hourly  3.  Every 4-6 hours minimum:  Change oxygen saturation probe site  4.  Every 4-6 hours:  If on nasal continuous positive airway pressure, respiratory therapy assess nares and determine need for appliance change or resting period.  Outcome: Progressing     Problem: Chronic Conditions and Co-morbidities  Goal: Patient's chronic conditions and co-morbidity symptoms are monitored and maintained or improved  Outcome: Progressing     Problem: Nutrition Deficit:  Goal: Optimize nutritional status  Outcome: Progressing

## 2024-07-25 NOTE — PLAN OF CARE
Problem: Discharge Planning  Goal: Discharge to home or other facility with appropriate resources  7/24/2024 2012 by Shira Mcintyre RN  Outcome: Progressing  7/24/2024 1011 by Natasha Momin RN  Outcome: Progressing     Problem: Safety - Adult  Goal: Free from fall injury  7/24/2024 2012 by Shira Mcintyre RN  Outcome: Progressing  7/24/2024 1011 by Natasha Momni RN  Outcome: Progressing     Problem: Skin/Tissue Integrity  Goal: Absence of new skin breakdown  Description: 1.  Monitor for areas of redness and/or skin breakdown  2.  Assess vascular access sites hourly  3.  Every 4-6 hours minimum:  Change oxygen saturation probe site  4.  Every 4-6 hours:  If on nasal continuous positive airway pressure, respiratory therapy assess nares and determine need for appliance change or resting period.  7/24/2024 2012 by Shira Mcintyre RN  Outcome: Progressing  7/24/2024 1011 by Natasha Momin RN  Outcome: Progressing     Problem: Chronic Conditions and Co-morbidities  Goal: Patient's chronic conditions and co-morbidity symptoms are monitored and maintained or improved  Outcome: Progressing     Problem: Nutrition Deficit:  Goal: Optimize nutritional status  Outcome: Progressing

## 2024-07-25 NOTE — PROGRESS NOTES
Hospitalist Progress Note    Patient:  David Vaca      Unit/Bed:8A-06/006-A    YOB: 1965    MRN: [id number]       Acct: [id number]     PCP: Sandi Choudhary APRN - CNP    Date of Admission: 7/22/2024    Assessment/Plan:    Nausea and vomiting--resolved, possibly secondary to #3  Mild hypotension--on Lopressor with hold parameters noted; improved  Hypomagnesia, severe--replace per protocol, add magnesium oxide 400 mg twice daily; check in the morning  Hypocalcemia--resolved  Recent left groin I&D with wound VAC placement and washout on July 12, 2024 and culture showing Enterobacter--had PICC line placed on July 15, 2024 and to receive Rocephin-started July 14 through August 24  PAD status post prior complicated revascularization on May 10, 2024 with subsequent fasciotomy closure on June 4 and June 7  Status post AAA repair with aortobifem on May 31, 2024  COPD/emphysema--stable, on room air, on Spiriva  HIV--on antiviral agents  Diabetes mellitus type 2, controlled with diabetic peripheral neuropathy--on low-dose sliding scale  Elevated troponin--likely secondary to demand ischemia  BPH without obstruction--on Flomax  Depression--treated with Prozac  Primary hypertension, uncontrolled--on Lopressor; please see #2  Hyperlipidemia--on statin, fenofibrate  GERD--on Protonix  Severe malnutrition--per dietitian  History of blood clots--on Lovenox for DVT prophylaxis  Physical deconditioning/debility--secondary to numerous comorbid medical conditions, PT and OT on the case as well as social work; plan is SNF on discharge      Expected discharge date: Pending clinical course    Disposition:    [x] Home       [] TCU       [] Rehab       [] Psych       [] SNF       [] Long Term Care Facility       [] Other-    Chief Complaint: Nausea and vomiting    Hospital Course: Per H&P done 7/22/2024: \" David Vaca is a 58 y.o. male with PMHx of COPD, depression, emphysema, GERD, HIV, T2DM, HLD,  mL Isovue-370 intravenous contrast. Axial images as well as coronal and sagittal MIP reconstructions were obtained. All CT scans at this facility use dose modulation, iterative reconstruction, and/or weight-based dosing when appropriate to reduce radiation dose to as low as reasonably achievable. COMPARISON: CTA chest 7/10/2024 FINDINGS: There are no filling defects or lack of contrast opacification in the visualized pulmonary arteries to suggest thromboembolism. Cardiac size is normal. There is no pleural or pericardial effusion. Emphysematous changes are again noted in the bilateral lungs. There is scarring at the bilateral lung bases. There are no lung consolidations. There is no mediastinal, hilar or axillary lymphadenopathy. A small hiatal hernia is stable. Degenerative changes are present in the thoracic spine without evidence of aggressive osseous lesions. Upper abdominal organs are evaluated on same day CT of the abdomen and pelvis.     1. No pulmonary embolism. 2. No acute intrathoracic findings. 3. Chronic lung disease. **This report has been created using voice recognition software. It may contain minor errors which are inherent in voice recognition technology.** Electronically signed by Dr. Rizwan Vaca    XR CHEST PORTABLE    Result Date: 7/22/2024  PROCEDURE: XR CHEST PORTABLE CLINICAL INFORMATION: Chest pain TECHNIQUE: Mobile AP chest radiograph. COMPARISON: Mobile AP chest radiograph 6/15/2024 FINDINGS: The tip of a right-sided PICC overlies the superior vena cava. Cardiomediastinal silhouette is within normal limits. Stable reticular opacities at the left lung base may be secondary to scarring. There are no lung consolidations. Degenerative changes in the thoracic spine are poorly visualized.     No acute intrathoracic process. **This report has been created using voice recognition software. It may contain minor errors which are inherent in voice recognition technology.** Electronically signed

## 2024-07-26 VITALS
RESPIRATION RATE: 16 BRPM | BODY MASS INDEX: 24.79 KG/M2 | HEIGHT: 65 IN | WEIGHT: 148.81 LBS | DIASTOLIC BLOOD PRESSURE: 69 MMHG | SYSTOLIC BLOOD PRESSURE: 110 MMHG | TEMPERATURE: 97.8 F | OXYGEN SATURATION: 100 % | HEART RATE: 93 BPM

## 2024-07-26 LAB
ANION GAP SERPL CALC-SCNC: 11 MEQ/L (ref 8–16)
BUN SERPL-MCNC: 13 MG/DL (ref 7–22)
CALCIUM SERPL-MCNC: 8.6 MG/DL (ref 8.5–10.5)
CHLORIDE SERPL-SCNC: 105 MEQ/L (ref 98–111)
CO2 SERPL-SCNC: 22 MEQ/L (ref 23–33)
CREAT SERPL-MCNC: 0.8 MG/DL (ref 0.4–1.2)
GFR SERPL CREATININE-BSD FRML MDRD: > 90 ML/MIN/1.73M2
GLUCOSE BLD STRIP.AUTO-MCNC: 141 MG/DL (ref 70–108)
GLUCOSE BLD STRIP.AUTO-MCNC: 143 MG/DL (ref 70–108)
GLUCOSE SERPL-MCNC: 148 MG/DL (ref 70–108)
MAGNESIUM SERPL-MCNC: 1.8 MG/DL (ref 1.6–2.4)
POTASSIUM SERPL-SCNC: 5 MEQ/L (ref 3.5–5.2)
SODIUM SERPL-SCNC: 138 MEQ/L (ref 135–145)

## 2024-07-26 PROCEDURE — 97116 GAIT TRAINING THERAPY: CPT

## 2024-07-26 PROCEDURE — 6370000000 HC RX 637 (ALT 250 FOR IP)

## 2024-07-26 PROCEDURE — 80048 BASIC METABOLIC PNL TOTAL CA: CPT

## 2024-07-26 PROCEDURE — 36415 COLL VENOUS BLD VENIPUNCTURE: CPT

## 2024-07-26 PROCEDURE — 82948 REAGENT STRIP/BLOOD GLUCOSE: CPT

## 2024-07-26 PROCEDURE — 97530 THERAPEUTIC ACTIVITIES: CPT

## 2024-07-26 PROCEDURE — 99239 HOSP IP/OBS DSCHRG MGMT >30: CPT | Performed by: NURSE PRACTITIONER

## 2024-07-26 PROCEDURE — 6370000000 HC RX 637 (ALT 250 FOR IP): Performed by: NURSE PRACTITIONER

## 2024-07-26 PROCEDURE — 83735 ASSAY OF MAGNESIUM: CPT

## 2024-07-26 RX ORDER — ALBUTEROL SULFATE 90 UG/1
2 AEROSOL, METERED RESPIRATORY (INHALATION)
Qty: 18 G | Refills: 3 | DISCHARGE
Start: 2024-07-26

## 2024-07-26 RX ORDER — ATORVASTATIN CALCIUM 80 MG/1
80 TABLET, FILM COATED ORAL NIGHTLY
Qty: 30 TABLET | Refills: 3 | DISCHARGE
Start: 2024-07-26

## 2024-07-26 RX ORDER — OLANZAPINE 10 MG/1
20 TABLET ORAL NIGHTLY
Qty: 30 TABLET | Refills: 3 | DISCHARGE
Start: 2024-07-26

## 2024-07-26 RX ORDER — LAMIVUDINE AND ZIDOVUDINE 150; 300 MG/1; MG/1
1 TABLET, FILM COATED ORAL 2 TIMES DAILY
Qty: 60 TABLET | Refills: 3 | DISCHARGE
Start: 2024-07-26

## 2024-07-26 RX ORDER — FLUOXETINE HYDROCHLORIDE 20 MG/1
20 CAPSULE ORAL DAILY
Qty: 30 CAPSULE | Refills: 3 | DISCHARGE
Start: 2024-07-26

## 2024-07-26 RX ORDER — TAMSULOSIN HYDROCHLORIDE 0.4 MG/1
0.4 CAPSULE ORAL DAILY
Qty: 30 CAPSULE | Refills: 3 | DISCHARGE
Start: 2024-07-26

## 2024-07-26 RX ORDER — LANOLIN ALCOHOL/MO/W.PET/CERES
400 CREAM (GRAM) TOPICAL 2 TIMES DAILY
Qty: 30 TABLET | DISCHARGE
Start: 2024-07-26

## 2024-07-26 RX ORDER — FOSAMPRENAVIR CALCIUM 700 MG/1
1400 TABLET, COATED ORAL 2 TIMES DAILY
Qty: 60 TABLET | Refills: 3 | DISCHARGE
Start: 2024-07-26

## 2024-07-26 RX ORDER — FENOFIBRATE 145 MG/1
145 TABLET, COATED ORAL DAILY
Qty: 30 TABLET | Refills: 11 | DISCHARGE
Start: 2024-07-26

## 2024-07-26 RX ORDER — ASPIRIN 81 MG/1
81 TABLET, CHEWABLE ORAL DAILY
Qty: 30 TABLET | Refills: 3 | DISCHARGE
Start: 2024-07-26

## 2024-07-26 RX ORDER — PANTOPRAZOLE SODIUM 40 MG/1
40 TABLET, DELAYED RELEASE ORAL
Qty: 90 TABLET | Refills: 11 | DISCHARGE
Start: 2024-07-26

## 2024-07-26 RX ORDER — ISOSORBIDE DINITRATE 20 MG/1
TABLET ORAL
Qty: 180 TABLET | Refills: 0 | DISCHARGE
Start: 2024-07-26

## 2024-07-26 RX ADMIN — TAMSULOSIN HYDROCHLORIDE 0.4 MG: 0.4 CAPSULE ORAL at 10:20

## 2024-07-26 RX ADMIN — PANTOPRAZOLE SODIUM 40 MG: 40 TABLET, DELAYED RELEASE ORAL at 03:45

## 2024-07-26 RX ADMIN — LAMIVUDINE AND ZIDOVUDINE 1 TABLET: 150; 300 TABLET, FILM COATED ORAL at 10:21

## 2024-07-26 RX ADMIN — FOSAMPRENAVIR CALCIUM 1400 MG: 700 TABLET, COATED ORAL at 10:21

## 2024-07-26 RX ADMIN — FLUOXETINE HYDROCHLORIDE 20 MG: 20 CAPSULE ORAL at 10:20

## 2024-07-26 RX ADMIN — METOPROLOL TARTRATE 25 MG: 25 TABLET, FILM COATED ORAL at 10:20

## 2024-07-26 RX ADMIN — Medication 400 MG: at 10:20

## 2024-07-26 RX ADMIN — ISOSORBIDE DINITRATE 20 MG: 20 TABLET ORAL at 10:20

## 2024-07-26 RX ADMIN — ASPIRIN 81 MG 81 MG: 81 TABLET ORAL at 10:20

## 2024-07-26 RX ADMIN — FENOFIBRATE 160 MG: 160 TABLET ORAL at 10:20

## 2024-07-26 ASSESSMENT — PAIN SCALES - GENERAL: PAINLEVEL_OUTOF10: 0

## 2024-07-26 NOTE — PROGRESS NOTES
[facility]  INPATIENT PHYSICAL THERAPY  DAILY NOTE  [facility] MED SURG 8AB - 8A-06/006-A    Time In: 1138  Time Out: 1203  Timed Code Treatment Minutes: 25 Minutes  Minutes: 25          Date: 2024  Patient Name: David Vaca,  Gender:  male        MRN: [id number]  : 1965  (58 y.o.)     Referring Practitioner: Dr. JIN Beckman  Diagnosis: SOB  Additional Pertinent Hx: H&P:58 y.o. male with PMHx of COPD, depression, emphysema, GERD, HIV, T2DM, HLD, HTN, h/o blood clots, s/p AAA repair w/ aortobifem (24) and L groin I&D w/ wound VAC placement and washout (7/15/24 at [facility]) w/ R-PICC who presents to [facility] with multiple episodes of non-bloody emesis. Pt states that Saturday he felt fine and ate as usual, then  () he did not eat at all, and did not feel well. Today he had ~7 episodes of NBNB emesis. A/w nausea, chills, night sweats, lightheadedness, generalized weakness, poor appetite/PO intake, mild CP, SOB, productive cough. Also reports having loose stools since starting on IV Rocephin daily (through PICC). Does state that this CP and SOB have been present over the past 6 weeks, and is intermittent. He denies fevers, HA, dizziness, sore throat, neck pain, abdominal pain, dysuria, constipation, melena, hematochezia. Denies any pain in groin or at prior fasciotomy sites.     Prior Level of Function:  Lives With: Alone  Type of Home: Apartment  Home Layout: One level  Home Access: Stairs to enter without rails  Entrance Stairs - Number of Steps: 1-threshold  Home Equipment: Walker - 4-Wheeled   Bathroom Shower/Tub: Tub/Shower unit  Bathroom Toilet: Standard    ADL Assistance: Independent  Homemaking Assistance: Independent  Ambulation Assistance: Independent  Transfer Assistance: Independent  Active : Yes  Additional Comments: Pt reports using 4 wheeled walker occassionally at home-especially with IADLs-ex moving laundry. Pt reports recent with HH since  recent sx for IV antiboitics.    Restrictions/Precautions:  Restrictions/Precautions: Fall Risk  Position Activity Restriction  Other position/activity restrictions: .     SUBJECTIVE: RN Dmitry approved therapy session. Patient supine in bed upon PTA arrival and agreeable to therapy treatment. Patient with request for toileting task at beginning of treatment then continued to complete tasks while standing at sink. Patient returned to bed at end of therapy treatment.    PAIN: 0/10: denies    Vitals: Vitals not assessed per clinical judgement, see nursing flowsheet    OBJECTIVE:  Bed Mobility:  Supine to Sit: Modified Independent  Scooting: Modified Independent    Transfers:  Sit to Stand: Stand By Assistance  Stand to Sit:Stand By Assistance    Ambulation:  Contact Guard Assistance  Distance: 100'x1  Surface: Level Tile  Device: No Device  Gait Deviations:  Forward Flexed Posture, Slow Ethel, Decreased Step Length Bilaterally, Decreased Arm Swing, Decreased Trunk Rotation, Decreased Gait Speed, Decreased Heel Strike Bilaterally, Narrow Base of Support, and Mild Path Deviations    Balance:  Static Sitting Balance:  Modified Independent  Static Standing Balance: Stand By Assistance    Exercise:  Patient was guided in 1 set(s) 15 reps of exercise to both lower extremities.  Seated marches, Seated heel/toe raises, Long arc quads, Seated isometric hip adduction, and Seated abduction/adduction.  Exercises were completed for increased independence with functional mobility.    Functional Outcome Measures:  AMPAC (6 CLICK) BASIC MOBILITY           Modified Waqas Scale:  Not Applicable    ASSESSMENT:  Assessment: Patient progressing toward established goals.  Activity Tolerance:  Patient tolerance of  treatment:Good.  Equipment Recommendations:Equipment Needed: No  Discharge Recommendations: Subacte/Skilled Nursing Facility and Patient would benefit from continued PT at discharge  Plan: Current Treatment Recommendations:

## 2024-07-26 NOTE — PROGRESS NOTES
[facility] Nurse  Progress Note       NAME:  David Vaca  MEDICAL RECORD NUMBER:  [id number]  AGE: 58 y.o.   GENDER: male  : 1965  TODAY'S DATE:  2024    Subjective   Reason for WOCN Evaluation and Assessment: wound vac in L groin, possible exchange       David Vaca is a 58 y.o. male referred by:   [x] Physician/ PA/ APRN-CNP  [] Nursing  [] Other:     Wound Identification:  Wound Type: non-healing surgical  Wound Location: Left groin   Modifying factors:  surgical site infection    Patient Goal of Care:  [x] Wound Healing  [x] Drainage Management   [] Odor Control  [] Palliative Care  [x] Pain Control         Objective     Bishnu Risk Score: Bishnu Scale Score: 20    Assessment     Encounter: Wound ostomy present to room for removal of wound vac therapy to left groin wound prior to discharge to SNF. Patient in bed upon arrival. Cleansed wound with normal saline and gauze. Applied skin prep to laina wound. Applied wet to dry saline moistened gauze to wound bed and covered with ABD pad.  Secured in place with foam tape.  Wound continues to granulated in. Patient could benefit from surgical closure. Will continue to follow patient with inpatient. Bed in low, call light in reach.  [employer] rep called office and asked to have patient's home vac machine placed in Wound Care's office for .  Machine placed in bag and taken to office for .    Photo:     Wound type: Left groin wound  Wound size: 6cm x 0.2cm x 1.3cm  Undermining or Tunneling: None  Wound assessment/color: Granulation red  Drainage amount: Minimal  Drainage description: Serosanguinous  Odor: None  Margins: Attached  Laina wound: Intact  Exposed structure: None     Pain Assessment:  Premedicated: No    Plan     Treatment Recommendations:   Continue wound care orders per physician orders.      Vac Instructions:   -Change cannister once a week or when full.      If suction fails: You have 2 hours to fix the issue

## 2024-07-26 NOTE — DISCHARGE INSTR - COC
Continuity of Care Form    Patient Name: David Vaca   :  1965  MRN:  [id number]    Admit date:  2024  Discharge date:  2024    Code Status Order: Full Code   Advance Directives:     Admitting Physician:  No admitting provider for patient encounter.  PCP: Sandi Choudhary, APRN - CNP    Discharging Nurse: Dmitry HUERTA  Discharging Hospital Unit/Room#: 8A-06/006-A  Discharging Unit Phone Number: [phone]    Emergency Contact:   Extended Emergency Contact Information  Primary Emergency Contact: Eri Hernandez  Home Phone: [phone]  Relation: Brother/Sister  Preferred language: English   needed? No  Secondary Emergency Contact: Augustina Mcgee  Home Phone: [phone]  Relation: Brother/Sister    Past Surgical History:  Past Surgical History:   Procedure Laterality Date    ABDOMINAL AORTIC ANEURYSM REPAIR N/A 2024    AORTOBIFEMORAL BYPASS performed by Yen Montague MD at [facility]    CARDIAC CATHETERIZATION  ???    OK    COLONOSCOPY  2016    COLONOSCOPY      Dr Rutledge     CYSTOSCOPY  2024    CYSTOSCOPY WITH ENRIQUEZ CATHETER PLACEMENT performed by Tru Kurtz MD at [facility]    EGD      ENDOSCOPY, COLON, DIAGNOSTIC      egd with dilatation    FEMORAL BYPASS N/A 2024    LOWER EXTREMITY THROMBECTOMY POSSIBLE FASCIOTOMY**CELLSAVER- NOTIFIED performed by Yen Montague MD at [facility]    FOOT SURGERY Left 's    left foot needle foreign body removal    GROIN SURGERY Left 2024    LEFT GROIN INCISION AND DRAINAGE, WOUND VA PLACEMENT performed by Yen Montague MD at [facility]    GROIN SURGERY Left 7/15/2024    LEFT GROIN WASHOUT, WOUND VAC PLACEMENT performed by Yen Montague MD at [facility]    HERNIA REPAIR Left 2024    Robotic Left Inguinal Hernia Repair and Right Inguinal Hernia Repair both with mesh performed by Jana Trent MD at [facility] OR    LEG SURGERY Left 2024    LOWER EXTREMITY WOUND VAC    Respiratory Treatments: Inhaler  Oxygen Therapy:  is not on home oxygen therapy.  Ventilator:    - No ventilator support    Rehab Therapies: Physical Therapy and Occupational Therapy  Weight Bearing Status/Restrictions: No weight bearing restrictions  Other Medical Equipment (for information only, NOT a DME order):  walker  Other Treatments: N/A    Patient's personal belongings (please select all that are sent with patient):  Glasses, Hearing Aides bilateral    RN SIGNATURE:  Electronically signed by Dmitry Corey RN on 7/26/24 at 11:39 AM EDT    CASE MANAGEMENT/SOCIAL WORK SECTION    Inpatient Status Date: 7/22/2024    Readmission Risk Assessment Score:  Readmission Risk              Risk of Unplanned Readmission:  39           Discharging to Facility/ Agency   Name: [facility]  Address: [address] [place] [address]  [id number]  Phone: [phone]  Fax: [phone]    Dialysis Facility (if applicable)   Name:  Address:  Dialysis Schedule:  Phone:  Fax:    / signature: Electronically signed by FAHAD Lopez on 7/26/24 at 10:49 AM EDT    PHYSICIAN SECTION    Prognosis: Good    Condition at Discharge: Stable    Rehab Potential (if transferring to Rehab): Good    Recommended Labs or Other Treatments After Discharge: Wound vac settings at 75mmHg continuous suction. Staff to apply extra clear drape as needed if leaks noted and to change canister as needed when full. Wound ostomy to change wound vac three times weekly; needs Rocephin 2 gram daily through August 24, 2024; needs good lung hygiene, needs good bowel regimen    Physician Certification: I certify the above information and transfer of David Vaca  is necessary for the continuing treatment of the diagnosis listed and that he requires Skilled Nursing Facility for greater 30 days.     Update Admission H&P: No change in H&P    PHYSICIAN SIGNATURE:  Electronically signed by ALIZE Garcia CNP on 7/26/24 at 10:03 AM

## 2024-07-26 NOTE — PROGRESS NOTES
Patient discharged at this time. All IV's removed. Discharge instructions, medication changes and follow up appointments explained at this time. All questions answered at this time. AVS and last dose MAR faxed to [facility]. Report called to nurse Silva with callback number for any questions. All patient belongings returned. Chart broken down and placed in yellow bin.

## 2024-07-26 NOTE — CARE COORDINATION
7/26/24, 9:48 AM EDT    DISCHARGE PLANNING EVALUATION    Abdirizak from [facility] called, patient is accepted and precert approved. Will accept today.    7/26/24, 12:06 PM EDT    Patient goals/plan/ treatment preferences discussed by  and .  Patient goals/plan/ treatment preferences reviewed with patient/ family.  Patient/ family verbalize understanding of discharge plan and are in agreement with goal/plan/treatment preferences.  Understanding was demonstrated using the teach back method.  AVS provided by RN at time of discharge, which includes all necessary medical information pertaining to the patients current course of illness, treatment, post-discharge goals of care, and treatment preferences.     Services At/After Discharge: Skilled Nursing Facility (SNF), Aide services, In ambulette, IV Therapy, Nursing service, OT, and PT       Patient discharged to [facility] skilled medicare bed. Abdirizak at [facility] informed of discharge and 1:00 transport.  Spoke with patient informed of discharge and 1:00 transport.  Left message for patient sister Eri notifying of discharge and 1:00 transport.

## 2024-07-26 NOTE — PROGRESS NOTES
Comprehensive Nutrition Assessment    Type and Reason for Visit:  Reassess    Nutrition Recommendations/Plan:   Consider MVI to assist in wound healing process    Continue current diet and ONS (Ensure Plus TID) as ordered      Malnutrition Assessment:  Malnutrition Status:  Severe malnutrition (07/23/24 1055)    Context:  Acute Illness     Findings of the 6 clinical characteristics of malnutrition:  Energy Intake:  50% or less of estimated energy requirements for 5 or more days  Weight Loss:  Greater than 5% over 1 month (7.5% (11 lb) in the past 2 months)     Body Fat Loss:  Mild body fat loss Orbital, Triceps   Muscle Mass Loss:  Mild muscle mass loss Temples (temporalis), Hand (interosseous)  Fluid Accumulation:  No significant fluid accumulation     Strength:  Not Performed    Nutrition Assessment:    Pt improving from a nutritional standpoint AEB reports tolerating po, improving appetite/ intake, acceptance of ONS. Remains at risk for further nutritional compromise r/t meets criteria for severe malnutrition, admit due to emesis and diarrhea, recent left groin I&D with wound vac placement and wash out on 7/12/24, PICC placement 7/15, IV antibiotic, increased nutrient needs to support wound healing and underlying medical condition (arthritis, COPD, depression, T2DM with A1C (6/10/24) 5.8%, dysphagia, emphysema, GERD, HIV, HLD, HTN).         Nutrition Related Findings:    Pt. Report/Treatments/Miscellaneous: Patient seen and reports appetite improving, tolerating po well, no further complaints of nausea/ vomiting, good acceptance of Ensure Plus, denies having questions  On initial RD visit 7/23/24: Patient reports lost around 10-15 lb in the past few months d/t a poor appetite, nausea and vomiting and last vomited yesterday morning, diarrhea today. Per patient when he is at home he usually eats 1-2 meals per day, he is just not hungry enough to eat more often than that.   GI Status: BM 7/25  Pertinent Labs:

## 2024-07-26 NOTE — DISCHARGE SUMMARY
Hospital Medicine Discharge Summary      Patient Identification:   David Vaca   : 1965  MRN: [id number]   Account: [id number]      Patient's PCP: Sandi Choudhary APRN - CNP    Admit Date: 2024     Discharge Date:   2024    Admitting Physician: No admitting provider for patient encounter.     Discharging Nurse Practitioner: ALIZE Garcia CNP     Discharge Diagnoses with Assessment/Plan:  Nausea and vomiting--resolved, possibly secondary to #3  Mild hypotension--on Lopressor with hold parameters noted; resolved  Hypomagnesia, severe--replace per protocol, on magnesium oxide 400 mg twice daily; resolved; plan to check outpatient on  with results to SNF provider, order placed in epic  Hypocalcemia--resolved  Recent left groin I&D with wound VAC placement and washout on 2024 and culture showing Enterobacter--had PICC line placed on July 15, 2024 and to receive Rocephin-started  through ; wound VAC at 75 mmHg  PAD status post prior complicated revascularization on May 10, 2024 with subsequent fasciotomy closure on  and   Status post AAA repair with aortobifem on May 31, 2024  COPD/emphysema--stable, on room air, on Spiriva  HIV--on antiviral agents  Diabetes mellitus type 2, controlled with diabetic peripheral neuropathy--hemoglobin A1c noted to be 5.8 on Debbie 10, 2024  Elevated troponin--likely secondary to demand ischemia  BPH without obstruction--on Flomax  Depression--treated with Prozac  Primary hypertension, uncontrolled--on Lopressor; please see #2  Hyperlipidemia--on statin, fenofibrate  GERD--on Protonix  Severe malnutrition--per dietitian  History of blood clots--on Lovenox for DVT prophylaxis  Physical deconditioning/debility--secondary to numerous comorbid medical conditions, PT and OT on the case as well as social work; plan is SNF on discharge, pre-CERT has been approved and will be discharged     The patient was seen and  examined on day of discharge and this discharge summary is in conjunction with any daily progress note from day of discharge.    Hospital Course:   David Vaca is a 58 y.o. male admitted to [facility] on 7/22/2024 for nausea and vomiting; Per H&P done 7/22/2024: \" David Vaca is a 58 y.o. male with PMHx of COPD, depression, emphysema, GERD, HIV, T2DM, HLD, HTN, h/o blood clots, s/p AAA repair w/ aortobifem (5/31/24) and L groin I&D w/ wound VAC placement and washout (7/15/24 at [facility]) w/ R-PICC who presents to [facility] with multiple episodes of non-bloody emesis. Pt states that Saturday he felt fine and ate as usual, then Sunday (7/21) he did not eat at all, and did not feel well. Today he had ~7 episodes of NBNB emesis. A/w nausea, chills, night sweats, lightheadedness, generalized weakness, poor appetite/PO intake, mild CP, SOB, productive cough. Also reports having loose stools since starting on IV Rocephin daily (through PICC). Does state that this CP and SOB have been present over the past 6 weeks, and is intermittent. He denies fevers, HA, dizziness, sore throat, neck pain, abdominal pain, dysuria, constipation, melena, hematochezia. Denies any pain in groin or at prior fasciotomy sites.      Since patient was recently seen at [facility], ED physician discussed the possibility of transfer there for continuity of care. However, pt stated he would prefer to be admitted to [facility].      ED course: Temp 97.3, RR 36, , /89, SpO2 96% on RA. Mg 0.5 (given 2g IV Mg), Ca 7.3, iCal 0.78 (given 1g IV Ca), , Trop 68 -> 68, AST 49, total bili 0.2, WBC 9.5, Hgb 10.4, LA 1.3, procal 0.11. Negative COVID/influenza. Given 1L NS bolus. Wound VAC was readjusted by ED physician w/ improvement in output. CD4 count ordered. Started on IV Vanc and Cefepime.\"     7/24--> blood pressure is soft but improved, afebrile and on room air, is not tachycardic; wound VAC to

## 2024-07-26 NOTE — CARE COORDINATION
1030 Called and spoke with Giuseppe at [facility] ([phone]). Updated on discharge plan for SNF. They will plan to discharge him at this time.   1035 Call placed Erich with [facility] ([phone]) to update on discharge plan to SNF. He request that wound care RN's  vac and that they will get it to the rep when they come to  other equipment. PS message sent to wound team to update them on plan. Primary RN Dmitry updated as well.   1040 Call placed to HENRRY Ortiz ([phone]), updated on plan for discharge to [facility].

## 2024-07-27 LAB
BACTERIA BLD AEROBE CULT: NORMAL
BACTERIA BLD AEROBE CULT: NORMAL

## 2024-08-21 ENCOUNTER — OFFICE VISIT (OUTPATIENT)
Age: 59
End: 2024-08-21

## 2024-08-21 VITALS
WEIGHT: 152 LBS | DIASTOLIC BLOOD PRESSURE: 74 MMHG | BODY MASS INDEX: 25.33 KG/M2 | HEIGHT: 65 IN | SYSTOLIC BLOOD PRESSURE: 127 MMHG | OXYGEN SATURATION: 100 % | HEART RATE: 74 BPM

## 2024-08-21 DIAGNOSIS — I73.9 PAD (PERIPHERAL ARTERY DISEASE) (HCC): ICD-10-CM

## 2024-08-21 DIAGNOSIS — Z95.828 S/P AORTO-BIFEMORAL BYPASS SURGERY: Primary | ICD-10-CM

## 2024-08-21 DIAGNOSIS — I74.09 AORTOILIAC OCCLUSIVE DISEASE (HCC): ICD-10-CM

## 2024-08-21 PROCEDURE — 99024 POSTOP FOLLOW-UP VISIT: CPT | Performed by: SURGERY

## 2024-08-21 ASSESSMENT — ENCOUNTER SYMPTOMS
ABDOMINAL PAIN: 0
ALLERGIC/IMMUNOLOGIC NEGATIVE: 1
COLOR CHANGE: 0
SHORTNESS OF BREATH: 0
CHEST TIGHTNESS: 0

## 2024-08-21 NOTE — PROGRESS NOTES
Division of Vascular Surgery        Postoperative Follow Up    Aortobifemoral bypass, Left iliac, femoral, popliteal thromboembolectomy, right femoral endarterectomy with patch angioplasty, 4 compartment fasciotomy (5/31/24)  Closure of medial fasciotomy (6/4/24)  Closure of lateral fasciotomy (6/7/24)  Left groin incision and drainage of infected seroma (7/12/24)  Left groin washout and wound vac placement (7/15/24)    History of Present Illness:      David Vaca is a 58 y.o. gentleman presents for postoperative follow up after undergoing open revascularization for acute aortic syndrome with prolong ischemia to left lower extremity.  He underwent aortobifemoral bypass along with thrombectomy of his left lower extremity with fasciotomy.  He had erythema over his incision site on the left side and underwent washout, thankfully the infected seroma was not involving the bypass graft and the graft was not exposed.  He was maintained on IV antibiotics and underwent wound vac changes.  He is doing great, wound vac was discontinued about two weeks ago.  He walked in to the office today, denies any claudication or pain in his legs.  Looks great, incisions have all healed including the left groin and lower extremity fasciotomy.  He is on aspirin daily.  He is still at nursing facility and hoping to go home soon.  He is still on IV antibiotics with PICC line and is enquiring about when this can be removed.    (7/11/24) Patient well known to me, underwent emergent aortobifemoral bypass, bilateral ilial femoral thrombectomy, RLE fasciotomy for acute aortic occlusion and bilateral limb ischemia on 5/31/24. He also underwent closure of his fasciotomy incisions with drainage of seroma. He presents with drainage from left groin incision, erythema and leukocytosis. He has been feeling a bit fatigued, denies fevers/chills. He has palpable dorsalis pedis pulse bilaterally. Right groin soft, no significant swelling, erythema

## 2024-09-30 ENCOUNTER — TELEPHONE (OUTPATIENT)
Dept: CARDIOLOGY CLINIC | Age: 59
End: 2024-09-30

## 2024-09-30 ENCOUNTER — OFFICE VISIT (OUTPATIENT)
Dept: PHYSICAL MEDICINE AND REHAB | Age: 59
End: 2024-09-30
Payer: MEDICARE

## 2024-09-30 ENCOUNTER — TELEPHONE (OUTPATIENT)
Dept: PHYSICAL MEDICINE AND REHAB | Age: 59
End: 2024-09-30

## 2024-09-30 VITALS
SYSTOLIC BLOOD PRESSURE: 116 MMHG | BODY MASS INDEX: 24.99 KG/M2 | HEIGHT: 65 IN | DIASTOLIC BLOOD PRESSURE: 72 MMHG | WEIGHT: 150 LBS

## 2024-09-30 DIAGNOSIS — M47.812 CERVICAL SPONDYLOSIS: ICD-10-CM

## 2024-09-30 DIAGNOSIS — M47.816 LUMBAR SPONDYLOSIS: Primary | ICD-10-CM

## 2024-09-30 DIAGNOSIS — M46.1 SI (SACROILIAC) JOINT INFLAMMATION (HCC): ICD-10-CM

## 2024-09-30 DIAGNOSIS — M53.3 SACROILIAC JOINT DYSFUNCTION: ICD-10-CM

## 2024-09-30 DIAGNOSIS — Z21 HIV INFECTION, UNSPECIFIED SYMPTOM STATUS (HCC): ICD-10-CM

## 2024-09-30 DIAGNOSIS — G89.4 CHRONIC PAIN SYNDROME: ICD-10-CM

## 2024-09-30 DIAGNOSIS — G62.9 NEUROPATHY: ICD-10-CM

## 2024-09-30 PROCEDURE — 99214 OFFICE O/P EST MOD 30 MIN: CPT | Performed by: NURSE PRACTITIONER

## 2024-09-30 ASSESSMENT — ENCOUNTER SYMPTOMS
SINUS PRESSURE: 0
COLOR CHANGE: 0
BACK PAIN: 1
SHORTNESS OF BREATH: 1
CHEST TIGHTNESS: 0
DIARRHEA: 0
EYES NEGATIVE: 1
CONSTIPATION: 0
EYE PAIN: 0
ALLERGIC/IMMUNOLOGIC NEGATIVE: 1
RHINORRHEA: 0
ABDOMINAL PAIN: 0
VOMITING: 0
SORE THROAT: 0
PHOTOPHOBIA: 0
NAUSEA: 0

## 2024-09-30 NOTE — PROGRESS NOTES
Functionality Assessment/Goals Worksheet     On a scale of 0 (Does not Interfere) to 10 (Completely Interferes)     1.  Which number describes how during the past week pain has interfered with           the following:  A.  General Activity:  0  B.  Mood: 2  C.  Walking Ability:  2  D.  Normal Work (Includes both work outside the home and housework):  0  E.  Relations with Other People:   0  F.  Sleep:   0  G.  Enjoyment of Life:   0    2.  Patient Prefers to Take their Pain Medications:     []  On a regular basis   [x]  Only when necessary    []  Does not take pain medications    3.  What are the Patient's Goals/Expectations for Visiting Pain Management?     []  Learn about my pain    [x]  Receive Medication   []  Physical Therapy     [x]  Treat Depression   [x]  Receive Injections    []  Treat Sleep   []  Deal with Anxiety and Stress   []  Treat Opoid Dependence/Addiction   []  Other:     HPI:   David Vaca is a 58 y.o. male is here today for    Chief Complaint: Lumbar back pain and SI pain      F/U     He was following Dr Bruno due to  [facility] not taking his insurance [place]  for awhile.  Contract was then approved. Has not been here since 2023. He has had multiple  health issues, was not able to have repeat Lumbar RFA due to all his surgeries and health issues.    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 Had Aortobifemoral bypass. Had fasciotomy had wound vac for awhile then had wound closure.  He had abscess  left groin with Septicemia.  He then had CP issues had       Lumbar RFA has worn off now (completed 2/2023) lumbar pain is back to baseline maybe higher.   He c/o low lumbar Si  pain that Increases with walking  standing bending and mowing.   Pain increases with bending, lifting, twisting , reaching, pushing, pulling, walking, standing, stairs and getting up and down.  Treatments Tried ice, heat, NSAIDS, narcotics, muscle relaxer, OTC rubs

## 2024-09-30 NOTE — TELEPHONE ENCOUNTER
Pt last seen by Dr. Carroll 5-23-24 and was made PRN.  Pt is needing clearance for Bilateral Lumber RFA with Neuroscience TBD.  Pt is on ASA.    Can pt be cleared or should PCP?

## 2024-10-01 NOTE — ED NOTES
Patient resting in bed watching TV. Respirations easy and unlabored. No distress noted. Call light within reach.        Nany Franco RN  09/07/23 1785 Patient's belongings returned

## 2024-10-08 ENCOUNTER — TELEPHONE (OUTPATIENT)
Dept: CARDIOLOGY CLINIC | Age: 59
End: 2024-10-08

## 2024-10-08 NOTE — TELEPHONE ENCOUNTER
[facility]  Pt is now PRN with Dr Carroll.  Called patient, he states he called his PCP to get these refilled.  Called PCP office, they are going to check with the provider to see if she will refill. If not, they will call the office back.   
Patent

## 2025-01-30 ENCOUNTER — TELEPHONE (OUTPATIENT)
Dept: PHYSICAL MEDICINE AND REHAB | Age: 60
End: 2025-01-30

## 2025-01-30 NOTE — TELEPHONE ENCOUNTER
We received his medical clearance back so I was calling him to schedule the procedure but I had to leave a message.

## 2025-02-12 ENCOUNTER — HOSPITAL ENCOUNTER (OUTPATIENT)
Dept: INTERVENTIONAL RADIOLOGY/VASCULAR | Age: 60
Discharge: HOME | End: 2025-02-14
Attending: SURGERY
Payer: MEDICARE

## 2025-02-12 DIAGNOSIS — I74.09 AORTOILIAC OCCLUSIVE DISEASE (HCC): ICD-10-CM

## 2025-02-12 DIAGNOSIS — I73.9 PAD (PERIPHERAL ARTERY DISEASE) (HCC): ICD-10-CM

## 2025-02-12 DIAGNOSIS — Z95.828 S/P AORTO-BIFEMORAL BYPASS SURGERY: ICD-10-CM

## 2025-02-12 PROCEDURE — 93923 UPR/LXTR ART STDY 3+ LVLS: CPT

## 2025-02-12 PROCEDURE — 93925 LOWER EXTREMITY STUDY: CPT

## 2025-02-19 ENCOUNTER — OFFICE VISIT (OUTPATIENT)
Age: 60
End: 2025-02-19
Payer: MEDICARE

## 2025-02-19 VITALS
BODY MASS INDEX: 24.93 KG/M2 | DIASTOLIC BLOOD PRESSURE: 71 MMHG | WEIGHT: 149.6 LBS | HEIGHT: 65 IN | HEART RATE: 69 BPM | SYSTOLIC BLOOD PRESSURE: 122 MMHG

## 2025-02-19 DIAGNOSIS — Z95.828 S/P AORTOBIFEMORAL BYPASS SURGERY: ICD-10-CM

## 2025-02-19 DIAGNOSIS — I73.9 PAD (PERIPHERAL ARTERY DISEASE): Primary | ICD-10-CM

## 2025-02-19 PROCEDURE — 99213 OFFICE O/P EST LOW 20 MIN: CPT | Performed by: SURGERY

## 2025-02-19 ASSESSMENT — ENCOUNTER SYMPTOMS
COLOR CHANGE: 0
ABDOMINAL PAIN: 0
CHEST TIGHTNESS: 0
SHORTNESS OF BREATH: 0
ALLERGIC/IMMUNOLOGIC NEGATIVE: 1

## 2025-02-19 NOTE — PATIENT INSTRUCTIONS
If you receive a survey asking about your care experience, please respond. Your answers will help ensure you receive high-quality care at this office. Thank you!    Your Medical Assistant today: Diana PERZAA  Thank you for coming to our office! It was a pleasure to serve you.

## 2025-02-24 ENCOUNTER — TELEPHONE (OUTPATIENT)
Dept: PHYSICAL MEDICINE AND REHAB | Age: 60
End: 2025-02-24

## 2025-02-24 NOTE — TELEPHONE ENCOUNTER
3/18/25 BILATERAL LUMBAR 4-5, 5-SACRAL 1 RADIO FREQUENCY ABLATION requesting additional documentation.     Reason: The notes show patient has had two radiofrequency ablations in the last 12 months. The notes do not show absence of untreated radiculopathy or neurogenic claudication (except for radiculopathy caused by facet joint synovial cyst). The notes do not show that the previous injection met the requirements for percentage and duration of relief.     Please update note with requested information for resubmission.

## 2025-02-25 NOTE — TELEPHONE ENCOUNTER
Notes from Dr Bruno's office are scanned into the media. Case has been denied, if peer to peer is not scheduled case will close 02/28/25.     Would you like a peer to peer scheduled?

## 2025-03-03 ENCOUNTER — TELEPHONE (OUTPATIENT)
Dept: PHYSICAL MEDICINE AND REHAB | Age: 60
End: 2025-03-03

## 2025-03-03 NOTE — TELEPHONE ENCOUNTER
LM for patient regarding the text message he got about an estimate for his upcoming procedure on 03/18/25. [facility] and his Medicaid was not added to the account for some reason so she fixed it and that voided the estimate.

## 2025-03-04 NOTE — PROGRESS NOTES
breath sounds and wheezing present. No rales. Comments:   Left sided I and E A and P   wheezes  DBS   DAY  Chest:      Chest wall: No tenderness. Abdominal:      General: Bowel sounds are normal. There is no distension. Palpations: Abdomen is soft. Tenderness: There is no abdominal tenderness. There is no guarding or rebound. Musculoskeletal:         General: Tenderness present. Right shoulder: Normal.      Left shoulder: Normal.      Cervical back: Full passive range of motion without pain, normal range of motion and neck supple. No edema, erythema or rigidity. No muscular tenderness. Normal range of motion. Thoracic back: Tenderness and bony tenderness present. Decreased range of motion. Lumbar back: Spasms, tenderness and bony tenderness present. Decreased range of motion. Back:       Right hip: Tenderness present. Left hip: Tenderness present. Right knee: Normal.      Left knee: Normal.      Right ankle: Normal.      Left ankle: Normal.   Skin:     General: Skin is warm. Coloration: Skin is not pale. Findings: No erythema or rash. Neurological:      Mental Status: He is alert and oriented to person, place, and time. He is not disoriented. Cranial Nerves: No cranial nerve deficit. Sensory: Sensation is intact. No sensory deficit. Motor: Weakness present. No atrophy or abnormal muscle tone. Coordination: Coordination is intact. Coordination normal.      Gait: Gait abnormal.      Deep Tendon Reflexes: Reflexes are normal and symmetric. Reflex Scores:       Tricep reflexes are 2+ on the right side and 2+ on the left side. Bicep reflexes are 2+ on the right side and 2+ on the left side. Brachioradialis reflexes are 2+ on the right side and 2+ on the left side. Patellar reflexes are 2+ on the right side and 2+ on the left side. Achilles reflexes are 2+ on the right side and 2+ on the left side.
DISPLAY PLAN FREE TEXT

## 2025-03-18 ENCOUNTER — HOSPITAL ENCOUNTER (OUTPATIENT)
Age: 60
Setting detail: OUTPATIENT SURGERY
Discharge: HOME | End: 2025-03-18
Attending: ANESTHESIOLOGY | Admitting: ANESTHESIOLOGY
Payer: MEDICARE

## 2025-03-18 ENCOUNTER — APPOINTMENT (OUTPATIENT)
Dept: GENERAL RADIOLOGY | Age: 60
End: 2025-03-18
Attending: ANESTHESIOLOGY
Payer: MEDICARE

## 2025-03-18 VITALS
TEMPERATURE: 97.3 F | RESPIRATION RATE: 14 BRPM | WEIGHT: 152.4 LBS | BODY MASS INDEX: 24.49 KG/M2 | OXYGEN SATURATION: 100 % | HEIGHT: 66 IN | SYSTOLIC BLOOD PRESSURE: 145 MMHG | HEART RATE: 80 BPM | DIASTOLIC BLOOD PRESSURE: 76 MMHG

## 2025-03-18 LAB — GLUCOSE BLD STRIP.AUTO-MCNC: 126 MG/DL (ref 70–108)

## 2025-03-18 PROCEDURE — 3600000056 HC PAIN LEVEL 4 BASE: Performed by: ANESTHESIOLOGY

## 2025-03-18 PROCEDURE — 64636 DESTROY L/S FACET JNT ADDL: CPT | Performed by: ANESTHESIOLOGY

## 2025-03-18 PROCEDURE — 7100000011 HC PHASE II RECOVERY - ADDTL 15 MIN: Performed by: ANESTHESIOLOGY

## 2025-03-18 PROCEDURE — 99152 MOD SED SAME PHYS/QHP 5/>YRS: CPT | Performed by: ANESTHESIOLOGY

## 2025-03-18 PROCEDURE — 64635 DESTROY LUMB/SAC FACET JNT: CPT | Performed by: ANESTHESIOLOGY

## 2025-03-18 PROCEDURE — 6360000002 HC RX W HCPCS: Performed by: ANESTHESIOLOGY

## 2025-03-18 PROCEDURE — 7100000010 HC PHASE II RECOVERY - FIRST 15 MIN: Performed by: ANESTHESIOLOGY

## 2025-03-18 PROCEDURE — 82948 REAGENT STRIP/BLOOD GLUCOSE: CPT

## 2025-03-18 PROCEDURE — 2709999900 HC NON-CHARGEABLE SUPPLY: Performed by: ANESTHESIOLOGY

## 2025-03-18 PROCEDURE — 3600000057 HC PAIN LEVEL 4 ADDL 15 MIN: Performed by: ANESTHESIOLOGY

## 2025-03-18 RX ORDER — FENTANYL CITRATE 50 UG/ML
INJECTION, SOLUTION INTRAMUSCULAR; INTRAVENOUS PRN
Status: DISCONTINUED | OUTPATIENT
Start: 2025-03-18 | End: 2025-03-18 | Stop reason: ALTCHOICE

## 2025-03-18 RX ORDER — MIDAZOLAM HYDROCHLORIDE 1 MG/ML
INJECTION, SOLUTION INTRAMUSCULAR; INTRAVENOUS PRN
Status: DISCONTINUED | OUTPATIENT
Start: 2025-03-18 | End: 2025-03-18 | Stop reason: ALTCHOICE

## 2025-03-18 RX ORDER — LIDOCAINE HYDROCHLORIDE 20 MG/ML
INJECTION, SOLUTION EPIDURAL; INFILTRATION; INTRACAUDAL; PERINEURAL PRN
Status: DISCONTINUED | OUTPATIENT
Start: 2025-03-18 | End: 2025-03-18 | Stop reason: ALTCHOICE

## 2025-03-18 RX ORDER — LIDOCAINE HYDROCHLORIDE 10 MG/ML
INJECTION, SOLUTION EPIDURAL; INFILTRATION; INTRACAUDAL; PERINEURAL PRN
Status: DISCONTINUED | OUTPATIENT
Start: 2025-03-18 | End: 2025-03-18 | Stop reason: ALTCHOICE

## 2025-03-18 ASSESSMENT — PAIN SCALES - GENERAL
PAINLEVEL_OUTOF10: 4
PAINLEVEL_OUTOF10: 3

## 2025-03-18 ASSESSMENT — PAIN DESCRIPTION - LOCATION: LOCATION: BACK

## 2025-03-18 NOTE — PRE SEDATION
[facility]  Pre-Sedation/Analgesia History & Physical    Pt Name: David Vaca  MRN: [id number]  YOB: 1965  Provider Performing Procedure: Alex Campbell DO   Primary Care Physician: Sandi Choudhary APRN - [place]      MEDICAL HISTORY       has a past medical history of Arthritis, COPD (chronic obstructive pulmonary disease) (HCC), Depression, Diabetes mellitus (HCC), Dysphagia, Emphysema lung (HCC), GERD (gastroesophageal reflux disease), History of nephrolithiasis, HIV (human immunodeficiency virus infection) (HCC), Hx of blood clots, Hyperlipidemia, and Hypertension.  SURGICAL HISTORY   has a past surgical history that includes Foot surgery (Left, 1970's); Cardiac catheterization (2015???); Colonoscopy (2016); Endoscopy, colon, diagnostic; other surgical history (Bilateral, 01/17/2017); other surgical history (Bilateral, 02/20/2017); other surgical history (Right, 03/28/2017); Nerve Surgery (Bilateral, 07/18/2017); Nerve Block Lumb Facet Level 1 Bilateral (Bilateral, 07/18/2017); Upper gastrointestinal endoscopy (2005,2007x2, 2014,2017); other surgical history (Bilateral, 03/06/2018); pr njx dx/ther agt pvrt facet jt lmbr/sac 2nd level (Bilateral, 03/06/2018); Lumbar spine surgery (Right, 04/23/2019); Lumbar spine surgery (Left, 05/21/2019); Pain management procedure (Right, 01/14/2020); Pain management procedure (Left, 03/02/2020); Pain management procedure (Right, 10/08/2020); Pain management procedure (Left, 11/12/2020); EGD (2021); Colonoscopy (2021); Pain management procedure (Bilateral, 02/27/2023); hernia repair (Left, 04/26/2024); Abdominal aortic aneurysm repair (N/A, 05/31/2024); femoral bypass (N/A, 05/31/2024); Cystoscopy (05/31/2024); Leg Surgery (Left, 06/04/2024); Leg Surgery (Left, 6/7/2024); Groin Surgery (Left, 7/12/2024); and Groin Surgery (Left, 7/15/2024).    ALLERGIES   Allergies as of 01/30/2025    (No Known Allergies)       MEDICATIONS   Prior to

## 2025-03-18 NOTE — PROCEDURES
included automated blood pressure, continuous EKG, and continuous pulse oximetry  - The detailed conscious record is permanently stored in the Hospital Information System  - The following is the conscious sedation record:  Start Time: 10:14  End Time : 10:29  Duration: 15 minutes   Medications Administered: 3 mg Versed, 150 mcg Fentanyl        Alex Campbell DO  Interventional Pain Management/PM&R   [facility]

## 2025-03-18 NOTE — POST SEDATION
[facility]  Sedation/Analgesia Post Sedation Record    Pt Name: David Vaca  MRN: [id number]  YOB: 1965  Procedure Performed By: Alex Campbell DO  Primary Care Physician: Sandi Choudhary APRN - CNP    POST-PROCEDURE    Physicians/Assistants: Alex Campbell DO  Procedure Performed: See Procedure Note   Sedation/Anesthesia: Versed and Fentanyl (See procedure note for amount and duration)  Estimated Blood Loss:     0  ml  Specimens Removed: None        Complications: None           Alex Campbell DO  Electronically signed 3/18/2025 at 11:33 AM

## 2025-03-18 NOTE — PROGRESS NOTES
1029: Pt arrived to Phase II recovery via cart. Eyes closed with spontaneous respirations even and unlabored. Report received from DEANNA Thurston.  1031: VSS. Pt awakens to voice. HOB elevated. Snack and drink provided. Call light in reach.  1050: IV removed. Patient dressed.  1055: Pt escorted to vehicle and discharged home in stable condition with family.

## 2025-04-08 ENCOUNTER — OFFICE VISIT (OUTPATIENT)
Dept: PHYSICAL MEDICINE AND REHAB | Age: 60
End: 2025-04-08
Payer: MEDICARE

## 2025-04-08 VITALS
DIASTOLIC BLOOD PRESSURE: 70 MMHG | WEIGHT: 152 LBS | SYSTOLIC BLOOD PRESSURE: 104 MMHG | HEIGHT: 66 IN | BODY MASS INDEX: 24.43 KG/M2

## 2025-04-08 DIAGNOSIS — G89.4 CHRONIC PAIN SYNDROME: ICD-10-CM

## 2025-04-08 DIAGNOSIS — G62.9 NEUROPATHY: ICD-10-CM

## 2025-04-08 DIAGNOSIS — M53.3 SACROILIAC JOINT DYSFUNCTION: ICD-10-CM

## 2025-04-08 DIAGNOSIS — M46.1 SI (SACROILIAC) JOINT INFLAMMATION: ICD-10-CM

## 2025-04-08 DIAGNOSIS — M46.1 SACROILIAC INFLAMMATION: ICD-10-CM

## 2025-04-08 DIAGNOSIS — M47.812 CERVICAL SPONDYLOSIS: ICD-10-CM

## 2025-04-08 DIAGNOSIS — M47.816 LUMBAR SPONDYLOSIS: Primary | ICD-10-CM

## 2025-04-08 PROCEDURE — 99214 OFFICE O/P EST MOD 30 MIN: CPT | Performed by: NURSE PRACTITIONER

## 2025-04-08 ASSESSMENT — ENCOUNTER SYMPTOMS
RHINORRHEA: 0
DIARRHEA: 0
ABDOMINAL PAIN: 0
SINUS PRESSURE: 0
BACK PAIN: 1
CONSTIPATION: 0
PHOTOPHOBIA: 0
CHEST TIGHTNESS: 0
EYE PAIN: 0
SORE THROAT: 0
EYES NEGATIVE: 1
ALLERGIC/IMMUNOLOGIC NEGATIVE: 1
SHORTNESS OF BREATH: 1
NAUSEA: 0
COLOR CHANGE: 0
VOMITING: 0

## 2025-04-08 NOTE — PROGRESS NOTES
Functionality Assessment/Goals Worksheet     On a scale of 0 (Does not Interfere) to 10 (Completely Interferes)     1.  Which number describes how during the past week pain has interfered with           the following:  A.  General Activity:  0  B.  Mood: 2  C.  Walking Ability:  2  D.  Normal Work (Includes both work outside the home and housework):  0  E.  Relations with Other People:   0  F.  Sleep:   0  G.  Enjoyment of Life:   0    2.  Patient Prefers to Take their Pain Medications:     []  On a regular basis   [x]  Only when necessary    []  Does not take pain medications    3.  What are the Patient's Goals/Expectations for Visiting Pain Management?     []  Learn about my pain    [x]  Receive Medication   []  Physical Therapy     [x]  Treat Depression   [x]  Receive Injections    []  Treat Sleep   []  Deal with Anxiety and Stress   []  Treat Opoid Dependence/Addiction   []  Other:     HPI:   David Vaca is a 59 y.o. male is here today for    Chief Complaint: Lumbar back pain and SI pain      F/U repeat Lumbar  RFA  @L4-5,5-S1 Bilateral  3/18/2025  states the procedure was painful  at first but now receiving about  90% pain relief or benefit. Pain at highest is 0-1/10 post procedure. He is more active  walking longer distances     Before his prior visit. He was following Dr Bruno due to  [facility] not taking his insurance [employer]  for [employer].  Contract was then approved. Had not been here since 2023. He has had multiple  health issues, was not able to have repeat Lumbar RFA due to all his surgeries and health issues.    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 Had Aortobifemoral bypass. Had fasciotomy had wound vac for awhile then had wound closure.  He had abscess  left groin with Septicemia.  He then had CP issues had     He has facet mediated axial back pain.   He c/o low lumbar and Si  pain that Increases with walking  standing bending and

## 2025-04-08 NOTE — PROGRESS NOTES
Functionality Assessment/Goals Worksheet     On a scale of 0 (Does not Interfere) to 10 (Completely Interferes)     1.  Which number describes how during the past week pain has interfered with           the following:  A.  General Activity:  2  B.  Mood: 2  C.  Walking Ability:  2  D.  Normal Work (Includes both work outside the home and housework):  2  E.  Relations with Other People:   2  F.  Sleep:   0  G.  Enjoyment of Life:   2    2.  Patient Prefers to Take their Pain Medications:     []  On a regular basis   []  Only when necessary    []  Does not take pain medications    3.  What are the Patient's Goals/Expectations for Visiting Pain Management?     [x]  Learn about my pain    []  Receive Medication   []  Physical Therapy     []  Treat Depression   []  Receive Injections    []  Treat Sleep   []  Deal with Anxiety and Stress   []  Treat Opoid Dependence/Addiction   []  Other:

## 2025-04-24 ENCOUNTER — HOSPITAL ENCOUNTER (OUTPATIENT)
Age: 60
Setting detail: OBSERVATION
Discharge: HOME | End: 2025-04-26
Attending: STUDENT IN AN ORGANIZED HEALTH CARE EDUCATION/TRAINING PROGRAM
Payer: MEDICARE

## 2025-04-24 ENCOUNTER — APPOINTMENT (OUTPATIENT)
Dept: GENERAL RADIOLOGY | Age: 60
End: 2025-04-24
Payer: MEDICARE

## 2025-04-24 ENCOUNTER — APPOINTMENT (OUTPATIENT)
Dept: CT IMAGING | Age: 60
End: 2025-04-24
Payer: MEDICARE

## 2025-04-24 DIAGNOSIS — R07.89 CHEST WALL TENDERNESS: ICD-10-CM

## 2025-04-24 DIAGNOSIS — J44.0 ACUTE BRONCHITIS WITH COPD (HCC): ICD-10-CM

## 2025-04-24 DIAGNOSIS — E87.1 HYPONATREMIA: ICD-10-CM

## 2025-04-24 DIAGNOSIS — J44.1 COPD EXACERBATION (HCC): Primary | ICD-10-CM

## 2025-04-24 DIAGNOSIS — R10.812 LEFT UPPER QUADRANT ABDOMINAL TENDERNESS WITHOUT REBOUND TENDERNESS: ICD-10-CM

## 2025-04-24 DIAGNOSIS — R10.811 RIGHT UPPER QUADRANT ABDOMINAL TENDERNESS WITHOUT REBOUND TENDERNESS: ICD-10-CM

## 2025-04-24 DIAGNOSIS — N17.9 AKI (ACUTE KIDNEY INJURY): ICD-10-CM

## 2025-04-24 DIAGNOSIS — J20.9 ACUTE BRONCHITIS WITH COPD (HCC): ICD-10-CM

## 2025-04-24 LAB
ALBUMIN SERPL BCG-MCNC: 4.3 G/DL (ref 3.4–4.9)
ALP SERPL-CCNC: 50 U/L (ref 40–129)
ALT SERPL W/O P-5'-P-CCNC: 18 U/L (ref 10–50)
ANION GAP SERPL CALC-SCNC: 16 MEQ/L (ref 8–16)
AST SERPL-CCNC: 37 U/L (ref 10–50)
BACTERIA: ABNORMAL
BASOPHILS ABSOLUTE: 0.1 THOU/MM3 (ref 0–0.1)
BASOPHILS NFR BLD AUTO: 0.6 %
BILIRUB CONJ SERPL-MCNC: < 0.1 MG/DL (ref 0–0.2)
BILIRUB SERPL-MCNC: 0.2 MG/DL (ref 0.3–1.2)
BILIRUB UR QL STRIP: NEGATIVE
BUN SERPL-MCNC: 14 MG/DL (ref 8–23)
CALCIUM SERPL-MCNC: 9.4 MG/DL (ref 8.8–10.2)
CASTS #/AREA URNS LPF: ABNORMAL /LPF
CHARACTER UR: ABNORMAL
CHLORIDE SERPL-SCNC: 90 MEQ/L (ref 98–111)
CO2 SERPL-SCNC: 20 MEQ/L (ref 22–29)
COLOR UR: YELLOW
CREAT SERPL-MCNC: 1.5 MG/DL (ref 0.7–1.2)
CRYSTALS URNS QL MICRO: ABNORMAL
DEPRECATED RDW RBC AUTO: 51.1 FL (ref 35–45)
EKG ATRIAL RATE: 91 BPM
EKG ATRIAL RATE: 95 BPM
EKG P AXIS: 25 DEGREES
EKG P AXIS: 43 DEGREES
EKG P-R INTERVAL: 152 MS
EKG P-R INTERVAL: 156 MS
EKG Q-T INTERVAL: 374 MS
EKG Q-T INTERVAL: 376 MS
EKG QRS DURATION: 102 MS
EKG QRS DURATION: 104 MS
EKG QTC CALCULATION (BAZETT): 462 MS
EKG QTC CALCULATION (BAZETT): 469 MS
EKG R AXIS: -7 DEGREES
EKG R AXIS: 11 DEGREES
EKG T AXIS: 34 DEGREES
EKG T AXIS: 52 DEGREES
EKG VENTRICULAR RATE: 91 BPM
EKG VENTRICULAR RATE: 95 BPM
EOSINOPHIL NFR BLD AUTO: 5.7 %
EOSINOPHILS ABSOLUTE: 0.7 THOU/MM3 (ref 0–0.4)
EPITHELIAL CELLS, UA: ABNORMAL /HPF
ERYTHROCYTE [DISTWIDTH] IN BLOOD BY AUTOMATED COUNT: 12.5 % (ref 11.5–14.5)
GFR SERPL CREATININE-BSD FRML MDRD: 53 ML/MIN/1.73M2
GLUCOSE SERPL-MCNC: 113 MG/DL (ref 74–109)
GLUCOSE UR QL STRIP.AUTO: NEGATIVE MG/DL
HCT VFR BLD AUTO: 33.4 % (ref 42–52)
HGB BLD-MCNC: 12.1 GM/DL (ref 14–18)
HGB UR QL STRIP.AUTO: ABNORMAL
IMM GRANULOCYTES # BLD AUTO: 0.04 THOU/MM3 (ref 0–0.07)
IMM GRANULOCYTES NFR BLD AUTO: 0.3 %
KETONES UR QL STRIP.AUTO: NEGATIVE
LEUKOCYTE ESTERASE UR QL STRIP.AUTO: ABNORMAL
LIPASE SERPL-CCNC: 37 U/L (ref 13–60)
LYMPHOCYTES ABSOLUTE: 5.2 THOU/MM3 (ref 1–4.8)
LYMPHOCYTES NFR BLD AUTO: 40.6 %
MAGNESIUM SERPL-MCNC: 1.4 MG/DL (ref 1.6–2.6)
MCH RBC QN AUTO: 39.8 PG (ref 26–33)
MCHC RBC AUTO-ENTMCNC: 36.2 GM/DL (ref 32.2–35.5)
MCV RBC AUTO: 109.9 FL (ref 80–94)
MONOCYTES ABSOLUTE: 1.2 THOU/MM3 (ref 0.4–1.3)
MONOCYTES NFR BLD AUTO: 9.7 %
NEUTROPHILS ABSOLUTE: 5.5 THOU/MM3 (ref 1.8–7.7)
NEUTROPHILS NFR BLD AUTO: 43.1 %
NITRITE UR QL STRIP.AUTO: NEGATIVE
NRBC BLD AUTO-RTO: 0 /100 WBC
NT-PROBNP SERPL IA-MCNC: 191 PG/ML (ref 0–124)
OSMOLALITY SERPL CALC.SUM OF ELEC: 254.6 MOSMOL/KG (ref 275–300)
PH UR STRIP.AUTO: 5.5 [PH] (ref 5–9)
PLATELET # BLD AUTO: 318 THOU/MM3 (ref 130–400)
PLATELET BLD QL SMEAR: ADEQUATE
PMV BLD AUTO: 8.7 FL (ref 9.4–12.4)
POTASSIUM SERPL-SCNC: 3.9 MEQ/L (ref 3.5–5.2)
PROT SERPL-MCNC: 7.1 G/DL (ref 6.4–8.3)
PROT UR STRIP.AUTO-MCNC: 100 MG/DL
RBC # BLD AUTO: 3.04 MILL/MM3 (ref 4.7–6.1)
RBC #/AREA URNS HPF: ABNORMAL /HPF
SCAN OF BLOOD SMEAR: NORMAL
SODIUM SERPL-SCNC: 126 MEQ/L (ref 135–145)
SP GR UR REFRACT.AUTO: 1.01 (ref 1–1.03)
TROPONIN, HIGH SENSITIVITY: 33 NG/L (ref 0–12)
TROPONIN, HIGH SENSITIVITY: 43 NG/L (ref 0–12)
UROBILINOGEN UR QL STRIP.AUTO: 0.2 EU/DL (ref 0–1)
VARIANT LYMPHS BLD QL SMEAR: ABNORMAL %
WBC # BLD AUTO: 12.7 THOU/MM3 (ref 4.8–10.8)
WBC #/AREA URNS HPF: ABNORMAL /HPF

## 2025-04-24 PROCEDURE — 87086 URINE CULTURE/COLONY COUNT: CPT

## 2025-04-24 PROCEDURE — 83690 ASSAY OF LIPASE: CPT

## 2025-04-24 PROCEDURE — 2580000003 HC RX 258: Performed by: INTERNAL MEDICINE

## 2025-04-24 PROCEDURE — 87591 N.GONORRHOEAE DNA AMP PROB: CPT

## 2025-04-24 PROCEDURE — 99285 EMERGENCY DEPT VISIT HI MDM: CPT

## 2025-04-24 PROCEDURE — 2580000003 HC RX 258

## 2025-04-24 PROCEDURE — 93010 ELECTROCARDIOGRAM REPORT: CPT | Performed by: INTERNAL MEDICINE

## 2025-04-24 PROCEDURE — 80053 COMPREHEN METABOLIC PANEL: CPT

## 2025-04-24 PROCEDURE — 84484 ASSAY OF TROPONIN QUANT: CPT

## 2025-04-24 PROCEDURE — 96375 TX/PRO/DX INJ NEW DRUG ADDON: CPT

## 2025-04-24 PROCEDURE — 6370000000 HC RX 637 (ALT 250 FOR IP): Performed by: INTERNAL MEDICINE

## 2025-04-24 PROCEDURE — 96372 THER/PROPH/DIAG INJ SC/IM: CPT

## 2025-04-24 PROCEDURE — 83735 ASSAY OF MAGNESIUM: CPT

## 2025-04-24 PROCEDURE — 83880 ASSAY OF NATRIURETIC PEPTIDE: CPT

## 2025-04-24 PROCEDURE — G0378 HOSPITAL OBSERVATION PER HR: HCPCS

## 2025-04-24 PROCEDURE — 87186 SC STD MICRODIL/AGAR DIL: CPT

## 2025-04-24 PROCEDURE — 96361 HYDRATE IV INFUSION ADD-ON: CPT

## 2025-04-24 PROCEDURE — 87491 CHLMYD TRACH DNA AMP PROBE: CPT

## 2025-04-24 PROCEDURE — 36415 COLL VENOUS BLD VENIPUNCTURE: CPT

## 2025-04-24 PROCEDURE — 96365 THER/PROPH/DIAG IV INF INIT: CPT

## 2025-04-24 PROCEDURE — 85025 COMPLETE CBC W/AUTO DIFF WBC: CPT

## 2025-04-24 PROCEDURE — 93005 ELECTROCARDIOGRAM TRACING: CPT

## 2025-04-24 PROCEDURE — 71250 CT THORAX DX C-: CPT

## 2025-04-24 PROCEDURE — 99222 1ST HOSP IP/OBS MODERATE 55: CPT | Performed by: INTERNAL MEDICINE

## 2025-04-24 PROCEDURE — 6370000000 HC RX 637 (ALT 250 FOR IP): Performed by: STUDENT IN AN ORGANIZED HEALTH CARE EDUCATION/TRAINING PROGRAM

## 2025-04-24 PROCEDURE — 71045 X-RAY EXAM CHEST 1 VIEW: CPT

## 2025-04-24 PROCEDURE — 6360000002 HC RX W HCPCS

## 2025-04-24 PROCEDURE — 93005 ELECTROCARDIOGRAM TRACING: CPT | Performed by: INTERNAL MEDICINE

## 2025-04-24 PROCEDURE — 6360000002 HC RX W HCPCS: Performed by: INTERNAL MEDICINE

## 2025-04-24 PROCEDURE — 2580000003 HC RX 258: Performed by: STUDENT IN AN ORGANIZED HEALTH CARE EDUCATION/TRAINING PROGRAM

## 2025-04-24 PROCEDURE — 74176 CT ABD & PELVIS W/O CONTRAST: CPT

## 2025-04-24 PROCEDURE — 81001 URINALYSIS AUTO W/SCOPE: CPT

## 2025-04-24 PROCEDURE — 6370000000 HC RX 637 (ALT 250 FOR IP)

## 2025-04-24 PROCEDURE — 94640 AIRWAY INHALATION TREATMENT: CPT

## 2025-04-24 PROCEDURE — 2500000003 HC RX 250 WO HCPCS: Performed by: INTERNAL MEDICINE

## 2025-04-24 PROCEDURE — 87077 CULTURE AEROBIC IDENTIFY: CPT

## 2025-04-24 PROCEDURE — 82248 BILIRUBIN DIRECT: CPT

## 2025-04-24 RX ORDER — ACETAMINOPHEN 325 MG/1
650 TABLET ORAL EVERY 6 HOURS PRN
Status: DISCONTINUED | OUTPATIENT
Start: 2025-04-24 | End: 2025-04-26 | Stop reason: HOSPADM

## 2025-04-24 RX ORDER — ATORVASTATIN CALCIUM 80 MG/1
80 TABLET, FILM COATED ORAL NIGHTLY
Status: DISCONTINUED | OUTPATIENT
Start: 2025-04-24 | End: 2025-04-24 | Stop reason: CLARIF

## 2025-04-24 RX ORDER — POLYETHYLENE GLYCOL 3350 17 G/17G
17 POWDER, FOR SOLUTION ORAL DAILY PRN
Status: DISCONTINUED | OUTPATIENT
Start: 2025-04-24 | End: 2025-04-26 | Stop reason: HOSPADM

## 2025-04-24 RX ORDER — ZIDOVUDINE 300 MG/1
300 TABLET ORAL 2 TIMES DAILY
Status: DISCONTINUED | OUTPATIENT
Start: 2025-04-24 | End: 2025-04-25 | Stop reason: SDUPTHER

## 2025-04-24 RX ORDER — LAMIVUDINE 10 MG/ML
150 SOLUTION ORAL 2 TIMES DAILY
Status: DISCONTINUED | OUTPATIENT
Start: 2025-04-24 | End: 2025-04-25 | Stop reason: SDUPTHER

## 2025-04-24 RX ORDER — ISOSORBIDE DINITRATE 20 MG/1
20 TABLET ORAL 2 TIMES DAILY
Status: DISCONTINUED | OUTPATIENT
Start: 2025-04-24 | End: 2025-04-26 | Stop reason: HOSPADM

## 2025-04-24 RX ORDER — LANOLIN ALCOHOL/MO/W.PET/CERES
400 CREAM (GRAM) TOPICAL 2 TIMES DAILY
Status: DISCONTINUED | OUTPATIENT
Start: 2025-04-24 | End: 2025-04-24 | Stop reason: ALTCHOICE

## 2025-04-24 RX ORDER — LAMIVUDINE AND ZIDOVUDINE 150; 300 MG/1; MG/1
1 TABLET, FILM COATED ORAL 2 TIMES DAILY
Status: DISCONTINUED | OUTPATIENT
Start: 2025-04-24 | End: 2025-04-24 | Stop reason: SDUPTHER

## 2025-04-24 RX ORDER — FOSAMPRENAVIR CALCIUM 700 MG/1
1400 TABLET, COATED ORAL 2 TIMES DAILY
Status: DISCONTINUED | OUTPATIENT
Start: 2025-04-24 | End: 2025-04-26 | Stop reason: HOSPADM

## 2025-04-24 RX ORDER — OLANZAPINE 10 MG/1
20 TABLET ORAL NIGHTLY
Status: DISCONTINUED | OUTPATIENT
Start: 2025-04-24 | End: 2025-04-26 | Stop reason: HOSPADM

## 2025-04-24 RX ORDER — IPRATROPIUM BROMIDE AND ALBUTEROL SULFATE 2.5; .5 MG/3ML; MG/3ML
1 SOLUTION RESPIRATORY (INHALATION) ONCE
Status: COMPLETED | OUTPATIENT
Start: 2025-04-24 | End: 2025-04-24

## 2025-04-24 RX ORDER — HYDROCODONE BITARTRATE AND ACETAMINOPHEN 5; 325 MG/1; MG/1
1 TABLET ORAL EVERY 6 HOURS PRN
Status: DISCONTINUED | OUTPATIENT
Start: 2025-04-24 | End: 2025-04-26 | Stop reason: HOSPADM

## 2025-04-24 RX ORDER — ENOXAPARIN SODIUM 100 MG/ML
40 INJECTION SUBCUTANEOUS EVERY 24 HOURS
Status: DISCONTINUED | OUTPATIENT
Start: 2025-04-24 | End: 2025-04-26 | Stop reason: HOSPADM

## 2025-04-24 RX ORDER — 0.9 % SODIUM CHLORIDE 0.9 %
1000 INTRAVENOUS SOLUTION INTRAVENOUS ONCE
Status: COMPLETED | OUTPATIENT
Start: 2025-04-24 | End: 2025-04-24

## 2025-04-24 RX ORDER — ONDANSETRON 2 MG/ML
4 INJECTION INTRAMUSCULAR; INTRAVENOUS EVERY 6 HOURS PRN
Status: DISCONTINUED | OUTPATIENT
Start: 2025-04-24 | End: 2025-04-26 | Stop reason: HOSPADM

## 2025-04-24 RX ORDER — ACETAMINOPHEN 650 MG/1
650 SUPPOSITORY RECTAL EVERY 6 HOURS PRN
Status: DISCONTINUED | OUTPATIENT
Start: 2025-04-24 | End: 2025-04-26 | Stop reason: HOSPADM

## 2025-04-24 RX ORDER — IPRATROPIUM BROMIDE AND ALBUTEROL SULFATE 2.5; .5 MG/3ML; MG/3ML
2 SOLUTION RESPIRATORY (INHALATION) ONCE
Status: COMPLETED | OUTPATIENT
Start: 2025-04-24 | End: 2025-04-24

## 2025-04-24 RX ORDER — SODIUM CHLORIDE 0.9 % (FLUSH) 0.9 %
5-40 SYRINGE (ML) INJECTION EVERY 12 HOURS SCHEDULED
Status: DISCONTINUED | OUTPATIENT
Start: 2025-04-24 | End: 2025-04-26 | Stop reason: HOSPADM

## 2025-04-24 RX ORDER — POTASSIUM CHLORIDE 7.45 MG/ML
10 INJECTION INTRAVENOUS PRN
Status: DISCONTINUED | OUTPATIENT
Start: 2025-04-24 | End: 2025-04-26 | Stop reason: HOSPADM

## 2025-04-24 RX ORDER — SODIUM CHLORIDE 0.9 % (FLUSH) 0.9 %
10 SYRINGE (ML) INJECTION PRN
Status: DISCONTINUED | OUTPATIENT
Start: 2025-04-24 | End: 2025-04-26 | Stop reason: HOSPADM

## 2025-04-24 RX ORDER — SODIUM CHLORIDE 9 MG/ML
INJECTION, SOLUTION INTRAVENOUS PRN
Status: DISCONTINUED | OUTPATIENT
Start: 2025-04-24 | End: 2025-04-26 | Stop reason: HOSPADM

## 2025-04-24 RX ORDER — ONDANSETRON 4 MG/1
4 TABLET, ORALLY DISINTEGRATING ORAL EVERY 8 HOURS PRN
Status: DISCONTINUED | OUTPATIENT
Start: 2025-04-24 | End: 2025-04-26 | Stop reason: HOSPADM

## 2025-04-24 RX ORDER — MAGNESIUM SULFATE IN WATER 40 MG/ML
2000 INJECTION, SOLUTION INTRAVENOUS PRN
Status: DISCONTINUED | OUTPATIENT
Start: 2025-04-24 | End: 2025-04-26 | Stop reason: HOSPADM

## 2025-04-24 RX ORDER — SODIUM CHLORIDE 9 MG/ML
INJECTION, SOLUTION INTRAVENOUS CONTINUOUS
Status: ACTIVE | OUTPATIENT
Start: 2025-04-24 | End: 2025-04-25

## 2025-04-24 RX ORDER — PREDNISONE 20 MG/1
60 TABLET ORAL ONCE
Status: COMPLETED | OUTPATIENT
Start: 2025-04-24 | End: 2025-04-24

## 2025-04-24 RX ORDER — TAMSULOSIN HYDROCHLORIDE 0.4 MG/1
0.4 CAPSULE ORAL DAILY
Status: DISCONTINUED | OUTPATIENT
Start: 2025-04-24 | End: 2025-04-24 | Stop reason: ALTCHOICE

## 2025-04-24 RX ORDER — PANTOPRAZOLE SODIUM 40 MG/1
40 TABLET, DELAYED RELEASE ORAL
Status: DISCONTINUED | OUTPATIENT
Start: 2025-04-25 | End: 2025-04-26 | Stop reason: HOSPADM

## 2025-04-24 RX ORDER — ATORVASTATIN CALCIUM 40 MG/1
40 TABLET, FILM COATED ORAL NIGHTLY
Status: DISCONTINUED | OUTPATIENT
Start: 2025-04-24 | End: 2025-04-26 | Stop reason: HOSPADM

## 2025-04-24 RX ORDER — POTASSIUM CHLORIDE 1500 MG/1
40 TABLET, EXTENDED RELEASE ORAL PRN
Status: DISCONTINUED | OUTPATIENT
Start: 2025-04-24 | End: 2025-04-26 | Stop reason: HOSPADM

## 2025-04-24 RX ORDER — ASPIRIN 81 MG/1
81 TABLET, CHEWABLE ORAL DAILY
Status: DISCONTINUED | OUTPATIENT
Start: 2025-04-24 | End: 2025-04-26 | Stop reason: HOSPADM

## 2025-04-24 RX ORDER — BENZONATATE 100 MG/1
100 CAPSULE ORAL 3 TIMES DAILY PRN
Status: DISCONTINUED | OUTPATIENT
Start: 2025-04-24 | End: 2025-04-26 | Stop reason: HOSPADM

## 2025-04-24 RX ORDER — METOPROLOL TARTRATE 25 MG/1
25 TABLET, FILM COATED ORAL 2 TIMES DAILY
Status: DISCONTINUED | OUTPATIENT
Start: 2025-04-24 | End: 2025-04-26 | Stop reason: HOSPADM

## 2025-04-24 RX ADMIN — WATER 40 MG: 1 INJECTION INTRAMUSCULAR; INTRAVENOUS; SUBCUTANEOUS at 11:48

## 2025-04-24 RX ADMIN — METOPROLOL TARTRATE 25 MG: 25 TABLET, FILM COATED ORAL at 20:07

## 2025-04-24 RX ADMIN — SODIUM CHLORIDE: 0.9 INJECTION, SOLUTION INTRAVENOUS at 09:25

## 2025-04-24 RX ADMIN — FOSAMPRENAVIR CALCIUM 1400 MG: 700 TABLET, COATED ORAL at 20:12

## 2025-04-24 RX ADMIN — PREDNISONE 60 MG: 20 TABLET ORAL at 05:17

## 2025-04-24 RX ADMIN — ZIDOVUDINE 300 MG: 300 TABLET ORAL at 20:08

## 2025-04-24 RX ADMIN — ISOSORBIDE DINITRATE 20 MG: 20 TABLET ORAL at 11:47

## 2025-04-24 RX ADMIN — OLANZAPINE 20 MG: 10 TABLET, FILM COATED ORAL at 20:07

## 2025-04-24 RX ADMIN — ENOXAPARIN SODIUM 40 MG: 100 INJECTION SUBCUTANEOUS at 11:43

## 2025-04-24 RX ADMIN — SODIUM CHLORIDE 1000 ML: 9 INJECTION, SOLUTION INTRAVENOUS at 05:33

## 2025-04-24 RX ADMIN — WATER 1000 MG: 1 INJECTION INTRAMUSCULAR; INTRAVENOUS; SUBCUTANEOUS at 09:57

## 2025-04-24 RX ADMIN — SODIUM CHLORIDE, PRESERVATIVE FREE 10 ML: 5 INJECTION INTRAVENOUS at 20:11

## 2025-04-24 RX ADMIN — ISOSORBIDE DINITRATE 20 MG: 20 TABLET ORAL at 20:08

## 2025-04-24 RX ADMIN — AZITHROMYCIN MONOHYDRATE 500 MG: 500 INJECTION, POWDER, LYOPHILIZED, FOR SOLUTION INTRAVENOUS at 06:43

## 2025-04-24 RX ADMIN — METOPROLOL TARTRATE 25 MG: 25 TABLET, FILM COATED ORAL at 14:09

## 2025-04-24 RX ADMIN — IPRATROPIUM BROMIDE AND ALBUTEROL SULFATE 2 DOSE: .5; 3 SOLUTION RESPIRATORY (INHALATION) at 04:09

## 2025-04-24 RX ADMIN — SODIUM CHLORIDE: 0.9 INJECTION, SOLUTION INTRAVENOUS at 23:03

## 2025-04-24 RX ADMIN — IPRATROPIUM BROMIDE AND ALBUTEROL SULFATE 1 DOSE: .5; 3 SOLUTION RESPIRATORY (INHALATION) at 06:50

## 2025-04-24 RX ADMIN — LAMIVUDINE 150 MG: 10 SOLUTION ORAL at 20:08

## 2025-04-24 RX ADMIN — ATORVASTATIN CALCIUM 40 MG: 40 TABLET, FILM COATED ORAL at 20:08

## 2025-04-24 ASSESSMENT — PAIN DESCRIPTION - LOCATION: LOCATION: BACK;ABDOMEN

## 2025-04-24 ASSESSMENT — PAIN - FUNCTIONAL ASSESSMENT
PAIN_FUNCTIONAL_ASSESSMENT: 0-10

## 2025-04-24 ASSESSMENT — PAIN SCALES - GENERAL
PAINLEVEL_OUTOF10: 10
PAINLEVEL_OUTOF10: 10
PAINLEVEL_OUTOF10: 0
PAINLEVEL_OUTOF10: 0
PAINLEVEL_OUTOF10: 10

## 2025-04-24 ASSESSMENT — HEART SCORE: ECG: NORMAL

## 2025-04-24 NOTE — PROGRESS NOTES
Pt admitted to  6K5 via cart/stretcher.   Complaints: Shortness of breath.    IV none infusing  IV site free of s/s of infection or infiltration. Vital signs obtained. Assessment and data collection initiated. Two nurse skin assessment performed by Patria Cavanaugh RN and Shila Dhaliwal RN. Oriented to room. Policies and procedures for 6K explained. Shila HUERTA discussed hourly rounding with patient addressing 5 P's. Fall prevention and safety brochure discussed with patient.  Bed alarm on. Call light in reach.  The best day to schedule a follow up Dr appointment is:  Monday, Tuesday, Wednesday, Thursday, and Friday a.m.  Explained patients right to have family, representative or physician notified of their admission.  Patient has Declined for physician to be notified.  Patient has Declined for family/representative to be notified. All questions answered with no further questions at this time.       Which family member is the designated  Sisters, both POA   Do you want them contacted on admission and updated every shift no

## 2025-04-24 NOTE — ED NOTES
ED to inpatient nurses report      Chief Complaint:  Chief Complaint   Patient presents with    Back Pain    Abdominal Pain     Present to ED from: Home    MOA:     LOC: alert and orientated to name, place, date  Mobility: Independent  Oxygen Baseline: Room Air    Current needs required: Room Air     Code Status:   Prior    What abnormal results were found and what did you give/do to treat them? COPD exacerbation, RUQ tenderness, KEHINDE, hyponatremia  Any procedures or intervention occur? See MAR    Mental Status:  Level of Consciousness: Alert (0)    Psych Assessment:        Vitals:  Patient Vitals for the past 24 hrs:   BP Pulse Resp SpO2 Height Weight   04/24/25 0651 -- -- -- 99 % -- --   04/24/25 0639 104/72 91 20 96 % -- --   04/24/25 0533 123/64 100 24 97 % -- --   04/24/25 0436 112/70 84 18 94 % -- --   04/24/25 0411 -- -- -- 97 % -- --   04/24/25 0257 135/85 92 18 95 % 1.676 m (5' 6\") 68.9 kg (152 lb)        LDAs:   Peripheral IV 04/24/25 Left Antecubital (Active)   Site Assessment Clean, dry & intact 04/24/25 0437   Line Status Normal saline locked 04/24/25 0437   Phlebitis Assessment No symptoms 04/24/25 0437   Infiltration Assessment 0 04/24/25 0437   Alcohol Cap Used Yes 04/24/25 0437   Dressing Status Clean, dry & intact 04/24/25 0437   Dressing Type Transparent 04/24/25 0437       Ambulatory Status:  No data recorded    Diagnosis:  DISPOSITION Decision To Admit 04/24/2025 06:32:07 AM   Final diagnoses:   Chest wall tenderness   Right upper quadrant abdominal tenderness without rebound tenderness   Left upper quadrant abdominal tenderness without rebound tenderness   COPD exacerbation (HCC)   KEHINDE (acute kidney injury)   Hyponatremia        Consults:  None     Pain Score:  Pain Assessment  Pain Assessment: 0-10  Pain Level: 10  Pain Location: Back, Abdomen    C-SSRS:   Risk of Suicide: No Risk    Sepsis Screening:       [place] Fall Risk:       Swallow Screening        Preferred Language:  2000    History of nephrolithiasis 2023    HIV (human immunodeficiency virus infection) (HCC) 1991    Hx of blood clots 05/31/2024    Hyperlipidemia     Diagnosed in 2000's    Hypertension            Electronically signed by Hanna Avilez RN on 4/24/2025 at 6:52 AM

## 2025-04-24 NOTE — H&P
thoracic aortic aneurysm. Atheromatous aortic and coronary arterial calcifications. Pleura: No pneumothorax or significant effusion. Lymphatic: No adenopathy. Upper abdomen: No acute pathology. Soft tissues: No acute pathology. Bones: No acute pathology.     Impression: No acute pathology. Stable emphysematous and fibrotic changes. This document has been electronically signed by: Nasreen Irizarry MD on 04/24/2025 05:50 AM All CTs at this facility use dose modulation techniques and iterative reconstructions, and/or weight-based dosing when appropriate to reduce radiation to a low as reasonably achievable.    XR CHEST PORTABLE  Result Date: 4/24/2025  1 view chest x-ray. Comparison: CR/SR - XR CHEST PORTABLE - 7/22/24 14:17 EDT Findings: The lungs appear clear. There is no consolidation, effusion, or pneumothorax. Heart size is within normal limits. There are prominent pericardial fat pads.     No acute cardiopulmonary abnormality. This document has been electronically signed by: Mendoza Rajput MD on 04/24/2025 04:09 AM         EKG:  Possible evolving right bundle branch block but appears to have a sinus rhythm there are no ischemic changes I have compared this EKG to previous one performed July 2024 and I see no ischemic changes      Principal Problem:    Acute bronchitis with COPD (HCC)  Resolved Problems:    * No resolved hospital problems. *      Assessment and Plan:  Urinary tract infection these results have resulted after my admission has been performed he has greater than 100 white blood cells in clumps with many bacteria noted in his urinalysis I suspect this is causing an diffuse inflammatory type of reaction throughout his body essentially a SIRS precipitating chest wall pain and wheezing noted when he arrived, he will be started on Rocephin I would likely continue azithromycin that was started for COPD exacerbation I have ordered a urine GC secondary to history of chronic HIV, I am uncertain as to whether  or not he follows with outpatient infectious disease his white blood cell count seems to be intact I see no oral thrush.  Will follow urine cultures and assess his response to IV Rocephin, ct abdomen suggestive of cystitis, no renal abscess  Pleuritic chest wall pain symptomatically treat do not suspect ACS  COPD exacerbation patient still has trace wheezing I will treat him in the meantime with continued steroids either IV or oral and beta agonist treatments I will likely continue azithromycin as well for anti-inflammatory effect  Elevated cardiac enzyme I do not suspect ACS at this time based on type of pleuritic type chest pain he is having location of chest pain and ECG   Hyponatremia likely hypovolemic I suspect some underlying dehydration the patient has denied any nausea vomiting or decreased p.o. intake on my physical survey since he also appears to have a concomitant acute kidney injury  Acute kidney injury patient's baseline creatinine is 0.8 mg/dL I will attempt low volume IV isotonic fluids and reassess his chemistry in a.m.  HIV on suppression medications last recorded viral load within our system appears to be from 2023  Hypomagnesemia replace as needed        DVT prophylaxis: [x] Lovenox                                 [] SCDs                                 [] SQ Heparin                                 [] Encourage ambulation, low risk for DVT, no chemical or mechanical prophylaxis necessary              [] Already on Anticoagulation                Anticipated Disposition upon discharge: [x] Home                                                                         [] Home with Home Health                                                                         [] Skilled Nursing Facility                                                                         [] [facility]          Electronically signed by Robert David MD on 4/24/2025 at 8:30 AM

## 2025-04-24 NOTE — ED NOTES
ED to inpatient nurses report      Chief Complaint:  Chief Complaint   Patient presents with    Back Pain    Abdominal Pain     Present to ED from: home    MOA:     LOC: alert and orientated to name, place, date  Mobility: Independent  Oxygen Baseline: 94    Current needs required: room air     Code Status:   Prior      Mental Status:  Level of Consciousness: Alert (0)    Psych Assessment:        Vitals:  Patient Vitals for the past 24 hrs:   BP Pulse Resp SpO2 Height Weight   04/24/25 0803 (!) 159/93 (!) 104 21 94 % -- --   04/24/25 0651 -- -- -- 99 % -- --   04/24/25 0639 104/72 91 20 96 % -- --   04/24/25 0533 123/64 100 24 97 % -- --   04/24/25 0436 112/70 84 18 94 % -- --   04/24/25 0411 -- -- -- 97 % -- --   04/24/25 0257 135/85 92 18 95 % 1.676 m (5' 6\") 68.9 kg (152 lb)        LDAs:   Peripheral IV 04/24/25 Left Antecubital (Active)   Site Assessment Clean, dry & intact 04/24/25 0437   Line Status Normal saline locked 04/24/25 0437   Phlebitis Assessment No symptoms 04/24/25 0437   Infiltration Assessment 0 04/24/25 0437   Alcohol Cap Used Yes 04/24/25 0437   Dressing Status Clean, dry & intact 04/24/25 0437   Dressing Type Transparent 04/24/25 0437       Ambulatory Status:  No data recorded    Diagnosis:  DISPOSITION Admitted 04/24/2025 07:46:02 AM   Final diagnoses:   Chest wall tenderness   Right upper quadrant abdominal tenderness without rebound tenderness   Left upper quadrant abdominal tenderness without rebound tenderness   COPD exacerbation (HCC)   KEHINDE (acute kidney injury)   Hyponatremia        Consults:  None     Pain Score:  Pain Assessment  Pain Assessment: 0-10  Pain Level: 10  Pain Location: Back, Abdomen    C-SSRS:   Risk of Suicide: No Risk    Sepsis Screening:       [place] Fall Risk:       Swallow Screening        Preferred Language:   English      ALLERGIES     Patient has no known allergies.    SURGICAL HISTORY       Past Surgical History:   Procedure Laterality Date    ABDOMINAL AORTIC  ANEURYSM REPAIR N/A 05/31/2024    AORTOBIFEMORAL BYPASS performed by Yen Montague MD at [facility]    CARDIAC CATHETERIZATION  2015???    OK    COLONOSCOPY  2016    COLONOSCOPY  2021    Dr Rutledge     CYSTOSCOPY  05/31/2024    CYSTOSCOPY WITH ENRIQUEZ CATHETER PLACEMENT performed by Tru Kurtz MD at [facility]    EGD  2021    ENDOSCOPY, COLON, DIAGNOSTIC      egd with dilatation    FEMORAL BYPASS N/A 05/31/2024    LOWER EXTREMITY THROMBECTOMY POSSIBLE FASCIOTOMY**CELLSAVER- NOTIFIED performed by Yen Montague MD at [facility]    FOOT SURGERY Left 1970's    left foot needle foreign body removal    GROIN SURGERY Left 7/12/2024    LEFT GROIN INCISION AND DRAINAGE, WOUND VA PLACEMENT performed by Yen Montague MD at [facility]    GROIN SURGERY Left 7/15/2024    LEFT GROIN WASHOUT, WOUND VAC PLACEMENT performed by Yen Montague MD at [facility]    HERNIA REPAIR Left 04/26/2024    Robotic Left Inguinal Hernia Repair and Right Inguinal Hernia Repair both with mesh performed by Jana Trent MD at [facility]    LEG SURGERY Left 06/04/2024    LOWER EXTREMITY WOUND VAC EXCHANGE / FASCIOTOMY CLOSURE performed by Yen Montague MD at [facility]    LEG SURGERY Left 6/7/2024    LEFT LOWER EXTREMITY FASCIOTOMY CLOSURE, WOUND VAC REMOVAL performed by Yen Montague MD at [facility]    LUMBAR SPINE SURGERY Right 04/23/2019    Lumbar RFA  Right side first L3-4,4-5,5-S1 performed by Robert Diaz MD at [facility] [place]    LUMBAR SPINE SURGERY Left 05/21/2019    Lumbar RFA  Left side L3-4,4-5,5-S1 performed by Robert Diaz MD at [facility] [place] [facility]    NERVE BLOCK LUMB FACET LEVEL 1 BILATERAL Bilateral 07/18/2017    SI MBB BILATERAL performed by Robert Diaz MD at [facility] OR    NERVE SURGERY Bilateral 07/18/2017    SI BLOCK    OTHER SURGICAL HISTORY Bilateral 01/17/2017    Lumbar facet     OTHER SURGICAL HISTORY Bilateral 02/20/2017

## 2025-04-24 NOTE — ED TRIAGE NOTES
Pt to ED through intake. Pt c/c bilateral upper back pain rated 10/10. Pt reports chronic back pain with an increase a few hours ago. Pt also reports bilateral upper abdominal pain/rib. EKG complete. Vitals assessed.

## 2025-04-24 NOTE — ED NOTES
Pt resting in bed. Vitals assessed. Pt reports pain 9/10 at this time. Denies additional needs. Call light in reach.

## 2025-04-24 NOTE — PROCEDURES
PROCEDURE NOTE  Date: 4/24/2025   Name: David Vaca  YOB: 1965    Procedures EKG completed. Given to RN

## 2025-04-24 NOTE — ED NOTES
Pt back on the floor from imaging. Vitals assessed. Pt states the breathing tx \"helped some\". NS bolus started. Call light in reach.

## 2025-04-24 NOTE — ED PROVIDER NOTES
[facility] EMERGENCY DEPARTMENT  EMERGENCY DEPARTMENT ENCOUNTER          Pt Name: David Vaca  MRN: [id number]  Birthdate 1965  Date of evaluation: 4/24/2025  Physician: Sivakumar Elise MD  Supervising Attending Physician: Marty Mariano MD       CHIEF COMPLAINT       Chief Complaint   Patient presents with    Back Pain    Abdominal Pain         HISTORY OF PRESENT ILLNESS    HPI  David Vaca is a current smoker 59 y.o. male with PMH significant for COPD (not on home O2), DMT2, GERD, HIV, HTN, HLD, h/o blood clots, s/p AAA repair with aortobifem (5/31/2024) and left groin I&D with wound VAC placement and washout (7/15/24) [at [facility]] with R-PICC placement for daily Rocephin infusions until 8/24 who presents to the emergency department from home for evaluation of B/L chest wall pain.  Reports that pain started around 0100 and is increased to 10/10, constant.  No known alleviating factors.  States that he has chronic back pain that seems to be more pronounced with this pain as well.  Does report a recent new dry cough.  Denies any recent fever or chills.  Denies any recent abx or steroid use.  No falls or trauma recently.  Denies associated nausea, vomiting, constipation, diarrhea, dysuria, hematuria, hematochezia, melena.  No associated numbness/tingling/weakness in extremities.  Denies any recent congestion or sore throat.  Denies CP or SOB.  Does report current 1 PPD tobacco smoking; denies EtOH or drug use.  The patient has no other acute complaints at this time.      PAST MEDICAL AND SURGICAL HISTORY     Past Medical History:   Diagnosis Date    Arthritis 06/01/2016    LUMBAR SPINE     COPD (chronic obstructive pulmonary disease) (HCC)     found in late 2010s    Depression 2012    Diabetes mellitus (HCC)     For 1 month in 2000's    Dysphagia     Emphysema lung (HCC) 2023    GERD (gastroesophageal reflux disease) 2000    History of nephrolithiasis 2023    HIV (human

## 2025-04-24 NOTE — ED NOTES
Pt ambulated to restroom for urine sample. Sample obtained and sent. Abx started upon pt being back in bed. Vitals assessed. Pt denies additional needs. Call light in reach.

## 2025-04-25 LAB
ALBUMIN SERPL BCG-MCNC: 3.7 G/DL (ref 3.4–4.9)
ALP SERPL-CCNC: 42 U/L (ref 40–129)
ALT SERPL W/O P-5'-P-CCNC: 17 U/L (ref 10–50)
ANION GAP SERPL CALC-SCNC: 11 MEQ/L (ref 8–16)
AST SERPL-CCNC: 52 U/L (ref 10–50)
AUTO DIFF PNL BLD: ABNORMAL
BASOPHILS ABSOLUTE: 0 THOU/MM3 (ref 0–0.1)
BASOPHILS NFR BLD AUTO: 0.1 %
BILIRUB SERPL-MCNC: < 0.2 MG/DL (ref 0.3–1.2)
BUN SERPL-MCNC: 16 MG/DL (ref 8–23)
CALCIUM SERPL-MCNC: 9.6 MG/DL (ref 8.8–10.2)
CHLAMYDIA DNA UR QL NAA+PROBE: NEGATIVE
CHLAMYDIA, GC DNA AMP, URINE: NORMAL
CHLORIDE SERPL-SCNC: 103 MEQ/L (ref 98–111)
CO2 SERPL-SCNC: 22 MEQ/L (ref 22–29)
CREAT SERPL-MCNC: 1.2 MG/DL (ref 0.7–1.2)
DEPRECATED RDW RBC AUTO: 51.6 FL (ref 35–45)
EOSINOPHIL NFR BLD AUTO: 0.2 %
EOSINOPHILS ABSOLUTE: 0 THOU/MM3 (ref 0–0.4)
ERYTHROCYTE [DISTWIDTH] IN BLOOD BY AUTOMATED COUNT: 12.5 % (ref 11.5–14.5)
GFR SERPL CREATININE-BSD FRML MDRD: 69 ML/MIN/1.73M2
GLUCOSE SERPL-MCNC: 119 MG/DL (ref 74–109)
HCT VFR BLD AUTO: 30.9 % (ref 42–52)
HGB BLD-MCNC: 11 GM/DL (ref 14–18)
IMM GRANULOCYTES # BLD AUTO: 0.05 THOU/MM3 (ref 0–0.07)
IMM GRANULOCYTES NFR BLD AUTO: 0.4 %
LYMPHOCYTES ABSOLUTE: 2.5 THOU/MM3 (ref 1–4.8)
LYMPHOCYTES NFR BLD AUTO: 21.1 %
MACROCYTES BLD QL SMEAR: PRESENT
MCH RBC QN AUTO: 39.6 PG (ref 26–33)
MCHC RBC AUTO-ENTMCNC: 35.6 GM/DL (ref 32.2–35.5)
MCV RBC AUTO: 111.2 FL (ref 80–94)
MONOCYTES ABSOLUTE: 0.9 THOU/MM3 (ref 0.4–1.3)
MONOCYTES NFR BLD AUTO: 7.6 %
N GONORRHOEA DNA UR QL NAA+PROBE: NEGATIVE
NEUTROPHILS ABSOLUTE: 8.5 THOU/MM3 (ref 1.8–7.7)
NEUTROPHILS NFR BLD AUTO: 70.6 %
NRBC BLD AUTO-RTO: 0 /100 WBC
PATHOLOGIST REVIEW: ABNORMAL
PLATELET # BLD AUTO: 274 THOU/MM3 (ref 130–400)
PLATELET BLD QL SMEAR: ADEQUATE
PMV BLD AUTO: 8.5 FL (ref 9.4–12.4)
POTASSIUM SERPL-SCNC: 4.3 MEQ/L (ref 3.5–5.2)
PROT SERPL-MCNC: 6.1 G/DL (ref 6.4–8.3)
RBC # BLD AUTO: 2.78 MILL/MM3 (ref 4.7–6.1)
SCAN OF BLOOD SMEAR: NORMAL
SODIUM SERPL-SCNC: 136 MEQ/L (ref 135–145)
TROPONIN, HIGH SENSITIVITY: 50 NG/L (ref 0–12)
VARIANT LYMPHS BLD QL SMEAR: ABNORMAL %
WBC # BLD AUTO: 12 THOU/MM3 (ref 4.8–10.8)

## 2025-04-25 PROCEDURE — 2500000003 HC RX 250 WO HCPCS: Performed by: INTERNAL MEDICINE

## 2025-04-25 PROCEDURE — 6370000000 HC RX 637 (ALT 250 FOR IP): Performed by: INTERNAL MEDICINE

## 2025-04-25 PROCEDURE — 99232 SBSQ HOSP IP/OBS MODERATE 35: CPT | Performed by: PHYSICIAN ASSISTANT

## 2025-04-25 PROCEDURE — 94761 N-INVAS EAR/PLS OXIMETRY MLT: CPT

## 2025-04-25 PROCEDURE — 96361 HYDRATE IV INFUSION ADD-ON: CPT

## 2025-04-25 PROCEDURE — 84484 ASSAY OF TROPONIN QUANT: CPT

## 2025-04-25 PROCEDURE — G0378 HOSPITAL OBSERVATION PER HR: HCPCS

## 2025-04-25 PROCEDURE — 96372 THER/PROPH/DIAG INJ SC/IM: CPT

## 2025-04-25 PROCEDURE — 6370000000 HC RX 637 (ALT 250 FOR IP): Performed by: PHYSICIAN ASSISTANT

## 2025-04-25 PROCEDURE — 96376 TX/PRO/DX INJ SAME DRUG ADON: CPT

## 2025-04-25 PROCEDURE — 36415 COLL VENOUS BLD VENIPUNCTURE: CPT

## 2025-04-25 PROCEDURE — 80053 COMPREHEN METABOLIC PANEL: CPT

## 2025-04-25 PROCEDURE — 94640 AIRWAY INHALATION TREATMENT: CPT

## 2025-04-25 PROCEDURE — 85025 COMPLETE CBC W/AUTO DIFF WBC: CPT

## 2025-04-25 PROCEDURE — 6360000002 HC RX W HCPCS: Performed by: INTERNAL MEDICINE

## 2025-04-25 PROCEDURE — 94669 MECHANICAL CHEST WALL OSCILL: CPT

## 2025-04-25 RX ORDER — IPRATROPIUM BROMIDE AND ALBUTEROL SULFATE 2.5; .5 MG/3ML; MG/3ML
1 SOLUTION RESPIRATORY (INHALATION)
Status: DISCONTINUED | OUTPATIENT
Start: 2025-04-25 | End: 2025-04-26 | Stop reason: HOSPADM

## 2025-04-25 RX ORDER — PREDNISONE 20 MG/1
40 TABLET ORAL DAILY
Status: DISCONTINUED | OUTPATIENT
Start: 2025-04-25 | End: 2025-04-26 | Stop reason: HOSPADM

## 2025-04-25 RX ORDER — IPRATROPIUM BROMIDE AND ALBUTEROL SULFATE 2.5; .5 MG/3ML; MG/3ML
1 SOLUTION RESPIRATORY (INHALATION)
Status: DISCONTINUED | OUTPATIENT
Start: 2025-04-25 | End: 2025-04-25

## 2025-04-25 RX ORDER — LAMIVUDINE AND ZIDOVUDINE 150; 300 MG/1; MG/1
1 TABLET, FILM COATED ORAL 2 TIMES DAILY
Status: DISCONTINUED | OUTPATIENT
Start: 2025-04-26 | End: 2025-04-25

## 2025-04-25 RX ORDER — LAMIVUDINE AND ZIDOVUDINE 150; 300 MG/1; MG/1
1 TABLET, FILM COATED ORAL 2 TIMES DAILY
Status: DISCONTINUED | OUTPATIENT
Start: 2025-04-25 | End: 2025-04-26 | Stop reason: HOSPADM

## 2025-04-25 RX ADMIN — FOSAMPRENAVIR CALCIUM 1400 MG: 700 TABLET, COATED ORAL at 08:42

## 2025-04-25 RX ADMIN — METOPROLOL TARTRATE 25 MG: 25 TABLET, FILM COATED ORAL at 22:55

## 2025-04-25 RX ADMIN — LAMIVUDINE 150 MG: 10 SOLUTION ORAL at 08:39

## 2025-04-25 RX ADMIN — ISOSORBIDE DINITRATE 20 MG: 20 TABLET ORAL at 08:28

## 2025-04-25 RX ADMIN — SODIUM CHLORIDE, PRESERVATIVE FREE 10 ML: 5 INJECTION INTRAVENOUS at 08:22

## 2025-04-25 RX ADMIN — OLANZAPINE 20 MG: 10 TABLET, FILM COATED ORAL at 22:55

## 2025-04-25 RX ADMIN — SODIUM CHLORIDE, PRESERVATIVE FREE 10 ML: 5 INJECTION INTRAVENOUS at 22:56

## 2025-04-25 RX ADMIN — ENOXAPARIN SODIUM 40 MG: 100 INJECTION SUBCUTANEOUS at 11:38

## 2025-04-25 RX ADMIN — ZIDOVUDINE 300 MG: 300 TABLET ORAL at 08:29

## 2025-04-25 RX ADMIN — LAMIVUDINE AND ZIDOVUDINE 1 TABLET: 150; 300 TABLET, FILM COATED ORAL at 22:55

## 2025-04-25 RX ADMIN — WATER 40 MG: 1 INJECTION INTRAMUSCULAR; INTRAVENOUS; SUBCUTANEOUS at 08:25

## 2025-04-25 RX ADMIN — ASPIRIN 81 MG: 81 TABLET, CHEWABLE ORAL at 08:41

## 2025-04-25 RX ADMIN — IPRATROPIUM BROMIDE AND ALBUTEROL SULFATE 1 DOSE: .5; 3 SOLUTION RESPIRATORY (INHALATION) at 11:20

## 2025-04-25 RX ADMIN — METOPROLOL TARTRATE 25 MG: 25 TABLET, FILM COATED ORAL at 08:28

## 2025-04-25 RX ADMIN — PANTOPRAZOLE SODIUM 40 MG: 40 TABLET, DELAYED RELEASE ORAL at 05:50

## 2025-04-25 RX ADMIN — ISOSORBIDE DINITRATE 20 MG: 20 TABLET ORAL at 22:55

## 2025-04-25 RX ADMIN — FOSAMPRENAVIR CALCIUM 1400 MG: 700 TABLET, COATED ORAL at 22:54

## 2025-04-25 RX ADMIN — IPRATROPIUM BROMIDE AND ALBUTEROL SULFATE 1 DOSE: .5; 3 SOLUTION RESPIRATORY (INHALATION) at 20:37

## 2025-04-25 RX ADMIN — WATER 1000 MG: 1 INJECTION INTRAMUSCULAR; INTRAVENOUS; SUBCUTANEOUS at 08:26

## 2025-04-25 RX ADMIN — ATORVASTATIN CALCIUM 40 MG: 40 TABLET, FILM COATED ORAL at 22:57

## 2025-04-25 NOTE — PROGRESS NOTES
Hospitalist Progress Note      Patient:  David Vaca 59 y.o. male     Unit/Bed:[facility]05/005-A    Date of Admission: 4/24/2025      ASSESSMENT AND PLAN    Active Problems  UTI (POA)   UA suspicious. Urine culture pending. IV Ceftriaxone.   CT abdomen suspicious of cystitis.   COPD, acute exacerbation   ABX. Steroids. Breathing treatments.   CXR in the AM.   IS & Acapella.     Chronic Conditions (reviewed and stable unless otherwise stated)  HIV--on antiviral agents  Diabetes mellitus type 2, controlled with diabetic peripheral neuropathy--on low-dose sliding scale  Elevated troponin--likely secondary to demand ischemia  BPH without obstruction--on Flomax  Depression--treated with Prozac  Primary hypertension, uncontrolled--on Lopressor; please see #2  Hyperlipidemia--on statin, fenofibrate  GERD--on Protonix  Severe malnutrition--per dietitian      LDA: []CVC / []PICC / []Midline / []Valle / []Drains / []Mediport / [x]None  Antibiotics: Ceftriaxone   Steroids: Prednisone   Labs (still needed?): [x]Yes / []No  IVF (still needed?): []Yes / []No    Level of care: []Step Down / [x]Med-Surg  Bed Status: [x]Inpatient / []Observation  Telemetry: [x]Yes / []No  PT/OT: []Yes / [x]No    DVT Prophylaxis: [x] Lovenox / [] Heparin / [] SCDs / [] Already on Systemic Anticoagulation / [] None   Code status: Full Code     Expected discharge date:  1-2 days   Disposition: Home      ===================================================================    Chief Complaint: Chest wall pain   Subjective (past 24 hours): No acute events overnight. Pt is audibly wheezy. Pt resting in bed and states that he is ready to go home. Pt was educated on plan of care and awaiting cultures.       HPI / Hospital Course:  Initial H&P \"The patient is a 59 y.o. male who presents with diffuse chest wall pain primarily in the left lateral thoracic wall and right lateral thoracic wall not reproducible with palpation but worsens with movement patient

## 2025-04-25 NOTE — PLAN OF CARE
Problem: Chronic Conditions and Co-morbidities  Goal: Patient's chronic conditions and co-morbidity symptoms are monitored and maintained or improved  Outcome: Progressing  Flowsheets (Taken 4/24/2025 2005)  Care Plan - Patient's Chronic Conditions and Co-Morbidity Symptoms are Monitored and Maintained or Improved:   Monitor and assess patient's chronic conditions and comorbid symptoms for stability, deterioration, or improvement   Collaborate with multidisciplinary team to address chronic and comorbid conditions and prevent exacerbation or deterioration     Problem: Discharge Planning  Goal: Discharge to home or other facility with appropriate resources  Outcome: Progressing  Flowsheets (Taken 4/24/2025 2005)  Discharge to home or other facility with appropriate resources:   Identify barriers to discharge with patient and caregiver   Arrange for needed discharge resources and transportation as appropriate     Problem: Pain  Goal: Verbalizes/displays adequate comfort level or baseline comfort level  Outcome: Progressing  Flowsheets (Taken 4/24/2025 2005)  Verbalizes/displays adequate comfort level or baseline comfort level:   Encourage patient to monitor pain and request assistance   Assess pain using appropriate pain scale     Problem: Safety - Adult  Goal: Free from fall injury  Outcome: Progressing  Flowsheets (Taken 4/24/2025 1945)  Free From Fall Injury:   Instruct family/caregiver on patient safety   Based on caregiver fall risk screen, instruct family/caregiver to ask for assistance with transferring infant if caregiver noted to have fall risk factors

## 2025-04-25 NOTE — RT PROTOCOL NOTE
RT Inhaler-Nebulizer Bronchodilator Protocol Note    There is a bronchodilator order in the chart from a provider indicating to follow the RT Bronchodilator Protocol and there is an “Initiate RT Inhaler-Nebulizer Bronchodilator Protocol” order as well (see protocol at bottom of note).    CXR Findings:  XR CHEST PORTABLE  Result Date: 4/24/2025  No acute cardiopulmonary abnormality. This document has been electronically signed by: Mendoza Rajput MD on 04/24/2025 04:09 AM      The findings from the last RT Protocol Assessment were as follows:   History Pulmonary Disease: Chronic pulmonary disease  Respiratory Pattern: Regular pattern and RR 12-20 bpm  Breath Sounds: Inspiratory and expiratory or bilateral wheezing and/or rhonchi  Cough: Strong, spontaneous, non-productive  Indication for Bronchodilator Therapy: Wheezing associated with pulm disorder, On home bronchodilators  Bronchodilator Assessment Score: 8    Aerosolized bronchodilator medication orders have been revised according to the RT Inhaler-Nebulizer Bronchodilator Protocol below.    Respiratory Therapist to perform RT Therapy Protocol Assessment initially then follow the protocol.  Repeat RT Therapy Protocol Assessment PRN for score 0-3 or on second treatment, BID, and PRN for scores above 3.    No Indications - adjust the frequency to every 6 hours PRN wheezing or bronchospasm, if no treatments needed after 48 hours then discontinue using Per Protocol order mode.     If indication present, adjust the RT bronchodilator orders based on the Bronchodilator Assessment Score as indicated below.  Use Inhaler orders unless patient has one or more of the following: on home nebulizer, not able to hold breath for 10 seconds, is not alert and oriented, cannot activate and use MDI correctly, or respiratory rate 25 breaths per minute or more, then use the equivalent nebulizer order(s) with same Frequency and PRN reasons based on the score.  If a patient is on this

## 2025-04-25 NOTE — PLAN OF CARE
Problem: Respiratory - Adult  Goal: Achieves optimal ventilation and oxygenation  Outcome: Progressing     Problem: Respiratory - Adult  Goal: Clear lung sounds  Outcome: Progressing

## 2025-04-26 ENCOUNTER — APPOINTMENT (OUTPATIENT)
Dept: GENERAL RADIOLOGY | Age: 60
End: 2025-04-26
Payer: MEDICARE

## 2025-04-26 VITALS
WEIGHT: 145.5 LBS | HEIGHT: 66 IN | DIASTOLIC BLOOD PRESSURE: 83 MMHG | RESPIRATION RATE: 20 BRPM | HEART RATE: 85 BPM | BODY MASS INDEX: 23.38 KG/M2 | SYSTOLIC BLOOD PRESSURE: 142 MMHG | TEMPERATURE: 97.8 F | OXYGEN SATURATION: 93 %

## 2025-04-26 LAB
ALBUMIN SERPL BCG-MCNC: 3.7 G/DL (ref 3.4–4.9)
ALP SERPL-CCNC: 45 U/L (ref 40–129)
ALT SERPL W/O P-5'-P-CCNC: 23 U/L (ref 10–50)
ANION GAP SERPL CALC-SCNC: 9 MEQ/L (ref 8–16)
AST SERPL-CCNC: 80 U/L (ref 10–50)
BACTERIA UR CULT: ABNORMAL
BASOPHILS ABSOLUTE: 0 THOU/MM3 (ref 0–0.1)
BASOPHILS NFR BLD AUTO: 0.1 %
BILIRUB SERPL-MCNC: < 0.2 MG/DL (ref 0.3–1.2)
BUN SERPL-MCNC: 17 MG/DL (ref 8–23)
CALCIUM SERPL-MCNC: 9.7 MG/DL (ref 8.8–10.2)
CHLORIDE SERPL-SCNC: 98 MEQ/L (ref 98–111)
CO2 SERPL-SCNC: 26 MEQ/L (ref 22–29)
CREAT SERPL-MCNC: 1.1 MG/DL (ref 0.7–1.2)
DEPRECATED RDW RBC AUTO: 46.5 FL (ref 35–45)
EOSINOPHIL NFR BLD AUTO: 0.1 %
EOSINOPHILS ABSOLUTE: 0 THOU/MM3 (ref 0–0.4)
ERYTHROCYTE [DISTWIDTH] IN BLOOD BY AUTOMATED COUNT: 11.7 % (ref 11.5–14.5)
GFR SERPL CREATININE-BSD FRML MDRD: 77 ML/MIN/1.73M2
GLUCOSE SERPL-MCNC: 110 MG/DL (ref 74–109)
HCT VFR BLD AUTO: 30.6 % (ref 42–52)
HGB BLD-MCNC: 10.7 GM/DL (ref 14–18)
IMM GRANULOCYTES # BLD AUTO: 0.08 THOU/MM3 (ref 0–0.07)
IMM GRANULOCYTES NFR BLD AUTO: 0.5 %
LYMPHOCYTES ABSOLUTE: 2.9 THOU/MM3 (ref 1–4.8)
LYMPHOCYTES NFR BLD AUTO: 19.5 %
MCH RBC QN AUTO: 38.1 PG (ref 26–33)
MCHC RBC AUTO-ENTMCNC: 35 GM/DL (ref 32.2–35.5)
MCV RBC AUTO: 108.9 FL (ref 80–94)
MONOCYTES ABSOLUTE: 0.8 THOU/MM3 (ref 0.4–1.3)
MONOCYTES NFR BLD AUTO: 5.6 %
NEUTROPHILS ABSOLUTE: 11.1 THOU/MM3 (ref 1.8–7.7)
NEUTROPHILS NFR BLD AUTO: 74.2 %
NRBC BLD AUTO-RTO: 0 /100 WBC
ORGANISM: ABNORMAL
PLATELET # BLD AUTO: 272 THOU/MM3 (ref 130–400)
PMV BLD AUTO: 8 FL (ref 9.4–12.4)
POTASSIUM SERPL-SCNC: 4.1 MEQ/L (ref 3.5–5.2)
PROT SERPL-MCNC: 6 G/DL (ref 6.4–8.3)
RBC # BLD AUTO: 2.81 MILL/MM3 (ref 4.7–6.1)
SODIUM SERPL-SCNC: 133 MEQ/L (ref 135–145)
WBC # BLD AUTO: 14.9 THOU/MM3 (ref 4.8–10.8)

## 2025-04-26 PROCEDURE — 36415 COLL VENOUS BLD VENIPUNCTURE: CPT

## 2025-04-26 PROCEDURE — 94640 AIRWAY INHALATION TREATMENT: CPT

## 2025-04-26 PROCEDURE — 96376 TX/PRO/DX INJ SAME DRUG ADON: CPT

## 2025-04-26 PROCEDURE — 6370000000 HC RX 637 (ALT 250 FOR IP): Performed by: PHYSICIAN ASSISTANT

## 2025-04-26 PROCEDURE — 6370000000 HC RX 637 (ALT 250 FOR IP): Performed by: INTERNAL MEDICINE

## 2025-04-26 PROCEDURE — 94669 MECHANICAL CHEST WALL OSCILL: CPT

## 2025-04-26 PROCEDURE — 2500000003 HC RX 250 WO HCPCS: Performed by: INTERNAL MEDICINE

## 2025-04-26 PROCEDURE — 80053 COMPREHEN METABOLIC PANEL: CPT

## 2025-04-26 PROCEDURE — 71045 X-RAY EXAM CHEST 1 VIEW: CPT

## 2025-04-26 PROCEDURE — G0378 HOSPITAL OBSERVATION PER HR: HCPCS

## 2025-04-26 PROCEDURE — 6360000002 HC RX W HCPCS: Performed by: INTERNAL MEDICINE

## 2025-04-26 PROCEDURE — 99239 HOSP IP/OBS DSCHRG MGMT >30: CPT | Performed by: PHYSICIAN ASSISTANT

## 2025-04-26 PROCEDURE — 85025 COMPLETE CBC W/AUTO DIFF WBC: CPT

## 2025-04-26 RX ORDER — ALBUTEROL SULFATE 0.83 MG/ML
2.5 SOLUTION RESPIRATORY (INHALATION) EVERY 4 HOURS PRN
Status: DISCONTINUED | OUTPATIENT
Start: 2025-04-26 | End: 2025-04-26 | Stop reason: HOSPADM

## 2025-04-26 RX ORDER — PREDNISONE 20 MG/1
40 TABLET ORAL DAILY
Qty: 8 TABLET | Refills: 0 | Status: SHIPPED | OUTPATIENT
Start: 2025-04-27 | End: 2025-05-01

## 2025-04-26 RX ORDER — LEVOFLOXACIN 500 MG/1
500 TABLET, FILM COATED ORAL DAILY
Qty: 7 TABLET | Refills: 0 | Status: SHIPPED | OUTPATIENT
Start: 2025-04-26 | End: 2025-05-03

## 2025-04-26 RX ADMIN — PREDNISONE 40 MG: 20 TABLET ORAL at 08:30

## 2025-04-26 RX ADMIN — SODIUM CHLORIDE, PRESERVATIVE FREE 10 ML: 5 INJECTION INTRAVENOUS at 08:30

## 2025-04-26 RX ADMIN — ISOSORBIDE DINITRATE 20 MG: 20 TABLET ORAL at 08:27

## 2025-04-26 RX ADMIN — FOSAMPRENAVIR CALCIUM 1400 MG: 700 TABLET, COATED ORAL at 08:27

## 2025-04-26 RX ADMIN — WATER 1000 MG: 1 INJECTION INTRAMUSCULAR; INTRAVENOUS; SUBCUTANEOUS at 08:27

## 2025-04-26 RX ADMIN — METOPROLOL TARTRATE 25 MG: 25 TABLET, FILM COATED ORAL at 08:27

## 2025-04-26 RX ADMIN — PANTOPRAZOLE SODIUM 40 MG: 40 TABLET, DELAYED RELEASE ORAL at 05:38

## 2025-04-26 RX ADMIN — LAMIVUDINE AND ZIDOVUDINE 1 TABLET: 150; 300 TABLET, FILM COATED ORAL at 08:27

## 2025-04-26 RX ADMIN — ASPIRIN 81 MG: 81 TABLET, CHEWABLE ORAL at 08:27

## 2025-04-26 RX ADMIN — IPRATROPIUM BROMIDE AND ALBUTEROL SULFATE 1 DOSE: .5; 3 SOLUTION RESPIRATORY (INHALATION) at 07:33

## 2025-04-26 NOTE — RT PROTOCOL NOTE
RT Inhaler-Nebulizer Bronchodilator Protocol Note    There is a bronchodilator order in the chart from a provider indicating to follow the RT Bronchodilator Protocol and there is an “Initiate RT Inhaler-Nebulizer Bronchodilator Protocol” order as well (see protocol at bottom of note).    CXR Findings:  XR CHEST PORTABLE  Result Date: 4/24/2025  No acute cardiopulmonary abnormality. This document has been electronically signed by: Mendoza Rajput MD on 04/24/2025 04:09 AM      The findings from the last RT Protocol Assessment were as follows:   History Pulmonary Disease: Chronic pulmonary disease  Respiratory Pattern: Regular pattern and RR 12-20 bpm  Breath Sounds: Inspiratory and expiratory or bilateral wheezing and/or rhonchi  Cough: Strong, spontaneous, non-productive  Indication for Bronchodilator Therapy: Wheezing associated with pulm disorder  Bronchodilator Assessment Score: 8    Aerosolized bronchodilator medication orders have been revised according to the RT Inhaler-Nebulizer Bronchodilator Protocol below.    Respiratory Therapist to perform RT Therapy Protocol Assessment initially then follow the protocol.  Repeat RT Therapy Protocol Assessment PRN for score 0-3 or on second treatment, BID, and PRN for scores above 3.    No Indications - adjust the frequency to every 6 hours PRN wheezing or bronchospasm, if no treatments needed after 48 hours then discontinue using Per Protocol order mode.     If indication present, adjust the RT bronchodilator orders based on the Bronchodilator Assessment Score as indicated below.  Use Inhaler orders unless patient has one or more of the following: on home nebulizer, not able to hold breath for 10 seconds, is not alert and oriented, cannot activate and use MDI correctly, or respiratory rate 25 breaths per minute or more, then use the equivalent nebulizer order(s) with same Frequency and PRN reasons based on the score.  If a patient is on this medication at home then do  not decrease Frequency below that used at home.    0-3 - enter or revise RT bronchodilator order(s) to equivalent RT Bronchodilator order with Frequency of every 4 hours PRN for wheezing or increased work of breathing using Per Protocol order mode.        4-6 - enter or revise RT Bronchodilator order(s) to two equivalent RT bronchodilator orders with one order with BID Frequency and one order with Frequency of every 4 hours PRN wheezing or increased work of breathing using Per Protocol order mode.        7-10 - enter or revise RT Bronchodilator order(s) to two equivalent RT bronchodilator orders with one order with TID Frequency and one order with Frequency of every 4 hours PRN wheezing or increased work of breathing using Per Protocol order mode.       11-13 - enter or revise RT Bronchodilator order(s) to one equivalent RT bronchodilator order with QID Frequency and an Albuterol order with Frequency of every 4 hours PRN wheezing or increased work of breathing using Per Protocol order mode.      Greater than 13 - enter or revise RT Bronchodilator order(s) to one equivalent RT bronchodilator order with every 4 hours Frequency and an Albuterol order with Frequency of every 2 hours PRN wheezing or increased work of breathing using Per Protocol order mode.     RT to enter RT Home Evaluation for COPD & MDI Assessment order using Per Protocol order mode.    Electronically signed by Chika Peña RCP on 4/25/2025 at 8:40 PM

## 2025-04-26 NOTE — PROGRESS NOTES
Pt has a good technique with darren and has been doing on his own. Darren has been turned over to the pt.

## 2025-04-26 NOTE — PLAN OF CARE
Problem: Chronic Conditions and Co-morbidities  Goal: Patient's chronic conditions and co-morbidity symptoms are monitored and maintained or improved  Outcome: Progressing  Flowsheets (Taken 4/26/2025 0037)  Care Plan - Patient's Chronic Conditions and Co-Morbidity Symptoms are Monitored and Maintained or Improved:   Monitor and assess patient's chronic conditions and comorbid symptoms for stability, deterioration, or improvement   Collaborate with multidisciplinary team to address chronic and comorbid conditions and prevent exacerbation or deterioration   Update acute care plan with appropriate goals if chronic or comorbid symptoms are exacerbated and prevent overall improvement and discharge     Problem: Discharge Planning  Goal: Discharge to home or other facility with appropriate resources  Outcome: Progressing  Flowsheets (Taken 4/26/2025 0037)  Discharge to home or other facility with appropriate resources:   Identify barriers to discharge with patient and caregiver   Arrange for needed discharge resources and transportation as appropriate   Identify discharge learning needs (meds, wound care, etc)     Problem: Pain  Goal: Verbalizes/displays adequate comfort level or baseline comfort level  Outcome: Progressing  Flowsheets (Taken 4/26/2025 0037)  Verbalizes/displays adequate comfort level or baseline comfort level:   Encourage patient to monitor pain and request assistance   Assess pain using appropriate pain scale   Implement non-pharmacological measures as appropriate and evaluate response   Administer analgesics based on type and severity of pain and evaluate response     Problem: Safety - Adult  Goal: Free from fall injury  Outcome: Progressing  Flowsheets (Taken 4/26/2025 0037)  Free From Fall Injury:   Instruct family/caregiver on patient safety   Based on caregiver fall risk screen, instruct family/caregiver to ask for assistance with transferring infant if caregiver noted to have fall risk factors

## 2025-04-28 NOTE — DISCHARGE SUMMARY
Consults:     None    Disposition:    [x] Home       [] TCU       [] Rehab       [] Psych       [] SNF       [] Long Term Care Facility       [] Other-    Condition at Discharge: Stable    Code Status:  Prior     Patient Instructions:    Discharge lab work: None   Activity: activity as tolerated  Diet: No diet orders on file      Follow-up visits:   Sandi Choudhary, ALIZE - CNP  [address] [facility] [address] [phone]    Schedule an appointment as soon as possible for a visit in 1 week(s)  Hospital follow up appointment, Appt time         Discharge Medications:        Medication List        START taking these medications      levoFLOXacin 500 MG tablet  Commonly known as: LEVAQUIN  Take 1 tablet by mouth daily for 7 days     predniSONE 20 MG tablet  Commonly known as: DELTASONE  Take 2 tablets by mouth daily for 4 doses            CONTINUE taking these medications      albuterol sulfate  (90 Base) MCG/ACT inhaler  Commonly known as: Ventolin HFA  Inhale 2 puffs into the lungs every 4-6 hours as needed for Shortness of Breath     aspirin 81 MG chewable tablet  Take 1 tablet by mouth daily     atorvastatin 80 MG tablet  Commonly known as: LIPITOR  Take 1 tablet by mouth nightly     Calcium-Magnesium-Zinc-Vit D3 333-133-5-3.33 MG-MCG Tabs     fenofibrate 145 MG tablet  Commonly known as: TRICOR  Take 1 tablet by mouth daily     FLUoxetine 20 MG capsule  Commonly known as: PROZAC  Take 1 capsule by mouth daily     fosamprenavir 700 MG tablet  Commonly known as: LEXIVA  Take 2 tablets by mouth 2 times daily     isosorbide dinitrate 20 MG tablet  Commonly known as: ISORDIL  Take 1 tablet by mouth twice daily     lamiVUDine-zidovudine 150-300 MG per tablet  Commonly known as: COMBIVIR  Take 1 tablet by mouth 2 times daily     magnesium oxide 400 (240 Mg) MG tablet  Commonly known as: MAG-OX  Take 1 tablet by mouth 2 times daily     metoprolol tartrate 25 MG tablet  Commonly known as: LOPRESSOR  Take 1

## 2025-07-14 ENCOUNTER — OFFICE VISIT (OUTPATIENT)
Dept: PHYSICAL MEDICINE AND REHAB | Age: 60
End: 2025-07-14
Payer: MEDICARE

## 2025-07-14 VITALS
SYSTOLIC BLOOD PRESSURE: 108 MMHG | DIASTOLIC BLOOD PRESSURE: 62 MMHG | BODY MASS INDEX: 23.14 KG/M2 | WEIGHT: 144 LBS | HEIGHT: 66 IN

## 2025-07-14 DIAGNOSIS — G89.4 CHRONIC PAIN SYNDROME: ICD-10-CM

## 2025-07-14 DIAGNOSIS — M46.1 SI (SACROILIAC) JOINT INFLAMMATION: ICD-10-CM

## 2025-07-14 DIAGNOSIS — M53.3 SACROILIAC JOINT DYSFUNCTION: ICD-10-CM

## 2025-07-14 DIAGNOSIS — M47.812 CERVICAL SPONDYLOSIS: ICD-10-CM

## 2025-07-14 DIAGNOSIS — G62.9 NEUROPATHY: ICD-10-CM

## 2025-07-14 DIAGNOSIS — M47.816 LUMBAR SPONDYLOSIS: Primary | ICD-10-CM

## 2025-07-14 PROCEDURE — 99213 OFFICE O/P EST LOW 20 MIN: CPT | Performed by: NURSE PRACTITIONER

## 2025-07-14 ASSESSMENT — ENCOUNTER SYMPTOMS
SHORTNESS OF BREATH: 1
EYES NEGATIVE: 1
BACK PAIN: 1
CONSTIPATION: 0
ALLERGIC/IMMUNOLOGIC NEGATIVE: 1
SINUS PRESSURE: 0
VOMITING: 0
EYE PAIN: 0
NAUSEA: 0
CHEST TIGHTNESS: 0
RHINORRHEA: 0
ABDOMINAL PAIN: 0
PHOTOPHOBIA: 0
DIARRHEA: 0
SORE THROAT: 0
COLOR CHANGE: 0

## 2025-07-14 NOTE — PROGRESS NOTES
Functionality Assessment/Goals Worksheet     On a scale of 0 (Does not Interfere) to 10 (Completely Interferes)     1.  Which number describes how during the past week pain has interfered with           the following:  A.  General Activity:  0  B.  Mood: 0  C.  Walking Ability:  6  D.  Normal Work (Includes both work outside the home and housework):  0  E.  Relations with Other People:   0  F.  Sleep:   0  G.  Enjoyment of Life:   0    2.  Patient Prefers to Take their Pain Medications:     []  On a regular basis   [x]  Only when necessary    []  Does not take pain medications    3.  What are the Patient's Goals/Expectations for Visiting Pain Management?     []  Learn about my pain    [x]  Receive Medication   []  Physical Therapy     [x]  Treat Depression   [x]  Receive Injections    []  Treat Sleep   []  Deal with Anxiety and Stress   []  Treat Opoid Dependence/Addiction   []  Other:   HPI:   David Vaca is a 59 y.o. male is here today for    Chief Complaint: Lumbar back pain and SI pain      F/U Has continued relief from repeat Lumbar  RFA  @L4-5,5-S1 Bilateral  3/18/2025 . Pain is tolerable  No new issues states doing well.      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 Had Aortobifemoral bypass. Had fasciotomy had wound vac for awhile then had wound closure.  He had abscess  left groin with Septicemia.  He then had CP issues had     He has continued  facet mediated axial low back pain.   He c/o low lumbar and Si  pain that Increases with walking  standing bending and mowing.   Pain increases with bending, lifting, twisting , reaching, pushing, pulling, walking, standing, stairs and getting up and down.  Treatments Tried ice, heat, NSAIDS, narcotics, muscle relaxer, OTC rubs creams patches, steroid burst and injections  Pain Description sharp, burning, aching, numbness and tingling   .    Pain scale with out pain medications or at its worst is

## 2025-08-04 ENCOUNTER — OFFICE VISIT (OUTPATIENT)
Dept: PULMONOLOGY | Age: 60
End: 2025-08-04
Payer: MEDICARE

## 2025-08-04 VITALS
DIASTOLIC BLOOD PRESSURE: 70 MMHG | OXYGEN SATURATION: 98 % | TEMPERATURE: 97.8 F | BODY MASS INDEX: 21.86 KG/M2 | SYSTOLIC BLOOD PRESSURE: 132 MMHG | HEIGHT: 69 IN | WEIGHT: 147.6 LBS | HEART RATE: 75 BPM

## 2025-08-04 DIAGNOSIS — J43.9 PULMONARY EMPHYSEMA, UNSPECIFIED EMPHYSEMA TYPE (HCC): Primary | ICD-10-CM

## 2025-08-04 DIAGNOSIS — R93.89 ABNORMAL CT OF THE CHEST: ICD-10-CM

## 2025-08-04 DIAGNOSIS — R06.09 EXERTIONAL DYSPNEA: ICD-10-CM

## 2025-08-04 DIAGNOSIS — J18.9 PNEUMONIA OF LEFT LOWER LOBE DUE TO INFECTIOUS ORGANISM: ICD-10-CM

## 2025-08-04 DIAGNOSIS — Z72.0 TOBACCO USE: ICD-10-CM

## 2025-08-04 PROCEDURE — 99204 OFFICE O/P NEW MOD 45 MIN: CPT | Performed by: INTERNAL MEDICINE

## 2025-08-04 RX ORDER — LORATADINE 10 MG/1
TABLET ORAL
COMMUNITY
Start: 2025-03-19

## 2025-08-04 RX ORDER — BUDESONIDE AND FORMOTEROL FUMARATE DIHYDRATE 160; 4.5 UG/1; UG/1
AEROSOL RESPIRATORY (INHALATION)
COMMUNITY
Start: 2025-07-22

## 2025-09-03 ENCOUNTER — HOSPITAL ENCOUNTER (OUTPATIENT)
Dept: PULMONOLOGY | Age: 60
Discharge: HOME | End: 2025-09-03
Payer: MEDICARE

## 2025-09-03 ENCOUNTER — HOSPITAL ENCOUNTER (OUTPATIENT)
Dept: CT IMAGING | Age: 60
Discharge: HOME | End: 2025-09-03
Payer: MEDICARE

## 2025-09-03 DIAGNOSIS — Z72.0 TOBACCO USE: ICD-10-CM

## 2025-09-03 DIAGNOSIS — J43.9 PULMONARY EMPHYSEMA, UNSPECIFIED EMPHYSEMA TYPE (HCC): ICD-10-CM

## 2025-09-03 PROCEDURE — 94729 DIFFUSING CAPACITY: CPT

## 2025-09-03 PROCEDURE — 94726 PLETHYSMOGRAPHY LUNG VOLUMES: CPT

## 2025-09-03 PROCEDURE — 94060 EVALUATION OF WHEEZING: CPT

## 2025-09-03 PROCEDURE — 71250 CT THORAX DX C-: CPT

## (undated) DEVICE — HANDPIECE SET WITH COAXIAL HIGH FLOW TIP AND SUCTION TUBE: Brand: INTERPULSE

## (undated) DEVICE — ADAPTER URO SCP UROLOK LL

## (undated) DEVICE — SUTURE NONABSORBABLE MONOFILAMENT 2-0 FS 18 IN ETHILON 664H

## (undated) DEVICE — SUTURE MONOCRYL SZ 4-0 L18IN ABSRB UD L19MM PS-2 3/8 CIR PRIM Y496G

## (undated) DEVICE — Z DISCONTINUED USE 2220190 SUTURE VICRYL SZ 3-0 L27IN ABSRB UD L26MM SH 1/2 CIR J416H

## (undated) DEVICE — STRAP,POSITIONING,KNEE/BODY,FOAM,4X60": Brand: MEDLINE

## (undated) DEVICE — TUBING INSUFFLATOR HEAT HUMIDIFIED SMK EVAC SET PNEUMOCLEAR

## (undated) DEVICE — SUTURE PERMAHAND SZ 2-0 L18IN NONABSORBABLE BLK L26MM SH C012D

## (undated) DEVICE — APPLICATOR MEDICATED 26 CC SOLUTION HI LT ORNG CHLORAPREP

## (undated) DEVICE — MEDI-VAC NON-CONDUCTIVE SUCTION TUBING: Brand: CARDINAL HEALTH

## (undated) DEVICE — SOLUTION IV 500ML 0.9% SOD CHL PH 5 INJ USP VIAFLX PLAS

## (undated) DEVICE — DISCONTINUED USE 394504 SYRINGE LUER LOCK TIP 12 ML

## (undated) DEVICE — SYRINGE MED 10ML LUERLOCK TIP W/O SFTY DISP

## (undated) DEVICE — TOWEL,OR,DSP,ST,BLUE,STD,4/PK,20PK/CS: Brand: MEDLINE

## (undated) DEVICE — SVMMC VASC MAJ PK

## (undated) DEVICE — NEEDLE SPNL 22GA L3.5IN BLK HUB S STL REG WALL FIT STYL W/

## (undated) DEVICE — SHEET, T, LAPAROTOMY, STERILE: Brand: MEDLINE

## (undated) DEVICE — SUTURE VICRYL + SZ 3-0 L27IN ABSRB UD L26MM SH 1/2 CIR VCP416H

## (undated) DEVICE — INSUFFLATION NEEDLE TO ESTABLISH PNEUMOPERITONEUM.: Brand: INSUFFLATION NEEDLE

## (undated) DEVICE — CANISTER NEG PRSS 500ML WND THER W/ TBNG NO PRSS RANG W/

## (undated) DEVICE — SHEET,DRAPE,70X100,STERILE: Brand: MEDLINE

## (undated) DEVICE — Z DISCONTINUED USE 2537982 ELECTRODE DISPER W/ CRD DISP

## (undated) DEVICE — C-ARM: Brand: UNBRANDED

## (undated) DEVICE — BLADE ES ELASTOMERIC COAT INSUL DURABLE BEND UPTO 90DEG

## (undated) DEVICE — 6 ML SYRINGE LUER-LOCK TIP: Brand: MONOJECT

## (undated) DEVICE — KIT DRSG SM W12.5XH3.2XL18CM VAC SH DRP PD W/ CONN DISP RUL

## (undated) DEVICE — ZINACTIVE USE 2537982 PAD GRND FOR RF PAIN MGMT DISP

## (undated) DEVICE — PLEDGET SURG W3.5XL7MM THK1.5MM WHT PTFE RECT FIRM TFE

## (undated) DEVICE — GLOVE SURG SZ 65 L12IN FNGR THK79MIL GRN LTX FREE

## (undated) DEVICE — STRIP,CLOSURE,WOUND,MEDI-STRIP,1/2X4: Brand: MEDLINE

## (undated) DEVICE — GLOVE SURG SZ 8 CRM LTX FREE POLYISOPRENE POLYMER BEAD ANTI

## (undated) DEVICE — SHEET, ORTHO, SPLIT, STERILE: Brand: MEDLINE

## (undated) DEVICE — SUTURE PROL SZ 6-0 L24IN NONABSORBABLE BLU L9.3MM BV-1 3/8 8805H

## (undated) DEVICE — GLOVE ORANGE PI 7 1/2   MSG9075

## (undated) DEVICE — CATHETER ETER CTRL VEN OD7FR 3 LUMN COMP

## (undated) DEVICE — MASTISOL ADHESIVE LIQ 2/3ML

## (undated) DEVICE — NEEDLE SYR 18GA L1.5IN RED PLAS HUB S STL BLNT FILL W/O

## (undated) DEVICE — TAPE UMB 72X7/32 IN

## (undated) DEVICE — GAUZE,SPONGE,4"X4",12PLY,STERILE,LF,2'S: Brand: MEDLINE

## (undated) DEVICE — Device

## (undated) DEVICE — BNDG,ELSTC,MATRIX,STRL,4"X5YD,LF,HOOK&LP: Brand: MEDLINE

## (undated) DEVICE — SUTURE VICRYL + SZ 2 0 L27IN ABSRB UD CT 1 L36MM 1 2 CIR TAPR VCPP42D

## (undated) DEVICE — GOWN,SIRUS,NONRNF,SETINSLV,XL,20/CS: Brand: MEDLINE

## (undated) DEVICE — APPLICATOR MEDICATED 10.5 CC SOLUTION HI LT ORNG CHLORAPREP

## (undated) DEVICE — SHEET,DRAPE,3/4,53X77,STERILE: Brand: MEDLINE

## (undated) DEVICE — BNDG,ELSTC,MATRIX,STRL,6"X5YD,LF,HOOK&LP: Brand: MEDLINE

## (undated) DEVICE — CATHETER FOL 2 W 14 FR 5 CC URETH SPEC TIEM BARDX IC

## (undated) DEVICE — GLOVE SURG SZ 75 L12IN FNGR THK79MIL GRN LTX FREE

## (undated) DEVICE — DRAPE, SLUSH XL, 44X66, STERILE: Brand: MEDLINE

## (undated) DEVICE — GAUZE SPONGES,USP TYPE VII GAUZE, 12 PLY: Brand: CURITY

## (undated) DEVICE — SUTURE NONABSORBABLE MONOFILAMENT 4-0 RB-1 36 IN BLU PROLENE 8557H

## (undated) DEVICE — BANDAGE,GAUZE,BULKEE II,4.5"X4.1YD,STRL: Brand: MEDLINE

## (undated) DEVICE — HYPODERMIC SAFETY NEEDLE: Brand: MAGELLAN

## (undated) DEVICE — CONTAINER,SPECIMEN,4OZ,OR STRL: Brand: MEDLINE

## (undated) DEVICE — SUTURE V-LOC 180 SZ 3-0 L9IN ABSRB GRN L26MM V-20 1/2 CIR VLOCL0644

## (undated) DEVICE — SUTURE PERMAHAND SZ 2-0 L30IN NONABSORBABLE BLK SILK W/O A305H

## (undated) DEVICE — DRAPE,UTILITY,XL,4/PK,STERILE: Brand: MEDLINE

## (undated) DEVICE — SUTURE N ABSRB L 36 IN SZ 5-0 NDL L 13 MM POLYPRO OR PPL BLU

## (undated) DEVICE — LABEL MED DRUG CUST

## (undated) DEVICE — TIP COVER ACCESSORY

## (undated) DEVICE — ARM DRAPE

## (undated) DEVICE — BLOOD TRANSFER PACK 600 CC W/ CPLR

## (undated) DEVICE — MEDI-VAC YANKAUER SUCTION HANDLE W/BULBOUS TIP: Brand: CARDINAL HEALTH

## (undated) DEVICE — TOWEL,OR,DSP,ST,BLUE,DLX,4/PK,20PK/CS: Brand: MEDLINE

## (undated) DEVICE — BLADE ES L6IN ELASTOMERIC COAT EXT DURABLE BEND UPTO 90DEG

## (undated) DEVICE — SUTURE NONABSORBABLE MONOFILAMENT 7-0 BV-1 1X24 IN PROLENE 8702H

## (undated) DEVICE — SIZER GRAFT SM 5-24 MM AORT MP STRL DISP

## (undated) DEVICE — 3 ML SYRINGE LUER-LOCK TIP: Brand: MONOJECT

## (undated) DEVICE — SURGICAL SUCTION CONNECTING TUBE WITH MALE CONNECTOR AND SUCTION CLAMP, 2 FT. LONG (.6 M), 5 MM I.D.: Brand: CONMED

## (undated) DEVICE — SUTURE PROL SZ 4-0 L36IN NONABSORBABLE BLU L26MM SH 1/2 CIR 8521H

## (undated) DEVICE — PAD,ABDOMINAL,5"X9",ST,LF,25/BX: Brand: MEDLINE INDUSTRIES, INC.

## (undated) DEVICE — SYRINGE MED 5ML STD CLR PLAS LUERLOCK TIP N CTRL DISP

## (undated) DEVICE — STRAP ARMBRD W1.5XL32IN FOAM STR YET SFT W/ HK AND LOOP

## (undated) DEVICE — WHISTLE TIP URETERAL CATHETER (LEFT): Brand: COOK

## (undated) DEVICE — S-CURVE URETHRAL DILATOR SET WITH AQ, HYDROPHILIC COATING: Brand: S~CURVE

## (undated) DEVICE — SUTURE PROL SZ 3-0 L36IN NONABSORBABLE BLU L26MM SH 1/2 CIR 8522H

## (undated) DEVICE — GLOVE SURG SZ 65 CRM LTX FREE POLYISOPRENE POLYMER BEAD ANTI

## (undated) DEVICE — PREMIUM DRY TRAY LF: Brand: MEDLINE INDUSTRIES, INC.

## (undated) DEVICE — SUTURE PROL SZ 3-0 L48IN NONABSORBABLE BLU SH L26MM 1/2 CIR 8534H

## (undated) DEVICE — PAD GEN USE BORDERED ADH 14IN 2IN AND 12IN 4IN GZ UNIV ST

## (undated) DEVICE — 3M™ IOBAN™ 2 ANTIMICROBIAL INCISE DRAPE 6651EZ: Brand: IOBAN™ 2

## (undated) DEVICE — DRAPE,SPLIT,BILATERAL,STERILE: Brand: MEDLINE

## (undated) DEVICE — GLOVE SURG SZ 65 THK91MIL LTX FREE SYN POLYISOPRENE

## (undated) DEVICE — BOWL SET COLL HI VOL AUTOTRANFUSION CELL SAVR

## (undated) DEVICE — LARGE (BLUE) FOR GRAFTS UP TO 10 MM, 20 1/2" / 52 CM (1/PKG): Brand: SCANLAN® VASCULAR TUNNELER SHEATHS AND BULLET TIPS

## (undated) DEVICE — AGENT HEMSTAT W6XL9IN OXIDIZED REGENERATED CELOS ABSRB FOR

## (undated) DEVICE — 4F 80 CM LEMAITRE EMBOLECTOMY CATHETER, EIFU: Brand: LEMAITRE EMBOLECTOMY CATHETER

## (undated) DEVICE — GLOVE SURG SZ 7 CRM LTX FREE POLYISOPRENE POLYMER BEAD ANTI

## (undated) DEVICE — 40586 ADVANCED TRENDELENBURG POSITIONING KIT: Brand: 40586 ADVANCED TRENDELENBURG POSITIONING KIT

## (undated) DEVICE — Z DSICONTINUED USE 2881387 SUTURE PROL SZ 5-0 L36IN NONABSORBABLE BLU L13MM C-1 3/8 8720H

## (undated) DEVICE — SUTURE MONOCRYL + SZ 4 0 L18IN ABSRB UD PC 3 L16MM 3 8 CIR PRIM MCP845G

## (undated) DEVICE — SUTURE PERMAHAND SZ 4-0 L18IN NONABSORBABLE BLK SILK BRAID A183H

## (undated) DEVICE — TOWEL,OR,DSP,ST,NATURAL,DLX,4/PK,20PK/CS: Brand: MEDLINE

## (undated) DEVICE — GLOVE SURG SZ 8 L12IN FNGR THK79MIL GRN LTX FREE

## (undated) DEVICE — SUTURE PERMAHAND SZ 3-0 L30IN NONABSORBABLE BLK SILK BRAID A304H

## (undated) DEVICE — 3M™ IOBAN™ 2 ANTIMICROBIAL INCISE DRAPE 6640EZ: Brand: IOBAN™ 2

## (undated) DEVICE — GAUZE,SPONGE,FLUFF,6"X6.75",STRL,5/TRAY: Brand: MEDLINE

## (undated) DEVICE — COVER,LIGHT HANDLE,FLX,2/PK: Brand: MEDLINE INDUSTRIES, INC.

## (undated) DEVICE — LOOP VES W13MM THK09MM MINI RED SIL FLD REPELLENT

## (undated) DEVICE — EVERGRIP INSERT SET: Brand: FOGARTY EVERGRIP

## (undated) DEVICE — SUTURE ABSORBABLE MONOFILAMENT 0 TP1 60 IN VIO PDS + PDP991G

## (undated) DEVICE — FOGARTY - HYDRAGRIP SURGICAL - CLAMP INSERTS: Brand: FOGARTY HYDRAJAW

## (undated) DEVICE — GOWN,SIRUS,NONRNF,SETINSLV,2XL,18/CS: Brand: MEDLINE

## (undated) DEVICE — DRAPE,REIN 53X77,STERILE: Brand: MEDLINE

## (undated) DEVICE — LOOP VES W25MM THK1MM MAXI RED SIL FLD REPELLENT 100 PER

## (undated) DEVICE — 3F 80 CM LEMAITRE EMBOLECTOMY CATHETER, EIFU: Brand: LEMAITRE EMBOLECTOMY CATHETER

## (undated) DEVICE — KIT NEG PRSS M W12.5XH3.2XL18CM 2 SHT STD DRP 1 SENSATRAC

## (undated) DEVICE — SEAL

## (undated) DEVICE — 1LYRTR 16FR10ML100%SIL UMS SNP: Brand: MEDLINE INDUSTRIES, INC.

## (undated) DEVICE — GOWN,SIRUS,NON REINFRCD,LARGE,SET IN SL: Brand: MEDLINE

## (undated) DEVICE — FOGARTY - HYDRAGRIP SURGICAL - CLAMP INSERTS: Brand: FOGARTY SOFTJAW

## (undated) DEVICE — BLADELESS OBTURATOR: Brand: WECK VISTA

## (undated) DEVICE — LIQUIBAND RAPID ADHESIVE 36/CS 0.8ML: Brand: MEDLINE

## (undated) DEVICE — BINDER ABD UNISX 4 PNL PREM 6INX6INX12IN L XL 4

## (undated) DEVICE — 1LYRTR 16FR10ML 100%SILI SNAP: Brand: MEDLINE INDUSTRIES, INC.

## (undated) DEVICE — SC PAIN PACK: Brand: MEDLINE INDUSTRIES, INC.

## (undated) DEVICE — BLADE,CARBON-STEEL,10,STRL,DISPOSABLE: Brand: MEDLINE

## (undated) DEVICE — SYRINGE MEDICAL 3ML CLEAR PLASTIC STANDARD NON CONTROL LUERLOCK TIP DISPOSABLE

## (undated) DEVICE — RESERVOIR AUTOTRNS 3L W/ 150UM RAISE FLTR FOR CELL SAVR

## (undated) DEVICE — MARKER,SKIN,WI/RULER AND LABELS: Brand: MEDLINE

## (undated) DEVICE — SUTURE VICRYL + SZ 2-0 L27IN ABSRB CLR CT-1 1/2 CIR TAPERCUT VCP259H

## (undated) DEVICE — GENERAL LAPAROSCOPY-LF: Brand: MEDLINE INDUSTRIES, INC.

## (undated) DEVICE — NEPTUNE E-SEP SMOKE EVACUATION PENCIL, COATED, 70MM BLADE, PUSH BUTTON SWITCH: Brand: NEPTUNE E-SEP

## (undated) DEVICE — GOWN,AURORA,NONREINFORCED,LARGE: Brand: MEDLINE

## (undated) DEVICE — LINE AUTOTRNS ASPIR ANTICOAG CELL SAVR 5 + SYS 00208] HAEMONETICS CORP]

## (undated) DEVICE — SVMMC AMPUTATION PK

## (undated) DEVICE — GLOVE ORANGE PI 8 1/2   MSG9085

## (undated) DEVICE — CHLORAPREP 26ML CLEAR

## (undated) DEVICE — GLOVE ORANGE PI 7   MSG9070

## (undated) DEVICE — Z INACTIVE USE 2423038 APPLICATOR MEDICATED 10.5 CC CHLORHEXIDINE GLUC 2%

## (undated) DEVICE — COLUMN DRAPE

## (undated) DEVICE — SUTURE VICRYL + SZ 4-0 L27IN ABSRB UD L26MM SH 1/2 CIR VCP415H

## (undated) DEVICE — ELECTRO LUBE IS A SINGLE PATIENT USE DEVICE THAT IS INTENDED TO BE USED ON ELECTROSURGICAL ELECTRODES TO REDUCE STICKING.: Brand: KEY SURGICAL ELECTRO LUBE

## (undated) DEVICE — TOWEL,OR,DSP,ST,BLUE,DLX,XR,4/PK,20PK/CS: Brand: MEDLINE

## (undated) DEVICE — SUTURE VICRYL SZ 2-0 L27IN ABSRB UD L26MM SH 1/2 CIR J417H

## (undated) DEVICE — COUNTER NDL 40 COUNT HLD 70 FOAM BLK ADH W/ MAG